# Patient Record
Sex: FEMALE | Race: WHITE | NOT HISPANIC OR LATINO | URBAN - METROPOLITAN AREA
[De-identification: names, ages, dates, MRNs, and addresses within clinical notes are randomized per-mention and may not be internally consistent; named-entity substitution may affect disease eponyms.]

---

## 2017-01-09 ENCOUNTER — EMERGENCY (EMERGENCY)
Facility: HOSPITAL | Age: 52
LOS: 0 days | Discharge: HOME | End: 2017-01-09

## 2017-06-27 DIAGNOSIS — Z86.19 PERSONAL HISTORY OF OTHER INFECTIOUS AND PARASITIC DISEASES: ICD-10-CM

## 2017-06-27 DIAGNOSIS — R05 COUGH: ICD-10-CM

## 2017-06-27 DIAGNOSIS — J02.9 ACUTE PHARYNGITIS, UNSPECIFIED: ICD-10-CM

## 2017-06-27 DIAGNOSIS — K21.9 GASTRO-ESOPHAGEAL REFLUX DISEASE WITHOUT ESOPHAGITIS: ICD-10-CM

## 2017-06-27 DIAGNOSIS — Z87.891 PERSONAL HISTORY OF NICOTINE DEPENDENCE: ICD-10-CM

## 2017-06-27 DIAGNOSIS — I10 ESSENTIAL (PRIMARY) HYPERTENSION: ICD-10-CM

## 2017-06-27 DIAGNOSIS — J44.9 CHRONIC OBSTRUCTIVE PULMONARY DISEASE, UNSPECIFIED: ICD-10-CM

## 2017-06-27 DIAGNOSIS — Z90.49 ACQUIRED ABSENCE OF OTHER SPECIFIED PARTS OF DIGESTIVE TRACT: ICD-10-CM

## 2017-06-27 DIAGNOSIS — Z91.018 ALLERGY TO OTHER FOODS: ICD-10-CM

## 2018-05-25 ENCOUNTER — EMERGENCY (EMERGENCY)
Facility: HOSPITAL | Age: 53
LOS: 0 days | Discharge: HOME | End: 2018-05-26
Attending: EMERGENCY MEDICINE | Admitting: EMERGENCY MEDICINE

## 2018-05-25 VITALS
DIASTOLIC BLOOD PRESSURE: 56 MMHG | OXYGEN SATURATION: 84 % | HEART RATE: 89 BPM | RESPIRATION RATE: 20 BRPM | SYSTOLIC BLOOD PRESSURE: 108 MMHG | TEMPERATURE: 98 F

## 2018-05-25 VITALS
DIASTOLIC BLOOD PRESSURE: 74 MMHG | HEART RATE: 99 BPM | RESPIRATION RATE: 20 BRPM | OXYGEN SATURATION: 97 % | TEMPERATURE: 98 F | SYSTOLIC BLOOD PRESSURE: 150 MMHG

## 2018-05-25 DIAGNOSIS — Z90.49 ACQUIRED ABSENCE OF OTHER SPECIFIED PARTS OF DIGESTIVE TRACT: ICD-10-CM

## 2018-05-25 DIAGNOSIS — K57.92 DIVERTICULITIS OF INTESTINE, PART UNSPECIFIED, WITHOUT PERFORATION OR ABSCESS WITHOUT BLEEDING: ICD-10-CM

## 2018-05-25 DIAGNOSIS — Z79.891 LONG TERM (CURRENT) USE OF OPIATE ANALGESIC: ICD-10-CM

## 2018-05-25 DIAGNOSIS — R10.9 UNSPECIFIED ABDOMINAL PAIN: ICD-10-CM

## 2018-05-25 DIAGNOSIS — I10 ESSENTIAL (PRIMARY) HYPERTENSION: ICD-10-CM

## 2018-05-25 DIAGNOSIS — Z87.891 PERSONAL HISTORY OF NICOTINE DEPENDENCE: ICD-10-CM

## 2018-05-25 DIAGNOSIS — Z91.018 ALLERGY TO OTHER FOODS: ICD-10-CM

## 2018-05-25 DIAGNOSIS — J44.9 CHRONIC OBSTRUCTIVE PULMONARY DISEASE, UNSPECIFIED: ICD-10-CM

## 2018-05-25 DIAGNOSIS — Z79.51 LONG TERM (CURRENT) USE OF INHALED STEROIDS: ICD-10-CM

## 2018-05-25 DIAGNOSIS — Z90.49 ACQUIRED ABSENCE OF OTHER SPECIFIED PARTS OF DIGESTIVE TRACT: Chronic | ICD-10-CM

## 2018-05-25 DIAGNOSIS — Z79.899 OTHER LONG TERM (CURRENT) DRUG THERAPY: ICD-10-CM

## 2018-05-25 LAB
ALBUMIN SERPL ELPH-MCNC: 3.7 G/DL — SIGNIFICANT CHANGE UP (ref 3.5–5.2)
ALP SERPL-CCNC: 92 U/L — SIGNIFICANT CHANGE UP (ref 30–115)
ALT FLD-CCNC: 19 U/L — SIGNIFICANT CHANGE UP (ref 0–41)
ANION GAP SERPL CALC-SCNC: 15 MMOL/L — HIGH (ref 7–14)
APTT BLD: 29.6 SEC — SIGNIFICANT CHANGE UP (ref 27–39.2)
AST SERPL-CCNC: 24 U/L — SIGNIFICANT CHANGE UP (ref 0–41)
BASOPHILS # BLD AUTO: 0.03 K/UL — SIGNIFICANT CHANGE UP (ref 0–0.2)
BASOPHILS NFR BLD AUTO: 0.3 % — SIGNIFICANT CHANGE UP (ref 0–1)
BILIRUB SERPL-MCNC: 0.3 MG/DL — SIGNIFICANT CHANGE UP (ref 0.2–1.2)
BUN SERPL-MCNC: 19 MG/DL — SIGNIFICANT CHANGE UP (ref 10–20)
CALCIUM SERPL-MCNC: 9.3 MG/DL — SIGNIFICANT CHANGE UP (ref 8.5–10.1)
CHLORIDE SERPL-SCNC: 98 MMOL/L — SIGNIFICANT CHANGE UP (ref 98–110)
CO2 SERPL-SCNC: 26 MMOL/L — SIGNIFICANT CHANGE UP (ref 17–32)
CREAT SERPL-MCNC: 1 MG/DL — SIGNIFICANT CHANGE UP (ref 0.7–1.5)
EOSINOPHIL # BLD AUTO: 0.4 K/UL — SIGNIFICANT CHANGE UP (ref 0–0.7)
EOSINOPHIL NFR BLD AUTO: 3.8 % — SIGNIFICANT CHANGE UP (ref 0–8)
GLUCOSE SERPL-MCNC: 103 MG/DL — HIGH (ref 70–99)
HCT VFR BLD CALC: 34.1 % — LOW (ref 37–47)
HGB BLD-MCNC: 10.8 G/DL — LOW (ref 12–16)
IMM GRANULOCYTES NFR BLD AUTO: 0.2 % — SIGNIFICANT CHANGE UP (ref 0.1–0.3)
INR BLD: 0.98 RATIO — SIGNIFICANT CHANGE UP (ref 0.65–1.3)
LACTATE SERPL-SCNC: 1.6 MMOL/L — SIGNIFICANT CHANGE UP (ref 0.5–2.2)
LYMPHOCYTES # BLD AUTO: 1.15 K/UL — LOW (ref 1.2–3.4)
LYMPHOCYTES # BLD AUTO: 10.9 % — LOW (ref 20.5–51.1)
MAGNESIUM SERPL-MCNC: 1.7 MG/DL — LOW (ref 1.8–2.4)
MCHC RBC-ENTMCNC: 26.3 PG — LOW (ref 27–31)
MCHC RBC-ENTMCNC: 31.7 G/DL — LOW (ref 32–37)
MCV RBC AUTO: 83.2 FL — SIGNIFICANT CHANGE UP (ref 81–99)
MONOCYTES # BLD AUTO: 0.77 K/UL — HIGH (ref 0.1–0.6)
MONOCYTES NFR BLD AUTO: 7.3 % — SIGNIFICANT CHANGE UP (ref 1.7–9.3)
NEUTROPHILS # BLD AUTO: 8.16 K/UL — HIGH (ref 1.4–6.5)
NEUTROPHILS NFR BLD AUTO: 77.5 % — HIGH (ref 42.2–75.2)
PLATELET # BLD AUTO: 334 K/UL — SIGNIFICANT CHANGE UP (ref 130–400)
POTASSIUM SERPL-MCNC: 4.4 MMOL/L — SIGNIFICANT CHANGE UP (ref 3.5–5)
POTASSIUM SERPL-SCNC: 4.4 MMOL/L — SIGNIFICANT CHANGE UP (ref 3.5–5)
PROT SERPL-MCNC: 6.3 G/DL — SIGNIFICANT CHANGE UP (ref 6–8)
PROTHROM AB SERPL-ACNC: 10.6 SEC — SIGNIFICANT CHANGE UP (ref 9.95–12.87)
RBC # BLD: 4.1 M/UL — LOW (ref 4.2–5.4)
RBC # FLD: 18 % — HIGH (ref 11.5–14.5)
SODIUM SERPL-SCNC: 139 MMOL/L — SIGNIFICANT CHANGE UP (ref 135–146)
WBC # BLD: 10.53 K/UL — SIGNIFICANT CHANGE UP (ref 4.8–10.8)
WBC # FLD AUTO: 10.53 K/UL — SIGNIFICANT CHANGE UP (ref 4.8–10.8)

## 2018-05-25 RX ORDER — MORPHINE SULFATE 50 MG/1
2 CAPSULE, EXTENDED RELEASE ORAL ONCE
Qty: 0 | Refills: 0 | Status: DISCONTINUED | OUTPATIENT
Start: 2018-05-25 | End: 2018-05-25

## 2018-05-25 RX ORDER — HYDROMORPHONE HYDROCHLORIDE 2 MG/ML
1 INJECTION INTRAMUSCULAR; INTRAVENOUS; SUBCUTANEOUS ONCE
Qty: 0 | Refills: 0 | Status: DISCONTINUED | OUTPATIENT
Start: 2018-05-25 | End: 2018-05-25

## 2018-05-25 RX ORDER — MORPHINE SULFATE 50 MG/1
4 CAPSULE, EXTENDED RELEASE ORAL ONCE
Qty: 0 | Refills: 0 | Status: DISCONTINUED | OUTPATIENT
Start: 2018-05-25 | End: 2018-05-25

## 2018-05-25 RX ORDER — CIPROFLOXACIN LACTATE 400MG/40ML
400 VIAL (ML) INTRAVENOUS ONCE
Qty: 0 | Refills: 0 | Status: COMPLETED | OUTPATIENT
Start: 2018-05-25 | End: 2018-05-25

## 2018-05-25 RX ORDER — SODIUM CHLORIDE 9 MG/ML
1000 INJECTION, SOLUTION INTRAVENOUS ONCE
Qty: 0 | Refills: 0 | Status: COMPLETED | OUTPATIENT
Start: 2018-05-25 | End: 2018-05-25

## 2018-05-25 RX ORDER — METRONIDAZOLE 500 MG
500 TABLET ORAL ONCE
Qty: 0 | Refills: 0 | Status: COMPLETED | OUTPATIENT
Start: 2018-05-25 | End: 2018-05-25

## 2018-05-25 RX ORDER — ONDANSETRON 8 MG/1
4 TABLET, FILM COATED ORAL ONCE
Qty: 0 | Refills: 0 | Status: COMPLETED | OUTPATIENT
Start: 2018-05-25 | End: 2018-05-25

## 2018-05-25 RX ADMIN — HYDROMORPHONE HYDROCHLORIDE 1 MILLIGRAM(S): 2 INJECTION INTRAMUSCULAR; INTRAVENOUS; SUBCUTANEOUS at 21:51

## 2018-05-25 RX ADMIN — Medication 200 MILLIGRAM(S): at 21:52

## 2018-05-25 RX ADMIN — ONDANSETRON 4 MILLIGRAM(S): 8 TABLET, FILM COATED ORAL at 23:53

## 2018-05-25 RX ADMIN — Medication 100 MILLIGRAM(S): at 21:52

## 2018-05-25 RX ADMIN — HYDROMORPHONE HYDROCHLORIDE 1 MILLIGRAM(S): 2 INJECTION INTRAMUSCULAR; INTRAVENOUS; SUBCUTANEOUS at 22:35

## 2018-05-25 RX ADMIN — MORPHINE SULFATE 2 MILLIGRAM(S): 50 CAPSULE, EXTENDED RELEASE ORAL at 18:24

## 2018-05-25 RX ADMIN — MORPHINE SULFATE 2 MILLIGRAM(S): 50 CAPSULE, EXTENDED RELEASE ORAL at 19:30

## 2018-05-25 RX ADMIN — SODIUM CHLORIDE 1000 MILLILITER(S): 9 INJECTION, SOLUTION INTRAVENOUS at 21:46

## 2018-05-25 NOTE — ED PROVIDER NOTE - PHYSICAL EXAMINATION
PHYSICAL EXAM:    T(F): 97.7, Max: 97.7 (05-25-18 @ 15:53)  HR: 99  BP: 150/74  BP(mean): --  RR: 20  SpO2: 97%  GENERAL: NAD, well-developed  HEAD:  Atraumatic, Normocephalic  EYES: EOMI, PERRLA, conjunctiva and sclera clear  NECK: Supple, No JVD  CHEST/LUNG: Clear to auscultation bilaterally; No wheeze  HEART: Regular rate and rhythm; No murmurs, rubs, or gallops  ABDOMEN: tenderness to palpation of LLQ abdomen, rebound tenderness present, no guarding  EXTREMITIES:  2+ Peripheral Pulses, No clubbing, cyanosis, or edema  PSYCH: AAOx3  NEUROLOGY: non-focal  SKIN: No rashes or lesions

## 2018-05-25 NOTE — ED PROVIDER NOTE - PMH
COPD (chronic obstructive pulmonary disease)    Diverticulitis    HTN (hypertension)    Nasopharyngeal cancer    Pulmonary nodules/lesions, multiple

## 2018-05-25 NOTE — ED ADULT NURSE REASSESSMENT NOTE - NS ED NURSE REASSESS COMMENT FT1
Pt reassessed A.O times 4 Vs stable report filling much better Ed attending made aware , NAD noted pt care transfer to next shift RN .

## 2018-05-25 NOTE — ED PROVIDER NOTE - PROGRESS NOTE DETAILS
abdominal pain r/o diverticultiis. will order ct scan, labs, guaiac and re-assess I personally evaluated the patient. I reviewed the Resident’s or Physician Assistant’s note (as assigned above), and agree with the findings and plan except as documented in my note.  51 Y/O F COPD, NASOPHARYNGEAL CA S/P CHEMO/RT, DIVERTCULITIS C/O LLQ PAIN X 2 DAYS. PAIN IS CONSTANT AND WORSENING AND NONRADIATING. PT WITH SEVERAL DAYS OF LOOSE STOOL ALTERNATING WITH "HARD STOOL."PT WITH EPISODES OF LOOSE STOOL MIXED WITH BLOOD OVER THE PAST 3 DAYS. + NAUSEA. NO VOMITING. NO FEVER, CHILS. NORMAL URINATION. NO BACK PAIN. PT HAD A CT SCAN TO FOLLOW UP AN ABNORMAL PET SCAN LAST WEEK AND IT REVEALED A COLITIS WHICH PT DID NOT FOLLOW UP. PSHX: ANN-MARIE. VITALS NOTED. ALERT OX3 MILD DISTRESS DUE TO PAIN. OP NORMAL. MMM. LUNGS CLEAR B/L. RRR S1S2. ABD- SOFT + DISTENDED. + L SIDED ABD TENDERNESS, INCREASED IN LLQ. NO REBOUND. + GUARDING. BACK NONTENDER. NO CVAT B/L. PT SIGNED OUT TO DR. SOLOMON, FOLLOW UP CT SCAN, REASSESS AND DISPO. pt feeling improved with pain, but started feeling nauseated.  will  give antiemetic and reassess, po challenge.  pt with acute diverticulitis, no abscess.  already started on abx pt tolerated po.  will give a few days of percocet for pain, but strongly urged to take stool softeners for the pain.  pt offered admission and pt preferring to go home.

## 2018-05-25 NOTE — ED PROVIDER NOTE - MEDICAL DECISION MAKING DETAILS
wrap up note:  acute diverticulitis, no abscess or perf, started on abx, will need pain meds.  advised to f/u with her gi.  return if sx worsen  Chart finished.

## 2018-05-25 NOTE — ED PROVIDER NOTE - NS ED ROS FT
REVIEW OF SYSTEMS:    CONSTITUTIONAL: No weakness, fevers or chills  EYES/ENT: No visual changes;  No vertigo or throat pain   NECK: No pain or stiffness  RESPIRATORY: No cough, wheezing, hemoptysis; No shortness of breath  CARDIOVASCULAR: No chest pain or palpitations  GASTROINTESTINAL: abdominal pain, diarrhea  GENITOURINARY: No dysuria, frequency or hematuria  NEUROLOGICAL: No numbness or weakness  SKIN: No itching, rashes

## 2018-05-25 NOTE — ED PROVIDER NOTE - OBJECTIVE STATEMENT
52 year old F with COPD, Diverticulitis, pulm nodules, and nasopharyngioma s/p chemo/rad, presenting for a 1 day history or LLQ abdominal pain, nonradiating, dull, associated with nausea and 2x bloody diarrhea episodes. Pt describes pain similar to prior episodes of diverticulitis. Pt denies any fever, chills, sick contacts or recent travel. Pt states that 1 month ptp, pt underwent CT abd/pelvis showing colitis but did not seek further care or follow up on it due to absence of symptoms at that time.

## 2018-05-26 RX ORDER — METRONIDAZOLE 500 MG
1 TABLET ORAL
Qty: 30 | Refills: 0 | OUTPATIENT
Start: 2018-05-26 | End: 2018-06-04

## 2018-05-26 RX ORDER — DOCUSATE SODIUM 100 MG
1 CAPSULE ORAL
Qty: 30 | Refills: 0 | OUTPATIENT
Start: 2018-05-26 | End: 2018-06-04

## 2018-05-26 RX ORDER — MOXIFLOXACIN HYDROCHLORIDE TABLETS, 400 MG 400 MG/1
1 TABLET, FILM COATED ORAL
Qty: 20 | Refills: 0 | OUTPATIENT
Start: 2018-05-26 | End: 2018-06-04

## 2018-06-18 ENCOUNTER — INPATIENT (INPATIENT)
Facility: HOSPITAL | Age: 53
LOS: 3 days | Discharge: HOME | End: 2018-06-22
Attending: INTERNAL MEDICINE | Admitting: INTERNAL MEDICINE

## 2018-06-18 VITALS
DIASTOLIC BLOOD PRESSURE: 76 MMHG | HEART RATE: 90 BPM | OXYGEN SATURATION: 97 % | SYSTOLIC BLOOD PRESSURE: 130 MMHG | RESPIRATION RATE: 18 BRPM | TEMPERATURE: 98 F

## 2018-06-18 DIAGNOSIS — K57.92 DIVERTICULITIS OF INTESTINE, PART UNSPECIFIED, WITHOUT PERFORATION OR ABSCESS WITHOUT BLEEDING: ICD-10-CM

## 2018-06-18 DIAGNOSIS — Z90.49 ACQUIRED ABSENCE OF OTHER SPECIFIED PARTS OF DIGESTIVE TRACT: Chronic | ICD-10-CM

## 2018-06-18 LAB
ALBUMIN SERPL ELPH-MCNC: 3.9 G/DL — SIGNIFICANT CHANGE UP (ref 3.5–5.2)
ALP SERPL-CCNC: 76 U/L — SIGNIFICANT CHANGE UP (ref 30–115)
ALT FLD-CCNC: 15 U/L — SIGNIFICANT CHANGE UP (ref 0–41)
ANION GAP SERPL CALC-SCNC: 15 MMOL/L — HIGH (ref 7–14)
APPEARANCE UR: CLEAR — SIGNIFICANT CHANGE UP
AST SERPL-CCNC: 17 U/L — SIGNIFICANT CHANGE UP (ref 0–41)
BACTERIA # UR AUTO: (no result) /HPF
BASOPHILS # BLD AUTO: 0.03 K/UL — SIGNIFICANT CHANGE UP (ref 0–0.2)
BASOPHILS NFR BLD AUTO: 0.4 % — SIGNIFICANT CHANGE UP (ref 0–1)
BILIRUB SERPL-MCNC: <0.2 MG/DL — SIGNIFICANT CHANGE UP (ref 0.2–1.2)
BILIRUB UR-MCNC: NEGATIVE — SIGNIFICANT CHANGE UP
BUN SERPL-MCNC: 17 MG/DL — SIGNIFICANT CHANGE UP (ref 10–20)
CALCIUM SERPL-MCNC: 8.7 MG/DL — SIGNIFICANT CHANGE UP (ref 8.5–10.1)
CHLORIDE SERPL-SCNC: 98 MMOL/L — SIGNIFICANT CHANGE UP (ref 98–110)
CO2 SERPL-SCNC: 24 MMOL/L — SIGNIFICANT CHANGE UP (ref 17–32)
COLOR SPEC: YELLOW — SIGNIFICANT CHANGE UP
CREAT SERPL-MCNC: 0.9 MG/DL — SIGNIFICANT CHANGE UP (ref 0.7–1.5)
DIFF PNL FLD: NEGATIVE — SIGNIFICANT CHANGE UP
EOSINOPHIL # BLD AUTO: 0.31 K/UL — SIGNIFICANT CHANGE UP (ref 0–0.7)
EOSINOPHIL NFR BLD AUTO: 3.7 % — SIGNIFICANT CHANGE UP (ref 0–8)
EPI CELLS # UR: (no result) /HPF
GLUCOSE SERPL-MCNC: 102 MG/DL — HIGH (ref 70–99)
GLUCOSE UR QL: NEGATIVE MG/DL — SIGNIFICANT CHANGE UP
HCT VFR BLD CALC: 34.6 % — LOW (ref 37–47)
HGB BLD-MCNC: 10.9 G/DL — LOW (ref 12–16)
IMM GRANULOCYTES NFR BLD AUTO: 0.4 % — HIGH (ref 0.1–0.3)
KETONES UR-MCNC: NEGATIVE — SIGNIFICANT CHANGE UP
LACTATE SERPL-SCNC: 1.1 MMOL/L — SIGNIFICANT CHANGE UP (ref 0.5–2.2)
LEUKOCYTE ESTERASE UR-ACNC: NEGATIVE — SIGNIFICANT CHANGE UP
LIDOCAIN IGE QN: 24 U/L — SIGNIFICANT CHANGE UP (ref 7–60)
LYMPHOCYTES # BLD AUTO: 1.08 K/UL — LOW (ref 1.2–3.4)
LYMPHOCYTES # BLD AUTO: 12.9 % — LOW (ref 20.5–51.1)
MCHC RBC-ENTMCNC: 26.3 PG — LOW (ref 27–31)
MCHC RBC-ENTMCNC: 31.5 G/DL — LOW (ref 32–37)
MCV RBC AUTO: 83.4 FL — SIGNIFICANT CHANGE UP (ref 81–99)
MONOCYTES # BLD AUTO: 0.54 K/UL — SIGNIFICANT CHANGE UP (ref 0.1–0.6)
MONOCYTES NFR BLD AUTO: 6.5 % — SIGNIFICANT CHANGE UP (ref 1.7–9.3)
NEUTROPHILS # BLD AUTO: 6.35 K/UL — SIGNIFICANT CHANGE UP (ref 1.4–6.5)
NEUTROPHILS NFR BLD AUTO: 76.1 % — HIGH (ref 42.2–75.2)
NITRITE UR-MCNC: NEGATIVE — SIGNIFICANT CHANGE UP
NRBC # BLD: 0 /100 WBCS — SIGNIFICANT CHANGE UP (ref 0–0)
PH UR: 6 — SIGNIFICANT CHANGE UP (ref 5–8)
PLATELET # BLD AUTO: 319 K/UL — SIGNIFICANT CHANGE UP (ref 130–400)
POTASSIUM SERPL-MCNC: 3.9 MMOL/L — SIGNIFICANT CHANGE UP (ref 3.5–5)
POTASSIUM SERPL-SCNC: 3.9 MMOL/L — SIGNIFICANT CHANGE UP (ref 3.5–5)
PROT SERPL-MCNC: 6.6 G/DL — SIGNIFICANT CHANGE UP (ref 6–8)
PROT UR-MCNC: 30 MG/DL
RBC # BLD: 4.15 M/UL — LOW (ref 4.2–5.4)
RBC # FLD: 17 % — HIGH (ref 11.5–14.5)
SODIUM SERPL-SCNC: 137 MMOL/L — SIGNIFICANT CHANGE UP (ref 135–146)
SP GR SPEC: 1.02 — SIGNIFICANT CHANGE UP (ref 1.01–1.03)
UROBILINOGEN FLD QL: 0.2 MG/DL — SIGNIFICANT CHANGE UP (ref 0.2–0.2)
WBC # BLD: 8.34 K/UL — SIGNIFICANT CHANGE UP (ref 4.8–10.8)
WBC # FLD AUTO: 8.34 K/UL — SIGNIFICANT CHANGE UP (ref 4.8–10.8)
WBC UR QL: SIGNIFICANT CHANGE UP /HPF

## 2018-06-18 RX ORDER — PANTOPRAZOLE SODIUM 20 MG/1
40 TABLET, DELAYED RELEASE ORAL
Qty: 0 | Refills: 0 | Status: DISCONTINUED | OUTPATIENT
Start: 2018-06-18 | End: 2018-06-22

## 2018-06-18 RX ORDER — LEVOTHYROXINE SODIUM 125 MCG
88 TABLET ORAL DAILY
Qty: 0 | Refills: 0 | Status: DISCONTINUED | OUTPATIENT
Start: 2018-06-18 | End: 2018-06-22

## 2018-06-18 RX ORDER — CIPROFLOXACIN LACTATE 400MG/40ML
400 VIAL (ML) INTRAVENOUS EVERY 12 HOURS
Qty: 0 | Refills: 0 | Status: DISCONTINUED | OUTPATIENT
Start: 2018-06-19 | End: 2018-06-19

## 2018-06-18 RX ORDER — HYDROMORPHONE HYDROCHLORIDE 2 MG/ML
4 INJECTION INTRAMUSCULAR; INTRAVENOUS; SUBCUTANEOUS EVERY 6 HOURS
Qty: 0 | Refills: 0 | Status: DISCONTINUED | OUTPATIENT
Start: 2018-06-18 | End: 2018-06-22

## 2018-06-18 RX ORDER — CYCLOBENZAPRINE HYDROCHLORIDE 10 MG/1
0 TABLET, FILM COATED ORAL
Qty: 60 | Refills: 0 | COMMUNITY

## 2018-06-18 RX ORDER — BUPROPION HYDROCHLORIDE 150 MG/1
300 TABLET, EXTENDED RELEASE ORAL DAILY
Qty: 0 | Refills: 0 | Status: DISCONTINUED | OUTPATIENT
Start: 2018-06-18 | End: 2018-06-22

## 2018-06-18 RX ORDER — ONDANSETRON 8 MG/1
8 TABLET, FILM COATED ORAL THREE TIMES A DAY
Qty: 0 | Refills: 0 | Status: DISCONTINUED | OUTPATIENT
Start: 2018-06-18 | End: 2018-06-20

## 2018-06-18 RX ORDER — SODIUM CHLORIDE 9 MG/ML
1000 INJECTION, SOLUTION INTRAVENOUS
Qty: 0 | Refills: 0 | Status: DISCONTINUED | OUTPATIENT
Start: 2018-06-18 | End: 2018-06-19

## 2018-06-18 RX ORDER — CIPROFLOXACIN LACTATE 400MG/40ML
400 VIAL (ML) INTRAVENOUS ONCE
Qty: 0 | Refills: 0 | Status: COMPLETED | OUTPATIENT
Start: 2018-06-18 | End: 2018-06-18

## 2018-06-18 RX ORDER — ALBUTEROL 90 UG/1
2 AEROSOL, METERED ORAL EVERY 6 HOURS
Qty: 0 | Refills: 0 | Status: DISCONTINUED | OUTPATIENT
Start: 2018-06-18 | End: 2018-06-22

## 2018-06-18 RX ORDER — CLONAZEPAM 1 MG
0 TABLET ORAL
Qty: 60 | Refills: 0 | COMMUNITY

## 2018-06-18 RX ORDER — HYDROMORPHONE HYDROCHLORIDE 2 MG/ML
1 INJECTION INTRAMUSCULAR; INTRAVENOUS; SUBCUTANEOUS ONCE
Qty: 0 | Refills: 0 | Status: DISCONTINUED | OUTPATIENT
Start: 2018-06-18 | End: 2018-06-18

## 2018-06-18 RX ORDER — ZOLPIDEM TARTRATE 10 MG/1
5 TABLET ORAL AT BEDTIME
Qty: 0 | Refills: 0 | Status: DISCONTINUED | OUTPATIENT
Start: 2018-06-18 | End: 2018-06-22

## 2018-06-18 RX ORDER — CLONAZEPAM 1 MG
0.5 TABLET ORAL
Qty: 0 | Refills: 0 | Status: DISCONTINUED | OUTPATIENT
Start: 2018-06-18 | End: 2018-06-22

## 2018-06-18 RX ORDER — DOCUSATE SODIUM 100 MG
100 CAPSULE ORAL THREE TIMES A DAY
Qty: 0 | Refills: 0 | Status: DISCONTINUED | OUTPATIENT
Start: 2018-06-18 | End: 2018-06-22

## 2018-06-18 RX ORDER — ONDANSETRON 8 MG/1
4 TABLET, FILM COATED ORAL ONCE
Qty: 0 | Refills: 0 | Status: COMPLETED | OUTPATIENT
Start: 2018-06-18 | End: 2018-06-18

## 2018-06-18 RX ORDER — IBUPROFEN 200 MG
600 TABLET ORAL THREE TIMES A DAY
Qty: 0 | Refills: 0 | Status: DISCONTINUED | OUTPATIENT
Start: 2018-06-18 | End: 2018-06-22

## 2018-06-18 RX ORDER — METRONIDAZOLE 500 MG
500 TABLET ORAL EVERY 8 HOURS
Qty: 0 | Refills: 0 | Status: DISCONTINUED | OUTPATIENT
Start: 2018-06-18 | End: 2018-06-22

## 2018-06-18 RX ORDER — SENNA PLUS 8.6 MG/1
1 TABLET ORAL
Qty: 0 | Refills: 0 | Status: DISCONTINUED | OUTPATIENT
Start: 2018-06-18 | End: 2018-06-22

## 2018-06-18 RX ORDER — ENOXAPARIN SODIUM 100 MG/ML
40 INJECTION SUBCUTANEOUS DAILY
Qty: 0 | Refills: 0 | Status: DISCONTINUED | OUTPATIENT
Start: 2018-06-18 | End: 2018-06-22

## 2018-06-18 RX ORDER — CIPROFLOXACIN LACTATE 400MG/40ML
VIAL (ML) INTRAVENOUS
Qty: 0 | Refills: 0 | Status: DISCONTINUED | OUTPATIENT
Start: 2018-06-18 | End: 2018-06-19

## 2018-06-18 RX ORDER — SODIUM CHLORIDE 9 MG/ML
1000 INJECTION, SOLUTION INTRAVENOUS ONCE
Qty: 0 | Refills: 0 | Status: COMPLETED | OUTPATIENT
Start: 2018-06-18 | End: 2018-06-18

## 2018-06-18 RX ORDER — LEVOTHYROXINE SODIUM 125 MCG
0 TABLET ORAL
Qty: 30 | Refills: 0 | COMMUNITY

## 2018-06-18 RX ORDER — DIPHENHYDRAMINE HCL 50 MG
25 CAPSULE ORAL ONCE
Qty: 0 | Refills: 0 | Status: COMPLETED | OUTPATIENT
Start: 2018-06-18 | End: 2018-06-18

## 2018-06-18 RX ORDER — CYCLOBENZAPRINE HYDROCHLORIDE 10 MG/1
10 TABLET, FILM COATED ORAL THREE TIMES A DAY
Qty: 0 | Refills: 0 | Status: DISCONTINUED | OUTPATIENT
Start: 2018-06-18 | End: 2018-06-22

## 2018-06-18 RX ORDER — TIOTROPIUM BROMIDE 18 UG/1
1 CAPSULE ORAL; RESPIRATORY (INHALATION) DAILY
Qty: 0 | Refills: 0 | Status: DISCONTINUED | OUTPATIENT
Start: 2018-06-18 | End: 2018-06-22

## 2018-06-18 RX ORDER — PIPERACILLIN AND TAZOBACTAM 4; .5 G/20ML; G/20ML
3.38 INJECTION, POWDER, LYOPHILIZED, FOR SOLUTION INTRAVENOUS ONCE
Qty: 0 | Refills: 0 | Status: COMPLETED | OUTPATIENT
Start: 2018-06-18 | End: 2018-06-18

## 2018-06-18 RX ORDER — BUDESONIDE AND FORMOTEROL FUMARATE DIHYDRATE 160; 4.5 UG/1; UG/1
2 AEROSOL RESPIRATORY (INHALATION)
Qty: 0 | Refills: 0 | Status: DISCONTINUED | OUTPATIENT
Start: 2018-06-18 | End: 2018-06-22

## 2018-06-18 RX ORDER — PANTOPRAZOLE SODIUM 20 MG/1
0 TABLET, DELAYED RELEASE ORAL
Qty: 30 | Refills: 0 | COMMUNITY

## 2018-06-18 RX ORDER — OXYCODONE AND ACETAMINOPHEN 5; 325 MG/1; MG/1
2 TABLET ORAL EVERY 6 HOURS
Qty: 0 | Refills: 0 | Status: DISCONTINUED | OUTPATIENT
Start: 2018-06-18 | End: 2018-06-22

## 2018-06-18 RX ORDER — MONTELUKAST 4 MG/1
10 TABLET, CHEWABLE ORAL DAILY
Qty: 0 | Refills: 0 | Status: DISCONTINUED | OUTPATIENT
Start: 2018-06-18 | End: 2018-06-22

## 2018-06-18 RX ORDER — ZOLPIDEM TARTRATE 10 MG/1
0 TABLET ORAL
Qty: 30 | Refills: 0 | COMMUNITY

## 2018-06-18 RX ORDER — BUPROPION HYDROCHLORIDE 150 MG/1
0 TABLET, EXTENDED RELEASE ORAL
Qty: 30 | Refills: 0 | COMMUNITY

## 2018-06-18 RX ADMIN — Medication 100 MILLIGRAM(S): at 23:56

## 2018-06-18 RX ADMIN — Medication 25 MILLIGRAM(S): at 22:27

## 2018-06-18 RX ADMIN — HYDROMORPHONE HYDROCHLORIDE 1 MILLIGRAM(S): 2 INJECTION INTRAMUSCULAR; INTRAVENOUS; SUBCUTANEOUS at 17:14

## 2018-06-18 RX ADMIN — HYDROMORPHONE HYDROCHLORIDE 1 MILLIGRAM(S): 2 INJECTION INTRAMUSCULAR; INTRAVENOUS; SUBCUTANEOUS at 16:23

## 2018-06-18 RX ADMIN — Medication 200 MILLIGRAM(S): at 21:19

## 2018-06-18 RX ADMIN — ONDANSETRON 4 MILLIGRAM(S): 8 TABLET, FILM COATED ORAL at 16:16

## 2018-06-18 RX ADMIN — SODIUM CHLORIDE 1000 MILLILITER(S): 9 INJECTION, SOLUTION INTRAVENOUS at 16:16

## 2018-06-18 RX ADMIN — PIPERACILLIN AND TAZOBACTAM 200 GRAM(S): 4; .5 INJECTION, POWDER, LYOPHILIZED, FOR SOLUTION INTRAVENOUS at 18:29

## 2018-06-18 RX ADMIN — HYDROMORPHONE HYDROCHLORIDE 4 MILLIGRAM(S): 2 INJECTION INTRAMUSCULAR; INTRAVENOUS; SUBCUTANEOUS at 21:19

## 2018-06-18 RX ADMIN — HYDROMORPHONE HYDROCHLORIDE 4 MILLIGRAM(S): 2 INJECTION INTRAMUSCULAR; INTRAVENOUS; SUBCUTANEOUS at 22:30

## 2018-06-18 RX ADMIN — BUDESONIDE AND FORMOTEROL FUMARATE DIHYDRATE 2 PUFF(S): 160; 4.5 AEROSOL RESPIRATORY (INHALATION) at 21:20

## 2018-06-18 RX ADMIN — SODIUM CHLORIDE 75 MILLILITER(S): 9 INJECTION, SOLUTION INTRAVENOUS at 21:19

## 2018-06-18 RX ADMIN — Medication 100 MILLIGRAM(S): at 23:57

## 2018-06-18 NOTE — H&P ADULT - ATTENDING COMMENTS
53 y/o female with a history of diverticulitis, recently d/lynda from the ED MAy 25th with cipro and flagyl with o/p f/u with a gastroenterologist presents with acute sigmoid diverticulitis and no abscess formation.    pt seen and examined in er    chart reviwed    agree with plan above    IV abx    GI and surgery f/u    ID eval if any fever    stat repeat CT if any worsening pain    frequent abdominal exams    Call or text with any questions or updates

## 2018-06-18 NOTE — H&P ADULT - NSHPLABSRESULTS_GEN_ALL_CORE
T(F): 97.5 (18 @ 15:01), Max: 97.5 (18 @ 15:01)  HR: 90 (18 @ 15:01) (90 - 90)  BP: 130/76 (18 @ 15:01) (130/76 - 130/76)  BP(mean): --  RR: 18 (18 @ 15:01) (18 - 18)  SpO2: 97% (18 @ 15:01) (97% - 97%)        137  |  98  |  17  ----------------------------<  102<H>  3.9   |  24  |  0.9    Ca    8.7      2018 16:05    TPro  6.6  /  Alb  3.9  /  TBili  <0.2  /  DBili  x   /  AST  17  /  ALT  15  /  AlkPhos  76                              10.9   8.34  )-----------( 319      ( 2018 16:05 )             34.6         Urinalysis Basic - ( 2018 16:05 )    Color: Yellow / Appearance: Clear / S.020 / pH: x  Gluc: x / Ketone: Negative  / Bili: Negative / Urobili: 0.2 mg/dL   Blood: x / Protein: 30 mg/dL / Nitrite: Negative   Leuk Esterase: Negative / RBC: x / WBC 3-5 /HPF   Sq Epi: x / Non Sq Epi: Moderate /HPF / Bacteria: Few /HPF         < from: CT Abdomen and Pelvis w/ IV Cont (18 @ 17:07) >    1. Acute sigmoid diverticulitis; no abscess.    < end of copied text >

## 2018-06-18 NOTE — H&P ADULT - ASSESSMENT
51 y/o female with a history of diverticulitis, recently d/lynda from the ED MAy 25th with cipro and flagyl with o/p f/u with a gastroenterologist presents with acute sigmoid diverticulitis and no abscess formation.          #acute/recurrent diverticulitis  -admit to medicine  -NPO except meds and ice chips  -GI evaluation  -surgery evaluation given that this is recurrent  - IVF- LR @100cc/hr  -cipro+ flagyl IV  -pain control      #COPD not on home O2  -symbicort + spiriva (pt on breo + incruse at home)  - ventolin prn    #GERD  -protonix qd    #anxiety  clonazepam prn + bupropion    #insomnia  zolpidem    #htn  -enalapril 5mg    #h/a  pain control    #cervical discopathy  -percocet prn for mod pain  -dilaudid po prn for severe pain  -flexeril for muscle spasms    #constipation/ nausea  colace 100mg tid  senna bid  zofran prn    #insomnia  zolpidem    #dvt ppx

## 2018-06-18 NOTE — H&P ADULT - PMH
Cervical disc disease  sequelae of radtion for nasopharyngeal cancer  COPD (chronic obstructive pulmonary disease)    Diverticulitis    Hemorrhoids    HTN (hypertension)    Hypothyroid    Left ear hearing loss    Nasopharyngeal cancer    Pulmonary nodules/lesions, multiple

## 2018-06-18 NOTE — ED PROVIDER NOTE - PHYSICAL EXAMINATION
CONSTITUTIONAL: WA / WN / NAD  HEAD: NCAT  EYES: PERRL; EOMI; anicteric.  ENT: Normal pharynx; mucous membranes pink/moist, no erythema.  NECK: Supple; no meningeal signs  CARD: RRR; nl S1/S2; no M/R/G. Pulses equal bilaterally.  RESP: Respiratory rate and effort are normal; breath sounds clear and equal bilaterally.  ABD: Soft,  + RLQ & LLQ abdominal tenderness  MSK/EXT: No gross deformities;  SKIN: Warm and dry;   NEURO: AAOx3, Motor 5/5 x 4 extremities  PSYCH: Memory Intact, Normal Affect

## 2018-06-18 NOTE — ED PROVIDER NOTE - NS ED ROS FT
Constitutional:  See HPI.  ENMT:  No hearing changes, pain, discharge or infections. No neck pain or stiffness.  Cardiac:  No chest pain, SOB o  Respiratory:  No cough or respiratory distress.   GI:  see hpi  :  No dysuria, frequency or burning.  MS:  No myalgia, muscle weakness, joint pain or back pain.  Neuro:  No focal neurological complaints.

## 2018-06-18 NOTE — ED PROVIDER NOTE - PROGRESS NOTE DETAILS
Attending Note: 53 y/o F PMHx diverticulitis presents with L lower abdominal pain since last night associated with blood in stool and constipation. (+) nausea, no vomiting. No fevers or chills. No CP, some SOB with pain. No back pain. Pt able to recall 4 episodes of diverticulitis throughout her life but reports after last episode x1 month ago her stomach has not felt the same. PE: Pt in NAD. AAOX3. Head NCAT. Lungs CTAB. CV S1S2 regular. Abdomen soft, LLQ ND. No CVAT. Plan: CT and reassess. Attending Note: 51 y/o F, PMHx diverticulitis, last episode 1 mo ago, presents with L lower abdominal pain since last night associated with blood in stool and nausea, no vomiting. No fevers or chills. No CP/SOB. No back pain. No urinary symptoms. PE: Pt in NAD. AAOX3. Head NCAT. Lungs CTAB. CV S1S2 regular. Abdomen soft/LLQ abdominal pain/ND/(+)BS, back (-) CVAT. Plan: CT, labs, and reassess.

## 2018-06-18 NOTE — H&P ADULT - NSHPPHYSICALEXAM_GEN_ALL_CORE
PHYSICAL EXAM:  GENERAL: NAD, speaks in full sentences, no signs of respiratory distress, morbidly obese female  HEAD:  Atraumatic, Normocephalic, decreased left sidede hearing  EYES:  conjunctiva and sclera clear  NECK: Supple,   CHEST/LUNG: Clear to auscultation bilaterally; No wheeze; No crackles; No accessory muscles used  HEART: Regular rate and rhythm; No murmurs;   ABDOMEN: Soft, Nondistended;  No guarding, TTP LLQ and RLQ, no rebound tenderness  EXTREMITIES:  No cyanosis or edema  PSYCH: AAOx3  NEUROLOGY: non-focal  SKIN: No rashes or lesions

## 2018-06-18 NOTE — ED ADULT NURSE NOTE - OBJECTIVE STATEMENT
pt comes in with complaints of left lower abdominal pain that radiates to groin. feels like labor pains. denies fever. no diarrhea. pt states she is unable to go to bathroom. not sure on how many days its been. pain started last night.  has nausea no vomiting. pt states she placed a gloved finger up her rectum thinking her stool was hard, states red blood and white jelly material came out but no stool.

## 2018-06-18 NOTE — H&P ADULT - HISTORY OF PRESENT ILLNESS
51 yo female with history of recurrent diverticulitis, COPD, pulmonary nodules, hypothyroidism, nasopharygneal cancer s/p chemo + radiation (2013 leading to cervical discopathy and left sided hearing loss p/w 1 day history of worsening LLQ abdominal pain  a/w nausea and diarrhea. Patient was recently at [facility] in May for diverticulitis, seen in the ED and discharged with a course of cipro and flagyl. Per patient she states she never fully recovered while at home, states that she was still continuing to have diarrhea and was having on and off diffuse headaches over the past 2 weeks. 24 hour ptp patient states she was constipated and overnight she tried to disimpact herself (which she has done in the past) and feels that she may have aggravated her hemorrhoids because she had blood streaked stool afterward, and at times when she does strain she noticed that she has blood streaked stool as well. Her abdominal pain improved since her discharge in the ED from may but 1 night ptp it reoccurred prompting her to come to the ED for further evaluation. IN the ED patient was found to have acute sigmoid diverticulitis on CT imaging was given zosyn, 1 liter LR bolus.

## 2018-06-18 NOTE — CONSULT NOTE ADULT - ASSESSMENT
53y/o female with abdominal pain from recurrent diverticulitis  NPO  IV fluids  IV abx  Colonoscopy after 6 weeks  Recommend surgery after colonscopy after discharge  Will continue to follow

## 2018-06-18 NOTE — ED PROVIDER NOTE - OBJECTIVE STATEMENT
52 year old female with a pmh of diveriticulitits copd , pulm nodules nasopharyngea carcinoma in the past s/p chemo and radiation presents here c/o llq abdominal pain that began suddenly last night . Patient states the pain is similar to her previous episode of diveriticulittis 1 month ago when she received cipro & flagyl. Patient states she has had blood in her stool yesterday as well. Patient admits to having nausea. Denies fever chills cp urinary frequency or urgency.

## 2018-06-18 NOTE — CONSULT NOTE ADULT - PROBLEM SELECTOR RECOMMENDATION 9
NPO  IV fluids  IV abx  Colonoscopy after 6 weeks  Recommend surgery after colonscopy after discharge  Will continue to follow

## 2018-06-19 LAB
ALBUMIN SERPL ELPH-MCNC: 3.7 G/DL — SIGNIFICANT CHANGE UP (ref 3.5–5.2)
ALP SERPL-CCNC: 78 U/L — SIGNIFICANT CHANGE UP (ref 30–115)
ALT FLD-CCNC: 15 U/L — SIGNIFICANT CHANGE UP (ref 0–41)
ANION GAP SERPL CALC-SCNC: 14 MMOL/L — SIGNIFICANT CHANGE UP (ref 7–14)
AST SERPL-CCNC: 16 U/L — SIGNIFICANT CHANGE UP (ref 0–41)
BASOPHILS # BLD AUTO: 0.03 K/UL — SIGNIFICANT CHANGE UP (ref 0–0.2)
BASOPHILS NFR BLD AUTO: 0.4 % — SIGNIFICANT CHANGE UP (ref 0–1)
BILIRUB SERPL-MCNC: <0.2 MG/DL — SIGNIFICANT CHANGE UP (ref 0.2–1.2)
BUN SERPL-MCNC: 14 MG/DL — SIGNIFICANT CHANGE UP (ref 10–20)
CALCIUM SERPL-MCNC: 8.6 MG/DL — SIGNIFICANT CHANGE UP (ref 8.5–10.1)
CHLORIDE SERPL-SCNC: 100 MMOL/L — SIGNIFICANT CHANGE UP (ref 98–110)
CO2 SERPL-SCNC: 27 MMOL/L — SIGNIFICANT CHANGE UP (ref 17–32)
CREAT SERPL-MCNC: 0.9 MG/DL — SIGNIFICANT CHANGE UP (ref 0.7–1.5)
EOSINOPHIL # BLD AUTO: 0.35 K/UL — SIGNIFICANT CHANGE UP (ref 0–0.7)
EOSINOPHIL NFR BLD AUTO: 4.5 % — SIGNIFICANT CHANGE UP (ref 0–8)
GLUCOSE SERPL-MCNC: 111 MG/DL — HIGH (ref 70–99)
HCT VFR BLD CALC: 32.9 % — LOW (ref 37–47)
HGB BLD-MCNC: 10.5 G/DL — LOW (ref 12–16)
IMM GRANULOCYTES NFR BLD AUTO: 0.3 % — SIGNIFICANT CHANGE UP (ref 0.1–0.3)
LYMPHOCYTES # BLD AUTO: 0.97 K/UL — LOW (ref 1.2–3.4)
LYMPHOCYTES # BLD AUTO: 12.4 % — LOW (ref 20.5–51.1)
MCHC RBC-ENTMCNC: 26.9 PG — LOW (ref 27–31)
MCHC RBC-ENTMCNC: 31.9 G/DL — LOW (ref 32–37)
MCV RBC AUTO: 84.1 FL — SIGNIFICANT CHANGE UP (ref 81–99)
MONOCYTES # BLD AUTO: 0.56 K/UL — SIGNIFICANT CHANGE UP (ref 0.1–0.6)
MONOCYTES NFR BLD AUTO: 7.1 % — SIGNIFICANT CHANGE UP (ref 1.7–9.3)
NEUTROPHILS # BLD AUTO: 5.91 K/UL — SIGNIFICANT CHANGE UP (ref 1.4–6.5)
NEUTROPHILS NFR BLD AUTO: 75.3 % — HIGH (ref 42.2–75.2)
PLATELET # BLD AUTO: 305 K/UL — SIGNIFICANT CHANGE UP (ref 130–400)
POTASSIUM SERPL-MCNC: 4.2 MMOL/L — SIGNIFICANT CHANGE UP (ref 3.5–5)
POTASSIUM SERPL-SCNC: 4.2 MMOL/L — SIGNIFICANT CHANGE UP (ref 3.5–5)
PROT SERPL-MCNC: 6.3 G/DL — SIGNIFICANT CHANGE UP (ref 6–8)
RBC # BLD: 3.91 M/UL — LOW (ref 4.2–5.4)
RBC # FLD: 17.2 % — HIGH (ref 11.5–14.5)
SODIUM SERPL-SCNC: 141 MMOL/L — SIGNIFICANT CHANGE UP (ref 135–146)
WBC # BLD: 7.84 K/UL — SIGNIFICANT CHANGE UP (ref 4.8–10.8)
WBC # FLD AUTO: 7.84 K/UL — SIGNIFICANT CHANGE UP (ref 4.8–10.8)

## 2018-06-19 RX ORDER — ONDANSETRON 8 MG/1
4 TABLET, FILM COATED ORAL ONCE
Qty: 0 | Refills: 0 | Status: COMPLETED | OUTPATIENT
Start: 2018-06-19 | End: 2018-06-19

## 2018-06-19 RX ORDER — CEFTRIAXONE 500 MG/1
2 INJECTION, POWDER, FOR SOLUTION INTRAMUSCULAR; INTRAVENOUS ONCE
Qty: 0 | Refills: 0 | Status: COMPLETED | OUTPATIENT
Start: 2018-06-19 | End: 2018-06-19

## 2018-06-19 RX ORDER — CEFTRIAXONE 500 MG/1
INJECTION, POWDER, FOR SOLUTION INTRAMUSCULAR; INTRAVENOUS
Qty: 0 | Refills: 0 | Status: DISCONTINUED | OUTPATIENT
Start: 2018-06-19 | End: 2018-06-22

## 2018-06-19 RX ORDER — CEFTRIAXONE 500 MG/1
2 INJECTION, POWDER, FOR SOLUTION INTRAMUSCULAR; INTRAVENOUS EVERY 24 HOURS
Qty: 0 | Refills: 0 | Status: DISCONTINUED | OUTPATIENT
Start: 2018-06-20 | End: 2018-06-22

## 2018-06-19 RX ADMIN — Medication 600 MILLIGRAM(S): at 06:12

## 2018-06-19 RX ADMIN — SENNA PLUS 1 TABLET(S): 8.6 TABLET ORAL at 06:13

## 2018-06-19 RX ADMIN — BUDESONIDE AND FORMOTEROL FUMARATE DIHYDRATE 2 PUFF(S): 160; 4.5 AEROSOL RESPIRATORY (INHALATION) at 08:16

## 2018-06-19 RX ADMIN — HYDROMORPHONE HYDROCHLORIDE 4 MILLIGRAM(S): 2 INJECTION INTRAMUSCULAR; INTRAVENOUS; SUBCUTANEOUS at 17:38

## 2018-06-19 RX ADMIN — Medication 100 MILLIGRAM(S): at 21:27

## 2018-06-19 RX ADMIN — BUPROPION HYDROCHLORIDE 300 MILLIGRAM(S): 150 TABLET, EXTENDED RELEASE ORAL at 13:22

## 2018-06-19 RX ADMIN — Medication 100 MILLIGRAM(S): at 06:12

## 2018-06-19 RX ADMIN — Medication 5 MILLIGRAM(S): at 06:13

## 2018-06-19 RX ADMIN — Medication 600 MILLIGRAM(S): at 16:44

## 2018-06-19 RX ADMIN — ZOLPIDEM TARTRATE 5 MILLIGRAM(S): 10 TABLET ORAL at 21:29

## 2018-06-19 RX ADMIN — Medication 100 MILLIGRAM(S): at 14:50

## 2018-06-19 RX ADMIN — TIOTROPIUM BROMIDE 1 CAPSULE(S): 18 CAPSULE ORAL; RESPIRATORY (INHALATION) at 09:53

## 2018-06-19 RX ADMIN — HYDROMORPHONE HYDROCHLORIDE 4 MILLIGRAM(S): 2 INJECTION INTRAMUSCULAR; INTRAVENOUS; SUBCUTANEOUS at 04:17

## 2018-06-19 RX ADMIN — CEFTRIAXONE 100 GRAM(S): 500 INJECTION, POWDER, FOR SOLUTION INTRAMUSCULAR; INTRAVENOUS at 11:56

## 2018-06-19 RX ADMIN — Medication 600 MILLIGRAM(S): at 07:07

## 2018-06-19 RX ADMIN — Medication 88 MICROGRAM(S): at 06:12

## 2018-06-19 RX ADMIN — HYDROMORPHONE HYDROCHLORIDE 4 MILLIGRAM(S): 2 INJECTION INTRAMUSCULAR; INTRAVENOUS; SUBCUTANEOUS at 22:38

## 2018-06-19 RX ADMIN — Medication 100 MILLIGRAM(S): at 06:13

## 2018-06-19 RX ADMIN — ONDANSETRON 8 MILLIGRAM(S): 8 TABLET, FILM COATED ORAL at 13:15

## 2018-06-19 RX ADMIN — HYDROMORPHONE HYDROCHLORIDE 4 MILLIGRAM(S): 2 INJECTION INTRAMUSCULAR; INTRAVENOUS; SUBCUTANEOUS at 10:08

## 2018-06-19 RX ADMIN — PANTOPRAZOLE SODIUM 40 MILLIGRAM(S): 20 TABLET, DELAYED RELEASE ORAL at 06:13

## 2018-06-19 RX ADMIN — Medication 600 MILLIGRAM(S): at 17:38

## 2018-06-19 RX ADMIN — HYDROMORPHONE HYDROCHLORIDE 4 MILLIGRAM(S): 2 INJECTION INTRAMUSCULAR; INTRAVENOUS; SUBCUTANEOUS at 16:31

## 2018-06-19 RX ADMIN — HYDROMORPHONE HYDROCHLORIDE 4 MILLIGRAM(S): 2 INJECTION INTRAMUSCULAR; INTRAVENOUS; SUBCUTANEOUS at 22:30

## 2018-06-19 RX ADMIN — HYDROMORPHONE HYDROCHLORIDE 4 MILLIGRAM(S): 2 INJECTION INTRAMUSCULAR; INTRAVENOUS; SUBCUTANEOUS at 04:10

## 2018-06-19 RX ADMIN — MONTELUKAST 10 MILLIGRAM(S): 4 TABLET, CHEWABLE ORAL at 13:11

## 2018-06-19 RX ADMIN — ENOXAPARIN SODIUM 40 MILLIGRAM(S): 100 INJECTION SUBCUTANEOUS at 13:10

## 2018-06-19 NOTE — CONSULT NOTE ADULT - SUBJECTIVE AND OBJECTIVE BOX
HPI:  53 yo female with history of recurrent diverticulitis, COPD, pulmonary nodules, hypothyroidism, nasopharygneal cancer s/p chemo + radiation ( leading to cervical discopathy and left sided hearing loss p/w 1 day history of worsening constant LLQ abdominal pain  a/w nausea and watery diarrhea. The pain is described as sharp rated 9/10. Patient was recently at [facility] in May for diverticulitis, seen in the ED and discharged with a course of PO cipro and flagyl. She had 2 more similar episodes in s and s. Per patient she states she never fully recovered while at home, states that she was still continuing to have diarrhea and was having on and off diffuse headaches over the past 2 weeks. 24 hour ptp patient states she was constipated and overnight she tried to disimpact herself (which she has done in the past) and feels that she may have aggravated her hemorrhoids because she reports her finger was covered with red jell, and at times when she does strain she noticed that she has blood streaked stool as well. Her abdominal pain improved since her discharge in the ED from may but 1 night ptp it reoccurred prompting her to come to the ED for further evaluation. In the ED patient was found to have acute sigmoid diverticulitis on CT imaging was given zosyn, 1 liter LR bolus. When patient received IV cipro last night, patient developed itchy arm and face with L eye swelling. Currently on IV Rocephin and Flagyl. Patient hasn't had BM since ED admission.     PAST MEDICAL & SURGICAL HISTORY:  Hemorrhoids  Left ear hearing loss  Cervical disc 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  Hypothyroid  Nasopharyngeal cancer  Pulmonary nodules/lesions, multiple  Diverticulitis  HTN (hypertension)  COPD (chronic obstructive pulmonary disease)  History of cholecystectomy      REVIEW OF SYSTEMS: C/o headache, abdominal pain, nausea. Denies fever, chills, vomiting. Rest of ROS are negative.       MEDICATIONS  (STANDING):  buDESOnide 160 MICROgram(s)/formoterol 4.5 MICROgram(s) Inhaler 2 Puff(s) Inhalation two times a day  buPROPion XL (24-Hour) Oral Tab/Cap - Peds 300 milliGRAM(s) Oral daily  cefTRIAXone   IVPB      cefTRIAXone   IVPB 2 Gram(s) IV Intermittent once  docusate sodium 100 milliGRAM(s) Oral three times a day  enalapril 5 milliGRAM(s) Oral daily  enoxaparin Injectable 40 milliGRAM(s) SubCutaneous daily  lactated ringers. 1000 milliLiter(s) (75 mL/Hr) IV Continuous <Continuous>  levothyroxine 88 MICROGram(s) Oral daily  metroNIDAZOLE  IVPB 500 milliGRAM(s) IV Intermittent every 8 hours  montelukast Oral Tab/Cap - Peds 10 milliGRAM(s) Oral daily  pantoprazole    Tablet 40 milliGRAM(s) Oral before breakfast  senna 1 Tablet(s) Oral two times a day  tiotropium 18 MICROgram(s) Capsule 1 Capsule(s) Inhalation daily    MEDICATIONS  (PRN):  ALBUTerol    90 MICROgram(s) HFA Inhaler 2 Puff(s) Inhalation every 6 hours PRN Shortness of Breath and/or Wheezing  clonazePAM Tablet 0.5 milliGRAM(s) Oral two times a day PRN anxiety  cyclobenzaprine 10 milliGRAM(s) Oral three times a day PRN Muscle Spasm  HYDROmorphone   Tablet 4 milliGRAM(s) Oral every 6 hours PRN Severe Pain (7 - 10)  ibuprofen  Tablet 600 milliGRAM(s) Oral three times a day PRN headache  ondansetron    Tablet 8 milliGRAM(s) Oral three times a day PRN Nausea and/or Vomiting  oxyCODONE    5 mG/acetaminophen 325 mG 2 Tablet(s) Oral every 6 hours PRN Moderate Pain (4 - 6)  zolpidem 5 milliGRAM(s) Oral at bedtime PRN Insomnia  zolpidem 5 milliGRAM(s) Oral at bedtime PRN Insomnia      Allergies: IV Cipro (pruritus and edema)      FAMILY HISTORY:  Family history of lung cancer (Father, Mother)    PHYSICAL EXAM  Vital Signs Last 24 Hrs  T(C): 36.4 (2018 07:33), Max: 36.6 (2018 23:16)  T(F): 97.6 (2018 07:33), Max: 97.9 (2018 23:16)  HR: 89 (2018 07:33) (78 - 90)  BP: 118/59 (2018 07:33) (118/59 - 141/67)  BP(mean): --  RR: 19 (2018 07:33) (18 - 20)  SpO2: 98% (2018 07:33) (97% - 98%)      GENERAL: NAD, speaks in full sentences, no signs of respiratory distress, morbidly obese female  	HEAD:  Atraumatic, Normocephalic, decreased left sided hearing  	EYES:  conjunctiva and sclera clear  	NECK: Supple,   	CHEST/LUNG: Clear to auscultation bilaterally; No wheeze; No crackles; No accessory muscles used  	HEART: Regular rate and rhythm; Normal S1 and S2. No murmurs;   	ABDOMEN: Soft, Nondistended; BM is present. Tenderness in deep palpation of LLQ w/o guarding, rebound, or rigidity.   	EXTREMITIES:  No cyanosis or edema  	PSYCH: AAOx3  	NEUROLOGY: non-focal    SKIN: No rashes or lesions        LABS:                        10.5   7.84  )-----------( 305      ( 2018 07:59 )               32.9    Complete Blood Count + Automated Diff (18 @ 16:05)    WBC Count: 8.34 K/uL    RBC Count: 4.15 M/uL    Hemoglobin: 10.9 g/dL    Hematocrit: 34.6 %    Mean Cell Volume: 83.4 fL    Mean Cell Hemoglobin: 26.3 pg    Mean Cell Hemoglobin Conc: 31.5 g/dL    Red Cell Distrib Width: 17.0 %    Platelet Count - Automated: 319 K/uL    Auto Neutrophil #: 6.35 K/uL    Auto Lymphocyte #: 1.08 K/uL    Auto Monocyte #: 0.54 K/uL    Auto Eosinophil #: 0.31 K/uL    Auto Basophil #: 0.03 K/uL    Auto Neutrophil %: 76.1: Differential percentages must be correlated with absolute numbers for  clinical significance. %    Auto Lymphocyte %: 12.9 %    Auto Monocyte %: 6.5 %    Auto Eosinophil %: 3.7 %    Auto Basophil %: 0.4 %    Auto Immature Granulocyte %: 0.4 %        Hemoglobin: 10.5 g/dL (18 @ 07:59)  Hemoglobin: 10.9 g/dL (18 @ 16:05)          141  |  100  |  14  ----------------------------<  111<H>  4.2   |  27  |  0.9    Ca    8.6      2018 07:59    TPro  6.3  /  Alb  3.7  /  TBili  <0.2  /  DBili  x   /  AST  16  /  ALT  15  /  AlkPhos  78  -      Urinalysis Basic - ( 2018 16:05 )    Color: Yellow / Appearance: Clear / S.020 / pH: x  Gluc: x / Ketone: Negative  / Bili: Negative / Urobili: 0.2 mg/dL   Blood: x / Protein: 30 mg/dL / Nitrite: Negative   Leuk Esterase: Negative / RBC: x / WBC 3-5 /HPF   Sq Epi: x / Non Sq Epi: Moderate /HPF / Bacteria: Few /HPF    RADIOLOGY & ADDITIONAL STUDIES:  < from: CT Abdomen and Pelvis w/ IV Cont (18 @ 17:07) >  FINDINGS:    LOWER CHEST: Unremarkable.    HEPATOBILIARY: Post cholecystectomy. Liver unremarkable. No biliary   dilation.    SPLEEN: Unremarkable.    PANCREAS: Unremarkable.    ADRENAL GLANDS: Stable 1.5 cm right adrenal nodule, not fully   characterize however likely represents adenoma in absence of oncological   history.    KIDNEYS: Right renal cyst, previously characterized on ultrasound. No   hydronephrosis.    ABDOMINOPELVIC NODES: Unremarkable.    PELVIC ORGANS: Unremarkable.    PERITONEUM/MESENTERY/BOWEL: Sigmoid diverticulosis with segment of wall   thickening and stranding inflammatory change; no evidence of abscess or   gross free air.    BONES/SOFT TISSUES: Degenerative change, lumbar spine.      IMPRESSION:   1. Acute sigmoid diverticulitis; no abscess.    < end of copied text >
HPI:  51 yo female with history of recurrent diverticulitis, COPD, pulmonary nodules, hypothyroidism, nasopharygneal cancer s/p chemo + radiation ( leading to cervical discopathy and left sided hearing loss p/w 1 day history of worsening LLQ abdominal pain  a/w nausea and diarrhea. Patient was recently at [facility] in May for diverticulitis, seen in the ED and discharged with a course of cipro and flagyl. Per patient she states she never fully recovered while at home, states that she was still continuing to have diarrhea and was having on and off diffuse headaches over the past 2 weeks. 24 hour ptp patient states she was constipated and overnight she tried to disimpact herself (which she has done in the past) and feels that she may have aggravated her hemorrhoids because she had blood streaked stool afterward, and at times when she does strain she noticed that she has blood streaked stool as well. Her abdominal pain improved since her discharge in the ED from may but 1 night ptp it reoccurred prompting her to come to the ED for further evaluation. IN the ED patient was found to have acute sigmoid diverticulitis on CT imaging was given zosyn, 1 liter LR bolus. (2018 19:16)    Colonoscopy 3 years ago. benign polyps removed    PAST MEDICAL & SURGICAL HISTORY:  Hemorrhoids  Left ear hearing loss  Cervical disc 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  Hypothyroid  Nasopharyngeal cancer  Pulmonary nodules/lesions, multiple  Diverticulitis  HTN (hypertension)  COPD (chronic obstructive pulmonary disease)  History of cholecystectomy    No Known Allergies    Home Medications:  BREO ELLIPTA -25:  (2018 19:22)  BUPROPION HCL  MG TB24: orally once a day (2018 19:22)  CLONAZEPAM .5 MG TABS: orally 2 times a day (2018 19:22)  CYCLOBENZAPRINE HCL 10 MG TABS: orally 2 times a day (2018 19:22)  ENALAPRIL MALEATE 5 MG TABS:  (2018 19:22)  HYDROCO/APAP TAB 7.5-325: orally 2 times a day, As Needed (2018 19:22)  INCRUSE ELLIPTA 62.5 MCG/INH AEPB:  (2018 19:22)  LEVOTHYROXINE SODIUM 88 MCG TABS: orally once a day (2018 19:22)  MONTELUKAST SODIUM 10 MG TABS:  (2018 19:22)  PANTOPRAZOLE SODIUM 40 MG TBEC: orally once a day (2018 19:22)  VENTOLIN  MCG/ACT AERS:  (2018 19:22)  ZOLPIDEM TARTRATE 10 MG TABS: orally once a day (at bedtime) (2018 19:22)      Vital Signs Last 24 Hrs  T(C): 36.4 (2018 15:01), Max: 36.4 (2018 15:01)  T(F): 97.5 (2018 15:01), Max: 97.5 (2018 15:01)  HR: 90 (2018 15:) (90 - 90)  BP: 130/76 (2018 15:) (130/76 - 130/76)  BP(mean): --  RR: 18 (2018 15:01) (18 - 18)  SpO2: 97% (2018 15:01) (97% - 97%)    PHYSICAL EXAM  Gen:   CV:   Lungs:   Abd:  Inc:   Ext:    MEDICATIONS:   MEDICATIONS  (STANDING):  buDESOnide 160 MICROgram(s)/formoterol 4.5 MICROgram(s) Inhaler 2 Puff(s) Inhalation two times a day  buPROPion XL (24-Hour) Oral Tab/Cap - Peds 300 milliGRAM(s) Oral daily  ciprofloxacin   IVPB      docusate sodium 100 milliGRAM(s) Oral three times a day  enalapril 5 milliGRAM(s) Oral daily  enoxaparin Injectable 40 milliGRAM(s) SubCutaneous daily  lactated ringers. 1000 milliLiter(s) (75 mL/Hr) IV Continuous <Continuous>  levothyroxine 88 MICROGram(s) Oral daily  metroNIDAZOLE  IVPB 500 milliGRAM(s) IV Intermittent every 8 hours  montelukast Oral Tab/Cap - Peds 10 milliGRAM(s) Oral daily  pantoprazole    Tablet 40 milliGRAM(s) Oral before breakfast  senna 1 Tablet(s) Oral two times a day  tiotropium 18 MICROgram(s) Capsule 1 Capsule(s) Inhalation daily    MEDICATIONS  (PRN):  ALBUTerol    90 MICROgram(s) HFA Inhaler 2 Puff(s) Inhalation every 6 hours PRN Shortness of Breath and/or Wheezing  clonazePAM Tablet 0.5 milliGRAM(s) Oral two times a day PRN anxiety  cyclobenzaprine 10 milliGRAM(s) Oral three times a day PRN Muscle Spasm  HYDROmorphone   Tablet 4 milliGRAM(s) Oral every 6 hours PRN Severe Pain (7 - 10)  ibuprofen  Tablet 600 milliGRAM(s) Oral three times a day PRN headache  ondansetron    Tablet 8 milliGRAM(s) Oral three times a day PRN Nausea and/or Vomiting  oxyCODONE    5 mG/acetaminophen 325 mG 2 Tablet(s) Oral every 6 hours PRN Moderate Pain (4 - 6)  zolpidem 5 milliGRAM(s) Oral at bedtime PRN Insomnia  zolpidem 5 milliGRAM(s) Oral at bedtime PRN Insomnia      LAB/STUDIES:                        10.9   8.34  )-----------( 319      ( 2018 16:05 )             34.6     06-18    137  |  98  |  17  ----------------------------<  102<H>  3.9   |  24  |  0.9    Ca    8.7      2018 16:05    TPro  6.6  /  Alb  3.9  /  TBili  <0.2  /  DBili  x   /  AST  17  /  ALT  15  /  AlkPhos  76  06-18      LIVER FUNCTIONS - ( 2018 16:05 )  Alb: 3.9 g/dL / Pro: 6.6 g/dL / ALK PHOS: 76 U/L / ALT: 15 U/L / AST: 17 U/L / GGT: x           Urinalysis Basic - ( 2018 16:05 )    Color: Yellow / Appearance: Clear / S.020 / pH: x  Gluc: x / Ketone: Negative  / Bili: Negative / Urobili: 0.2 mg/dL   Blood: x / Protein: 30 mg/dL / Nitrite: Negative   Leuk Esterase: Negative / RBC: x / WBC 3-5 /HPF   Sq Epi: x / Non Sq Epi: Moderate /HPF / Bacteria: Few /HPF                  IMAGING:  CT scan- Diverticulitis w/o any drainable collection

## 2018-06-19 NOTE — PROGRESS NOTE ADULT - ASSESSMENT
53 y/o female with a history of diverticulitis, recently d/lynda from the ED MAy 25th with cipro and flagyl with o/p f/u with a gastroenterologist presents with acute sigmoid diverticulitis and no abscess formation.    #acute/recurrent diverticulitis  -admit to medicine  -NPO except meds and ice chips  -GI evaluation  -as per surgery no intervention until outpt colonscopy  - IVF- LR @100cc/hr  -rocephin+ flagyl IV  -pain control      #COPD not on home O2  -symbicort + spiriva (pt on breo + incruse at home)  - ventolin prn    #GERD  -protonix qd    #anxiety  clonazepam prn + bupropion    #insomnia  zolpidem    #htn  -enalapril 5mg    #h/a  pain control    #cervical discopathy  -percocet prn for mod pain  -dilaudid po prn for severe pain  -flexeril for muscle spasms    #constipation/ nausea  colace 100mg tid  senna bid  zofran prn    #insomnia  zolpidem    #dvt ppx

## 2018-06-19 NOTE — PATIENT PROFILE ADULT. - ABILITY TO HEAR (WITH HEARING AID OR HEARING APPLIANCE IF NORMALLY USED):
on left ear/Mildly to Moderately Impaired: difficulty hearing in some environments or speaker may need to increase volume or speak distinctly

## 2018-06-19 NOTE — CONSULT NOTE ADULT - ASSESSMENT
51 y/o female with a history of diverticulitis, recently d/lynda from the ED MAy 25th with cipro and flagyl with o/p f/u with a gastroenterologist presents with acute sigmoid diverticulitis and no abscess formation.          #acute/recurrent sigmoidal diverticulitis  -admit to medicine  -NPO except meds and ice chips  -surgery evaluation: Recommend surgery after colonoscopy after discharge  - IVF- LR @100cc/hr  -c/w IV Rocephin and Flagyl  -pain control  -Close monitoring.  -F/u outpatient and colonoscopy in 6 weeks after d/c. 51 y/o female with a history of diverticulitis, recently d/lynda from the ED MAy 25th with cipro and flagyl with o/p f/u with a gastroenterologist presents with acute sigmoid diverticulitis and no abscess formation.      #acute/recurrent sigmoidal diverticulitis  -admit to medicine  -NPO except meds and ice chips  -surgery evaluation: Recommend surgery after colonoscopy after discharge  - IVF- LR @100cc/hr  -c/w IV Rocephin and Flagyl  -pain control  -Observation.   -F/u outpatient and colonoscopy in 6 weeks after d/c. 51 y/o female with a history of diverticulitis, recently d/lynda from the ED MAy 25th with cipro and flagyl with o/p f/u with a gastroenterologist presents with acute sigmoid diverticulitis and no abscess formation.      #acute/recurrent sigmoidal diverticulitis  -admit to medicine  -NPO except meds and ice chips  -surgery evaluation  - IVF- LR @100cc/hr  -c/w IV Rocephin and Flagyl  -pain control  -Observation.   -F/u outpatient and colonoscopy in 6 weeks after d/c.

## 2018-06-19 NOTE — PROGRESS NOTE ADULT - SUBJECTIVE AND OBJECTIVE BOX
DIAGNOSIS:   HOSPITAL DAY #:    STATUS POST:    POST OPERATIVE DAY #:     Vital Signs Last 24 Hrs  T(C): 35.9 (19 Jun 2018 16:00), Max: 36.6 (18 Jun 2018 23:16)  T(F): 96.7 (19 Jun 2018 16:00), Max: 97.9 (18 Jun 2018 23:16)  HR: 81 (19 Jun 2018 16:00) (78 - 89)  BP: 112/56 (19 Jun 2018 16:00) (101/58 - 141/67)  BP(mean): --  RR: 18 (19 Jun 2018 16:00) (18 - 20)  SpO2: 98% (19 Jun 2018 07:33) (98% - 98%)    SUBJECTIVE: Pt seen    Pain: YES  [ ]   NO [ ]   Nausea: [ ] YES [ ] NO           Vomiting: [ ] YES [ ] NO  Flatus: [ ] YES [ ] NO             Bowel Movement: [ ] YES [ ] NO     Void: [ ]YES [ ]No      JANIS DRAINAGE: SIGNIFICANT [ ]   NOT SIGNIFICANT [ ]   NOT APPLICABLE [ ]  YES [ ] NO    General Appearance: Appears well, NAD  Neck: Supple  Chest: Equal expansion bilaterally, equal breath sounds  CV: Pulse regular presently  Abdomen: Soft [x ] YES [ ]NO  DISTENDED [ ] YES [x ] NO TENDERNESS [ ]YES [ ]NO  INCISIONS: HEALING WELL [ ] YES  [ ] NO ERYTHEMA [ ] YES [ ] NO DRAINAGE [ ] YES  [ ] NO  Extremities: Grossly symmetric, CALF TENDERNESS [ ] YES  [ ] NO      LABS:                        10.5   7.84  )-----------( 305      ( 19 Jun 2018 07:59 )             32.9     06-19    141  |  100  |  14  ----------------------------<  111<H>  4.2   |  27  |  0.9    Ca    8.6      19 Jun 2018 07:59    TPro  6.3  /  Alb  3.7  /  TBili  <0.2  /  DBili  x   /  AST  16  /  ALT  15  /  AlkPhos  78  06-19            ASSESSMENT:     GOOD POST OP COURSE [ ]  YES  [ ] NO  CONDITION IMPROVING  []  YES  [ ]  NO          PLAN:    CONTINUE PRESENT MANAGEMENT  [x ] YES  [ ] NO

## 2018-06-19 NOTE — PROGRESS NOTE ADULT - SUBJECTIVE AND OBJECTIVE BOX
LENGTH OF HOSPITAL STAY: 1d    CHIEF COMPLAINT:   Patient is a 52y old  Female who presents with a chief complaint of diverticulitis (2018 19:16)      HISTORY OF PRESENTING ILLNESS:    HPI:  53 yo female with history of recurrent diverticulitis, COPD, pulmonary nodules, hypothyroidism, nasopharygneal cancer s/p chemo + radiation ( leading to cervical discopathy and left sided hearing loss p/w 1 day history of worsening LLQ abdominal pain  a/w nausea and diarrhea. Patient was recently at [facility] in May for diverticulitis, seen in the ED and discharged with a course of cipro and flagyl. Per patient she states she never fully recovered while at home, states that she was still continuing to have diarrhea and was having on and off diffuse headaches over the past 2 weeks. 24 hour ptp patient states she was constipated and overnight she tried to disimpact herself (which she has done in the past) and feels that she may have aggravated her hemorrhoids because she had blood streaked stool afterward, and at times when she does strain she noticed that she has blood streaked stool as well. Her abdominal pain improved since her discharge in the ED from may but 1 night ptp it reoccurred prompting her to come to the ED for further evaluation. IN the ED patient was found to have acute sigmoid diverticulitis on CT imaging was given zosyn, 1 liter LR bolus.   Pt reports having urticaria last time with cipro IV but not PO. Overnight pt received with IV cipro and developed urticaria again.    PAST MEDICAL & SURGICAL HISTORY  PAST MEDICAL & SURGICAL HISTORY:  Hemorrhoids  Left ear hearing loss  Cervical disc 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  Hypothyroid  Nasopharyngeal cancer  Pulmonary nodules/lesions, multiple  Diverticulitis  HTN (hypertension)  COPD (chronic obstructive pulmonary disease)  History of cholecystectomy    SOCIAL HISTORY:    ALLERGIES:  No Known Allergies    MEDICATIONS:  STANDING MEDICATIONS  buDESOnide 160 MICROgram(s)/formoterol 4.5 MICROgram(s) Inhaler 2 Puff(s) Inhalation two times a day  buPROPion XL (24-Hour) Oral Tab/Cap - Peds 300 milliGRAM(s) Oral daily  cefTRIAXone   IVPB      docusate sodium 100 milliGRAM(s) Oral three times a day  enalapril 5 milliGRAM(s) Oral daily  enoxaparin Injectable 40 milliGRAM(s) SubCutaneous daily  lactated ringers. 1000 milliLiter(s) IV Continuous <Continuous>  levothyroxine 88 MICROGram(s) Oral daily  metroNIDAZOLE  IVPB 500 milliGRAM(s) IV Intermittent every 8 hours  montelukast Oral Tab/Cap - Peds 10 milliGRAM(s) Oral daily  pantoprazole    Tablet 40 milliGRAM(s) Oral before breakfast  senna 1 Tablet(s) Oral two times a day  tiotropium 18 MICROgram(s) Capsule 1 Capsule(s) Inhalation daily    PRN MEDICATIONS  ALBUTerol    90 MICROgram(s) HFA Inhaler 2 Puff(s) Inhalation every 6 hours PRN  clonazePAM Tablet 0.5 milliGRAM(s) Oral two times a day PRN  cyclobenzaprine 10 milliGRAM(s) Oral three times a day PRN  HYDROmorphone   Tablet 4 milliGRAM(s) Oral every 6 hours PRN  ibuprofen  Tablet 600 milliGRAM(s) Oral three times a day PRN  ondansetron    Tablet 8 milliGRAM(s) Oral three times a day PRN  oxyCODONE    5 mG/acetaminophen 325 mG 2 Tablet(s) Oral every 6 hours PRN  zolpidem 5 milliGRAM(s) Oral at bedtime PRN  zolpidem 5 milliGRAM(s) Oral at bedtime PRN    VITALS:   T(F): 97.6  HR: 89  BP: 118/59  RR: 19  SpO2: 98%    LABS:                        10.5   7.84  )-----------( 305      ( 2018 07:59 )             32.9     06-19    141  |  100  |  14  ----------------------------<  111<H>  4.2   |  27  |  0.9    Ca    8.6      2018 07:59    TPro  6.3  /  Alb  3.7  /  TBili  <0.2  /  DBili  x   /  AST  16  /  ALT  15  /  AlkPhos  78  -      Urinalysis Basic - ( 2018 16:05 )    Color: Yellow / Appearance: Clear / S.020 / pH: x  Gluc: x / Ketone: Negative  / Bili: Negative / Urobili: 0.2 mg/dL   Blood: x / Protein: 30 mg/dL / Nitrite: Negative   Leuk Esterase: Negative / RBC: x / WBC 3-5 /HPF   Sq Epi: x / Non Sq Epi: Moderate /HPF / Bacteria: Few /HPF        Lactate, Blood: 1.1 mmol/L (18 @ 16:05)          RADIOLOGY:    PHYSICAL EXAM:  GEN: No acute distress  HEENT:   LUNGS: Clear to auscultation bilaterally   HEART: S1/S2 present. RRR.   ABD: Soft, mildly-tender, non-distended. Bowel sounds present  EXT:  NEURO: AAOX3

## 2018-06-20 LAB
ALBUMIN SERPL ELPH-MCNC: 3.5 G/DL — SIGNIFICANT CHANGE UP (ref 3.5–5.2)
ALP SERPL-CCNC: 76 U/L — SIGNIFICANT CHANGE UP (ref 30–115)
ALT FLD-CCNC: 13 U/L — SIGNIFICANT CHANGE UP (ref 0–41)
ANION GAP SERPL CALC-SCNC: 14 MMOL/L — SIGNIFICANT CHANGE UP (ref 7–14)
ANION GAP SERPL CALC-SCNC: 18 MMOL/L — HIGH (ref 7–14)
AST SERPL-CCNC: 15 U/L — SIGNIFICANT CHANGE UP (ref 0–41)
BASOPHILS # BLD AUTO: 0.02 K/UL — SIGNIFICANT CHANGE UP (ref 0–0.2)
BASOPHILS NFR BLD AUTO: 0.3 % — SIGNIFICANT CHANGE UP (ref 0–1)
BILIRUB SERPL-MCNC: <0.2 MG/DL — SIGNIFICANT CHANGE UP (ref 0.2–1.2)
BUN SERPL-MCNC: 12 MG/DL — SIGNIFICANT CHANGE UP (ref 10–20)
BUN SERPL-MCNC: 13 MG/DL — SIGNIFICANT CHANGE UP (ref 10–20)
CALCIUM SERPL-MCNC: 8.2 MG/DL — LOW (ref 8.5–10.1)
CALCIUM SERPL-MCNC: 8.3 MG/DL — LOW (ref 8.5–10.1)
CHLORIDE SERPL-SCNC: 100 MMOL/L — SIGNIFICANT CHANGE UP (ref 98–110)
CHLORIDE SERPL-SCNC: 104 MMOL/L — SIGNIFICANT CHANGE UP (ref 98–110)
CO2 SERPL-SCNC: 24 MMOL/L — SIGNIFICANT CHANGE UP (ref 17–32)
CO2 SERPL-SCNC: 28 MMOL/L — SIGNIFICANT CHANGE UP (ref 17–32)
CREAT SERPL-MCNC: 0.9 MG/DL — SIGNIFICANT CHANGE UP (ref 0.7–1.5)
CREAT SERPL-MCNC: 0.9 MG/DL — SIGNIFICANT CHANGE UP (ref 0.7–1.5)
EOSINOPHIL # BLD AUTO: 0.29 K/UL — SIGNIFICANT CHANGE UP (ref 0–0.7)
EOSINOPHIL NFR BLD AUTO: 4.4 % — SIGNIFICANT CHANGE UP (ref 0–8)
GLUCOSE SERPL-MCNC: 101 MG/DL — HIGH (ref 70–99)
GLUCOSE SERPL-MCNC: 90 MG/DL — SIGNIFICANT CHANGE UP (ref 70–99)
HCT VFR BLD CALC: 33.2 % — LOW (ref 37–47)
HCT VFR BLD CALC: 34.1 % — LOW (ref 37–47)
HGB BLD-MCNC: 10.4 G/DL — LOW (ref 12–16)
HGB BLD-MCNC: 10.6 G/DL — LOW (ref 12–16)
IMM GRANULOCYTES NFR BLD AUTO: 0.6 % — HIGH (ref 0.1–0.3)
LYMPHOCYTES # BLD AUTO: 1.05 K/UL — LOW (ref 1.2–3.4)
LYMPHOCYTES # BLD AUTO: 16 % — LOW (ref 20.5–51.1)
MAGNESIUM SERPL-MCNC: 2 MG/DL — SIGNIFICANT CHANGE UP (ref 1.8–2.4)
MCHC RBC-ENTMCNC: 26.4 PG — LOW (ref 27–31)
MCHC RBC-ENTMCNC: 26.5 PG — LOW (ref 27–31)
MCHC RBC-ENTMCNC: 31.1 G/DL — LOW (ref 32–37)
MCHC RBC-ENTMCNC: 31.3 G/DL — LOW (ref 32–37)
MCV RBC AUTO: 84.3 FL — SIGNIFICANT CHANGE UP (ref 81–99)
MCV RBC AUTO: 85.3 FL — SIGNIFICANT CHANGE UP (ref 81–99)
MONOCYTES # BLD AUTO: 0.43 K/UL — SIGNIFICANT CHANGE UP (ref 0.1–0.6)
MONOCYTES NFR BLD AUTO: 6.5 % — SIGNIFICANT CHANGE UP (ref 1.7–9.3)
NEUTROPHILS # BLD AUTO: 4.75 K/UL — SIGNIFICANT CHANGE UP (ref 1.4–6.5)
NEUTROPHILS NFR BLD AUTO: 72.2 % — SIGNIFICANT CHANGE UP (ref 42.2–75.2)
NRBC # BLD: 0 /100 WBCS — SIGNIFICANT CHANGE UP (ref 0–0)
NRBC # BLD: 0 /100 WBCS — SIGNIFICANT CHANGE UP (ref 0–0)
PLATELET # BLD AUTO: 304 K/UL — SIGNIFICANT CHANGE UP (ref 130–400)
PLATELET # BLD AUTO: 319 K/UL — SIGNIFICANT CHANGE UP (ref 130–400)
POTASSIUM SERPL-MCNC: 3.9 MMOL/L — SIGNIFICANT CHANGE UP (ref 3.5–5)
POTASSIUM SERPL-MCNC: 3.9 MMOL/L — SIGNIFICANT CHANGE UP (ref 3.5–5)
POTASSIUM SERPL-SCNC: 3.9 MMOL/L — SIGNIFICANT CHANGE UP (ref 3.5–5)
POTASSIUM SERPL-SCNC: 3.9 MMOL/L — SIGNIFICANT CHANGE UP (ref 3.5–5)
PROT SERPL-MCNC: 5.9 G/DL — LOW (ref 6–8)
RBC # BLD: 3.94 M/UL — LOW (ref 4.2–5.4)
RBC # BLD: 4 M/UL — LOW (ref 4.2–5.4)
RBC # FLD: 16.8 % — HIGH (ref 11.5–14.5)
RBC # FLD: 16.9 % — HIGH (ref 11.5–14.5)
SODIUM SERPL-SCNC: 142 MMOL/L — SIGNIFICANT CHANGE UP (ref 135–146)
SODIUM SERPL-SCNC: 146 MMOL/L — SIGNIFICANT CHANGE UP (ref 135–146)
WBC # BLD: 6.58 K/UL — SIGNIFICANT CHANGE UP (ref 4.8–10.8)
WBC # BLD: 6.66 K/UL — SIGNIFICANT CHANGE UP (ref 4.8–10.8)
WBC # FLD AUTO: 6.58 K/UL — SIGNIFICANT CHANGE UP (ref 4.8–10.8)
WBC # FLD AUTO: 6.66 K/UL — SIGNIFICANT CHANGE UP (ref 4.8–10.8)

## 2018-06-20 RX ORDER — ONDANSETRON 8 MG/1
4 TABLET, FILM COATED ORAL EVERY 6 HOURS
Qty: 0 | Refills: 0 | Status: DISCONTINUED | OUTPATIENT
Start: 2018-06-20 | End: 2018-06-22

## 2018-06-20 RX ORDER — SODIUM CHLORIDE 9 MG/ML
1000 INJECTION, SOLUTION INTRAVENOUS
Qty: 0 | Refills: 0 | Status: DISCONTINUED | OUTPATIENT
Start: 2018-06-20 | End: 2018-06-20

## 2018-06-20 RX ORDER — SODIUM CHLORIDE 0.65 %
1 AEROSOL, SPRAY (ML) NASAL
Qty: 0 | Refills: 0 | Status: DISCONTINUED | OUTPATIENT
Start: 2018-06-20 | End: 2018-06-22

## 2018-06-20 RX ADMIN — Medication 5 MILLIGRAM(S): at 05:37

## 2018-06-20 RX ADMIN — SODIUM CHLORIDE 125 MILLILITER(S): 9 INJECTION, SOLUTION INTRAVENOUS at 11:12

## 2018-06-20 RX ADMIN — Medication 100 MILLIGRAM(S): at 05:37

## 2018-06-20 RX ADMIN — Medication 88 MICROGRAM(S): at 05:36

## 2018-06-20 RX ADMIN — ONDANSETRON 4 MILLIGRAM(S): 8 TABLET, FILM COATED ORAL at 18:20

## 2018-06-20 RX ADMIN — ONDANSETRON 4 MILLIGRAM(S): 8 TABLET, FILM COATED ORAL at 11:18

## 2018-06-20 RX ADMIN — OXYCODONE AND ACETAMINOPHEN 2 TABLET(S): 5; 325 TABLET ORAL at 14:31

## 2018-06-20 RX ADMIN — CEFTRIAXONE 100 GRAM(S): 500 INJECTION, POWDER, FOR SOLUTION INTRAMUSCULAR; INTRAVENOUS at 09:52

## 2018-06-20 RX ADMIN — HYDROMORPHONE HYDROCHLORIDE 4 MILLIGRAM(S): 2 INJECTION INTRAMUSCULAR; INTRAVENOUS; SUBCUTANEOUS at 05:39

## 2018-06-20 RX ADMIN — MONTELUKAST 10 MILLIGRAM(S): 4 TABLET, CHEWABLE ORAL at 11:19

## 2018-06-20 RX ADMIN — Medication 100 MILLIGRAM(S): at 14:19

## 2018-06-20 RX ADMIN — BUPROPION HYDROCHLORIDE 300 MILLIGRAM(S): 150 TABLET, EXTENDED RELEASE ORAL at 12:20

## 2018-06-20 RX ADMIN — SENNA PLUS 1 TABLET(S): 8.6 TABLET ORAL at 05:36

## 2018-06-20 RX ADMIN — OXYCODONE AND ACETAMINOPHEN 2 TABLET(S): 5; 325 TABLET ORAL at 09:52

## 2018-06-20 RX ADMIN — PANTOPRAZOLE SODIUM 40 MILLIGRAM(S): 20 TABLET, DELAYED RELEASE ORAL at 05:36

## 2018-06-20 RX ADMIN — ZOLPIDEM TARTRATE 5 MILLIGRAM(S): 10 TABLET ORAL at 23:25

## 2018-06-20 RX ADMIN — Medication 100 MILLIGRAM(S): at 21:48

## 2018-06-20 RX ADMIN — ONDANSETRON 8 MILLIGRAM(S): 8 TABLET, FILM COATED ORAL at 05:36

## 2018-06-20 RX ADMIN — OXYCODONE AND ACETAMINOPHEN 2 TABLET(S): 5; 325 TABLET ORAL at 18:36

## 2018-06-20 RX ADMIN — ONDANSETRON 4 MILLIGRAM(S): 8 TABLET, FILM COATED ORAL at 00:23

## 2018-06-20 RX ADMIN — OXYCODONE AND ACETAMINOPHEN 2 TABLET(S): 5; 325 TABLET ORAL at 19:24

## 2018-06-20 RX ADMIN — OXYCODONE AND ACETAMINOPHEN 2 TABLET(S): 5; 325 TABLET ORAL at 11:31

## 2018-06-20 RX ADMIN — Medication 100 MILLIGRAM(S): at 05:36

## 2018-06-20 RX ADMIN — OXYCODONE AND ACETAMINOPHEN 2 TABLET(S): 5; 325 TABLET ORAL at 16:56

## 2018-06-20 RX ADMIN — ENOXAPARIN SODIUM 40 MILLIGRAM(S): 100 INJECTION SUBCUTANEOUS at 11:19

## 2018-06-20 RX ADMIN — HYDROMORPHONE HYDROCHLORIDE 4 MILLIGRAM(S): 2 INJECTION INTRAMUSCULAR; INTRAVENOUS; SUBCUTANEOUS at 06:20

## 2018-06-20 RX ADMIN — Medication 100 MILLIGRAM(S): at 21:51

## 2018-06-20 NOTE — PROGRESS NOTE ADULT - SUBJECTIVE AND OBJECTIVE BOX
SUBJECTIVE:    Patient is a 52y old Female who presents with a chief complaint of diverticulitis (2018 19:16)    Currently admitted to medicine with the primary diagnosis of Diverticulitis     Today is hospital day 2d. Patient reports diffuse abdominal pain worst in the LLQ and nausea.     PAST MEDICAL & SURGICAL HISTORY  Hemorrhoids  Left ear hearing loss  Cervical disc disease: sequelae of radiation for nasopharyngeal cancer  Hypothyroid  Nasopharyngeal cancer  Pulmonary nodules/lesions, multiple  Diverticulitis  HTN (hypertension)  COPD (chronic obstructive pulmonary disease)  History of cholecystectomy    SOCIAL HISTORY:  Negative for smoking/alcohol/drug use.     ALLERGIES:  No Known Allergies    MEDICATIONS:  STANDING MEDICATIONS  buDESOnide 160 MICROgram(s)/formoterol 4.5 MICROgram(s) Inhaler 2 Puff(s) Inhalation two times a day  buPROPion XL (24-Hour) Oral Tab/Cap - Peds 300 milliGRAM(s) Oral daily  cefTRIAXone   IVPB      cefTRIAXone   IVPB 2 Gram(s) IV Intermittent every 24 hours  docusate sodium 100 milliGRAM(s) Oral three times a day  enalapril 5 milliGRAM(s) Oral daily  enoxaparin Injectable 40 milliGRAM(s) SubCutaneous daily  lactated ringers. 1000 milliLiter(s) IV Continuous <Continuous>  levothyroxine 88 MICROGram(s) Oral daily  metroNIDAZOLE  IVPB 500 milliGRAM(s) IV Intermittent every 8 hours  montelukast Oral Tab/Cap - Peds 10 milliGRAM(s) Oral daily  pantoprazole    Tablet 40 milliGRAM(s) Oral before breakfast  senna 1 Tablet(s) Oral two times a day  tiotropium 18 MICROgram(s) Capsule 1 Capsule(s) Inhalation daily    PRN MEDICATIONS  ALBUTerol    90 MICROgram(s) HFA Inhaler 2 Puff(s) Inhalation every 6 hours PRN  clonazePAM Tablet 0.5 milliGRAM(s) Oral two times a day PRN  cyclobenzaprine 10 milliGRAM(s) Oral three times a day PRN  HYDROmorphone   Tablet 4 milliGRAM(s) Oral every 6 hours PRN  ibuprofen  Tablet 600 milliGRAM(s) Oral three times a day PRN  ondansetron Injectable 4 milliGRAM(s) IV Push every 6 hours PRN  oxyCODONE    5 mG/acetaminophen 325 mG 2 Tablet(s) Oral every 6 hours PRN  zolpidem 5 milliGRAM(s) Oral at bedtime PRN  zolpidem 5 milliGRAM(s) Oral at bedtime PRN    VITALS:   T(F): 98.3  HR: 107  BP: 122/60  RR: 20  SpO2: --    LABS:                        10.4   6.66  )-----------( 304      ( 2018 07:26 )             33.2     06-20    146  |  104  |  12  ----------------------------<  101<H>  3.9   |  24  |  0.9    Ca    8.2<L>      2018 07:26    TPro  5.9<L>  /  Alb  3.5  /  TBili  <0.2  /  DBili  x   /  AST  15  /  ALT  13  /  AlkPhos  76        Urinalysis Basic - ( 2018 16:05 )    Color: Yellow / Appearance: Clear / S.020 / pH: x  Gluc: x / Ketone: Negative  / Bili: Negative / Urobili: 0.2 mg/dL   Blood: x / Protein: 30 mg/dL / Nitrite: Negative   Leuk Esterase: Negative / RBC: x / WBC 3-5 /HPF   Sq Epi: x / Non Sq Epi: Moderate /HPF / Bacteria: Few /HPF      RADIOLOGY:     EXAM:  CT ABDOMEN AND PELVIS IC            PROCEDURE DATE:  2018        INTERPRETATION:  CLINICAL STATEMENT: Abdominal pain.      TECHNIQUE: Contiguous axial CT images were obtained from the lower chest   to the pubic symphysis following administration of 100cc Optiray 320   intravenous contrast.  Oral contrast was not administered.  Reformatted   images in the coronal and sagittal planes were acquired.    Comparison made with CT abdomen and pelvis May 25, 2018.      FINDINGS:    LOWER CHEST: Unremarkable.    HEPATOBILIARY: Post cholecystectomy. Liver unremarkable. No biliary   dilation.    SPLEEN: Unremarkable.    PANCREAS: Unremarkable.    ADRENAL GLANDS: Stable 1.5 cm right adrenal nodule, not fully   characterize however likely represents adenoma in absence of oncological   history.    KIDNEYS: Right renal cyst, previously characterized on ultrasound. No   hydronephrosis.    ABDOMINOPELVIC NODES: Unremarkable.    PELVIC ORGANS: Unremarkable.    PERITONEUM/MESENTERY/BOWEL: Sigmoid diverticulosis with segment of wall   thickening and stranding inflammatory change; no evidence of abscess or   gross free air.    BONES/SOFT TISSUES: Degenerative change, lumbar spine.      IMPRESSION:   1. Acute sigmoid diverticulitis; no abscess.    PHYSICAL EXAM:  GEN: Lying in bed in mild discomfort   LUNGS: Clear to auscultation bilaterally   HEART: S1/S2 present. RRR   ABD: Soft, TTP in RLQ and LLQ, non-distended, no rebound or guarding. Bowel sounds present  EXT: NC/NC/NE/2+PP/TEE/Skin Intact   NEURO: AAOX3    Intravenous access:   NG tube:   Perez cathter: SUBJECTIVE:    Patient is a 52y old Female who presents with a chief complaint of diverticulitis (2018 19:16)    Currently admitted to medicine with the primary diagnosis of Diverticulitis     Today is hospital day 2d. Patient reported diffuse abdominal pain worst in the LLQ and nausea this AM. Diet advanced from NPO to clear liquids, patient tolerating well.     PAST MEDICAL & SURGICAL HISTORY  Hemorrhoids  Left ear hearing loss  Cervical disc disease: sequelae of radiation for nasopharyngeal cancer  Hypothyroid  Nasopharyngeal cancer  Pulmonary nodules/lesions, multiple  Diverticulitis  HTN (hypertension)  COPD (chronic obstructive pulmonary disease)  History of cholecystectomy    SOCIAL HISTORY:  Negative for smoking/alcohol/drug use.     ALLERGIES:  No Known Allergies    MEDICATIONS:  STANDING MEDICATIONS  buDESOnide 160 MICROgram(s)/formoterol 4.5 MICROgram(s) Inhaler 2 Puff(s) Inhalation two times a day  buPROPion XL (24-Hour) Oral Tab/Cap - Peds 300 milliGRAM(s) Oral daily  cefTRIAXone   IVPB      cefTRIAXone   IVPB 2 Gram(s) IV Intermittent every 24 hours  docusate sodium 100 milliGRAM(s) Oral three times a day  enalapril 5 milliGRAM(s) Oral daily  enoxaparin Injectable 40 milliGRAM(s) SubCutaneous daily  lactated ringers. 1000 milliLiter(s) IV Continuous <Continuous>  levothyroxine 88 MICROGram(s) Oral daily  metroNIDAZOLE  IVPB 500 milliGRAM(s) IV Intermittent every 8 hours  montelukast Oral Tab/Cap - Peds 10 milliGRAM(s) Oral daily  pantoprazole    Tablet 40 milliGRAM(s) Oral before breakfast  senna 1 Tablet(s) Oral two times a day  tiotropium 18 MICROgram(s) Capsule 1 Capsule(s) Inhalation daily    PRN MEDICATIONS  ALBUTerol    90 MICROgram(s) HFA Inhaler 2 Puff(s) Inhalation every 6 hours PRN  clonazePAM Tablet 0.5 milliGRAM(s) Oral two times a day PRN  cyclobenzaprine 10 milliGRAM(s) Oral three times a day PRN  HYDROmorphone   Tablet 4 milliGRAM(s) Oral every 6 hours PRN  ibuprofen  Tablet 600 milliGRAM(s) Oral three times a day PRN  ondansetron Injectable 4 milliGRAM(s) IV Push every 6 hours PRN  oxyCODONE    5 mG/acetaminophen 325 mG 2 Tablet(s) Oral every 6 hours PRN  zolpidem 5 milliGRAM(s) Oral at bedtime PRN  zolpidem 5 milliGRAM(s) Oral at bedtime PRN    VITALS:   T(F): 98.3  HR: 107  BP: 122/60  RR: 20  SpO2: --    LABS:                        10.4   6.66  )-----------( 304      ( 2018 07:26 )             33.2     06-20    146  |  104  |  12  ----------------------------<  101<H>  3.9   |  24  |  0.9    Ca    8.2<L>      2018 07:26    TPro  5.9<L>  /  Alb  3.5  /  TBili  <0.2  /  DBili  x   /  AST  15  /  ALT  13  /  AlkPhos  76  06-20      Urinalysis Basic - ( 2018 16:05 )    Color: Yellow / Appearance: Clear / S.020 / pH: x  Gluc: x / Ketone: Negative  / Bili: Negative / Urobili: 0.2 mg/dL   Blood: x / Protein: 30 mg/dL / Nitrite: Negative   Leuk Esterase: Negative / RBC: x / WBC 3-5 /HPF   Sq Epi: x / Non Sq Epi: Moderate /HPF / Bacteria: Few /HPF      RADIOLOGY:     EXAM:  CT ABDOMEN AND PELVIS IC      IMPRESSION:   1. Acute sigmoid diverticulitis; no abscess.    PHYSICAL EXAM:  GEN: Lying in bed in mild discomfort   LUNGS: Clear to auscultation bilaterally   HEART: S1/S2 present. RRR   ABD: Soft, TTP in RLQ and LLQ, non-distended, no rebound or guarding. Bowel sounds present  EXT: NC/NC/NE/2+PP/TEE/Skin Intact   NEURO: AAOX3    Intravenous access:   NG tube:   Perez cathter:

## 2018-06-20 NOTE — PROGRESS NOTE ADULT - SUBJECTIVE AND OBJECTIVE BOX
Pt seen and examined at bedside. Patient reports the pain is the same but resolution of pain when oxycodone is given. Patient reports flatus and urination but has not had BM. Patient reports nausea but resolution with Zofran. Denies fever, chill, vomiting. Patient is tolerating liquid. Felt nauseous with soft liquid.     REVIEW OF SYSTEMS: negative except as stated above.   Allergies    No Known Allergies    Intolerances        MEDICATIONS:  MEDICATIONS  (STANDING):  buDESOnide 160 MICROgram(s)/formoterol 4.5 MICROgram(s) Inhaler 2 Puff(s) Inhalation two times a day  buPROPion XL (24-Hour) Oral Tab/Cap - Peds 300 milliGRAM(s) Oral daily  cefTRIAXone   IVPB      cefTRIAXone   IVPB 2 Gram(s) IV Intermittent every 24 hours  docusate sodium 100 milliGRAM(s) Oral three times a day  enalapril 5 milliGRAM(s) Oral daily  enoxaparin Injectable 40 milliGRAM(s) SubCutaneous daily  lactated ringers. 1000 milliLiter(s) (125 mL/Hr) IV Continuous <Continuous>  levothyroxine 88 MICROGram(s) Oral daily  metroNIDAZOLE  IVPB 500 milliGRAM(s) IV Intermittent every 8 hours  montelukast Oral Tab/Cap - Peds 10 milliGRAM(s) Oral daily  pantoprazole    Tablet 40 milliGRAM(s) Oral before breakfast  senna 1 Tablet(s) Oral two times a day  tiotropium 18 MICROgram(s) Capsule 1 Capsule(s) Inhalation daily    MEDICATIONS  (PRN):  ALBUTerol    90 MICROgram(s) HFA Inhaler 2 Puff(s) Inhalation every 6 hours PRN Shortness of Breath and/or Wheezing  clonazePAM Tablet 0.5 milliGRAM(s) Oral two times a day PRN anxiety  cyclobenzaprine 10 milliGRAM(s) Oral three times a day PRN Muscle Spasm  HYDROmorphone   Tablet 4 milliGRAM(s) Oral every 6 hours PRN Severe Pain (7 - 10)  ibuprofen  Tablet 600 milliGRAM(s) Oral three times a day PRN headache  ondansetron Injectable 4 milliGRAM(s) IV Push every 6 hours PRN Nausea and/or Vomiting  oxyCODONE    5 mG/acetaminophen 325 mG 2 Tablet(s) Oral every 6 hours PRN Moderate Pain (4 - 6)  zolpidem 5 milliGRAM(s) Oral at bedtime PRN Insomnia  zolpidem 5 milliGRAM(s) Oral at bedtime PRN Insomnia      Vital Signs Last 24 Hrs  T(C): 36.8 (2018 07:51), Max: 36.8 (2018 07:51)  T(F): 98.3 (2018 07:51), Max: 98.3 (2018 07:51)  HR: 107 (2018 07:51) (81 - 107)  BP: 122/60 (2018 07:51) (101/58 - 128/71)  BP(mean): --  RR: 20 (2018 07:51) (18 - 20)  SpO2: --     @ 07:01  -   @ 12:39  --------------------------------------------------------  IN: 460 mL / OUT: 300 mL / NET: 160 mL        PHYSICAL EXAM:    General: Well developed; well nourished; in no acute distress  HEENT: MMM, conjunctiva and sclera clear  Gastrointestinal: Soft non-tender non-distended; Normal bowel sounds; No hepatosplenomegaly. No rebound or guarding  Skin: Warm and dry. No obvious rash    LABS:  CBC Full  -  ( 2018 07:26 )  WBC Count : 6.66 K/uL  Hemoglobin : 10.4 g/dL  Hematocrit : 33.2 %  Platelet Count - Automated : 304 K/uL  Mean Cell Volume : 84.3 fL  Mean Cell Hemoglobin : 26.4 pg  Mean Cell Hemoglobin Concentration : 31.3 g/dL  Auto Neutrophil # : x  Auto Lymphocyte # : x  Auto Monocyte # : x  Auto Eosinophil # : x  Auto Basophil # : x  Auto Neutrophil % : x  Auto Lymphocyte % : x  Auto Monocyte % : x  Auto Eosinophil % : x  Auto Basophil % : x        146  |  104  |  12  ----------------------------<  101<H>  3.9   |  24  |  0.9    Ca    8.2<L>      2018 07:26    TPro  5.9<L>  /  Alb  3.5  /  TBili  <0.2  /  DBili  x   /  AST  15  /  ALT  13  /  AlkPhos  76  06-20          Urinalysis Basic - ( 2018 16:05 )    Color: Yellow / Appearance: Clear / S.020 / pH: x  Gluc: x / Ketone: Negative  / Bili: Negative / Urobili: 0.2 mg/dL   Blood: x / Protein: 30 mg/dL / Nitrite: Negative   Leuk Esterase: Negative / RBC: x / WBC 3-5 /HPF   Sq Epi: x / Non Sq Epi: Moderate /HPF / Bacteria: Few /HPF

## 2018-06-20 NOTE — PROGRESS NOTE ADULT - SUBJECTIVE AND OBJECTIVE BOX
Patient was seen and examined. Spoke with RN. Chart reviewed.  No events overnight. Haqs nausea; but no bleeding, fever, diarrhea, and has improved pain  Vital Signs Last 24 Hrs  T(F): 96.5 (2018 02:01), Max: 97.6 (2018 07:33)  HR: 88 (2018 02:01) (81 - 89)  BP: 121/66 (2018 04:49) (101/58 - 128/71)  SpO2: 98% (2018 07:33) (98% - 98%)  MEDICATIONS  (STANDING):  buDESOnide 160 MICROgram(s)/formoterol 4.5 MICROgram(s) Inhaler 2 Puff(s) Inhalation two times a day  buPROPion XL (24-Hour) Oral Tab/Cap - Peds 300 milliGRAM(s) Oral daily  cefTRIAXone   IVPB      cefTRIAXone   IVPB 2 Gram(s) IV Intermittent every 24 hours  docusate sodium 100 milliGRAM(s) Oral three times a day  enalapril 5 milliGRAM(s) Oral daily  enoxaparin Injectable 40 milliGRAM(s) SubCutaneous daily  levothyroxine 88 MICROGram(s) Oral daily  metroNIDAZOLE  IVPB 500 milliGRAM(s) IV Intermittent every 8 hours  montelukast Oral Tab/Cap - Peds 10 milliGRAM(s) Oral daily  pantoprazole    Tablet 40 milliGRAM(s) Oral before breakfast  senna 1 Tablet(s) Oral two times a day  tiotropium 18 MICROgram(s) Capsule 1 Capsule(s) Inhalation daily    MEDICATIONS  (PRN):  ALBUTerol    90 MICROgram(s) HFA Inhaler 2 Puff(s) Inhalation every 6 hours PRN Shortness of Breath and/or Wheezing  clonazePAM Tablet 0.5 milliGRAM(s) Oral two times a day PRN anxiety  cyclobenzaprine 10 milliGRAM(s) Oral three times a day PRN Muscle Spasm  HYDROmorphone   Tablet 4 milliGRAM(s) Oral every 6 hours PRN Severe Pain (7 - 10)  ibuprofen  Tablet 600 milliGRAM(s) Oral three times a day PRN headache  ondansetron    Tablet 8 milliGRAM(s) Oral three times a day PRN Nausea and/or Vomiting  oxyCODONE    5 mG/acetaminophen 325 mG 2 Tablet(s) Oral every 6 hours PRN Moderate Pain (4 - 6)  zolpidem 5 milliGRAM(s) Oral at bedtime PRN Insomnia  zolpidem 5 milliGRAM(s) Oral at bedtime PRN Insomnia    Labs:                        10.5   7.84  )-----------( 305      ( 2018 07:59 )             32.9                         10.9   8.34  )-----------( 319      ( 2018 16:05 )             34.6     2018 07:59    141    |  100    |  14     ----------------------------<  111    4.2     |  27     |  0.9    2018 16:05    137    |  98     |  17     ----------------------------<  102    3.9     |  24     |  0.9      Ca    8.6        2018 07:59  Ca    8.7        2018 16:05    TPro  6.3    /  Alb  3.7    /  TBili  <0.2   /  DBili  x      /  AST  16     /  ALT  15     /  AlkPhos  78     2018 07:59  TPro  6.6    /  Alb  3.9    /  TBili  <0.2   /  DBili  x      /  AST  17     /  ALT  15     /  AlkPhos  76     2018 16:05      Urinalysis Basic - ( 2018 16:05 )    Color: Yellow / Appearance: Clear / S.020 / pH: x  Gluc: x / Ketone: Negative  / Bili: Negative / Urobili: 0.2 mg/dL   Blood: x / Protein: 30 mg/dL / Nitrite: Negative   Leuk Esterase: Negative / RBC: x / WBC 3-5 /HPF   Sq Epi: x / Non Sq Epi: Moderate /HPF / Bacteria: Few /HPF        General: comfortable, NAD  Neurology: A&Ox3, nonfocal  Head:  Normocephalic, atraumatic  ENT:  Mucosa moist, no ulcerations  Neck:  Supple, no JVD,   Skin: no breakdowns (as per RN)  Resp: CTA B/L  CV: RRR, S1S2,   GI: Soft, NT, bowel sounds, obese  MS: No edema, + peripheral pulses, FROM all 4 extremity      A/P:  diverticulitis  IV abx  GI consult    IV zofran    pain management consult    surgery f/u    close monitoring  DVT prophylaxis  Decubitus prevention- all measures as per RN protocol  Please call or text me with any questions or updates

## 2018-06-20 NOTE — PROGRESS NOTE ADULT - SUBJECTIVE AND OBJECTIVE BOX
DIAGNOSIS:   HOSPITAL DAY #:    STATUS POST:    POST OPERATIVE DAY #:     Vital Signs Last 24 Hrs  T(C): 35.6 (20 Jun 2018 15:40), Max: 36.8 (20 Jun 2018 07:51)  T(F): 96 (20 Jun 2018 15:40), Max: 98.3 (20 Jun 2018 07:51)  HR: 107 (20 Jun 2018 15:40) (88 - 107)  BP: 121/65 (20 Jun 2018 15:40) (121/65 - 128/71)  BP(mean): --  RR: 18 (20 Jun 2018 15:40) (18 - 20)  SpO2: --    SUBJECTIVE: Pt seen    Pain: YES  [ ]   NO [ ]   Nausea: [ ] YES [ ] NO           Vomiting: [ ] YES [ ] NO  Flatus: [ ] YES [ ] NO             Bowel Movement: [ ] YES [ ] NO     Void: [ ]YES [ ]No      JANIS DRAINAGE: SIGNIFICANT [ ]   NOT SIGNIFICANT [ ]   NOT APPLICABLE [ ]  YES [ ] NO    General Appearance: Appears well, NAD  Neck: Supple  Chest: Equal expansion bilaterally, equal breath sounds  CV: Pulse regular presently  Abdomen: Soft [x ] YES [ ]NO  DISTENDED [ ] YES [x ] NO TENDERNESS [ ]YES [ ]NO  INCISIONS: HEALING WELL [ ] YES  [ ] NO ERYTHEMA [ ] YES [ ] NO DRAINAGE [ ] YES  [ ] NO  Extremities: Grossly symmetric, CALF TENDERNESS [ ] YES  [ ] NO  mild tenderness LLQ    LABS:                        10.6   6.58  )-----------( 319      ( 20 Jun 2018 22:06 )             34.1     06-20    146  |  104  |  12  ----------------------------<  101<H>  3.9   |  24  |  0.9    Ca    8.2<L>      20 Jun 2018 07:26    TPro  5.9<L>  /  Alb  3.5  /  TBili  <0.2  /  DBili  x   /  AST  15  /  ALT  13  /  AlkPhos  76  06-20            ASSESSMENT:     GOOD POST OP COURSE [ ]  YES  [ ] NO  CONDITION IMPROVING  []  YES  [ ]  NO          PLAN:    CONTINUE PRESENT MANAGEMENT  [x ] YES  [ ] NO

## 2018-06-20 NOTE — PROGRESS NOTE ADULT - SUBJECTIVE AND OBJECTIVE BOX
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  Hypothyroid  Nasopharyngeal cancer  Pulmonary nodules/lesions, multiple  Diverticulitis  HTN (hypertension)  COPD (chronic obstructive pulmonary disease)  History of cholecystectomy      Vital Signs Last 24 Hrs  T(C): 35.8 (2018 02:01), Max: 36.4 (2018 07:33)  T(F): 96.5 (2018 02:01), Max: 97.6 (2018 07:33)  HR: 88 (2018 02:01) (81 - 89)  BP: 121/66 (2018 04:49) (101/58 - 141/67)  BP(mean): --  RR: 19 (2018 02:01) (18 - 20)  SpO2: 98% (2018 07:33) (98% - 98%)        Diet, NPO:   Except Medications  With Ice Chips/Sips of Water (18 @ 19:32)      I&O's Detail      MEDICATIONS  (STANDING):  buDESOnide 160 MICROgram(s)/formoterol 4.5 MICROgram(s) Inhaler 2 Puff(s) Inhalation two times a day  buPROPion XL (24-Hour) Oral Tab/Cap - Peds 300 milliGRAM(s) Oral daily  cefTRIAXone   IVPB      cefTRIAXone   IVPB 2 Gram(s) IV Intermittent every 24 hours  docusate sodium 100 milliGRAM(s) Oral three times a day  enalapril 5 milliGRAM(s) Oral daily  enoxaparin Injectable 40 milliGRAM(s) SubCutaneous daily  levothyroxine 88 MICROGram(s) Oral daily  metroNIDAZOLE  IVPB 500 milliGRAM(s) IV Intermittent every 8 hours  montelukast Oral Tab/Cap - Peds 10 milliGRAM(s) Oral daily  pantoprazole    Tablet 40 milliGRAM(s) Oral before breakfast  senna 1 Tablet(s) Oral two times a day  tiotropium 18 MICROgram(s) Capsule 1 Capsule(s) Inhalation daily    MEDICATIONS  (PRN):  ALBUTerol    90 MICROgram(s) HFA Inhaler 2 Puff(s) Inhalation every 6 hours PRN Shortness of Breath and/or Wheezing  clonazePAM Tablet 0.5 milliGRAM(s) Oral two times a day PRN anxiety  cyclobenzaprine 10 milliGRAM(s) Oral three times a day PRN Muscle Spasm  HYDROmorphone   Tablet 4 milliGRAM(s) Oral every 6 hours PRN Severe Pain (7 - 10)  ibuprofen  Tablet 600 milliGRAM(s) Oral three times a day PRN headache  ondansetron    Tablet 8 milliGRAM(s) Oral three times a day PRN Nausea and/or Vomiting  oxyCODONE    5 mG/acetaminophen 325 mG 2 Tablet(s) Oral every 6 hours PRN Moderate Pain (4 - 6)  zolpidem 5 milliGRAM(s) Oral at bedtime PRN Insomnia  zolpidem 5 milliGRAM(s) Oral at bedtime PRN Insomnia      PHYSICAL EXAM:                          10.5   7.84  )-----------( 305      ( 2018 07:59 )             32.9        06-    141  |  100  |  14  ----------------------------<  111<H>  4.2   |  27  |  0.9    Ca    8.6      2018 07:59    TPro  6.3  /  Alb  3.7  /  TBili  <0.2  /  DBili  x   /  AST  16  /  ALT  15  /  AlkPhos  78      LIVER FUNCTIONS - ( 2018 07:59 )  Alb: 3.7 g/dL / Pro: 6.3 g/dL / ALK PHOS: 78 U/L / ALT: 15 U/L / AST: 16 U/L / GGT: x                 Urinalysis Basic - ( 2018 16:05 )    Color: Yellow / Appearance: Clear / S.020 / pH: x  Gluc: x / Ketone: Negative  / Bili: Negative / Urobili: 0.2 mg/dL   Blood: x / Protein: 30 mg/dL / Nitrite: Negative   Leuk Esterase: Negative / RBC: x / WBC 3-5 /HPF   Sq Epi: x / Non Sq Epi: Moderate /HPF / Bacteria: Few /HPF          IMAGING: < from: CT Abdomen and Pelvis w/ IV Cont (18 @ 17:07) >  1. Acute sigmoid diverticulitis; no abscess.    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  Hypothyroid  Nasopharyngeal cancer  Pulmonary nodules/lesions, multiple  Diverticulitis  HTN (hypertension)  COPD (chronic obstructive pulmonary disease)  History of cholecystectomy      Vital Signs Last 24 Hrs  T(C): 35.8 (2018 02:01), Max: 36.4 (2018 07:33)  T(F): 96.5 (2018 02:01), Max: 97.6 (2018 07:33)  HR: 88 (2018 02:01) (81 - 89)  BP: 121/66 (2018 04:49) (101/58 - 141/67)  BP(mean): --  RR: 19 (2018 02:01) (18 - 20)  SpO2: 98% (2018 07:33) (98% - 98%)        Diet, NPO:   Except Medications  With Ice Chips/Sips of Water (18 @ 19:32)      I&O's Detail      MEDICATIONS  (STANDING):  buDESOnide 160 MICROgram(s)/formoterol 4.5 MICROgram(s) Inhaler 2 Puff(s) Inhalation two times a day  buPROPion XL (24-Hour) Oral Tab/Cap - Peds 300 milliGRAM(s) Oral daily  cefTRIAXone   IVPB      cefTRIAXone   IVPB 2 Gram(s) IV Intermittent every 24 hours  docusate sodium 100 milliGRAM(s) Oral three times a day  enalapril 5 milliGRAM(s) Oral daily  enoxaparin Injectable 40 milliGRAM(s) SubCutaneous daily  levothyroxine 88 MICROGram(s) Oral daily  metroNIDAZOLE  IVPB 500 milliGRAM(s) IV Intermittent every 8 hours  montelukast Oral Tab/Cap - Peds 10 milliGRAM(s) Oral daily  pantoprazole    Tablet 40 milliGRAM(s) Oral before breakfast  senna 1 Tablet(s) Oral two times a day  tiotropium 18 MICROgram(s) Capsule 1 Capsule(s) Inhalation daily    MEDICATIONS  (PRN):  ALBUTerol    90 MICROgram(s) HFA Inhaler 2 Puff(s) Inhalation every 6 hours PRN Shortness of Breath and/or Wheezing  clonazePAM Tablet 0.5 milliGRAM(s) Oral two times a day PRN anxiety  cyclobenzaprine 10 milliGRAM(s) Oral three times a day PRN Muscle Spasm  HYDROmorphone   Tablet 4 milliGRAM(s) Oral every 6 hours PRN Severe Pain (7 - 10)  ibuprofen  Tablet 600 milliGRAM(s) Oral three times a day PRN headache  ondansetron    Tablet 8 milliGRAM(s) Oral three times a day PRN Nausea and/or Vomiting  oxyCODONE    5 mG/acetaminophen 325 mG 2 Tablet(s) Oral every 6 hours PRN Moderate Pain (4 - 6)  zolpidem 5 milliGRAM(s) Oral at bedtime PRN Insomnia  zolpidem 5 milliGRAM(s) Oral at bedtime PRN Insomnia      PHYSICAL EXAM:  GEN: NAD  HEENT: WNL  RESP: CTAB  CV: RRR  ABD: SOFT, MILD DISTENSION, DIFFUSELY TENDER - WORSE SUPRAPUBIC                          10.5   7.84  )-----------( 305      ( 2018 07:59 )             32.9        06-19    141  |  100  |  14  ----------------------------<  111<H>  4.2   |  27  |  0.9    Ca    8.6      2018 07:59    TPro  6.3  /  Alb  3.7  /  TBili  <0.2  /  DBili  x   /  AST  16  /  ALT  15  /  AlkPhos  78  06-19    LIVER FUNCTIONS - ( 2018 07:59 )  Alb: 3.7 g/dL / Pro: 6.3 g/dL / ALK PHOS: 78 U/L / ALT: 15 U/L / AST: 16 U/L / GGT: x                 Urinalysis Basic - ( 2018 16:05 )    Color: Yellow / Appearance: Clear / S.020 / pH: x  Gluc: x / Ketone: Negative  / Bili: Negative / Urobili: 0.2 mg/dL   Blood: x / Protein: 30 mg/dL / Nitrite: Negative   Leuk Esterase: Negative / RBC: x / WBC 3-5 /HPF   Sq Epi: x / Non Sq Epi: Moderate /HPF / Bacteria: Few /HPF          IMAGING: < from: CT Abdomen and Pelvis w/ IV Cont (18 @ 17:07) >  1. Acute sigmoid diverticulitis; no abscess.    < end of copied text >      SPECTRA: [id number]

## 2018-06-20 NOTE — PROGRESS NOTE ADULT - ASSESSMENT
51 Y/O female hospital day 4 w/ acute diverticulitis    Plan:  non-operative management  needs IV fluids - maintenance fluids at 125  encourage ambulation  continue abx  will need outpatient colonoscopy in 6 weeks

## 2018-06-20 NOTE — PROGRESS NOTE ADULT - ASSESSMENT
51 y/o female with a history of diverticulitis, recently d/lynda from the ED MAy 25th with cipro and flagyl with o/p f/u with a gastroenterologist presents with acute sigmoid diverticulitis and no abscess formation.      #acute/recurrent sigmoidal diverticulitis  -advanced diet as tolerated  -patient can be discharged after tolerating PO diet.  -c/w abx and finish the treatment course of acute diverticulitis  -c/w pain control  -F/u outpatient for colonoscopy in 6 weeks after d/c.  -Recall if needed. GI sign off.

## 2018-06-20 NOTE — PROGRESS NOTE ADULT - ASSESSMENT
Acute recurrent diverticulitis  -Advanced diet to clear liquids  -Fluids: LR @ 125 cc/hr  -GI eval, F/U GI outpatient   -Per surgery, no intervention until outpatient colonoscopy at 6 weeks, ambulation, pain control  -Rocephin and Flagyl IV   -IV Zofran PRN nausea     #COPD not on home O2  -symbicort + spiriva (pt on breo + incruse at home)  - ventolin prn    #GERD  -protonix qd    #anxiety  clonazepam prn + bupropion    #insomnia  zolpidem    #htn  -enalapril 5mg    #h/a  pain control    #cervical discopathy  -percocet prn for mod pain  -dilaudid po prn for severe pain  -flexeril for muscle spasms    #constipation/ nausea  colace 100mg tid  senna bid  zofran prn    #insomnia  zolpidem    #dvt ppx: Acute recurrent diverticulitis  -Currently on clear liquid diet   -GI: advance diet as tolerated, DC after PO intake, finish abx course   -Fluids: LR @ 125 cc/hr   -Per surgery, no intervention until outpatient colonoscopy at 6 weeks, ambulation, pain control  -Rocephin and Flagyl IV   -IV Zofran PRN nausea     #Anemia  -Hb 10.4, continue to monitor     #COPD not on home O2  -symbicort + spiriva (pt on breo + incruse at home)  - ventolin prn    #GERD  -protonix qd    #anxiety  clonazepam prn + bupropion    #insomnia  zolpidem    #htn  -enalapril 5mg    #h/a  pain control    #cervical discopathy  -percocet prn for mod pain  -dilaudid po prn for severe pain  -flexeril for muscle spasms    #constipation/ nausea  colace 100mg tid  senna bid  zofran prn    #insomnia  zolpidem    #dvt ppx:

## 2018-06-21 LAB
ANION GAP SERPL CALC-SCNC: 12 MMOL/L — SIGNIFICANT CHANGE UP (ref 7–14)
BUN SERPL-MCNC: 16 MG/DL — SIGNIFICANT CHANGE UP (ref 10–20)
CALCIUM SERPL-MCNC: 8.4 MG/DL — LOW (ref 8.5–10.1)
CHLORIDE SERPL-SCNC: 101 MMOL/L — SIGNIFICANT CHANGE UP (ref 98–110)
CO2 SERPL-SCNC: 28 MMOL/L — SIGNIFICANT CHANGE UP (ref 17–32)
CREAT SERPL-MCNC: 1 MG/DL — SIGNIFICANT CHANGE UP (ref 0.7–1.5)
GLUCOSE SERPL-MCNC: 133 MG/DL — HIGH (ref 70–99)
POTASSIUM SERPL-MCNC: 3.9 MMOL/L — SIGNIFICANT CHANGE UP (ref 3.5–5)
POTASSIUM SERPL-SCNC: 3.9 MMOL/L — SIGNIFICANT CHANGE UP (ref 3.5–5)
SODIUM SERPL-SCNC: 141 MMOL/L — SIGNIFICANT CHANGE UP (ref 135–146)

## 2018-06-21 RX ADMIN — Medication 100 MILLIGRAM(S): at 21:32

## 2018-06-21 RX ADMIN — CEFTRIAXONE 100 GRAM(S): 500 INJECTION, POWDER, FOR SOLUTION INTRAMUSCULAR; INTRAVENOUS at 08:48

## 2018-06-21 RX ADMIN — MONTELUKAST 10 MILLIGRAM(S): 4 TABLET, CHEWABLE ORAL at 11:20

## 2018-06-21 RX ADMIN — OXYCODONE AND ACETAMINOPHEN 2 TABLET(S): 5; 325 TABLET ORAL at 01:24

## 2018-06-21 RX ADMIN — OXYCODONE AND ACETAMINOPHEN 2 TABLET(S): 5; 325 TABLET ORAL at 08:40

## 2018-06-21 RX ADMIN — Medication 88 MICROGRAM(S): at 05:25

## 2018-06-21 RX ADMIN — Medication 100 MILLIGRAM(S): at 05:26

## 2018-06-21 RX ADMIN — TIOTROPIUM BROMIDE 1 CAPSULE(S): 18 CAPSULE ORAL; RESPIRATORY (INHALATION) at 08:41

## 2018-06-21 RX ADMIN — BUPROPION HYDROCHLORIDE 300 MILLIGRAM(S): 150 TABLET, EXTENDED RELEASE ORAL at 11:20

## 2018-06-21 RX ADMIN — Medication 100 MILLIGRAM(S): at 05:25

## 2018-06-21 RX ADMIN — ONDANSETRON 4 MILLIGRAM(S): 8 TABLET, FILM COATED ORAL at 15:33

## 2018-06-21 RX ADMIN — Medication 100 MILLIGRAM(S): at 21:31

## 2018-06-21 RX ADMIN — BUDESONIDE AND FORMOTEROL FUMARATE DIHYDRATE 2 PUFF(S): 160; 4.5 AEROSOL RESPIRATORY (INHALATION) at 13:34

## 2018-06-21 RX ADMIN — OXYCODONE AND ACETAMINOPHEN 2 TABLET(S): 5; 325 TABLET ORAL at 21:34

## 2018-06-21 RX ADMIN — OXYCODONE AND ACETAMINOPHEN 2 TABLET(S): 5; 325 TABLET ORAL at 00:56

## 2018-06-21 RX ADMIN — PANTOPRAZOLE SODIUM 40 MILLIGRAM(S): 20 TABLET, DELAYED RELEASE ORAL at 05:25

## 2018-06-21 RX ADMIN — Medication 100 MILLIGRAM(S): at 13:20

## 2018-06-21 RX ADMIN — OXYCODONE AND ACETAMINOPHEN 2 TABLET(S): 5; 325 TABLET ORAL at 08:24

## 2018-06-21 RX ADMIN — SENNA PLUS 1 TABLET(S): 8.6 TABLET ORAL at 05:25

## 2018-06-21 RX ADMIN — ONDANSETRON 4 MILLIGRAM(S): 8 TABLET, FILM COATED ORAL at 21:38

## 2018-06-21 RX ADMIN — ONDANSETRON 4 MILLIGRAM(S): 8 TABLET, FILM COATED ORAL at 08:16

## 2018-06-21 RX ADMIN — OXYCODONE AND ACETAMINOPHEN 2 TABLET(S): 5; 325 TABLET ORAL at 15:33

## 2018-06-21 RX ADMIN — OXYCODONE AND ACETAMINOPHEN 2 TABLET(S): 5; 325 TABLET ORAL at 16:02

## 2018-06-21 RX ADMIN — ENOXAPARIN SODIUM 40 MILLIGRAM(S): 100 INJECTION SUBCUTANEOUS at 11:21

## 2018-06-21 RX ADMIN — ZOLPIDEM TARTRATE 5 MILLIGRAM(S): 10 TABLET ORAL at 21:34

## 2018-06-21 RX ADMIN — Medication 5 MILLIGRAM(S): at 05:26

## 2018-06-21 NOTE — PROGRESS NOTE ADULT - ASSESSMENT
51 y/o female with a history of diverticulitis, recently d/lynda from the ED May 25th with cipro and flagyl with o/p f/u with a gastroenterologist. Presents with acute sigmoid diverticulitis, no abscess formation.    #acute/recurrent diverticulitis  -Full liquid diet being advanced to regular diet  -GI: advance diet as tolerated and OK for discharge after PO intake tolerated, finish abx course, F/u outpt   -surgery: no intervention at this time, outpt colonoscopy at 6 weeks   -rocephin+ flagyl IV  -pain control     #anemia  -Hb 10.4, continue to monitor     #COPD not on home O2  -symbicort + spiriva (pt on breo + incruse at home)  - ventolin prn    #GERD  -protonix qd    #anxiety  clonazepam prn + bupropion    #insomnia  zolpidem    #htn  -enalapril 5mg    #h/a  pain control    #cervical discopathy  -percocet prn for mod pain  -dilaudid po prn for severe pain  -flexeril for muscle spasms    #constipation/ nausea  colace 100mg tid  senna bid  zofran prn    #insomnia  zolpidem    #dvt ppx

## 2018-06-21 NOTE — PROGRESS NOTE ADULT - ASSESSMENT
51 y/o female hospital day 5 w/ acute diverticulitis, on full liquid diet, ambulating, pain improving. Seen by GI. On abx.    Plan:  cont abx  advance diet as tolerated  encourage ambulation  f/u w/ GI for cscope in 6 weeks  no acute surgical intervention

## 2018-06-21 NOTE — PROGRESS NOTE ADULT - SUBJECTIVE AND OBJECTIVE BOX
Patient was seen and examined. Spoke with RN. Chart reviewed.    No events overnight.  Vital Signs Last 24 Hrs  T(F): 98.7 (21 Jun 2018 07:55), Max: 98.7 (21 Jun 2018 07:55)  HR: 86 (21 Jun 2018 07:55) (83 - 107)  BP: 148/70 (21 Jun 2018 07:55) (120/66 - 148/77)  SpO2: --  MEDICATIONS  (STANDING):  buDESOnide 160 MICROgram(s)/formoterol 4.5 MICROgram(s) Inhaler 2 Puff(s) Inhalation two times a day  buPROPion XL (24-Hour) Oral Tab/Cap - Peds 300 milliGRAM(s) Oral daily  cefTRIAXone   IVPB      cefTRIAXone   IVPB 2 Gram(s) IV Intermittent every 24 hours  docusate sodium 100 milliGRAM(s) Oral three times a day  enalapril 5 milliGRAM(s) Oral daily  enoxaparin Injectable 40 milliGRAM(s) SubCutaneous daily  levothyroxine 88 MICROGram(s) Oral daily  metroNIDAZOLE  IVPB 500 milliGRAM(s) IV Intermittent every 8 hours  montelukast Oral Tab/Cap - Peds 10 milliGRAM(s) Oral daily  pantoprazole    Tablet 40 milliGRAM(s) Oral before breakfast  senna 1 Tablet(s) Oral two times a day  tiotropium 18 MICROgram(s) Capsule 1 Capsule(s) Inhalation daily    MEDICATIONS  (PRN):  ALBUTerol    90 MICROgram(s) HFA Inhaler 2 Puff(s) Inhalation every 6 hours PRN Shortness of Breath and/or Wheezing  clonazePAM Tablet 0.5 milliGRAM(s) Oral two times a day PRN anxiety  cyclobenzaprine 10 milliGRAM(s) Oral three times a day PRN Muscle Spasm  HYDROmorphone   Tablet 4 milliGRAM(s) Oral every 6 hours PRN Severe Pain (7 - 10)  ibuprofen  Tablet 600 milliGRAM(s) Oral three times a day PRN headache  ondansetron Injectable 4 milliGRAM(s) IV Push every 6 hours PRN Nausea and/or Vomiting  oxyCODONE    5 mG/acetaminophen 325 mG 2 Tablet(s) Oral every 6 hours PRN Moderate Pain (4 - 6)  sodium chloride 0.65% Nasal 1 Spray(s) Both Nostrils two times a day PRN Nasal Congestion  zolpidem 5 milliGRAM(s) Oral at bedtime PRN Insomnia  zolpidem 5 milliGRAM(s) Oral at bedtime PRN Insomnia    Labs:                        10.6   6.58  )-----------( 319      ( 20 Jun 2018 22:06 )             34.1                         10.4   6.66  )-----------( 304      ( 20 Jun 2018 07:26 )             33.2     20 Jun 2018 22:06    142    |  100    |  13     ----------------------------<  90     3.9     |  28     |  0.9    20 Jun 2018 07:26    146    |  104    |  12     ----------------------------<  101    3.9     |  24     |  0.9      Ca    8.3        20 Jun 2018 22:06  Ca    8.2        20 Jun 2018 07:26  Mg     2.0       20 Jun 2018 22:06    TPro  5.9    /  Alb  3.5    /  TBili  <0.2   /  DBili  x      /  AST  15     /  ALT  13     /  AlkPhos  76     20 Jun 2018 07:26            Radiology:    General: comfortable, NAD  Neurology: A&Ox3, nonfocal  Head:  Normocephalic, atraumatic  ENT:  Mucosa moist, no ulcerations  Neck:  Supple, no JVD,   Skin: no breakdowns (as per RN)  Resp: CTA B/L  CV: RRR, S1S2,   GI: Soft, NT, bowel sounds  MS: No edema, + peripheral pulses, FROM all 4 extremity

## 2018-06-21 NOTE — PROGRESS NOTE ADULT - SUBJECTIVE AND OBJECTIVE BOX
SUBJECTIVE:    Patient is a 52y old Female who presents with a chief complaint of diverticulitis (18 Jun 2018 19:16)    Currently admitted to medicine with the primary diagnosis of Diverticulitis     Today is hospital day 3d. This morning she is resting comfortably in bed. Patient reports her abdominal pain has decreased since yesterday and she is more comfortable. She reported 2 episodes of nonbloody emesis last night after dinner and persistent nausea which is well controlled with Zofran. Patient is willing to try to advance her diet.     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     ALLERGIES:  No Known Allergies    MEDICATIONS:  STANDING MEDICATIONS  buDESOnide 160 MICROgram(s)/formoterol 4.5 MICROgram(s) Inhaler 2 Puff(s) Inhalation two times a day  buPROPion XL (24-Hour) Oral Tab/Cap - Peds 300 milliGRAM(s) Oral daily  cefTRIAXone   IVPB      cefTRIAXone   IVPB 2 Gram(s) IV Intermittent every 24 hours  docusate sodium 100 milliGRAM(s) Oral three times a day  enalapril 5 milliGRAM(s) Oral daily  enoxaparin Injectable 40 milliGRAM(s) SubCutaneous daily  levothyroxine 88 MICROGram(s) Oral daily  metroNIDAZOLE  IVPB 500 milliGRAM(s) IV Intermittent every 8 hours  montelukast Oral Tab/Cap - Peds 10 milliGRAM(s) Oral daily  pantoprazole    Tablet 40 milliGRAM(s) Oral before breakfast  senna 1 Tablet(s) Oral two times a day  tiotropium 18 MICROgram(s) Capsule 1 Capsule(s) Inhalation daily    PRN MEDICATIONS  ALBUTerol    90 MICROgram(s) HFA Inhaler 2 Puff(s) Inhalation every 6 hours PRN  clonazePAM Tablet 0.5 milliGRAM(s) Oral two times a day PRN  cyclobenzaprine 10 milliGRAM(s) Oral three times a day PRN  HYDROmorphone   Tablet 4 milliGRAM(s) Oral every 6 hours PRN  ibuprofen  Tablet 600 milliGRAM(s) Oral three times a day PRN  ondansetron Injectable 4 milliGRAM(s) IV Push every 6 hours PRN  oxyCODONE    5 mG/acetaminophen 325 mG 2 Tablet(s) Oral every 6 hours PRN  sodium chloride 0.65% Nasal 1 Spray(s) Both Nostrils two times a day PRN  zolpidem 5 milliGRAM(s) Oral at bedtime PRN  zolpidem 5 milliGRAM(s) Oral at bedtime PRN    VITALS:   T(F): 98.7  HR: 86  BP: 148/70  RR: 19  SpO2: --    LABS:                        10.6   6.58  )-----------( 319      ( 20 Jun 2018 22:06 )             34.1     06-20    142  |  100  |  13  ----------------------------<  90  3.9   |  28  |  0.9    Ca    8.3<L>      20 Jun 2018 22:06  Mg     2.0     06-20    TPro  5.9<L>  /  Alb  3.5  /  TBili  <0.2  /  DBili  x   /  AST  15  /  ALT  13  /  AlkPhos  76  06-20    RADIOLOGY:  No new imaging studies.     PHYSICAL EXAM:  GEN: No acute distress  LUNGS: Clear to auscultation bilaterally   HEART: S1/S2 present. RRR.   ABD: Soft, mild TTP in LLQ, non-distended, no guarding or rebound. Bowel sounds present  EXT: NC/NC/NE/2+PP/TEE/Skin Intact.   NEURO: AAOX3    Intravenous access:   NG tube:   Perez cathter: SUBJECTIVE:    Patient is a 52y old Female who presents with a chief complaint of diverticulitis (18 Jun 2018 19:16)    Currently admitted to medicine with the primary diagnosis of Diverticulitis     Today is hospital day 3d. This morning she is resting comfortably in bed. Patient reports her abdominal pain has decreased since yesterday and she is more comfortable. She reported 2 episodes of nonbloody emesis last night after dinner and persistent nausea which is well controlled with Zofran. Her diet was advanced from full liquid to regular and she reported 5 episodes of watery stool after lunch.     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     ALLERGIES:  No Known Allergies    MEDICATIONS:  STANDING MEDICATIONS  buDESOnide 160 MICROgram(s)/formoterol 4.5 MICROgram(s) Inhaler 2 Puff(s) Inhalation two times a day  buPROPion XL (24-Hour) Oral Tab/Cap - Peds 300 milliGRAM(s) Oral daily  cefTRIAXone   IVPB      cefTRIAXone   IVPB 2 Gram(s) IV Intermittent every 24 hours  docusate sodium 100 milliGRAM(s) Oral three times a day  enalapril 5 milliGRAM(s) Oral daily  enoxaparin Injectable 40 milliGRAM(s) SubCutaneous daily  levothyroxine 88 MICROGram(s) Oral daily  metroNIDAZOLE  IVPB 500 milliGRAM(s) IV Intermittent every 8 hours  montelukast Oral Tab/Cap - Peds 10 milliGRAM(s) Oral daily  pantoprazole    Tablet 40 milliGRAM(s) Oral before breakfast  senna 1 Tablet(s) Oral two times a day  tiotropium 18 MICROgram(s) Capsule 1 Capsule(s) Inhalation daily    PRN MEDICATIONS  ALBUTerol    90 MICROgram(s) HFA Inhaler 2 Puff(s) Inhalation every 6 hours PRN  clonazePAM Tablet 0.5 milliGRAM(s) Oral two times a day PRN  cyclobenzaprine 10 milliGRAM(s) Oral three times a day PRN  HYDROmorphone   Tablet 4 milliGRAM(s) Oral every 6 hours PRN  ibuprofen  Tablet 600 milliGRAM(s) Oral three times a day PRN  ondansetron Injectable 4 milliGRAM(s) IV Push every 6 hours PRN  oxyCODONE    5 mG/acetaminophen 325 mG 2 Tablet(s) Oral every 6 hours PRN  sodium chloride 0.65% Nasal 1 Spray(s) Both Nostrils two times a day PRN  zolpidem 5 milliGRAM(s) Oral at bedtime PRN  zolpidem 5 milliGRAM(s) Oral at bedtime PRN    VITALS:   T(F): 98.7  HR: 86  BP: 148/70  RR: 19  SpO2: --    LABS:                        10.6   6.58  )-----------( 319      ( 20 Jun 2018 22:06 )             34.1     06-20    142  |  100  |  13  ----------------------------<  90  3.9   |  28  |  0.9    Ca    8.3<L>      20 Jun 2018 22:06  Mg     2.0     06-20    TPro  5.9<L>  /  Alb  3.5  /  TBili  <0.2  /  DBili  x   /  AST  15  /  ALT  13  /  AlkPhos  76  06-20    RADIOLOGY:  No new imaging studies.     PHYSICAL EXAM:  GEN: No acute distress  LUNGS: Clear to auscultation bilaterally   HEART: S1/S2 present. RRR.   ABD: Soft, mild TTP in LLQ, non-distended, no guarding or rebound. Bowel sounds present  NEURO: AAOX3    Intravenous access:   NG tube:   Perez cathter:

## 2018-06-21 NOTE — PROGRESS NOTE ADULT - SUBJECTIVE AND OBJECTIVE BOX
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  Hypothyroid  Nasopharyngeal cancer  Pulmonary nodules/lesions, multiple  Diverticulitis  HTN (hypertension)  COPD (chronic obstructive pulmonary disease)  History of cholecystectomy      Vital Signs Last 24 Hrs  T(C): 36.3 (21 Jun 2018 00:12), Max: 36.8 (20 Jun 2018 07:51)  T(F): 97.4 (21 Jun 2018 00:12), Max: 98.3 (20 Jun 2018 07:51)  HR: 88 (21 Jun 2018 05:07) (83 - 107)  BP: 148/77 (21 Jun 2018 05:07) (120/66 - 148/77)  BP(mean): --  RR: 18 (21 Jun 2018 05:07) (18 - 20)  SpO2: --        Diet, Full Liquid (06-20-18 @ 16:33)      I&O's Detail    20 Jun 2018 07:01  -  21 Jun 2018 05:25  --------------------------------------------------------  IN:    Oral Fluid: 700 mL  Total IN: 700 mL    OUT:    Voided: 300 mL  Total OUT: 300 mL    Total NET: 400 mL          MEDICATIONS  (STANDING):  buDESOnide 160 MICROgram(s)/formoterol 4.5 MICROgram(s) Inhaler 2 Puff(s) Inhalation two times a day  buPROPion XL (24-Hour) Oral Tab/Cap - Peds 300 milliGRAM(s) Oral daily  cefTRIAXone   IVPB      cefTRIAXone   IVPB 2 Gram(s) IV Intermittent every 24 hours  docusate sodium 100 milliGRAM(s) Oral three times a day  enalapril 5 milliGRAM(s) Oral daily  enoxaparin Injectable 40 milliGRAM(s) SubCutaneous daily  levothyroxine 88 MICROGram(s) Oral daily  metroNIDAZOLE  IVPB 500 milliGRAM(s) IV Intermittent every 8 hours  montelukast Oral Tab/Cap - Peds 10 milliGRAM(s) Oral daily  pantoprazole    Tablet 40 milliGRAM(s) Oral before breakfast  senna 1 Tablet(s) Oral two times a day  tiotropium 18 MICROgram(s) Capsule 1 Capsule(s) Inhalation daily    MEDICATIONS  (PRN):  ALBUTerol    90 MICROgram(s) HFA Inhaler 2 Puff(s) Inhalation every 6 hours PRN Shortness of Breath and/or Wheezing  clonazePAM Tablet 0.5 milliGRAM(s) Oral two times a day PRN anxiety  cyclobenzaprine 10 milliGRAM(s) Oral three times a day PRN Muscle Spasm  HYDROmorphone   Tablet 4 milliGRAM(s) Oral every 6 hours PRN Severe Pain (7 - 10)  ibuprofen  Tablet 600 milliGRAM(s) Oral three times a day PRN headache  ondansetron Injectable 4 milliGRAM(s) IV Push every 6 hours PRN Nausea and/or Vomiting  oxyCODONE    5 mG/acetaminophen 325 mG 2 Tablet(s) Oral every 6 hours PRN Moderate Pain (4 - 6)  sodium chloride 0.65% Nasal 1 Spray(s) Both Nostrils two times a day PRN Nasal Congestion  zolpidem 5 milliGRAM(s) Oral at bedtime PRN Insomnia  zolpidem 5 milliGRAM(s) Oral at bedtime PRN Insomnia      PHYSICAL EXAM:    GENERAL: NAD    HEENT: NCAT    CHEST/LUNGS: CTAB    HEART: RRR,  No murmurs, rubs, or gallops    ABDOMEN: Soft, generalized tenderness, non distended    EXTREMITIES:  FROM, No clubbing, cyanosis, or edema, palpable pulse    NEURO: No focal neurological deficits    SKIN: No rashes or lesions                              10.6   6.58  )-----------( 319      ( 20 Jun 2018 22:06 )             34.1        06-20    142  |  100  |  13  ----------------------------<  90  3.9   |  28  |  0.9    Ca    8.3<L>      20 Jun 2018 22:06  Mg     2.0     06-20    TPro  5.9<L>  /  Alb  3.5  /  TBili  <0.2  /  DBili  x   /  AST  15  /  ALT  13  /  AlkPhos  76  06-20    LIVER FUNCTIONS - ( 20 Jun 2018 07:26 )  Alb: 3.5 g/dL / Pro: 5.9 g/dL / ALK PHOS: 76 U/L / ALT: 13 U/L / AST: 15 U/L / GGT: x               SPECTRA: [id number]

## 2018-06-22 ENCOUNTER — TRANSCRIPTION ENCOUNTER (OUTPATIENT)
Age: 53
End: 2018-06-22

## 2018-06-22 VITALS
HEART RATE: 79 BPM | RESPIRATION RATE: 18 BRPM | TEMPERATURE: 97 F | DIASTOLIC BLOOD PRESSURE: 88 MMHG | SYSTOLIC BLOOD PRESSURE: 139 MMHG

## 2018-06-22 LAB
ANION GAP SERPL CALC-SCNC: 16 MMOL/L — HIGH (ref 7–14)
BASOPHILS # BLD AUTO: 0.03 K/UL — SIGNIFICANT CHANGE UP (ref 0–0.2)
BASOPHILS NFR BLD AUTO: 0.4 % — SIGNIFICANT CHANGE UP (ref 0–1)
BUN SERPL-MCNC: 13 MG/DL — SIGNIFICANT CHANGE UP (ref 10–20)
CALCIUM SERPL-MCNC: 8.6 MG/DL — SIGNIFICANT CHANGE UP (ref 8.5–10.1)
CHLORIDE SERPL-SCNC: 101 MMOL/L — SIGNIFICANT CHANGE UP (ref 98–110)
CO2 SERPL-SCNC: 26 MMOL/L — SIGNIFICANT CHANGE UP (ref 17–32)
CREAT SERPL-MCNC: 1 MG/DL — SIGNIFICANT CHANGE UP (ref 0.7–1.5)
EOSINOPHIL # BLD AUTO: 0.26 K/UL — SIGNIFICANT CHANGE UP (ref 0–0.7)
EOSINOPHIL NFR BLD AUTO: 3.5 % — SIGNIFICANT CHANGE UP (ref 0–8)
GLUCOSE SERPL-MCNC: 101 MG/DL — HIGH (ref 70–99)
HCT VFR BLD CALC: 33.1 % — LOW (ref 37–47)
HGB BLD-MCNC: 10.5 G/DL — LOW (ref 12–16)
IMM GRANULOCYTES NFR BLD AUTO: 0.4 % — HIGH (ref 0.1–0.3)
LYMPHOCYTES # BLD AUTO: 1.21 K/UL — SIGNIFICANT CHANGE UP (ref 1.2–3.4)
LYMPHOCYTES # BLD AUTO: 16.4 % — LOW (ref 20.5–51.1)
MAGNESIUM SERPL-MCNC: 1.9 MG/DL — SIGNIFICANT CHANGE UP (ref 1.8–2.4)
MCHC RBC-ENTMCNC: 26.4 PG — LOW (ref 27–31)
MCHC RBC-ENTMCNC: 31.7 G/DL — LOW (ref 32–37)
MCV RBC AUTO: 83.4 FL — SIGNIFICANT CHANGE UP (ref 81–99)
MONOCYTES # BLD AUTO: 0.47 K/UL — SIGNIFICANT CHANGE UP (ref 0.1–0.6)
MONOCYTES NFR BLD AUTO: 6.4 % — SIGNIFICANT CHANGE UP (ref 1.7–9.3)
NEUTROPHILS # BLD AUTO: 5.37 K/UL — SIGNIFICANT CHANGE UP (ref 1.4–6.5)
NEUTROPHILS NFR BLD AUTO: 72.9 % — SIGNIFICANT CHANGE UP (ref 42.2–75.2)
NRBC # BLD: 0 /100 WBCS — SIGNIFICANT CHANGE UP (ref 0–0)
PLATELET # BLD AUTO: 318 K/UL — SIGNIFICANT CHANGE UP (ref 130–400)
POTASSIUM SERPL-MCNC: 3.8 MMOL/L — SIGNIFICANT CHANGE UP (ref 3.5–5)
POTASSIUM SERPL-SCNC: 3.8 MMOL/L — SIGNIFICANT CHANGE UP (ref 3.5–5)
RBC # BLD: 3.97 M/UL — LOW (ref 4.2–5.4)
RBC # FLD: 16.4 % — HIGH (ref 11.5–14.5)
SODIUM SERPL-SCNC: 143 MMOL/L — SIGNIFICANT CHANGE UP (ref 135–146)
WBC # BLD: 7.37 K/UL — SIGNIFICANT CHANGE UP (ref 4.8–10.8)
WBC # FLD AUTO: 7.37 K/UL — SIGNIFICANT CHANGE UP (ref 4.8–10.8)

## 2018-06-22 RX ORDER — AZTREONAM 2 G
1 VIAL (EA) INJECTION
Qty: 14 | Refills: 0 | OUTPATIENT
Start: 2018-06-22 | End: 2018-06-28

## 2018-06-22 RX ORDER — METRONIDAZOLE 500 MG
1 TABLET ORAL
Qty: 21 | Refills: 0 | OUTPATIENT
Start: 2018-06-22 | End: 2018-06-28

## 2018-06-22 RX ADMIN — SENNA PLUS 1 TABLET(S): 8.6 TABLET ORAL at 05:04

## 2018-06-22 RX ADMIN — Medication 100 MILLIGRAM(S): at 05:03

## 2018-06-22 RX ADMIN — Medication 100 MILLIGRAM(S): at 13:17

## 2018-06-22 RX ADMIN — Medication 88 MICROGRAM(S): at 05:03

## 2018-06-22 RX ADMIN — PANTOPRAZOLE SODIUM 40 MILLIGRAM(S): 20 TABLET, DELAYED RELEASE ORAL at 05:03

## 2018-06-22 RX ADMIN — TIOTROPIUM BROMIDE 1 CAPSULE(S): 18 CAPSULE ORAL; RESPIRATORY (INHALATION) at 07:49

## 2018-06-22 RX ADMIN — Medication 100 MILLIGRAM(S): at 13:14

## 2018-06-22 RX ADMIN — Medication 5 MILLIGRAM(S): at 05:03

## 2018-06-22 RX ADMIN — CEFTRIAXONE 100 GRAM(S): 500 INJECTION, POWDER, FOR SOLUTION INTRAMUSCULAR; INTRAVENOUS at 13:17

## 2018-06-22 RX ADMIN — BUPROPION HYDROCHLORIDE 300 MILLIGRAM(S): 150 TABLET, EXTENDED RELEASE ORAL at 13:15

## 2018-06-22 RX ADMIN — MONTELUKAST 10 MILLIGRAM(S): 4 TABLET, CHEWABLE ORAL at 13:14

## 2018-06-22 NOTE — DISCHARGE NOTE ADULT - HOSPITAL COURSE
Lety Servin is a 52 year old female who presented to [facility] after failing outpatient treatment for diverticulitis. She was seen by GI and surgery, who recommended no surgical intervention at this time. Patient was placed on IV rocephin and flagyl, given pain control, and her diet was slowly advanced as tolerated. Patient is currently pain free, eating regular meals, with no abdominal pain, nausea, vomiting, diarrhea, or constipation. She will complete an outpatient course of antibiotics and follow up with gastroenterology in 6 weeks for an outpatient colonoscopy as well as follow up with her primary care doctor.

## 2018-06-22 NOTE — PROGRESS NOTE ADULT - ASSESSMENT
53 Y/O female hospital day 6 w/  DIVERTICULITIS, +flatus, + BM, mild nausea, no vomiting, tolerating diet      Plan:  no surgical intervention  follow up with surgery as outpatient  f/u w/ GI as outpatient for c-scope in 6 weeks  d/c on oral abx

## 2018-06-22 NOTE — DISCHARGE NOTE ADULT - ABILITY TO HEAR (WITH HEARING AID OR HEARING APPLIANCE IF NORMALLY USED):
Mildly to Moderately Impaired: difficulty hearing in some environments or speaker may need to increase volume or speak distinctly/on left ear

## 2018-06-22 NOTE — PROGRESS NOTE ADULT - SUBJECTIVE AND OBJECTIVE BOX
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  Hypothyroid  Nasopharyngeal cancer  Pulmonary nodules/lesions, multiple  Diverticulitis  HTN (hypertension)  COPD (chronic obstructive pulmonary disease)  History of cholecystectomy      Vital Signs Last 24 Hrs  T(C): 36.6 (22 Jun 2018 00:00), Max: 37.1 (21 Jun 2018 07:55)  T(F): 97.9 (22 Jun 2018 00:00), Max: 98.7 (21 Jun 2018 07:55)  HR: 84 (22 Jun 2018 05:00) (71 - 86)  BP: 163/75 (22 Jun 2018 05:00) (122/60 - 163/75)  BP(mean): --  RR: 19 (22 Jun 2018 05:00) (18 - 19)  SpO2: --        Diet, Regular (06-21-18 @ 11:16)      I&O's Detail    21 Jun 2018 07:01  -  22 Jun 2018 05:12  --------------------------------------------------------  IN:    IV PiggyBack: 150 mL  Total IN: 150 mL    OUT:  Total OUT: 0 mL    Total NET: 150 mL          MEDICATIONS  (STANDING):  buDESOnide 160 MICROgram(s)/formoterol 4.5 MICROgram(s) Inhaler 2 Puff(s) Inhalation two times a day  buPROPion XL (24-Hour) Oral Tab/Cap - Peds 300 milliGRAM(s) Oral daily  cefTRIAXone   IVPB      cefTRIAXone   IVPB 2 Gram(s) IV Intermittent every 24 hours  docusate sodium 100 milliGRAM(s) Oral three times a day  enalapril 5 milliGRAM(s) Oral daily  enoxaparin Injectable 40 milliGRAM(s) SubCutaneous daily  levothyroxine 88 MICROGram(s) Oral daily  metroNIDAZOLE  IVPB 500 milliGRAM(s) IV Intermittent every 8 hours  montelukast Oral Tab/Cap - Peds 10 milliGRAM(s) Oral daily  pantoprazole    Tablet 40 milliGRAM(s) Oral before breakfast  senna 1 Tablet(s) Oral two times a day  tiotropium 18 MICROgram(s) Capsule 1 Capsule(s) Inhalation daily    MEDICATIONS  (PRN):  ALBUTerol    90 MICROgram(s) HFA Inhaler 2 Puff(s) Inhalation every 6 hours PRN Shortness of Breath and/or Wheezing  clonazePAM Tablet 0.5 milliGRAM(s) Oral two times a day PRN anxiety  cyclobenzaprine 10 milliGRAM(s) Oral three times a day PRN Muscle Spasm  HYDROmorphone   Tablet 4 milliGRAM(s) Oral every 6 hours PRN Severe Pain (7 - 10)  ibuprofen  Tablet 600 milliGRAM(s) Oral three times a day PRN headache  ondansetron Injectable 4 milliGRAM(s) IV Push every 6 hours PRN Nausea and/or Vomiting  oxyCODONE    5 mG/acetaminophen 325 mG 2 Tablet(s) Oral every 6 hours PRN Moderate Pain (4 - 6)  sodium chloride 0.65% Nasal 1 Spray(s) Both Nostrils two times a day PRN Nasal Congestion  zolpidem 5 milliGRAM(s) Oral at bedtime PRN Insomnia  zolpidem 5 milliGRAM(s) Oral at bedtime PRN Insomnia      PHYSICAL EXAM:                          10.6   6.58  )-----------( 319      ( 20 Jun 2018 22:06 )             34.1        06-21    141  |  101  |  16  ----------------------------<  133<H>  3.9   |  28  |  1.0    Ca    8.4<L>      21 Jun 2018 19:08  Mg     2.0     06-20    TPro  5.9<L>  /  Alb  3.5  /  TBili  <0.2  /  DBili  x   /  AST  15  /  ALT  13  /  AlkPhos  76  06-20    LIVER FUNCTIONS - ( 20 Jun 2018 07:26 )  Alb: 3.5 g/dL / Pro: 5.9 g/dL / ALK PHOS: 76 U/L / ALT: 13 U/L / AST: 15 U/L / GGT: x                 SPECTRA: [id number] GEOVANNA PUTNAM  52y Female   [id number]    Hospital Day: 6    Procedure/dx: DIVERTICULITIS  Events of the Last 24h: +flatus, + BM, mild nausea, no vomiting, tolerating diet    Patient is a 52y old  Female who presents with a chief complaint of diverticulitis (18 Jun 2018 19:16)    PAST MEDICAL & SURGICAL HISTORY:  Hemorrhoids  Left ear hearing loss  Cervical disc 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  Hypothyroid  Nasopharyngeal cancer  Pulmonary nodules/lesions, multiple  Diverticulitis  HTN (hypertension)  COPD (chronic obstructive pulmonary disease)  History of cholecystectomy      Vital Signs Last 24 Hrs  T(C): 36.6 (22 Jun 2018 00:00), Max: 37.1 (21 Jun 2018 07:55)  T(F): 97.9 (22 Jun 2018 00:00), Max: 98.7 (21 Jun 2018 07:55)  HR: 84 (22 Jun 2018 05:00) (71 - 86)  BP: 163/75 (22 Jun 2018 05:00) (122/60 - 163/75)  BP(mean): --  RR: 19 (22 Jun 2018 05:00) (18 - 19)  SpO2: --        Diet, Regular (06-21-18 @ 11:16)      I&O's Detail    21 Jun 2018 07:01  -  22 Jun 2018 05:12  --------------------------------------------------------  IN:    IV PiggyBack: 150 mL  Total IN: 150 mL    OUT:  Total OUT: 0 mL    Total NET: 150 mL          MEDICATIONS  (STANDING):  buDESOnide 160 MICROgram(s)/formoterol 4.5 MICROgram(s) Inhaler 2 Puff(s) Inhalation two times a day  buPROPion XL (24-Hour) Oral Tab/Cap - Peds 300 milliGRAM(s) Oral daily  cefTRIAXone   IVPB      cefTRIAXone   IVPB 2 Gram(s) IV Intermittent every 24 hours  docusate sodium 100 milliGRAM(s) Oral three times a day  enalapril 5 milliGRAM(s) Oral daily  enoxaparin Injectable 40 milliGRAM(s) SubCutaneous daily  levothyroxine 88 MICROGram(s) Oral daily  metroNIDAZOLE  IVPB 500 milliGRAM(s) IV Intermittent every 8 hours  montelukast Oral Tab/Cap - Peds 10 milliGRAM(s) Oral daily  pantoprazole    Tablet 40 milliGRAM(s) Oral before breakfast  senna 1 Tablet(s) Oral two times a day  tiotropium 18 MICROgram(s) Capsule 1 Capsule(s) Inhalation daily    MEDICATIONS  (PRN):  ALBUTerol    90 MICROgram(s) HFA Inhaler 2 Puff(s) Inhalation every 6 hours PRN Shortness of Breath and/or Wheezing  clonazePAM Tablet 0.5 milliGRAM(s) Oral two times a day PRN anxiety  cyclobenzaprine 10 milliGRAM(s) Oral three times a day PRN Muscle Spasm  HYDROmorphone   Tablet 4 milliGRAM(s) Oral every 6 hours PRN Severe Pain (7 - 10)  ibuprofen  Tablet 600 milliGRAM(s) Oral three times a day PRN headache  ondansetron Injectable 4 milliGRAM(s) IV Push every 6 hours PRN Nausea and/or Vomiting  oxyCODONE    5 mG/acetaminophen 325 mG 2 Tablet(s) Oral every 6 hours PRN Moderate Pain (4 - 6)  sodium chloride 0.65% Nasal 1 Spray(s) Both Nostrils two times a day PRN Nasal Congestion  zolpidem 5 milliGRAM(s) Oral at bedtime PRN Insomnia  zolpidem 5 milliGRAM(s) Oral at bedtime PRN Insomnia      PHYSICAL EXAM:  GEN: NAD  HEENT: WNL  RESP: CTAB  CV: RRR  ABD: MILD SUPRAPUBIC TENDERNESS, SOFT, NON DISTENDED                          10.6   6.58  )-----------( 319      ( 20 Jun 2018 22:06 )             34.1        06-21    141  |  101  |  16  ----------------------------<  133<H>  3.9   |  28  |  1.0    Ca    8.4<L>      21 Jun 2018 19:08  Mg     2.0     06-20    TPro  5.9<L>  /  Alb  3.5  /  TBili  <0.2  /  DBili  x   /  AST  15  /  ALT  13  /  AlkPhos  76  06-20    LIVER FUNCTIONS - ( 20 Jun 2018 07:26 )  Alb: 3.5 g/dL / Pro: 5.9 g/dL / ALK PHOS: 76 U/L / ALT: 13 U/L / AST: 15 U/L / GGT: x                 SPECTRA: 8069

## 2018-06-22 NOTE — DISCHARGE NOTE ADULT - CARE PLAN
Principal Discharge DX:	Diverticulitis  Goal:	Manage and prevent complications  Assessment and plan of treatment:	Complete course of antibiotics as prescribed. Follow up with GI outpatient for colonoscopy in 6 weeks. Maintain a high fiber diet.  Secondary Diagnosis:	COPD (chronic obstructive pulmonary disease)  Goal:	Manage and prevent complications  Assessment and plan of treatment:	Take home meds as directed. Follow up with primary care doctor.  Secondary Diagnosis:	HTN (hypertension)  Goal:	Manage and prevent complications  Assessment and plan of treatment:	Take home meds as directed. Follow up with primary care doctor.  Secondary Diagnosis:	Hypothyroid  Goal:	Manage and prevent complications  Assessment and plan of treatment:	Take home meds as directed. Follow up with primary care doctor.

## 2018-06-22 NOTE — PROGRESS NOTE ADULT - PROVIDER SPECIALTY LIST ADULT
Gastroenterology
Internal Medicine
Surgery
Internal Medicine

## 2018-06-22 NOTE — DISCHARGE NOTE ADULT - MEDICATION SUMMARY - MEDICATIONS TO TAKE
I will START or STAY ON the medications listed below when I get home from the hospital:    Flagyl 500 mg oral tablet  -- 1 tab(s) by mouth 3 times a day   -- Do not drink alcoholic beverages when taking this medication.  Finish all this medication unless otherwise directed by prescriber.  May discolor urine or feces.    -- Indication: For Diverticulitis    HYDROCO/APAP TAB 7.5-325  -- orally 2 times a day, As Needed  -- Indication: For Cervical disc disease    ENALAPRIL MALEATE 5 MG TABS  -- Indication: For HTN (hypertension)    CLONAZEPAM .5 MG TABS  -- orally 2 times a day  -- Indication: For Cervical disc disease    ZOLPIDEM TARTRATE 10 MG TABS  -- orally once a day (at bedtime)  -- Indication: For Cervical disc disease    VENTOLIN  MCG/ACT AERS  -- Indication: For COPD (chronic obstructive pulmonary disease)    BREO ELLIPTA -25  -- Indication: For COPD (chronic obstructive pulmonary disease)    INCRUSE ELLIPTA 62.5 MCG/INH AEPB  -- Indication: For COPD (chronic obstructive pulmonary disease)    Colace 100 mg oral capsule  -- 1 cap(s) by mouth 3 times a day   -- Medication should be taken with plenty of water.    -- Indication: For Hemorrhoids    MONTELUKAST SODIUM 10 MG TABS  -- Indication: For COPD (chronic obstructive pulmonary disease)    CYCLOBENZAPRINE HCL 10 MG TABS  -- orally 2 times a day  -- Indication: For Cervical disc disease    PANTOPRAZOLE SODIUM 40 MG TBEC  -- orally once a day  -- Indication: For Diverticulitis    BUPROPION HCL  MG TB24  -- orally once a day  -- Indication: For COPD (chronic obstructive pulmonary disease)    Bactrim  mg-160 mg oral tablet  -- 1 tab(s) by mouth 2 times a day   -- Avoid prolonged or excessive exposure to direct and/or artificial sunlight while taking this medication.  Finish all this medication unless otherwise directed by prescriber.  Medication should be taken with plenty of water.    -- Indication: For Diverticulitis    LEVOTHYROXINE SODIUM 88 MCG TABS  -- orally once a day  -- Indication: For Hypothyroid

## 2018-06-22 NOTE — DISCHARGE NOTE ADULT - PATIENT PORTAL LINK FT
You can access the [facility] Patient Portal, offered by [facility], by registering with the following website: [email or web address]

## 2018-06-22 NOTE — DISCHARGE NOTE ADULT - PLAN OF CARE
Manage and prevent complications Complete course of antibiotics as prescribed. Follow up with GI outpatient for colonoscopy in 6 weeks. Maintain a high fiber diet. Take home meds as directed. Follow up with primary care doctor.

## 2018-06-22 NOTE — DISCHARGE NOTE ADULT - CARE PROVIDER_API CALL
Kody Noonan (DO), Internal Medicine  [address] [place] [address]  Phone: [phone]  Fax: [phone]

## 2018-06-26 DIAGNOSIS — K21.9 GASTRO-ESOPHAGEAL REFLUX DISEASE WITHOUT ESOPHAGITIS: ICD-10-CM

## 2018-06-26 DIAGNOSIS — F41.9 ANXIETY DISORDER, UNSPECIFIED: ICD-10-CM

## 2018-06-26 DIAGNOSIS — K57.32 DIVERTICULITIS OF LARGE INTESTINE WITHOUT PERFORATION OR ABSCESS WITHOUT BLEEDING: ICD-10-CM

## 2018-06-26 DIAGNOSIS — K59.00 CONSTIPATION, UNSPECIFIED: ICD-10-CM

## 2018-06-26 DIAGNOSIS — Z85.819 PERSONAL HISTORY OF MALIGNANT NEOPLASM OF UNSPECIFIED SITE OF LIP, ORAL CAVITY, AND PHARYNX: ICD-10-CM

## 2018-06-26 DIAGNOSIS — H91.92 UNSPECIFIED HEARING LOSS, LEFT EAR: ICD-10-CM

## 2018-06-26 DIAGNOSIS — K64.9 UNSPECIFIED HEMORRHOIDS: ICD-10-CM

## 2018-06-26 DIAGNOSIS — Z92.3 PERSONAL HISTORY OF IRRADIATION: ICD-10-CM

## 2018-06-26 DIAGNOSIS — R51 HEADACHE: ICD-10-CM

## 2018-06-26 DIAGNOSIS — D64.9 ANEMIA, UNSPECIFIED: ICD-10-CM

## 2018-06-26 DIAGNOSIS — I10 ESSENTIAL (PRIMARY) HYPERTENSION: ICD-10-CM

## 2018-06-26 DIAGNOSIS — M53.82 OTHER SPECIFIED DORSOPATHIES, CERVICAL REGION: ICD-10-CM

## 2018-06-26 DIAGNOSIS — G47.00 INSOMNIA, UNSPECIFIED: ICD-10-CM

## 2018-06-26 DIAGNOSIS — Z92.21 PERSONAL HISTORY OF ANTINEOPLASTIC CHEMOTHERAPY: ICD-10-CM
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
## 2018-06-26 DIAGNOSIS — R91.8 OTHER NONSPECIFIC ABNORMAL FINDING OF LUNG FIELD: ICD-10-CM

## 2018-06-26 DIAGNOSIS — E03.9 HYPOTHYROIDISM, UNSPECIFIED: ICD-10-CM

## 2018-12-13 ENCOUNTER — EMERGENCY (EMERGENCY)
Facility: HOSPITAL | Age: 53
LOS: 1 days | Discharge: HOME | End: 2018-12-13
Attending: EMERGENCY MEDICINE | Admitting: EMERGENCY MEDICINE

## 2018-12-13 VITALS
TEMPERATURE: 99 F | DIASTOLIC BLOOD PRESSURE: 71 MMHG | RESPIRATION RATE: 20 BRPM | HEART RATE: 121 BPM | OXYGEN SATURATION: 98 % | SYSTOLIC BLOOD PRESSURE: 119 MMHG

## 2018-12-13 VITALS
HEART RATE: 118 BPM | TEMPERATURE: 97 F | DIASTOLIC BLOOD PRESSURE: 70 MMHG | RESPIRATION RATE: 20 BRPM | SYSTOLIC BLOOD PRESSURE: 121 MMHG | OXYGEN SATURATION: 98 %

## 2018-12-13 DIAGNOSIS — Z79.2 LONG TERM (CURRENT) USE OF ANTIBIOTICS: ICD-10-CM

## 2018-12-13 DIAGNOSIS — Z90.49 ACQUIRED ABSENCE OF OTHER SPECIFIED PARTS OF DIGESTIVE TRACT: Chronic | ICD-10-CM

## 2018-12-13 DIAGNOSIS — Z79.51 LONG TERM (CURRENT) USE OF INHALED STEROIDS: ICD-10-CM

## 2018-12-13 DIAGNOSIS — R11.0 NAUSEA: ICD-10-CM

## 2018-12-13 DIAGNOSIS — Z88.1 ALLERGY STATUS TO OTHER ANTIBIOTIC AGENTS STATUS: ICD-10-CM

## 2018-12-13 DIAGNOSIS — R10.32 LEFT LOWER QUADRANT PAIN: ICD-10-CM

## 2018-12-13 DIAGNOSIS — Z79.899 OTHER LONG TERM (CURRENT) DRUG THERAPY: ICD-10-CM

## 2018-12-13 DIAGNOSIS — Z90.49 ACQUIRED ABSENCE OF OTHER SPECIFIED PARTS OF DIGESTIVE TRACT: ICD-10-CM

## 2018-12-13 DIAGNOSIS — R19.7 DIARRHEA, UNSPECIFIED: ICD-10-CM

## 2018-12-13 DIAGNOSIS — I10 ESSENTIAL (PRIMARY) HYPERTENSION: ICD-10-CM

## 2018-12-13 DIAGNOSIS — K57.32 DIVERTICULITIS OF LARGE INTESTINE WITHOUT PERFORATION OR ABSCESS WITHOUT BLEEDING: ICD-10-CM

## 2018-12-13 DIAGNOSIS — J44.9 CHRONIC OBSTRUCTIVE PULMONARY DISEASE, UNSPECIFIED: ICD-10-CM

## 2018-12-13 DIAGNOSIS — E03.9 HYPOTHYROIDISM, UNSPECIFIED: ICD-10-CM

## 2018-12-13 LAB
ALBUMIN SERPL ELPH-MCNC: 4.1 G/DL — SIGNIFICANT CHANGE UP (ref 3.5–5.2)
ALP SERPL-CCNC: 103 U/L — SIGNIFICANT CHANGE UP (ref 30–115)
ALT FLD-CCNC: 21 U/L — SIGNIFICANT CHANGE UP (ref 0–41)
ANION GAP SERPL CALC-SCNC: 16 MMOL/L — HIGH (ref 7–14)
AST SERPL-CCNC: 20 U/L — SIGNIFICANT CHANGE UP (ref 0–41)
BASE EXCESS BLDV CALC-SCNC: 5.1 MMOL/L — HIGH (ref -2–2)
BASOPHILS # BLD AUTO: 0.02 K/UL — SIGNIFICANT CHANGE UP (ref 0–0.2)
BASOPHILS NFR BLD AUTO: 0.2 % — SIGNIFICANT CHANGE UP (ref 0–1)
BILIRUB SERPL-MCNC: 0.4 MG/DL — SIGNIFICANT CHANGE UP (ref 0.2–1.2)
BUN SERPL-MCNC: 16 MG/DL — SIGNIFICANT CHANGE UP (ref 10–20)
CA-I SERPL-SCNC: 1.2 MMOL/L — SIGNIFICANT CHANGE UP (ref 1.12–1.3)
CALCIUM SERPL-MCNC: 9.6 MG/DL — SIGNIFICANT CHANGE UP (ref 8.5–10.1)
CHLORIDE SERPL-SCNC: 95 MMOL/L — LOW (ref 98–110)
CO2 SERPL-SCNC: 27 MMOL/L — SIGNIFICANT CHANGE UP (ref 17–32)
CREAT SERPL-MCNC: 1.2 MG/DL — SIGNIFICANT CHANGE UP (ref 0.7–1.5)
EOSINOPHIL # BLD AUTO: 0.15 K/UL — SIGNIFICANT CHANGE UP (ref 0–0.7)
EOSINOPHIL NFR BLD AUTO: 1.7 % — SIGNIFICANT CHANGE UP (ref 0–8)
GAS PNL BLDV: 140 MMOL/L — SIGNIFICANT CHANGE UP (ref 136–145)
GAS PNL BLDV: SIGNIFICANT CHANGE UP
GLUCOSE SERPL-MCNC: 105 MG/DL — HIGH (ref 70–99)
HCO3 BLDV-SCNC: 30 MMOL/L — HIGH (ref 22–29)
HCT VFR BLD CALC: 38.2 % — SIGNIFICANT CHANGE UP (ref 37–47)
HCT VFR BLDA CALC: 39.2 % — SIGNIFICANT CHANGE UP (ref 34–44)
HGB BLD CALC-MCNC: 12.8 G/DL — LOW (ref 14–18)
HGB BLD-MCNC: 12.1 G/DL — SIGNIFICANT CHANGE UP (ref 12–16)
IMM GRANULOCYTES NFR BLD AUTO: 0.5 % — HIGH (ref 0.1–0.3)
LACTATE BLDV-MCNC: 1.1 MMOL/L — SIGNIFICANT CHANGE UP (ref 0.5–1.6)
LIDOCAIN IGE QN: 22 U/L — SIGNIFICANT CHANGE UP (ref 7–60)
LYMPHOCYTES # BLD AUTO: 1.02 K/UL — LOW (ref 1.2–3.4)
LYMPHOCYTES # BLD AUTO: 11.8 % — LOW (ref 20.5–51.1)
MCHC RBC-ENTMCNC: 26.8 PG — LOW (ref 27–31)
MCHC RBC-ENTMCNC: 31.7 G/DL — LOW (ref 32–37)
MCV RBC AUTO: 84.5 FL — SIGNIFICANT CHANGE UP (ref 81–99)
MONOCYTES # BLD AUTO: 0.48 K/UL — SIGNIFICANT CHANGE UP (ref 0.1–0.6)
MONOCYTES NFR BLD AUTO: 5.5 % — SIGNIFICANT CHANGE UP (ref 1.7–9.3)
NEUTROPHILS # BLD AUTO: 6.94 K/UL — HIGH (ref 1.4–6.5)
NEUTROPHILS NFR BLD AUTO: 80.3 % — HIGH (ref 42.2–75.2)
NRBC # BLD: 0 /100 WBCS — SIGNIFICANT CHANGE UP (ref 0–0)
PCO2 BLDV: 47 MMHG — SIGNIFICANT CHANGE UP (ref 41–51)
PH BLDV: 7.42 — SIGNIFICANT CHANGE UP (ref 7.26–7.43)
PLATELET # BLD AUTO: 357 K/UL — SIGNIFICANT CHANGE UP (ref 130–400)
PO2 BLDV: 20 MMHG — SIGNIFICANT CHANGE UP (ref 20–40)
POTASSIUM BLDV-SCNC: 3.6 MMOL/L — SIGNIFICANT CHANGE UP (ref 3.3–5.6)
POTASSIUM SERPL-MCNC: 4 MMOL/L — SIGNIFICANT CHANGE UP (ref 3.5–5)
POTASSIUM SERPL-SCNC: 4 MMOL/L — SIGNIFICANT CHANGE UP (ref 3.5–5)
PROT SERPL-MCNC: 7.5 G/DL — SIGNIFICANT CHANGE UP (ref 6–8)
RBC # BLD: 4.52 M/UL — SIGNIFICANT CHANGE UP (ref 4.2–5.4)
RBC # FLD: 15.9 % — HIGH (ref 11.5–14.5)
SAO2 % BLDV: 30 % — SIGNIFICANT CHANGE UP
SODIUM SERPL-SCNC: 138 MMOL/L — SIGNIFICANT CHANGE UP (ref 135–146)
WBC # BLD: 8.65 K/UL — SIGNIFICANT CHANGE UP (ref 4.8–10.8)
WBC # FLD AUTO: 8.65 K/UL — SIGNIFICANT CHANGE UP (ref 4.8–10.8)

## 2018-12-13 RX ORDER — HYDROMORPHONE HYDROCHLORIDE 2 MG/ML
1 INJECTION INTRAMUSCULAR; INTRAVENOUS; SUBCUTANEOUS ONCE
Qty: 0 | Refills: 0 | Status: DISCONTINUED | OUTPATIENT
Start: 2018-12-13 | End: 2018-12-13

## 2018-12-13 RX ORDER — MORPHINE SULFATE 50 MG/1
8 CAPSULE, EXTENDED RELEASE ORAL ONCE
Qty: 0 | Refills: 0 | Status: DISCONTINUED | OUTPATIENT
Start: 2018-12-13 | End: 2018-12-13

## 2018-12-13 RX ORDER — ONDANSETRON 8 MG/1
8 TABLET, FILM COATED ORAL ONCE
Qty: 0 | Refills: 0 | Status: COMPLETED | OUTPATIENT
Start: 2018-12-13 | End: 2018-12-13

## 2018-12-13 RX ORDER — SODIUM CHLORIDE 9 MG/ML
1000 INJECTION, SOLUTION INTRAVENOUS ONCE
Qty: 0 | Refills: 0 | Status: COMPLETED | OUTPATIENT
Start: 2018-12-13 | End: 2018-12-13

## 2018-12-13 RX ADMIN — SODIUM CHLORIDE 1000 MILLILITER(S): 9 INJECTION, SOLUTION INTRAVENOUS at 19:24

## 2018-12-13 RX ADMIN — MORPHINE SULFATE 8 MILLIGRAM(S): 50 CAPSULE, EXTENDED RELEASE ORAL at 19:23

## 2018-12-13 RX ADMIN — HYDROMORPHONE HYDROCHLORIDE 1 MILLIGRAM(S): 2 INJECTION INTRAMUSCULAR; INTRAVENOUS; SUBCUTANEOUS at 23:10

## 2018-12-13 RX ADMIN — ONDANSETRON 8 MILLIGRAM(S): 8 TABLET, FILM COATED ORAL at 19:23

## 2018-12-13 NOTE — ED PROVIDER NOTE - PROGRESS NOTE DETAILS
Patient seen and evaluated by me. Labs/imaging ordered. Started on 1L IVF, given morphine and zofran IV for pain. Patient re-evaluated - still in pain - CT pending. Ordered 1mg dilaudid. Pain controlled, labs/imaging discussed with patient - uncomplicated diverticulitis. Patient wants to go home. Agrees to take abx as prescribed and follow up with PMD. Patient states she had allergic reaction to cipro as inpatient but states when it was prescribed in pill form in the past she tolerated without any effects.

## 2018-12-13 NOTE — ED PROVIDER NOTE - OBJECTIVE STATEMENT
53 year old female, pmhx diverticulitis, nasopharyngeal CA s/p radiation/chemo, last dose 2013, COPD, HTN, hypothyroidism, presenting with a 2 day history of worsening LLQ abdominal pain associated with nausea and diarrhea. States this feels the exact same as it did earlier this year when she was dx with diverticulitis and was admitted for IV abx. Patient otherwise denies fevers, headache, vision changes, weakness/numbness, confusion, URI symptoms, neck pain, chest pain, back pain, dyspnea, cough, palpitations, vomiting, constipation, blood in stool/dark stools, urinary symptoms, vaginal bleeding/discharge, leg swelling, rash, recent travel or sick contacts.

## 2018-12-13 NOTE — ED ADULT NURSE NOTE - NSIMPLEMENTINTERV_GEN_ALL_ED
Implemented All Universal Safety Interventions:  [place] to call system. Call bell, personal items and telephone within reach. Instruct patient to call for assistance. Room bathroom lighting operational. Non-slip footwear when patient is off stretcher. Physically safe environment: no spills, clutter or unnecessary equipment. Stretcher in lowest position, wheels locked, appropriate side rails in place.

## 2018-12-13 NOTE — ED PROVIDER NOTE - MEDICAL DECISION MAKING DETAILS
Patient presented with abdominal pain, nausea, diarrhea, found to have acute uncomplicated diverticulitis. Patient's pain controlled in ED, otherwise HD stable, no other significant lab abnormalities. Patient agrees to follow up as outpatient and take abx as prescribed.

## 2018-12-13 NOTE — ED PROVIDER NOTE - CARE PLAN
Principal Discharge DX:	Diverticulitis  Secondary Diagnosis:	Nausea  Secondary Diagnosis:	Diarrhea, unspecified type

## 2018-12-13 NOTE — ED PROVIDER NOTE - PHYSICAL EXAMINATION
Vital Signs: I have reviewed the initial vital signs.  Constitutional: NAD, well-nourished, appears stated age, no acute distress.  HEENT: Airway patent, moist MM, no erythema/swelling/deformity of oral structures. EOMI, PERRLA.  CV: regular rate, regular rhythm, well-perfused extremities, 2+ b/l DP and radial pulses equal.  Lungs: BCTA, no increased WOB.  ABD: (+) Diffuse tenderness, most pronounced in LLQ, no guarding or rebound, no pulsatile mass, no hernias.   MSK: Neck supple, nontender, nl ROM, no stepoff. Chest nontender. Back nontender in TLS spine or to b/l bony structures or flanks. Ext nontender, nl rom, no deformity.   INTEG: Skin warm, dry, no rash.  NEURO: A&Ox3, CN II-XII intact, normal strength 5/5 all 4 ext, nl sensation throughout, normal speech and coordination.  PSYCH: Calm, cooperative, normal affect and interaction.

## 2018-12-14 PROBLEM — C11.9 MALIGNANT NEOPLASM OF NASOPHARYNX, UNSPECIFIED: Chronic | Status: ACTIVE | Noted: 2018-05-25

## 2018-12-14 PROBLEM — M50.90 CERVICAL DISC DISORDER, UNSPECIFIED, UNSPECIFIED CERVICAL REGION: Chronic | Status: ACTIVE | Noted: 2018-06-18

## 2018-12-14 PROBLEM — H91.92 UNSPECIFIED HEARING LOSS, LEFT EAR: Chronic | Status: ACTIVE | Noted: 2018-06-18

## 2018-12-14 PROBLEM — E03.9 HYPOTHYROIDISM, UNSPECIFIED: Chronic | Status: ACTIVE | Noted: 2018-06-18

## 2018-12-14 PROBLEM — K57.92 DIVERTICULITIS OF INTESTINE, PART UNSPECIFIED, WITHOUT PERFORATION OR ABSCESS WITHOUT BLEEDING: Chronic | Status: ACTIVE | Noted: 2018-05-25

## 2018-12-14 PROBLEM — J44.9 CHRONIC OBSTRUCTIVE PULMONARY DISEASE, UNSPECIFIED: Chronic | Status: ACTIVE | Noted: 2018-05-25

## 2018-12-14 PROBLEM — I10 ESSENTIAL (PRIMARY) HYPERTENSION: Chronic | Status: ACTIVE | Noted: 2018-05-25

## 2018-12-14 PROBLEM — R91.8 OTHER NONSPECIFIC ABNORMAL FINDING OF LUNG FIELD: Chronic | Status: ACTIVE | Noted: 2018-05-25

## 2018-12-14 PROBLEM — K64.9 UNSPECIFIED HEMORRHOIDS: Chronic | Status: ACTIVE | Noted: 2018-06-18

## 2018-12-14 RX ORDER — CIPROFLOXACIN LACTATE 400MG/40ML
1 VIAL (ML) INTRAVENOUS
Qty: 20 | Refills: 0 | OUTPATIENT
Start: 2018-12-14 | End: 2018-12-23

## 2018-12-14 RX ORDER — METRONIDAZOLE 500 MG
1 TABLET ORAL
Qty: 21 | Refills: 0 | OUTPATIENT
Start: 2018-12-14 | End: 2018-12-20

## 2019-02-07 ENCOUNTER — INPATIENT (INPATIENT)
Facility: HOSPITAL | Age: 54
LOS: 2 days | Discharge: HOME | End: 2019-02-10
Attending: INTERNAL MEDICINE | Admitting: INTERNAL MEDICINE

## 2019-02-07 VITALS
RESPIRATION RATE: 20 BRPM | HEART RATE: 121 BPM | HEIGHT: 64 IN | TEMPERATURE: 99 F | SYSTOLIC BLOOD PRESSURE: 149 MMHG | WEIGHT: 281.97 LBS | DIASTOLIC BLOOD PRESSURE: 74 MMHG | OXYGEN SATURATION: 98 %

## 2019-02-07 DIAGNOSIS — Z90.49 ACQUIRED ABSENCE OF OTHER SPECIFIED PARTS OF DIGESTIVE TRACT: Chronic | ICD-10-CM

## 2019-02-08 LAB
ALBUMIN SERPL ELPH-MCNC: 3.7 G/DL — SIGNIFICANT CHANGE UP (ref 3.5–5.2)
ALP SERPL-CCNC: 88 U/L — SIGNIFICANT CHANGE UP (ref 30–115)
ALT FLD-CCNC: 17 U/L — SIGNIFICANT CHANGE UP (ref 0–41)
ANION GAP SERPL CALC-SCNC: 17 MMOL/L — HIGH (ref 7–14)
APPEARANCE UR: CLEAR — SIGNIFICANT CHANGE UP
AST SERPL-CCNC: 17 U/L — SIGNIFICANT CHANGE UP (ref 0–41)
BASOPHILS # BLD AUTO: 0.02 K/UL — SIGNIFICANT CHANGE UP (ref 0–0.2)
BASOPHILS NFR BLD AUTO: 0.2 % — SIGNIFICANT CHANGE UP (ref 0–1)
BILIRUB SERPL-MCNC: <0.2 MG/DL — SIGNIFICANT CHANGE UP (ref 0.2–1.2)
BILIRUB UR-MCNC: NEGATIVE — SIGNIFICANT CHANGE UP
BUN SERPL-MCNC: 18 MG/DL — SIGNIFICANT CHANGE UP (ref 10–20)
CALCIUM SERPL-MCNC: 9 MG/DL — SIGNIFICANT CHANGE UP (ref 8.5–10.1)
CHLORIDE SERPL-SCNC: 100 MMOL/L — SIGNIFICANT CHANGE UP (ref 98–110)
CO2 SERPL-SCNC: 23 MMOL/L — SIGNIFICANT CHANGE UP (ref 17–32)
COLOR SPEC: YELLOW — SIGNIFICANT CHANGE UP
CREAT SERPL-MCNC: 1.1 MG/DL — SIGNIFICANT CHANGE UP (ref 0.7–1.5)
DIFF PNL FLD: NEGATIVE — SIGNIFICANT CHANGE UP
EOSINOPHIL # BLD AUTO: 0.16 K/UL — SIGNIFICANT CHANGE UP (ref 0–0.7)
EOSINOPHIL NFR BLD AUTO: 1.5 % — SIGNIFICANT CHANGE UP (ref 0–8)
EPI CELLS # UR: ABNORMAL /HPF
GLUCOSE SERPL-MCNC: 115 MG/DL — HIGH (ref 70–99)
GLUCOSE UR QL: NEGATIVE — SIGNIFICANT CHANGE UP
HCT VFR BLD CALC: 34.7 % — LOW (ref 37–47)
HGB BLD-MCNC: 11 G/DL — LOW (ref 12–16)
IMM GRANULOCYTES NFR BLD AUTO: 0.3 % — SIGNIFICANT CHANGE UP (ref 0.1–0.3)
KETONES UR-MCNC: NEGATIVE — SIGNIFICANT CHANGE UP
LACTATE SERPL-SCNC: 1 MMOL/L — SIGNIFICANT CHANGE UP (ref 0.5–2.2)
LEUKOCYTE ESTERASE UR-ACNC: NEGATIVE — SIGNIFICANT CHANGE UP
LIDOCAIN IGE QN: 27 U/L — SIGNIFICANT CHANGE UP (ref 7–60)
LYMPHOCYTES # BLD AUTO: 1.23 K/UL — SIGNIFICANT CHANGE UP (ref 1.2–3.4)
LYMPHOCYTES # BLD AUTO: 11.7 % — LOW (ref 20.5–51.1)
MCHC RBC-ENTMCNC: 26.7 PG — LOW (ref 27–31)
MCHC RBC-ENTMCNC: 31.7 G/DL — LOW (ref 32–37)
MCV RBC AUTO: 84.2 FL — SIGNIFICANT CHANGE UP (ref 81–99)
MONOCYTES # BLD AUTO: 0.82 K/UL — HIGH (ref 0.1–0.6)
MONOCYTES NFR BLD AUTO: 7.8 % — SIGNIFICANT CHANGE UP (ref 1.7–9.3)
NEUTROPHILS # BLD AUTO: 8.25 K/UL — HIGH (ref 1.4–6.5)
NEUTROPHILS NFR BLD AUTO: 78.5 % — HIGH (ref 42.2–75.2)
NITRITE UR-MCNC: NEGATIVE — SIGNIFICANT CHANGE UP
NRBC # BLD: 0 /100 WBCS — SIGNIFICANT CHANGE UP (ref 0–0)
PH UR: 6 — SIGNIFICANT CHANGE UP (ref 5–8)
PLATELET # BLD AUTO: 313 K/UL — SIGNIFICANT CHANGE UP (ref 130–400)
POTASSIUM SERPL-MCNC: 4.3 MMOL/L — SIGNIFICANT CHANGE UP (ref 3.5–5)
POTASSIUM SERPL-SCNC: 4.3 MMOL/L — SIGNIFICANT CHANGE UP (ref 3.5–5)
PROT SERPL-MCNC: 6.6 G/DL — SIGNIFICANT CHANGE UP (ref 6–8)
PROT UR-MCNC: ABNORMAL
RBC # BLD: 4.12 M/UL — LOW (ref 4.2–5.4)
RBC # FLD: 16.2 % — HIGH (ref 11.5–14.5)
RBC CASTS # UR COMP ASSIST: SIGNIFICANT CHANGE UP /HPF
SODIUM SERPL-SCNC: 140 MMOL/L — SIGNIFICANT CHANGE UP (ref 135–146)
SP GR SPEC: >=1.03 — SIGNIFICANT CHANGE UP (ref 1.01–1.03)
UROBILINOGEN FLD QL: 0.2 — SIGNIFICANT CHANGE UP (ref 0.2–0.2)
WBC # BLD: 10.51 K/UL — SIGNIFICANT CHANGE UP (ref 4.8–10.8)
WBC # FLD AUTO: 10.51 K/UL — SIGNIFICANT CHANGE UP (ref 4.8–10.8)

## 2019-02-08 RX ORDER — SODIUM CHLORIDE 9 MG/ML
1000 INJECTION, SOLUTION INTRAVENOUS ONCE
Qty: 0 | Refills: 0 | Status: COMPLETED | OUTPATIENT
Start: 2019-02-08 | End: 2019-02-08

## 2019-02-08 RX ORDER — IPRATROPIUM/ALBUTEROL SULFATE 18-103MCG
3 AEROSOL WITH ADAPTER (GRAM) INHALATION EVERY 6 HOURS
Qty: 0 | Refills: 0 | Status: DISCONTINUED | OUTPATIENT
Start: 2019-02-08 | End: 2019-02-10

## 2019-02-08 RX ORDER — HYDROMORPHONE HYDROCHLORIDE 2 MG/ML
0.5 INJECTION INTRAMUSCULAR; INTRAVENOUS; SUBCUTANEOUS EVERY 4 HOURS
Qty: 0 | Refills: 0 | Status: DISCONTINUED | OUTPATIENT
Start: 2019-02-08 | End: 2019-02-10

## 2019-02-08 RX ORDER — PANTOPRAZOLE SODIUM 20 MG/1
40 TABLET, DELAYED RELEASE ORAL
Qty: 0 | Refills: 0 | Status: DISCONTINUED | OUTPATIENT
Start: 2019-02-08 | End: 2019-02-10

## 2019-02-08 RX ORDER — ENOXAPARIN SODIUM 100 MG/ML
40 INJECTION SUBCUTANEOUS EVERY 24 HOURS
Qty: 0 | Refills: 0 | Status: DISCONTINUED | OUTPATIENT
Start: 2019-02-08 | End: 2019-02-10

## 2019-02-08 RX ORDER — MONTELUKAST 4 MG/1
10 TABLET, CHEWABLE ORAL DAILY
Qty: 0 | Refills: 0 | Status: DISCONTINUED | OUTPATIENT
Start: 2019-02-08 | End: 2019-02-10

## 2019-02-08 RX ORDER — ONDANSETRON 8 MG/1
4 TABLET, FILM COATED ORAL ONCE
Qty: 0 | Refills: 0 | Status: COMPLETED | OUTPATIENT
Start: 2019-02-08 | End: 2019-02-08

## 2019-02-08 RX ORDER — LEVOTHYROXINE SODIUM 125 MCG
0 TABLET ORAL
Qty: 0 | Refills: 0 | COMMUNITY

## 2019-02-08 RX ORDER — LEVOTHYROXINE SODIUM 125 MCG
112 TABLET ORAL DAILY
Qty: 0 | Refills: 0 | Status: DISCONTINUED | OUTPATIENT
Start: 2019-02-08 | End: 2019-02-10

## 2019-02-08 RX ORDER — CHLORHEXIDINE GLUCONATE 213 G/1000ML
1 SOLUTION TOPICAL
Qty: 0 | Refills: 0 | Status: DISCONTINUED | OUTPATIENT
Start: 2019-02-08 | End: 2019-02-10

## 2019-02-08 RX ORDER — SODIUM CHLORIDE 9 MG/ML
3 INJECTION INTRAMUSCULAR; INTRAVENOUS; SUBCUTANEOUS ONCE
Qty: 0 | Refills: 0 | Status: COMPLETED | OUTPATIENT
Start: 2019-02-08 | End: 2019-02-08

## 2019-02-08 RX ORDER — ONDANSETRON 8 MG/1
4 TABLET, FILM COATED ORAL EVERY 6 HOURS
Qty: 0 | Refills: 0 | Status: DISCONTINUED | OUTPATIENT
Start: 2019-02-08 | End: 2019-02-10

## 2019-02-08 RX ORDER — CEFEPIME 1 G/1
2000 INJECTION, POWDER, FOR SOLUTION INTRAMUSCULAR; INTRAVENOUS ONCE
Qty: 0 | Refills: 0 | Status: COMPLETED | OUTPATIENT
Start: 2019-02-08 | End: 2019-02-08

## 2019-02-08 RX ORDER — HYDROMORPHONE HYDROCHLORIDE 2 MG/ML
0.5 INJECTION INTRAMUSCULAR; INTRAVENOUS; SUBCUTANEOUS ONCE
Qty: 0 | Refills: 0 | Status: DISCONTINUED | OUTPATIENT
Start: 2019-02-08 | End: 2019-02-08

## 2019-02-08 RX ORDER — METHOCARBAMOL 500 MG/1
750 TABLET, FILM COATED ORAL
Qty: 0 | Refills: 0 | Status: DISCONTINUED | OUTPATIENT
Start: 2019-02-08 | End: 2019-02-10

## 2019-02-08 RX ORDER — FLUOXETINE HCL 10 MG
1 CAPSULE ORAL
Qty: 0 | Refills: 0 | COMMUNITY

## 2019-02-08 RX ORDER — BUPROPION HYDROCHLORIDE 150 MG/1
300 TABLET, EXTENDED RELEASE ORAL DAILY
Qty: 0 | Refills: 0 | Status: DISCONTINUED | OUTPATIENT
Start: 2019-02-08 | End: 2019-02-10

## 2019-02-08 RX ORDER — SODIUM CHLORIDE 9 MG/ML
1000 INJECTION, SOLUTION INTRAVENOUS
Qty: 0 | Refills: 0 | Status: DISCONTINUED | OUTPATIENT
Start: 2019-02-08 | End: 2019-02-09

## 2019-02-08 RX ORDER — METRONIDAZOLE 500 MG
500 TABLET ORAL ONCE
Qty: 0 | Refills: 0 | Status: COMPLETED | OUTPATIENT
Start: 2019-02-08 | End: 2019-02-08

## 2019-02-08 RX ORDER — ZOLPIDEM TARTRATE 10 MG/1
5 TABLET ORAL AT BEDTIME
Qty: 0 | Refills: 0 | Status: DISCONTINUED | OUTPATIENT
Start: 2019-02-08 | End: 2019-02-10

## 2019-02-08 RX ORDER — METRONIDAZOLE 500 MG
500 TABLET ORAL EVERY 8 HOURS
Qty: 0 | Refills: 0 | Status: DISCONTINUED | OUTPATIENT
Start: 2019-02-08 | End: 2019-02-10

## 2019-02-08 RX ORDER — CYCLOBENZAPRINE HYDROCHLORIDE 10 MG/1
0 TABLET, FILM COATED ORAL
Qty: 0 | Refills: 0 | COMMUNITY

## 2019-02-08 RX ORDER — CLONAZEPAM 1 MG
0.5 TABLET ORAL
Qty: 0 | Refills: 0 | Status: DISCONTINUED | OUTPATIENT
Start: 2019-02-08 | End: 2019-02-10

## 2019-02-08 RX ORDER — FLUOXETINE HCL 10 MG
40 CAPSULE ORAL DAILY
Qty: 0 | Refills: 0 | Status: DISCONTINUED | OUTPATIENT
Start: 2019-02-08 | End: 2019-02-10

## 2019-02-08 RX ORDER — CEFTRIAXONE 500 MG/1
2 INJECTION, POWDER, FOR SOLUTION INTRAMUSCULAR; INTRAVENOUS EVERY 24 HOURS
Qty: 0 | Refills: 0 | Status: DISCONTINUED | OUTPATIENT
Start: 2019-02-08 | End: 2019-02-10

## 2019-02-08 RX ORDER — BUDESONIDE AND FORMOTEROL FUMARATE DIHYDRATE 160; 4.5 UG/1; UG/1
2 AEROSOL RESPIRATORY (INHALATION)
Qty: 0 | Refills: 0 | Status: DISCONTINUED | OUTPATIENT
Start: 2019-02-08 | End: 2019-02-10

## 2019-02-08 RX ORDER — HYDROMORPHONE HYDROCHLORIDE 2 MG/ML
1 INJECTION INTRAMUSCULAR; INTRAVENOUS; SUBCUTANEOUS ONCE
Qty: 0 | Refills: 0 | Status: DISCONTINUED | OUTPATIENT
Start: 2019-02-08 | End: 2019-02-08

## 2019-02-08 RX ADMIN — ONDANSETRON 4 MILLIGRAM(S): 8 TABLET, FILM COATED ORAL at 04:12

## 2019-02-08 RX ADMIN — HYDROMORPHONE HYDROCHLORIDE 1 MILLIGRAM(S): 2 INJECTION INTRAMUSCULAR; INTRAVENOUS; SUBCUTANEOUS at 03:15

## 2019-02-08 RX ADMIN — Medication 100 MILLIGRAM(S): at 23:02

## 2019-02-08 RX ADMIN — SODIUM CHLORIDE 100 MILLILITER(S): 9 INJECTION, SOLUTION INTRAVENOUS at 22:45

## 2019-02-08 RX ADMIN — HYDROMORPHONE HYDROCHLORIDE 0.5 MILLIGRAM(S): 2 INJECTION INTRAMUSCULAR; INTRAVENOUS; SUBCUTANEOUS at 20:11

## 2019-02-08 RX ADMIN — BUPROPION HYDROCHLORIDE 300 MILLIGRAM(S): 150 TABLET, EXTENDED RELEASE ORAL at 14:45

## 2019-02-08 RX ADMIN — ENOXAPARIN SODIUM 40 MILLIGRAM(S): 100 INJECTION SUBCUTANEOUS at 14:45

## 2019-02-08 RX ADMIN — HYDROMORPHONE HYDROCHLORIDE 0.5 MILLIGRAM(S): 2 INJECTION INTRAMUSCULAR; INTRAVENOUS; SUBCUTANEOUS at 15:35

## 2019-02-08 RX ADMIN — ZOLPIDEM TARTRATE 5 MILLIGRAM(S): 10 TABLET ORAL at 23:00

## 2019-02-08 RX ADMIN — SODIUM CHLORIDE 1000 MILLILITER(S): 9 INJECTION, SOLUTION INTRAVENOUS at 02:21

## 2019-02-08 RX ADMIN — HYDROMORPHONE HYDROCHLORIDE 0.5 MILLIGRAM(S): 2 INJECTION INTRAMUSCULAR; INTRAVENOUS; SUBCUTANEOUS at 20:30

## 2019-02-08 RX ADMIN — Medication 100 MILLIGRAM(S): at 02:26

## 2019-02-08 RX ADMIN — MONTELUKAST 10 MILLIGRAM(S): 4 TABLET, CHEWABLE ORAL at 14:50

## 2019-02-08 RX ADMIN — HYDROMORPHONE HYDROCHLORIDE 0.5 MILLIGRAM(S): 2 INJECTION INTRAMUSCULAR; INTRAVENOUS; SUBCUTANEOUS at 02:20

## 2019-02-08 RX ADMIN — CEFTRIAXONE 100 GRAM(S): 500 INJECTION, POWDER, FOR SOLUTION INTRAMUSCULAR; INTRAVENOUS at 14:41

## 2019-02-08 RX ADMIN — SODIUM CHLORIDE 100 MILLILITER(S): 9 INJECTION, SOLUTION INTRAVENOUS at 14:37

## 2019-02-08 RX ADMIN — Medication 100 MILLIGRAM(S): at 14:41

## 2019-02-08 RX ADMIN — SODIUM CHLORIDE 3 MILLILITER(S): 9 INJECTION INTRAMUSCULAR; INTRAVENOUS; SUBCUTANEOUS at 02:21

## 2019-02-08 RX ADMIN — HYDROMORPHONE HYDROCHLORIDE 0.5 MILLIGRAM(S): 2 INJECTION INTRAMUSCULAR; INTRAVENOUS; SUBCUTANEOUS at 14:38

## 2019-02-08 RX ADMIN — HYDROMORPHONE HYDROCHLORIDE 0.5 MILLIGRAM(S): 2 INJECTION INTRAMUSCULAR; INTRAVENOUS; SUBCUTANEOUS at 10:12

## 2019-02-08 RX ADMIN — Medication 40 MILLIGRAM(S): at 14:40

## 2019-02-08 RX ADMIN — CEFEPIME 100 MILLIGRAM(S): 1 INJECTION, POWDER, FOR SOLUTION INTRAMUSCULAR; INTRAVENOUS at 03:15

## 2019-02-08 NOTE — ED PROVIDER NOTE - OBJECTIVE STATEMENT
The patient is a 53y Female with PMH diverticulitis, HTN, COPD, nasopharyngeal ca in remission is presenting to ED with LLQ pain x 1 day. Patient states she developed LLQ pain today that was mild in nature. She states the pain has worsened since 9pm and is worse with movement. She endorses nonbloody diarrhea x 3days and chills. She denies n/v/f, chest pain, shortness of breath, urinary symptoms, vaginal bleeding or discharge.

## 2019-02-08 NOTE — ED PROVIDER NOTE - PHYSICAL EXAMINATION
GEN: Alert & Oriented x 3, No acute distress. Calm, appropriate.  Eyes: PERRL. No conjunctival injection. No scleral icterus.   RESP: Lungs clear to auscult bilat. no wheezes, rhonchi or rales. No retractions. Equal air entry.  CARDIO: regular rate and rhythm, no murmurs, rubs or gallops. Normal S1, S2. Radial pulses 2+ bilaterally.   ABD: Soft, distended. BS +4Q. No rebound tenderness/guarding.  No pulsatile mass. Tenderness with palpation to LLQ. No tenderness with palpation of LUQ, RUQ, RLQ.  MS: Full ROM of extremities.  SKIN: no rashes/lesions, no petechiae, no ecchymosis.  NEURO: CN II-XII grossly intact.  Speech and cognition normal.

## 2019-02-08 NOTE — H&P ADULT - NSHPSOCIALHISTORY_GEN_ALL_CORE
Marital Status:  (x )    (   ) Single    (   )    (  )   Occupation: stay at home mom  Lives with: (  ) alone  (  ) children   ( x ) spouse   (  ) parents  (  ) other    Substance Use (street drugs): (x ) never used  (  ) other:  Tobacco Usage:  (   ) never smoked   (quit 9yrs ago and smoked 3ppd since the age of 14) former smoker   (   ) current smoker  (     ) pack year  (        ) last cigarette date  Alcohol Usage: Quit daily drinking about 9yrs ago. Now drinks occasionally

## 2019-02-08 NOTE — H&P ADULT - PMH
Anxiety    Cervical disc disease  sequelae of radtion for nasopharyngeal cancer  COPD (chronic obstructive pulmonary disease)    Diverticulitis    Hemorrhoids    HTN (hypertension)    Hypothyroid    Left ear hearing loss    Nasopharyngeal cancer    Pulmonary nodules/lesions, multiple

## 2019-02-08 NOTE — H&P ADULT - ASSESSMENT
54 yo female with history of recurrent diverticulitis, htn, depression, anxiety, COPD, pulmonary nodules, morbid obesity, hypothyroidism, nasopharyngeal cancer s/p chemo + radiation (2013 leading to cervical discopathy and left sided hearing loss p/w 3 day history of worsening LLQ abdominal pain  a/w nausea and diarrhea. 54 yo female with history of recurrent diverticulitis, htn, depression, anxiety, COPD, pulmonary nodules, morbid obesity, hypothyroidism, nasopharyngeal cancer s/p chemo + radiation (2013 leading to cervical discopathy and left sided hearing loss p/w 3 day history of worsening LLQ abdominal pain  a/w nausea and diarrhea.     Acute uncomplicated diverticulitis  -Admit to medicine  -Start on ceftriaxone 2gm q24h and flagyl 500mg q8h IV  -fluid resusitation with LR @100ml/hr  -Keep NPO for now and when abdominal pain improves can advance to clears and then solids  -zofran prn for nausea  -If fevers develop or elevated wbc or worsening pain can repeat CT abdomen  -Patient can be offered surgery but since recurrent attacks is not the indication for surgery anymore they can be consulted if the patient wants them.       COPD not on home O2  -c/w symbicort + spiriva (pt on breo + incruse at home)  - ventolin prn    GERD  -c/w protonix     anxiety  -c/w clonazepam prn + bupropion    insomnia  -c/w zolpidem    htn  -c/w enalapril 5mg    #cervical discopathy  -percocet prn for mod pain  -dilaudid IV prn for severe pain  -flexeril for muscle spasms    dvt ppx  -lovenox

## 2019-02-08 NOTE — ED PROVIDER NOTE - MEDICAL DECISION MAKING DETAILS
Pt with abd pain, vomiting. CT consistent with acute diverticulitis. Will admit for further management and due to intractable pain.

## 2019-02-08 NOTE — H&P ADULT - ATTENDING COMMENTS
This is 52 yo female with PMH of recurrent diverticulitis, HTN, depression, anxiety, COPD, morbid obesity, hypothyroidism, nasopharyngeal cancer s/p chemo + radiation (2013 leading to cervical discopathy and left sided hearing loss came to ED c/o LLQ pain, symptoms started three days ago with water diarrhea, then lower abdominal pain, yesterday the pain was localized on the LLQ, she started to have diarrhea, nausea and vomited she came to Ed, she reports chills but no fever, no chest pain, SOB, palpitation or cough, patient was admitted in Jun 2018 for similar finding, she was discharged home advised to have colonoscopy in 6 weeks but as per patient she lost her insurance and couldn't do the colonoscopy. In the ED her vital signs were stable , abdomen CT showed acute sigmoid diverticulitis. This is the 3rd episode since May 2018.     PHYSICAL EXAM:  GENERAL: NAD, morbidly obese.   HEAD:  Atraumatic, Normocephalic  EYES: EOMI, PERRLA, conjunctiva and sclera clear  NECK: Supple, No JVD  CHEST/LUNG: Clear to auscultation bilaterally; No wheeze  HEART: Regular rate and rhythm; No murmurs, rubs, or gallops  ABDOMEN: Soft, LLQ tenderness, no guarding, no rigidity.   EXTREMITIES:  2+ Peripheral Pulses, No clubbing, cyanosis, or edema  PSYCH: AAOx3  NEUROLOGY: non-focal    A/P:   1. Acute Sigmoid Diverticulitis:   severe abdominal pain  Start on Cipro and flagyl  Send stool Cx, stool WBC  GI follow up outpatient  NPO today, clear liquid once abdominal pain improves. IV NS 100CC/hr.    HTN:   Continue Enalapril.     COPD:   Stable, clear lungs, continue home meds    Anxiety  clonazepam prn and bupropion

## 2019-02-08 NOTE — H&P ADULT - NSHPPHYSICALEXAM_GEN_ALL_CORE
T(F): 98.9 (02-07-19 @ 23:50), Max: 98.9 (02-07-19 @ 23:50)  HR: 121 (02-07-19 @ 23:50) (121 - 121)  BP: 149/74 (02-07-19 @ 23:50) (149/74 - 149/74)  RR: 20 (02-07-19 @ 23:50) (20 - 20)  SpO2: 98% (02-07-19 @ 23:50) (98% - 98%)  PHYSICAL EXAM:  GENERAL: mild distress due to pain on movement, speaks in full sentences, no signs of respiratory distress  HEAD:  Atraumatic, Normocephalic  EYES: EOMI,  conjunctiva clear  CHEST/LUNG: Clear to auscultation bilaterally; No wheeze; No crackles; No accessory muscles used  HEART: Regular rate and rhythm; No murmurs;   ABDOMEN: Soft, lower abdominal tenderness to palpation, Nondistended; Bowel sounds present; No guarding  EXTREMITIES:  No cyanosis or edema  PSYCH: AAOx3  NEUROLOGY: non-focal  SKIN: No rashes or lesions

## 2019-02-08 NOTE — ED PROVIDER NOTE - PROGRESS NOTE DETAILS
pt seen and examined; + tender LLQ; labs pending; will take pt to Ct asap; case d/w ct tech; abx/pain meds given;  I personally evaluated the patient. I reviewed the Physician Assistant Fellow's note and agree with the findings and plan. Discussed with patient lab/CT results. Patient to be admitted for pain control.

## 2019-02-08 NOTE — ED PROVIDER NOTE - NS ED ROS FT
GEN: (-) fever, (+) chills, (-) malaise, (-) decreased appetite  HEENT: (-) vision changes, (-) HA, (-) sore throat, (-) ear pain  CV: (-) chest pain, (-) palpitations, (-) edema  PULM: (-) cough, (-) wheezing, (-) dyspnea  GI: (+) abdominal pain,(-) Nausea, (-) Vomiting, (+) Diarrhea, (-) Melena  NEURO: (-) weakness, (-) paresthesias, (-) syncope  : (-) dysuria, (-) frequency, (-) urgency, (-) hematuria  MS: (-) back pain, (-) joint pain, (-)myalgias, (-) swelling  SKIN: (-) rashes, (-) new lesions, (-) pruritus, (-) jaundice  HEME: (-) bleeding, (-) ecchymosis

## 2019-02-08 NOTE — H&P ADULT - NSHPLABSRESULTS_GEN_ALL_CORE
LABS:                        11.0   10.51 )-----------( 313      ( 08 Feb 2019 01:22 )             34.7     08 Feb 2019 01:22    140    |  100    |  18     ----------------------------<  115    4.3     |  23     |  1.1      Ca    9.0        08 Feb 2019 01:22    TPro  6.6    /  Alb  3.7    /  TBili  <0.2   /  DBili  x      /  AST  17     /  ALT  17     /  AlkPhos  88     08 Feb 2019 01:22

## 2019-02-08 NOTE — H&P ADULT - HISTORY OF PRESENT ILLNESS
53 yo female with history of recurrent diverticulitis, depression, anxiety, COPD, pulmonary nodules, morbid obesity, hypothyroidism, nasopharyngeal cancer s/p chemo + radiation (2013 leading to cervical discopathy and left sided hearing loss p/w 3 day history of worsening LLQ abdominal pain  a/w nausea and diarrhea. As per patient she developed watery diarrhea about 3 days ago and would see blood when she wiped which she attributed to her hemorrhoids. Since yesterday she developed LLQ abdominal pain with nausea which kept worsening and she had to come to the ED. This was associated with chills. Patient denies fevers, vomiting, chest pain, melena, dysuria or vaginal bleeding. In the ED she was found to be tachycardic and given 1L LR. CT abdomen done showed that since May 2018 this is the 3rd attack of diverticulitis in the exact same location. 54 yo female with history of recurrent diverticulitis, htn, depression, anxiety, COPD, pulmonary nodules, morbid obesity, hypothyroidism, nasopharyngeal cancer s/p chemo + radiation (2013 leading to cervical discopathy and left sided hearing loss p/w 3 day history of worsening LLQ abdominal pain  a/w nausea and diarrhea. As per patient she developed watery diarrhea about 3 days ago and would see blood when she wiped which she attributed to her hemorrhoids. Since yesterday she developed LLQ abdominal pain with nausea which kept worsening and she had to come to the ED. This was associated with chills. Patient denies fevers, vomiting, chest pain, melena, dysuria or vaginal bleeding. In the ED she was found to be tachycardic and given 1L LR. CT abdomen done showed that since May 2018 this is the 3rd attack of diverticulitis in the exact same location.

## 2019-02-09 LAB
ALBUMIN SERPL ELPH-MCNC: 3.4 G/DL — LOW (ref 3.5–5.2)
ALP SERPL-CCNC: 86 U/L — SIGNIFICANT CHANGE UP (ref 30–115)
ALT FLD-CCNC: 14 U/L — SIGNIFICANT CHANGE UP (ref 0–41)
ANION GAP SERPL CALC-SCNC: 16 MMOL/L — HIGH (ref 7–14)
AST SERPL-CCNC: 15 U/L — SIGNIFICANT CHANGE UP (ref 0–41)
BASOPHILS # BLD AUTO: 0.03 K/UL — SIGNIFICANT CHANGE UP (ref 0–0.2)
BASOPHILS NFR BLD AUTO: 0.5 % — SIGNIFICANT CHANGE UP (ref 0–1)
BILIRUB SERPL-MCNC: <0.2 MG/DL — SIGNIFICANT CHANGE UP (ref 0.2–1.2)
BUN SERPL-MCNC: 10 MG/DL — SIGNIFICANT CHANGE UP (ref 10–20)
CALCIUM SERPL-MCNC: 8.7 MG/DL — SIGNIFICANT CHANGE UP (ref 8.5–10.1)
CHLORIDE SERPL-SCNC: 100 MMOL/L — SIGNIFICANT CHANGE UP (ref 98–110)
CO2 SERPL-SCNC: 25 MMOL/L — SIGNIFICANT CHANGE UP (ref 17–32)
CREAT SERPL-MCNC: 0.8 MG/DL — SIGNIFICANT CHANGE UP (ref 0.7–1.5)
EOSINOPHIL # BLD AUTO: 0.18 K/UL — SIGNIFICANT CHANGE UP (ref 0–0.7)
EOSINOPHIL NFR BLD AUTO: 3.2 % — SIGNIFICANT CHANGE UP (ref 0–8)
GLUCOSE SERPL-MCNC: 96 MG/DL — SIGNIFICANT CHANGE UP (ref 70–99)
HCT VFR BLD CALC: 31.8 % — LOW (ref 37–47)
HGB BLD-MCNC: 10.1 G/DL — LOW (ref 12–16)
IMM GRANULOCYTES NFR BLD AUTO: 0.4 % — HIGH (ref 0.1–0.3)
LYMPHOCYTES # BLD AUTO: 0.95 K/UL — LOW (ref 1.2–3.4)
LYMPHOCYTES # BLD AUTO: 16.7 % — LOW (ref 20.5–51.1)
MCHC RBC-ENTMCNC: 26.7 PG — LOW (ref 27–31)
MCHC RBC-ENTMCNC: 31.8 G/DL — LOW (ref 32–37)
MCV RBC AUTO: 84.1 FL — SIGNIFICANT CHANGE UP (ref 81–99)
MONOCYTES # BLD AUTO: 0.43 K/UL — SIGNIFICANT CHANGE UP (ref 0.1–0.6)
MONOCYTES NFR BLD AUTO: 7.5 % — SIGNIFICANT CHANGE UP (ref 1.7–9.3)
NEUTROPHILS # BLD AUTO: 4.09 K/UL — SIGNIFICANT CHANGE UP (ref 1.4–6.5)
NEUTROPHILS NFR BLD AUTO: 71.7 % — SIGNIFICANT CHANGE UP (ref 42.2–75.2)
PLATELET # BLD AUTO: 299 K/UL — SIGNIFICANT CHANGE UP (ref 130–400)
POTASSIUM SERPL-MCNC: 4 MMOL/L — SIGNIFICANT CHANGE UP (ref 3.5–5)
POTASSIUM SERPL-SCNC: 4 MMOL/L — SIGNIFICANT CHANGE UP (ref 3.5–5)
PROT SERPL-MCNC: 6.1 G/DL — SIGNIFICANT CHANGE UP (ref 6–8)
RBC # BLD: 3.78 M/UL — LOW (ref 4.2–5.4)
RBC # FLD: 16.2 % — HIGH (ref 11.5–14.5)
SODIUM SERPL-SCNC: 141 MMOL/L — SIGNIFICANT CHANGE UP (ref 135–146)
WBC # BLD: 5.7 K/UL — SIGNIFICANT CHANGE UP (ref 4.8–10.8)
WBC # FLD AUTO: 5.7 K/UL — SIGNIFICANT CHANGE UP (ref 4.8–10.8)

## 2019-02-09 RX ADMIN — BUPROPION HYDROCHLORIDE 300 MILLIGRAM(S): 150 TABLET, EXTENDED RELEASE ORAL at 11:15

## 2019-02-09 RX ADMIN — SODIUM CHLORIDE 100 MILLILITER(S): 9 INJECTION, SOLUTION INTRAVENOUS at 10:11

## 2019-02-09 RX ADMIN — HYDROMORPHONE HYDROCHLORIDE 0.5 MILLIGRAM(S): 2 INJECTION INTRAMUSCULAR; INTRAVENOUS; SUBCUTANEOUS at 22:31

## 2019-02-09 RX ADMIN — METHOCARBAMOL 750 MILLIGRAM(S): 500 TABLET, FILM COATED ORAL at 05:21

## 2019-02-09 RX ADMIN — Medication 5 MILLIGRAM(S): at 05:21

## 2019-02-09 RX ADMIN — Medication 112 MICROGRAM(S): at 05:20

## 2019-02-09 RX ADMIN — Medication 100 MILLIGRAM(S): at 22:10

## 2019-02-09 RX ADMIN — BUDESONIDE AND FORMOTEROL FUMARATE DIHYDRATE 2 PUFF(S): 160; 4.5 AEROSOL RESPIRATORY (INHALATION) at 22:11

## 2019-02-09 RX ADMIN — HYDROMORPHONE HYDROCHLORIDE 0.5 MILLIGRAM(S): 2 INJECTION INTRAMUSCULAR; INTRAVENOUS; SUBCUTANEOUS at 18:24

## 2019-02-09 RX ADMIN — Medication 100 MILLIGRAM(S): at 13:05

## 2019-02-09 RX ADMIN — Medication 40 MILLIGRAM(S): at 11:15

## 2019-02-09 RX ADMIN — ZOLPIDEM TARTRATE 5 MILLIGRAM(S): 10 TABLET ORAL at 22:31

## 2019-02-09 RX ADMIN — METHOCARBAMOL 750 MILLIGRAM(S): 500 TABLET, FILM COATED ORAL at 17:28

## 2019-02-09 RX ADMIN — HYDROMORPHONE HYDROCHLORIDE 0.5 MILLIGRAM(S): 2 INJECTION INTRAMUSCULAR; INTRAVENOUS; SUBCUTANEOUS at 09:24

## 2019-02-09 RX ADMIN — Medication 100 MILLIGRAM(S): at 05:21

## 2019-02-09 RX ADMIN — HYDROMORPHONE HYDROCHLORIDE 0.5 MILLIGRAM(S): 2 INJECTION INTRAMUSCULAR; INTRAVENOUS; SUBCUTANEOUS at 01:35

## 2019-02-09 RX ADMIN — MONTELUKAST 10 MILLIGRAM(S): 4 TABLET, CHEWABLE ORAL at 11:15

## 2019-02-09 RX ADMIN — CEFTRIAXONE 100 GRAM(S): 500 INJECTION, POWDER, FOR SOLUTION INTRAMUSCULAR; INTRAVENOUS at 14:38

## 2019-02-09 RX ADMIN — ENOXAPARIN SODIUM 40 MILLIGRAM(S): 100 INJECTION SUBCUTANEOUS at 13:05

## 2019-02-09 RX ADMIN — HYDROMORPHONE HYDROCHLORIDE 0.5 MILLIGRAM(S): 2 INJECTION INTRAMUSCULAR; INTRAVENOUS; SUBCUTANEOUS at 01:20

## 2019-02-09 NOTE — PROGRESS NOTE ADULT - ATTENDING COMMENTS
patient feels better, diet advanced, no fever.  continue with antibiotics  plan to discharge home tomorrow with oral Cipro and Flagyl  GI follow up outpatient in 6 weeks for colonoscopy.

## 2019-02-10 ENCOUNTER — TRANSCRIPTION ENCOUNTER (OUTPATIENT)
Age: 54
End: 2019-02-10

## 2019-02-10 VITALS
DIASTOLIC BLOOD PRESSURE: 65 MMHG | HEART RATE: 77 BPM | SYSTOLIC BLOOD PRESSURE: 137 MMHG | TEMPERATURE: 97 F | RESPIRATION RATE: 18 BRPM

## 2019-02-10 LAB
ANION GAP SERPL CALC-SCNC: 18 MMOL/L — HIGH (ref 7–14)
BASOPHILS # BLD AUTO: 0.03 K/UL — SIGNIFICANT CHANGE UP (ref 0–0.2)
BASOPHILS NFR BLD AUTO: 0.6 % — SIGNIFICANT CHANGE UP (ref 0–1)
BUN SERPL-MCNC: 11 MG/DL — SIGNIFICANT CHANGE UP (ref 10–20)
CALCIUM SERPL-MCNC: 9 MG/DL — SIGNIFICANT CHANGE UP (ref 8.5–10.1)
CHLORIDE SERPL-SCNC: 99 MMOL/L — SIGNIFICANT CHANGE UP (ref 98–110)
CO2 SERPL-SCNC: 25 MMOL/L — SIGNIFICANT CHANGE UP (ref 17–32)
CREAT SERPL-MCNC: 0.8 MG/DL — SIGNIFICANT CHANGE UP (ref 0.7–1.5)
EOSINOPHIL # BLD AUTO: 0.21 K/UL — SIGNIFICANT CHANGE UP (ref 0–0.7)
EOSINOPHIL NFR BLD AUTO: 3.9 % — SIGNIFICANT CHANGE UP (ref 0–8)
GLUCOSE SERPL-MCNC: 124 MG/DL — HIGH (ref 70–99)
HCT VFR BLD CALC: 32.5 % — LOW (ref 37–47)
HGB BLD-MCNC: 10.4 G/DL — LOW (ref 12–16)
IMM GRANULOCYTES NFR BLD AUTO: 0.6 % — HIGH (ref 0.1–0.3)
LYMPHOCYTES # BLD AUTO: 0.91 K/UL — LOW (ref 1.2–3.4)
LYMPHOCYTES # BLD AUTO: 17 % — LOW (ref 20.5–51.1)
MAGNESIUM SERPL-MCNC: 1.9 MG/DL — SIGNIFICANT CHANGE UP (ref 1.8–2.4)
MCHC RBC-ENTMCNC: 26.7 PG — LOW (ref 27–31)
MCHC RBC-ENTMCNC: 32 G/DL — SIGNIFICANT CHANGE UP (ref 32–37)
MCV RBC AUTO: 83.5 FL — SIGNIFICANT CHANGE UP (ref 81–99)
MONOCYTES # BLD AUTO: 0.29 K/UL — SIGNIFICANT CHANGE UP (ref 0.1–0.6)
MONOCYTES NFR BLD AUTO: 5.4 % — SIGNIFICANT CHANGE UP (ref 1.7–9.3)
NEUTROPHILS # BLD AUTO: 3.87 K/UL — SIGNIFICANT CHANGE UP (ref 1.4–6.5)
NEUTROPHILS NFR BLD AUTO: 72.5 % — SIGNIFICANT CHANGE UP (ref 42.2–75.2)
NRBC # BLD: 0 /100 WBCS — SIGNIFICANT CHANGE UP (ref 0–0)
PLATELET # BLD AUTO: 299 K/UL — SIGNIFICANT CHANGE UP (ref 130–400)
POTASSIUM SERPL-MCNC: 3.8 MMOL/L — SIGNIFICANT CHANGE UP (ref 3.5–5)
POTASSIUM SERPL-SCNC: 3.8 MMOL/L — SIGNIFICANT CHANGE UP (ref 3.5–5)
RBC # BLD: 3.89 M/UL — LOW (ref 4.2–5.4)
RBC # FLD: 16.1 % — HIGH (ref 11.5–14.5)
SODIUM SERPL-SCNC: 142 MMOL/L — SIGNIFICANT CHANGE UP (ref 135–146)
WBC # BLD: 5.34 K/UL — SIGNIFICANT CHANGE UP (ref 4.8–10.8)
WBC # FLD AUTO: 5.34 K/UL — SIGNIFICANT CHANGE UP (ref 4.8–10.8)

## 2019-02-10 RX ORDER — CIPROFLOXACIN LACTATE 400MG/40ML
1 VIAL (ML) INTRAVENOUS
Qty: 10 | Refills: 0
Start: 2019-02-10 | End: 2019-02-14

## 2019-02-10 RX ORDER — METRONIDAZOLE 500 MG
1 TABLET ORAL
Qty: 10 | Refills: 0
Start: 2019-02-10 | End: 2019-02-14

## 2019-02-10 RX ADMIN — MONTELUKAST 10 MILLIGRAM(S): 4 TABLET, CHEWABLE ORAL at 11:08

## 2019-02-10 RX ADMIN — Medication 100 MILLIGRAM(S): at 13:03

## 2019-02-10 RX ADMIN — Medication 40 MILLIGRAM(S): at 11:08

## 2019-02-10 RX ADMIN — Medication 112 MICROGRAM(S): at 05:49

## 2019-02-10 RX ADMIN — METHOCARBAMOL 750 MILLIGRAM(S): 500 TABLET, FILM COATED ORAL at 05:49

## 2019-02-10 RX ADMIN — Medication 5 MILLIGRAM(S): at 05:49

## 2019-02-10 RX ADMIN — HYDROMORPHONE HYDROCHLORIDE 0.5 MILLIGRAM(S): 2 INJECTION INTRAMUSCULAR; INTRAVENOUS; SUBCUTANEOUS at 05:49

## 2019-02-10 RX ADMIN — HYDROMORPHONE HYDROCHLORIDE 0.5 MILLIGRAM(S): 2 INJECTION INTRAMUSCULAR; INTRAVENOUS; SUBCUTANEOUS at 06:11

## 2019-02-10 RX ADMIN — HYDROMORPHONE HYDROCHLORIDE 0.5 MILLIGRAM(S): 2 INJECTION INTRAMUSCULAR; INTRAVENOUS; SUBCUTANEOUS at 14:12

## 2019-02-10 RX ADMIN — HYDROMORPHONE HYDROCHLORIDE 0.5 MILLIGRAM(S): 2 INJECTION INTRAMUSCULAR; INTRAVENOUS; SUBCUTANEOUS at 00:18

## 2019-02-10 RX ADMIN — Medication 100 MILLIGRAM(S): at 05:49

## 2019-02-10 RX ADMIN — BUPROPION HYDROCHLORIDE 300 MILLIGRAM(S): 150 TABLET, EXTENDED RELEASE ORAL at 11:08

## 2019-02-10 NOTE — DISCHARGE NOTE ADULT - CARE PLAN
Principal Discharge DX:	Diverticulitis  Goal:	improved  Assessment and plan of treatment:	low fiber diet  GI follow up in 2 weeks, colonoscopy in 6 weeks  complete the antibiotics, oral hydration  Secondary Diagnosis:	COPD (chronic obstructive pulmonary disease)  Assessment and plan of treatment:	continue home meds  Secondary Diagnosis:	Hypothyroid

## 2019-02-10 NOTE — DISCHARGE NOTE ADULT - CARE PROVIDER_API CALL
Kody Noonan (DO)  Internal Medicine  [address] [place] [address]  Phone: [phone]  Fax: [phone]  Follow Up Time:

## 2019-02-10 NOTE — DISCHARGE NOTE ADULT - PATIENT PORTAL LINK FT
You can access the [facility] Patient Portal, offered by [facility], by registering with the following website: [email or web address]

## 2019-02-10 NOTE — DISCHARGE NOTE ADULT - PLAN OF CARE
improved low fiber diet  GI follow up in 2 weeks, colonoscopy in 6 weeks  complete the antibiotics, oral hydration continue home meds

## 2019-02-10 NOTE — DISCHARGE NOTE ADULT - MEDICATION SUMMARY - MEDICATIONS TO TAKE
I will START or STAY ON the medications listed below when I get home from the hospital:    Flagyl 500 mg oral tablet  -- 1 tab(s) by mouth 2 times a day   -- Do not drink alcoholic beverages when taking this medication.  Finish all this medication unless otherwise directed by prescriber.  May discolor urine or feces.    -- Indication: For Diverticulitis    HYDROCO/APAP TAB 7.5-325  -- orally 2 times a day, As Needed  -- Indication: For Diverticulitis    ENALAPRIL MALEATE 5 MG TABS  -- Indication: For HTN    CLONAZEPAM .5 MG TABS  -- orally 2 times a day  -- Indication: For Anxiety    FLUoxetine 40 mg oral capsule  -- 1 cap(s) by mouth once a day  -- Indication: For Anxiety    ZOLPIDEM TARTRATE 10 MG TABS  -- orally once a day (at bedtime)  -- Indication: For Anxiety    BREO ELLIPTA -25  -- Indication: For Asthma    VENTOLIN  MCG/ACT AERS  -- Indication: For Asthma    INCRUSE ELLIPTA 62.5 MCG/INH AEPB  -- Indication: For Asthma    MONTELUKAST SODIUM 10 MG TABS  -- Indication: For Asthma    Soma 350 mg oral tablet  -- 1 tab(s) by mouth 2 times a day  -- Indication: For back pain    PANTOPRAZOLE SODIUM 40 MG TBEC  -- orally once a day  -- Indication: For GERD    Cipro 250 mg oral tablet  -- 1 tab(s) by mouth 2 times a day   -- Avoid prolonged or excessive exposure to direct and/or artificial sunlight while taking this medication.  Check with your doctor before becoming pregnant.  Do not take dairy products, antacids, or iron preparations within one hour of this medication.  Finish all this medication unless otherwise directed by prescriber.  Medication should be taken with plenty of water.    -- Indication: For Diverticulitis    BUPROPION HCL  MG TB24  -- orally once a day  -- Indication: For Anxiety    levothyroxine 112 mcg (0.112 mg) oral tablet  -- 1 tab(s) by mouth once a day  -- Indication: For Hypothyroidism

## 2019-02-10 NOTE — DISCHARGE NOTE ADULT - HOSPITAL COURSE
This is 52 yo female with PMH of recurrent diverticulitis, HTN, depression, anxiety, COPD, morbid obesity, hypothyroidism, nasopharyngeal cancer s/p chemo + radiation (2013 leading to cervical discopathy and left sided hearing loss came to ED c/o LLQ pain, symptoms started three days ago with water diarrhea, then lower abdominal pain, yesterday the pain was localized on the LLQ, she started to have diarrhea, nausea and vomited she came to Ed, she reports chills but no fever, no chest pain, SOB, palpitation or cough, patient was admitted in Jun 2018 for similar finding, she was discharged home advised to have colonoscopy in 6 weeks but as per patient she lost her insurance and couldn't do the colonoscopy. In the ED her vital signs were stable , abdomen CT showed acute sigmoid diverticulitis. This is the 3rd episode since May 2018. patient was started on antibiotics, her pain improved, she started on clear liquid diet    PHYSICAL EXAM:  GENERAL: NAD, morbidly obese.   HEAD:  Atraumatic, Normocephalic  EYES: EOMI, PERRLA, conjunctiva and sclera clear  NECK: Supple, No JVD  CHEST/LUNG: Clear to auscultation bilaterally; No wheeze  HEART: Regular rate and rhythm; No murmurs, rubs, or gallops  ABDOMEN: Soft, LLQ tenderness, no guarding, no rigidity.   EXTREMITIES:  2+ Peripheral Pulses, No clubbing, cyanosis, or edema  PSYCH: AAOx3  NEUROLOGY: non-focal    A/P:   1. Acute Sigmoid Diverticulitis:   discharge home on cipro and flagyl to complete 7 days  continue clear liquid at home when pain free, start low fiber diet.   GI follow up outpatient , colonoscopy in 6 weeks.     HTN:   Continue Enalapril.     COPD:   Stable, clear lungs, continue home meds    Anxiety  clonazepam prn and bupropion .

## 2019-02-12 LAB
CULTURE RESULTS: SIGNIFICANT CHANGE UP
SPECIMEN SOURCE: SIGNIFICANT CHANGE UP

## 2019-02-14 DIAGNOSIS — E66.01 MORBID (SEVERE) OBESITY DUE TO EXCESS CALORIES: ICD-10-CM

## 2019-02-14 DIAGNOSIS — G47.00 INSOMNIA, UNSPECIFIED: ICD-10-CM

## 2019-02-14 DIAGNOSIS — K57.32 DIVERTICULITIS OF LARGE INTESTINE WITHOUT PERFORATION OR ABSCESS WITHOUT BLEEDING: ICD-10-CM

## 2019-02-14 DIAGNOSIS — F41.9 ANXIETY DISORDER, UNSPECIFIED: ICD-10-CM

## 2019-02-14 DIAGNOSIS — J44.9 CHRONIC OBSTRUCTIVE PULMONARY DISEASE, UNSPECIFIED: ICD-10-CM

## 2019-02-14 DIAGNOSIS — Z90.49 ACQUIRED ABSENCE OF OTHER SPECIFIED PARTS OF DIGESTIVE TRACT: ICD-10-CM

## 2019-02-14 DIAGNOSIS — I10 ESSENTIAL (PRIMARY) HYPERTENSION: ICD-10-CM

## 2019-02-14 DIAGNOSIS — E03.9 HYPOTHYROIDISM, UNSPECIFIED: ICD-10-CM

## 2019-02-14 DIAGNOSIS — K21.9 GASTRO-ESOPHAGEAL REFLUX DISEASE WITHOUT ESOPHAGITIS: ICD-10-CM

## 2019-02-14 DIAGNOSIS — M50.90 CERVICAL DISC DISORDER, UNSPECIFIED, UNSPECIFIED CERVICAL REGION: ICD-10-CM

## 2019-02-14 DIAGNOSIS — F32.9 MAJOR DEPRESSIVE DISORDER, SINGLE EPISODE, UNSPECIFIED: ICD-10-CM

## 2019-02-14 DIAGNOSIS — Z85.819 PERSONAL HISTORY OF MALIGNANT NEOPLASM OF UNSPECIFIED SITE OF LIP, ORAL CAVITY, AND PHARYNX: ICD-10-CM

## 2019-02-14 DIAGNOSIS — H91.8X2 OTHER SPECIFIED HEARING LOSS, LEFT EAR: ICD-10-CM

## 2019-10-22 ENCOUNTER — TRANSCRIPTION ENCOUNTER (OUTPATIENT)
Age: 54
End: 2019-10-22

## 2019-11-08 NOTE — PROGRESS NOTE ADULT - ASSESSMENT
Thoracic Surgery Consultation Admit Date: 10/19/2019 Reason for Consultation: Charl Loffler placement HPI: 
Sacha Alexander is a 68 y.o. male with extensive PMH as noted below & who is 2 1/2 weeks s/p CABG x5 (LIMA to LAD; saphenous vein graft to diagonal 1 to OM2 to OM3; saphenous vein graft to PDA) by Dr. Tony Solis on 10/22/2019 who we are asked to see in Thoracic Surgery consultation for Trach placement. Mr. Massimo Sousa was initially extubated on POD #1 & has been on hiflow/BIPAP until yesterday (11/7/2019) when he was re-intubated, as he was unable to be weaned from BIPAP & not clearing secretions adequately. He is currently critically ill with hypoxic respiratory failure--on vent 80% FiO2 & has been on steroids without significant improvement. Per pulmonary, suspect acute exacerbation of underlying ILD in setting of cardiac surgery, high O2 exposure & currently suspect rapidly progressive pulmonary fibrosis. Of note, he has been smoking since 4 yrs old. Patient Active Problem List  
 Diagnosis Date Noted  ACS (acute coronary syndrome) ([address] 10/19/2019 Priority: 1 - One  S/P CABG x 5 10/23/2019  Systolic heart failure ([address] 10/22/2019  Coronary artery disease of native artery of native heart with stable angina pectoris ([address] 10/21/2019  Unstable angina ([address] 10/19/2019 Past Medical History:  
Diagnosis Date  Diabetes ([address]  High cholesterol  Hypertension  MI, old Past Surgical History:  
Procedure Laterality Date  HX CORONARY ARTERY BYPASS GRAFT  10/23/2019  
 x 5, LIMA to LAD, RSVG to Diag1, OM2-OM3, RSVG to PDA  HX CORONARY STENT PLACEMENT    
 HX LUMBAR LAMINECTOMY l5-s1 Social History Tobacco Use  Smoking status: Current Every Day Smoker  Smokeless tobacco: Never Used Substance Use Topics  Alcohol use: Yes History reviewed. No pertinent family history. Prior to Admission medications ADMISSION SUMMARY  54 yo female with history of recurrent diverticulitis, htn, depression, anxiety, COPD, pulmonary nodules, morbid obesity, hypothyroidism, nasopharyngeal cancer s/p chemo + radiation (2013 leading to cervical discopathy and left sided hearing loss p/w 3 day history of worsening LLQ abdominal pain  a/w nausea and diarrhea.     ASSESSMENT & PLAN    # Acute uncomplicated diverticulitis  -Afebrile, no leukocytosis, vitals stable  -Continue ceftriaxone 2g q24h and Flagyl 500mg q8h IV  -Abdominal pain improved today, requesting to eat, can advance to clears and then advance as tolerated. If increased pain or nausea with clears, NPO  -Continue fluids LR @100 cc/hr until tolerating diet, Zofran PRN for nausea  -If fevers develop or elevated WBC or worsening pain can repeat CT abdomen  -Patient can be offered surgery but since recurrent attacks is not the indication for surgery anymore they can be consulted if the patient wants them     # COPD not on home O2  -C/w Symbicort + Spiriva (pt on Breo + Incruse at home)  -Ventolin PRN    # Anxiety  -C/w clonazepam 0.5 mg BID PRN, bupropion 300 mg daily    # Insomnia  -C/w zolpidem 5 mg qHS    # HTN   -C/w enalapril 5 mg daily    # Cervical discopathy  -Percocet PRN for mod pain, Dilaudid IV prn for severe pain  -Flexeril for muscle spasms    # GI ppx  -Diet: NPO, advance as tolerated > clears > full liquid > regular  -Protonix 40 mg daily (GERD)     # DVT ppx  -Lovenox 40 mg daily    # Disposition   -From home  -Code status: FULL CODE Medication Sig Start Date End Date Taking? Authorizing Provider  
lisinopril (PRINIVIL, ZESTRIL) 40 mg tablet Take 40 mg by mouth daily. Indications: high blood pressure   Yes Provider, Historical  
pravastatin (PRAVACHOL) 40 mg tablet Take 40 mg by mouth nightly. Yes Provider, Historical  
clopidogrel (PLAVIX) 75 mg tab Take  by mouth daily. Yes Provider, Historical  
sertraline (ZOLOFT) 100 mg tablet Take 100 mg by mouth daily. Indications: Anxiousness associated with Depression   Yes Provider, Historical  
 
Allergies Allergen Reactions  Scallops Other (comments) Headache Subjective:  
 
Review of Systems:   
Review of systems not obtained due to patient factors. Objective:  
 
Blood pressure 102/61, pulse 90, temperature 99.6 °F (37.6 °C), resp. rate 14, height 5' 7\" (1.702 m), weight 123 lb 3.2 oz (55.9 kg), SpO2 100 %. Recent Results (from the past 24 hour(s)) CULTURE, RESPIRATORY/SPUTUM/BRONCH W GRAM STAIN Collection Time: 11/07/19 12:45 PM  
Result Value Ref Range Special Requests: NO SPECIAL REQUESTS    
 GRAM STAIN OCCASIONAL WBCS SEEN    
 GRAM STAIN NO ORGANISMS SEEN Culture result: PENDING   
CELL COUNT, BODY FLUID Collection Time: 11/07/19 12:45 PM  
Result Value Ref Range BODY FLUID TYPE BRONCHOALVEOLAR LAVAGE    
 FLUID COLOR COLORLESS    
 FLUID APPEARANCE CLEAR    
 FLUID RBC CT. >100 (H) 0 /cu mm FLUID NUCLEATED CELLS 49 /cu mm  
 FLD NEUTROPHILS 80 (A) NRRE % FLD LYMPHS 2 (A) NRRE % FLD MONO/MACROPHAGES 18 (A) NRRE %  
IRIS, OTHER SOURCES Collection Time: 11/07/19 12:45 PM  
Result Value Ref Range Special Requests: NO SPECIAL REQUESTS    
 KOH NO YEAST SEEN    
 KOH NO FUNGAL ELEMENTS SEEN    
GLUCOSE, POC Collection Time: 11/07/19  1:27 PM  
Result Value Ref Range Glucose (POC) 204 (H) 65 - 100 mg/dL Performed by Sherryle Postal, POC Collection Time: 11/07/19  6:24 PM  
Result Value Ref Range Glucose (POC) 173 (H) 65 - 100 mg/dL Performed by Dean Bravo, POC Collection Time: 11/07/19 11:37 PM  
Result Value Ref Range Glucose (POC) 212 (H) 65 - 100 mg/dL Performed by Taylor Sahu METABOLIC PANEL, COMPREHENSIVE Collection Time: 11/08/19  3:00 AM  
Result Value Ref Range Sodium 144 136 - 145 mmol/L Potassium 4.4 3.5 - 5.1 mmol/L Chloride 102 97 - 108 mmol/L  
 CO2 32 21 - 32 mmol/L Anion gap 10 5 - 15 mmol/L Glucose 170 (H) 65 - 100 mg/dL  (H) 6 - 20 MG/DL Creatinine 2.92 (H) 0.70 - 1.30 MG/DL  
 BUN/Creatinine ratio 59 (H) 12 - 20 GFR est AA 26 (L) >60 ml/min/1.73m2 GFR est non-AA 21 (L) >60 ml/min/1.73m2 Calcium 8.6 8.5 - 10.1 MG/DL Bilirubin, total 0.4 0.2 - 1.0 MG/DL  
 ALT (SGPT) 56 12 - 78 U/L  
 AST (SGOT) 43 (H) 15 - 37 U/L Alk. phosphatase 150 (H) 45 - 117 U/L Protein, total 6.5 6.4 - 8.2 g/dL Albumin 2.7 (L) 3.5 - 5.0 g/dL Globulin 3.8 2.0 - 4.0 g/dL A-G Ratio 0.7 (L) 1.1 - 2.2    
CBC W/O DIFF Collection Time: 11/08/19  3:00 AM  
Result Value Ref Range WBC 21.1 (H) 4.1 - 11.1 K/uL  
 RBC 3.12 (L) 4.10 - 5.70 M/uL HGB 9.8 (L) 12.1 - 17.0 g/dL HCT 30.4 (L) 36.6 - 50.3 % MCV 97.4 80.0 - 99.0 FL  
 MCH 31.4 26.0 - 34.0 PG  
 MCHC 32.2 30.0 - 36.5 g/dL  
 RDW 15.1 (H) 11.5 - 14.5 % PLATELET 184 591 - 556 K/uL MPV 11.8 8.9 - 12.9 FL  
 NRBC 0.0 0  WBC ABSOLUTE NRBC 0.00 0.00 - 0.01 K/uL MAGNESIUM Collection Time: 11/08/19  3:00 AM  
Result Value Ref Range Magnesium 3.5 (H) 1.6 - 2.4 mg/dL NT-PRO BNP Collection Time: 11/08/19  3:00 AM  
Result Value Ref Range NT pro-BNP 9,465 (H) <125 PG/ML  
GLUCOSE, POC Collection Time: 11/08/19  5:36 AM  
Result Value Ref Range Glucose (POC) 199 (H) 65 - 100 mg/dL Performed by Taylor Sahu   
 
_____________________ Physical Exam:  
 
General:  Intubated & sedated on vent.   
Throat: Lips, mucosa, and tongue normal.  
 Neck: Supple, symmetrical, trachea midline. Lungs:   Dec BS bilaterally. Heart:  Regular rate and rhythm. Abdomen:   Soft, non-tender. +da silva Extremities: Extremities normal, atraumatic, no cyanosis or edema. Skin: W/d/i. Assessment:  
Principal Problem: S/P CABG x 5 (10/23/2019) Overview: x 5, LIMA to LAD, RSVG to Diag1, OM2-OM3, RSVG to PDA Active Problems: 
  ACS (acute coronary syndrome) ([address] (10/19/2019) Unstable angina ([address] (10/19/2019) Coronary artery disease of native artery of native heart with stable angina pectoris ([address] (10/21/2019) Systolic heart failure ([address] (10/22/2019) Plan:  
 
Continue critical care plan as outlined. CT Chest today as ordered. Dr. Freddy Pond to evauate patient upon his return to [facility] on Monday 11/11/2019 Thank you for including us in the care of this gentleman. Total time spent with patient: 25 minutes. Signed By: CHELSEA Villeda November 8, 2019 ADMISSION SUMMARY  54 yo female with history of recurrent diverticulitis, htn, depression, anxiety, COPD, pulmonary nodules, morbid obesity, hypothyroidism, nasopharyngeal cancer s/p chemo + radiation (2013 leading to cervical discopathy and left sided hearing loss p/w 3 day history of worsening LLQ abdominal pain  a/w nausea and diarrhea.     ASSESSMENT & PLAN    # Acute uncomplicated diverticulitis  -Afebrile, no leukocytosis, vitals stable  -Continue ceftriaxone 2g q24h and Flagyl 500mg q8h IV  -Abdominal pain improved today, requesting to eat, can advance to clears and then advance as tolerated. If increased pain or nausea with clears, NPO  DC fluid, encourage oral hydration.   -If fevers develop or elevated WBC or worsening pain can repeat CT abdomen      # COPD not on home O2  -C/w Symbicort + Spiriva (pt on Breo + Incruse at home)  -Ventolin PRN    # Anxiety  -C/w clonazepam 0.5 mg BID PRN, bupropion 300 mg daily    # Insomnia  -C/w zolpidem 5 mg qHS    # HTN   -C/w enalapril 5 mg daily    # Cervical discopathy  -Percocet PRN for mod pain, Dilaudid IV prn for severe pain  -Flexeril for muscle spasms    # GI ppx  -Diet: NPO, advance as tolerated > clears > full liquid > regular  -Protonix 40 mg daily (GERD)     # DVT ppx  -Lovenox 40 mg daily    # Disposition   -From home  -Code status: FULL CODE

## 2020-08-29 ENCOUNTER — EMERGENCY (EMERGENCY)
Facility: HOSPITAL | Age: 55
LOS: 0 days | Discharge: HOME | End: 2020-08-29
Attending: EMERGENCY MEDICINE | Admitting: EMERGENCY MEDICINE
Payer: MEDICAID

## 2020-08-29 VITALS
WEIGHT: 293 LBS | HEART RATE: 92 BPM | SYSTOLIC BLOOD PRESSURE: 142 MMHG | DIASTOLIC BLOOD PRESSURE: 67 MMHG | OXYGEN SATURATION: 96 % | TEMPERATURE: 98 F | RESPIRATION RATE: 19 BRPM

## 2020-08-29 DIAGNOSIS — Z79.899 OTHER LONG TERM (CURRENT) DRUG THERAPY: ICD-10-CM

## 2020-08-29 DIAGNOSIS — F41.9 ANXIETY DISORDER, UNSPECIFIED: ICD-10-CM

## 2020-08-29 DIAGNOSIS — Z88.1 ALLERGY STATUS TO OTHER ANTIBIOTIC AGENTS STATUS: ICD-10-CM

## 2020-08-29 DIAGNOSIS — H91.92 UNSPECIFIED HEARING LOSS, LEFT EAR: ICD-10-CM

## 2020-08-29 DIAGNOSIS — E03.9 HYPOTHYROIDISM, UNSPECIFIED: ICD-10-CM

## 2020-08-29 DIAGNOSIS — Z79.2 LONG TERM (CURRENT) USE OF ANTIBIOTICS: ICD-10-CM

## 2020-08-29 DIAGNOSIS — Z90.49 ACQUIRED ABSENCE OF OTHER SPECIFIED PARTS OF DIGESTIVE TRACT: Chronic | ICD-10-CM

## 2020-08-29 DIAGNOSIS — F44.9 DISSOCIATIVE AND CONVERSION DISORDER, UNSPECIFIED: ICD-10-CM

## 2020-08-29 DIAGNOSIS — Z85.818 PERSONAL HISTORY OF MALIGNANT NEOPLASM OF OTHER SITES OF LIP, ORAL CAVITY, AND PHARYNX: ICD-10-CM

## 2020-08-29 DIAGNOSIS — I10 ESSENTIAL (PRIMARY) HYPERTENSION: ICD-10-CM

## 2020-08-29 DIAGNOSIS — K62.5 HEMORRHAGE OF ANUS AND RECTUM: ICD-10-CM

## 2020-08-29 DIAGNOSIS — K57.32 DIVERTICULITIS OF LARGE INTESTINE WITHOUT PERFORATION OR ABSCESS WITHOUT BLEEDING: ICD-10-CM

## 2020-08-29 LAB
ALBUMIN SERPL ELPH-MCNC: 3.7 G/DL — SIGNIFICANT CHANGE UP (ref 3.5–5.2)
ALP SERPL-CCNC: 75 U/L — SIGNIFICANT CHANGE UP (ref 30–115)
ALT FLD-CCNC: 21 U/L — SIGNIFICANT CHANGE UP (ref 0–41)
ANION GAP SERPL CALC-SCNC: 12 MMOL/L — SIGNIFICANT CHANGE UP (ref 7–14)
APPEARANCE UR: CLEAR — SIGNIFICANT CHANGE UP
AST SERPL-CCNC: 29 U/L — SIGNIFICANT CHANGE UP (ref 0–41)
BASOPHILS # BLD AUTO: 0.02 K/UL — SIGNIFICANT CHANGE UP (ref 0–0.2)
BASOPHILS NFR BLD AUTO: 0.3 % — SIGNIFICANT CHANGE UP (ref 0–1)
BILIRUB DIRECT SERPL-MCNC: <0.2 MG/DL — SIGNIFICANT CHANGE UP (ref 0–0.2)
BILIRUB INDIRECT FLD-MCNC: SIGNIFICANT CHANGE UP MG/DL (ref 0.2–1.2)
BILIRUB SERPL-MCNC: <0.2 MG/DL — SIGNIFICANT CHANGE UP (ref 0.2–1.2)
BILIRUB UR-MCNC: NEGATIVE — SIGNIFICANT CHANGE UP
BLD GP AB SCN SERPL QL: SIGNIFICANT CHANGE UP
BUN SERPL-MCNC: 17 MG/DL — SIGNIFICANT CHANGE UP (ref 10–20)
CALCIUM SERPL-MCNC: 9 MG/DL — SIGNIFICANT CHANGE UP (ref 8.5–10.1)
CHLORIDE SERPL-SCNC: 104 MMOL/L — SIGNIFICANT CHANGE UP (ref 98–110)
CO2 SERPL-SCNC: 25 MMOL/L — SIGNIFICANT CHANGE UP (ref 17–32)
COLOR SPEC: YELLOW — SIGNIFICANT CHANGE UP
CREAT SERPL-MCNC: 1.1 MG/DL — SIGNIFICANT CHANGE UP (ref 0.7–1.5)
DIFF PNL FLD: NEGATIVE — SIGNIFICANT CHANGE UP
EOSINOPHIL # BLD AUTO: 0.3 K/UL — SIGNIFICANT CHANGE UP (ref 0–0.7)
EOSINOPHIL NFR BLD AUTO: 4.4 % — SIGNIFICANT CHANGE UP (ref 0–8)
GLUCOSE SERPL-MCNC: 127 MG/DL — HIGH (ref 70–99)
GLUCOSE UR QL: NEGATIVE — SIGNIFICANT CHANGE UP
HCT VFR BLD CALC: 35.1 % — LOW (ref 37–47)
HGB BLD-MCNC: 10.8 G/DL — LOW (ref 12–16)
IMM GRANULOCYTES NFR BLD AUTO: 0.3 % — SIGNIFICANT CHANGE UP (ref 0.1–0.3)
KETONES UR-MCNC: NEGATIVE — SIGNIFICANT CHANGE UP
LACTATE SERPL-SCNC: 1.5 MMOL/L — SIGNIFICANT CHANGE UP (ref 0.7–2)
LEUKOCYTE ESTERASE UR-ACNC: NEGATIVE — SIGNIFICANT CHANGE UP
LIDOCAIN IGE QN: 48 U/L — SIGNIFICANT CHANGE UP (ref 7–60)
LYMPHOCYTES # BLD AUTO: 1.16 K/UL — LOW (ref 1.2–3.4)
LYMPHOCYTES # BLD AUTO: 16.9 % — LOW (ref 20.5–51.1)
MCHC RBC-ENTMCNC: 25.7 PG — LOW (ref 27–31)
MCHC RBC-ENTMCNC: 30.8 G/DL — LOW (ref 32–37)
MCV RBC AUTO: 83.6 FL — SIGNIFICANT CHANGE UP (ref 81–99)
MONOCYTES # BLD AUTO: 0.4 K/UL — SIGNIFICANT CHANGE UP (ref 0.1–0.6)
MONOCYTES NFR BLD AUTO: 5.8 % — SIGNIFICANT CHANGE UP (ref 1.7–9.3)
NEUTROPHILS # BLD AUTO: 4.98 K/UL — SIGNIFICANT CHANGE UP (ref 1.4–6.5)
NEUTROPHILS NFR BLD AUTO: 72.3 % — SIGNIFICANT CHANGE UP (ref 42.2–75.2)
NITRITE UR-MCNC: NEGATIVE — SIGNIFICANT CHANGE UP
NRBC # BLD: 0 /100 WBCS — SIGNIFICANT CHANGE UP (ref 0–0)
PH UR: 6.5 — SIGNIFICANT CHANGE UP (ref 5–8)
PLATELET # BLD AUTO: 281 K/UL — SIGNIFICANT CHANGE UP (ref 130–400)
POTASSIUM SERPL-MCNC: 4.2 MMOL/L — SIGNIFICANT CHANGE UP (ref 3.5–5)
POTASSIUM SERPL-SCNC: 4.2 MMOL/L — SIGNIFICANT CHANGE UP (ref 3.5–5)
PROT SERPL-MCNC: 6.2 G/DL — SIGNIFICANT CHANGE UP (ref 6–8)
PROT UR-MCNC: SIGNIFICANT CHANGE UP
RBC # BLD: 4.2 M/UL — SIGNIFICANT CHANGE UP (ref 4.2–5.4)
RBC # FLD: 19.5 % — HIGH (ref 11.5–14.5)
SODIUM SERPL-SCNC: 141 MMOL/L — SIGNIFICANT CHANGE UP (ref 135–146)
SP GR SPEC: >1.05 (ref 1.01–1.02)
UROBILINOGEN FLD QL: SIGNIFICANT CHANGE UP
WBC # BLD: 6.88 K/UL — SIGNIFICANT CHANGE UP (ref 4.8–10.8)
WBC # FLD AUTO: 6.88 K/UL — SIGNIFICANT CHANGE UP (ref 4.8–10.8)

## 2020-08-29 PROCEDURE — 99285 EMERGENCY DEPT VISIT HI MDM: CPT

## 2020-08-29 PROCEDURE — 74177 CT ABD & PELVIS W/CONTRAST: CPT | Mod: 26

## 2020-08-29 RX ORDER — MORPHINE SULFATE 50 MG/1
4 CAPSULE, EXTENDED RELEASE ORAL ONCE
Refills: 0 | Status: DISCONTINUED | OUTPATIENT
Start: 2020-08-29 | End: 2020-08-29

## 2020-08-29 RX ORDER — CEPHALEXIN 500 MG
1 CAPSULE ORAL
Qty: 20 | Refills: 0
Start: 2020-08-29 | End: 2020-09-07

## 2020-08-29 RX ORDER — METRONIDAZOLE 500 MG
500 TABLET ORAL ONCE
Refills: 0 | Status: COMPLETED | OUTPATIENT
Start: 2020-08-29 | End: 2020-08-29

## 2020-08-29 RX ORDER — METRONIDAZOLE 500 MG
1 TABLET ORAL
Qty: 30 | Refills: 0
Start: 2020-08-29 | End: 2020-09-07

## 2020-08-29 RX ORDER — CEFEPIME 1 G/1
2000 INJECTION, POWDER, FOR SOLUTION INTRAMUSCULAR; INTRAVENOUS ONCE
Refills: 0 | Status: COMPLETED | OUTPATIENT
Start: 2020-08-29 | End: 2020-08-29

## 2020-08-29 RX ORDER — SODIUM CHLORIDE 9 MG/ML
1000 INJECTION INTRAMUSCULAR; INTRAVENOUS; SUBCUTANEOUS ONCE
Refills: 0 | Status: COMPLETED | OUTPATIENT
Start: 2020-08-29 | End: 2020-08-29

## 2020-08-29 RX ORDER — MORPHINE SULFATE 50 MG/1
6 CAPSULE, EXTENDED RELEASE ORAL ONCE
Refills: 0 | Status: DISCONTINUED | OUTPATIENT
Start: 2020-08-29 | End: 2020-08-29

## 2020-08-29 RX ORDER — ACETAMINOPHEN 500 MG
650 TABLET ORAL ONCE
Refills: 0 | Status: COMPLETED | OUTPATIENT
Start: 2020-08-29 | End: 2020-08-29

## 2020-08-29 RX ADMIN — Medication 100 MILLIGRAM(S): at 19:15

## 2020-08-29 RX ADMIN — MORPHINE SULFATE 4 MILLIGRAM(S): 50 CAPSULE, EXTENDED RELEASE ORAL at 16:57

## 2020-08-29 RX ADMIN — MORPHINE SULFATE 6 MILLIGRAM(S): 50 CAPSULE, EXTENDED RELEASE ORAL at 18:18

## 2020-08-29 RX ADMIN — SODIUM CHLORIDE 1000 MILLILITER(S): 9 INJECTION INTRAMUSCULAR; INTRAVENOUS; SUBCUTANEOUS at 16:57

## 2020-08-29 RX ADMIN — CEFEPIME 2000 MILLIGRAM(S): 1 INJECTION, POWDER, FOR SOLUTION INTRAMUSCULAR; INTRAVENOUS at 20:08

## 2020-08-29 RX ADMIN — CEFEPIME 100 MILLIGRAM(S): 1 INJECTION, POWDER, FOR SOLUTION INTRAMUSCULAR; INTRAVENOUS at 19:38

## 2020-08-29 RX ADMIN — Medication 500 MILLIGRAM(S): at 20:00

## 2020-08-29 RX ADMIN — Medication 650 MILLIGRAM(S): at 19:52

## 2020-08-29 NOTE — ED PROVIDER NOTE - PHYSICAL EXAMINATION
CONSTITUTIONAL: Well-appearing; well-nourished; in no apparent distress.   EYES: PERRL; EOM intact.   ENT: normal nose; no rhinorrhea; normal pharynx with no tonsillar hypertrophy.   NECK: Supple; non-tender; no cervical lymphadenopathy.   CARDIOVASCULAR: Normal S1, S2; no murmurs, rubs, or gallops.   RESPIRATORY: Normal chest excursion with respiration; breath sounds clear and equal bilaterally; no wheezes, rhonchi, or rales.  GI/: Normal bowel sounds; non-distended; + ttp to LLQ. No rebound or guarding. Rectal exam chaperoned by PCA. No external abnl, blood, melena noted.   MS: No evidence of trauma or deformity. Normal ROM in all four extremities; non-tender to palpation; distal pulses are normal.   SKIN: Normal for age and race; warm; dry; good turgor; no apparent lesions or exudate.   NEURO/PSYCH: A & O x 4; grossly unremarkable. mood and manner are appropriate.

## 2020-08-29 NOTE — ED ADULT NURSE NOTE - OBJECTIVE STATEMENT
pt c/o abd pain and rectal bleeding. states bleeding is present every time she has a BM. pt c/o abd pain with nausea but denies vomiting,. Denies fever, cough, sob. PMH diverticulitis

## 2020-08-29 NOTE — ED PROVIDER NOTE - NS ED ROS FT
Constitutional: no fever, chills, no recent weight loss, change in appetite or malaise  Cardiac: No chest pain, SOB or edema.  Respiratory: No cough or respiratory distress  GI: + LLQ pain and bloody stools. No nausea, vomiting, diarrhea   : No dysuria, frequency, urgency or hematuria  MS: no pain to back or extremities, no loss of ROM, no weakness  Neuro: No headache or weakness. No LOC.  Skin: No skin rash.  Endocrine: No history of thyroid disease or diabetes.  Except as documented in the HPI, all other systems are negative.

## 2020-08-29 NOTE — ED PROVIDER NOTE - PATIENT PORTAL LINK FT
You can access the FollowMyHealth Patient Portal offered by [facility] by registering at the following website: [email or web address] By joining [facility]’s FollowMyHealth portal, you will also be able to view your health information using other applications (apps) compatible with our system.

## 2020-08-29 NOTE — ED PROVIDER NOTE - ATTENDING CONTRIBUTION TO CARE
53 yo f with pmh of copd, hypthyroidism, htn, diverticulitis, presents with LLQ pain and rectal bleeding.  pt says only when she has a bm.  pt f/u with dr. bryan for gi.  no urinary sx, no n/v, no fever, chills,  no cp ,no sob, no cough.  exam: nad, ncat, perrl, eomi, mmm, rrr, ctab, abd soft, ttp LLQ no rebound, no guarding ,nd aox3,imp: pt with llq pain, r/o diverticulitis, labs, ct a/p

## 2020-08-29 NOTE — ED PROVIDER NOTE - OBJECTIVE STATEMENT
54 year old F with hx of HTN, diverticulitis, COPD not on home O2, hypothyroidism, nasopharyngeal ca s/p chemo/radiation (2013) c/o LLQ pain and rectal bleeding x 1 day. Pain is sharp, constant and non radiating. + 5 episodes of rectal bleeding. No melena/AC/AP use. Sx hx: cholecystectomy. pt follows with GI Dr. Rollins/last colonoscopy 2014. No fever/chills, vomiting, urinary symptoms, sick contacts, recent travel, chest pain, sob.

## 2020-08-29 NOTE — ED PROVIDER NOTE - CLINICAL SUMMARY MEDICAL DECISION MAKING FREE TEXT BOX
Pt with llq pain, found to have diverticulitis on CT, started on abx, pt to f/u with dr. randi GI outpt

## 2020-08-29 NOTE — ED PROVIDER NOTE - CARE PROVIDER_API CALL
Akira Reyna  Gastroenterology  [address] [place] [address]  Phone: [phone]  Fax: [phone]  Follow Up Time:

## 2020-10-01 ENCOUNTER — INPATIENT (INPATIENT)
Facility: HOSPITAL | Age: 55
LOS: 2 days | Discharge: HOME | End: 2020-10-04
Attending: HOSPITALIST | Admitting: HOSPITALIST
Payer: MEDICAID

## 2020-10-01 VITALS
WEIGHT: 244.93 LBS | HEIGHT: 64 IN | HEART RATE: 111 BPM | DIASTOLIC BLOOD PRESSURE: 68 MMHG | SYSTOLIC BLOOD PRESSURE: 122 MMHG | RESPIRATION RATE: 20 BRPM | OXYGEN SATURATION: 97 % | TEMPERATURE: 98 F

## 2020-10-01 DIAGNOSIS — Z90.49 ACQUIRED ABSENCE OF OTHER SPECIFIED PARTS OF DIGESTIVE TRACT: Chronic | ICD-10-CM

## 2020-10-01 LAB
ALBUMIN SERPL ELPH-MCNC: 4 G/DL — SIGNIFICANT CHANGE UP (ref 3.5–5.2)
ALP SERPL-CCNC: 82 U/L — SIGNIFICANT CHANGE UP (ref 30–115)
ALT FLD-CCNC: 20 U/L — SIGNIFICANT CHANGE UP (ref 0–41)
ANION GAP SERPL CALC-SCNC: 12 MMOL/L — SIGNIFICANT CHANGE UP (ref 7–14)
APPEARANCE UR: CLEAR — SIGNIFICANT CHANGE UP
APTT BLD: 30 SEC — SIGNIFICANT CHANGE UP (ref 27–39.2)
AST SERPL-CCNC: 21 U/L — SIGNIFICANT CHANGE UP (ref 0–41)
BACTERIA # UR AUTO: NEGATIVE — SIGNIFICANT CHANGE UP
BASOPHILS # BLD AUTO: 0.03 K/UL — SIGNIFICANT CHANGE UP (ref 0–0.2)
BASOPHILS NFR BLD AUTO: 0.3 % — SIGNIFICANT CHANGE UP (ref 0–1)
BILIRUB DIRECT SERPL-MCNC: <0.2 MG/DL — SIGNIFICANT CHANGE UP (ref 0–0.2)
BILIRUB INDIRECT FLD-MCNC: SIGNIFICANT CHANGE UP MG/DL (ref 0.2–1.2)
BILIRUB SERPL-MCNC: <0.2 MG/DL — SIGNIFICANT CHANGE UP (ref 0.2–1.2)
BILIRUB UR-MCNC: NEGATIVE — SIGNIFICANT CHANGE UP
BLD GP AB SCN SERPL QL: SIGNIFICANT CHANGE UP
BUN SERPL-MCNC: 24 MG/DL — HIGH (ref 10–20)
CALCIUM SERPL-MCNC: 9.8 MG/DL — SIGNIFICANT CHANGE UP (ref 8.5–10.1)
CHLORIDE SERPL-SCNC: 100 MMOL/L — SIGNIFICANT CHANGE UP (ref 98–110)
CO2 SERPL-SCNC: 26 MMOL/L — SIGNIFICANT CHANGE UP (ref 17–32)
COLOR SPEC: ABNORMAL
CREAT SERPL-MCNC: 1.3 MG/DL — SIGNIFICANT CHANGE UP (ref 0.7–1.5)
DIFF PNL FLD: NEGATIVE — SIGNIFICANT CHANGE UP
EOSINOPHIL # BLD AUTO: 0.25 K/UL — SIGNIFICANT CHANGE UP (ref 0–0.7)
EOSINOPHIL NFR BLD AUTO: 2.7 % — SIGNIFICANT CHANGE UP (ref 0–8)
EPI CELLS # UR: 12 /HPF — HIGH (ref 0–5)
GLUCOSE SERPL-MCNC: 101 MG/DL — HIGH (ref 70–99)
GLUCOSE UR QL: NEGATIVE — SIGNIFICANT CHANGE UP
HCT VFR BLD CALC: 37.8 % — SIGNIFICANT CHANGE UP (ref 37–47)
HGB BLD-MCNC: 11.7 G/DL — LOW (ref 12–16)
HYALINE CASTS # UR AUTO: 12 /LPF — HIGH (ref 0–7)
IMM GRANULOCYTES NFR BLD AUTO: 0.3 % — SIGNIFICANT CHANGE UP (ref 0.1–0.3)
INR BLD: 1.01 RATIO — SIGNIFICANT CHANGE UP (ref 0.65–1.3)
KETONES UR-MCNC: SIGNIFICANT CHANGE UP
LACTATE SERPL-SCNC: 1.3 MMOL/L — SIGNIFICANT CHANGE UP (ref 0.7–2)
LEUKOCYTE ESTERASE UR-ACNC: NEGATIVE — SIGNIFICANT CHANGE UP
LIDOCAIN IGE QN: 51 U/L — SIGNIFICANT CHANGE UP (ref 7–60)
LYMPHOCYTES # BLD AUTO: 1.43 K/UL — SIGNIFICANT CHANGE UP (ref 1.2–3.4)
LYMPHOCYTES # BLD AUTO: 15.7 % — LOW (ref 20.5–51.1)
MCHC RBC-ENTMCNC: 26.6 PG — LOW (ref 27–31)
MCHC RBC-ENTMCNC: 31 G/DL — LOW (ref 32–37)
MCV RBC AUTO: 85.9 FL — SIGNIFICANT CHANGE UP (ref 81–99)
MONOCYTES # BLD AUTO: 0.7 K/UL — HIGH (ref 0.1–0.6)
MONOCYTES NFR BLD AUTO: 7.7 % — SIGNIFICANT CHANGE UP (ref 1.7–9.3)
NEUTROPHILS # BLD AUTO: 6.69 K/UL — HIGH (ref 1.4–6.5)
NEUTROPHILS NFR BLD AUTO: 73.3 % — SIGNIFICANT CHANGE UP (ref 42.2–75.2)
NITRITE UR-MCNC: NEGATIVE — SIGNIFICANT CHANGE UP
NRBC # BLD: 0 /100 WBCS — SIGNIFICANT CHANGE UP (ref 0–0)
PH UR: 6 — SIGNIFICANT CHANGE UP (ref 5–8)
PLATELET # BLD AUTO: 314 K/UL — SIGNIFICANT CHANGE UP (ref 130–400)
POTASSIUM SERPL-MCNC: 4.4 MMOL/L — SIGNIFICANT CHANGE UP (ref 3.5–5)
POTASSIUM SERPL-SCNC: 4.4 MMOL/L — SIGNIFICANT CHANGE UP (ref 3.5–5)
PROT SERPL-MCNC: 6.9 G/DL — SIGNIFICANT CHANGE UP (ref 6–8)
PROT UR-MCNC: ABNORMAL
PROTHROM AB SERPL-ACNC: 11.6 SEC — SIGNIFICANT CHANGE UP (ref 9.95–12.87)
RBC # BLD: 4.4 M/UL — SIGNIFICANT CHANGE UP (ref 4.2–5.4)
RBC # FLD: 18.4 % — HIGH (ref 11.5–14.5)
RBC CASTS # UR COMP ASSIST: 3 /HPF — SIGNIFICANT CHANGE UP (ref 0–4)
SODIUM SERPL-SCNC: 138 MMOL/L — SIGNIFICANT CHANGE UP (ref 135–146)
SP GR SPEC: 1.03 — HIGH (ref 1.01–1.03)
TROPONIN T SERPL-MCNC: <0.01 NG/ML — SIGNIFICANT CHANGE UP
UROBILINOGEN FLD QL: ABNORMAL
WBC # BLD: 9.13 K/UL — SIGNIFICANT CHANGE UP (ref 4.8–10.8)
WBC # FLD AUTO: 9.13 K/UL — SIGNIFICANT CHANGE UP (ref 4.8–10.8)
WBC UR QL: 1 /HPF — SIGNIFICANT CHANGE UP (ref 0–5)

## 2020-10-01 PROCEDURE — 99285 EMERGENCY DEPT VISIT HI MDM: CPT

## 2020-10-01 PROCEDURE — 74177 CT ABD & PELVIS W/CONTRAST: CPT | Mod: 26

## 2020-10-01 PROCEDURE — 99223 1ST HOSP IP/OBS HIGH 75: CPT | Mod: AI

## 2020-10-01 PROCEDURE — 93010 ELECTROCARDIOGRAM REPORT: CPT

## 2020-10-01 RX ORDER — ONDANSETRON 8 MG/1
4 TABLET, FILM COATED ORAL ONCE
Refills: 0 | Status: COMPLETED | OUTPATIENT
Start: 2020-10-01 | End: 2020-10-01

## 2020-10-01 RX ORDER — SODIUM CHLORIDE 9 MG/ML
1000 INJECTION, SOLUTION INTRAVENOUS
Refills: 0 | Status: DISCONTINUED | OUTPATIENT
Start: 2020-10-01 | End: 2020-10-04

## 2020-10-01 RX ORDER — FLUOXETINE HCL 10 MG
1 CAPSULE ORAL
Qty: 0 | Refills: 0 | DISCHARGE

## 2020-10-01 RX ORDER — CEFTRIAXONE 500 MG/1
1000 INJECTION, POWDER, FOR SOLUTION INTRAMUSCULAR; INTRAVENOUS ONCE
Refills: 0 | Status: COMPLETED | OUTPATIENT
Start: 2020-10-01 | End: 2020-10-01

## 2020-10-01 RX ORDER — ALBUTEROL 90 UG/1
2 AEROSOL, METERED ORAL EVERY 6 HOURS
Refills: 0 | Status: DISCONTINUED | OUTPATIENT
Start: 2020-10-01 | End: 2020-10-04

## 2020-10-01 RX ORDER — OXYCODONE HYDROCHLORIDE 5 MG/1
5 TABLET ORAL EVERY 6 HOURS
Refills: 0 | Status: DISCONTINUED | OUTPATIENT
Start: 2020-10-01 | End: 2020-10-04

## 2020-10-01 RX ORDER — ACETAMINOPHEN 500 MG
650 TABLET ORAL EVERY 6 HOURS
Refills: 0 | Status: DISCONTINUED | OUTPATIENT
Start: 2020-10-01 | End: 2020-10-03

## 2020-10-01 RX ORDER — IOHEXOL 300 MG/ML
30 INJECTION, SOLUTION INTRAVENOUS ONCE
Refills: 0 | Status: COMPLETED | OUTPATIENT
Start: 2020-10-01 | End: 2020-10-01

## 2020-10-01 RX ORDER — CLONAZEPAM 1 MG
1 TABLET ORAL
Refills: 0 | Status: DISCONTINUED | OUTPATIENT
Start: 2020-10-01 | End: 2020-10-04

## 2020-10-01 RX ORDER — SODIUM CHLORIDE 9 MG/ML
1000 INJECTION, SOLUTION INTRAVENOUS ONCE
Refills: 0 | Status: COMPLETED | OUTPATIENT
Start: 2020-10-01 | End: 2020-10-01

## 2020-10-01 RX ORDER — BUPROPION HYDROCHLORIDE 150 MG/1
300 TABLET, EXTENDED RELEASE ORAL DAILY
Refills: 0 | Status: DISCONTINUED | OUTPATIENT
Start: 2020-10-01 | End: 2020-10-04

## 2020-10-01 RX ORDER — ZOLPIDEM TARTRATE 10 MG/1
5 TABLET ORAL AT BEDTIME
Refills: 0 | Status: DISCONTINUED | OUTPATIENT
Start: 2020-10-01 | End: 2020-10-04

## 2020-10-01 RX ORDER — MONTELUKAST 4 MG/1
10 TABLET, CHEWABLE ORAL DAILY
Refills: 0 | Status: DISCONTINUED | OUTPATIENT
Start: 2020-10-01 | End: 2020-10-04

## 2020-10-01 RX ORDER — LEVOTHYROXINE SODIUM 125 MCG
125 TABLET ORAL DAILY
Refills: 0 | Status: DISCONTINUED | OUTPATIENT
Start: 2020-10-01 | End: 2020-10-04

## 2020-10-01 RX ORDER — PANTOPRAZOLE SODIUM 20 MG/1
40 TABLET, DELAYED RELEASE ORAL
Refills: 0 | Status: DISCONTINUED | OUTPATIENT
Start: 2020-10-01 | End: 2020-10-04

## 2020-10-01 RX ORDER — METRONIDAZOLE 500 MG
500 TABLET ORAL ONCE
Refills: 0 | Status: COMPLETED | OUTPATIENT
Start: 2020-10-01 | End: 2020-10-01

## 2020-10-01 RX ORDER — METRONIDAZOLE 500 MG
500 TABLET ORAL EVERY 8 HOURS
Refills: 0 | Status: DISCONTINUED | OUTPATIENT
Start: 2020-10-02 | End: 2020-10-04

## 2020-10-01 RX ORDER — LEVOTHYROXINE SODIUM 125 MCG
1 TABLET ORAL
Qty: 0 | Refills: 0 | DISCHARGE

## 2020-10-01 RX ORDER — FLUTICASONE FUROATE AND VILANTEROL TRIFENATATE 100; 25 UG/1; UG/1
0 POWDER RESPIRATORY (INHALATION)
Qty: 60 | Refills: 0 | DISCHARGE

## 2020-10-01 RX ORDER — UMECLIDINIUM 62.5 UG/1
0 AEROSOL, POWDER ORAL
Qty: 30 | Refills: 0 | DISCHARGE

## 2020-10-01 RX ORDER — TIOTROPIUM BROMIDE 18 UG/1
1 CAPSULE ORAL; RESPIRATORY (INHALATION) DAILY
Refills: 0 | Status: DISCONTINUED | OUTPATIENT
Start: 2020-10-01 | End: 2020-10-04

## 2020-10-01 RX ORDER — CEFTRIAXONE 500 MG/1
2000 INJECTION, POWDER, FOR SOLUTION INTRAMUSCULAR; INTRAVENOUS EVERY 24 HOURS
Refills: 0 | Status: DISCONTINUED | OUTPATIENT
Start: 2020-10-02 | End: 2020-10-04

## 2020-10-01 RX ORDER — CLONAZEPAM 1 MG
0 TABLET ORAL
Qty: 0 | Refills: 0 | DISCHARGE

## 2020-10-01 RX ORDER — BUDESONIDE AND FORMOTEROL FUMARATE DIHYDRATE 160; 4.5 UG/1; UG/1
2 AEROSOL RESPIRATORY (INHALATION)
Refills: 0 | Status: DISCONTINUED | OUTPATIENT
Start: 2020-10-01 | End: 2020-10-04

## 2020-10-01 RX ORDER — MORPHINE SULFATE 50 MG/1
6 CAPSULE, EXTENDED RELEASE ORAL ONCE
Refills: 0 | Status: DISCONTINUED | OUTPATIENT
Start: 2020-10-01 | End: 2020-10-01

## 2020-10-01 RX ORDER — MORPHINE SULFATE 50 MG/1
4 CAPSULE, EXTENDED RELEASE ORAL ONCE
Refills: 0 | Status: DISCONTINUED | OUTPATIENT
Start: 2020-10-01 | End: 2020-10-01

## 2020-10-01 RX ORDER — CARISOPRODOL 250 MG
1 TABLET ORAL
Qty: 0 | Refills: 0 | DISCHARGE

## 2020-10-01 RX ADMIN — MORPHINE SULFATE 4 MILLIGRAM(S): 50 CAPSULE, EXTENDED RELEASE ORAL at 21:36

## 2020-10-01 RX ADMIN — MORPHINE SULFATE 4 MILLIGRAM(S): 50 CAPSULE, EXTENDED RELEASE ORAL at 22:09

## 2020-10-01 RX ADMIN — SODIUM CHLORIDE 1000 MILLILITER(S): 9 INJECTION, SOLUTION INTRAVENOUS at 19:00

## 2020-10-01 RX ADMIN — MORPHINE SULFATE 6 MILLIGRAM(S): 50 CAPSULE, EXTENDED RELEASE ORAL at 19:23

## 2020-10-01 RX ADMIN — IOHEXOL 30 MILLILITER(S): 300 INJECTION, SOLUTION INTRAVENOUS at 18:59

## 2020-10-01 RX ADMIN — SODIUM CHLORIDE 50 MILLILITER(S): 9 INJECTION, SOLUTION INTRAVENOUS at 23:08

## 2020-10-01 RX ADMIN — ONDANSETRON 4 MILLIGRAM(S): 8 TABLET, FILM COATED ORAL at 18:59

## 2020-10-01 RX ADMIN — CEFTRIAXONE 100 MILLIGRAM(S): 500 INJECTION, POWDER, FOR SOLUTION INTRAMUSCULAR; INTRAVENOUS at 21:36

## 2020-10-01 RX ADMIN — ONDANSETRON 4 MILLIGRAM(S): 8 TABLET, FILM COATED ORAL at 21:29

## 2020-10-01 RX ADMIN — MORPHINE SULFATE 6 MILLIGRAM(S): 50 CAPSULE, EXTENDED RELEASE ORAL at 18:59

## 2020-10-01 RX ADMIN — Medication 100 MILLIGRAM(S): at 22:07

## 2020-10-01 RX ADMIN — SODIUM CHLORIDE 1000 MILLILITER(S): 9 INJECTION, SOLUTION INTRAVENOUS at 23:03

## 2020-10-01 RX ADMIN — CEFTRIAXONE 1000 MILLIGRAM(S): 500 INJECTION, POWDER, FOR SOLUTION INTRAMUSCULAR; INTRAVENOUS at 23:04

## 2020-10-01 NOTE — H&P ADULT - ASSESSMENT
54 year old female with hx of HTN, COPD not on home O2, nasopharyngeal carcinoma s/p radiation in 2013, recurrent diverticulitis more than 6 episodes in the last 10 years, presents with LLQ abdominal pain, chills, and bloody diarrhea likely secondary to diverticulitis    # Diverticulitis  - not septic on admission  - CT showing mild acute sigmoid diverticulitis, no abscess  - has been recurrent, patient was referred to GI surgeon however hasn't followed up yet  - started on rocephin/flagyl in the ED as patient has cipro allergy   54 year old female with hx of HTN, COPD not on home O2, nasopharyngeal carcinoma s/p radiation in 2013, recurrent diverticulitis more than 6 episodes in the last 10 years, presents with LLQ abdominal pain, chills, and bloody diarrhea likely secondary to diverticulitis    # Diverticulitis  - not septic on admission  - CT showing mild acute sigmoid diverticulitis, no abscess  - has been recurrent, patient was referred to GI surgeon however hasn't followed up yet  - started on rocephin/flagyl in the ED as patient has cipro allergy, will continue // f/u ID and GI  - continue with LR at 50 for now, received 1L in ED // will keep NPO for now // f/u AM lactate  - f/u blood cx / GI PCR    # HTN  - c/w enalapril 10 qd    # COPD  - c/w symbicort / albuterol / spiriva / montelukast    # Chronic neck pain and abdominal pain  - oxy 5 q6h prn, tylenol    # Depression/Anxiety  - bupropion  / klonopin 1 bid    # GERD  - protonix 40 qd    PLAN: c/w abx monitor pain, likely advance diet to liquids as pain subsides, f/u GI and ID    # DVT PPX: SCDs for now as patient was having bloody diarrhea, f/u cbc tomorrow  # GI PPX: already on protonix  # Diet: NPO for now  # FULL CODE

## 2020-10-01 NOTE — ED PROVIDER NOTE - ATTENDING CONTRIBUTION TO CARE
I personally evaluated the patient. I reviewed the Resident’s or Physician Assistant’s note (as assigned above), and agree with the findings and plan except as documented in my note.  54 F with hx of nasopharyngeal ca 7 years ago s/p chemo and radiation, HTN, COPD, + smoker, hx of diverticulitis with complaints of 4 dys of progressing LLQ abd pain. Associated with nausea w/o vomiting. No f/c/d. No urinary complaints. VS reviewed, pt non-toxic appearing, but uncomfortable due to pain, NAD. Head ncat, MMM,  neck supple, normal ROM, normal s1s2 without any murmurs, Lungs CTAB with normal work of breathing. abd +BS, s/nd, + ttp of the LLQ with rebound, no CVAT b/l, LIVIA exam as per MLP exam, extremities wnl, neuro exam grossly normal. No acute skin rashes.

## 2020-10-01 NOTE — H&P ADULT - NSHPLABSRESULTS_GEN_ALL_CORE
11.7   9.13  )-----------( 314      ( 01 Oct 2020 18:55 )             37.8     10    138  |  100  |  24<H>  ----------------------------<  101<H>  4.4   |  26  |  1.3    Ca    9.8      01 Oct 2020 18:55    TPro  6.9  /  Alb  4.0  /  TBili  <0.2  /  DBili  <0.2  /  AST  21  /  ALT  20  /  AlkPhos  82  10-01    PT/INR - ( 01 Oct 2020 18:55 )   PT: 11.60 sec;   INR: 1.01 ratio         PTT - ( 01 Oct 2020 18:55 )  PTT:30.0 sec    Urinalysis Basic - ( 01 Oct 2020 20:50 )    Color: Ingrid / Appearance: Clear / S.031 / pH: x  Gluc: x / Ketone: Trace  / Bili: Negative / Urobili: 3 mg/dL   Blood: x / Protein: 30 mg/dL / Nitrite: Negative   Leuk Esterase: Negative / RBC: 3 /HPF / WBC 1 /HPF   Sq Epi: x / Non Sq Epi: 12 /HPF / Bacteria: Negative    < from: CT Abdomen and Pelvis w/ Oral Cont and w/ IV Cont (10.01.20 @ 21:09) >    1. Mild acute sigmoid diverticulitis; no abscess.  2. Hepatic steatosis.    < end of copied text >

## 2020-10-01 NOTE — H&P ADULT - ATTENDING COMMENTS
55 YO F with a PMH of obesity, HTN, depression/anxiety, GERD, COPD not on home O2, nasopharyngeal CA s/p radiation, and recurrent diverticulitis who present to the hospital with a c/o ABD pain for the last x 2 days. Described as LLQ, crampy, and non-radiating. Associated with fevers/chills and diarrhea that was bloody and is now watery. In the ED, CT-AP w/ IV contrast showed acute sigmoid diverticulitis. Started on IV ABXs (Flagyl/Cipro).     Physical exam shows obese pt in NAD. VSS, afebrile, not hypoxic on RA. A&Ox3. Non-focal neuro exam. Muscle strength/sensation intact. CTA B/L with no W/C/R. RRR, no M/G/R. ABD with TTP in the LLQ, normoactive BSs. LEs without swelling. No rashes. Labs and radiology as above.     LLQ ABD pain + bloody diarrhea (now resolved) due to acute sigmoid diverticulitis without abscess/perforation; no sepsis on admission. NPO. IV ABXs (Cipro/flagyl). PRN pain meds. IVFs (LR). Anti-emetics PRN. FU cultures and GI PCR. GI consult.    Hx of obesity, HTN, depression/anxiety, GERD, COPD not on home O2, and nasopharyngeal CA s/p radiation. Restart home meds. DVT PPX. Inform PCP of pt's admission to hospital. My note supersedes the residents note.

## 2020-10-01 NOTE — ED PROVIDER NOTE - PHYSICAL EXAMINATION
RECTAL: nontender, no masses or fissures, no blood. Exam chaperoned by medical student Sharon. CONSTITUTIONAL: Well-developed; well-nourished; in no acute distress.   SKIN: warm, dry  HEAD: Normocephalic; atraumatic.  EYES: no conjunctival injection. EOMI.   ENT: No nasal discharge; airway clear.  NECK: Supple; non tender.  CARD: S1, S2 normal; no murmurs, gallops, or rubs. Regular rate and rhythm.   RESP: No wheezes, rales or rhonchi.  ABD: soft non-distended. +TTP in LLQ. No CVA tenderness.   RECTAL: nontender, no masses or fissures, no blood. Exam chaperoned by medical student Sharon.  EXT: Normal ROM.  No clubbing, cyanosis or edema.   LYMPH: No acute cervical adenopathy.  NEURO: Alert, oriented, grossly unremarkable.  PSYCH: Cooperative, appropriate.

## 2020-10-01 NOTE — H&P ADULT - NSHPPHYSICALEXAM_GEN_ALL_CORE
General: WN/WD obese  Neurology: A&Ox3, nonfocal, TEE x 4  Head:  Normocephalic, atraumatic  ENT:  Mucosa moist, no ulcerations  Neck:  chronic radiation changes, no sinuses or palpable masses  Lymphatic:  No palpable cervical, supraclavicular  Respiratory: CTA B/L  CV: RRR, S1S2, no murmur  Abdominal: Soft, tenderness in bialteral lower quadrants, pain in LLQ upon palpation of RLQ  MSK: +1 bl le edema chronic  Incisions: intact, no erythema or drainage

## 2020-10-01 NOTE — ED PROVIDER NOTE - NS ED ROS FT
Review of Systems:  CONSTITUTIONAL: No fever, No diaphoresis, No weight change  SKIN: No rash  HEMATOLOGIC: No abnormal bleeding or bruising  EYES: No eye pain, No blurred vision  ENT: No sore throat, No neck pain, No rhinorrhea  RESPIRATORY: No shortness of breath, No cough  CARDIAC: No chest pain, No palpitations  GI: +abdominal pain, +nausea, No vomiting, +diarrhea, No constipation, +bright red blood per rectum. No flank pain.  : No dysuria, frequency, hematuria.   MUSCULOSKELETAL: No joint paint, No swelling, No back pain  NEUROLOGIC: No numbness, No focal weakness, No headache, No dizziness  All other systems negative, unless specified in HPI

## 2020-10-01 NOTE — H&P ADULT - HISTORY OF PRESENT ILLNESS
54 year old female with hx of HTN, COPD not on home O2, nasopharyngeal carcinoma s/p radiation in 2013, recurrent diverticulitis more than 6 episodes in the last 10 years, presents with LLQ abdominal pain, chills, and bloody diarrhea.  Pain started yesterday and she started having chills later on.  She states that her episodes start off as watery diarrhea and then she begins having bloody diarrhea.  She states she had about 20 episodes of watery and then bloody diarrhea yesterday, her last bowel movement before our interview was watery diarrhea.  She also endorsed nausea with no vomiting.    Of note her PCP was in the process of setting her up with a surgeon for resection due to her recurrent episodes of diverticulitis but she has yet to follow up with him.    In the ED she received rocephin/flagyl, 1L LR, morphine, and zofran.    Triage vitals: /68    RR 20  Temp 97.9  SpO2 97% on room air

## 2020-10-01 NOTE — ED ADULT NURSE REASSESSMENT NOTE - NS ED NURSE REASSESS COMMENT FT1
Pt. assessed, not in any form of distress; AAO x4. on continous IV infusion, IV patent and intact. instructed to obtain urine speciment. VSS. no verbalized pain at this time. safety measures in place. will continue to assess and monitor.

## 2020-10-01 NOTE — ED PROVIDER NOTE - OBJECTIVE STATEMENT
55 y/o F PMHx HTN, diverticulitis, COPD not on home O2, nasopharyngeal carcinoma in remission, anxiety/depression presents to ED with severe gradual LLQ abd pain since last night. Pain has been worsening. Pt reports nausea, diarrhea, and bright red blood per rectum since yesterday. No vomiting. Denies fevers/chills.

## 2020-10-01 NOTE — H&P ADULT - NSICDXFAMILYHX_GEN_ALL_CORE_FT
FAMILY HISTORY:  Father  Still living? Unknown  Family history of lung cancer, Age at diagnosis: Age Unknown    Mother  Still living? Unknown  Family history of lung cancer, Age at diagnosis: Age Unknown

## 2020-10-01 NOTE — H&P ADULT - NSICDXPASTMEDICALHX_GEN_ALL_CORE_FT
PAST MEDICAL HISTORY:  Anxiety     Cervical disc disease sequelae of radtion for nasopharyngeal cancer    COPD (chronic obstructive pulmonary disease)     Diverticulitis     Hemorrhoids     HTN (hypertension)     Hypothyroid     Left ear hearing loss     Nasopharyngeal cancer     Pulmonary nodules/lesions, multiple

## 2020-10-01 NOTE — H&P ADULT - NSHPREVIEWOFSYSTEMS_GEN_ALL_CORE
CONSTITUTIONAL: had chills at home  EYES/ENT: No visual changes;  No vertigo or throat pain   NECK: chronic neck pain  RESPIRATORY: No cough, wheezing, hemoptysis; No shortness of breath  CARDIOVASCULAR: No chest pain or palpitations  GASTROINTESTINAL: aching abdominal pain in RLQ and LLQ. Mild nausea no vomiting; currently having watery diarrhea, had bloody diarrhea at home  GENITOURINARY: No dysuria, frequency or hematuria  NEUROLOGICAL: No numbness or weakness  SKIN: No itching, rashes

## 2020-10-02 LAB
A1C WITH ESTIMATED AVERAGE GLUCOSE RESULT: 6.6 % — HIGH (ref 4–5.6)
ALBUMIN SERPL ELPH-MCNC: 3.6 G/DL — SIGNIFICANT CHANGE UP (ref 3.5–5.2)
ALP SERPL-CCNC: 88 U/L — SIGNIFICANT CHANGE UP (ref 30–115)
ALT FLD-CCNC: 19 U/L — SIGNIFICANT CHANGE UP (ref 0–41)
ANION GAP SERPL CALC-SCNC: 12 MMOL/L — SIGNIFICANT CHANGE UP (ref 7–14)
AST SERPL-CCNC: 18 U/L — SIGNIFICANT CHANGE UP (ref 0–41)
BASOPHILS # BLD AUTO: 0.02 K/UL — SIGNIFICANT CHANGE UP (ref 0–0.2)
BASOPHILS NFR BLD AUTO: 0.2 % — SIGNIFICANT CHANGE UP (ref 0–1)
BILIRUB SERPL-MCNC: <0.2 MG/DL — SIGNIFICANT CHANGE UP (ref 0.2–1.2)
BUN SERPL-MCNC: 18 MG/DL — SIGNIFICANT CHANGE UP (ref 10–20)
CALCIUM SERPL-MCNC: 9.1 MG/DL — SIGNIFICANT CHANGE UP (ref 8.5–10.1)
CHLORIDE SERPL-SCNC: 103 MMOL/L — SIGNIFICANT CHANGE UP (ref 98–110)
CO2 SERPL-SCNC: 25 MMOL/L — SIGNIFICANT CHANGE UP (ref 17–32)
CREAT SERPL-MCNC: 1 MG/DL — SIGNIFICANT CHANGE UP (ref 0.7–1.5)
EOSINOPHIL # BLD AUTO: 0.27 K/UL — SIGNIFICANT CHANGE UP (ref 0–0.7)
EOSINOPHIL NFR BLD AUTO: 3.1 % — SIGNIFICANT CHANGE UP (ref 0–8)
ESTIMATED AVERAGE GLUCOSE: 143 MG/DL — HIGH (ref 68–114)
GLUCOSE SERPL-MCNC: 119 MG/DL — HIGH (ref 70–99)
HCT VFR BLD CALC: 36.3 % — LOW (ref 37–47)
HCV AB S/CO SERPL IA: 0.04 COI — SIGNIFICANT CHANGE UP
HCV AB SERPL-IMP: SIGNIFICANT CHANGE UP
HGB BLD-MCNC: 11 G/DL — LOW (ref 12–16)
IMM GRANULOCYTES NFR BLD AUTO: 0.3 % — SIGNIFICANT CHANGE UP (ref 0.1–0.3)
LACTATE SERPL-SCNC: 1 MMOL/L — SIGNIFICANT CHANGE UP (ref 0.7–2)
LYMPHOCYTES # BLD AUTO: 1.31 K/UL — SIGNIFICANT CHANGE UP (ref 1.2–3.4)
LYMPHOCYTES # BLD AUTO: 15 % — LOW (ref 20.5–51.1)
MAGNESIUM SERPL-MCNC: 1.6 MG/DL — LOW (ref 1.8–2.4)
MCHC RBC-ENTMCNC: 25.5 PG — LOW (ref 27–31)
MCHC RBC-ENTMCNC: 30.3 G/DL — LOW (ref 32–37)
MCV RBC AUTO: 84.2 FL — SIGNIFICANT CHANGE UP (ref 81–99)
MONOCYTES # BLD AUTO: 0.62 K/UL — HIGH (ref 0.1–0.6)
MONOCYTES NFR BLD AUTO: 7.1 % — SIGNIFICANT CHANGE UP (ref 1.7–9.3)
NEUTROPHILS # BLD AUTO: 6.49 K/UL — SIGNIFICANT CHANGE UP (ref 1.4–6.5)
NEUTROPHILS NFR BLD AUTO: 74.3 % — SIGNIFICANT CHANGE UP (ref 42.2–75.2)
NRBC # BLD: 0 /100 WBCS — SIGNIFICANT CHANGE UP (ref 0–0)
PLATELET # BLD AUTO: 293 K/UL — SIGNIFICANT CHANGE UP (ref 130–400)
POTASSIUM SERPL-MCNC: 4.4 MMOL/L — SIGNIFICANT CHANGE UP (ref 3.5–5)
POTASSIUM SERPL-SCNC: 4.4 MMOL/L — SIGNIFICANT CHANGE UP (ref 3.5–5)
PROT SERPL-MCNC: 5.9 G/DL — LOW (ref 6–8)
RBC # BLD: 4.31 M/UL — SIGNIFICANT CHANGE UP (ref 4.2–5.4)
RBC # FLD: 18.4 % — HIGH (ref 11.5–14.5)
SARS-COV-2 RNA SPEC QL NAA+PROBE: SIGNIFICANT CHANGE UP
SODIUM SERPL-SCNC: 140 MMOL/L — SIGNIFICANT CHANGE UP (ref 135–146)
WBC # BLD: 8.74 K/UL — SIGNIFICANT CHANGE UP (ref 4.8–10.8)
WBC # FLD AUTO: 8.74 K/UL — SIGNIFICANT CHANGE UP (ref 4.8–10.8)

## 2020-10-02 PROCEDURE — 99233 SBSQ HOSP IP/OBS HIGH 50: CPT

## 2020-10-02 PROCEDURE — 74018 RADEX ABDOMEN 1 VIEW: CPT | Mod: 26

## 2020-10-02 RX ORDER — TRAMADOL HYDROCHLORIDE 50 MG/1
25 TABLET ORAL ONCE
Refills: 0 | Status: DISCONTINUED | OUTPATIENT
Start: 2020-10-02 | End: 2020-10-02

## 2020-10-02 RX ORDER — HEPARIN SODIUM 5000 [USP'U]/ML
5000 INJECTION INTRAVENOUS; SUBCUTANEOUS EVERY 12 HOURS
Refills: 0 | Status: DISCONTINUED | OUTPATIENT
Start: 2020-10-02 | End: 2020-10-03

## 2020-10-02 RX ADMIN — TRAMADOL HYDROCHLORIDE 25 MILLIGRAM(S): 50 TABLET ORAL at 05:23

## 2020-10-02 RX ADMIN — OXYCODONE HYDROCHLORIDE 5 MILLIGRAM(S): 5 TABLET ORAL at 04:42

## 2020-10-02 RX ADMIN — Medication 650 MILLIGRAM(S): at 11:18

## 2020-10-02 RX ADMIN — SODIUM CHLORIDE 50 MILLILITER(S): 9 INJECTION, SOLUTION INTRAVENOUS at 08:22

## 2020-10-02 RX ADMIN — Medication 100 MILLIGRAM(S): at 13:19

## 2020-10-02 RX ADMIN — OXYCODONE HYDROCHLORIDE 5 MILLIGRAM(S): 5 TABLET ORAL at 18:49

## 2020-10-02 RX ADMIN — ZOLPIDEM TARTRATE 5 MILLIGRAM(S): 10 TABLET ORAL at 23:09

## 2020-10-02 RX ADMIN — OXYCODONE HYDROCHLORIDE 5 MILLIGRAM(S): 5 TABLET ORAL at 08:22

## 2020-10-02 RX ADMIN — Medication 1 MILLIGRAM(S): at 05:22

## 2020-10-02 RX ADMIN — OXYCODONE HYDROCHLORIDE 5 MILLIGRAM(S): 5 TABLET ORAL at 00:09

## 2020-10-02 RX ADMIN — OXYCODONE HYDROCHLORIDE 5 MILLIGRAM(S): 5 TABLET ORAL at 09:00

## 2020-10-02 RX ADMIN — Medication 100 MILLIGRAM(S): at 05:22

## 2020-10-02 RX ADMIN — MONTELUKAST 10 MILLIGRAM(S): 4 TABLET, CHEWABLE ORAL at 11:17

## 2020-10-02 RX ADMIN — TRAMADOL HYDROCHLORIDE 25 MILLIGRAM(S): 50 TABLET ORAL at 06:17

## 2020-10-02 RX ADMIN — Medication 100 MILLIGRAM(S): at 21:20

## 2020-10-02 RX ADMIN — TIOTROPIUM BROMIDE 1 CAPSULE(S): 18 CAPSULE ORAL; RESPIRATORY (INHALATION) at 09:56

## 2020-10-02 RX ADMIN — BUPROPION HYDROCHLORIDE 300 MILLIGRAM(S): 150 TABLET, EXTENDED RELEASE ORAL at 11:17

## 2020-10-02 RX ADMIN — Medication 1 MILLIGRAM(S): at 18:36

## 2020-10-02 RX ADMIN — BUDESONIDE AND FORMOTEROL FUMARATE DIHYDRATE 2 PUFF(S): 160; 4.5 AEROSOL RESPIRATORY (INHALATION) at 09:56

## 2020-10-02 RX ADMIN — CEFTRIAXONE 100 MILLIGRAM(S): 500 INJECTION, POWDER, FOR SOLUTION INTRAMUSCULAR; INTRAVENOUS at 21:20

## 2020-10-02 RX ADMIN — Medication 125 MICROGRAM(S): at 06:01

## 2020-10-02 NOTE — CONSULT NOTE ADULT - ASSESSMENT
ASSESSMENT  54yFemale with a PMH of HTN, COPD not on home O2, nasopharyngeal carcinoma s/p radiation in 2013, recurrent diverticulitis more than 6 episodes in the last 10 years, presents with abdominal pain and bloody diarrhea likely secondary to acute diverticulitis      IMPRESSION  # Noncomplicated acute recurrent diverticulitis  -LLQ Tenderness and pain, bloody diarrhea, chills  -CT positive for mild acute diverticultitis with no abscess  -Prior history of recurrence  -Vital Signs WNL      RECOMMENDATIONS  -Continue with current antibiotic course of ceftriaxone IV 2g 124h and metronidazole IV 500mg q8h until symptoms subside  -Continue NPO and bowel rest      This is a pended note. All final recommendations to follow pending discussion with ID Attending ASSESSMENT  54yFemale with a PMH of HTN, COPD not on home O2, nasopharyngeal carcinoma s/p radiation in 2013, recurrent diverticulitis more than 6 episodes in the last 10 years, presents with abdominal pain and bloody diarrhea likely secondary to acute diverticulitis      IMPRESSION  # Noncomplicated acute recurrent diverticulitis  -LLQ Tenderness and pain, bloody diarrhea, chills  -CT positive for mild acute diverticultitis with no abscess  -Prior history of recurrence  -Vital Signs WNL      RECOMMENDATIONS  -Continue with current antibiotic course of ceftriaxone IV 2g 124h and metronidazole IV 500mg q8h until symptoms subside  -Continue NPO and bowel rest  -f/u pending blood culture    This is a pended note. All final recommendations to follow pending discussion with ID Attending ASSESSMENT  54yFemale with a PMH of HTN, COPD not on home O2, nasopharyngeal carcinoma s/p radiation in 2013, recurrent diverticulitis more than 6 episodes in the last 10 years, presents with abdominal pain and bloody diarrhea likely secondary to acute diverticulitis      IMPRESSION  # Noncomplicated acute recurrent diverticulitis  -LLQ Tenderness and pain, bloody diarrhea, chills  -CT positive for mild acute diverticultitis with no abscess  -Prior history of recurrence  -Vital Signs WNL      RECOMMENDATIONS  -Continue with current antibiotic course of ceftriaxone IV 2g 124h and metronidazole IV 500mg q8h for 10 days  -Switch to cefpodoxime 200mg BID and metronidazole 500mg TID PO upon discharge  -Continue NPO and bowel rest  -f/u pending blood culture    This is a pended note. All final recommendations to follow pending discussion with ID Attending ASSESSMENT  54yFemale with a PMH of HTN, COPD not on home O2, nasopharyngeal carcinoma s/p radiation in 2013, recurrent diverticulitis more than 6 episodes in the last 10 years, presents with abdominal pain and bloody diarrhea likely secondary to acute diverticulitis      IMPRESSION  # Noncomplicated acute recurrent diverticulitis  -LLQ Tenderness and pain, bloody diarrhea, chills  -CT positive for mild acute diverticultitis with no abscess  -Prior history of recurrence  -Vital Signs WNL      RECOMMENDATIONS  -Continue with current antibiotic course of ceftriaxone IV 2g 124h and metronidazole IV 500mg q8h  -Switch to cefpodoxime 200mg BID and metronidazole 500mg TID PO upon discharge  for 10 days  -Continue NPO and bowel rest  -f/u pending blood culture

## 2020-10-02 NOTE — CONSULT NOTE ADULT - SUBJECTIVE AND OBJECTIVE BOX
GEOVANNA PUTNAM [id number]  54y Female  1d    HPI:  54F w/ PMH of HTN, COPD not on home O2, hypothyroidism, nasopharyngeal carcinoma s/p chemo/radiation (), recurrent diverticulitis who presented to the hospital yesterday with LLQ pain. Associated chills, nausea, and diarrhea. Denies vomiting. In the ED, pt received CTX and flagyl (cipro allergy), IVF, morphine, and zofran. CT A/P notable for sigmoid diverticulitis w/o abscess. Surgery is consulted for resection.       PAST MEDICAL & SURGICAL HISTORY:  Anxiety  Hemorrhoids  Left ear hearing loss  Cervical disc disease  sequelae of radtion for nasopharyngeal cancer  Hypothyroid  Nasopharyngeal cancer  Pulmonary nodules/lesions, multiple  Diverticulitis  HTN (hypertension)  COPD (chronic obstructive pulmonary disease)  History of cholecystectomy    MEDICATIONS  (STANDING):  budesonide 160 MICROgram(s)/formoterol 4.5 MICROgram(s) Inhaler 2 Puff(s) Inhalation two times a day  buPROPion XL . 300 milliGRAM(s) Oral daily  cefTRIAXone   IVPB 2000 milliGRAM(s) IV Intermittent every 24 hours  clonazePAM  Tablet 1 milliGRAM(s) Oral two times a day  heparin SubCutaneous Injection - Peds 5000 Unit(s) SubCutaneous every 12 hours  lactated ringers. 1000 milliLiter(s) (50 mL/Hr) IV Continuous <Continuous>  levothyroxine 125 MICROGram(s) Oral daily  metroNIDAZOLE  IVPB 500 milliGRAM(s) IV Intermittent every 8 hours  montelukast 10 milliGRAM(s) Oral daily  pantoprazole    Tablet 40 milliGRAM(s) Oral before breakfast  tiotropium 18 MICROgram(s) Capsule 1 Capsule(s) Inhalation daily    MEDICATIONS  (PRN):  acetaminophen   Tablet .. 650 milliGRAM(s) Oral every 6 hours PRN Mild Pain (1 - 3)  ALBUTerol    90 MICROgram(s) HFA Inhaler 2 Puff(s) Inhalation every 6 hours PRN Shortness of Breath and/or Wheezing  oxyCODONE    IR 5 milliGRAM(s) Oral every 6 hours PRN Moderate Pain (4 - 6)  zolpidem 5 milliGRAM(s) Oral at bedtime PRN Insomnia      Allergies  ciprofloxacin (Urticaria; Other)    REVIEW OF SYSTEMS  [X] A ten-point review of systems was otherwise negative except as noted.  [ ] Due to altered mental status/intubation, subjective information were not able to be obtained from the patient. History was obtained, to the extent possible, from review of the chart and collateral sources of information.    Vital Signs Last 24 Hrs  T(C): 37.2 (02 Oct 2020 07:54), Max: 37.2 (02 Oct 2020 07:54)  T(F): 98.9 (02 Oct 2020 07:54), Max: 98.9 (02 Oct 2020 07:54)  HR: 92 (02 Oct 2020 07:54) (82 - 111)  BP: 123/57 (02 Oct 2020 07:54) (115/62 - 130/68)  RR: 18 (02 Oct 2020 07:54) (18 - 20)  SpO2: 96% (02 Oct 2020 07:54) (96% - 100%)      PHYSICAL EXAM:  GENERAL: NAD, well-appearing  CHEST/LUNG: Clear to auscultation bilaterally  HEART: Regular rate and rhythm  ABDOMEN: Soft, Nontender, Nondistended;   EXTREMITIES:  No clubbing, cyanosis, or edema      LABS:                  11.0   8.74  )-----------( 293      ( 02 Oct 2020 05:15 )             36.3       Auto Neutrophil %: 74.3 % (10-02-20 @ 05:15)  Auto Immature Granulocyte %: 0.3 % (10-02-20 @ 05:15)  Auto Neutrophil %: 73.3 % (10-01-20 @ 18:55)  Auto Immature Granulocyte %: 0.3 % (10-01-20 @ 18:55)    10-02    140  |  103  |  18  ----------------------------<  119<H>  4.4   |  25  |  1.0    Calcium, Total Serum: 9.1 mg/dL (10-02-20 @ 05:15)    LFTs:             5.9  | <0.2 | 18       ------------------[88      ( 02 Oct 2020 05:15 )  3.6  | x    | 19          Lactate, Blood: 1.0 mmol/L (10-02-20 @ 05:15)  Lactate, Blood: 1.3 mmol/L (10-01-20 @ 18:55)    Coags:     11.60  ----< 1.01    ( 01 Oct 2020 18:55 )     30.0     CARDIAC MARKERS ( 01 Oct 2020 20:10 )  x     / <0.01 ng/mL / x     / x     / x          Urinalysis Basic - ( 01 Oct 2020 20:50 )    Color: Ingrid / Appearance: Clear / S.031 / pH: x  Gluc: x / Ketone: Trace  / Bili: Negative / Urobili: 3 mg/dL   Blood: x / Protein: 30 mg/dL / Nitrite: Negative   Leuk Esterase: Negative / RBC: 3 /HPF / WBC 1 /HPF   Sq Epi: x / Non Sq Epi: 12 /HPF / Bacteria: Negative        RADIOLOGY & ADDITIONAL STUDIES:    < from: CT Abdomen and Pelvis w/ Oral Cont and w/ IV Cont (10.01.20 @ 21:09) >  FINDINGS:    LOWER CHEST: Left basilar subsegmental atelectasis.    HEPATOBILIARY: Hepatic steatosis. Post cholecystectomy. No significant biliary dilation.    SPLEEN: Unremarkable.    PANCREAS: Unremarkable.    ADRENAL GLANDS: Unchanged 1.3 cm right adrenal nodule.    KIDNEYS: No hydronephrosis or renal calculus. Right lower renal pole cyst..    ABDOMINOPELVIC NODES: Unremarkable.    PELVIC ORGANS: Unremarkable.    PERITONEUM/MESENTERY/BOWEL: Sigmoid diverticulosis with area of perisigmoid mild inflammatory stranding; no abscess or gross free air.    BONES/SOFT TISSUES: Degenerative change, lumbar spine.    OTHER: Vascular calcifications.      IMPRESSION:  1. Mild acute sigmoid diverticulitis; no abscess.  2. Hepatic steatosis.  < end of copied text > GEOVANNA PUTNAM [id number]  54y Female  1d    HPI:  54F w/ PMH of HTN, COPD not on home O2, hypothyroidism, nasopharyngeal carcinoma s/p chemo/radiation (), recurrent diverticulitis who presented to the hospital yesterday with LLQ pain. Associated chills, nausea, and diarrhea. Denies vomiting. In the ED, pt received CTX and flagyl (cipro allergy), IVF, morphine, and zofran. CT A/P notable for sigmoid diverticulitis w/o abscess. Surgery is consulted for resection.       PAST MEDICAL & SURGICAL HISTORY:  Anxiety  Hemorrhoids  Left ear hearing loss  Cervical disc disease  sequelae of radtion for nasopharyngeal cancer  Hypothyroid  Nasopharyngeal cancer  Pulmonary nodules/lesions, multiple  Diverticulitis  HTN (hypertension)  COPD (chronic obstructive pulmonary disease)  History of cholecystectomy    MEDICATIONS  (STANDING):  budesonide 160 MICROgram(s)/formoterol 4.5 MICROgram(s) Inhaler 2 Puff(s) Inhalation two times a day  buPROPion XL . 300 milliGRAM(s) Oral daily  cefTRIAXone   IVPB 2000 milliGRAM(s) IV Intermittent every 24 hours  clonazePAM  Tablet 1 milliGRAM(s) Oral two times a day  heparin SubCutaneous Injection - Peds 5000 Unit(s) SubCutaneous every 12 hours  lactated ringers. 1000 milliLiter(s) (50 mL/Hr) IV Continuous <Continuous>  levothyroxine 125 MICROGram(s) Oral daily  metroNIDAZOLE  IVPB 500 milliGRAM(s) IV Intermittent every 8 hours  montelukast 10 milliGRAM(s) Oral daily  pantoprazole    Tablet 40 milliGRAM(s) Oral before breakfast  tiotropium 18 MICROgram(s) Capsule 1 Capsule(s) Inhalation daily    MEDICATIONS  (PRN):  acetaminophen   Tablet .. 650 milliGRAM(s) Oral every 6 hours PRN Mild Pain (1 - 3)  ALBUTerol    90 MICROgram(s) HFA Inhaler 2 Puff(s) Inhalation every 6 hours PRN Shortness of Breath and/or Wheezing  oxyCODONE    IR 5 milliGRAM(s) Oral every 6 hours PRN Moderate Pain (4 - 6)  zolpidem 5 milliGRAM(s) Oral at bedtime PRN Insomnia      Allergies  ciprofloxacin (Urticaria; Other)    REVIEW OF SYSTEMS  [X] A ten-point review of systems was otherwise negative except as noted.  [ ] Due to altered mental status/intubation, subjective information were not able to be obtained from the patient. History was obtained, to the extent possible, from review of the chart and collateral sources of information.    Vital Signs Last 24 Hrs  T(C): 37.2 (02 Oct 2020 07:54), Max: 37.2 (02 Oct 2020 07:54)  T(F): 98.9 (02 Oct 2020 07:54), Max: 98.9 (02 Oct 2020 07:54)  HR: 92 (02 Oct 2020 07:54) (82 - 111)  BP: 123/57 (02 Oct 2020 07:54) (115/62 - 130/68)  RR: 18 (02 Oct 2020 07:54) (18 - 20)  SpO2: 96% (02 Oct 2020 07:54) (96% - 100%)      PHYSICAL EXAM:  GENERAL: NAD, well-appearing  CHEST/LUNG: Clear to auscultation bilaterally  HEART: Regular rate and rhythm  ABDOMEN: Soft, obese, Nondistended; TTP in LLQ/suprapubic area  EXTREMITIES: No clubbing, cyanosis, or edema      LABS:                  11.0   8.74  )-----------( 293      ( 02 Oct 2020 05:15 )             36.3       Auto Neutrophil %: 74.3 % (10-02-20 @ 05:15)  Auto Immature Granulocyte %: 0.3 % (10-02-20 @ 05:15)  Auto Neutrophil %: 73.3 % (10-01-20 @ 18:55)  Auto Immature Granulocyte %: 0.3 % (10-01-20 @ 18:55)    10-02    140  |  103  |  18  ----------------------------<  119<H>  4.4   |  25  |  1.0    Calcium, Total Serum: 9.1 mg/dL (10-02-20 @ 05:15)    LFTs:             5.9  | <0.2 | 18       ------------------[88      ( 02 Oct 2020 05:15 )  3.6  | x    | 19          Lactate, Blood: 1.0 mmol/L (10-02-20 @ 05:15)  Lactate, Blood: 1.3 mmol/L (10-01-20 @ 18:55)    Coags:     11.60  ----< 1.01    ( 01 Oct 2020 18:55 )     30.0     CARDIAC MARKERS ( 01 Oct 2020 20:10 )  x     / <0.01 ng/mL / x     / x     / x          Urinalysis Basic - ( 01 Oct 2020 20:50 )    Color: Ingrid / Appearance: Clear / S.031 / pH: x  Gluc: x / Ketone: Trace  / Bili: Negative / Urobili: 3 mg/dL   Blood: x / Protein: 30 mg/dL / Nitrite: Negative   Leuk Esterase: Negative / RBC: 3 /HPF / WBC 1 /HPF   Sq Epi: x / Non Sq Epi: 12 /HPF / Bacteria: Negative        RADIOLOGY & ADDITIONAL STUDIES:    < from: CT Abdomen and Pelvis w/ Oral Cont and w/ IV Cont (10.01.20 @ 21:09) >  FINDINGS:    PERITONEUM/MESENTERY/BOWEL: Sigmoid diverticulosis with area of perisigmoid mild inflammatory stranding; no abscess or gross free air.    IMPRESSION:  1. Mild acute sigmoid diverticulitis; no abscess.  2. Hepatic steatosis.  < end of copied text >

## 2020-10-02 NOTE — PROGRESS NOTE ADULT - ASSESSMENT
54 year old female with hx of HTN, COPD not on home O2, nasopharyngeal carcinoma s/p radiation in 2013, recurrent diverticulitis more than 6 episodes in the last 10 years, presents with LLQ abdominal pain, chills, and bloody diarrhea likely secondary to diverticulitis    #  Recurrent Diverticulitis Still has abdominal pain today , Not complicated by abscess or fisula, NO BM since admission  - not septic on admission  - CT showing mild acute sigmoid diverticulitis, no abscess  - has been recurrent, patient was referred to GI surgeon however hasn't followed up yet  - started on rocephin/flagyl in the ED as patient has cipro allergy, will continue // f/u GI  - continue with LR at 48 for now, received 1L in ED // will keep NPO for now   - f/u blood cx / GI PCR  - f/u Surgery   - starting Clears  tomorrow likely  - f/u KUB       # HTN  - c/w enalapril 10 qd    # COPD  - c/w symbicort / albuterol / spiriva / montelukast    # Chronic neck pain and abdominal pain  - oxy 5 q6h prn, tylenol    # Depression/Anxiety  - bupropion  / klonopin 1 bid    # GERD  - protonix 40 qd      Electrolyte Imbalances: Correct as needed  []  Hyponatremia   /   Hypernatremia  []   []  Hypokalemia   /   Hyperkalemia  []   []  Hypochlorhydria   /    Hypochlorhydria  []   [x]  Hypomagnesemia   /   Hypermagnesemia  []   []  Hypophosphatemia   /   Hyperphosphatemia  []       GI ppx:                                   [] Not indicated   /   [] Pantoprazole 40mg PO Daily    DVT ppx: HGB stable   [] Not indicated / [x] Heparin 5000mg SubQ / [] Lovenox 40mg SubQ / [] SCDs    Fluids: NPO  [] PO  |  [] IVF    Activity:  [X] Assisatnce needed   [X] Increase as Tolerated  /  [] OOB w/ assist  /  [] Bed Rest    BMI:      DISPO:  Patient to be discharged when condition(s) optimized.  [x ] From Home     [ ] NH/SNF   [ ] 4A Rehab  [ ] Detox Clinic  [X] Plan Discussion with patient and/or family.  [X] Discussed Case and Plan with the Medical Attending.    CODE STATUS  [X] FULL   /    [] DNR       54 year old female with hx of HTN, COPD not on home O2, nasopharyngeal carcinoma s/p radiation in 2013, recurrent diverticulitis more than 6 episodes in the last 10 years, presents with LLQ abdominal pain, chills, and bloody diarrhea likely secondary to diverticulitis    #  Recurrent Diverticulitis Still has abdominal pain today , Not complicated by abscess or fisula, NO BM since admission  - not septic on admission  - CT showing mild acute sigmoid diverticulitis, no abscess  - has been recurrent, patient was referred to GI surgeon however hasn't followed up yet  - started on rocephin/flagyl in the ED as patient has cipro allergy, will continue // f/u GI  - continue with LR at 48 for now, received 1L in ED // will keep NPO for now   - f/u blood cx / GI PCR  - f/u Surgery   - starting Clears  tomorrow likely  - f/u KUB    Hepatic  Steatosis    Counseled on Weight loss      # HTN  - c/w enalapril 10 qd    # COPD  - c/w symbicort / albuterol / spiriva / montelukast    # Chronic neck pain and abdominal pain  - oxy 5 q6h prn, tylenol    # Depression/Anxiety  - bupropion  / klonopin 1 bid    # GERD  - protonix 40 qd      Electrolyte Imbalances: Correct as needed  []  Hyponatremia   /   Hypernatremia  []   []  Hypokalemia   /   Hyperkalemia  []   []  Hypochlorhydria   /    Hypochlorhydria  []   [x]  Hypomagnesemia   /   Hypermagnesemia  []   []  Hypophosphatemia   /   Hyperphosphatemia  []       GI ppx:                                   [] Not indicated   /   [] Pantoprazole 40mg PO Daily    DVT ppx: HGB stable   [] Not indicated / [x] Heparin 5000mg SubQ / [] Lovenox 40mg SubQ / [] SCDs    Fluids: NPO  [] PO  |  [] IVF    Activity:  [X] Assisatnce needed   [X] Increase as Tolerated  /  [] OOB w/ assist  /  [] Bed Rest    BMI:      DISPO:  Patient to be discharged when condition(s) optimized.  [x ] From Home     [ ] NH/SNF   [ ] 4A Rehab  [ ] Detox Clinic  [X] Plan Discussion with patient and/or family.  [X] Discussed Case and Plan with the Medical Attending.    CODE STATUS  [X] FULL   /    [] DNR

## 2020-10-02 NOTE — PROGRESS NOTE ADULT - PROVIDER SPECIALTY LIST ADULT
[facility]  [address]  [facility] [address]  Phone: [phone]  Fax: [phone]                  Nathalie Rodriguez   5/15/2017 3:20 PM   Office Visit    Description:  Female : 1972   Provider:  Krish Edwards MD   Department:  [facility]           Reason for Visit     Consult           Diagnoses this Visit        Comments    Hemidystonia    -  Primary            To Do List           Goals (5 Years of Data)     None       These Medications        Disp Refills Start End    carbidopa-levodopa  mg (SINEMET)  mg per tablet 20 tablet 0 5/15/2017     Take 1/2 - 1 tab twice per day    Pharmacy: [facility] [id number] [place] [facility] [id number] [place] [address] #: [phone]         [facility]     [facility] On Call Nurse Care Line -  Assistance  Unless otherwise directed by your provider, please contact [facility], our nurse care line that is available for  assistance.     Registered nurses in the [facility] provide: appointment scheduling, clinical advisement, health education, and other advisory services.  Call: [phone] (toll free)               Medications           Message regarding Medications     Verify the changes and/or additions to your medication regime listed below are the same as discussed with your clinician today.  If any of these changes or additions are incorrect, please notify your healthcare provider.        START taking these NEW medications        Refills    carbidopa-levodopa  mg (SINEMET)  mg per tablet 0    Sig: Take 1/2 - 1 tab twice per day    Class: Normal           Verify that the below list of medications is an accurate representation of the medications you are currently taking.  If none reported, the list may be blank. If incorrect, please contact your healthcare provider. Carry this list with you in case of emergency.           Current Medications     cyclobenzaprine (FLEXERIL) 10 MG  Internal Medicine "tablet Tab 1 po at HS prn muscle spasms.  Warning of drowsiness.    levothyroxine (SYNTHROID) 88 MCG tablet Take 1 tablet (88 mcg total) by mouth before breakfast.    liothyronine (CYTOMEL) 5 MCG Tab Take 1 tablet (5 mcg total) by mouth 2 (two) times daily.    mecobal-levomefolat Ca-B6 phos 3-35-2 mg Tab Take one tablet by mouth one time daily    meloxicam (MOBIC) 15 MG tablet TAKE 1 TABLET BY MOUTH ONCE DAILY AS NEEDED FOR KNEE PAIN    VITAMIN D2 50,000 unit capsule 50,000 Units every 30 days.     carbidopa-levodopa  mg (SINEMET)  mg per tablet Take 1/2 - 1 tab twice per day           Clinical Reference Information           Your Vitals Were     BP Pulse Height Weight Last Period BMI    111/74 (BP Location: Right arm, Patient Position: Sitting, BP Method: Automatic) 84 5' 3" (1.6 m) 58.9 kg (129 lb 13.6 oz) 05/04/2017 (Exact Date) 23 kg/m2      Blood Pressure          Most Recent Value    BP  111/74      Allergies as of 5/15/2017     Codeine    Hydrocodone    Sulfa (Sulfonamide Antibiotics)      Immunizations Administered on Date of Encounter - 5/15/2017     None      Orders Placed During Today's Visit     Future Labs/Procedures Expected by Expires    CERULOPLASMIN  5/15/2017 7/14/2018    Copper, urine, 24 hour  As directed 5/15/2018      Language Assistance Services     ATTENTION: Language assistance services are available, free of charge. Please call [phone].      ATENCIÓN: Si habla tor, tiene a tubbs disposición servicios gratuitos de asistencia lingüística. Llame al [phone].     CHÚ Ý: N?u b?n nói Ti?ng Vi?t, có các d?ch v? h? tr? ngôn ng? mi?n phí dành cho b?n. G?i s? [phone].         [facility] complies with applicable Federal civil rights laws and does not discriminate on the basis of race, color, national origin, age, disability, or sex.        "

## 2020-10-02 NOTE — CONSULT NOTE ADULT - SUBJECTIVE AND OBJECTIVE BOX
GEOVANNA PUTNAM  54y, Female  Allergy: ciprofloxacin (Urticaria; Other)      CHIEF COMPLAINT: abdominal pain and bloody diarrhea (02 Oct 2020 10:35)      HPI:  54yFemale with a past medical history of HTN, COPD not on home O2, nasopharyngeal carcinoma s/p radiation in , recurrent diverticulitis more than 6 episodes in the last 10 years, presents with LLQ abdominal pain, chills, and bloody diarrhea. Patient endorses that pain has been ongoing for 2-3 days and was at its worst yesterday when she came into the emergency department. Patient states she had one episode of bloody diarrhea yesterday and multiple episodes of watery diarrhea otherwise. Patient states that the current pain is similar to the type she has had from prior episodes but that her current episode feels worse. Patient endorses nausea, denies vomiting, shortness of breath, chest pain, urinary symptoms, swelling. She has been referred to surgery by her PCP for her recurrent diverticulitis, but has been unable to schedule an appointment.     Infectious Diseases History:  Old Micro Data/Cultures:   No history of positive cultures    FAMILY HISTORY:  Family history of lung cancer (Father, Mother)      PAST MEDICAL & SURGICAL HISTORY:  Anxiety    Hemorrhoids    Left ear hearing loss    Cervical disc disease  sequelae of radtion for nasopharyngeal cancer    Hypothyroid    Nasopharyngeal cancer    Pulmonary nodules/lesions, multiple    Diverticulitis    HTN (hypertension)    COPD (chronic obstructive pulmonary disease)    History of cholecystectomy        SOCIAL HISTORY  Social History:  restarted smoking in February, about a pack a week  social alcohol use  no illicit drug abuse (01 Oct 2020 22:46)      Recent Travel: N/A  Other Exposures: N/A    ROS  General: Denies rigors, nightsweats  HEENT: Denies rhinorrhea, sore throat, eye pain  CV: Denies CP, palpitations  PULM: Denies wheezing, hemoptysis  GI: Denies hematemesis, vomiting   : Denies discharge, hematuria  MSK: Denies arthralgias, myalgias  SKIN: Denies rash, lesions  NEURO: Denies paresthesias, weakness  PSYCH: Denies depression, Endorses anxiety    VITALS:  T(F): 98.9, Max: 98.9 (10-02-20 @ 07:54)  HR: 92  BP: 123/57  RR: 18Vital Signs Last 24 Hrs  T(C): 37.2 (02 Oct 2020 07:54), Max: 37.2 (02 Oct 2020 07:54)  T(F): 98.9 (02 Oct 2020 07:54), Max: 98.9 (02 Oct 2020 07:54)  HR: 92 (02 Oct 2020 07:54) (82 - 111)  BP: 123/57 (02 Oct 2020 07:54) (115/62 - 130/68)  BP(mean): --  RR: 18 (02 Oct 2020 07:54) (18 - 20)  SpO2: 96% (02 Oct 2020 07:54) (96% - 100%)    PHYSICAL EXAM:  Gen: NAD, resting in bed  HEENT: Normocephalic, atraumatic  Neck: supple, no lymphadenopathy  CV: Regular rate & regular rhythm  Lungs: CTAB  Abdomen: Soft, BS present, Tenderness to RLQ, LUQ, and LLQ with greatest severity in LLQ  Ext: Warm, well perfused  Neuro: non focal, awake  Skin: no rash, no lesions  Lines: no phlebitis    TESTS & MEASUREMENTS:                        11.0   8.74  )-----------( 293      ( 02 Oct 2020 05:15 )             36.3     10-    140  |  103  |  18  ----------------------------<  119<H>  4.4   |  25  |  1.0    Ca    9.1      02 Oct 2020 05:15  Mg     1.6     10-    TPro  5.9<L>  /  Alb  3.6  /  TBili  <0.2  /  DBili  x   /  AST  18  /  ALT  19  /  AlkPhos  88  10-02    eGFR if Non African American: 63 mL/min/1.73M2 (10-02-20 @ 05:15)  eGFR if African American: 73 mL/min/1.73M2 (10-02-20 @ 05:15)  eGFR if Non African American: 46 mL/min/1.73M2 (10-01-20 @ 18:55)  eGFR if : 53 mL/min/1.73M2 (10-01-20 @ 18:55)    LIVER FUNCTIONS - ( 02 Oct 2020 05:15 )  Alb: 3.6 g/dL / Pro: 5.9 g/dL / ALK PHOS: 88 U/L / ALT: 19 U/L / AST: 18 U/L / GGT: x           Urinalysis Basic - ( 01 Oct 2020 20:50 )    Color: Ingrid / Appearance: Clear / S.031 / pH: x  Gluc: x / Ketone: Trace  / Bili: Negative / Urobili: 3 mg/dL   Blood: x / Protein: 30 mg/dL / Nitrite: Negative   Leuk Esterase: Negative / RBC: 3 /HPF / WBC 1 /HPF   Sq Epi: x / Non Sq Epi: 12 /HPF / Bacteria: Negative          Lactate, Blood: 1.0 mmol/L (10-02-20 @ 05:15)  Lactate, Blood: 1.3 mmol/L (10-01-20 @ 18:55)      INFECTIOUS DISEASES TESTING      RADIOLOGY & ADDITIONAL TESTS:  CT  CT Abdomen and Pelvis w/ Oral Cont and w/ IV Cont:   EXAM:  CT ABDOMEN AND PELVIS OC IC            PROCEDURE DATE:  10/01/2020            INTERPRETATION:  CLINICAL STATEMENT: Abdominal pain.    TECHNIQUE: Contiguous axial CT images were obtained from the lower chest to the pubic symphysis following administration of 100cc Optiray 320 intravenous contrast.  Oral contrast was not administered.  Reformatted images in the coronal and sagittal planes were acquired.    Comparison made with CT abdomen and pelvis 2020.    FINDINGS:    LOWER CHEST: Left basilar subsegmental atelectasis.    HEPATOBILIARY: Hepatic steatosis. Post cholecystectomy. No significant biliary dilation.    SPLEEN: Unremarkable.    PANCREAS: Unremarkable.    ADRENAL GLANDS: Unchanged 1.3 cm right adrenal nodule.    KIDNEYS: No hydronephrosis or renal calculus. Right lower renal pole cyst..    ABDOMINOPELVIC NODES: Unremarkable.    PELVIC ORGANS: Unremarkable.    PERITONEUM/MESENTERY/BOWEL: Sigmoid diverticulosis with area of perisigmoid mild inflammatory stranding; no abscess or gross free air.    BONES/SOFT TISSUES: Degenerative change, lumbar spine.    OTHER: Vascular calcifications.      IMPRESSION:  1. Mild acute sigmoid diverticulitis; no abscess.  2. Hepatic steatosis.                JOSUÉ MA M.D., ATTENDING RADIOLOGIST  This document has been electronically signed. Oct  1 2020  9:19PM (10-01-20 @ 21:09)      CARDIOLOGY TESTING  12 Lead ECG:   Ventricular Rate 88 BPM    Atrial Rate 88 BPM    P-R Interval 160 ms    QRS Duration 94 ms    Q-T Interval 400 ms    QTC Calculation(Bazett) 484 ms    P Axis 70 degrees    R Axis -8 degrees    T Axis 99 degrees    Diagnosis Line Normal sinus rhythm  Incomplete right bundle branch block  T wave abnormality, consider lateral ischemia  Abnormal ECG    Confirmed by Luca Small ([id number]) on 10/1/2020 11:33:39 PM (10-01-20 @ 19:36)      All available historical records have been reviewed    MEDICATIONS  budesonide 160 MICROgram(s)/formoterol 4.5 MICROgram(s) Inhaler 2  buPROPion XL . 300  cefTRIAXone   IVPB 2000  clonazePAM  Tablet 1  heparin SubCutaneous Injection - Peds 5000  lactated ringers. 1000  levothyroxine 125  metroNIDAZOLE  IVPB 500  montelukast 10  pantoprazole    Tablet 40  tiotropium 18 MICROgram(s) Capsule 1      ANTIBIOTICS:  cefTRIAXone   IVPB 2000 milliGRAM(s) IV Intermittent every 24 hours  metroNIDAZOLE  IVPB 500 milliGRAM(s) IV Intermittent every 8 hours      All available historical data has been reviewed

## 2020-10-02 NOTE — CONSULT NOTE ADULT - ASSESSMENT
54F w/ PMH of HTN, COPD not on home O2, hypothyroidism, nasopharyngeal carcinoma s/p chemo/radiation (2013), recurrent diverticulitis admitted with diverticulitis now on IV ceftriaxone and flagyl.     PLAN:  - no acute surgical intervention  - recommend NPO, IVF, IV antibiotics  - advance diet as tolerated when pain resolves  - outpatient c-scope in 6 weeks  - FU w/ Dr. Spencer as outpatient

## 2020-10-02 NOTE — CONSULT NOTE ADULT - ATTENDING COMMENTS
above noted abdomen soft no distension tender LLQ ct scan noted labs noted on IV AB
54yFemale with a PMH of HTN, COPD not on home O2, nasopharyngeal carcinoma s/p radiation in 2013, recurrent diverticulitis more than 6 episodes in the last 10 years, presents with abdominal pain and bloody diarrhea likely secondary to acute diverticulitis    IMPRESSION  #Acute sigmoid diverticulitis    WBC 6    CT AP +, no abscess or perforation  #Sepsis ruled out on admission   #Morbid obesity  #DM A1c 6.6    RECOMMEND  - Continue Ceftriaxone/flagyl, on d/c PO vantin 200mg BID and flagyl 500mg TID x 10 days  - Surgery f/u   - Counseled about diet

## 2020-10-02 NOTE — PROGRESS NOTE ADULT - SUBJECTIVE AND OBJECTIVE BOX
LENGTH OF HOSPITAL STAY: 1d      CHIEF COMPLAINT:   Patient is a 54y old  Female who presents with a chief complaint of abdominal pain and bloody diarrhea (01 Oct 2020 22:46)      OVER Past 24hrs:  The patient was seen and examined at bedside there were no acute events  during the night.         PAST MEDICAL & SURGICAL HISTORY  PAST MEDICAL & SURGICAL HISTORY:  Anxiety    Hemorrhoids    Left ear hearing loss    Cervical disc disease  sequelae of radtion for nasopharyngeal cancer    Hypothyroid    Nasopharyngeal cancer    Pulmonary nodules/lesions, multiple    Diverticulitis    HTN (hypertension)    COPD (chronic obstructive pulmonary disease)    History of cholecystectomy          REVIEW OF SYSTEMS  CONSTITUTIONAL: No fever, weight loss, or fatigue  EYES: No eye pain, visual disturbances, or discharge  RESPIRATORY: No cough, wheezing, chills or hemoptysis; No shortness of breath  CARDIOVASCULAR: No chest pain, palpitations, dizziness, or leg swelling  GASTROINTESTINAL: complains of lower abdominal  pain. No nausea, vomiting, or hematemesis; complains constipation.   GENITOURINARY: No dysuria, frequency, hematuria, or incontinence  NEUROLOGICAL: No headaches, memory loss, loss of strength, numbness, or tremors    ALLERGIES:  ciprofloxacin (Urticaria; Other)    MEDICATIONS:  STANDING MEDICATIONS  budesonide 160 MICROgram(s)/formoterol 4.5 MICROgram(s) Inhaler 2 Puff(s) Inhalation two times a day  buPROPion XL . 300 milliGRAM(s) Oral daily  cefTRIAXone   IVPB 2000 milliGRAM(s) IV Intermittent every 24 hours  clonazePAM  Tablet 1 milliGRAM(s) Oral two times a day  lactated ringers. 1000 milliLiter(s) IV Continuous <Continuous>  levothyroxine 125 MICROGram(s) Oral daily  metroNIDAZOLE  IVPB 500 milliGRAM(s) IV Intermittent every 8 hours  montelukast 10 milliGRAM(s) Oral daily  pantoprazole    Tablet 40 milliGRAM(s) Oral before breakfast  tiotropium 18 MICROgram(s) Capsule 1 Capsule(s) Inhalation daily    PRN MEDICATIONS  acetaminophen   Tablet .. 650 milliGRAM(s) Oral every 6 hours PRN  ALBUTerol    90 MICROgram(s) HFA Inhaler 2 Puff(s) Inhalation every 6 hours PRN  oxyCODONE    IR 5 milliGRAM(s) Oral every 6 hours PRN  zolpidem 5 milliGRAM(s) Oral at bedtime PRN    VITALS:   T(F): 98.9  HR: 92  BP: 123/57  RR: 18  SpO2: 96%    PHYSICAL EXAM:  General: No acute distress.  Alert, oriented, interactive, nonfocal.    HEENT: Pupils equal, reactive to light symmetrically.    PULM: Clear to auscultation bilaterally, no significant sputum production.    CVS: Regular rate and rhythm, no murmurs, rubs, or gallops.    GI: Soft, distended, tender no masses. no rebound no guarding     MSK: No edema, nontender.    SKIN: Warm and well perfused, no rashes noted.    PSYCH:     NEURO:    LABS:                        11.0   8.74  )-----------( 293      ( 02 Oct 2020 05:15 )             36.3     10    140  |  103  |  18  ----------------------------<  119<H>  4.4   |  25  |  1.0    Ca    9.1      02 Oct 2020 05:15  Mg     1.6     10-    TPro  5.9<L>  /  Alb  3.6  /  TBili  <0.2  /  DBili  x   /  AST  18  /  ALT  19  /  AlkPhos  88  10-02    PT/INR - ( 01 Oct 2020 18:55 )   PT: 11.60 sec;   INR: 1.01 ratio         PTT - ( 01 Oct 2020 18:55 )  PTT:30.0 sec  Urinalysis Basic - ( 01 Oct 2020 20:50 )    Color: Ingrid / Appearance: Clear / S.031 / pH: x  Gluc: x / Ketone: Trace  / Bili: Negative / Urobili: 3 mg/dL   Blood: x / Protein: 30 mg/dL / Nitrite: Negative   Leuk Esterase: Negative / RBC: 3 /HPF / WBC 1 /HPF   Sq Epi: x / Non Sq Epi: 12 /HPF / Bacteria: Negative        Lactate, Blood: 1.0 mmol/L (10-02-20 @ 05:15)  Troponin T, Serum: <0.01 ng/mL (10-01-20 @ 20:10)  Lactate, Blood: 1.3 mmol/L (10-01-20 @ 18:55)      CARDIAC MARKERS ( 01 Oct 2020 20:10 )  x     / <0.01 ng/mL / x     / x     / x          RADIOLOGY:                       LENGTH OF HOSPITAL STAY: 1d      CHIEF COMPLAINT:   Patient is a 54y old  Female who presents with a chief complaint of abdominal pain and bloody diarrhea (01 Oct 2020 22:46)      OVER Past 24hrs:  The patient was seen and examined at bedside there were no acute events  during the night.         PAST MEDICAL & SURGICAL HISTORY  PAST MEDICAL & SURGICAL HISTORY:  Anxiety    Hemorrhoids    Left ear hearing loss    Cervical disc disease  sequelae of radtion for nasopharyngeal cancer    Hypothyroid    Nasopharyngeal cancer    Pulmonary nodules/lesions, multiple    Diverticulitis    HTN (hypertension)    COPD (chronic obstructive pulmonary disease)    History of cholecystectomy          REVIEW OF SYSTEMS  CONSTITUTIONAL: No fever, weight loss, or fatigue  EYES: No eye pain, visual disturbances, or discharge  RESPIRATORY: No cough, wheezing, chills or hemoptysis; No shortness of breath  CARDIOVASCULAR: No chest pain, palpitations, dizziness, or leg swelling  GASTROINTESTINAL: complains of lower abdominal  pain. No nausea, vomiting, or hematemesis; complains constipation.   GENITOURINARY: No dysuria, frequency, hematuria, or incontinence  NEUROLOGICAL: No headaches, memory loss, loss of strength, numbness, or tremors    ALLERGIES:  ciprofloxacin (Urticaria; Other)    MEDICATIONS:  STANDING MEDICATIONS  budesonide 160 MICROgram(s)/formoterol 4.5 MICROgram(s) Inhaler 2 Puff(s) Inhalation two times a day  buPROPion XL . 300 milliGRAM(s) Oral daily  cefTRIAXone   IVPB 2000 milliGRAM(s) IV Intermittent every 24 hours  clonazePAM  Tablet 1 milliGRAM(s) Oral two times a day  lactated ringers. 1000 milliLiter(s) IV Continuous <Continuous>  levothyroxine 125 MICROGram(s) Oral daily  metroNIDAZOLE  IVPB 500 milliGRAM(s) IV Intermittent every 8 hours  montelukast 10 milliGRAM(s) Oral daily  pantoprazole    Tablet 40 milliGRAM(s) Oral before breakfast  tiotropium 18 MICROgram(s) Capsule 1 Capsule(s) Inhalation daily    PRN MEDICATIONS  acetaminophen   Tablet .. 650 milliGRAM(s) Oral every 6 hours PRN  ALBUTerol    90 MICROgram(s) HFA Inhaler 2 Puff(s) Inhalation every 6 hours PRN  oxyCODONE    IR 5 milliGRAM(s) Oral every 6 hours PRN  zolpidem 5 milliGRAM(s) Oral at bedtime PRN    VITALS:   T(F): 98.9  HR: 92  BP: 123/57  RR: 18  SpO2: 96%    PHYSICAL EXAM:  General: No acute distress.  Alert, oriented, interactive, nonfocal. Female  Obese     HEENT: Pupils equal, reactive to light symmetrically.    PULM: Clear to auscultation bilaterally, no significant sputum production.    CVS: Regular rate and rhythm, no murmurs, rubs, or gallops.    GI: Soft, distended, tender in lower quadrants no masses. no rebound no guarding,     MSK: No edema, nontender.    SKIN: Warm and well perfused, no rashes noted.    PSYCH: AAOX3    NEURO: Non focal     LABS:                        11.0   8.74  )-----------( 293      ( 02 Oct 2020 05:15 )             36.3     10-    140  |  103  |  18  ----------------------------<  119<H>  4.4   |  25  |  1.0    Ca    9.1      02 Oct 2020 05:15  Mg     1.6     10-    TPro  5.9<L>  /  Alb  3.6  /  TBili  <0.2  /  DBili  x   /  AST  18  /  ALT  19  /  AlkPhos  88  10-02    PT/INR - ( 01 Oct 2020 18:55 )   PT: 11.60 sec;   INR: 1.01 ratio         PTT - ( 01 Oct 2020 18:55 )  PTT:30.0 sec  Urinalysis Basic - ( 01 Oct 2020 20:50 )    Color: Ingrid / Appearance: Clear / S.031 / pH: x  Gluc: x / Ketone: Trace  / Bili: Negative / Urobili: 3 mg/dL   Blood: x / Protein: 30 mg/dL / Nitrite: Negative   Leuk Esterase: Negative / RBC: 3 /HPF / WBC 1 /HPF   Sq Epi: x / Non Sq Epi: 12 /HPF / Bacteria: Negative        Lactate, Blood: 1.0 mmol/L (10-02-20 @ 05:15)  Troponin T, Serum: <0.01 ng/mL (10-01-20 @ 20:10)  Lactate, Blood: 1.3 mmol/L (10-01-20 @ 18:55)      CARDIAC MARKERS ( 01 Oct 2020 20:10 )  x     / <0.01 ng/mL / x     / x     / x          RADIOLOGY:    < from: CT Abdomen and Pelvis w/ Oral Cont and w/ IV Cont (10.01.20 @ 21:09) >  TECHNIQUE: Contiguous axial CT images were obtained from the lower chest to the pubic symphysis following administration of 100cc Optiray 320 intravenous contrast.  Oral contrast was not administered.  Reformatted images in the coronal and sagittal planes were acquired.    Comparison made with CT abdomen and pelvis 2020.    FINDINGS:    LOWER CHEST: Left basilar subsegmental atelectasis.    HEPATOBILIARY: Hepatic steatosis. Post cholecystectomy. No significant biliary dilation.    SPLEEN: Unremarkable.    PANCREAS: Unremarkable.    ADRENAL GLANDS: Unchanged 1.3 cm right adrenal nodule.    KIDNEYS: No hydronephrosis or renal calculus. Right lower renal pole cyst..    ABDOMINOPELVIC NODES: Unremarkable.    PELVIC ORGANS: Unremarkable.    PERITONEUM/MESENTERY/BOWEL: Sigmoid diverticulosis with area of perisigmoid mild inflammatory stranding; no abscess or gross free air.    BONES/SOFT TISSUES: Degenerative change, lumbar spine.    OTHER: Vascular calcifications.      IMPRESSION:  1. Mild acute sigmoid diverticulitis; no abscess.  2. Hepatic steatosis.    < end of copied text >

## 2020-10-03 LAB
ALBUMIN SERPL ELPH-MCNC: 3.6 G/DL — SIGNIFICANT CHANGE UP (ref 3.5–5.2)
ALP SERPL-CCNC: 87 U/L — SIGNIFICANT CHANGE UP (ref 30–115)
ALT FLD-CCNC: 18 U/L — SIGNIFICANT CHANGE UP (ref 0–41)
ANION GAP SERPL CALC-SCNC: 9 MMOL/L — SIGNIFICANT CHANGE UP (ref 7–14)
AST SERPL-CCNC: 19 U/L — SIGNIFICANT CHANGE UP (ref 0–41)
BASOPHILS # BLD AUTO: 0.04 K/UL — SIGNIFICANT CHANGE UP (ref 0–0.2)
BASOPHILS NFR BLD AUTO: 0.7 % — SIGNIFICANT CHANGE UP (ref 0–1)
BILIRUB SERPL-MCNC: <0.2 MG/DL — SIGNIFICANT CHANGE UP (ref 0.2–1.2)
BUN SERPL-MCNC: 13 MG/DL — SIGNIFICANT CHANGE UP (ref 10–20)
CALCIUM SERPL-MCNC: 8.5 MG/DL — SIGNIFICANT CHANGE UP (ref 8.5–10.1)
CHLORIDE SERPL-SCNC: 102 MMOL/L — SIGNIFICANT CHANGE UP (ref 98–110)
CO2 SERPL-SCNC: 29 MMOL/L — SIGNIFICANT CHANGE UP (ref 17–32)
CREAT SERPL-MCNC: 0.9 MG/DL — SIGNIFICANT CHANGE UP (ref 0.7–1.5)
EOSINOPHIL # BLD AUTO: 0.25 K/UL — SIGNIFICANT CHANGE UP (ref 0–0.7)
EOSINOPHIL NFR BLD AUTO: 4.2 % — SIGNIFICANT CHANGE UP (ref 0–8)
GLUCOSE SERPL-MCNC: 108 MG/DL — HIGH (ref 70–99)
HCT VFR BLD CALC: 35.3 % — LOW (ref 37–47)
HGB BLD-MCNC: 10.7 G/DL — LOW (ref 12–16)
IMM GRANULOCYTES NFR BLD AUTO: 0.5 % — HIGH (ref 0.1–0.3)
LYMPHOCYTES # BLD AUTO: 1.24 K/UL — SIGNIFICANT CHANGE UP (ref 1.2–3.4)
LYMPHOCYTES # BLD AUTO: 20.7 % — SIGNIFICANT CHANGE UP (ref 20.5–51.1)
MAGNESIUM SERPL-MCNC: 2.1 MG/DL — SIGNIFICANT CHANGE UP (ref 1.8–2.4)
MCHC RBC-ENTMCNC: 26 PG — LOW (ref 27–31)
MCHC RBC-ENTMCNC: 30.3 G/DL — LOW (ref 32–37)
MCV RBC AUTO: 85.9 FL — SIGNIFICANT CHANGE UP (ref 81–99)
MONOCYTES # BLD AUTO: 0.4 K/UL — SIGNIFICANT CHANGE UP (ref 0.1–0.6)
MONOCYTES NFR BLD AUTO: 6.7 % — SIGNIFICANT CHANGE UP (ref 1.7–9.3)
NEUTROPHILS # BLD AUTO: 4.03 K/UL — SIGNIFICANT CHANGE UP (ref 1.4–6.5)
NEUTROPHILS NFR BLD AUTO: 67.2 % — SIGNIFICANT CHANGE UP (ref 42.2–75.2)
NRBC # BLD: 0 /100 WBCS — SIGNIFICANT CHANGE UP (ref 0–0)
PLATELET # BLD AUTO: 275 K/UL — SIGNIFICANT CHANGE UP (ref 130–400)
POTASSIUM SERPL-MCNC: 4.3 MMOL/L — SIGNIFICANT CHANGE UP (ref 3.5–5)
POTASSIUM SERPL-SCNC: 4.3 MMOL/L — SIGNIFICANT CHANGE UP (ref 3.5–5)
PROT SERPL-MCNC: 6 G/DL — SIGNIFICANT CHANGE UP (ref 6–8)
RBC # BLD: 4.11 M/UL — LOW (ref 4.2–5.4)
RBC # FLD: 18.2 % — HIGH (ref 11.5–14.5)
SODIUM SERPL-SCNC: 140 MMOL/L — SIGNIFICANT CHANGE UP (ref 135–146)
WBC # BLD: 5.99 K/UL — SIGNIFICANT CHANGE UP (ref 4.8–10.8)
WBC # FLD AUTO: 5.99 K/UL — SIGNIFICANT CHANGE UP (ref 4.8–10.8)

## 2020-10-03 PROCEDURE — 99233 SBSQ HOSP IP/OBS HIGH 50: CPT

## 2020-10-03 RX ORDER — ONDANSETRON 8 MG/1
4 TABLET, FILM COATED ORAL ONCE
Refills: 0 | Status: COMPLETED | OUTPATIENT
Start: 2020-10-03 | End: 2020-10-03

## 2020-10-03 RX ORDER — ENOXAPARIN SODIUM 100 MG/ML
40 INJECTION SUBCUTANEOUS DAILY
Refills: 0 | Status: DISCONTINUED | OUTPATIENT
Start: 2020-10-03 | End: 2020-10-04

## 2020-10-03 RX ORDER — ONDANSETRON 8 MG/1
4 TABLET, FILM COATED ORAL AT BEDTIME
Refills: 0 | Status: DISCONTINUED | OUTPATIENT
Start: 2020-10-03 | End: 2020-10-04

## 2020-10-03 RX ORDER — KETOROLAC TROMETHAMINE 30 MG/ML
30 SYRINGE (ML) INJECTION ONCE
Refills: 0 | Status: DISCONTINUED | OUTPATIENT
Start: 2020-10-03 | End: 2020-10-03

## 2020-10-03 RX ORDER — KETOROLAC TROMETHAMINE 30 MG/ML
30 SYRINGE (ML) INJECTION EVERY 6 HOURS
Refills: 0 | Status: DISCONTINUED | OUTPATIENT
Start: 2020-10-03 | End: 2020-10-04

## 2020-10-03 RX ORDER — TRAMADOL HYDROCHLORIDE 50 MG/1
25 TABLET ORAL ONCE
Refills: 0 | Status: DISCONTINUED | OUTPATIENT
Start: 2020-10-03 | End: 2020-10-03

## 2020-10-03 RX ORDER — ACETAMINOPHEN 500 MG
650 TABLET ORAL EVERY 6 HOURS
Refills: 0 | Status: DISCONTINUED | OUTPATIENT
Start: 2020-10-03 | End: 2020-10-04

## 2020-10-03 RX ADMIN — Medication 650 MILLIGRAM(S): at 17:16

## 2020-10-03 RX ADMIN — Medication 30 MILLIGRAM(S): at 11:01

## 2020-10-03 RX ADMIN — BUDESONIDE AND FORMOTEROL FUMARATE DIHYDRATE 2 PUFF(S): 160; 4.5 AEROSOL RESPIRATORY (INHALATION) at 12:21

## 2020-10-03 RX ADMIN — SODIUM CHLORIDE 50 MILLILITER(S): 9 INJECTION, SOLUTION INTRAVENOUS at 15:34

## 2020-10-03 RX ADMIN — Medication 1 MILLIGRAM(S): at 05:02

## 2020-10-03 RX ADMIN — Medication 650 MILLIGRAM(S): at 18:25

## 2020-10-03 RX ADMIN — HEPARIN SODIUM 5000 UNIT(S): 5000 INJECTION INTRAVENOUS; SUBCUTANEOUS at 05:02

## 2020-10-03 RX ADMIN — BUPROPION HYDROCHLORIDE 300 MILLIGRAM(S): 150 TABLET, EXTENDED RELEASE ORAL at 12:20

## 2020-10-03 RX ADMIN — Medication 100 MILLIGRAM(S): at 21:13

## 2020-10-03 RX ADMIN — ZOLPIDEM TARTRATE 5 MILLIGRAM(S): 10 TABLET ORAL at 22:26

## 2020-10-03 RX ADMIN — Medication 1 MILLIGRAM(S): at 17:16

## 2020-10-03 RX ADMIN — OXYCODONE HYDROCHLORIDE 5 MILLIGRAM(S): 5 TABLET ORAL at 03:08

## 2020-10-03 RX ADMIN — Medication 100 MILLIGRAM(S): at 13:09

## 2020-10-03 RX ADMIN — Medication 30 MILLIGRAM(S): at 23:56

## 2020-10-03 RX ADMIN — CEFTRIAXONE 100 MILLIGRAM(S): 500 INJECTION, POWDER, FOR SOLUTION INTRAMUSCULAR; INTRAVENOUS at 21:13

## 2020-10-03 RX ADMIN — Medication 100 MILLIGRAM(S): at 05:03

## 2020-10-03 RX ADMIN — ENOXAPARIN SODIUM 40 MILLIGRAM(S): 100 INJECTION SUBCUTANEOUS at 13:55

## 2020-10-03 RX ADMIN — OXYCODONE HYDROCHLORIDE 5 MILLIGRAM(S): 5 TABLET ORAL at 13:55

## 2020-10-03 RX ADMIN — Medication 650 MILLIGRAM(S): at 23:53

## 2020-10-03 RX ADMIN — ALBUTEROL 2 PUFF(S): 90 AEROSOL, METERED ORAL at 08:00

## 2020-10-03 RX ADMIN — TIOTROPIUM BROMIDE 1 CAPSULE(S): 18 CAPSULE ORAL; RESPIRATORY (INHALATION) at 08:01

## 2020-10-03 RX ADMIN — OXYCODONE HYDROCHLORIDE 5 MILLIGRAM(S): 5 TABLET ORAL at 14:25

## 2020-10-03 RX ADMIN — BUDESONIDE AND FORMOTEROL FUMARATE DIHYDRATE 2 PUFF(S): 160; 4.5 AEROSOL RESPIRATORY (INHALATION) at 21:12

## 2020-10-03 RX ADMIN — PANTOPRAZOLE SODIUM 40 MILLIGRAM(S): 20 TABLET, DELAYED RELEASE ORAL at 05:02

## 2020-10-03 RX ADMIN — TRAMADOL HYDROCHLORIDE 25 MILLIGRAM(S): 50 TABLET ORAL at 15:51

## 2020-10-03 RX ADMIN — Medication 30 MILLIGRAM(S): at 11:31

## 2020-10-03 RX ADMIN — ONDANSETRON 4 MILLIGRAM(S): 8 TABLET, FILM COATED ORAL at 03:16

## 2020-10-03 RX ADMIN — MONTELUKAST 10 MILLIGRAM(S): 4 TABLET, CHEWABLE ORAL at 12:20

## 2020-10-03 RX ADMIN — TRAMADOL HYDROCHLORIDE 25 MILLIGRAM(S): 50 TABLET ORAL at 17:09

## 2020-10-03 RX ADMIN — ONDANSETRON 4 MILLIGRAM(S): 8 TABLET, FILM COATED ORAL at 19:27

## 2020-10-03 RX ADMIN — Medication 125 MICROGRAM(S): at 05:02

## 2020-10-03 NOTE — PROGRESS NOTE ADULT - SUBJECTIVE AND OBJECTIVE BOX
54F w/ PMH of HTN, COPD not on home O2, hypothyroidism, nasopharyngeal carcinoma s/p chemo/radiation (2013), recurrent diverticulitis admitted with diverticulitis now on IV ceftriaxone and flagyl.     PAST MEDICAL & SURGICAL HISTORY:  Anxiety    Hemorrhoids    Left ear hearing loss    Cervical disc disease  sequelae of radtion for nasopharyngeal cancer    Hypothyroid    Nasopharyngeal cancer    Pulmonary nodules/lesions, multiple    Diverticulitis    HTN (hypertension)    COPD (chronic obstructive pulmonary disease)    History of cholecystectomy        Events of the Last 24h:  Vital Signs Last 24 Hrs  T(C): 36.9 (02 Oct 2020 18:31), Max: 37.3 (02 Oct 2020 16:12)  T(F): 98.4 (02 Oct 2020 18:31), Max: 99.1 (02 Oct 2020 16:12)  HR: 81 (02 Oct 2020 18:31) (81 - 92)  BP: 132/63 (02 Oct 2020 18:31) (120/61 - 132/63)  BP(mean): --  RR: 17 (02 Oct 2020 18:31) (17 - 18)  SpO2: 98% (02 Oct 2020 18:31) (96% - 98%)        Diet, NPO:   Except Medications (10-01-20 @ 22:43)      I&O's Summary   I&O's Detail      MEDICATIONS  (STANDING):  budesonide 160 MICROgram(s)/formoterol 4.5 MICROgram(s) Inhaler 2 Puff(s) Inhalation two times a day  buPROPion XL . 300 milliGRAM(s) Oral daily  cefTRIAXone   IVPB 2000 milliGRAM(s) IV Intermittent every 24 hours  clonazePAM  Tablet 1 milliGRAM(s) Oral two times a day  heparin SubCutaneous Injection - Peds 5000 Unit(s) SubCutaneous every 12 hours  lactated ringers. 1000 milliLiter(s) (50 mL/Hr) IV Continuous <Continuous>  levothyroxine 125 MICROGram(s) Oral daily  metroNIDAZOLE  IVPB 500 milliGRAM(s) IV Intermittent every 8 hours  montelukast 10 milliGRAM(s) Oral daily  pantoprazole    Tablet 40 milliGRAM(s) Oral before breakfast  tiotropium 18 MICROgram(s) Capsule 1 Capsule(s) Inhalation daily    MEDICATIONS  (PRN):  acetaminophen   Tablet .. 650 milliGRAM(s) Oral every 6 hours PRN Mild Pain (1 - 3)  ALBUTerol    90 MICROgram(s) HFA Inhaler 2 Puff(s) Inhalation every 6 hours PRN Shortness of Breath and/or Wheezing  ondansetron    Tablet 4 milliGRAM(s) Oral at bedtime PRN Nausea and/or Vomiting  oxyCODONE    IR 5 milliGRAM(s) Oral every 6 hours PRN Moderate Pain (4 - 6)  zolpidem 5 milliGRAM(s) Oral at bedtime PRN Insomnia      PHYSICAL EXAM:  GENERAL: NAD, well-appearing  CHEST/LUNG: Clear to auscultation bilaterally  HEART: Regular rate and rhythm  ABDOMEN: Soft, obese, Nondistended; TTP in LLQ/suprapubic area  EXTREMITIES: No clubbing, cyanosis, or edema                            11.0   8.74  )-----------( 293      ( 02 Oct 2020 05:15 )             36.3        CBC Full  -  ( 02 Oct 2020 05:15 )  WBC Count : 8.74 K/uL  RBC Count : 4.31 M/uL  Hemoglobin : 11.0 g/dL  Hematocrit : 36.3 %  Platelet Count - Automated : 293 K/uL  Mean Cell Volume : 84.2 fL  Mean Cell Hemoglobin : 25.5 pg  Mean Cell Hemoglobin Concentration : 30.3 g/dL  Auto Neutrophil # : 6.49 K/uL  Auto Lymphocyte # : 1.31 K/uL  Auto Monocyte # : 0.62 K/uL  Auto Eosinophil # : 0.27 K/uL  Auto Basophil # : 0.02 K/uL  Auto Neutrophil % : 74.3 %  Auto Lymphocyte % : 15.0 %  Auto Monocyte % : 7.1 %  Auto Eosinophil % : 3.1 %  Auto Basophil % : 0.2 %               140   |  103   |  18                 Ca: 9.1    BMP:   ----------------------------< 119    M.6   (10-02-20 @ 05:15)             4.4    |  25    | 1.0                Ph: x        LFT:     TPro: 5.9 / Alb: 3.6 / TBili: <0.2 / DBili: x / AST: 18 / ALT: 19 / AlkPhos: 88   (10-02-20 @ 05:15)    LIVER FUNCTIONS - ( 02 Oct 2020 05:15 )  Alb: 3.6 g/dL / Pro: 5.9 g/dL / ALK PHOS: 88 U/L / ALT: 19 U/L / AST: 18 U/L / GGT: x           PT/INR - ( 01 Oct 2020 18:55 )   PT: 11.60 sec;   INR: 1.01 ratio         PTT - ( 01 Oct 2020 18:55 )  PTT:30.0 sec  CARDIAC MARKERS ( 01 Oct 2020 20:10 )  x     / <0.01 ng/mL / x     / x     / x          Urinalysis Basic - ( 01 Oct 2020 20:50 )    Color: Ingrid / Appearance: Clear / S.031 / pH: x  Gluc: x / Ketone: Trace  / Bili: Negative / Urobili: 3 mg/dL   Blood: x / Protein: 30 mg/dL / Nitrite: Negative   Leuk Esterase: Negative / RBC: 3 /HPF / WBC 1 /HPF   Sq Epi: x / Non Sq Epi: 12 /HPF / Bacteria: Negative          IMAGING:    PATHOLOGY:      SPECTRA:

## 2020-10-03 NOTE — PROGRESS NOTE ADULT - SUBJECTIVE AND OBJECTIVE BOX
pt seen and examined.     still has abd pain , mostly on the left lower quadrant. no radiation. no n/v     Vital Signs Last 24 Hrs  T(C): 36 (03 Oct 2020 04:40), Max: 37.3 (02 Oct 2020 16:12)  T(F): 96.8 (03 Oct 2020 04:40), Max: 99.1 (02 Oct 2020 16:12)  HR: 87 (03 Oct 2020 04:40) (81 - 87)  BP: 100/50 (03 Oct 2020 04:40) (100/50 - 132/63)  BP(mean): --  RR: 18 (03 Oct 2020 04:40) (17 - 18)  SpO2: 98% (02 Oct 2020 18:31) (98% - 98%)    Physical exam:   constitutional NAD, AAOX3, Respiratory  lungs CTA, CVS heart RRR, GI: abdomen obese, LLQ tenderness, BS+, skin: intact  neuro exam non focal. morbid obesity,                           10.7   5.99  )-----------( 275      ( 03 Oct 2020 06:27 )             35.3     10-03    140  |  102  |  13  ----------------------------<  108<H>  4.3   |  29  |  0.9    Ca    8.5      03 Oct 2020 06:27  Mg     2.1     10-03    TPro  6.0  /  Alb  3.6  /  TBili  <0.2  /  DBili  x   /  AST  19  /  ALT  18  /  AlkPhos  87  10-03    < from: CT Abdomen and Pelvis w/ Oral Cont and w/ IV Cont (10.01.20 @ 21:09) >  1. Mild acute sigmoid diverticulitis; no abscess.  2. Hepatic steatosis.    < end of copied text >    a/p    # recurrent diverticultitis, needs surgical resection ( non urgent) , cont abx, start clear liquids today.  #obesity and hepatic steatosis, outpt fu   # PAST MEDICAL & SURGICAL HISTORY: out pt fu   Anxiety  Hemorrhoids  Left ear hearing loss  Cervical disc disease  sequelae of radtion for nasopharyngeal cancer  Hypothyroid  Nasopharyngeal cancer  Pulmonary nodules/lesions, multiple  Diverticulitis  HTN (hypertension)  COPD (chronic obstructive pulmonary disease)  History of cholecystectomy    full code  poss dc in 24-48 hrs

## 2020-10-03 NOTE — PROGRESS NOTE ADULT - ASSESSMENT
54F w/ PMH of HTN, COPD not on home O2, hypothyroidism, nasopharyngeal carcinoma s/p chemo/radiation (2013), recurrent diverticulitis admitted with diverticulitis now on IV ceftriaxone and flagyl.     PLAN:  - no acute surgical intervention  - recommend NPO, IVF, IV antibiotics  - advance diet as tolerated when pain resolves  - surgery to follow

## 2020-10-04 ENCOUNTER — TRANSCRIPTION ENCOUNTER (OUTPATIENT)
Age: 55
End: 2020-10-04

## 2020-10-04 VITALS
DIASTOLIC BLOOD PRESSURE: 75 MMHG | RESPIRATION RATE: 18 BRPM | SYSTOLIC BLOOD PRESSURE: 157 MMHG | HEART RATE: 82 BPM | TEMPERATURE: 97 F

## 2020-10-04 LAB
-  AMIKACIN: SIGNIFICANT CHANGE UP
-  AMOXICILLIN/CLAVULANIC ACID: SIGNIFICANT CHANGE UP
-  AMPICILLIN/SULBACTAM: SIGNIFICANT CHANGE UP
-  AMPICILLIN: SIGNIFICANT CHANGE UP
-  AZTREONAM: SIGNIFICANT CHANGE UP
-  CEFAZOLIN: SIGNIFICANT CHANGE UP
-  CEFEPIME: SIGNIFICANT CHANGE UP
-  CEFOXITIN: SIGNIFICANT CHANGE UP
-  CEFTRIAXONE: SIGNIFICANT CHANGE UP
-  CIPROFLOXACIN: SIGNIFICANT CHANGE UP
-  ERTAPENEM: SIGNIFICANT CHANGE UP
-  GENTAMICIN: SIGNIFICANT CHANGE UP
-  IMIPENEM: SIGNIFICANT CHANGE UP
-  LEVOFLOXACIN: SIGNIFICANT CHANGE UP
-  MEROPENEM: SIGNIFICANT CHANGE UP
-  NITROFURANTOIN: SIGNIFICANT CHANGE UP
-  PIPERACILLIN/TAZOBACTAM: SIGNIFICANT CHANGE UP
-  TIGECYCLINE: SIGNIFICANT CHANGE UP
-  TOBRAMYCIN: SIGNIFICANT CHANGE UP
-  TRIMETHOPRIM/SULFAMETHOXAZOLE: SIGNIFICANT CHANGE UP
ALBUMIN SERPL ELPH-MCNC: 3.6 G/DL — SIGNIFICANT CHANGE UP (ref 3.5–5.2)
ALP SERPL-CCNC: 81 U/L — SIGNIFICANT CHANGE UP (ref 30–115)
ALT FLD-CCNC: 19 U/L — SIGNIFICANT CHANGE UP (ref 0–41)
ANION GAP SERPL CALC-SCNC: 12 MMOL/L — SIGNIFICANT CHANGE UP (ref 7–14)
AST SERPL-CCNC: 22 U/L — SIGNIFICANT CHANGE UP (ref 0–41)
BASOPHILS # BLD AUTO: 0.02 K/UL — SIGNIFICANT CHANGE UP (ref 0–0.2)
BASOPHILS NFR BLD AUTO: 0.3 % — SIGNIFICANT CHANGE UP (ref 0–1)
BILIRUB SERPL-MCNC: <0.2 MG/DL — SIGNIFICANT CHANGE UP (ref 0.2–1.2)
BUN SERPL-MCNC: 12 MG/DL — SIGNIFICANT CHANGE UP (ref 10–20)
CALCIUM SERPL-MCNC: 8.4 MG/DL — LOW (ref 8.5–10.1)
CHLORIDE SERPL-SCNC: 103 MMOL/L — SIGNIFICANT CHANGE UP (ref 98–110)
CO2 SERPL-SCNC: 26 MMOL/L — SIGNIFICANT CHANGE UP (ref 17–32)
CREAT SERPL-MCNC: 1 MG/DL — SIGNIFICANT CHANGE UP (ref 0.7–1.5)
CULTURE RESULTS: SIGNIFICANT CHANGE UP
EOSINOPHIL # BLD AUTO: 0.26 K/UL — SIGNIFICANT CHANGE UP (ref 0–0.7)
EOSINOPHIL NFR BLD AUTO: 4.2 % — SIGNIFICANT CHANGE UP (ref 0–8)
GLUCOSE SERPL-MCNC: 107 MG/DL — HIGH (ref 70–99)
HCT VFR BLD CALC: 36.2 % — LOW (ref 37–47)
HGB BLD-MCNC: 10.9 G/DL — LOW (ref 12–16)
IMM GRANULOCYTES NFR BLD AUTO: 0.3 % — SIGNIFICANT CHANGE UP (ref 0.1–0.3)
LYMPHOCYTES # BLD AUTO: 0.86 K/UL — LOW (ref 1.2–3.4)
LYMPHOCYTES # BLD AUTO: 13.8 % — LOW (ref 20.5–51.1)
MCHC RBC-ENTMCNC: 25.5 PG — LOW (ref 27–31)
MCHC RBC-ENTMCNC: 30.1 G/DL — LOW (ref 32–37)
MCV RBC AUTO: 84.6 FL — SIGNIFICANT CHANGE UP (ref 81–99)
METHOD TYPE: SIGNIFICANT CHANGE UP
MONOCYTES # BLD AUTO: 0.36 K/UL — SIGNIFICANT CHANGE UP (ref 0.1–0.6)
MONOCYTES NFR BLD AUTO: 5.8 % — SIGNIFICANT CHANGE UP (ref 1.7–9.3)
NEUTROPHILS # BLD AUTO: 4.71 K/UL — SIGNIFICANT CHANGE UP (ref 1.4–6.5)
NEUTROPHILS NFR BLD AUTO: 75.6 % — HIGH (ref 42.2–75.2)
NRBC # BLD: 0 /100 WBCS — SIGNIFICANT CHANGE UP (ref 0–0)
ORGANISM # SPEC MICROSCOPIC CNT: SIGNIFICANT CHANGE UP
ORGANISM # SPEC MICROSCOPIC CNT: SIGNIFICANT CHANGE UP
PLATELET # BLD AUTO: 303 K/UL — SIGNIFICANT CHANGE UP (ref 130–400)
POTASSIUM SERPL-MCNC: 4.2 MMOL/L — SIGNIFICANT CHANGE UP (ref 3.5–5)
POTASSIUM SERPL-SCNC: 4.2 MMOL/L — SIGNIFICANT CHANGE UP (ref 3.5–5)
PROT SERPL-MCNC: 6.1 G/DL — SIGNIFICANT CHANGE UP (ref 6–8)
RBC # BLD: 4.28 M/UL — SIGNIFICANT CHANGE UP (ref 4.2–5.4)
RBC # FLD: 17.9 % — HIGH (ref 11.5–14.5)
SODIUM SERPL-SCNC: 141 MMOL/L — SIGNIFICANT CHANGE UP (ref 135–146)
SPECIMEN SOURCE: SIGNIFICANT CHANGE UP
WBC # BLD: 6.23 K/UL — SIGNIFICANT CHANGE UP (ref 4.8–10.8)
WBC # FLD AUTO: 6.23 K/UL — SIGNIFICANT CHANGE UP (ref 4.8–10.8)

## 2020-10-04 PROCEDURE — 99233 SBSQ HOSP IP/OBS HIGH 50: CPT

## 2020-10-04 RX ORDER — ALBUTEROL 90 UG/1
2 AEROSOL, METERED ORAL
Qty: 0 | Refills: 0 | DISCHARGE
Start: 2020-10-04

## 2020-10-04 RX ORDER — METRONIDAZOLE 500 MG
1 TABLET ORAL
Qty: 36 | Refills: 0
Start: 2020-10-04 | End: 2020-10-15

## 2020-10-04 RX ORDER — ALBUTEROL 90 UG/1
0 AEROSOL, METERED ORAL
Qty: 18 | Refills: 0 | DISCHARGE

## 2020-10-04 RX ORDER — CEPHALEXIN 500 MG
1 CAPSULE ORAL
Qty: 24 | Refills: 0
Start: 2020-10-04 | End: 2020-10-15

## 2020-10-04 RX ORDER — SACCHAROMYCES BOULARDII 250 MG
1 POWDER IN PACKET (EA) ORAL
Qty: 45 | Refills: 0
Start: 2020-10-04 | End: 2020-10-18

## 2020-10-04 RX ADMIN — Medication 650 MILLIGRAM(S): at 05:25

## 2020-10-04 RX ADMIN — Medication 10 MILLIGRAM(S): at 16:47

## 2020-10-04 RX ADMIN — PANTOPRAZOLE SODIUM 40 MILLIGRAM(S): 20 TABLET, DELAYED RELEASE ORAL at 07:33

## 2020-10-04 RX ADMIN — Medication 100 MILLIGRAM(S): at 14:53

## 2020-10-04 RX ADMIN — Medication 1 MILLIGRAM(S): at 05:24

## 2020-10-04 RX ADMIN — BUDESONIDE AND FORMOTEROL FUMARATE DIHYDRATE 2 PUFF(S): 160; 4.5 AEROSOL RESPIRATORY (INHALATION) at 07:34

## 2020-10-04 RX ADMIN — Medication 125 MICROGRAM(S): at 05:25

## 2020-10-04 RX ADMIN — ENOXAPARIN SODIUM 40 MILLIGRAM(S): 100 INJECTION SUBCUTANEOUS at 11:17

## 2020-10-04 RX ADMIN — TIOTROPIUM BROMIDE 1 CAPSULE(S): 18 CAPSULE ORAL; RESPIRATORY (INHALATION) at 07:35

## 2020-10-04 RX ADMIN — Medication 650 MILLIGRAM(S): at 17:04

## 2020-10-04 RX ADMIN — Medication 1 MILLIGRAM(S): at 17:06

## 2020-10-04 RX ADMIN — Medication 650 MILLIGRAM(S): at 18:38

## 2020-10-04 RX ADMIN — BUPROPION HYDROCHLORIDE 300 MILLIGRAM(S): 150 TABLET, EXTENDED RELEASE ORAL at 11:17

## 2020-10-04 RX ADMIN — Medication 650 MILLIGRAM(S): at 11:16

## 2020-10-04 RX ADMIN — MONTELUKAST 10 MILLIGRAM(S): 4 TABLET, CHEWABLE ORAL at 11:16

## 2020-10-04 RX ADMIN — Medication 100 MILLIGRAM(S): at 05:25

## 2020-10-04 NOTE — PROGRESS NOTE ADULT - SUBJECTIVE AND OBJECTIVE BOX
54y old  Female who presents with a chief complaint of abdominal pain and bloody diarrhea, pt has h/o recurrent diverticulitis.   She was adviced to follow up with general surgery to schedule elective colectomy but developed worsening abdominal pain and was admitted for acute diverticulitis, clinically improved after IV Abx, able to tolerate clear liquid diet.   Today pt denies abdominal pain, asking for solid food and discharge.         PAST MEDICAL & SURGICAL HISTORY  PAST MEDICAL & SURGICAL HISTORY:  Anxiety    Hemorrhoids    Left ear hearing loss    Cervical disc disease  sequelae of radtion for nasopharyngeal cancer    Hypothyroid    Nasopharyngeal cancer    Pulmonary nodules/lesions, multiple    Diverticulitis    HTN (hypertension)    COPD (chronic obstructive pulmonary disease)    History of cholecystectomy          REVIEW OF SYSTEMS  CONSTITUTIONAL: morbidly obese, NAD   EYES: No eye pain, visual disturbances, or discharge  RESPIRATORY: No cough, wheezing, chills or hemoptysis; No shortness of breath  CARDIOVASCULAR: No chest pain, palpitations, dizziness, or leg swelling  GASTROINTESTINAL: obese, NT,ND, BS present   GENITOURINARY: No dysuria, frequency, hematuria, or incontinence  NEUROLOGICAL: AAOx3 , no focal neuro deficit     ALLERGIES:  ciprofloxacin (Urticaria; Other)    VITALS:   T(C): 36.1 (04 Oct 2020 12:18), Max: 36.3 (04 Oct 2020 05:20)  T(F): 96.9 (04 Oct 2020 12:18), Max: 97.3 (04 Oct 2020 05:20)  HR: 84 (04 Oct 2020 12:18) (71 - 84)  BP: 185/84 (04 Oct 2020 12:18) (122/60 - 187/84)  BP(mean): --  RR: 18 (04 Oct 2020 12:18) (18 - 18)  SpO2: 98% (04 Oct 2020 05:20) (97% - 98%)    PHYSICAL EXAM:  General: NAD, morbidly obese   NECK: large neck   PULM: decreased BS at bases   CVS: Regular rate and rhythm, no murmurs, rubs, or gallops.  GI: Soft, distended, slightly tender in lower quadrants no masses. no rebound no guarding,   MSK: No edema, nontender.  SKIN: Warm and well perfused, no rashes noted.  PSYCH: AAOX3  NEURO: Non focal     LABS:                                   10.9   6.23  )-----------( 303      ( 04 Oct 2020 06:55 )             36.2   10-04    141  |  103  |  12  ----------------------------<  107<H>  4.2   |  26  |  1.0    Ca    8.4<L>      04 Oct 2020 06:55  Mg     2.1     10-03    TPro  6.1  /  Alb  3.6  /  TBili  <0.2  /  DBili  x   /  AST  22  /  ALT  19  /  AlkPhos  81  10-04         PTT - ( 01 Oct 2020 18:55 )  PTT:30.0 sec  Urinalysis Basic - ( 01 Oct 2020 20:50 )    Color: Ingrid / Appearance: Clear / S.031 / pH: x  Gluc: x / Ketone: Trace  / Bili: Negative / Urobili: 3 mg/dL   Blood: x / Protein: 30 mg/dL / Nitrite: Negative   Leuk Esterase: Negative / RBC: 3 /HPF / WBC 1 /HPF   Sq Epi: x / Non Sq Epi: 12 /HPF / Bacteria: Negative        Lactate, Blood: 1.0 mmol/L (10-02-20 @ 05:15)  Troponin T, Serum: <0.01 ng/mL (10-01-20 @ 20:10)  Lactate, Blood: 1.3 mmol/L (10-01-20 @ 18:55)      CARDIAC MARKERS ( 01 Oct 2020 20:10 )  x     / <0.01 ng/mL / x     / x     / x          Culture - Urine (10.01.20 @ 20:50)   - Amikacin: S <=16   - Amoxicillin/Clavulanic Acid: R >16/8   - Ampicillin: R >16 These ampicillin results predict results for amoxicillin   - Ampicillin/Sulbactam: R >16/8 Enterobacter, Citrobacter, and Serratia may develop resistance during prolonged therapy (3-4 days)   - Aztreonam: I 16   - Cefazolin: R >16   - Cefepime: S <=2   - Cefoxitin: S <=8   - Ceftriaxone: S <=1 Enterobacter, Citrobacter, and Serratia may develop resistance during prolonged therapy   - Ciprofloxacin: S <=0.25   - Ertapenem: S <=0.5   - Gentamicin: S <=2   - Imipenem: R 4   - Levofloxacin: S <=0.5   - Meropenem: S <=1   - Nitrofurantoin: R <=32 Should not be used to treat pyelonephritis   - Piperacillin/Tazobactam: S <=8   - Tigecycline: R <=2   - Tobramycin: S <=2   - Trimethoprim/Sulfamethoxazole: S <=0.5/9.5   Specimen Source: .Urine Clean Catch (Midstream)   Culture Results:   10,000 - 49,000 CFU/mL Morganella morganii   Organism Identification: Morganella morganii   Organism: Morganella morganii     RADIOLOGY:    < from: CT Abdomen and Pelvis w/ Oral Cont and w/ IV Cont (10.01.20 @ 21:09) >  TECHNIQUE: Contiguous axial CT images were obtained from the lower chest to the pubic symphysis following administration of 100cc Optiray 320 intravenous contrast.  Oral contrast was not administered.  Reformatted images in the coronal and sagittal planes were acquired.    Comparison made with CT abdomen and pelvis 2020.    FINDINGS:    LOWER CHEST: Left basilar subsegmental atelectasis.    HEPATOBILIARY: Hepatic steatosis. Post cholecystectomy. No significant biliary dilation.    SPLEEN: Unremarkable.    PANCREAS: Unremarkable.    ADRENAL GLANDS: Unchanged 1.3 cm right adrenal nodule.    KIDNEYS: No hydronephrosis or renal calculus. Right lower renal pole cyst..    ABDOMINOPELVIC NODES: Unremarkable.    PELVIC ORGANS: Unremarkable.    PERITONEUM/MESENTERY/BOWEL: Sigmoid diverticulosis with area of perisigmoid mild inflammatory stranding; no abscess or gross free air.    BONES/SOFT TISSUES: Degenerative change, lumbar spine.    OTHER: Vascular calcifications.      IMPRESSION:  1. Mild acute sigmoid diverticulitis; no abscess.    MEDICATIONS  (STANDING):  acetaminophen   Tablet .. 650 milliGRAM(s) Oral every 6 hours  budesonide 160 MICROgram(s)/formoterol 4.5 MICROgram(s) Inhaler 2 Puff(s) Inhalation two times a day  buPROPion XL . 300 milliGRAM(s) Oral daily  cefTRIAXone   IVPB 2000 milliGRAM(s) IV Intermittent every 24 hours  clonazePAM  Tablet 1 milliGRAM(s) Oral two times a day  enoxaparin Injectable 40 milliGRAM(s) SubCutaneous daily  lactated ringers. 1000 milliLiter(s) (50 mL/Hr) IV Continuous <Continuous>  levothyroxine 125 MICROGram(s) Oral daily  metroNIDAZOLE  IVPB 500 milliGRAM(s) IV Intermittent every 8 hours  montelukast 10 milliGRAM(s) Oral daily  pantoprazole    Tablet 40 milliGRAM(s) Oral before breakfast  tiotropium 18 MICROgram(s) Capsule 1 Capsule(s) Inhalation daily    MEDICATIONS  (PRN):  ALBUTerol    90 MICROgram(s) HFA Inhaler 2 Puff(s) Inhalation every 6 hours PRN Shortness of Breath and/or Wheezing  ketorolac   Injectable 30 milliGRAM(s) IV Push every 6 hours PRN Moderate Pain (4 - 6)  ondansetron    Tablet 4 milliGRAM(s) Oral at bedtime PRN Nausea and/or Vomiting  oxyCODONE    IR 5 milliGRAM(s) Oral every 6 hours PRN Moderate Pain (4 - 6)  zolpidem 5 milliGRAM(s) Oral at bedtime PRN Insomnia  2. Hepatic steatosis.    < end of copied text >

## 2020-10-04 NOTE — PROGRESS NOTE ADULT - SUBJECTIVE AND OBJECTIVE BOX
Progress Note: General Surgery  Patient: GEOVANNA PUTNAM , 54y (1965)Female   MRN: [id number]  Location: [address]  Visit: 10-01-20 Inpatient  Date: 10-04-20 @ 04:36    Procedure/Diagnosis: CT A/P notable for sigmoid diverticulitis w/o abscess.       Events/ 24h: advanced to CLD, No acute events overnight. Pain controlled.    Vitals: T(F): 96.7 (10-03-20 @ 20:22), Max: 96.8 (10-03-20 @ 04:40)  HR: 80 (10-03-20 @ 20:22)  BP: 145/66 (10-03-20 @ 20:22) (100/50 - 187/84)  RR: 18 (10-03-20 @ 20:22)  SpO2: 97% (10-03-20 @ 15:27)    In:   10-03-20 @ 07:01  -  10-04-20 @ 04:36  --------------------------------------------------------  IN: 250 mL      Out:   10-03-20 @ 07:01  -  10-04-20 @ 04:36  --------------------------------------------------------  OUT:  Total OUT: 0 mL        Net:   10-03-20 @ 07:01  -  10-04-20 @ 04:36  --------------------------------------------------------  NET: 250 mL        Diet: Diet, Clear Liquid (10-03-20 @ 13:43)    IV Fluids: lactated ringers. 1000 milliLiter(s) (50 mL/Hr) IV Continuous <Continuous>      Physical Examination:  General Appearance: NAD  HEENT: EOMI, sclera non-icteric.  Heart: RRR   Lungs: CTABL.   Abdomen:  Soft, obese, Nondistended; TTP in LLQ/suprapubic area   MSK/Extremities: Warm & well-perfused.   Skin: Warm, dry. No jaundice.       Medications: [Standing]  acetaminophen   Tablet .. 650 milliGRAM(s) Oral every 6 hours  budesonide 160 MICROgram(s)/formoterol 4.5 MICROgram(s) Inhaler 2 Puff(s) Inhalation two times a day  buPROPion XL . 300 milliGRAM(s) Oral daily  cefTRIAXone   IVPB 2000 milliGRAM(s) IV Intermittent every 24 hours  clonazePAM  Tablet 1 milliGRAM(s) Oral two times a day  enoxaparin Injectable 40 milliGRAM(s) SubCutaneous daily  lactated ringers. 1000 milliLiter(s) (50 mL/Hr) IV Continuous <Continuous>  levothyroxine 125 MICROGram(s) Oral daily  metroNIDAZOLE  IVPB 500 milliGRAM(s) IV Intermittent every 8 hours  montelukast 10 milliGRAM(s) Oral daily  pantoprazole    Tablet 40 milliGRAM(s) Oral before breakfast  tiotropium 18 MICROgram(s) Capsule 1 Capsule(s) Inhalation daily    DVT Prophylaxis: enoxaparin Injectable 40 milliGRAM(s) SubCutaneous daily    GI Prophylaxis: pantoprazole    Tablet 40 milliGRAM(s) Oral before breakfast    Antibiotics: cefTRIAXone   IVPB 2000 milliGRAM(s) IV Intermittent every 24 hours  metroNIDAZOLE  IVPB 500 milliGRAM(s) IV Intermittent every 8 hours    Anticoagulation:   Medications:[PRN]  ALBUTerol    90 MICROgram(s) HFA Inhaler 2 Puff(s) Inhalation every 6 hours PRN  ketorolac   Injectable 30 milliGRAM(s) IV Push every 6 hours PRN  ondansetron    Tablet 4 milliGRAM(s) Oral at bedtime PRN  oxyCODONE    IR 5 milliGRAM(s) Oral every 6 hours PRN  zolpidem 5 milliGRAM(s) Oral at bedtime PRN      Labs:                        10.7   5.99  )-----------( 275      ( 03 Oct 2020 06:27 )             35.3     10-03    140  |  102  |  13  ----------------------------<  108<H>  4.3   |  29  |  0.9    Ca    8.5      03 Oct 2020 06:27  Mg     2.1     10-03    TPro  6.0  /  Alb  3.6  /  TBili  <0.2  /  DBili  x   /  AST  19  /  ALT  18  /  AlkPhos  87  10-03    LIVER FUNCTIONS - ( 03 Oct 2020 06:27 )  Alb: 3.6 g/dL / Pro: 6.0 g/dL / ALK PHOS: 87 U/L / ALT: 18 U/L / AST: 19 U/L / GGT: x                   Urine/Micro:    Culture - Blood (collected 02 Oct 2020 05:15)  Source: .Blood None  Preliminary Report (03 Oct 2020 19:01):    No growth to date.    Culture - Urine (collected 01 Oct 2020 20:50)  Source: .Urine Clean Catch (Midstream)  Preliminary Report (03 Oct 2020 15:01):    10,000 - 49,000 CFU/mL Morganella morganii          Imaging:     < from: Xray Kidney Ureter Bladder (10.02.20 @ 13:57) >  IMPRESSION:  Normal bowel gas pattern    < end of copied text >

## 2020-10-04 NOTE — DISCHARGE NOTE PROVIDER - HOSPITAL COURSE
54y old  Female  with hx of HTN, COPD not on home O2, nasopharyngeal carcinoma s/p radiation in 2013, recurrent diverticulitis more than 6 episodes in the last 10 years who presents with a chief complaint of abdominal pain and bloody diarrhea, pt has h/o recurrent diverticulitis.   She was adviced to follow up with general surgery to schedule elective colectomy but developed worsening abdominal pain and was admitted for acute diverticulitis, clinically improved after IV Abx, able to tolerate clear liquid diet. While in the hospital pt was treated with IV ABx , she was also diagnosed with UTI, consulted by surgery, diet was advanced and tolerated, no intervention recommended by surgery.   Pt was medically cleared for discharge, she was instructed to follow up with PMD, GI and general surgery to scheduled elective colectomy, consulted regarding diet and medications complaince.

## 2020-10-04 NOTE — PROGRESS NOTE ADULT - ASSESSMENT
54 year old female with hx of HTN, COPD not on home O2, nasopharyngeal carcinoma s/p radiation in 2013, recurrent diverticulitis more than 6 episodes in the last 10 years, presents with LLQ abdominal pain, chills, and bloody diarrhea likely secondary to diverticulitis.    A/P     #  Recurrent Diverticulitis   -clinically improved on IV ABx, will d/c on po Abx to complete the course of 14 days    - advance diet as tolerated   - diverticulitis has been  recurrent, patient was referred to GI surgeon however hasn't followed up yet  - pt was consulted by general surgery while in the hospital, no intervention recommended   - f/u with GI after discharge, pt needs colonoscopy in 6-8 weeks ( GI is DR Pelayo)     # UTI  - encourage po hydration   - c/w ABx on discharge   - consult regarding personal hygiene     # Morbid obesity/  Hepatic  Steatosis    - encourage weight loss   - low calories diet     # HTN  -DASH diet   - c/w enalapril 10 qd    # COPD  - stable now   - c/w Symbicort / albuterol / Spiriva / montelukast    # Chronic neck pain and abdominal pain  - c/w home pain meds on discharge  - f/u with pain management     # Depression/Anxiety  - bupropion  / klonopin 1 bid    # GERD  - protonix 40 qd    Dispo: if pt'll tolerated solids for lunch will plan discharge home today, pt was consulted regarding diet, medications compliance and outpt follow ups with PMD, GI and surgery to schedule elective colectomy.

## 2020-10-04 NOTE — DISCHARGE NOTE PROVIDER - CARE PROVIDER_API CALL
GITELMAN, ALEX  [id number]  N  GITELMAN ALEX, Phys,    Phone: [phone]  Fax: [phone]  Follow Up Time:     Venessa Pelayo  GASTROENTEROLOGY  [address] [place] [address]  Phone: [phone]  Fax: [phone]  Follow Up Time:     Fidelia Spencer  SURGERY  [address] [place] [address]  Phone: [phone]  Fax: [phone]  Follow Up Time:

## 2020-10-04 NOTE — PROGRESS NOTE ADULT - REASON FOR ADMISSION
abdominal pain and bloody diarrhea
DISCHARGE

## 2020-10-04 NOTE — DISCHARGE NOTE NURSING/CASE MANAGEMENT/SOCIAL WORK - PATIENT PORTAL LINK FT
You can access the FollowMyHealth Patient Portal offered by [facility] by registering at the following website: [email or web address]. By joining [facility]’s FollowMyHealth portal, you will also be able to view your health information using other applications (apps) compatible with our system.

## 2020-10-04 NOTE — DISCHARGE NOTE PROVIDER - NSDCCPCAREPLAN_GEN_ALL_CORE_FT
PRINCIPAL DISCHARGE DIAGNOSIS  Diagnosis: Diverticulitis  Assessment and Plan of Treatment: take all meds as prescribed, comply with diet, f/u with PMD, GI and general surgery tp schedule elective colectomy, prevent constipation      SECONDARY DISCHARGE DIAGNOSES  Diagnosis: Bacterial UTI  Assessment and Plan of Treatment: dring plenty of water, take all meds as prescribed

## 2020-10-04 NOTE — DISCHARGE NOTE PROVIDER - PROVIDER TOKENS
PROVIDER:[TOKEN:[[id number]]],PROVIDER:[TOKEN:[[id number]]],PROVIDER:[TOKEN:[[id number]]]

## 2020-10-04 NOTE — PROGRESS NOTE ADULT - ASSESSMENT
54F w/ PMH of HTN, COPD not on home O2, hypothyroidism, nasopharyngeal carcinoma s/p chemo/radiation (2013), recurrent diverticulitis admitted with diverticulitis now on IV ceftriaxone and flagyl.     PLAN:  - no acute surgical intervention  -IV antibiotics  - Currently clear liquid diet-advance diet as tolerated when pain resolves  - surgery to follow

## 2020-10-07 LAB
CULTURE RESULTS: SIGNIFICANT CHANGE UP
SPECIMEN SOURCE: SIGNIFICANT CHANGE UP

## 2020-10-09 DIAGNOSIS — K64.9 UNSPECIFIED HEMORRHOIDS: ICD-10-CM

## 2020-10-09 DIAGNOSIS — E83.42 HYPOMAGNESEMIA: ICD-10-CM

## 2020-10-09 DIAGNOSIS — Z92.21 PERSONAL HISTORY OF ANTINEOPLASTIC CHEMOTHERAPY: ICD-10-CM

## 2020-10-09 DIAGNOSIS — E66.01 MORBID (SEVERE) OBESITY DUE TO EXCESS CALORIES: ICD-10-CM

## 2020-10-09 DIAGNOSIS — K21.9 GASTRO-ESOPHAGEAL REFLUX DISEASE WITHOUT ESOPHAGITIS: ICD-10-CM

## 2020-10-09 DIAGNOSIS — J44.9 CHRONIC OBSTRUCTIVE PULMONARY DISEASE, UNSPECIFIED: ICD-10-CM

## 2020-10-09 DIAGNOSIS — I10 ESSENTIAL (PRIMARY) HYPERTENSION: ICD-10-CM

## 2020-10-09 DIAGNOSIS — K76.0 FATTY (CHANGE OF) LIVER, NOT ELSEWHERE CLASSIFIED: ICD-10-CM

## 2020-10-09 DIAGNOSIS — E03.9 HYPOTHYROIDISM, UNSPECIFIED: ICD-10-CM

## 2020-10-09 DIAGNOSIS — M50.90 CERVICAL DISC DISORDER, UNSPECIFIED, UNSPECIFIED CERVICAL REGION: ICD-10-CM

## 2020-10-09 DIAGNOSIS — K57.33 DIVERTICULITIS OF LARGE INTESTINE WITHOUT PERFORATION OR ABSCESS WITH BLEEDING: ICD-10-CM

## 2020-10-09 DIAGNOSIS — K57.92 DIVERTICULITIS OF INTESTINE, PART UNSPECIFIED, WITHOUT PERFORATION OR ABSCESS WITHOUT BLEEDING: ICD-10-CM

## 2020-10-09 DIAGNOSIS — Z88.1 ALLERGY STATUS TO OTHER ANTIBIOTIC AGENTS STATUS: ICD-10-CM

## 2020-10-09 DIAGNOSIS — N39.0 URINARY TRACT INFECTION, SITE NOT SPECIFIED: ICD-10-CM

## 2020-10-09 DIAGNOSIS — F17.210 NICOTINE DEPENDENCE, CIGARETTES, UNCOMPLICATED: ICD-10-CM

## 2020-10-09 DIAGNOSIS — Z85.22 PERSONAL HISTORY OF MALIGNANT NEOPLASM OF NASAL CAVITIES, MIDDLE EAR, AND ACCESSORY SINUSES: ICD-10-CM

## 2020-10-09 DIAGNOSIS — Z92.3 PERSONAL HISTORY OF IRRADIATION: ICD-10-CM

## 2020-10-09 DIAGNOSIS — F41.8 OTHER SPECIFIED ANXIETY DISORDERS: ICD-10-CM

## 2020-10-09 DIAGNOSIS — H91.92 UNSPECIFIED HEARING LOSS, LEFT EAR: ICD-10-CM

## 2020-10-11 ENCOUNTER — INPATIENT (INPATIENT)
Facility: HOSPITAL | Age: 55
LOS: 4 days | Discharge: HOME HEALTH | End: 2020-10-16
Attending: HOSPITALIST | Admitting: HOSPITALIST
Payer: MEDICAID

## 2020-10-11 VITALS
DIASTOLIC BLOOD PRESSURE: 83 MMHG | OXYGEN SATURATION: 96 % | HEART RATE: 102 BPM | WEIGHT: 293 LBS | RESPIRATION RATE: 20 BRPM | TEMPERATURE: 98 F | HEIGHT: 64 IN | SYSTOLIC BLOOD PRESSURE: 188 MMHG

## 2020-10-11 DIAGNOSIS — Z90.49 ACQUIRED ABSENCE OF OTHER SPECIFIED PARTS OF DIGESTIVE TRACT: Chronic | ICD-10-CM

## 2020-10-11 LAB
ALBUMIN SERPL ELPH-MCNC: 4 G/DL — SIGNIFICANT CHANGE UP (ref 3.5–5.2)
ALP SERPL-CCNC: 70 U/L — SIGNIFICANT CHANGE UP (ref 30–115)
ALT FLD-CCNC: 28 U/L — SIGNIFICANT CHANGE UP (ref 0–41)
ANION GAP SERPL CALC-SCNC: 10 MMOL/L — SIGNIFICANT CHANGE UP (ref 7–14)
AST SERPL-CCNC: 24 U/L — SIGNIFICANT CHANGE UP (ref 0–41)
BASOPHILS # BLD AUTO: 0.05 K/UL — SIGNIFICANT CHANGE UP (ref 0–0.2)
BASOPHILS NFR BLD AUTO: 0.5 % — SIGNIFICANT CHANGE UP (ref 0–1)
BILIRUB SERPL-MCNC: <0.2 MG/DL — SIGNIFICANT CHANGE UP (ref 0.2–1.2)
BUN SERPL-MCNC: 25 MG/DL — HIGH (ref 10–20)
CALCIUM SERPL-MCNC: 9.6 MG/DL — SIGNIFICANT CHANGE UP (ref 8.5–10.1)
CHLORIDE SERPL-SCNC: 107 MMOL/L — SIGNIFICANT CHANGE UP (ref 98–110)
CO2 SERPL-SCNC: 23 MMOL/L — SIGNIFICANT CHANGE UP (ref 17–32)
CREAT SERPL-MCNC: 1.2 MG/DL — SIGNIFICANT CHANGE UP (ref 0.7–1.5)
EOSINOPHIL # BLD AUTO: 0.23 K/UL — SIGNIFICANT CHANGE UP (ref 0–0.7)
EOSINOPHIL NFR BLD AUTO: 2.5 % — SIGNIFICANT CHANGE UP (ref 0–8)
GLUCOSE SERPL-MCNC: 112 MG/DL — HIGH (ref 70–99)
HCT VFR BLD CALC: 36.3 % — LOW (ref 37–47)
HGB BLD-MCNC: 11 G/DL — LOW (ref 12–16)
IMM GRANULOCYTES NFR BLD AUTO: 0.3 % — SIGNIFICANT CHANGE UP (ref 0.1–0.3)
LACTATE SERPL-SCNC: 1.1 MMOL/L — SIGNIFICANT CHANGE UP (ref 0.7–2)
LYMPHOCYTES # BLD AUTO: 1.75 K/UL — SIGNIFICANT CHANGE UP (ref 1.2–3.4)
LYMPHOCYTES # BLD AUTO: 18.7 % — LOW (ref 20.5–51.1)
MCHC RBC-ENTMCNC: 25.9 PG — LOW (ref 27–31)
MCHC RBC-ENTMCNC: 30.3 G/DL — LOW (ref 32–37)
MCV RBC AUTO: 85.4 FL — SIGNIFICANT CHANGE UP (ref 81–99)
MONOCYTES # BLD AUTO: 0.67 K/UL — HIGH (ref 0.1–0.6)
MONOCYTES NFR BLD AUTO: 7.2 % — SIGNIFICANT CHANGE UP (ref 1.7–9.3)
NEUTROPHILS # BLD AUTO: 6.63 K/UL — HIGH (ref 1.4–6.5)
NEUTROPHILS NFR BLD AUTO: 70.8 % — SIGNIFICANT CHANGE UP (ref 42.2–75.2)
NRBC # BLD: 0 /100 WBCS — SIGNIFICANT CHANGE UP (ref 0–0)
PLATELET # BLD AUTO: 362 K/UL — SIGNIFICANT CHANGE UP (ref 130–400)
POTASSIUM SERPL-MCNC: 4.6 MMOL/L — SIGNIFICANT CHANGE UP (ref 3.5–5)
POTASSIUM SERPL-SCNC: 4.6 MMOL/L — SIGNIFICANT CHANGE UP (ref 3.5–5)
PROT SERPL-MCNC: 6.5 G/DL — SIGNIFICANT CHANGE UP (ref 6–8)
RBC # BLD: 4.25 M/UL — SIGNIFICANT CHANGE UP (ref 4.2–5.4)
RBC # FLD: 17.9 % — HIGH (ref 11.5–14.5)
SODIUM SERPL-SCNC: 140 MMOL/L — SIGNIFICANT CHANGE UP (ref 135–146)
WBC # BLD: 9.36 K/UL — SIGNIFICANT CHANGE UP (ref 4.8–10.8)
WBC # FLD AUTO: 9.36 K/UL — SIGNIFICANT CHANGE UP (ref 4.8–10.8)

## 2020-10-11 PROCEDURE — 99285 EMERGENCY DEPT VISIT HI MDM: CPT

## 2020-10-11 RX ORDER — SODIUM CHLORIDE 9 MG/ML
1000 INJECTION INTRAMUSCULAR; INTRAVENOUS; SUBCUTANEOUS ONCE
Refills: 0 | Status: COMPLETED | OUTPATIENT
Start: 2020-10-11 | End: 2020-10-11

## 2020-10-11 RX ORDER — HYDROMORPHONE HYDROCHLORIDE 2 MG/ML
1 INJECTION INTRAMUSCULAR; INTRAVENOUS; SUBCUTANEOUS ONCE
Refills: 0 | Status: DISCONTINUED | OUTPATIENT
Start: 2020-10-11 | End: 2020-10-11

## 2020-10-11 RX ADMIN — SODIUM CHLORIDE 1000 MILLILITER(S): 9 INJECTION INTRAMUSCULAR; INTRAVENOUS; SUBCUTANEOUS at 22:43

## 2020-10-11 RX ADMIN — HYDROMORPHONE HYDROCHLORIDE 1 MILLIGRAM(S): 2 INJECTION INTRAMUSCULAR; INTRAVENOUS; SUBCUTANEOUS at 22:43

## 2020-10-11 NOTE — ED ADULT NURSE NOTE - OBJECTIVE STATEMENT
Patient c.o lower abdominal pain. States she is on abt tx for diverticulitis the pain has not subsided.

## 2020-10-11 NOTE — ED PROVIDER NOTE - PHYSICAL EXAMINATION
CONSTITUTIONAL: Well-appearing; well-nourished; in no apparent distress.   EYES: PERRL; EOM intact.   CARDIOVASCULAR: Normal S1, S2; no murmurs, rubs, or gallops.   RESPIRATORY: Normal chest excursion with respiration; breath sounds clear and equal bilaterally; no wheezes, rhonchi, or rales.  GI/: + LLQ tenderness. Normal bowel sounds; non-distended; no palpable organomegaly.   MS: No evidence of trauma or deformity. Non-tender to palpation. No scoliosis. No CVA tenderness. Normal ROM in all four extremities; non-tender to palpation; distal pulses are normal.   SKIN: Normal for age and race; warm; dry; good turgor; no apparent lesions or exudate.   NEURO/PSYCH: A & O x 4; grossly unremarkable.

## 2020-10-11 NOTE — ED PROVIDER NOTE - OBJECTIVE STATEMENT
53 yo female hx of HTN/COPD/Anxiety/Depression/ nasopharyngeal cancer/ recurrent diverticulitis present c/o LLQ pain x 1 day. patient was discharged last week for diverticulitis and still taking antibiotics. Pain started again tonight and similar to her previous diverticulitis pain. also reported an episode of constipation (change of bowel movement) today.   Denies fever/chill/nausea/vomiting/diarrhea/change of appetite and urinary sxs. Denies HA/dizziness/chest pain/sob/palpitations and pain to extremities and ambulatory difficulty.

## 2020-10-11 NOTE — ED PROVIDER NOTE - ATTENDING CONTRIBUTION TO CARE
54y old  f w/ pmhx of HTN, COPD not on home O2, nasopharyngeal carcinoma s/p radiation in 2013, recurrent diverticulitis > 7 episodes in the last 10 years, recent admission on 10/1-10/4 for diverticulitis with uti s/p abx and is to follow up with Johanna for elective colectomy, supposed to see him this week, presents with LLQ abd pain today, sharp in nature, 10/10, constant, non- radiating, typical of when she had diverticulitis in the past. no alleviating or precipitating factors. Dr. Gita more, which she saw ~ 3 days ago on Friday and ws told possible colonoscopy in ~ 6 weeks.  No fever, chills, n/v, cp,  pleuritic cp, sob, palpitations, diaphoresis, cough, ha/lh/dizziness, numbness/tingling, neck pain/ stiffness, diarrhea, constipation, melena/brbpr, urinary symptoms, trauma, weakness, edema, calf pain/swelling/erythema, sick contacts, recent travel or rash.    Vital Signs: I have reviewed the initial vital signs. Constitutional: Female pt sitting on stretcher speaking full sentences uncomfortable 2/2 to pain. Integumentary: No rash. ENT: MMM NECK: Supple, non-tender, no meningeal signs. Cardiovascular: RRR, radial pulses 2/4 b/l. No JVD. Respiratory: BS present b/l, ctabl, no wheezing or crackles, no accessory muscle use, no stridor. Gastrointestinal: BS present throughout all 4 quadrants, soft, nd, LLQ abd pain to palpation, w/ guarding, no rebound tenderness,  no cvat. Musculoskeletal: FROM,  no calf pain/swelling/erythema. Neurologic: AAOx3, motor 5/5 and sensation intact throughout upper and lowe ext, CN II-XII intact, No facial droop or slurring of speech. No focal deficits. 54y old  f w/ pmhx of HTN, COPD not on home O2, nasopharyngeal carcinoma s/p radiation in 2013, recurrent diverticulitis > 7 episodes in the last 10 years, recent admission on 10/1-10/4 for diverticulitis with uti s/p abx and is to follow up with Johanna for elective colectomy, supposed to see him this week, presents with LLQ abd pain today, currently on po abx, sharp in nature, 10/10, constant, non- radiating, typical of when she had diverticulitis in the past. no alleviating or precipitating factors. Dr. Gita more, which she saw ~ 3 days ago on Friday and ws told possible colonoscopy in ~ 6 weeks.  No fever, chills, n/v, cp,  pleuritic cp, sob, palpitations, diaphoresis, cough, ha/lh/dizziness, numbness/tingling, neck pain/ stiffness, diarrhea, constipation, melena/brbpr, urinary symptoms, trauma, weakness, edema, calf pain/swelling/erythema, sick contacts, recent travel or rash.    Vital Signs: I have reviewed the initial vital signs. Constitutional: Female pt sitting on stretcher speaking full sentences uncomfortable 2/2 to pain. Integumentary: No rash. ENT: MMM NECK: Supple, non-tender, no meningeal signs. Cardiovascular: RRR, radial pulses 2/4 b/l. No JVD. Respiratory: BS present b/l, ctabl, no wheezing or crackles, no accessory muscle use, no stridor. Gastrointestinal: BS present throughout all 4 quadrants, soft, nd, LLQ abd pain to palpation, w/ guarding, no rebound tenderness,  no cvat. Musculoskeletal: FROM,  no calf pain/swelling/erythema. Neurologic: AAOx3, motor 5/5 and sensation intact throughout upper and lowe ext, CN II-XII intact, No facial droop or slurring of speech. No focal deficits.

## 2020-10-11 NOTE — ED PROVIDER NOTE - PROGRESS NOTE DETAILS
GW: patient has diverticulitis on CT while taking PO abx. continue with abdominal pain/tenderness despite pain medications will admit for further management.

## 2020-10-11 NOTE — ED PROVIDER NOTE - CLINICAL SUMMARY MEDICAL DECISION MAKING FREE TEXT BOX
pt presented with llq abd pain typical of her diverticulitis pain, was on po abx, (+) diverticulitis on ct scan, is supposed to follow up with surgery for possible surgery, required Dilaudid pain medication x 2, received fluids, zofran, abx, medical admitting aware of pt as discussed and preferred by pt.

## 2020-10-11 NOTE — ED PROVIDER NOTE - CARE PLAN
Assessment and plan of treatment:	Plan: Labs, ivf, pain control, urine, imaging, reassess.   Principal Discharge DX:	Diverticulitis  Assessment and plan of treatment:	Plan: Labs, ivf, pain control, urine, imaging, reassess.   Principal Discharge DX:	Diverticulitis of large intestine with complication  Assessment and plan of treatment:	Plan: Labs, ivf, pain control, urine, imaging, reassess.

## 2020-10-12 LAB
ALBUMIN SERPL ELPH-MCNC: 3.6 G/DL — SIGNIFICANT CHANGE UP (ref 3.5–5.2)
ALP SERPL-CCNC: 69 U/L — SIGNIFICANT CHANGE UP (ref 30–115)
ALT FLD-CCNC: 23 U/L — SIGNIFICANT CHANGE UP (ref 0–41)
ANION GAP SERPL CALC-SCNC: 9 MMOL/L — SIGNIFICANT CHANGE UP (ref 7–14)
APPEARANCE UR: CLEAR — SIGNIFICANT CHANGE UP
AST SERPL-CCNC: 19 U/L — SIGNIFICANT CHANGE UP (ref 0–41)
BASOPHILS # BLD AUTO: 0.03 K/UL — SIGNIFICANT CHANGE UP (ref 0–0.2)
BASOPHILS NFR BLD AUTO: 0.4 % — SIGNIFICANT CHANGE UP (ref 0–1)
BILIRUB SERPL-MCNC: 0.6 MG/DL — SIGNIFICANT CHANGE UP (ref 0.2–1.2)
BILIRUB UR-MCNC: NEGATIVE — SIGNIFICANT CHANGE UP
BUN SERPL-MCNC: 19 MG/DL — SIGNIFICANT CHANGE UP (ref 10–20)
CALCIUM SERPL-MCNC: 8.7 MG/DL — SIGNIFICANT CHANGE UP (ref 8.5–10.1)
CHLORIDE SERPL-SCNC: 106 MMOL/L — SIGNIFICANT CHANGE UP (ref 98–110)
CO2 SERPL-SCNC: 25 MMOL/L — SIGNIFICANT CHANGE UP (ref 17–32)
COLOR SPEC: YELLOW — SIGNIFICANT CHANGE UP
CREAT SERPL-MCNC: 1 MG/DL — SIGNIFICANT CHANGE UP (ref 0.7–1.5)
DIFF PNL FLD: NEGATIVE — SIGNIFICANT CHANGE UP
EOSINOPHIL # BLD AUTO: 0.28 K/UL — SIGNIFICANT CHANGE UP (ref 0–0.7)
EOSINOPHIL NFR BLD AUTO: 4 % — SIGNIFICANT CHANGE UP (ref 0–8)
GLUCOSE SERPL-MCNC: 123 MG/DL — HIGH (ref 70–99)
GLUCOSE UR QL: NEGATIVE — SIGNIFICANT CHANGE UP
HCT VFR BLD CALC: 35.7 % — LOW (ref 37–47)
HGB BLD-MCNC: 10.9 G/DL — LOW (ref 12–16)
IMM GRANULOCYTES NFR BLD AUTO: 0.3 % — SIGNIFICANT CHANGE UP (ref 0.1–0.3)
KETONES UR-MCNC: SIGNIFICANT CHANGE UP
LEUKOCYTE ESTERASE UR-ACNC: NEGATIVE — SIGNIFICANT CHANGE UP
LYMPHOCYTES # BLD AUTO: 1.32 K/UL — SIGNIFICANT CHANGE UP (ref 1.2–3.4)
LYMPHOCYTES # BLD AUTO: 19 % — LOW (ref 20.5–51.1)
MAGNESIUM SERPL-MCNC: 1.9 MG/DL — SIGNIFICANT CHANGE UP (ref 1.8–2.4)
MCHC RBC-ENTMCNC: 25.9 PG — LOW (ref 27–31)
MCHC RBC-ENTMCNC: 30.5 G/DL — LOW (ref 32–37)
MCV RBC AUTO: 84.8 FL — SIGNIFICANT CHANGE UP (ref 81–99)
MONOCYTES # BLD AUTO: 0.49 K/UL — SIGNIFICANT CHANGE UP (ref 0.1–0.6)
MONOCYTES NFR BLD AUTO: 7.1 % — SIGNIFICANT CHANGE UP (ref 1.7–9.3)
NEUTROPHILS # BLD AUTO: 4.8 K/UL — SIGNIFICANT CHANGE UP (ref 1.4–6.5)
NEUTROPHILS NFR BLD AUTO: 69.2 % — SIGNIFICANT CHANGE UP (ref 42.2–75.2)
NITRITE UR-MCNC: NEGATIVE — SIGNIFICANT CHANGE UP
NRBC # BLD: 0 /100 WBCS — SIGNIFICANT CHANGE UP (ref 0–0)
PH UR: 6 — SIGNIFICANT CHANGE UP (ref 5–8)
PLATELET # BLD AUTO: 311 K/UL — SIGNIFICANT CHANGE UP (ref 130–400)
POTASSIUM SERPL-MCNC: 4.6 MMOL/L — SIGNIFICANT CHANGE UP (ref 3.5–5)
POTASSIUM SERPL-SCNC: 4.6 MMOL/L — SIGNIFICANT CHANGE UP (ref 3.5–5)
PROT SERPL-MCNC: 5.9 G/DL — LOW (ref 6–8)
PROT UR-MCNC: SIGNIFICANT CHANGE UP
RBC # BLD: 4.21 M/UL — SIGNIFICANT CHANGE UP (ref 4.2–5.4)
RBC # FLD: 18.1 % — HIGH (ref 11.5–14.5)
SARS-COV-2 RNA SPEC QL NAA+PROBE: SIGNIFICANT CHANGE UP
SODIUM SERPL-SCNC: 140 MMOL/L — SIGNIFICANT CHANGE UP (ref 135–146)
SP GR SPEC: 1.03 — SIGNIFICANT CHANGE UP (ref 1.01–1.03)
UROBILINOGEN FLD QL: SIGNIFICANT CHANGE UP
WBC # BLD: 6.94 K/UL — SIGNIFICANT CHANGE UP (ref 4.8–10.8)
WBC # FLD AUTO: 6.94 K/UL — SIGNIFICANT CHANGE UP (ref 4.8–10.8)

## 2020-10-12 PROCEDURE — 74177 CT ABD & PELVIS W/CONTRAST: CPT | Mod: 26

## 2020-10-12 PROCEDURE — 99223 1ST HOSP IP/OBS HIGH 75: CPT

## 2020-10-12 RX ORDER — CEFEPIME 1 G/1
1000 INJECTION, POWDER, FOR SOLUTION INTRAMUSCULAR; INTRAVENOUS ONCE
Refills: 0 | Status: COMPLETED | OUTPATIENT
Start: 2020-10-12 | End: 2020-10-12

## 2020-10-12 RX ORDER — METHOCARBAMOL 500 MG/1
750 TABLET, FILM COATED ORAL EVERY 8 HOURS
Refills: 0 | Status: DISCONTINUED | OUTPATIENT
Start: 2020-10-12 | End: 2020-10-16

## 2020-10-12 RX ORDER — ACETAMINOPHEN 500 MG
650 TABLET ORAL EVERY 6 HOURS
Refills: 0 | Status: DISCONTINUED | OUTPATIENT
Start: 2020-10-12 | End: 2020-10-16

## 2020-10-12 RX ORDER — HYDROMORPHONE HYDROCHLORIDE 2 MG/ML
1 INJECTION INTRAMUSCULAR; INTRAVENOUS; SUBCUTANEOUS ONCE
Refills: 0 | Status: DISCONTINUED | OUTPATIENT
Start: 2020-10-12 | End: 2020-10-12

## 2020-10-12 RX ORDER — IPRATROPIUM/ALBUTEROL SULFATE 18-103MCG
3 AEROSOL WITH ADAPTER (GRAM) INHALATION EVERY 6 HOURS
Refills: 0 | Status: DISCONTINUED | OUTPATIENT
Start: 2020-10-12 | End: 2020-10-12

## 2020-10-12 RX ORDER — HYDROMORPHONE HYDROCHLORIDE 2 MG/ML
1 INJECTION INTRAMUSCULAR; INTRAVENOUS; SUBCUTANEOUS EVERY 4 HOURS
Refills: 0 | Status: DISCONTINUED | OUTPATIENT
Start: 2020-10-12 | End: 2020-10-12

## 2020-10-12 RX ORDER — BUPROPION HYDROCHLORIDE 150 MG/1
300 TABLET, EXTENDED RELEASE ORAL DAILY
Refills: 0 | Status: DISCONTINUED | OUTPATIENT
Start: 2020-10-12 | End: 2020-10-16

## 2020-10-12 RX ORDER — HEPARIN SODIUM 5000 [USP'U]/ML
5000 INJECTION INTRAVENOUS; SUBCUTANEOUS EVERY 12 HOURS
Refills: 0 | Status: DISCONTINUED | OUTPATIENT
Start: 2020-10-12 | End: 2020-10-16

## 2020-10-12 RX ORDER — LEVOTHYROXINE SODIUM 125 MCG
125 TABLET ORAL DAILY
Refills: 0 | Status: DISCONTINUED | OUTPATIENT
Start: 2020-10-12 | End: 2020-10-16

## 2020-10-12 RX ORDER — METRONIDAZOLE 500 MG
500 TABLET ORAL EVERY 8 HOURS
Refills: 0 | Status: DISCONTINUED | OUTPATIENT
Start: 2020-10-12 | End: 2020-10-14

## 2020-10-12 RX ORDER — MONTELUKAST 4 MG/1
10 TABLET, CHEWABLE ORAL DAILY
Refills: 0 | Status: DISCONTINUED | OUTPATIENT
Start: 2020-10-12 | End: 2020-10-16

## 2020-10-12 RX ORDER — METRONIDAZOLE 500 MG
500 TABLET ORAL ONCE
Refills: 0 | Status: COMPLETED | OUTPATIENT
Start: 2020-10-12 | End: 2020-10-12

## 2020-10-12 RX ORDER — HYDROMORPHONE HYDROCHLORIDE 2 MG/ML
0.5 INJECTION INTRAMUSCULAR; INTRAVENOUS; SUBCUTANEOUS EVERY 6 HOURS
Refills: 0 | Status: DISCONTINUED | OUTPATIENT
Start: 2020-10-12 | End: 2020-10-12

## 2020-10-12 RX ORDER — OXYCODONE AND ACETAMINOPHEN 5; 325 MG/1; MG/1
1 TABLET ORAL EVERY 6 HOURS
Refills: 0 | Status: DISCONTINUED | OUTPATIENT
Start: 2020-10-12 | End: 2020-10-16

## 2020-10-12 RX ORDER — ONDANSETRON 8 MG/1
4 TABLET, FILM COATED ORAL ONCE
Refills: 0 | Status: COMPLETED | OUTPATIENT
Start: 2020-10-12 | End: 2020-10-12

## 2020-10-12 RX ORDER — SACCHAROMYCES BOULARDII 250 MG
250 POWDER IN PACKET (EA) ORAL
Refills: 0 | Status: DISCONTINUED | OUTPATIENT
Start: 2020-10-12 | End: 2020-10-16

## 2020-10-12 RX ORDER — ZOLPIDEM TARTRATE 10 MG/1
5 TABLET ORAL AT BEDTIME
Refills: 0 | Status: DISCONTINUED | OUTPATIENT
Start: 2020-10-12 | End: 2020-10-16

## 2020-10-12 RX ORDER — CEFEPIME 1 G/1
1000 INJECTION, POWDER, FOR SOLUTION INTRAMUSCULAR; INTRAVENOUS EVERY 8 HOURS
Refills: 0 | Status: DISCONTINUED | OUTPATIENT
Start: 2020-10-12 | End: 2020-10-14

## 2020-10-12 RX ORDER — CHLORHEXIDINE GLUCONATE 213 G/1000ML
1 SOLUTION TOPICAL
Refills: 0 | Status: DISCONTINUED | OUTPATIENT
Start: 2020-10-12 | End: 2020-10-16

## 2020-10-12 RX ORDER — SODIUM CHLORIDE 9 MG/ML
1000 INJECTION, SOLUTION INTRAVENOUS
Refills: 0 | Status: DISCONTINUED | OUTPATIENT
Start: 2020-10-12 | End: 2020-10-15

## 2020-10-12 RX ORDER — HYDROMORPHONE HYDROCHLORIDE 2 MG/ML
1 INJECTION INTRAMUSCULAR; INTRAVENOUS; SUBCUTANEOUS EVERY 4 HOURS
Refills: 0 | Status: DISCONTINUED | OUTPATIENT
Start: 2020-10-12 | End: 2020-10-15

## 2020-10-12 RX ORDER — ONDANSETRON 8 MG/1
4 TABLET, FILM COATED ORAL EVERY 6 HOURS
Refills: 0 | Status: DISCONTINUED | OUTPATIENT
Start: 2020-10-12 | End: 2020-10-16

## 2020-10-12 RX ORDER — BUDESONIDE AND FORMOTEROL FUMARATE DIHYDRATE 160; 4.5 UG/1; UG/1
2 AEROSOL RESPIRATORY (INHALATION)
Refills: 0 | Status: DISCONTINUED | OUTPATIENT
Start: 2020-10-12 | End: 2020-10-12

## 2020-10-12 RX ORDER — PANTOPRAZOLE SODIUM 20 MG/1
40 TABLET, DELAYED RELEASE ORAL
Refills: 0 | Status: DISCONTINUED | OUTPATIENT
Start: 2020-10-12 | End: 2020-10-15

## 2020-10-12 RX ORDER — ALBUTEROL 90 UG/1
2 AEROSOL, METERED ORAL EVERY 6 HOURS
Refills: 0 | Status: DISCONTINUED | OUTPATIENT
Start: 2020-10-12 | End: 2020-10-16

## 2020-10-12 RX ORDER — TIOTROPIUM BROMIDE 18 UG/1
1 CAPSULE ORAL; RESPIRATORY (INHALATION) DAILY
Refills: 0 | Status: DISCONTINUED | OUTPATIENT
Start: 2020-10-12 | End: 2020-10-16

## 2020-10-12 RX ORDER — CLONAZEPAM 1 MG
1 TABLET ORAL
Refills: 0 | Status: DISCONTINUED | OUTPATIENT
Start: 2020-10-12 | End: 2020-10-16

## 2020-10-12 RX ADMIN — Medication 650 MILLIGRAM(S): at 19:25

## 2020-10-12 RX ADMIN — Medication 650 MILLIGRAM(S): at 11:08

## 2020-10-12 RX ADMIN — ONDANSETRON 4 MILLIGRAM(S): 8 TABLET, FILM COATED ORAL at 06:53

## 2020-10-12 RX ADMIN — HYDROMORPHONE HYDROCHLORIDE 1 MILLIGRAM(S): 2 INJECTION INTRAMUSCULAR; INTRAVENOUS; SUBCUTANEOUS at 06:54

## 2020-10-12 RX ADMIN — HYDROMORPHONE HYDROCHLORIDE 1 MILLIGRAM(S): 2 INJECTION INTRAMUSCULAR; INTRAVENOUS; SUBCUTANEOUS at 13:30

## 2020-10-12 RX ADMIN — ZOLPIDEM TARTRATE 5 MILLIGRAM(S): 10 TABLET ORAL at 22:55

## 2020-10-12 RX ADMIN — SODIUM CHLORIDE 75 MILLILITER(S): 9 INJECTION, SOLUTION INTRAVENOUS at 04:48

## 2020-10-12 RX ADMIN — MONTELUKAST 10 MILLIGRAM(S): 4 TABLET, CHEWABLE ORAL at 11:28

## 2020-10-12 RX ADMIN — Medication 1 MILLIGRAM(S): at 18:19

## 2020-10-12 RX ADMIN — CEFEPIME 100 MILLIGRAM(S): 1 INJECTION, POWDER, FOR SOLUTION INTRAMUSCULAR; INTRAVENOUS at 18:19

## 2020-10-12 RX ADMIN — HYDROMORPHONE HYDROCHLORIDE 1 MILLIGRAM(S): 2 INJECTION INTRAMUSCULAR; INTRAVENOUS; SUBCUTANEOUS at 12:47

## 2020-10-12 RX ADMIN — Medication 650 MILLIGRAM(S): at 09:47

## 2020-10-12 RX ADMIN — CEFEPIME 100 MILLIGRAM(S): 1 INJECTION, POWDER, FOR SOLUTION INTRAMUSCULAR; INTRAVENOUS at 11:30

## 2020-10-12 RX ADMIN — Medication 650 MILLIGRAM(S): at 18:21

## 2020-10-12 RX ADMIN — HEPARIN SODIUM 5000 UNIT(S): 5000 INJECTION INTRAVENOUS; SUBCUTANEOUS at 18:20

## 2020-10-12 RX ADMIN — BUPROPION HYDROCHLORIDE 300 MILLIGRAM(S): 150 TABLET, EXTENDED RELEASE ORAL at 11:29

## 2020-10-12 RX ADMIN — ALBUTEROL 2 PUFF(S): 90 AEROSOL, METERED ORAL at 18:36

## 2020-10-12 RX ADMIN — SODIUM CHLORIDE 75 MILLILITER(S): 9 INJECTION, SOLUTION INTRAVENOUS at 18:36

## 2020-10-12 RX ADMIN — Medication 100 MILLIGRAM(S): at 23:48

## 2020-10-12 RX ADMIN — Medication 100 MILLIGRAM(S): at 11:29

## 2020-10-12 RX ADMIN — HYDROMORPHONE HYDROCHLORIDE 1 MILLIGRAM(S): 2 INJECTION INTRAMUSCULAR; INTRAVENOUS; SUBCUTANEOUS at 22:54

## 2020-10-12 RX ADMIN — HYDROMORPHONE HYDROCHLORIDE 1 MILLIGRAM(S): 2 INJECTION INTRAMUSCULAR; INTRAVENOUS; SUBCUTANEOUS at 02:33

## 2020-10-12 RX ADMIN — HYDROMORPHONE HYDROCHLORIDE 1 MILLIGRAM(S): 2 INJECTION INTRAMUSCULAR; INTRAVENOUS; SUBCUTANEOUS at 23:30

## 2020-10-12 RX ADMIN — Medication 100 MILLIGRAM(S): at 02:01

## 2020-10-12 RX ADMIN — CEFEPIME 100 MILLIGRAM(S): 1 INJECTION, POWDER, FOR SOLUTION INTRAMUSCULAR; INTRAVENOUS at 02:01

## 2020-10-12 RX ADMIN — ONDANSETRON 4 MILLIGRAM(S): 8 TABLET, FILM COATED ORAL at 04:03

## 2020-10-12 NOTE — H&P ADULT - NSHPLABSRESULTS_GEN_ALL_CORE
11.0   9.36  )-----------( 362      ( 11 Oct 2020 22:46 )             36.3       10-11    140  |  107  |  25<H>  ----------------------------<  112<H>  4.6   |  23  |  1.2    Ca    9.6      11 Oct 2020 22:46    TPro  6.5  /  Alb  4.0  /  TBili  <0.2  /  DBili  x   /  AST  24  /  ALT  28  /  AlkPhos  70  10-11    Lactate, Blood: 1.1 mmol/L (10.11.20 @ 22:46)    < from: CT Abdomen and Pelvis w/ IV Cont (10.12.20 @ 01:06) >    IMPRESSION:  Findings compatible with a mild/uncomplicated acute diverticulitis at the proximal sigmoid colon

## 2020-10-12 NOTE — H&P ADULT - HISTORY OF PRESENT ILLNESS
55 yo female  PMHx: HTN, COPD not on home O2, nasopharyngeal carcinoma s/p radiation in 2013, recurrent diverticulitis more than 6 episodes in the last 10 years  CC: Abdominal pain      ER Course: Vitals on admission were /83, , afebrile, on RA. CT showed mild/acute uncomplicated diverticulitis at proximal sigmoid colon. Pt received IV Cefepime and Flagyl with Dilaudid and Zofran. Pt also received 1L NS bolus. 53 yo female  PMHx: HTN, COPD not on home O2, nasopharyngeal carcinoma s/p radiation in 2013, recurrent diverticulitis more than 6 episodes in the last 10 years  CC: Abdominal pain  History dates back to 1 day when pt started having abdominal pian, located in the left lower quadrant, non radiating, worse with movement and better with pain medications and lying still. Pain was associated with nausea, no vomiting, No bloody diarrhea this admission. Pt denied any fever, chill. Last BM was on Saturday.  Pt was recently admitted to the hospital with similar complaints, treated for acute uncomplicated diverticulitis.  ER Course: Vitals on admission were /83, , afebrile, on RA. CT showed mild/acute uncomplicated diverticulitis at proximal sigmoid colon. Pt received IV Cefepime and Flagyl with Dilaudid and Zofran. Pt also received 1L NS bolus.

## 2020-10-12 NOTE — H&P ADULT - NSHPREVIEWOFSYSTEMS_GEN_ALL_CORE
ROS: Per HPI    Vital Signs Last 24 Hrs  T(C): 36.6 (12 Oct 2020 08:10), Max: 36.7 (11 Oct 2020 21:47)  T(F): 97.8 (12 Oct 2020 08:10), Max: 98 (11 Oct 2020 21:47)  HR: 81 (12 Oct 2020 08:10) (81 - 102)  BP: 146/73 (12 Oct 2020 08:10) (146/73 - 188/83)  RR: 19 (12 Oct 2020 08:10) (19 - 20)  SpO2: 96% (12 Oct 2020 08:10) (96% - 97%)

## 2020-10-12 NOTE — ED PROVIDER NOTE - NSTIMEPROVIDERCAREINITIATE_GEN_ER
[facility] SURGERY    PATIENT NAME: Filiberto Mittal Sr.     TODAY'S DATE: 10/12/2020    CHIEF COMPLAINT: abd pain    INTERVAL HISTORY/HPI:    Pt with mild left sided pain, no nausea or emesis, no fevers. REVIEW OF SYSTEMS:  Pertinent positives and negatives as per interval history section    OBJECTIVE:  VITALS:  BP (!) 155/55   Pulse 96   Temp 97.6 °F (36.4 °C) (Bladder)   Resp (!) 31   Ht 5' 4\" (1.626 m)   Wt (!) 355 lb 9.6 oz (161.3 kg)   SpO2 94%   BMI 61.04 kg/m²     INTAKE/OUTPUT:    I/O last 3 completed shifts: In: 2029 [Other:2029]  Out: -   No intake/output data recorded. CONSTITUTIONAL:  awake and alert  LUNGS:  Respirations easy and unlabored  CARD:  regular rate   ABDOMEN:  normal bowel sounds, soft, non-distended, tender left side     Data:  CBC:   Recent Labs     10/10/20  0528  10/11/20  0605  10/11/20  2002 10/12/20  0218 10/12/20  0620   WBC 36.9*  --  43.7*  --   --   --  27.2*   HGB 8.5*   < > 7.1*   < > 7.2* 6.7* 7.5*   HCT 25.2*   < > 21.3*   < > 22.1* 20.5* 22.9*     --  281  --   --   --  163    < > = values in this interval not displayed. BMP:    Recent Labs     10/11/20  2002 10/12/20  0218 10/12/20  0620    133* 137   K 5.1 4.8 4.9    101 105   CO2 26 26 27   BUN 27* 20 19   CREATININE 1.4* 1.3 1.2   GLUCOSE 150* 143* 133*     Hepatic:   Recent Labs     10/10/20  0528 10/11/20  0605 10/12/20  0620   AST 26 32 19   ALT 20 27 25   BILITOT 1.6* 0.9 0.9   ALKPHOS 67 78 79     Mag:      Recent Labs     10/11/20  2002 10/12/20  0218 10/12/20  0620   MG 2.30 2.30 2.30      Phos:     Recent Labs     10/11/20  1430 10/11/20  2002 10/12/20  0218   PHOS 3.4 2.9 2.4*      INR:   Recent Labs     10/09/20  1247 10/09/20  1811 10/10/20  0528   INR 9.24* 1.93* 1.42*       Radiology Review:  *Imaging personally reviewed by me. NA      ASSESSMENT AND PLAN:  70 yo with left retroperitoneal bleed likely from left kidney  1.   Further management per Urology and Vascular Surgery  2.   Will sign off at this time     Electronically signed by Donna Silva MD     [id number] 13-Dec-2018 18:41

## 2020-10-12 NOTE — CONSULT NOTE ADULT - SUBJECTIVE AND OBJECTIVE BOX
GEOVANNA PUTNAM [id number]  54y Female      HPI:  53 yo female  PMHx: HTN, COPD not on home O2, nasopharyngeal carcinoma s/p radiation in , recurrent diverticulitis more than 6 episodes in the last 10 years  CC: Abdominal pain  History dates back to 1 day when pt started having abdominal pian, located in the left lower quadrant, non radiating, worse with movement and better with pain medications and lying still. Pain was associated with nausea, no vomiting, No bloody diarrhea this admission. Pt denied any fever, chill. Last BM was on Saturday.  Pt was recently admitted to the hospital with similar complaints, treated for acute uncomplicated diverticulitis.  ER Course: Vitals on admission were /83, , afebrile, on RA. CT showed mild/acute uncomplicated diverticulitis at proximal sigmoid colon. Pt received IV Cefepime and Flagyl with Dilaudid and Zofran. Pt also received 1L NS bolus.  (12 Oct 2020 08:31)        54F w/ PMH of HTN, COPD not on home O2, hypothyroidism, nasopharyngeal carcinoma s/p chemo/radiation (), recurrent diverticulitis with most recent hospitalization 10/2/20 who presents to the ED with recurrent diverticulitis. Pt reports she received 4 days of IV CTX and flagyl and DC'd on 11 days of PO cephalexin and flagyl. Pt reports being compliant with antibiotics but developing worsening abdominal pain yesterday (last day of abx). On admission WBC 6.94 and CT A/P notable for mild/uncomplicated acute diverticulitis at the proximal sigmoid colon. Pt was started on cefepime and flagyl. Surgery was consulted for resection.       PAST MEDICAL & SURGICAL HISTORY:  Anxiety  Hemorrhoids  Left ear hearing loss  Cervical disc disease  sequelae of radtion for nasopharyngeal cancer  Hypothyroid  Nasopharyngeal cancer  Pulmonary nodules/lesions, multiple  Diverticulitis  HTN (hypertension)  COPD (chronic obstructive pulmonary disease)  History of cholecystectom    MEDICATIONS  (STANDING):  buPROPion XL . 300 milliGRAM(s) Oral daily  cefepime   IVPB 1000 milliGRAM(s) IV Intermittent every 8 hours  chlorhexidine 4% Liquid 1 Application(s) Topical <User Schedule>  clonazePAM  Tablet 1 milliGRAM(s) Oral two times a day  enalapril 10 milliGRAM(s) Oral daily  heparin   Injectable 5000 Unit(s) SubCutaneous every 12 hours  lactated ringers. 1000 milliLiter(s) (75 mL/Hr) IV Continuous <Continuous>  levothyroxine 125 MICROGram(s) Oral daily  metroNIDAZOLE  IVPB 500 milliGRAM(s) IV Intermittent every 8 hours  montelukast 10 milliGRAM(s) Oral daily  pantoprazole    Tablet 40 milliGRAM(s) Oral before breakfast  saccharomyces boulardii 250 milliGRAM(s) Oral two times a day  tiotropium 18 MICROgram(s) Capsule 1 Capsule(s) Inhalation daily    MEDICATIONS  (PRN):  acetaminophen   Tablet .. 650 milliGRAM(s) Oral every 6 hours PRN Mild Pain (1 - 3)  ALBUTerol    90 MICROgram(s) HFA Inhaler 2 Puff(s) Inhalation every 6 hours PRN Shortness of Breath and/or Wheezing  HYDROmorphone  Injectable 1 milliGRAM(s) IV Push every 4 hours PRN Severe Pain (7 - 10)  methocarbamol 750 milliGRAM(s) Oral every 8 hours PRN muescel spasm  ondansetron Injectable 4 milliGRAM(s) IV Push every 6 hours PRN Nausea and/or Vomiting  oxycodone    5 mG/acetaminophen 325 mG 1 Tablet(s) Oral every 6 hours PRN Moderate Pain (4 - 6)  zolpidem 5 milliGRAM(s) Oral at bedtime PRN Insomnia      Allergies  ciprofloxacin (Urticaria; Other)    REVIEW OF SYSTEMS  [X] A ten-point review of systems was otherwise negative except as noted.  [ ] Due to altered mental status/intubation, subjective information were not able to be obtained from the patient. History was obtained, to the extent possible, from review of the chart and collateral sources of information.    Vital Signs Last 24 Hrs  T(C): 37 (12 Oct 2020 16:03), Max: 37 (12 Oct 2020 16:03)  T(F): 98.6 (12 Oct 2020 16:03), Max: 98.6 (12 Oct 2020 16:03)  HR: 76 (12 Oct 2020 16:03) (76 - 102)  BP: 135/76 (12 Oct 2020 16:03) (135/76 - 188/83)  RR: 19 (12 Oct 2020 16:03) (19 - 20)  SpO2: 95% (12 Oct 2020 16:03) (95% - 97%)      PHYSICAL EXAM:  GENERAL: NAD, well-appearing  CHEST/LUNG: Clear to auscultation bilaterally  HEART: Regular rate and rhythm  ABDOMEN: Soft, obese, TTP in LLQ  EXTREMITIES:  No clubbing, cyanosis, or edema    LABS:                      10.9   6.94  )-----------( 311      ( 12 Oct 2020 11:50 )             35.7       Auto Neutrophil %: 69.2 % (10-12-20 @ 11:50)  Auto Immature Granulocyte %: 0.3 % (10-12-20 @ 11:50)  Auto Neutrophil %: 70.8 % (10-11-20 @ 22:46)  Auto Immature Granulocyte %: 0.3 % (10-11-20 @ 22:46)    10-12    140  |  106  |  19  ----------------------------<  123<H>  4.6   |  25  |  1.0    Calcium, Total Serum: 8.7 mg/dL (10-12-20 @ 11:50)    LFTs:             5.9  | 0.6  | 19       ------------------[69      ( 12 Oct 2020 11:50 )  3.6  | x    | 23           Lactate, Blood: 1.1 mmol/L (10-11-20 @ 22:46)    Urinalysis Basic - ( 12 Oct 2020 01:00 )    Color: Yellow / Appearance: Clear / S.028 / pH: x  Gluc: x / Ketone: Trace  / Bili: Negative / Urobili: <2 mg/dL   Blood: x / Protein: Trace / Nitrite: Negative   Leuk Esterase: Negative / RBC: x / WBC x   Sq Epi: x / Non Sq Epi: x / Bacteria: x      RADIOLOGY & ADDITIONAL STUDIES:  < from: CT Abdomen and Pelvis w/ IV Cont (10.12.20 @ 01:06) >  FINDINGS:    LOWER CHEST: There is mild bibasilar subsegmental atelectasis.    GALLBLADDER AND BILIARY TREE: Cholecystectomy. No biliary ductal dilatation.    ADRENALS: Stable 1.3 cm right adrenal gland nodule dating back to 2018    BOWEL: No bowel obstruction. Unremarkable appendix. There is new trace fascial thickening and fat stranding at the distal left paracolic gutter in the region of the proximal sigmoid colon compatible with a very mild diverticulitis.    PERITONEUM/RETROPERITONEUM/MESENTERY: No ascites, free air or abscess.      IMPRESSION:  Findings compatible with a mild/uncomplicated acute diverticulitis at the proximal sigmoid colon  < end of copied text >

## 2020-10-12 NOTE — H&P ADULT - ATTENDING COMMENTS
53 yo female with  HTN, COPD not on home O2, nasopharyngeal carcinoma s/p radiation in 2013, recurrent diverticulitis more than 6 episodes in the last 10 years, presented for recurrent abdominal pain.    #Acute uncomplicated sigmoid diverticulitis  h/o multiple recurrences  - Cont cefepime 1g q8h  - Cont flagyl 500mg q8h  - Sx consult, GI consult  - Pain control PRN  - Advance diet as tolerated    #HTN  - Cont enalapril 10mg qD    #COPD  - Cont spiriva/symbicort  - Cont montelukast 10mg qD  - Duonebs PRN    #Anxiety  - Klonopin 1mg BID  - Cont buproprion XL 300mg qD    DVT PPX, Heparin

## 2020-10-12 NOTE — H&P ADULT - NSHPPHYSICALEXAM_GEN_ALL_CORE
PHYSICAL EXAM:  GENERAL: NAD, speaks in full sentences, no signs of respiratory distress  HEAD:  Atraumatic, Normocephalic  EYES: EOMI, PERRLA, conjunctiva and sclera clear  NECK: Supple, No JVD  CHEST/LUNG: Clear to auscultation bilaterally; No wheeze; No crackles; No accessory muscles used  HEART: Regular rate and rhythm; No murmurs;   ABDOMEN: Soft, Nontender, Nondistended; Bowel sounds present; No guarding  EXTREMITIES:  2+ Peripheral Pulses, No cyanosis or edema  PSYCH: AAOx3  NEUROLOGY: non-focal  SKIN: No rashes or lesions PHYSICAL EXAM:  GENERAL: NAD, speaks in full sentences, no signs of respiratory distress  HEAD:  Atraumatic, Normocephalic  EYES: EOMI, PERRLA, conjunctiva and sclera clear  NECK: Supple, No JVD  CHEST/LUNG: Clear to auscultation bilaterally; No wheeze; No crackles; No accessory muscles used  HEART: Regular rate and rhythm; No murmurs;   ABDOMEN: Soft, Tender + LLQ abdomen, Nondistended; Bowel sounds present; No guarding  EXTREMITIES:  2+ Peripheral Pulses, No cyanosis or edema  PSYCH: AAOx3  NEUROLOGY: non-focal  SKIN: No rashes or lesions

## 2020-10-13 LAB
ALBUMIN SERPL ELPH-MCNC: 3.6 G/DL — SIGNIFICANT CHANGE UP (ref 3.5–5.2)
ALP SERPL-CCNC: 68 U/L — SIGNIFICANT CHANGE UP (ref 30–115)
ALT FLD-CCNC: 20 U/L — SIGNIFICANT CHANGE UP (ref 0–41)
ANION GAP SERPL CALC-SCNC: 8 MMOL/L — SIGNIFICANT CHANGE UP (ref 7–14)
AST SERPL-CCNC: 19 U/L — SIGNIFICANT CHANGE UP (ref 0–41)
BASOPHILS # BLD AUTO: 0.04 K/UL — SIGNIFICANT CHANGE UP (ref 0–0.2)
BASOPHILS NFR BLD AUTO: 0.6 % — SIGNIFICANT CHANGE UP (ref 0–1)
BILIRUB SERPL-MCNC: 0.7 MG/DL — SIGNIFICANT CHANGE UP (ref 0.2–1.2)
BUN SERPL-MCNC: 12 MG/DL — SIGNIFICANT CHANGE UP (ref 10–20)
CALCIUM SERPL-MCNC: 8.6 MG/DL — SIGNIFICANT CHANGE UP (ref 8.5–10.1)
CHLORIDE SERPL-SCNC: 104 MMOL/L — SIGNIFICANT CHANGE UP (ref 98–110)
CO2 SERPL-SCNC: 27 MMOL/L — SIGNIFICANT CHANGE UP (ref 17–32)
CREAT SERPL-MCNC: 0.9 MG/DL — SIGNIFICANT CHANGE UP (ref 0.7–1.5)
CULTURE RESULTS: NO GROWTH — SIGNIFICANT CHANGE UP
EOSINOPHIL # BLD AUTO: 0.26 K/UL — SIGNIFICANT CHANGE UP (ref 0–0.7)
EOSINOPHIL NFR BLD AUTO: 4.1 % — SIGNIFICANT CHANGE UP (ref 0–8)
GLUCOSE SERPL-MCNC: 116 MG/DL — HIGH (ref 70–99)
HCT VFR BLD CALC: 35.2 % — LOW (ref 37–47)
HGB BLD-MCNC: 10.7 G/DL — LOW (ref 12–16)
IMM GRANULOCYTES NFR BLD AUTO: 0.5 % — HIGH (ref 0.1–0.3)
LACTATE SERPL-SCNC: 0.7 MMOL/L — SIGNIFICANT CHANGE UP (ref 0.7–2)
LYMPHOCYTES # BLD AUTO: 1.16 K/UL — LOW (ref 1.2–3.4)
LYMPHOCYTES # BLD AUTO: 18.1 % — LOW (ref 20.5–51.1)
MAGNESIUM SERPL-MCNC: 1.8 MG/DL — SIGNIFICANT CHANGE UP (ref 1.8–2.4)
MCHC RBC-ENTMCNC: 26.3 PG — LOW (ref 27–31)
MCHC RBC-ENTMCNC: 30.4 G/DL — LOW (ref 32–37)
MCV RBC AUTO: 86.5 FL — SIGNIFICANT CHANGE UP (ref 81–99)
MONOCYTES # BLD AUTO: 0.44 K/UL — SIGNIFICANT CHANGE UP (ref 0.1–0.6)
MONOCYTES NFR BLD AUTO: 6.9 % — SIGNIFICANT CHANGE UP (ref 1.7–9.3)
NEUTROPHILS # BLD AUTO: 4.47 K/UL — SIGNIFICANT CHANGE UP (ref 1.4–6.5)
NEUTROPHILS NFR BLD AUTO: 69.8 % — SIGNIFICANT CHANGE UP (ref 42.2–75.2)
NRBC # BLD: 0 /100 WBCS — SIGNIFICANT CHANGE UP (ref 0–0)
PLATELET # BLD AUTO: 322 K/UL — SIGNIFICANT CHANGE UP (ref 130–400)
POTASSIUM SERPL-MCNC: 4.3 MMOL/L — SIGNIFICANT CHANGE UP (ref 3.5–5)
POTASSIUM SERPL-SCNC: 4.3 MMOL/L — SIGNIFICANT CHANGE UP (ref 3.5–5)
PROT SERPL-MCNC: 5.8 G/DL — LOW (ref 6–8)
RBC # BLD: 4.07 M/UL — LOW (ref 4.2–5.4)
RBC # FLD: 17.8 % — HIGH (ref 11.5–14.5)
SODIUM SERPL-SCNC: 139 MMOL/L — SIGNIFICANT CHANGE UP (ref 135–146)
SPECIMEN SOURCE: SIGNIFICANT CHANGE UP
WBC # BLD: 6.4 K/UL — SIGNIFICANT CHANGE UP (ref 4.8–10.8)
WBC # FLD AUTO: 6.4 K/UL — SIGNIFICANT CHANGE UP (ref 4.8–10.8)

## 2020-10-13 PROCEDURE — 99233 SBSQ HOSP IP/OBS HIGH 50: CPT

## 2020-10-13 RX ORDER — LANOLIN ALCOHOL/MO/W.PET/CERES
3 CREAM (GRAM) TOPICAL AT BEDTIME
Refills: 0 | Status: COMPLETED | OUTPATIENT
Start: 2020-10-13 | End: 2020-10-13

## 2020-10-13 RX ORDER — SENNA PLUS 8.6 MG/1
2 TABLET ORAL AT BEDTIME
Refills: 0 | Status: DISCONTINUED | OUTPATIENT
Start: 2020-10-13 | End: 2020-10-15

## 2020-10-13 RX ORDER — ZOLPIDEM TARTRATE 10 MG/1
5 TABLET ORAL AT BEDTIME
Refills: 0 | Status: DISCONTINUED | OUTPATIENT
Start: 2020-10-13 | End: 2020-10-13

## 2020-10-13 RX ORDER — SODIUM CHLORIDE 0.65 %
1 AEROSOL, SPRAY (ML) NASAL EVERY 6 HOURS
Refills: 0 | Status: DISCONTINUED | OUTPATIENT
Start: 2020-10-13 | End: 2020-10-16

## 2020-10-13 RX ORDER — POLYETHYLENE GLYCOL 3350 17 G/17G
17 POWDER, FOR SOLUTION ORAL DAILY
Refills: 0 | Status: DISCONTINUED | OUTPATIENT
Start: 2020-10-13 | End: 2020-10-15

## 2020-10-13 RX ADMIN — HYDROMORPHONE HYDROCHLORIDE 1 MILLIGRAM(S): 2 INJECTION INTRAMUSCULAR; INTRAVENOUS; SUBCUTANEOUS at 06:00

## 2020-10-13 RX ADMIN — Medication 250 MILLIGRAM(S): at 17:01

## 2020-10-13 RX ADMIN — SENNA PLUS 2 TABLET(S): 8.6 TABLET ORAL at 22:57

## 2020-10-13 RX ADMIN — Medication 125 MICROGRAM(S): at 06:01

## 2020-10-13 RX ADMIN — Medication 1 MILLIGRAM(S): at 05:59

## 2020-10-13 RX ADMIN — CEFEPIME 100 MILLIGRAM(S): 1 INJECTION, POWDER, FOR SOLUTION INTRAMUSCULAR; INTRAVENOUS at 01:35

## 2020-10-13 RX ADMIN — CEFEPIME 100 MILLIGRAM(S): 1 INJECTION, POWDER, FOR SOLUTION INTRAMUSCULAR; INTRAVENOUS at 17:01

## 2020-10-13 RX ADMIN — SODIUM CHLORIDE 75 MILLILITER(S): 9 INJECTION, SOLUTION INTRAVENOUS at 11:00

## 2020-10-13 RX ADMIN — ONDANSETRON 4 MILLIGRAM(S): 8 TABLET, FILM COATED ORAL at 00:24

## 2020-10-13 RX ADMIN — HYDROMORPHONE HYDROCHLORIDE 1 MILLIGRAM(S): 2 INJECTION INTRAMUSCULAR; INTRAVENOUS; SUBCUTANEOUS at 17:01

## 2020-10-13 RX ADMIN — Medication 100 MILLIGRAM(S): at 22:57

## 2020-10-13 RX ADMIN — Medication 250 MILLIGRAM(S): at 06:01

## 2020-10-13 RX ADMIN — Medication 10 MILLIGRAM(S): at 06:01

## 2020-10-13 RX ADMIN — Medication 1 MILLIGRAM(S): at 17:01

## 2020-10-13 RX ADMIN — MONTELUKAST 10 MILLIGRAM(S): 4 TABLET, CHEWABLE ORAL at 11:12

## 2020-10-13 RX ADMIN — Medication 100 MILLIGRAM(S): at 06:02

## 2020-10-13 RX ADMIN — HYDROMORPHONE HYDROCHLORIDE 1 MILLIGRAM(S): 2 INJECTION INTRAMUSCULAR; INTRAVENOUS; SUBCUTANEOUS at 21:55

## 2020-10-13 RX ADMIN — Medication 100 MILLIGRAM(S): at 13:01

## 2020-10-13 RX ADMIN — HYDROMORPHONE HYDROCHLORIDE 1 MILLIGRAM(S): 2 INJECTION INTRAMUSCULAR; INTRAVENOUS; SUBCUTANEOUS at 11:11

## 2020-10-13 RX ADMIN — POLYETHYLENE GLYCOL 3350 17 GRAM(S): 17 POWDER, FOR SOLUTION ORAL at 11:11

## 2020-10-13 RX ADMIN — CEFEPIME 100 MILLIGRAM(S): 1 INJECTION, POWDER, FOR SOLUTION INTRAMUSCULAR; INTRAVENOUS at 11:11

## 2020-10-13 RX ADMIN — BUPROPION HYDROCHLORIDE 300 MILLIGRAM(S): 150 TABLET, EXTENDED RELEASE ORAL at 11:11

## 2020-10-13 RX ADMIN — PANTOPRAZOLE SODIUM 40 MILLIGRAM(S): 20 TABLET, DELAYED RELEASE ORAL at 06:01

## 2020-10-13 RX ADMIN — HYDROMORPHONE HYDROCHLORIDE 1 MILLIGRAM(S): 2 INJECTION INTRAMUSCULAR; INTRAVENOUS; SUBCUTANEOUS at 21:20

## 2020-10-13 RX ADMIN — Medication 3 MILLIGRAM(S): at 22:56

## 2020-10-13 RX ADMIN — ZOLPIDEM TARTRATE 5 MILLIGRAM(S): 10 TABLET ORAL at 00:48

## 2020-10-13 NOTE — PROGRESS NOTE ADULT - ASSESSMENT
55 yo female KTH:  1. HTN  2. COPD not on home O2  3. nasopharyngeal carcinoma s/p radiation in 2013 in remission  4. recurrent diverticulitis more than 6 episodes in the last 10 years    admitted for uncomplicated sigmoid diverticulitis    #Acute uncomplicated sigmoid diverticulitis  -h/o multiple recurrences  - Cont cefepime 1g q8h and flagyl 500mg q8h  - No intervention for now per Sx  - Pain control PRN and bowel regimen for constipation treatment added  - Diet advanced to clear fluids today  - f/u with ID for d/c antibiotics plan  - colonoscopy in 6-12 weeks as per GI  - patient agreeable now to large bowel colectomy when symptoms resolve    #HTN  - Cont enalapril 10mg qD    #COPD  - Cont spiriva/symbicort  - Cont montelukast 10mg qD  - Duonebs PRN    #Anxiety  - Klonopin 1mg BID  - Cont buproprion XL 300mg qD      DVT PPX: Heparin   Diet: clear fluids  Code: Full  Dispo: home, pending medical clearance

## 2020-10-13 NOTE — PROGRESS NOTE ADULT - SUBJECTIVE AND OBJECTIVE BOX
Progress Note: General Surgery  Patient: GEOVANNA PUTNAM , 54y (1965)Female   MRN: [id number]  Location: [address]  Visit: 10-12-20 Inpatient  Date: 10-13-20 @ 13:19    Admit Diagnosis/Chief Complaint: Abdominal pain    Procedure/Diagnosis: Diverticulitis    Events/ 24h: No acute events overnight. Pain controlled.    Vitals: T(F): 97.9 (10-13-20 @ 12:30), Max: 98.6 (10-12-20 @ 16:03)  HR: 83 (10-13-20 @ 12:30)  BP: 135/59 (10-13-20 @ 12:30) (116/69 - 157/70)  RR: 20 (10-13-20 @ 12:30)  SpO2: 95% (10-12-20 @ 16:03)    In:   Out:   Net:     Diet: Diet, Clear Liquid (10-13-20 @ 09:58)    IV Fluids: lactated ringers. 1000 milliLiter(s) (75 mL/Hr) IV Continuous <Continuous>      Physical Examination:  General Appearance: NAD  HEENT: EOMI, sclera non-icteric.  Heart: RRR   Lungs: CTABL.   Abdomen:  Soft, mildly tender LLQ, nondistended.   MSK/Extremities: Warm & well-perfused.   Skin: Warm, dry. No jaundice.       Medications: [Standing]  buPROPion XL . 300 milliGRAM(s) Oral daily  cefepime   IVPB 1000 milliGRAM(s) IV Intermittent every 8 hours  chlorhexidine 4% Liquid 1 Application(s) Topical <User Schedule>  clonazePAM  Tablet 1 milliGRAM(s) Oral two times a day  enalapril 10 milliGRAM(s) Oral daily  heparin   Injectable 5000 Unit(s) SubCutaneous every 12 hours  lactated ringers. 1000 milliLiter(s) (75 mL/Hr) IV Continuous <Continuous>  levothyroxine 125 MICROGram(s) Oral daily  melatonin 3 milliGRAM(s) Oral at bedtime  metroNIDAZOLE  IVPB 500 milliGRAM(s) IV Intermittent every 8 hours  montelukast 10 milliGRAM(s) Oral daily  pantoprazole    Tablet 40 milliGRAM(s) Oral before breakfast  polyethylene glycol 3350 17 Gram(s) Oral daily  saccharomyces boulardii 250 milliGRAM(s) Oral two times a day  senna 2 Tablet(s) Oral at bedtime  tiotropium 18 MICROgram(s) Capsule 1 Capsule(s) Inhalation daily    DVT Prophylaxis: heparin   Injectable 5000 Unit(s) SubCutaneous every 12 hours    GI Prophylaxis: pantoprazole    Tablet 40 milliGRAM(s) Oral before breakfast    Antibiotics: cefepime   IVPB 1000 milliGRAM(s) IV Intermittent every 8 hours  metroNIDAZOLE  IVPB 500 milliGRAM(s) IV Intermittent every 8 hours    Anticoagulation:   Medications:[PRN]  acetaminophen   Tablet .. 650 milliGRAM(s) Oral every 6 hours PRN  ALBUTerol    90 MICROgram(s) HFA Inhaler 2 Puff(s) Inhalation every 6 hours PRN  HYDROmorphone  Injectable 1 milliGRAM(s) IV Push every 4 hours PRN  methocarbamol 750 milliGRAM(s) Oral every 8 hours PRN  ondansetron Injectable 4 milliGRAM(s) IV Push every 6 hours PRN  oxycodone    5 mG/acetaminophen 325 mG 1 Tablet(s) Oral every 6 hours PRN  zolpidem 5 milliGRAM(s) Oral at bedtime PRN      Labs:                        10.7   6.40  )-----------( 322      ( 13 Oct 2020 07:14 )             35.2     10-13    139  |  104  |  12  ----------------------------<  116<H>  4.3   |  27  |  0.9    Ca    8.6      13 Oct 2020 07:14  Mg     1.8     10-13    TPro  5.8<L>  /  Alb  3.6  /  TBili  0.7  /  DBili  x   /  AST  19  /  ALT  20  /  AlkPhos  68  10-13    LIVER FUNCTIONS - ( 13 Oct 2020 07:14 )  Alb: 3.6 g/dL / Pro: 5.8 g/dL / ALK PHOS: 68 U/L / ALT: 20 U/L / AST: 19 U/L / GGT: x             Urine/Micro:    Culture - Urine (collected 12 Oct 2020 01:00)  Source: .Urine Clean Catch (Midstream)  Final Report (13 Oct 2020 09:59):    No growth      Urinalysis Basic - ( 12 Oct 2020 01:00 )    Color: Yellow / Appearance: Clear / S.028 / pH: x  Gluc: x / Ketone: Trace  / Bili: Negative / Urobili: <2 mg/dL   Blood: x / Protein: Trace / Nitrite: Negative   Leuk Esterase: Negative / RBC: x / WBC x   Sq Epi: x / Non Sq Epi: x / Bacteria: x        Imaging:   c< from: CT Abdomen and Pelvis w/ IV Cont (10.12.20 @ 01:06) >    EXAM:  CT ABDOMEN AND PELVIS IC            PROCEDURE DATE:  10/12/2020            INTERPRETATION:  CLINICAL STATEMENT: Left lower quadrant abdominal pain    TECHNIQUE: Contiguous CT images were obtained of the abdomen and pelvis.  Intravenous Contrast: 100 cc intravenous contrast was administered.  Oral contrast: was not administered.      COMPARISON:  CT abdomen pelvis dated 10/1/2020 and 2018    FINDINGS:    LOWER CHEST: There is mild bibasilar subsegmental atelectasis.    LIVER: Unremarkable.    SPLEEN: Unremarkable.    PANCREAS: Unremarkable.    GALLBLADDER AND BILIARY TREE: Cholecystectomy. No biliary ductal dilatation.    ADRENALS: Stable 1.3 cm right adrenal gland nodule dating back to     KIDNEYS: Symmetric pattern of renal enhancement. No hydronephrosis. Right renal cyst    LYMPH NODES: There are no enlarged abdominal or pelvic lymph nodes.    VASCULATURE: Normal caliber abdominal aorta with atherosclerotic changes.    BOWEL: No bowel obstruction. Unremarkable appendix. There is new trace fascial thickening and fat stranding at the distal left paracolic gutter in the region of the proximal sigmoid colon compatible with a very mild diverticulitis.    PERITONEUM/RETROPERITONEUM/MESENTERY: No ascites, free air or abscess.    PELVIC VISCERA: Unremarkable.    BONES AND SOFT TISSUES: Degenerative changes of the spine.      IMPRESSION:    Findings compatible with a mild/uncomplicated acute diverticulitis at the proximal sigmoid colon            CLAUDE MILLER M.D., ATTENDING RADIOLOGIST  This document has been electronically signed. Oct 12 2020  2:36AM    < end of copied text >        Assessment:  54y Female patient consulted for diverticulitis  Patient seen and examined at bedside. NAD.     Plan:  - no acute surgical intervention  - recommend NPO, IVF, IV antibiotics  - advance diet as tolerated when pain resolves  - outpatient c-scope in 6 weeks  - FU w/ Dr. Spencer as outpatient    Date/Time: 10-13-20 @ 13:19

## 2020-10-13 NOTE — PROGRESS NOTE ADULT - SUBJECTIVE AND OBJECTIVE BOX
GEOVANNA PUTNAM  54y, Female  Allergy: ciprofloxacin (Urticaria; Other)    Hospital Day: 1d    Patient seen and examined earlier today. No acute events overnight.    PMH/PSH:  PAST MEDICAL & SURGICAL HISTORY:  Anxiety    Hemorrhoids    Left ear hearing loss    Cervical disc disease  sequelae of radtion for nasopharyngeal cancer    Hypothyroid    Nasopharyngeal cancer    Pulmonary nodules/lesions, multiple    Diverticulitis    HTN (hypertension)    COPD (chronic obstructive pulmonary disease)    History of cholecystectomy        VITALS:  T(F): 97.9 (10-13-20 @ 12:30), Max: 98.6 (10-12-20 @ 16:03)  HR: 83 (10-13-20 @ 12:30)  BP: 135/59 (10-13-20 @ 12:30) (116/69 - 157/70)  RR: 20 (10-13-20 @ 12:30)  SpO2: 95% (10-12-20 @ 16:03)    TESTS & MEASUREMENTS:  Weight (Kg): 135.2 (10-11-20 @ 21:47)  BMI (kg/m2): 51.1 (10-12)                          10.7   6.40  )-----------( 322      ( 13 Oct 2020 07:14 )             35.2       10-13    139  |  104  |  12  ----------------------------<  116<H>  4.3   |  27  |  0.9    Ca    8.6      13 Oct 2020 07:14  Mg     1.8     10-13    TPro  5.8<L>  /  Alb  3.6  /  TBili  0.7  /  DBili  x   /  AST  19  /  ALT  20  /  AlkPhos  68  10-13    LIVER FUNCTIONS - ( 13 Oct 2020 07:14 )  Alb: 3.6 g/dL / Pro: 5.8 g/dL / ALK PHOS: 68 U/L / ALT: 20 U/L / AST: 19 U/L / GGT: x                 Culture - Urine (collected 10-12-20 @ 01:00)  Source: .Urine Clean Catch (Midstream)  Final Report (10-13-20 @ 09:59):    No growth      Urinalysis Basic - ( 12 Oct 2020 01:00 )    Color: Yellow / Appearance: Clear / S.028 / pH: x  Gluc: x / Ketone: Trace  / Bili: Negative / Urobili: <2 mg/dL   Blood: x / Protein: Trace / Nitrite: Negative   Leuk Esterase: Negative / RBC: x / WBC x   Sq Epi: x / Non Sq Epi: x / Bacteria: x        RADIOLOGY & ADDITIONAL TESTS:    RECENT DIAGNOSTIC ORDERS:  Diet, Clear Liquid (10-13-20 @ 09:58)      MEDICATIONS:  MEDICATIONS  (STANDING):  buPROPion XL . 300 milliGRAM(s) Oral daily  cefepime   IVPB 1000 milliGRAM(s) IV Intermittent every 8 hours  chlorhexidine 4% Liquid 1 Application(s) Topical <User Schedule>  clonazePAM  Tablet 1 milliGRAM(s) Oral two times a day  enalapril 10 milliGRAM(s) Oral daily  heparin   Injectable 5000 Unit(s) SubCutaneous every 12 hours  lactated ringers. 1000 milliLiter(s) (75 mL/Hr) IV Continuous <Continuous>  levothyroxine 125 MICROGram(s) Oral daily  melatonin 3 milliGRAM(s) Oral at bedtime  metroNIDAZOLE  IVPB 500 milliGRAM(s) IV Intermittent every 8 hours  montelukast 10 milliGRAM(s) Oral daily  pantoprazole    Tablet 40 milliGRAM(s) Oral before breakfast  polyethylene glycol 3350 17 Gram(s) Oral daily  saccharomyces boulardii 250 milliGRAM(s) Oral two times a day  senna 2 Tablet(s) Oral at bedtime  tiotropium 18 MICROgram(s) Capsule 1 Capsule(s) Inhalation daily    MEDICATIONS  (PRN):  acetaminophen   Tablet .. 650 milliGRAM(s) Oral every 6 hours PRN Mild Pain (1 - 3)  ALBUTerol    90 MICROgram(s) HFA Inhaler 2 Puff(s) Inhalation every 6 hours PRN Shortness of Breath and/or Wheezing  HYDROmorphone  Injectable 1 milliGRAM(s) IV Push every 4 hours PRN Severe Pain (7 - 10)  methocarbamol 750 milliGRAM(s) Oral every 8 hours PRN muescel spasm  ondansetron Injectable 4 milliGRAM(s) IV Push every 6 hours PRN Nausea and/or Vomiting  oxycodone    5 mG/acetaminophen 325 mG 1 Tablet(s) Oral every 6 hours PRN Moderate Pain (4 - 6)  zolpidem 5 milliGRAM(s) Oral at bedtime PRN Insomnia    HOME MEDICATIONS:  albuterol 90 mcg/inh inhalation aerosol (10-12)  BUPROPION HCL  MG TB24 (10-12)  HYDROCO/APAP TAB 7.5-325 (10-12)  KlonoPIN 1 mg oral tablet (10-12)  levothyroxine 125 mcg (0.125 mg) oral tablet (10-12)  MONTELUKAST SODIUM 10 MG TABS (10-12)  PANTOPRAZOLE SODIUM 40 MG TBEC (10-12)  tiZANidine 4 mg oral tablet (10-12)  Trelegy Ellipta inhalation powder (10-12)  ZOLPIDEM TARTRATE 10 MG TABS (10-12)      REVIEW OF SYSTEMS:  All other review of systems is negative unless indicated above.     PHYSICAL EXAM:  GENERAL: NAD  HEENT: No Swelling  CHEST/LUNG: Good air entry, No wheezing  HEART: RRR, No murmurs  ABDOMEN: Soft, Bowel sounds present, LLQ tenderness  EXTREMITIES:  No clubbing

## 2020-10-13 NOTE — PROGRESS NOTE ADULT - SUBJECTIVE AND OBJECTIVE BOX
24H events:    Patient is a 54y old Female who presents with a chief complaint of Abdominal pain (13 Oct 2020 12:41)    Primary diagnosis of Diverticulitis of large intestine with complication       Today is hospital day 1d. This morning patient was seen and examined at bedside, resting comfortably in bed.    No acute or major events overnight.    PAST MEDICAL & SURGICAL HISTORY  Anxiety    Hemorrhoids    Left ear hearing loss    Cervical disc disease  sequelae of radtion for nasopharyngeal cancer    Hypothyroid    Nasopharyngeal cancer    Pulmonary nodules/lesions, multiple    Diverticulitis    HTN (hypertension)    COPD (chronic obstructive pulmonary disease)    History of cholecystectomy      SOCIAL HISTORY:  Social History:  Restarted smoking in February, about a pack a week  social alcohol use  no illicit drug abuse (12 Oct 2020 08:31)      ALLERGIES:  ciprofloxacin (Urticaria; Other)    MEDICATIONS:  STANDING MEDICATIONS  buPROPion XL . 300 milliGRAM(s) Oral daily  cefepime   IVPB 1000 milliGRAM(s) IV Intermittent every 8 hours  chlorhexidine 4% Liquid 1 Application(s) Topical <User Schedule>  clonazePAM  Tablet 1 milliGRAM(s) Oral two times a day  enalapril 10 milliGRAM(s) Oral daily  heparin   Injectable 5000 Unit(s) SubCutaneous every 12 hours  lactated ringers. 1000 milliLiter(s) IV Continuous <Continuous>  levothyroxine 125 MICROGram(s) Oral daily  melatonin 3 milliGRAM(s) Oral at bedtime  metroNIDAZOLE  IVPB 500 milliGRAM(s) IV Intermittent every 8 hours  montelukast 10 milliGRAM(s) Oral daily  pantoprazole    Tablet 40 milliGRAM(s) Oral before breakfast  polyethylene glycol 3350 17 Gram(s) Oral daily  saccharomyces boulardii 250 milliGRAM(s) Oral two times a day  senna 2 Tablet(s) Oral at bedtime  tiotropium 18 MICROgram(s) Capsule 1 Capsule(s) Inhalation daily    PRN MEDICATIONS  acetaminophen   Tablet .. 650 milliGRAM(s) Oral every 6 hours PRN  ALBUTerol    90 MICROgram(s) HFA Inhaler 2 Puff(s) Inhalation every 6 hours PRN  HYDROmorphone  Injectable 1 milliGRAM(s) IV Push every 4 hours PRN  methocarbamol 750 milliGRAM(s) Oral every 8 hours PRN  ondansetron Injectable 4 milliGRAM(s) IV Push every 6 hours PRN  oxycodone    5 mG/acetaminophen 325 mG 1 Tablet(s) Oral every 6 hours PRN  zolpidem 5 milliGRAM(s) Oral at bedtime PRN    VITALS:   T(F): 97.9  HR: 83  BP: 135/59  RR: 20  SpO2: 95%    PHYSICAL EXAM:  GENERAL: NAD, well-groomed, well-developed  HEAD:  Atraumatic, Normocephalic  EYES: EOMI  NECK: Supple  NERVOUS SYSTEM:  Alert & Oriented X3, non focal   CHEST/LUNG: Clear to auscultation bilaterally; No rales, rhonchi, wheezing, or rubs  HEART: Regular rate and rhythm; No murmurs, rubs, or gallops  ABDOMEN: Soft, Nontender, Nondistended; Bowel sounds present  EXTREMITIES:  2+ Peripheral Pulses, No clubbing, cyanosis, or edema  LYMPH: No lymphadenopathy noted  SKIN: No rashes or lesions  LABS:                        10.7   6.40  )-----------( 322      ( 13 Oct 2020 07:14 )             35.2     10-13    139  |  104  |  12  ----------------------------<  116<H>  4.3   |  27  |  0.9    Ca    8.6      13 Oct 2020 07:14  Mg     1.8     10-13    TPro  5.8<L>  /  Alb  3.6  /  TBili  0.7  /  DBili  x   /  AST  19  /  ALT  20  /  AlkPhos  68  10-13      Urinalysis Basic - ( 12 Oct 2020 01:00 )    Color: Yellow / Appearance: Clear / S.028 / pH: x  Gluc: x / Ketone: Trace  / Bili: Negative / Urobili: <2 mg/dL   Blood: x / Protein: Trace / Nitrite: Negative   Leuk Esterase: Negative / RBC: x / WBC x   Sq Epi: x / Non Sq Epi: x / Bacteria: x        Lactate, Blood: 0.7 mmol/L (10-13-20 @ 07:14)      Culture - Urine (collected 12 Oct 2020 01:00)  Source: .Urine Clean Catch (Midstream)  Final Report (13 Oct 2020 09:59):    No growth          RADIOLOGY:

## 2020-10-13 NOTE — PROGRESS NOTE ADULT - ASSESSMENT
53 yo female with  HTN, COPD not on home O2, nasopharyngeal carcinoma s/p radiation in 2013, recurrent diverticulitis more than 6 episodes in the last 10 years, presented for recurrent abdominal pain.    #Acute uncomplicated sigmoid diverticulitis  h/o multiple recurrences  - Cont cefepime 1g q8h  - Cont flagyl 500mg q8h  - Sx consult, GI consult  - Pain control PRN  - Advance diet as tolerated    #HTN  - Cont enalapril 10mg qD    #COPD  - Cont spiriva/symbicort  - Cont montelukast 10mg qD  - Duonebs PRN    #Anxiety  - Klonopin 1mg BID  - Cont buproprion XL 300mg qD    DVT PPX, Heparin .    55 yo female with  HTN, COPD not on home O2, nasopharyngeal carcinoma s/p radiation in 2013, recurrent diverticulitis more than 6 episodes in the last 10 years, presented for recurrent abdominal pain.    #Acute uncomplicated sigmoid diverticulitis  h/o multiple recurrences  - Cont cefepime 1g q8h  - Cont flagyl 500mg q8h  - No intervention for now per Sx  - Pain control PRN  - Advance diet as tolerated    #HTN  - Cont enalapril 10mg qD    #COPD  - Cont spiriva/symbicort  - Cont montelukast 10mg qD  - Duonebs PRN    #Anxiety  - Klonopin 1mg BID  - Cont buproprion XL 300mg qD    DVT PPX, Heparin .

## 2020-10-14 LAB
ALBUMIN SERPL ELPH-MCNC: 3.6 G/DL — SIGNIFICANT CHANGE UP (ref 3.5–5.2)
ALP SERPL-CCNC: 69 U/L — SIGNIFICANT CHANGE UP (ref 30–115)
ALT FLD-CCNC: 18 U/L — SIGNIFICANT CHANGE UP (ref 0–41)
ANION GAP SERPL CALC-SCNC: 11 MMOL/L — SIGNIFICANT CHANGE UP (ref 7–14)
AST SERPL-CCNC: 18 U/L — SIGNIFICANT CHANGE UP (ref 0–41)
BASOPHILS # BLD AUTO: 0.02 K/UL — SIGNIFICANT CHANGE UP (ref 0–0.2)
BASOPHILS NFR BLD AUTO: 0.2 % — SIGNIFICANT CHANGE UP (ref 0–1)
BILIRUB SERPL-MCNC: <0.2 MG/DL — SIGNIFICANT CHANGE UP (ref 0.2–1.2)
BUN SERPL-MCNC: 11 MG/DL — SIGNIFICANT CHANGE UP (ref 10–20)
CALCIUM SERPL-MCNC: 8.9 MG/DL — SIGNIFICANT CHANGE UP (ref 8.5–10.1)
CHLORIDE SERPL-SCNC: 100 MMOL/L — SIGNIFICANT CHANGE UP (ref 98–110)
CO2 SERPL-SCNC: 24 MMOL/L — SIGNIFICANT CHANGE UP (ref 17–32)
CREAT SERPL-MCNC: 1 MG/DL — SIGNIFICANT CHANGE UP (ref 0.7–1.5)
EOSINOPHIL # BLD AUTO: 0.2 K/UL — SIGNIFICANT CHANGE UP (ref 0–0.7)
EOSINOPHIL NFR BLD AUTO: 2.4 % — SIGNIFICANT CHANGE UP (ref 0–8)
GLUCOSE SERPL-MCNC: 119 MG/DL — HIGH (ref 70–99)
HCT VFR BLD CALC: 35.1 % — LOW (ref 37–47)
HGB BLD-MCNC: 10.7 G/DL — LOW (ref 12–16)
IMM GRANULOCYTES NFR BLD AUTO: 0.4 % — HIGH (ref 0.1–0.3)
LYMPHOCYTES # BLD AUTO: 1.33 K/UL — SIGNIFICANT CHANGE UP (ref 1.2–3.4)
LYMPHOCYTES # BLD AUTO: 15.7 % — LOW (ref 20.5–51.1)
MCHC RBC-ENTMCNC: 25.8 PG — LOW (ref 27–31)
MCHC RBC-ENTMCNC: 30.5 G/DL — LOW (ref 32–37)
MCV RBC AUTO: 84.8 FL — SIGNIFICANT CHANGE UP (ref 81–99)
MONOCYTES # BLD AUTO: 0.48 K/UL — SIGNIFICANT CHANGE UP (ref 0.1–0.6)
MONOCYTES NFR BLD AUTO: 5.7 % — SIGNIFICANT CHANGE UP (ref 1.7–9.3)
NEUTROPHILS # BLD AUTO: 6.4 K/UL — SIGNIFICANT CHANGE UP (ref 1.4–6.5)
NEUTROPHILS NFR BLD AUTO: 75.6 % — HIGH (ref 42.2–75.2)
NRBC # BLD: 0 /100 WBCS — SIGNIFICANT CHANGE UP (ref 0–0)
PLATELET # BLD AUTO: 349 K/UL — SIGNIFICANT CHANGE UP (ref 130–400)
POTASSIUM SERPL-MCNC: 3.9 MMOL/L — SIGNIFICANT CHANGE UP (ref 3.5–5)
POTASSIUM SERPL-SCNC: 3.9 MMOL/L — SIGNIFICANT CHANGE UP (ref 3.5–5)
PROT SERPL-MCNC: 6.3 G/DL — SIGNIFICANT CHANGE UP (ref 6–8)
RBC # BLD: 4.14 M/UL — LOW (ref 4.2–5.4)
RBC # FLD: 17.7 % — HIGH (ref 11.5–14.5)
SODIUM SERPL-SCNC: 135 MMOL/L — SIGNIFICANT CHANGE UP (ref 135–146)
WBC # BLD: 8.46 K/UL — SIGNIFICANT CHANGE UP (ref 4.8–10.8)
WBC # FLD AUTO: 8.46 K/UL — SIGNIFICANT CHANGE UP (ref 4.8–10.8)

## 2020-10-14 PROCEDURE — 99233 SBSQ HOSP IP/OBS HIGH 50: CPT

## 2020-10-14 RX ORDER — CEFEPIME 1 G/1
1000 INJECTION, POWDER, FOR SOLUTION INTRAMUSCULAR; INTRAVENOUS EVERY 8 HOURS
Refills: 0 | Status: DISCONTINUED | OUTPATIENT
Start: 2020-10-14 | End: 2020-10-14

## 2020-10-14 RX ORDER — ERTAPENEM SODIUM 1 G/1
1000 INJECTION, POWDER, LYOPHILIZED, FOR SOLUTION INTRAMUSCULAR; INTRAVENOUS EVERY 24 HOURS
Refills: 0 | Status: DISCONTINUED | OUTPATIENT
Start: 2020-10-15 | End: 2020-10-16

## 2020-10-14 RX ORDER — CEFTRIAXONE 500 MG/1
1 INJECTION, POWDER, FOR SOLUTION INTRAMUSCULAR; INTRAVENOUS EVERY 24 HOURS
Refills: 0 | Status: DISCONTINUED | OUTPATIENT
Start: 2020-10-14 | End: 2020-10-14

## 2020-10-14 RX ORDER — SIMETHICONE 80 MG/1
80 TABLET, CHEWABLE ORAL EVERY 6 HOURS
Refills: 0 | Status: DISCONTINUED | OUTPATIENT
Start: 2020-10-14 | End: 2020-10-16

## 2020-10-14 RX ORDER — ERTAPENEM SODIUM 1 G/1
INJECTION, POWDER, LYOPHILIZED, FOR SOLUTION INTRAMUSCULAR; INTRAVENOUS
Refills: 0 | Status: DISCONTINUED | OUTPATIENT
Start: 2020-10-14 | End: 2020-10-16

## 2020-10-14 RX ORDER — ERTAPENEM SODIUM 1 G/1
1000 INJECTION, POWDER, LYOPHILIZED, FOR SOLUTION INTRAMUSCULAR; INTRAVENOUS ONCE
Refills: 0 | Status: COMPLETED | OUTPATIENT
Start: 2020-10-14 | End: 2020-10-14

## 2020-10-14 RX ADMIN — HYDROMORPHONE HYDROCHLORIDE 1 MILLIGRAM(S): 2 INJECTION INTRAMUSCULAR; INTRAVENOUS; SUBCUTANEOUS at 22:59

## 2020-10-14 RX ADMIN — ZOLPIDEM TARTRATE 5 MILLIGRAM(S): 10 TABLET ORAL at 22:10

## 2020-10-14 RX ADMIN — HYDROMORPHONE HYDROCHLORIDE 1 MILLIGRAM(S): 2 INJECTION INTRAMUSCULAR; INTRAVENOUS; SUBCUTANEOUS at 03:18

## 2020-10-14 RX ADMIN — HYDROMORPHONE HYDROCHLORIDE 1 MILLIGRAM(S): 2 INJECTION INTRAMUSCULAR; INTRAVENOUS; SUBCUTANEOUS at 17:35

## 2020-10-14 RX ADMIN — Medication 10 MILLIGRAM(S): at 06:49

## 2020-10-14 RX ADMIN — Medication 1 MILLIGRAM(S): at 06:51

## 2020-10-14 RX ADMIN — HYDROMORPHONE HYDROCHLORIDE 1 MILLIGRAM(S): 2 INJECTION INTRAMUSCULAR; INTRAVENOUS; SUBCUTANEOUS at 20:46

## 2020-10-14 RX ADMIN — CEFEPIME 100 MILLIGRAM(S): 1 INJECTION, POWDER, FOR SOLUTION INTRAMUSCULAR; INTRAVENOUS at 09:22

## 2020-10-14 RX ADMIN — HYDROMORPHONE HYDROCHLORIDE 1 MILLIGRAM(S): 2 INJECTION INTRAMUSCULAR; INTRAVENOUS; SUBCUTANEOUS at 11:38

## 2020-10-14 RX ADMIN — HYDROMORPHONE HYDROCHLORIDE 1 MILLIGRAM(S): 2 INJECTION INTRAMUSCULAR; INTRAVENOUS; SUBCUTANEOUS at 08:15

## 2020-10-14 RX ADMIN — Medication 1 MILLIGRAM(S): at 17:31

## 2020-10-14 RX ADMIN — Medication 250 MILLIGRAM(S): at 17:29

## 2020-10-14 RX ADMIN — HYDROMORPHONE HYDROCHLORIDE 1 MILLIGRAM(S): 2 INJECTION INTRAMUSCULAR; INTRAVENOUS; SUBCUTANEOUS at 12:00

## 2020-10-14 RX ADMIN — Medication 100 MILLIGRAM(S): at 13:14

## 2020-10-14 RX ADMIN — HYDROMORPHONE HYDROCHLORIDE 1 MILLIGRAM(S): 2 INJECTION INTRAMUSCULAR; INTRAVENOUS; SUBCUTANEOUS at 04:00

## 2020-10-14 RX ADMIN — MONTELUKAST 10 MILLIGRAM(S): 4 TABLET, CHEWABLE ORAL at 11:40

## 2020-10-14 RX ADMIN — SIMETHICONE 80 MILLIGRAM(S): 80 TABLET, CHEWABLE ORAL at 17:29

## 2020-10-14 RX ADMIN — HYDROMORPHONE HYDROCHLORIDE 1 MILLIGRAM(S): 2 INJECTION INTRAMUSCULAR; INTRAVENOUS; SUBCUTANEOUS at 16:22

## 2020-10-14 RX ADMIN — BUPROPION HYDROCHLORIDE 300 MILLIGRAM(S): 150 TABLET, EXTENDED RELEASE ORAL at 11:40

## 2020-10-14 RX ADMIN — CEFEPIME 100 MILLIGRAM(S): 1 INJECTION, POWDER, FOR SOLUTION INTRAMUSCULAR; INTRAVENOUS at 03:14

## 2020-10-14 RX ADMIN — Medication 100 MILLIGRAM(S): at 06:49

## 2020-10-14 RX ADMIN — HEPARIN SODIUM 5000 UNIT(S): 5000 INJECTION INTRAVENOUS; SUBCUTANEOUS at 06:49

## 2020-10-14 RX ADMIN — SODIUM CHLORIDE 75 MILLILITER(S): 9 INJECTION, SOLUTION INTRAVENOUS at 20:46

## 2020-10-14 RX ADMIN — ERTAPENEM SODIUM 120 MILLIGRAM(S): 1 INJECTION, POWDER, LYOPHILIZED, FOR SOLUTION INTRAMUSCULAR; INTRAVENOUS at 16:31

## 2020-10-14 RX ADMIN — Medication 125 MICROGRAM(S): at 06:49

## 2020-10-14 RX ADMIN — Medication 250 MILLIGRAM(S): at 06:49

## 2020-10-14 RX ADMIN — PANTOPRAZOLE SODIUM 40 MILLIGRAM(S): 20 TABLET, DELAYED RELEASE ORAL at 06:49

## 2020-10-14 RX ADMIN — HYDROMORPHONE HYDROCHLORIDE 1 MILLIGRAM(S): 2 INJECTION INTRAMUSCULAR; INTRAVENOUS; SUBCUTANEOUS at 07:51

## 2020-10-14 RX ADMIN — SODIUM CHLORIDE 75 MILLILITER(S): 9 INJECTION, SOLUTION INTRAVENOUS at 03:25

## 2020-10-14 NOTE — CONSULT NOTE ADULT - ATTENDING COMMENTS
above noted discussed case with surgical resident abdomen soft tender LLQ
54yFemale with a past medical history of HTN, COPD not on home O2, nasopharyngeal carcinoma s/p radiation in 2013, recurrent diverticulitis more than 6 episodes in the last 10 years. Failed multiple courses of ABx. CTAP with continued mild diverticulitis.     #Sigmoid diveriticulitis  #Super Morbid Obesity BMI 51    - IV erta 1g q24h IV via midline  - Check ESR, CRP  - Surgery f/u  - ID follow-up with Dr. Alphonse Robison Tuesday 10/27 for Telehealth. We will call the patient between 10:30-6:30      [id number] [facility]       [phone]       Fax [phone]

## 2020-10-14 NOTE — PROGRESS NOTE ADULT - ASSESSMENT
55 yo female KTH:  1. HTN  2. COPD not on home O2  3. nasopharyngeal carcinoma s/p radiation in 2013 in remission  4. recurrent diverticulitis more than 6 episodes in the last 10 years    admitted for uncomplicated sigmoid diverticulitis    #Acute uncomplicated sigmoid diverticulitis  -h/o multiple recurrences  - Cont cefepime 1g q8h and flagyl 500mg q8h  - No intervention for now per Sx  - Pain control PRN and bowel regimen for constipation treatment added  - Diet advanced to mechanically soft today  - f/u with ID for d/c antibiotics plan  - colonoscopy in 6-12 weeks as per GI  - patient agreeable now to large bowel colectomy when symptoms resolve  - add semethecone for abdominal distension    #HTN  - Cont enalapril 10mg qD    #COPD  - Cont spiriva/symbicort  - Cont montelukast 10mg qD  - Duonebs PRN    #Anxiety  - Klonopin 1mg BID  - Cont buproprion XL 300mg qD      DVT PPX: Heparin   Diet: clear fluids  Code: Full  Dispo: home, pending medical clearance

## 2020-10-14 NOTE — CONSULT NOTE ADULT - SUBJECTIVE AND OBJECTIVE BOX
GEOVANNA PUTNAM  54y, Female  Allergy: ciprofloxacin (Urticaria; Other)      CHIEF COMPLAINT: Abdominal pain (14 Oct 2020 10:48)      HPI:  54yFemale with a past medical history of HTN, COPD not on home O2, nasopharyngeal carcinoma s/p radiation in 2013, recurrent diverticulitis more than 6 episodes in the last 10 years. Patient reports that she has been experiencing abdominal pain in the LLQ that is non-radiating in nature and worse with movement. Patient was recently discharged from the hospital with similar symptoms- admission was on 10/1. Patient reports that she continues to experience pain in the LLQ associated with nausea and vomiting. CT showed mild/acute uncomplicated diverticulitis at proximal sigmoid colon. Patient was admitted to medicine for recurrent diverticulitis and IV antibiotics for management     Today is hospital day 2 and patient is sitting in bed resting comofrtably. No acute overnight events were reported by team. Patient is able to carry out a conversation and speak in complete sentences. Patient reports that she is nauseous this AM but reports that the last bout of emesis was last night (10/13). Patient still had abdominal pain in the LLQ otherwise patient does not have any associated symptoms. Patient denies fevers, chills, back pain, vomiting, dysuria or bloody stool.       Infectious Diseases History:  Old Micro Data/Cultures:     FAMILY HISTORY:  Family history of lung cancer (Father, Mother)      PAST MEDICAL & SURGICAL HISTORY:  Anxiety    Hemorrhoids    Left ear hearing loss    Cervical disc disease  sequelae of radtion for nasopharyngeal cancer    Hypothyroid    Nasopharyngeal cancer    Pulmonary nodules/lesions, multiple    Diverticulitis    HTN (hypertension)    COPD (chronic obstructive pulmonary disease)    History of cholecystectomy        SOCIAL HISTORY  Social History:  Restarted smoking in February, about a pack a week  social alcohol use  no illicit drug abuse (12 Oct 2020 08:31)      Recent Travel:  Other Exposures:     ROS  General: Denies rigors, nightsweats  HEENT: Denies headache, rhinorrhea, sore throat, eye pain  CV: Denies CP, palpitations  PULM: Denies wheezing, hemoptysis  GI: Denies hematemesis, hematochezia, melena  : Denies discharge, hematuria  MSK: Denies arthralgias, myalgias  SKIN: Denies rash, lesions  NEURO: Denies paresthesias, weakness  PSYCH: Denies depression, anxiety    VITALS:  T(F): 98.5, Max: 98.7 (10-13-20 @ 20:08)  HR: 79  BP: 158/73  RR: 20Vital Signs Last 24 Hrs  T(C): 36.9 (14 Oct 2020 05:12), Max: 37.1 (13 Oct 2020 20:08)  T(F): 98.5 (14 Oct 2020 05:12), Max: 98.7 (13 Oct 2020 20:08)  HR: 79 (14 Oct 2020 12:40) (79 - 85)  BP: 158/73 (14 Oct 2020 12:40) (147/72 - 158/73)  BP(mean): --  RR: 20 (14 Oct 2020 12:40) (20 - 20)  SpO2: 92% (14 Oct 2020 08:16) (92% - 92%)    PHYSICAL EXAM:  Gen: NAD, resting in bed  HEENT: Normocephalic, atraumatic  Neck: supple, no lymphadenopathy  CV: Regular rate & regular rhythm  Lungs: CTAB  Abdomen: Soft, BS present. LLQ tenderness to palpation.   Ext: Warm, well perfused  Neuro: non focal, awake  Skin: no rash, no lesions  Lines: no phlebitis    TESTS & MEASUREMENTS:                        10.7   8.46  )-----------( 349      ( 14 Oct 2020 07:21 )             35.1     10-14    135  |  100  |  11  ----------------------------<  119<H>  3.9   |  24  |  1.0    Ca    8.9      14 Oct 2020 07:21  Mg     1.8     10-13    TPro  6.3  /  Alb  3.6  /  TBili  <0.2  /  DBili  x   /  AST  18  /  ALT  18  /  AlkPhos  69  10-14    eGFR if Non African American: 63 mL/min/1.73M2 (10-14-20 @ 07:21)  eGFR if : 73 mL/min/1.73M2 (10-14-20 @ 07:21)    LIVER FUNCTIONS - ( 14 Oct 2020 07:21 )  Alb: 3.6 g/dL / Pro: 6.3 g/dL / ALK PHOS: 69 U/L / ALT: 18 U/L / AST: 18 U/L / GGT: x               Culture - Urine (collected 10-12-20 @ 01:00)  Source: .Urine Clean Catch (Midstream)  Final Report (10-13-20 @ 09:59):    No growth        Lactate, Blood: 0.7 mmol/L (10-13-20 @ 07:14)  Lactate, Blood: 1.1 mmol/L (10-11-20 @ 22:46)      INFECTIOUS DISEASES TESTING      RADIOLOGY & ADDITIONAL TESTS:  I have personally reviewed the last available Chest xray  CXR      CT  CT Abdomen and Pelvis w/ IV Cont:   EXAM:  CT ABDOMEN AND PELVIS IC        IMPRESSION:  Findings compatible with a mild/uncomplicated acute diverticulitis at the proximal sigmoid colon        CARDIOLOGY TESTING  12 Lead ECG:   Ventricular Rate 88 BPM    Atrial Rate 88 BPM    P-R Interval 160 ms    QRS Duration 94 ms    Q-T Interval 400 ms    QTC Calculation(Bazett) 484 ms    P Axis 70 degrees    R Axis -8 degrees    T Axis 99 degrees    Diagnosis Line Normal sinus rhythm  Incomplete right bundle branch block  T wave abnormality, consider lateral ischemia  Abnormal ECG    Confirmed by Luca Small ([id number]) on 10/1/2020 11:33:39 PM (10-01-20 @ 19:36)      All available historical records have been reviewed    MEDICATIONS  buPROPion XL . 300  cefepime   IVPB 1000  chlorhexidine 4% Liquid 1  clonazePAM  Tablet 1  enalapril 10  heparin   Injectable 5000  lactated ringers. 1000  levothyroxine 125  metroNIDAZOLE  IVPB 500  montelukast 10  pantoprazole    Tablet 40  polyethylene glycol 3350 17  saccharomyces boulardii 250  senna 2  simethicone 80  tiotropium 18 MICROgram(s) Capsule 1      ANTIBIOTICS:  cefepime   IVPB 1000 milliGRAM(s) IV Intermittent every 8 hours  metroNIDAZOLE  IVPB 500 milliGRAM(s) IV Intermittent every 8 hours      All available historical data has been reviewed    ASSESSMENT  54yFemale with a past medical history of HTN, COPD not on home O2, nasopharyngeal carcinoma s/p radiation in 2013, recurrent diverticulitis more than 6 episodes in the last 10 years. Patient is currently admitted to medicine with a diagnosis of recurrent diverticulitis. CT shows mild/acute uncomplicated diverticulitis at the proximal sigmoid colon. Patient is recieving IV antibiotics of cefepime 1g q8h and flagyl 500mg q8h.     IMPRESSION  # (?) Sepsis on admission (2 or more of the following T<96.8F, T>101F, Pulse>90, Resp Rate>20, WBC>12, wbc<4, Bands>10%), PLUS (+) lactic acidosis, OR metabolic encephalopathy, OR LAKISHA, OR bacteremia . Otherwise just SIRS on admission, sepsis ruled out  #  #    RECOMMENDATIONS  - f/u pending cultures  - ***    This is a pended note. All final recommendations to follow pending discussion with ID Attending      GEOVANNA PUTNAM  54y, Female  Allergy: ciprofloxacin (Urticaria; Other)      CHIEF COMPLAINT: Abdominal pain (14 Oct 2020 10:48)      HPI:  54yFemale with a past medical history of HTN, COPD not on home O2, nasopharyngeal carcinoma s/p radiation in 2013, recurrent diverticulitis more than 6 episodes in the last 10 years. Patient reports that she has been experiencing abdominal pain in the LLQ that is non-radiating in nature and worse with movement. Patient was recently discharged from the hospital with similar symptoms- admission was on 10/1. Patient reports that she continues to experience pain in the LLQ associated with nausea and vomiting. CT showed mild/acute uncomplicated diverticulitis at proximal sigmoid colon similar to CT finding on previous admission on 10/1. Patient was admitted to medicine for recurrent diverticulitis and IV antibiotics for management     Today is hospital day 2 and patient is sitting in bed resting comfortably No acute overnight events were reported by team. Patient is able to carry out a conversation and speak in complete sentences. Patient reports that she is nauseous this AM but reports that the last bout of emesis was last night (10/13). Patient still had abdominal pain in the LLQ otherwise patient does not have any associated symptoms. Patient denies fevers, chills, back pain, vomiting, dysuria or bloody stool.       Infectious Diseases History:  Old Micro Data/Cultures:     FAMILY HISTORY:  Family history of lung cancer (Father, Mother)      PAST MEDICAL & SURGICAL HISTORY:  Anxiety    Hemorrhoids    Left ear hearing loss    Cervical disc disease  sequelae of radtion for nasopharyngeal cancer    Hypothyroid    Nasopharyngeal cancer    Pulmonary nodules/lesions, multiple    Diverticulitis    HTN (hypertension)    COPD (chronic obstructive pulmonary disease)    History of cholecystectomy        SOCIAL HISTORY  Social History:  Restarted smoking in February, about a pack a week  social alcohol use  no illicit drug abuse (12 Oct 2020 08:31)      Recent Travel:  Other Exposures:     ROS  General: Denies rigors, nightsweats  HEENT: Denies headache, rhinorrhea, sore throat, eye pain  CV: Denies CP, palpitations  PULM: Denies wheezing, hemoptysis  GI: Denies hematemesis, hematochezia, melena  : Denies discharge, hematuria  MSK: Denies arthralgias, myalgias  SKIN: Denies rash, lesions  NEURO: Denies paresthesias, weakness  PSYCH: Denies depression, anxiety    VITALS:  T(F): 98.5, Max: 98.7 (10-13-20 @ 20:08)  HR: 79  BP: 158/73  RR: 20Vital Signs Last 24 Hrs  T(C): 36.9 (14 Oct 2020 05:12), Max: 37.1 (13 Oct 2020 20:08)  T(F): 98.5 (14 Oct 2020 05:12), Max: 98.7 (13 Oct 2020 20:08)  HR: 79 (14 Oct 2020 12:40) (79 - 85)  BP: 158/73 (14 Oct 2020 12:40) (147/72 - 158/73)  BP(mean): --  RR: 20 (14 Oct 2020 12:40) (20 - 20)  SpO2: 92% (14 Oct 2020 08:16) (92% - 92%)    PHYSICAL EXAM:  Gen: NAD, resting in bed  HEENT: Normocephalic, atraumatic  Neck: supple, no lymphadenopathy  CV: Regular rate & regular rhythm  Lungs: CTAB  Abdomen: Soft, BS present. LLQ tenderness to palpation.   Ext: Warm, well perfused  Neuro: non focal, awake  Skin: no rash, no lesions  Lines: no phlebitis    TESTS & MEASUREMENTS:                        10.7   8.46  )-----------( 349      ( 14 Oct 2020 07:21 )             35.1     10-14    135  |  100  |  11  ----------------------------<  119<H>  3.9   |  24  |  1.0    Ca    8.9      14 Oct 2020 07:21  Mg     1.8     10-13    TPro  6.3  /  Alb  3.6  /  TBili  <0.2  /  DBili  x   /  AST  18  /  ALT  18  /  AlkPhos  69  10-14    eGFR if Non African American: 63 mL/min/1.73M2 (10-14-20 @ 07:21)  eGFR if : 73 mL/min/1.73M2 (10-14-20 @ 07:21)    LIVER FUNCTIONS - ( 14 Oct 2020 07:21 )  Alb: 3.6 g/dL / Pro: 6.3 g/dL / ALK PHOS: 69 U/L / ALT: 18 U/L / AST: 18 U/L / GGT: x               Culture - Urine (collected 10-12-20 @ 01:00)  Source: .Urine Clean Catch (Midstream)  Final Report (10-13-20 @ 09:59):    No growth        Lactate, Blood: 0.7 mmol/L (10-13-20 @ 07:14)  Lactate, Blood: 1.1 mmol/L (10-11-20 @ 22:46)      INFECTIOUS DISEASES TESTING      RADIOLOGY & ADDITIONAL TESTS:  I have personally reviewed the last available Chest xray  CXR      CT  CT Abdomen and Pelvis w/ IV Cont:   EXAM:  CT ABDOMEN AND PELVIS IC        IMPRESSION:  Findings compatible with a mild/uncomplicated acute diverticulitis at the proximal sigmoid colon        CARDIOLOGY TESTING  12 Lead ECG:   Ventricular Rate 88 BPM    Atrial Rate 88 BPM    P-R Interval 160 ms    QRS Duration 94 ms    Q-T Interval 400 ms    QTC Calculation(Bazett) 484 ms    P Axis 70 degrees    R Axis -8 degrees    T Axis 99 degrees    Diagnosis Line Normal sinus rhythm  Incomplete right bundle branch block  T wave abnormality, consider lateral ischemia  Abnormal ECG    Confirmed by Luca Small ([id number]) on 10/1/2020 11:33:39 PM (10-01-20 @ 19:36)      All available historical records have been reviewed    MEDICATIONS  buPROPion XL . 300  cefepime   IVPB 1000  chlorhexidine 4% Liquid 1  clonazePAM  Tablet 1  enalapril 10  heparin   Injectable 5000  lactated ringers. 1000  levothyroxine 125  metroNIDAZOLE  IVPB 500  montelukast 10  pantoprazole    Tablet 40  polyethylene glycol 3350 17  saccharomyces boulardii 250  senna 2  simethicone 80  tiotropium 18 MICROgram(s) Capsule 1      ANTIBIOTICS:  cefepime   IVPB 1000 milliGRAM(s) IV Intermittent every 8 hours  metroNIDAZOLE  IVPB 500 milliGRAM(s) IV Intermittent every 8 hours      All available historical data has been reviewed    ASSESSMENT  54yFemale with a past medical history of HTN, COPD not on home O2, nasopharyngeal carcinoma s/p radiation in 2013, recurrent diverticulitis more than 6 episodes in the last 10 years. Patient is currently admitted to medicine with a diagnosis of recurrent diverticulitis. CT shows mild/acute uncomplicated diverticulitis at the proximal sigmoid colon. Patient is recieving IV antibiotics of cefepime 1g q8h and flagyl 500mg q8h.     IMPRESSION  #Acute recurrent uncomplicated sigmoid diverticulitis   - NOT septic on admission- afebrile since admission no leukocytosis or tachycardia   - recurrent episodes of diverticulitis 6+ in the past 10 years   - CT on 10/12 shows mild/acute diverticulitis at the proximal sigmoid colon similar to CT last admission on 10/1  - failed PO treatment 2x and cannot use ciprofloxacin due to patient allergy  - patient advised to follow up with surgery outpatient to eliminate recurrence.       RECOMMENDATIONS  - midline placement   - start ertapenem 1g IV daily for 14 days due to failed PO abx treatment x2  - obtain ESR/CRP result and trend as outpatient   - will set up outpatient follow up with ID on 10/27    This is a pended note. All final recommendations to follow pending discussion with ID Attending      GEOVANNA PUTNAM  54y, Female  Allergy: ciprofloxacin (Urticaria; Other)      CHIEF COMPLAINT: Abdominal pain (14 Oct 2020 10:48)      HPI:  54yFemale with a past medical history of HTN, COPD not on home O2, nasopharyngeal carcinoma s/p radiation in 2013, recurrent diverticulitis more than 6 episodes in the last 10 years. Patient reports that she has been experiencing abdominal pain in the LLQ that is non-radiating in nature and worse with movement. Patient was recently discharged from the hospital with similar symptoms- admission was on 10/1. Patient reports that she continues to experience pain in the LLQ associated with nausea and vomiting. CT showed mild/acute uncomplicated diverticulitis at proximal sigmoid colon similar to CT finding on previous admission on 10/1. Patient was admitted to medicine for recurrent diverticulitis and IV antibiotics for management     Today is hospital day 2 and patient is sitting in bed resting comfortably No acute overnight events were reported by team. Patient is able to carry out a conversation and speak in complete sentences. Patient reports that she is nauseous this AM but reports that the last bout of emesis was last night (10/13). Patient still had abdominal pain in the LLQ otherwise patient does not have any associated symptoms. Patient denies fevers, chills, back pain, vomiting, dysuria or bloody stool.       Infectious Diseases History:  Old Micro Data/Cultures: NA    FAMILY HISTORY:  Family history of lung cancer (Father, Mother)      PAST MEDICAL & SURGICAL HISTORY:  Anxiety    Hemorrhoids    Left ear hearing loss    Cervical disc disease  sequelae of radtion for nasopharyngeal cancer    Hypothyroid    Nasopharyngeal cancer    Pulmonary nodules/lesions, multiple    Diverticulitis    HTN (hypertension)    COPD (chronic obstructive pulmonary disease)    History of cholecystectomy        SOCIAL HISTORY  Social History:  Restarted smoking in February, about a pack a week  social alcohol use  no illicit drug abuse (12 Oct 2020 08:31)      Recent Travel:  Other Exposures:     ROS  General: Denies rigors, nightsweats  HEENT: Denies headache, rhinorrhea, sore throat, eye pain  CV: Denies CP, palpitations  PULM: Denies wheezing, hemoptysis  GI: Denies hematemesis, hematochezia, melena  : Denies discharge, hematuria  MSK: Denies arthralgias, myalgias  SKIN: Denies rash, lesions  NEURO: Denies paresthesias, weakness  PSYCH: Denies depression, anxiety    VITALS:  T(F): 98.5, Max: 98.7 (10-13-20 @ 20:08)  HR: 79  BP: 158/73  RR: 20Vital Signs Last 24 Hrs  T(C): 36.9 (14 Oct 2020 05:12), Max: 37.1 (13 Oct 2020 20:08)  T(F): 98.5 (14 Oct 2020 05:12), Max: 98.7 (13 Oct 2020 20:08)  HR: 79 (14 Oct 2020 12:40) (79 - 85)  BP: 158/73 (14 Oct 2020 12:40) (147/72 - 158/73)  BP(mean): --  RR: 20 (14 Oct 2020 12:40) (20 - 20)  SpO2: 92% (14 Oct 2020 08:16) (92% - 92%)    PHYSICAL EXAM:  Gen: NAD, resting in bed  HEENT: Normocephalic, atraumatic  Neck: supple, no lymphadenopathy  CV: Regular rate & regular rhythm  Lungs: CTAB  Abdomen: Soft, BS present. LLQ tenderness to palpation.   Ext: Warm, well perfused  Neuro: non focal, awake  Skin: no rash, no lesions  Lines: no phlebitis    TESTS & MEASUREMENTS:                        10.7   8.46  )-----------( 349      ( 14 Oct 2020 07:21 )             35.1     10-14    135  |  100  |  11  ----------------------------<  119<H>  3.9   |  24  |  1.0    Ca    8.9      14 Oct 2020 07:21  Mg     1.8     10-13    TPro  6.3  /  Alb  3.6  /  TBili  <0.2  /  DBili  x   /  AST  18  /  ALT  18  /  AlkPhos  69  10-14    eGFR if Non African American: 63 mL/min/1.73M2 (10-14-20 @ 07:21)  eGFR if : 73 mL/min/1.73M2 (10-14-20 @ 07:21)    LIVER FUNCTIONS - ( 14 Oct 2020 07:21 )  Alb: 3.6 g/dL / Pro: 6.3 g/dL / ALK PHOS: 69 U/L / ALT: 18 U/L / AST: 18 U/L / GGT: x               Culture - Urine (collected 10-12-20 @ 01:00)  Source: .Urine Clean Catch (Midstream)  Final Report (10-13-20 @ 09:59):    No growth        Lactate, Blood: 0.7 mmol/L (10-13-20 @ 07:14)  Lactate, Blood: 1.1 mmol/L (10-11-20 @ 22:46)      INFECTIOUS DISEASES TESTING      RADIOLOGY & ADDITIONAL TESTS:  I have personally reviewed the last available Chest xray  CXR      CT  CT Abdomen and Pelvis w/ IV Cont:   EXAM:  CT ABDOMEN AND PELVIS IC        IMPRESSION:  Findings compatible with a mild/uncomplicated acute diverticulitis at the proximal sigmoid colon        CARDIOLOGY TESTING  12 Lead ECG:   Ventricular Rate 88 BPM    Atrial Rate 88 BPM    P-R Interval 160 ms    QRS Duration 94 ms    Q-T Interval 400 ms    QTC Calculation(Bazett) 484 ms    P Axis 70 degrees    R Axis -8 degrees    T Axis 99 degrees    Diagnosis Line Normal sinus rhythm  Incomplete right bundle branch block  T wave abnormality, consider lateral ischemia  Abnormal ECG    Confirmed by Luca Small ([id number]) on 10/1/2020 11:33:39 PM (10-01-20 @ 19:36)      All available historical records have been reviewed    MEDICATIONS  buPROPion XL . 300  cefepime   IVPB 1000  chlorhexidine 4% Liquid 1  clonazePAM  Tablet 1  enalapril 10  heparin   Injectable 5000  lactated ringers. 1000  levothyroxine 125  metroNIDAZOLE  IVPB 500  montelukast 10  pantoprazole    Tablet 40  polyethylene glycol 3350 17  saccharomyces boulardii 250  senna 2  simethicone 80  tiotropium 18 MICROgram(s) Capsule 1      ANTIBIOTICS:  cefepime   IVPB 1000 milliGRAM(s) IV Intermittent every 8 hours  metroNIDAZOLE  IVPB 500 milliGRAM(s) IV Intermittent every 8 hours      All available historical data has been reviewed

## 2020-10-14 NOTE — PROGRESS NOTE ADULT - ASSESSMENT
Patient is a 54 year old male with diverticulitis    NPO- bowel rest  Pain management  IVF  IV ABX cefepime flagyl   Follow up ID

## 2020-10-14 NOTE — PROGRESS NOTE ADULT - SUBJECTIVE AND OBJECTIVE BOX
24H events:    Patient is a 54y old Female who presents with a chief complaint of Abdominal pain (14 Oct 2020 01:07)    Primary diagnosis of Diverticulitis of large intestine with complication       Today is hospital day 2d. This morning patient was seen and examined at bedside, resting comfortably in bed.    No acute or major events overnight.    PAST MEDICAL & SURGICAL HISTORY  Anxiety    Hemorrhoids    Left ear hearing loss    Cervical disc disease  sequelae of radtion for nasopharyngeal cancer    Hypothyroid    Nasopharyngeal cancer    Pulmonary nodules/lesions, multiple    Diverticulitis    HTN (hypertension)    COPD (chronic obstructive pulmonary disease)    History of cholecystectomy      SOCIAL HISTORY:  Social History:  Restarted smoking in February, about a pack a week  social alcohol use  no illicit drug abuse (12 Oct 2020 08:31)      ALLERGIES:  ciprofloxacin (Urticaria; Other)    MEDICATIONS:  STANDING MEDICATIONS  buPROPion XL . 300 milliGRAM(s) Oral daily  cefepime   IVPB 1000 milliGRAM(s) IV Intermittent every 8 hours  chlorhexidine 4% Liquid 1 Application(s) Topical <User Schedule>  clonazePAM  Tablet 1 milliGRAM(s) Oral two times a day  enalapril 10 milliGRAM(s) Oral daily  heparin   Injectable 5000 Unit(s) SubCutaneous every 12 hours  lactated ringers. 1000 milliLiter(s) IV Continuous <Continuous>  levothyroxine 125 MICROGram(s) Oral daily  metroNIDAZOLE  IVPB 500 milliGRAM(s) IV Intermittent every 8 hours  montelukast 10 milliGRAM(s) Oral daily  pantoprazole    Tablet 40 milliGRAM(s) Oral before breakfast  polyethylene glycol 3350 17 Gram(s) Oral daily  saccharomyces boulardii 250 milliGRAM(s) Oral two times a day  senna 2 Tablet(s) Oral at bedtime  simethicone 80 milliGRAM(s) Chew every 6 hours  tiotropium 18 MICROgram(s) Capsule 1 Capsule(s) Inhalation daily    PRN MEDICATIONS  acetaminophen   Tablet .. 650 milliGRAM(s) Oral every 6 hours PRN  ALBUTerol    90 MICROgram(s) HFA Inhaler 2 Puff(s) Inhalation every 6 hours PRN  HYDROmorphone  Injectable 1 milliGRAM(s) IV Push every 4 hours PRN  methocarbamol 750 milliGRAM(s) Oral every 8 hours PRN  ondansetron Injectable 4 milliGRAM(s) IV Push every 6 hours PRN  oxycodone    5 mG/acetaminophen 325 mG 1 Tablet(s) Oral every 6 hours PRN  sodium chloride 0.65% Nasal 1 Spray(s) Both Nostrils every 6 hours PRN  zolpidem 5 milliGRAM(s) Oral at bedtime PRN    VITALS:   T(F): 98.5  HR: 85  BP: 147/72  RR: 20  SpO2: 92%    PHYSICAL EXAM:  GENERAL: NAD, well-groomed, well-developed  HEAD:  Atraumatic, Normocephalic  EYES: EOMI  NECK: Supple  NERVOUS SYSTEM:  Alert & Oriented X3, non focal   CHEST/LUNG: Clear to auscultation bilaterally; No rales, rhonchi, wheezing, or rubs  HEART: Regular rate and rhythm; No murmurs, rubs, or gallops  ABDOMEN: Soft, Nondistended; Bowel sounds present, LLQ tenderness  EXTREMITIES:  2+ Peripheral Pulses, No clubbing, cyanosis, or edema  LYMPH: No lymphadenopathy noted  SKIN: No rashes or lesions  LABS:                        10.7   8.46  )-----------( 349      ( 14 Oct 2020 07:21 )             35.1     10-14    135  |  100  |  11  ----------------------------<  119<H>  3.9   |  24  |  1.0    Ca    8.9      14 Oct 2020 07:21  Mg     1.8     10-13    TPro  6.3  /  Alb  3.6  /  TBili  <0.2  /  DBili  x   /  AST  18  /  ALT  18  /  AlkPhos  69  10-14              Culture - Urine (collected 12 Oct 2020 01:00)  Source: .Urine Clean Catch (Midstream)  Final Report (13 Oct 2020 09:59):    No growth          RADIOLOGY:

## 2020-10-14 NOTE — PROGRESS NOTE ADULT - SUBJECTIVE AND OBJECTIVE BOX
Hospital Day: 3  Post Operative Day:  Procedure:  Patient is a 54y old  Female who presents with a chief complaint of Abdominal pain (13 Oct 2020 14:56)    PAST MEDICAL & SURGICAL HISTORY:  Anxiety    Hemorrhoids    Left ear hearing loss    Cervical disc disease  sequelae of radtion for nasopharyngeal cancer    Hypothyroid    Nasopharyngeal cancer    Pulmonary nodules/lesions, multiple    Diverticulitis    HTN (hypertension)    COPD (chronic obstructive pulmonary disease)    History of cholecystectomy        Events of the Last 24h:  Vital Signs Last 24 Hrs  T(C): 37.1 (13 Oct 2020 20:08), Max: 37.1 (13 Oct 2020 20:08)  T(F): 98.7 (13 Oct 2020 20:08), Max: 98.7 (13 Oct 2020 20:08)  HR: 84 (13 Oct 2020 20:08) (83 - 87)  BP: 150/71 (13 Oct 2020 20:08) (135/59 - 157/70)  BP(mean): --  RR: 20 (13 Oct 2020 20:08) (20 - 20)  SpO2: --        Diet, Clear Liquid (10-13-20 @ 09:58)      I&O's Summary   I&O's Detail      MEDICATIONS  (STANDING):  buPROPion XL . 300 milliGRAM(s) Oral daily  cefepime   IVPB 1000 milliGRAM(s) IV Intermittent every 8 hours  chlorhexidine 4% Liquid 1 Application(s) Topical <User Schedule>  clonazePAM  Tablet 1 milliGRAM(s) Oral two times a day  enalapril 10 milliGRAM(s) Oral daily  heparin   Injectable 5000 Unit(s) SubCutaneous every 12 hours  lactated ringers. 1000 milliLiter(s) (75 mL/Hr) IV Continuous <Continuous>  levothyroxine 125 MICROGram(s) Oral daily  metroNIDAZOLE  IVPB 500 milliGRAM(s) IV Intermittent every 8 hours  montelukast 10 milliGRAM(s) Oral daily  pantoprazole    Tablet 40 milliGRAM(s) Oral before breakfast  polyethylene glycol 3350 17 Gram(s) Oral daily  saccharomyces boulardii 250 milliGRAM(s) Oral two times a day  senna 2 Tablet(s) Oral at bedtime  tiotropium 18 MICROgram(s) Capsule 1 Capsule(s) Inhalation daily    MEDICATIONS  (PRN):  acetaminophen   Tablet .. 650 milliGRAM(s) Oral every 6 hours PRN Mild Pain (1 - 3)  ALBUTerol    90 MICROgram(s) HFA Inhaler 2 Puff(s) Inhalation every 6 hours PRN Shortness of Breath and/or Wheezing  HYDROmorphone  Injectable 1 milliGRAM(s) IV Push every 4 hours PRN Severe Pain (7 - 10)  methocarbamol 750 milliGRAM(s) Oral every 8 hours PRN muescel spasm  ondansetron Injectable 4 milliGRAM(s) IV Push every 6 hours PRN Nausea and/or Vomiting  oxycodone    5 mG/acetaminophen 325 mG 1 Tablet(s) Oral every 6 hours PRN Moderate Pain (4 - 6)  sodium chloride 0.65% Nasal 1 Spray(s) Both Nostrils every 6 hours PRN Nasal Congestion  zolpidem 5 milliGRAM(s) Oral at bedtime PRN Insomnia      PHYSICAL EXAM:    GENERAL: NAD    HEENT: NCAT    CHEST/LUNGS: CTAB    HEART: RRR,  No murmurs, rubs, or gallops    ABDOMEN: SNTND +BS    EXTREMITIES:  FROM, No clubbing, cyanosis, or edema, palpable pulse    NEURO: No focal neurological deficits    SKIN: No rashes or lesions    INCISION/WOUNDS:                          10.7   6.40  )-----------( 322      ( 13 Oct 2020 07:14 )             35.2        CBC Full  -  ( 13 Oct 2020 07:14 )  WBC Count : 6.40 K/uL  RBC Count : 4.07 M/uL  Hemoglobin : 10.7 g/dL  Hematocrit : 35.2 %  Platelet Count - Automated : 322 K/uL  Mean Cell Volume : 86.5 fL  Mean Cell Hemoglobin : 26.3 pg  Mean Cell Hemoglobin Concentration : 30.4 g/dL  Auto Neutrophil # : 4.47 K/uL  Auto Lymphocyte # : 1.16 K/uL  Auto Monocyte # : 0.44 K/uL  Auto Eosinophil # : 0.26 K/uL  Auto Basophil # : 0.04 K/uL  Auto Neutrophil % : 69.8 %  Auto Lymphocyte % : 18.1 %  Auto Monocyte % : 6.9 %  Auto Eosinophil % : 4.1 %  Auto Basophil % : 0.6 %               139   |  104   |  12                 Ca: 8.6    BMP:   ----------------------------< 116    M.8   (10-13-20 @ 07:14)             4.3    |  27    | 0.9                Ph: x        LFT:     TPro: 5.8 / Alb: 3.6 / TBili: 0.7 / DBili: x / AST: 19 / ALT: 20 / AlkPhos: 68   (10-13-20 @ 07:14)    LIVER FUNCTIONS - ( 13 Oct 2020 07:14 )  Alb: 3.6 g/dL / Pro: 5.8 g/dL / ALK PHOS: 68 U/L / ALT: 20 U/L / AST: 19 U/L / GGT: x                     Culture - Urine (collected 12 Oct 2020 01:00)  Source: .Urine Clean Catch (Midstream)  Final Report (13 Oct 2020 09:59):    No growth      r< from: CT Abdomen and Pelvis w/ IV Cont (10.12.20 @ 01:06) >    IMPRESSION:    Findings compatible with a mild/uncomplicated acute diverticulitis at the proximal sigmoid colon

## 2020-10-15 ENCOUNTER — TRANSCRIPTION ENCOUNTER (OUTPATIENT)
Age: 55
End: 2020-10-15

## 2020-10-15 LAB
ALBUMIN SERPL ELPH-MCNC: 3.5 G/DL — SIGNIFICANT CHANGE UP (ref 3.5–5.2)
ALP SERPL-CCNC: 64 U/L — SIGNIFICANT CHANGE UP (ref 30–115)
ALT FLD-CCNC: 20 U/L — SIGNIFICANT CHANGE UP (ref 0–41)
ANION GAP SERPL CALC-SCNC: 10 MMOL/L — SIGNIFICANT CHANGE UP (ref 7–14)
AST SERPL-CCNC: 26 U/L — SIGNIFICANT CHANGE UP (ref 0–41)
BASOPHILS # BLD AUTO: 0.03 K/UL — SIGNIFICANT CHANGE UP (ref 0–0.2)
BASOPHILS NFR BLD AUTO: 0.5 % — SIGNIFICANT CHANGE UP (ref 0–1)
BILIRUB SERPL-MCNC: <0.2 MG/DL — SIGNIFICANT CHANGE UP (ref 0.2–1.2)
BUN SERPL-MCNC: 11 MG/DL — SIGNIFICANT CHANGE UP (ref 10–20)
CALCIUM SERPL-MCNC: 8.8 MG/DL — SIGNIFICANT CHANGE UP (ref 8.5–10.1)
CHLORIDE SERPL-SCNC: 103 MMOL/L — SIGNIFICANT CHANGE UP (ref 98–110)
CO2 SERPL-SCNC: 27 MMOL/L — SIGNIFICANT CHANGE UP (ref 17–32)
CREAT SERPL-MCNC: 0.9 MG/DL — SIGNIFICANT CHANGE UP (ref 0.7–1.5)
EOSINOPHIL # BLD AUTO: 0.25 K/UL — SIGNIFICANT CHANGE UP (ref 0–0.7)
EOSINOPHIL NFR BLD AUTO: 3.8 % — SIGNIFICANT CHANGE UP (ref 0–8)
ERYTHROCYTE [SEDIMENTATION RATE] IN BLOOD: 52 MM/HR — HIGH (ref 0–20)
GLUCOSE SERPL-MCNC: 119 MG/DL — HIGH (ref 70–99)
HCT VFR BLD CALC: 33.7 % — LOW (ref 37–47)
HGB BLD-MCNC: 10.3 G/DL — LOW (ref 12–16)
IMM GRANULOCYTES NFR BLD AUTO: 0.5 % — HIGH (ref 0.1–0.3)
LYMPHOCYTES # BLD AUTO: 1.34 K/UL — SIGNIFICANT CHANGE UP (ref 1.2–3.4)
LYMPHOCYTES # BLD AUTO: 20.3 % — LOW (ref 20.5–51.1)
MCHC RBC-ENTMCNC: 25.8 PG — LOW (ref 27–31)
MCHC RBC-ENTMCNC: 30.6 G/DL — LOW (ref 32–37)
MCV RBC AUTO: 84.3 FL — SIGNIFICANT CHANGE UP (ref 81–99)
MONOCYTES # BLD AUTO: 0.48 K/UL — SIGNIFICANT CHANGE UP (ref 0.1–0.6)
MONOCYTES NFR BLD AUTO: 7.3 % — SIGNIFICANT CHANGE UP (ref 1.7–9.3)
NEUTROPHILS # BLD AUTO: 4.48 K/UL — SIGNIFICANT CHANGE UP (ref 1.4–6.5)
NEUTROPHILS NFR BLD AUTO: 67.6 % — SIGNIFICANT CHANGE UP (ref 42.2–75.2)
NRBC # BLD: 0 /100 WBCS — SIGNIFICANT CHANGE UP (ref 0–0)
PLATELET # BLD AUTO: 318 K/UL — SIGNIFICANT CHANGE UP (ref 130–400)
POTASSIUM SERPL-MCNC: 3.8 MMOL/L — SIGNIFICANT CHANGE UP (ref 3.5–5)
POTASSIUM SERPL-SCNC: 3.8 MMOL/L — SIGNIFICANT CHANGE UP (ref 3.5–5)
PROT SERPL-MCNC: 5.9 G/DL — LOW (ref 6–8)
RBC # BLD: 4 M/UL — LOW (ref 4.2–5.4)
RBC # FLD: 17.4 % — HIGH (ref 11.5–14.5)
SODIUM SERPL-SCNC: 140 MMOL/L — SIGNIFICANT CHANGE UP (ref 135–146)
WBC # BLD: 6.61 K/UL — SIGNIFICANT CHANGE UP (ref 4.8–10.8)
WBC # FLD AUTO: 6.61 K/UL — SIGNIFICANT CHANGE UP (ref 4.8–10.8)

## 2020-10-15 PROCEDURE — 99233 SBSQ HOSP IP/OBS HIGH 50: CPT

## 2020-10-15 PROCEDURE — 93010 ELECTROCARDIOGRAM REPORT: CPT

## 2020-10-15 PROCEDURE — 93306 TTE W/DOPPLER COMPLETE: CPT | Mod: 26

## 2020-10-15 RX ORDER — FAMOTIDINE 10 MG/ML
20 INJECTION INTRAVENOUS DAILY
Refills: 0 | Status: DISCONTINUED | OUTPATIENT
Start: 2020-10-15 | End: 2020-10-16

## 2020-10-15 RX ORDER — LACTOBACILLUS ACIDOPHILUS 100MM CELL
1 CAPSULE ORAL DAILY
Refills: 0 | Status: DISCONTINUED | OUTPATIENT
Start: 2020-10-15 | End: 2020-10-16

## 2020-10-15 RX ORDER — LANOLIN ALCOHOL/MO/W.PET/CERES
5 CREAM (GRAM) TOPICAL AT BEDTIME
Refills: 0 | Status: DISCONTINUED | OUTPATIENT
Start: 2020-10-15 | End: 2020-10-16

## 2020-10-15 RX ADMIN — SIMETHICONE 80 MILLIGRAM(S): 80 TABLET, CHEWABLE ORAL at 00:47

## 2020-10-15 RX ADMIN — Medication 125 MICROGRAM(S): at 05:25

## 2020-10-15 RX ADMIN — CHLORHEXIDINE GLUCONATE 1 APPLICATION(S): 213 SOLUTION TOPICAL at 05:25

## 2020-10-15 RX ADMIN — HYDROMORPHONE HYDROCHLORIDE 1 MILLIGRAM(S): 2 INJECTION INTRAMUSCULAR; INTRAVENOUS; SUBCUTANEOUS at 01:00

## 2020-10-15 RX ADMIN — MONTELUKAST 10 MILLIGRAM(S): 4 TABLET, CHEWABLE ORAL at 11:16

## 2020-10-15 RX ADMIN — SIMETHICONE 80 MILLIGRAM(S): 80 TABLET, CHEWABLE ORAL at 05:26

## 2020-10-15 RX ADMIN — Medication 10 MILLIGRAM(S): at 05:26

## 2020-10-15 RX ADMIN — ONDANSETRON 4 MILLIGRAM(S): 8 TABLET, FILM COATED ORAL at 10:09

## 2020-10-15 RX ADMIN — BUPROPION HYDROCHLORIDE 300 MILLIGRAM(S): 150 TABLET, EXTENDED RELEASE ORAL at 11:16

## 2020-10-15 RX ADMIN — PANTOPRAZOLE SODIUM 40 MILLIGRAM(S): 20 TABLET, DELAYED RELEASE ORAL at 05:25

## 2020-10-15 RX ADMIN — FAMOTIDINE 20 MILLIGRAM(S): 10 INJECTION INTRAVENOUS at 12:05

## 2020-10-15 RX ADMIN — HYDROMORPHONE HYDROCHLORIDE 1 MILLIGRAM(S): 2 INJECTION INTRAMUSCULAR; INTRAVENOUS; SUBCUTANEOUS at 00:47

## 2020-10-15 RX ADMIN — HYDROMORPHONE HYDROCHLORIDE 1 MILLIGRAM(S): 2 INJECTION INTRAMUSCULAR; INTRAVENOUS; SUBCUTANEOUS at 05:25

## 2020-10-15 RX ADMIN — Medication 250 MILLIGRAM(S): at 05:26

## 2020-10-15 RX ADMIN — OXYCODONE AND ACETAMINOPHEN 1 TABLET(S): 5; 325 TABLET ORAL at 15:48

## 2020-10-15 RX ADMIN — SIMETHICONE 80 MILLIGRAM(S): 80 TABLET, CHEWABLE ORAL at 11:16

## 2020-10-15 RX ADMIN — Medication 1 MILLIGRAM(S): at 05:25

## 2020-10-15 RX ADMIN — ERTAPENEM SODIUM 120 MILLIGRAM(S): 1 INJECTION, POWDER, LYOPHILIZED, FOR SOLUTION INTRAMUSCULAR; INTRAVENOUS at 13:01

## 2020-10-15 RX ADMIN — HYDROMORPHONE HYDROCHLORIDE 1 MILLIGRAM(S): 2 INJECTION INTRAMUSCULAR; INTRAVENOUS; SUBCUTANEOUS at 06:06

## 2020-10-15 RX ADMIN — HEPARIN SODIUM 5000 UNIT(S): 5000 INJECTION INTRAVENOUS; SUBCUTANEOUS at 17:13

## 2020-10-15 RX ADMIN — OXYCODONE AND ACETAMINOPHEN 1 TABLET(S): 5; 325 TABLET ORAL at 21:52

## 2020-10-15 RX ADMIN — SIMETHICONE 80 MILLIGRAM(S): 80 TABLET, CHEWABLE ORAL at 17:13

## 2020-10-15 RX ADMIN — ZOLPIDEM TARTRATE 5 MILLIGRAM(S): 10 TABLET ORAL at 21:52

## 2020-10-15 RX ADMIN — HEPARIN SODIUM 5000 UNIT(S): 5000 INJECTION INTRAVENOUS; SUBCUTANEOUS at 05:26

## 2020-10-15 RX ADMIN — Medication 250 MILLIGRAM(S): at 17:13

## 2020-10-15 RX ADMIN — HYDROMORPHONE HYDROCHLORIDE 1 MILLIGRAM(S): 2 INJECTION INTRAMUSCULAR; INTRAVENOUS; SUBCUTANEOUS at 10:08

## 2020-10-15 RX ADMIN — Medication 1 TABLET(S): at 18:00

## 2020-10-15 RX ADMIN — Medication 1 MILLIGRAM(S): at 17:13

## 2020-10-15 RX ADMIN — SIMETHICONE 80 MILLIGRAM(S): 80 TABLET, CHEWABLE ORAL at 21:52

## 2020-10-15 RX ADMIN — OXYCODONE AND ACETAMINOPHEN 1 TABLET(S): 5; 325 TABLET ORAL at 17:38

## 2020-10-15 RX ADMIN — HYDROMORPHONE HYDROCHLORIDE 1 MILLIGRAM(S): 2 INJECTION INTRAMUSCULAR; INTRAVENOUS; SUBCUTANEOUS at 12:20

## 2020-10-15 NOTE — CONSULT NOTE ADULT - SUBJECTIVE AND OBJECTIVE BOX
T H I S    I S    A N    I N C O M P L E T E     N O T GEOVANNA SEBASTIAN  MRN-[id number]    HISTORY OF PRESENT ILLNESS:  HPI:  53 yo female  PMHx: HTN, COPD not on home O2, nasopharyngeal carcinoma s/p radiation in 2013, recurrent diverticulitis more than 6 episodes in the last 10 years  CC: Abdominal pain  History dates back to 1 day when pt started having abdominal pian, located in the left lower quadrant, non radiating, worse with movement and better with pain medications and lying still. Pain was associated with nausea, no vomiting, No bloody diarrhea this admission. Pt denied any fever, chill. Last BM was on Saturday.  Pt was recently admitted to the hospital with similar complaints, treated for acute uncomplicated diverticulitis.  ER Course: Vitals on admission were /83, , afebrile, on RA. CT showed mild/acute uncomplicated diverticulitis at proximal sigmoid colon. Pt received IV Cefepime and Flagyl with Dilaudid and Zofran. Pt also received 1L NS bolus.  (12 Oct 2020 08:31)      PMH/PSH:  PAST MEDICAL & SURGICAL HISTORY:  Anxiety    Hemorrhoids    Left ear hearing loss    Cervical disc disease  sequelae of radtion for nasopharyngeal cancer    Hypothyroid    Nasopharyngeal cancer    Pulmonary nodules/lesions, multiple    Diverticulitis    HTN (hypertension)    COPD (chronic obstructive pulmonary disease)    History of cholecystectomy      ALLERGIES:  Allergies    ciprofloxacin (Urticaria; Other)    Intolerances      SOCIAL HABITS:    FAMILY HISTORY:   FAMILY HISTORY:  Family history of lung cancer (Father, Mother)        REVIEW OF SYSTEM:  Elements of review of systems are negative or non-applicable except as noted above in HPI section.       HOME MEDICATIONS:  albuterol 90 mcg/inh inhalation aerosol  BUPROPION HCL  MG TB24  HYDROCO/APAP TAB 7.5-325  KlonoPIN 1 mg oral tablet  levothyroxine 125 mcg (0.125 mg) oral tablet  MONTELUKAST SODIUM 10 MG TABS  PANTOPRAZOLE SODIUM 40 MG TBEC  tiZANidine 4 mg oral tablet  Trelegy Ellipta inhalation powder  ZOLPIDEM TARTRATE 10 MG TABS    MEDICATIONS:  MEDICATIONS  (STANDING):  buPROPion XL . 300 milliGRAM(s) Oral daily  chlorhexidine 4% Liquid 1 Application(s) Topical <User Schedule>  clonazePAM  Tablet 1 milliGRAM(s) Oral two times a day  enalapril 10 milliGRAM(s) Oral daily  ertapenem  IVPB      ertapenem  IVPB 1000 milliGRAM(s) IV Intermittent every 24 hours  famotidine    Tablet 20 milliGRAM(s) Oral daily  heparin   Injectable 5000 Unit(s) SubCutaneous every 12 hours  levothyroxine 125 MICROGram(s) Oral daily  montelukast 10 milliGRAM(s) Oral daily  saccharomyces boulardii 250 milliGRAM(s) Oral two times a day  simethicone 80 milliGRAM(s) Chew every 6 hours  tiotropium 18 MICROgram(s) Capsule 1 Capsule(s) Inhalation daily    MEDICATIONS  (PRN):  acetaminophen   Tablet .. 650 milliGRAM(s) Oral every 6 hours PRN Mild Pain (1 - 3)  ALBUTerol    90 MICROgram(s) HFA Inhaler 2 Puff(s) Inhalation every 6 hours PRN Shortness of Breath and/or Wheezing  methocarbamol 750 milliGRAM(s) Oral every 8 hours PRN muescel spasm  ondansetron Injectable 4 milliGRAM(s) IV Push every 6 hours PRN Nausea and/or Vomiting  oxycodone    5 mG/acetaminophen 325 mG 1 Tablet(s) Oral every 6 hours PRN Moderate Pain (4 - 6)  sodium chloride 0.65% Nasal 1 Spray(s) Both Nostrils every 6 hours PRN Nasal Congestion  zolpidem 5 milliGRAM(s) Oral at bedtime PRN Insomnia        VITALS:   Vital Signs Last 24 Hrs  T(C): 37.1 (15 Oct 2020 14:36), Max: 37.1 (15 Oct 2020 14:36)  T(F): 98.7 (15 Oct 2020 14:36), Max: 98.7 (15 Oct 2020 14:36)  HR: 79 (15 Oct 2020 14:36) (78 - 83)  BP: 145/70 (15 Oct 2020 14:36) (138/72 - 145/70)  BP(mean): --  RR: 19 (15 Oct 2020 14:36) (18 - 19)  SpO2: 95% (15 Oct 2020 08:45) (95% - 95%)        PHYSICAL EXAM:    GENERAL: NAD  HEAD:  Atraumatic, Normocephalic  NECK: Supple, No JVD  CHEST/LUNG: Clear to auscultation bilaterally;   HEART: Regular rate and rhythm; No murmurs  ABDOMEN: Soft, Nontender, Nondistended  EXTREMITIES:  Good peripheral Pulses, No clubbing, cyanosis, or edema      LABS:                        10.3   6.61  )-----------( 318      ( 15 Oct 2020 06:12 )             33.7     10-15    140  |  103  |  11  ----------------------------<  119<H>  3.8   |  27  |  0.9    Ca    8.8      15 Oct 2020 06:12    TPro  5.9<L>  /  Alb  3.5  /  TBili  <0.2  /  DBili  x   /  AST  26  /  ALT  20  /  AlkPhos  64  10-15    LIVER FUNCTIONS - ( 15 Oct 2020 06:12 )  Alb: 3.5 g/dL / Pro: 5.9 g/dL / ALK PHOS: 64 U/L / ALT: 20 U/L / AST: 26 U/L / GGT: x                 COVID-19 PCR: NotDetec (10-11-20 @ 22:26)      Culture - Urine (collected 10-12-20 @ 01:00)  Source: .Urine Clean Catch (Midstream)  Final Report (10-13-20 @ 09:59):    No growth            ABG & VENT SETTINGS (when applicable)        DIAGNOSTIC STUDIES:  12 Lead ECG:   Ventricular Rate 84 BPM    Atrial Rate 84 BPM    P-R Interval 194 ms    QRS Duration 94 ms    Q-T Interval 388 ms    QTC Calculation(Bazett) 458 ms    P Axis 45 degrees    R Axis -17 degrees    T Axis 91 degrees    Diagnosis Line Sinus rhythm with Premature atrial complexes  Nonspecific T wave abnormality  Abnormal ECG    Reconfirmed by Luca Small ([id number]) on 10/15/2020 1:26:51 PM (10-15-20 @ 12:00)             GEOVANNA PUTNAM  MRN-[id number]    HISTORY OF PRESENT ILLNESS:  HPI:  53 yo female  PMHx: HTN, COPD not on home O2, nasopharyngeal carcinoma s/p radiation in 2013, recurrent diverticulitis more than 6 episodes in the last 10 years  CC: Abdominal pain  History dates back to 1 day when pt started having abdominal pian, located in the left lower quadrant, non radiating, worse with movement and better with pain medications and lying still. Pain was associated with nausea, no vomiting, No bloody diarrhea this admission. Pt denied any fever, chill. Last BM was on Saturday.  Pt was recently admitted to the hospital with similar complaints, treated for acute uncomplicated diverticulitis.  ER Course: Vitals on admission were /83, , afebrile, on RA. CT showed mild/acute uncomplicated diverticulitis at proximal sigmoid colon. Pt received IV Cefepime and Flagyl with Dilaudid and Zofran. Pt also received 1L NS bolus.  (12 Oct 2020 08:31)      PMH/PSH:  PAST MEDICAL & SURGICAL HISTORY:  Anxiety    Hemorrhoids    Left ear hearing loss    Cervical disc disease  sequelae of radtion for nasopharyngeal cancer    Hypothyroid    Nasopharyngeal cancer    Pulmonary nodules/lesions, multiple    Diverticulitis    HTN (hypertension)    COPD (chronic obstructive pulmonary disease)    History of cholecystectomy      ALLERGIES:  Allergies    ciprofloxacin (Urticaria; Other)    Intolerances      SOCIAL HABITS:    FAMILY HISTORY:   FAMILY HISTORY:  Family history of lung cancer (Father, Mother)        REVIEW OF SYSTEM:  Elements of review of systems are negative or non-applicable except as noted above in HPI section.       HOME MEDICATIONS:  albuterol 90 mcg/inh inhalation aerosol  BUPROPION HCL  MG TB24  HYDROCO/APAP TAB 7.5-325  KlonoPIN 1 mg oral tablet  levothyroxine 125 mcg (0.125 mg) oral tablet  MONTELUKAST SODIUM 10 MG TABS  PANTOPRAZOLE SODIUM 40 MG TBEC  tiZANidine 4 mg oral tablet  Trelegy Ellipta inhalation powder  ZOLPIDEM TARTRATE 10 MG TABS    MEDICATIONS:  MEDICATIONS  (STANDING):  buPROPion XL . 300 milliGRAM(s) Oral daily  chlorhexidine 4% Liquid 1 Application(s) Topical <User Schedule>  clonazePAM  Tablet 1 milliGRAM(s) Oral two times a day  enalapril 10 milliGRAM(s) Oral daily  ertapenem  IVPB      ertapenem  IVPB 1000 milliGRAM(s) IV Intermittent every 24 hours  famotidine    Tablet 20 milliGRAM(s) Oral daily  heparin   Injectable 5000 Unit(s) SubCutaneous every 12 hours  levothyroxine 125 MICROGram(s) Oral daily  montelukast 10 milliGRAM(s) Oral daily  saccharomyces boulardii 250 milliGRAM(s) Oral two times a day  simethicone 80 milliGRAM(s) Chew every 6 hours  tiotropium 18 MICROgram(s) Capsule 1 Capsule(s) Inhalation daily    MEDICATIONS  (PRN):  acetaminophen   Tablet .. 650 milliGRAM(s) Oral every 6 hours PRN Mild Pain (1 - 3)  ALBUTerol    90 MICROgram(s) HFA Inhaler 2 Puff(s) Inhalation every 6 hours PRN Shortness of Breath and/or Wheezing  methocarbamol 750 milliGRAM(s) Oral every 8 hours PRN muescel spasm  ondansetron Injectable 4 milliGRAM(s) IV Push every 6 hours PRN Nausea and/or Vomiting  oxycodone    5 mG/acetaminophen 325 mG 1 Tablet(s) Oral every 6 hours PRN Moderate Pain (4 - 6)  sodium chloride 0.65% Nasal 1 Spray(s) Both Nostrils every 6 hours PRN Nasal Congestion  zolpidem 5 milliGRAM(s) Oral at bedtime PRN Insomnia        VITALS:   Vital Signs Last 24 Hrs  T(C): 37.1 (15 Oct 2020 14:36), Max: 37.1 (15 Oct 2020 14:36)  T(F): 98.7 (15 Oct 2020 14:36), Max: 98.7 (15 Oct 2020 14:36)  HR: 79 (15 Oct 2020 14:36) (78 - 83)  BP: 145/70 (15 Oct 2020 14:36) (138/72 - 145/70)  BP(mean): --  RR: 19 (15 Oct 2020 14:36) (18 - 19)  SpO2: 95% (15 Oct 2020 08:45) (95% - 95%)        PHYSICAL EXAM:    GENERAL: NAD  HEAD:  Atraumatic, Normocephalic  NECK: Supple, No JVD  CHEST/LUNG: Clear to auscultation bilaterally;   HEART: Regular rate and rhythm; No murmurs  ABDOMEN: Soft, Nontender, Nondistended  EXTREMITIES:  Good peripheral Pulses, No clubbing, cyanosis, or edema      LABS:                        10.3   6.61  )-----------( 318      ( 15 Oct 2020 06:12 )             33.7     10-15    140  |  103  |  11  ----------------------------<  119<H>  3.8   |  27  |  0.9    Ca    8.8      15 Oct 2020 06:12    TPro  5.9<L>  /  Alb  3.5  /  TBili  <0.2  /  DBili  x   /  AST  26  /  ALT  20  /  AlkPhos  64  10-15    LIVER FUNCTIONS - ( 15 Oct 2020 06:12 )  Alb: 3.5 g/dL / Pro: 5.9 g/dL / ALK PHOS: 64 U/L / ALT: 20 U/L / AST: 26 U/L / GGT: x                 COVID-19 PCR: NotDetec (10-11-20 @ 22:26)      Culture - Urine (collected 10-12-20 @ 01:00)  Source: .Urine Clean Catch (Midstream)  Final Report (10-13-20 @ 09:59):    No growth            ABG & VENT SETTINGS (when applicable)        DIAGNOSTIC STUDIES:  12 Lead ECG:   Ventricular Rate 84 BPM    Atrial Rate 84 BPM    P-R Interval 194 ms    QRS Duration 94 ms    Q-T Interval 388 ms    QTC Calculation(Bazett) 458 ms    P Axis 45 degrees    R Axis -17 degrees    T Axis 91 degrees    Diagnosis Line Sinus rhythm with Premature atrial complexes  Nonspecific T wave abnormality  Abnormal ECG    Reconfirmed by Luca Small ([id number]) on 10/15/2020 1:26:51 PM (10-15-20 @ 12:00)

## 2020-10-15 NOTE — CONSULT NOTE ADULT - ASSESSMENT
54F w/ PMH of HTN, COPD not on home O2, hypothyroidism, nasopharyngeal carcinoma s/p chemo/radiation (2013), recurrent diverticulitis with most recent hospitalization 10/2/20 who presents to the ED with recurrent diverticulitis now on cefepime and flagyl.    PLAN:  - no acute surgical intervention  - recommend NPO, IVF, IV antibiotics  - advance diet as tolerated when pain resolves  - outpatient c-scope in 6 weeks  - FU w/ Dr. Spencer as outpatient
Impression  COPD  DAVID- on APAP at home (patient unsure of settings)  Stable from pulmonary standpoint for procedure    Recommendations  on triple therapy at home, start symbicort and spiriva  AVAPS at bedtime, TV-400, PEEP-8, Max IPAP-20, min IPAP-15  Standard post op pulmonary care  Stable from pulmonary standpoint for procedure  Aspiration precautions  DVT ppx
stable COPD, DAVID compliant with her CPAP  pre op risk assessment, stable to have the colonoscopy done no further cardiac w/u needed   for the surgery consider a new ECG, echo to assess systolic and diastolic function and PA pressure (if possible)  for colon resection in an elective basis: RCRI score 0, class I, estimated 30 days risk of MACE is about 3.9%  post op available CPAP  after the echo, if there is a finding will address accordingly.
54 year old female smoker with known COPD and DAVID with acute diverticulitis  Pulm consult for preop care/eval    Pt stable from the pulmonary standpoint for planned procedure  perioperative analgesics, incentive spirometry and  perioperative Bipap 12/6 9pt on pap therapy at home, last sleep study about 8 years ago)  perioperative albuterol/atrovent nebs  resume symbicort and spiriva  smoking cessation counseling  complete abx course  Advanced directives-full code    Patient scheduled for out ot follow up- will benefit from repeat psgn and pft  
ASSESSMENT  54yFemale with a past medical history of HTN, COPD not on home O2, nasopharyngeal carcinoma s/p radiation in 2013, recurrent diverticulitis more than 6 episodes in the last 10 years. Patient is currently admitted to medicine with a diagnosis of recurrent diverticulitis. CT shows mild/acute uncomplicated diverticulitis at the proximal sigmoid colon. Patient is receiving IV antibiotics of cefepime 1g q8h and flagyl 500mg q8h.     IMPRESSION  #Acute recurrent uncomplicated sigmoid diverticulitis   - NOT septic on admission- afebrile since admission no leukocytosis or tachycardia   - recurrent episodes of diverticulitis 6+ in the past 10 years   - CT on 10/12 shows mild/acute diverticulitis at the proximal sigmoid colon similar to CT last admission on 10/1  - failed PO treatment 2x and cannot use ciprofloxacin due to patient allergy  - patient advised to follow up with surgery outpatient to eliminate recurrence.       RECOMMENDATIONS  - midline placement   - start ertapenem 1g IV daily for 14 days due to failed PO abx treatment x2  - obtain ESR/CRP result and trend as outpatient   - will set up outpatient follow up with ID on 10/27

## 2020-10-15 NOTE — PROGRESS NOTE ADULT - SUBJECTIVE AND OBJECTIVE BOX
24H events:    Patient is a 54y old Female who presents with a chief complaint of Abdominal pain (15 Oct 2020 11:08)    Primary diagnosis of Diverticulitis of large intestine with complication       Today is hospital day 3d. This morning patient was seen and examined at bedside, resting comfortably in bed.    No acute or major events overnight.    PAST MEDICAL & SURGICAL HISTORY  Anxiety    Hemorrhoids    Left ear hearing loss    Cervical disc disease  sequelae of radtion for nasopharyngeal cancer    Hypothyroid    Nasopharyngeal cancer    Pulmonary nodules/lesions, multiple    Diverticulitis    HTN (hypertension)    COPD (chronic obstructive pulmonary disease)    History of cholecystectomy      SOCIAL HISTORY:  Social History:  Restarted smoking in February, about a pack a week  social alcohol use  no illicit drug abuse (12 Oct 2020 08:31)      ALLERGIES:  ciprofloxacin (Urticaria; Other)    MEDICATIONS:  STANDING MEDICATIONS  buPROPion XL . 300 milliGRAM(s) Oral daily  chlorhexidine 4% Liquid 1 Application(s) Topical <User Schedule>  clonazePAM  Tablet 1 milliGRAM(s) Oral two times a day  enalapril 10 milliGRAM(s) Oral daily  ertapenem  IVPB      ertapenem  IVPB 1000 milliGRAM(s) IV Intermittent every 24 hours  famotidine    Tablet 20 milliGRAM(s) Oral daily  heparin   Injectable 5000 Unit(s) SubCutaneous every 12 hours  levothyroxine 125 MICROGram(s) Oral daily  montelukast 10 milliGRAM(s) Oral daily  saccharomyces boulardii 250 milliGRAM(s) Oral two times a day  simethicone 80 milliGRAM(s) Chew every 6 hours  tiotropium 18 MICROgram(s) Capsule 1 Capsule(s) Inhalation daily    PRN MEDICATIONS  acetaminophen   Tablet .. 650 milliGRAM(s) Oral every 6 hours PRN  ALBUTerol    90 MICROgram(s) HFA Inhaler 2 Puff(s) Inhalation every 6 hours PRN  HYDROmorphone  Injectable 1 milliGRAM(s) IV Push every 4 hours PRN  methocarbamol 750 milliGRAM(s) Oral every 8 hours PRN  ondansetron Injectable 4 milliGRAM(s) IV Push every 6 hours PRN  oxycodone    5 mG/acetaminophen 325 mG 1 Tablet(s) Oral every 6 hours PRN  sodium chloride 0.65% Nasal 1 Spray(s) Both Nostrils every 6 hours PRN  zolpidem 5 milliGRAM(s) Oral at bedtime PRN    VITALS:   T(F): 98.5  HR: 78  BP: 138/72  RR: 18  SpO2: 95%    PHYSICAL EXAM:  GENERAL: NAD, well-groomed, well-developed  HEAD:  Atraumatic, Normocephalic  EYES: EOMI  NECK: Supple  NERVOUS SYSTEM:  Alert & Oriented X3, non focal   CHEST/LUNG: Clear to auscultation bilaterally; No rales, rhonchi, wheezing, or rubs  HEART: Regular rate and rhythm; No murmurs, rubs, or gallops  ABDOMEN: Soft, Nontender, Nondistended; Bowel sounds present  EXTREMITIES:  2+ Peripheral Pulses, No clubbing, cyanosis, or edema  LYMPH: No lymphadenopathy noted  SKIN: No rashes or lesions  LABS:                        10.3   6.61  )-----------( 318      ( 15 Oct 2020 06:12 )             33.7     10-15    140  |  103  |  11  ----------------------------<  119<H>  3.8   |  27  |  0.9    Ca    8.8      15 Oct 2020 06:12    TPro  5.9<L>  /  Alb  3.5  /  TBili  <0.2  /  DBili  x   /  AST  26  /  ALT  20  /  AlkPhos  64  10-15                  RADIOLOGY:

## 2020-10-15 NOTE — PROGRESS NOTE ADULT - SUBJECTIVE AND OBJECTIVE BOX
Hospital Day: 4  Post Operative Day:  Procedure:  Patient is a 54y old  Female who presents with a chief complaint of Abdominal pain found to have diverticulitis  (14 Oct 2020 12:55)    PAST MEDICAL & SURGICAL HISTORY:  Anxiety    Hemorrhoids    Left ear hearing loss    Cervical disc disease  sequelae of radtion for nasopharyngeal cancer    Hypothyroid    Nasopharyngeal cancer    Pulmonary nodules/lesions, multiple    Diverticulitis    HTN (hypertension)    COPD (chronic obstructive pulmonary disease)    History of cholecystectomy        Events of the Last 24h:  Vital Signs Last 24 Hrs  T(C): 36.9 (14 Oct 2020 20:38), Max: 36.9 (14 Oct 2020 05:12)  T(F): 98.5 (14 Oct 2020 20:38), Max: 98.5 (14 Oct 2020 05:12)  HR: 83 (14 Oct 2020 20:38) (79 - 85)  BP: 138/72 (14 Oct 2020 20:38) (138/72 - 158/73)  BP(mean): --  RR: 18 (14 Oct 2020 20:38) (18 - 20)  SpO2: 96% (14 Oct 2020 15:32) (92% - 96%)        Diet, Mechanical Soft (10-14-20 @ 10:10)      I&O's Summary   I&O's Detail      MEDICATIONS  (STANDING):  buPROPion XL . 300 milliGRAM(s) Oral daily  chlorhexidine 4% Liquid 1 Application(s) Topical <User Schedule>  clonazePAM  Tablet 1 milliGRAM(s) Oral two times a day  enalapril 10 milliGRAM(s) Oral daily  ertapenem  IVPB      ertapenem  IVPB 1000 milliGRAM(s) IV Intermittent every 24 hours  heparin   Injectable 5000 Unit(s) SubCutaneous every 12 hours  lactated ringers. 1000 milliLiter(s) (75 mL/Hr) IV Continuous <Continuous>  levothyroxine 125 MICROGram(s) Oral daily  montelukast 10 milliGRAM(s) Oral daily  pantoprazole    Tablet 40 milliGRAM(s) Oral before breakfast  polyethylene glycol 3350 17 Gram(s) Oral daily  saccharomyces boulardii 250 milliGRAM(s) Oral two times a day  senna 2 Tablet(s) Oral at bedtime  simethicone 80 milliGRAM(s) Chew every 6 hours  tiotropium 18 MICROgram(s) Capsule 1 Capsule(s) Inhalation daily    MEDICATIONS  (PRN):  acetaminophen   Tablet .. 650 milliGRAM(s) Oral every 6 hours PRN Mild Pain (1 - 3)  ALBUTerol    90 MICROgram(s) HFA Inhaler 2 Puff(s) Inhalation every 6 hours PRN Shortness of Breath and/or Wheezing  HYDROmorphone  Injectable 1 milliGRAM(s) IV Push every 4 hours PRN Severe Pain (7 - 10)  methocarbamol 750 milliGRAM(s) Oral every 8 hours PRN muescel spasm  ondansetron Injectable 4 milliGRAM(s) IV Push every 6 hours PRN Nausea and/or Vomiting  oxycodone    5 mG/acetaminophen 325 mG 1 Tablet(s) Oral every 6 hours PRN Moderate Pain (4 - 6)  sodium chloride 0.65% Nasal 1 Spray(s) Both Nostrils every 6 hours PRN Nasal Congestion  zolpidem 5 milliGRAM(s) Oral at bedtime PRN Insomnia      PHYSICAL EXAM:    GENERAL: NAD    HEENT: NCAT    CHEST/LUNGS: CTAB    HEART: RRR,  No murmurs, rubs, or gallops    ABDOMEN: SNTND +BS    EXTREMITIES:  FROM, No clubbing, cyanosis, or edema, palpable pulse    NEURO: No focal neurological deficits    SKIN: No rashes or lesions    INCISION/WOUNDS:                          10.7   8.46  )-----------( 349      ( 14 Oct 2020 07:21 )             35.1        CBC Full  -  ( 14 Oct 2020 07:21 )  WBC Count : 8.46 K/uL  RBC Count : 4.14 M/uL  Hemoglobin : 10.7 g/dL  Hematocrit : 35.1 %  Platelet Count - Automated : 349 K/uL  Mean Cell Volume : 84.8 fL  Mean Cell Hemoglobin : 25.8 pg  Mean Cell Hemoglobin Concentration : 30.5 g/dL  Auto Neutrophil # : 6.40 K/uL  Auto Lymphocyte # : 1.33 K/uL  Auto Monocyte # : 0.48 K/uL  Auto Eosinophil # : 0.20 K/uL  Auto Basophil # : 0.02 K/uL  Auto Neutrophil % : 75.6 %  Auto Lymphocyte % : 15.7 %  Auto Monocyte % : 5.7 %  Auto Eosinophil % : 2.4 %  Auto Basophil % : 0.2 %               135   |  100   |  11                 Ca: 8.9    BMP:   ----------------------------< 119    Mg: x     (10-14-20 @ 07:21)             3.9    |  24    | 1.0                Ph: x        LFT:     TPro: 6.3 / Alb: 3.6 / TBili: <0.2 / DBili: x / AST: 18 / ALT: 18 / AlkPhos: 69   (10-14-20 @ 07:21)    LIVER FUNCTIONS - ( 14 Oct 2020 07:21 )  Alb: 3.6 g/dL / Pro: 6.3 g/dL / ALK PHOS: 69 U/L / ALT: 18 U/L / AST: 18 U/L / GGT: x

## 2020-10-15 NOTE — PROGRESS NOTE ADULT - SUBJECTIVE AND OBJECTIVE BOX
GEOVANNA PUTNAM  54y, Female  Allergy: ciprofloxacin (Urticaria; Other)      LOS  3d    CHIEF COMPLAINT: Abdominal pain (15 Oct 2020 12:03)      INTERVAL EVENTS/HPI  - No acute events overnight  - T(F): , Max: 98.5 (10-14-20 @ 20:38)  - Denies any worsening symptoms  - Tolerating medication  - WBC Count: 6.61 (10-15-20 @ 06:12)  WBC Count: 8.46 (10-14-20 @ 07:21)  - Creatinine, Serum: 0.9 (10-15-20 @ 06:12)  Creatinine, Serum: 1.0 (10-14-20 @ 07:21)       ROS  General: Denies rigors, nightsweats  HEENT: Denies headache, rhinorrhea, sore throat, eye pain  CV: Denies CP, palpitations  PULM: Denies wheezing, hemoptysis  GI: Denies hematemesis, hematochezia, melena  : Denies discharge, hematuria  MSK: Denies arthralgias, myalgias  SKIN: Denies rash, lesions  NEURO: Denies paresthesias, weakness  PSYCH: Denies depression, anxiety    VITALS:  T(F): 98.5, Max: 98.5 (10-14-20 @ 20:38)  HR: 78  BP: 138/72  RR: 18Vital Signs Last 24 Hrs  T(C): 36.9 (14 Oct 2020 20:38), Max: 36.9 (14 Oct 2020 20:38)  T(F): 98.5 (14 Oct 2020 20:38), Max: 98.5 (14 Oct 2020 20:38)  HR: 78 (15 Oct 2020 08:45) (78 - 83)  BP: 138/72 (14 Oct 2020 20:38) (138/72 - 138/72)  BP(mean): --  RR: 18 (14 Oct 2020 20:38) (18 - 18)  SpO2: 95% (15 Oct 2020 08:45) (95% - 96%)    PHYSICAL EXAM:  Gen: NAD, resting in bed  HEENT: Normocephalic, atraumatic  Neck: supple, no lymphadenopathy  CV: Regular rate & regular rhythm  Lungs: decreased BS at bases, no fremitus  Abdomen: Soft, BS present LLQ tenderness to palpation   Ext: Warm, well perfused  Neuro: non focal, awake  Skin: no rash, no erythema  Lines: no phlebitis    FH: Non-contributory  Social Hx: Non-contributory    TESTS & MEASUREMENTS:                        10.3   6.61  )-----------( 318      ( 15 Oct 2020 06:12 )             33.7     10-15    140  |  103  |  11  ----------------------------<  119<H>  3.8   |  27  |  0.9    Ca    8.8      15 Oct 2020 06:12    TPro  5.9<L>  /  Alb  3.5  /  TBili  <0.2  /  DBili  x   /  AST  26  /  ALT  20  /  AlkPhos  64  10-15    eGFR if Non African American: 71 mL/min/1.73M2 (10-15-20 @ 06:12)  eGFR if African American: 83 mL/min/1.73M2 (10-15-20 @ 06:12)    LIVER FUNCTIONS - ( 15 Oct 2020 06:12 )  Alb: 3.5 g/dL / Pro: 5.9 g/dL / ALK PHOS: 64 U/L / ALT: 20 U/L / AST: 26 U/L / GGT: x               Culture - Urine (collected 10-12-20 @ 01:00)  Source: .Urine Clean Catch (Midstream)  Final Report (10-13-20 @ 09:59):    No growth    Culture - Blood (collected 10-02-20 @ 05:15)  Source: .Blood None  Final Report (10-07-20 @ 19:01):    No Growth Final    Culture - Urine (collected 10-01-20 @ 20:50)  Source: .Urine Clean Catch (Midstream)  Final Report (10-04-20 @ 12:25):    10,000 - 49,000 CFU/mL Morganella morganii  Organism: Morganella morganii (10-04-20 @ 12:25)  Organism: Morganella morganii (10-04-20 @ 12:25)      -  Amikacin: S <=16      -  Amoxicillin/Clavulanic Acid: R >16/8      -  Ampicillin: R >16 These ampicillin results predict results for amoxicillin      -  Ampicillin/Sulbactam: R >16/8 Enterobacter, Citrobacter, and Serratia may develop resistance during prolonged therapy (3-4 days)      -  Aztreonam: I 16      -  Cefazolin: R >16      -  Cefepime: S <=2      -  Cefoxitin: S <=8      -  Ceftriaxone: S <=1 Enterobacter, Citrobacter, and Serratia may develop resistance during prolonged therapy      -  Ciprofloxacin: S <=0.25      -  Ertapenem: S <=0.5      -  Gentamicin: S <=2      -  Imipenem: R 4      -  Levofloxacin: S <=0.5      -  Meropenem: S <=1      -  Nitrofurantoin: R <=32 Should not be used to treat pyelonephritis      -  Piperacillin/Tazobactam: S <=8      -  Tigecycline: R <=2      -  Tobramycin: S <=2      -  Trimethoprim/Sulfamethoxazole: S <=0.5/9.5      Method Type: LISA        Lactate, Blood: 0.7 mmol/L (10-13-20 @ 07:14)  Lactate, Blood: 1.1 mmol/L (10-11-20 @ 22:46)      INFECTIOUS DISEASES TESTING  COVID-19 PCR: NotDetec (10-11-20 @ 22:26)  COVID-19 PCR: NotDetec (10-01-20 @ 20:10)      INFLAMMATORY MARKERS      RADIOLOGY & ADDITIONAL TESTS:  I have personally reviewed the last available Chest xray  CXR      CT      CARDIOLOGY TESTING  12 Lead ECG:   Ventricular Rate 88 BPM    Atrial Rate 88 BPM    P-R Interval 160 ms    QRS Duration 94 ms    Q-T Interval 400 ms    QTC Calculation(Bazett) 484 ms    P Axis 70 degrees    R Axis -8 degrees    T Axis 99 degrees    Diagnosis Line Normal sinus rhythm  Incomplete right bundle branch block  T wave abnormality, consider lateral ischemia  Abnormal ECG    Confirmed by Luca Small ([id number]) on 10/1/2020 11:33:39 PM (10-01-20 @ 19:36)      MEDICATIONS  buPROPion XL . 300 Oral daily  chlorhexidine 4% Liquid 1 Topical <User Schedule>  clonazePAM  Tablet 1 Oral two times a day  enalapril 10 Oral daily  ertapenem  IVPB     ertapenem  IVPB 1000 IV Intermittent every 24 hours  famotidine    Tablet 20 Oral daily  heparin   Injectable 5000 SubCutaneous every 12 hours  levothyroxine 125 Oral daily  montelukast 10 Oral daily  saccharomyces boulardii 250 Oral two times a day  simethicone 80 Chew every 6 hours  tiotropium 18 MICROgram(s) Capsule 1 Inhalation daily      WEIGHT  Weight (kg): 135.2 (10-11-20 @ 21:47)  Creatinine, Serum: 0.9 mg/dL (10-15-20 @ 06:12)      ANTIBIOTICS:  ertapenem  IVPB      ertapenem  IVPB 1000 milliGRAM(s) IV Intermittent every 24 hours      All available historical records have been reviewed

## 2020-10-15 NOTE — PROGRESS NOTE ADULT - ASSESSMENT
ASSESSMENT  54yFemale with a past medical history of HTN, COPD not on home O2, nasopharyngeal carcinoma s/p radiation in 2013, recurrent diverticulitis more than 6 episodes in the last 10 years. Failed multiple courses of ABx. CTAP with continued mild diverticulitis.     IMPRESSION  #Recurrent/persistent Sigmoid diverticulitis    hx UCX 10/1 kyung  #Super Morbid Obesity BMI 51    RECOMMEND  - IV erta 1g q24h IV via midline x 14 days  - Check ESR, CRP  - Surgery f/u  - ID follow-up with Dr. Alphonse Robison Tudanielday 10/27 for Telehealth. We will call the patient between 10:30-6:30      [address]       [phone]       Fax [phone].

## 2020-10-15 NOTE — PROCEDURE NOTE - NSPROCDETAILS_GEN_ALL_CORE
location identified, draped/prepped, sterile technique used/ultrasound assessment/sterile technique, catheter placed/sterile dressing applied/supine position/ultrasound guidance

## 2020-10-15 NOTE — DISCHARGE NOTE NURSING/CASE MANAGEMENT/SOCIAL WORK - NSDCPEEMAIL_GEN_ALL_CORE
[facility] for Tobacco Control email [email or web address]

## 2020-10-15 NOTE — CONSULT NOTE ADULT - SUBJECTIVE AND OBJECTIVE BOX
Patient is a 54y old  Female who presents with a chief complaint of Abdominal pain (15 Oct 2020 13:00)      HPI:  53 yo female  PMHx: HTN, COPD not on home O2, nasopharyngeal carcinoma s/p radiation in 2013, recurrent diverticulitis more than 6 episodes in the last 10 years  CC: Abdominal pain  History dates back to 1 day when pt started having abdominal pian, located in the left lower quadrant, non radiating, worse with movement and better with pain medications and lying still. Pain was associated with nausea, no vomiting, No bloody diarrhea this admission. Pt denied any fever, chill. Last BM was on Saturday.  Pt was recently admitted to the hospital with similar complaints, treated for acute uncomplicated diverticulitis.  ER Course: Vitals on admission were /83, , afebrile, on RA. CT showed mild/acute uncomplicated diverticulitis at proximal sigmoid colon. Pt received IV Cefepime and Flagyl with Dilaudid and Zofran. Pt also received 1L NS bolus.  (12 Oct 2020 08:31)      PAST MEDICAL & SURGICAL HISTORY:  Anxiety    Hemorrhoids    Left ear hearing loss    Cervical disc disease  sequelae of radtion for nasopharyngeal cancer    Hypothyroid    Nasopharyngeal cancer    Pulmonary nodules/lesions, multiple    Diverticulitis    HTN (hypertension)    COPD (chronic obstructive pulmonary disease)    History of cholecystectomy        SOCIAL HX:   Smoking     0.5ppd for 15 yrs                    ETOH       denies                     Other denies illicit drug use    FAMILY HISTORY:  Family history of lung cancer (Father, Mother)    .  No cardiovascular or pulmonary family history     REVIEW OF SYSTEMS:    All ROS are negative exept per HPI       Allergies    ciprofloxacin (Urticaria; Other)    Intolerances          PHYSICAL EXAM  Vital Signs Last 24 Hrs  T(C): 36.9 (14 Oct 2020 20:38), Max: 36.9 (14 Oct 2020 20:38)  T(F): 98.5 (14 Oct 2020 20:38), Max: 98.5 (14 Oct 2020 20:38)  HR: 78 (15 Oct 2020 08:45) (78 - 83)  BP: 138/72 (14 Oct 2020 20:38) (138/72 - 138/72)  BP(mean): --  RR: 18 (14 Oct 2020 20:38) (18 - 18)  SpO2: 95% (15 Oct 2020 08:45) (95% - 96%)    CONSTITUTIONAL:  Morbidly obese female    ENT:   Airway patent,   No thrush    EYES:   Clear bilaterally,   pupils equal,   round and reactive to light.    CARDIAC:   Normal rate,   regular rhythm.    no edema    RESPIRATORY:   No wheezing   Normal chest expansion  Not tachypneic,  No use of accessory muscles    GASTROINTESTINAL:  Abdomen soft, non-tender,   No guarding,   Positive BS    MUSCULOSKELETAL:   Range of motion is not limited,  No clubbing, cyanosis    NEUROLOGICAL:   Alert and oriented   No motor deficits.    SKIN:   Skin normal color for race,   No evidence of rash.        LABS:                          10.3   6.61  )-----------( 318      ( 15 Oct 2020 06:12 )             33.7                                               10-15    140  |  103  |  11  ----------------------------<  119<H>  3.8   |  27  |  0.9    Ca    8.8      15 Oct 2020 06:12    TPro  5.9<L>  /  Alb  3.5  /  TBili  <0.2  /  DBili  x   /  AST  26  /  ALT  20  /  AlkPhos  64  10-15                                                                                           LIVER FUNCTIONS - ( 15 Oct 2020 06:12 )  Alb: 3.5 g/dL / Pro: 5.9 g/dL / ALK PHOS: 64 U/L / ALT: 20 U/L / AST: 26 U/L / GGT: x                                                                                                MEDICATIONS  (STANDING):  buPROPion XL . 300 milliGRAM(s) Oral daily  chlorhexidine 4% Liquid 1 Application(s) Topical <User Schedule>  clonazePAM  Tablet 1 milliGRAM(s) Oral two times a day  enalapril 10 milliGRAM(s) Oral daily  ertapenem  IVPB      ertapenem  IVPB 1000 milliGRAM(s) IV Intermittent every 24 hours  famotidine    Tablet 20 milliGRAM(s) Oral daily  heparin   Injectable 5000 Unit(s) SubCutaneous every 12 hours  levothyroxine 125 MICROGram(s) Oral daily  montelukast 10 milliGRAM(s) Oral daily  saccharomyces boulardii 250 milliGRAM(s) Oral two times a day  simethicone 80 milliGRAM(s) Chew every 6 hours  tiotropium 18 MICROgram(s) Capsule 1 Capsule(s) Inhalation daily    MEDICATIONS  (PRN):  acetaminophen   Tablet .. 650 milliGRAM(s) Oral every 6 hours PRN Mild Pain (1 - 3)  ALBUTerol    90 MICROgram(s) HFA Inhaler 2 Puff(s) Inhalation every 6 hours PRN Shortness of Breath and/or Wheezing  methocarbamol 750 milliGRAM(s) Oral every 8 hours PRN muescel spasm  ondansetron Injectable 4 milliGRAM(s) IV Push every 6 hours PRN Nausea and/or Vomiting  oxycodone    5 mG/acetaminophen 325 mG 1 Tablet(s) Oral every 6 hours PRN Moderate Pain (4 - 6)  sodium chloride 0.65% Nasal 1 Spray(s) Both Nostrils every 6 hours PRN Nasal Congestion  zolpidem 5 milliGRAM(s) Oral at bedtime PRN Insomnia      X-Rays reviewed:    CXR interpreted by me:

## 2020-10-15 NOTE — PROGRESS NOTE ADULT - ASSESSMENT
55 yo female KTH:  1. HTN  2. COPD not on home O2  3. nasopharyngeal carcinoma s/p radiation in 2013 in remission  4. recurrent diverticulitis more than 6 episodes in the last 10 years    admitted for uncomplicated sigmoid diverticulitis    #Acute uncomplicated sigmoid diverticulitis  -h/o multiple recurrences  - Cont cefepime 1g q8h and flagyl 500mg q8h  - No intervention for now per Sx  - Pain control PRN and bowel regimen for constipation treatment added  - Diet advanced to regular  - colonoscopy in 6-12 weeks as per GI  - patient agreeable now to large bowel colectomy when symptoms resolve (cardiology consulted by surgery)  - to be discharged on ertapenem for a total of 14 days of ertapenem via midline (sterile procedures done today)    #HTN  - Cont enalapril 10mg qD    #COPD  - Cont spiriva/symbicort  - Cont montelukast 10mg qD  - Duonebs PRN    #Anxiety  - Klonopin 1mg BID  - Cont buproprion XL 300mg qD      DVT PPX: Heparin   Diet: clear fluids  Code: Full  Dispo: home, pending medical clearance

## 2020-10-15 NOTE — DISCHARGE NOTE NURSING/CASE MANAGEMENT/SOCIAL WORK - NSDCPEWEB_GEN_ALL_CORE
[facility] for Tobacco Control website --- [email or web address] --- [email or web address]

## 2020-10-15 NOTE — PROGRESS NOTE ADULT - ASSESSMENT
IV abx etrapenem  LR @ 75  Soft diet  Follow up cardiology consult for outpt surgery clearance  Outpt follow up with Dr. Spencer

## 2020-10-15 NOTE — DISCHARGE NOTE NURSING/CASE MANAGEMENT/SOCIAL WORK - PATIENT PORTAL LINK FT
You can access the FollowMyHealth Patient Portal offered by [facility] by registering at the following website: [email or web address]. By joining [facility]’s FollowMyHealth portal, you will also be able to view your health information using other applications (apps) compatible with our system.

## 2020-10-16 ENCOUNTER — TRANSCRIPTION ENCOUNTER (OUTPATIENT)
Age: 55
End: 2020-10-16

## 2020-10-16 VITALS
TEMPERATURE: 97 F | DIASTOLIC BLOOD PRESSURE: 85 MMHG | OXYGEN SATURATION: 97 % | RESPIRATION RATE: 18 BRPM | HEART RATE: 82 BPM | SYSTOLIC BLOOD PRESSURE: 175 MMHG

## 2020-10-16 LAB — CRP SERPL-MCNC: 5.73 MG/DL — HIGH (ref 0–0.4)

## 2020-10-16 PROCEDURE — 99239 HOSP IP/OBS DSCHRG MGMT >30: CPT

## 2020-10-16 RX ORDER — ACETAMINOPHEN 500 MG
2 TABLET ORAL
Qty: 50 | Refills: 0
Start: 2020-10-16

## 2020-10-16 RX ORDER — AMLODIPINE BESYLATE 2.5 MG/1
10 TABLET ORAL ONCE
Refills: 0 | Status: COMPLETED | OUTPATIENT
Start: 2020-10-16 | End: 2020-10-16

## 2020-10-16 RX ORDER — LEVOTHYROXINE SODIUM 125 MCG
1 TABLET ORAL
Qty: 30 | Refills: 0
Start: 2020-10-16 | End: 2020-11-14

## 2020-10-16 RX ORDER — ERTAPENEM SODIUM 1 G/1
1 INJECTION, POWDER, LYOPHILIZED, FOR SOLUTION INTRAMUSCULAR; INTRAVENOUS
Qty: 11 | Refills: 0
Start: 2020-10-16 | End: 2020-10-26

## 2020-10-16 RX ORDER — SIMETHICONE 80 MG/1
1 TABLET, CHEWABLE ORAL
Qty: 40 | Refills: 0
Start: 2020-10-16 | End: 2020-10-25

## 2020-10-16 RX ORDER — ONDANSETRON 8 MG/1
1 TABLET, FILM COATED ORAL
Qty: 20 | Refills: 0
Start: 2020-10-16

## 2020-10-16 RX ORDER — LEVOTHYROXINE SODIUM 125 MCG
1 TABLET ORAL
Qty: 0 | Refills: 0 | DISCHARGE

## 2020-10-16 RX ADMIN — FAMOTIDINE 20 MILLIGRAM(S): 10 INJECTION INTRAVENOUS at 11:15

## 2020-10-16 RX ADMIN — MONTELUKAST 10 MILLIGRAM(S): 4 TABLET, CHEWABLE ORAL at 11:15

## 2020-10-16 RX ADMIN — Medication 125 MICROGRAM(S): at 05:37

## 2020-10-16 RX ADMIN — SIMETHICONE 80 MILLIGRAM(S): 80 TABLET, CHEWABLE ORAL at 05:36

## 2020-10-16 RX ADMIN — Medication 10 MILLIGRAM(S): at 05:37

## 2020-10-16 RX ADMIN — Medication 250 MILLIGRAM(S): at 05:36

## 2020-10-16 RX ADMIN — AMLODIPINE BESYLATE 10 MILLIGRAM(S): 2.5 TABLET ORAL at 15:48

## 2020-10-16 RX ADMIN — OXYCODONE AND ACETAMINOPHEN 1 TABLET(S): 5; 325 TABLET ORAL at 05:36

## 2020-10-16 RX ADMIN — SIMETHICONE 80 MILLIGRAM(S): 80 TABLET, CHEWABLE ORAL at 13:17

## 2020-10-16 RX ADMIN — OXYCODONE AND ACETAMINOPHEN 1 TABLET(S): 5; 325 TABLET ORAL at 14:13

## 2020-10-16 RX ADMIN — ERTAPENEM SODIUM 120 MILLIGRAM(S): 1 INJECTION, POWDER, LYOPHILIZED, FOR SOLUTION INTRAMUSCULAR; INTRAVENOUS at 13:17

## 2020-10-16 RX ADMIN — BUPROPION HYDROCHLORIDE 300 MILLIGRAM(S): 150 TABLET, EXTENDED RELEASE ORAL at 11:14

## 2020-10-16 RX ADMIN — OXYCODONE AND ACETAMINOPHEN 1 TABLET(S): 5; 325 TABLET ORAL at 00:17

## 2020-10-16 RX ADMIN — Medication 1 MILLIGRAM(S): at 05:36

## 2020-10-16 RX ADMIN — Medication 1 TABLET(S): at 11:14

## 2020-10-16 NOTE — DISCHARGE NOTE PROVIDER - PROVIDER TOKENS
PROVIDER:[TOKEN:[[id number]],SCHEDULEDAPPT:[10/27/2020]],PROVIDER:[TOKEN:[[id number]],FOLLOWUP:[1 month]],PROVIDER:[TOKEN:[[id number]],FOLLOWUP:[1 month]]

## 2020-10-16 NOTE — DISCHARGE NOTE PROVIDER - NSDCMRMEDTOKEN_GEN_ALL_CORE_FT
albuterol 90 mcg/inh inhalation aerosol: 2 puff(s) inhaled every 6 hours, As needed, Shortness of Breath and/or Wheezing  BUPROPION HCL  MG TB24: orally once a day  cephalexin 500 mg oral tablet: 1 tab(s) orally 2 times a day   Florastor 250 mg oral capsule: 1 cap(s) orally 3 times a day   KlonoPIN 1 mg oral tablet: 1 tab(s) orally 2 times a day  levothyroxine 125 mcg (0.125 mg) oral tablet: 1 tab(s) orally once a day  MONTELUKAST SODIUM 10 MG TABS:   PANTOPRAZOLE SODIUM 40 MG TBEC: orally once a day  tiZANidine 4 mg oral tablet: 2 tab(s) orally 2 times a day  Trelegy Ellipta inhalation powder: 1 puff(s) inhaled once a day  ZOLPIDEM TARTRATE 10 MG TABS: orally once a day (at bedtime)   acetaminophen 325 mg oral tablet: 2 tab(s) orally every 8 hours, As Needed -Mild Pain (1 - 3)   albuterol 90 mcg/inh inhalation aerosol: 2 puff(s) inhaled every 6 hours, As needed, Shortness of Breath and/or Wheezing  BUPROPION HCL  MG TB24: orally once a day  enalapril 10 mg oral tablet: 1 tab(s) orally once a day  ertapenem 1 g injection: 1 gram(s) injectable once a day   Florastor 250 mg oral capsule: 1 cap(s) orally 3 times a day   KlonoPIN 1 mg oral tablet: 1 tab(s) orally 2 times a day  levothyroxine 125 mcg (0.125 mg) oral tablet: 1 tab(s) orally once a day  MONTELUKAST SODIUM 10 MG TABS:   ondansetron 4 mg oral tablet: 1 tab(s) orally every 8 hours, As Needed -for nausea   oxycodone-acetaminophen 5 mg-325 mg oral tablet: 1 tab(s) orally every 6 hours, As needed, Moderate Pain (4 - 6) MDD:Do NOT take more than 4 tablets in 24 hours  PANTOPRAZOLE SODIUM 40 MG TBEC: orally once a day  simethicone 80 mg oral tablet, chewable: 1 tab(s) orally every 6 hours  tiZANidine 4 mg oral tablet: 2 tab(s) orally 2 times a day  Trelegy Ellipta inhalation powder: 1 puff(s) inhaled once a day  ZOLPIDEM TARTRATE 10 MG TABS: orally once a day (at bedtime)

## 2020-10-16 NOTE — PROGRESS NOTE ADULT - ASSESSMENT
acute diverticulitis, improved  copd  obstructive sleep apnea  hypertension      pulmonary status stable for discharge and outpatient follow up with dr srinivasan next week  continue bronchodilators  cpap with sleep  blood pressure medications  finish course of antibiotics

## 2020-10-16 NOTE — PROGRESS NOTE ADULT - SUBJECTIVE AND OBJECTIVE BOX
GEOVANNA PUTNAM  54y, Female  Allergy: ciprofloxacin (Urticaria; Other)      LAST 24-Hr EVENTS:  feels better; no respiratory complaints  VITALS:  T(F): 96.8 (10-16-20 @ 13:32), Max: 98.6 (10-16-20 @ 05:07)  HR: 80 (10-16-20 @ 15:50)  BP: 189/90 (10-16-20 @ 15:50) (135/84 - 189/90)  RR: 18 (10-16-20 @ 15:50)  SpO2: 98% (10-16-20 @ 15:50)room air    PHYSICAL EXAM:    GENERAL: NAD, well-developed, obese  HEAD:  Atraumatic, Normocephalic  NECK: Supple, No JVD  CHEST/LUNG: Clear to auscultation bilaterally; No wheeze  HEART: Regular rate and rhythm; No murmurs, rubs, or gallops  ABDOMEN: Soft, Nontender, Nondistended; Bowel sounds present  EXTREMITIES:  2+ Peripheral Pulses, No clubbing, cyanosis, or edema        TESTS & MEASUREMENTS:    IN: 600 mL / OUT: 0 mL / NET: 600 mL    IN: 0 mL / OUT: 1 mL / NET: -1 mL                            10.3   6.61  )-----------( 318      ( 15 Oct 2020 06:12 )             33.7       10-15    140  |  103  |  11  ----------------------------<  119<H>  3.8   |  27  |  0.9    Ca    8.8      15 Oct 2020 06:12    TPro  5.9<L>  /  Alb  3.5  /  TBili  <0.2  /  DBili  x   /  AST  26  /  ALT  20  /  AlkPhos  64  10-15    LIVER FUNCTIONS - ( 15 Oct 2020 06:12 )  Alb: 3.5 g/dL / Pro: 5.9 g/dL / ALK PHOS: 64 U/L / ALT: 20 U/L / AST: 26 U/L / GGT: x                 Culture - Urine (collected 10-12-20 @ 01:00)  Source: .Urine Clean Catch (Midstream)  Final Report (10-13-20 @ 09:59):    No growth          ABG & VENT SETTINGS: (when applicable)    n/a    RADIOLOGY & ADDITIONAL TESTS:    MEDICATIONS:  MEDICATIONS  (STANDING):  buPROPion XL . 300 milliGRAM(s) Oral daily  chlorhexidine 4% Liquid 1 Application(s) Topical <User Schedule>  clonazePAM  Tablet 1 milliGRAM(s) Oral two times a day  enalapril 10 milliGRAM(s) Oral daily  ertapenem  IVPB      ertapenem  IVPB 1000 milliGRAM(s) IV Intermittent every 24 hours  famotidine    Tablet 20 milliGRAM(s) Oral daily  heparin   Injectable 5000 Unit(s) SubCutaneous every 12 hours  lactobacillus acidophilus 1 Tablet(s) Oral daily  levothyroxine 125 MICROGram(s) Oral daily  montelukast 10 milliGRAM(s) Oral daily  saccharomyces boulardii 250 milliGRAM(s) Oral two times a day  simethicone 80 milliGRAM(s) Chew every 6 hours  tiotropium 18 MICROgram(s) Capsule 1 Capsule(s) Inhalation daily    MEDICATIONS  (PRN):  acetaminophen   Tablet .. 650 milliGRAM(s) Oral every 6 hours PRN Mild Pain (1 - 3)  ALBUTerol    90 MICROgram(s) HFA Inhaler 2 Puff(s) Inhalation every 6 hours PRN Shortness of Breath and/or Wheezing  melatonin 5 milliGRAM(s) Oral at bedtime PRN Insomnia  methocarbamol 750 milliGRAM(s) Oral every 8 hours PRN muescel spasm  ondansetron Injectable 4 milliGRAM(s) IV Push every 6 hours PRN Nausea and/or Vomiting  oxycodone    5 mG/acetaminophen 325 mG 1 Tablet(s) Oral every 6 hours PRN Moderate Pain (4 - 6)  sodium chloride 0.65% Nasal 1 Spray(s) Both Nostrils every 6 hours PRN Nasal Congestion  zolpidem 5 milliGRAM(s) Oral at bedtime PRN Insomnia

## 2020-10-16 NOTE — PROGRESS NOTE ADULT - ATTENDING COMMENTS
above noted abdomen soft no distension tenderness less on IV AB discussed case with surgical resident
above noted discussed case with surgical resident abdomen soft tender LLQ on IV AB
-Pt with recurrent Diverticulitis (many episodes over the last 10 years per patient).  She states that she will undergo colectomy as outpatient once medically stable.  -ID follow up appreciated; will need midline and ertapenem x 14 days.  Can DC home once midline placed, home abx set up by CM.  -Surgery has requested cardiac and pulmonary clearance for procedure.  She is currently optimized from a medical standpoint.
Agree with above.  I have edited the note where appropriate myself.  -Pt with recurrent Diverticulitis (many episodes over the last 10 years per patient).  She states that she will undergo colectomy as outpatient once medically stable.  -Continue Cefepime and Flagyl pending ID follow up.
above noted abdomen soft no distension tender LLQ on IV AB
above noted abdomen soft no distension tenderness less on IV AB

## 2020-10-16 NOTE — DISCHARGE NOTE PROVIDER - NSDCCPCAREPLAN_GEN_ALL_CORE_FT
PRINCIPAL DISCHARGE DIAGNOSIS  Diagnosis: Diverticulitis of intestine without perforation or abscess without bleeding  Assessment and Plan of Treatment: You were admitted for uncomplicated recurrent diverticulitis. You were seen by surgery and are receiving IV antibiotics. You should complete a total treatment course of ertapenem. Follow up with ID, GI, and Surgery as for a colonoscopy in 6 weeks as well as evaluation to a possible bowel resection surgery.

## 2020-10-16 NOTE — PROGRESS NOTE ADULT - SUBJECTIVE AND OBJECTIVE BOX
Progress Note: General Surgery  Patient: GEOVANNA PUTNAM , 54y (1965)Female   MRN: [id number]  Location: [address]  Visit: 10-12-20 Inpatient  Date: 10-16-20 @ 08:57    Admit Diagnosis/Chief Complaint: diverticulitis    Procedure/Diagnosis:  diverticulitis with antibiotic management    Events/ 24h: No acute events overnight. Pain controlled. ID saw patient, plan to send home on IV Abx    Vitals: T(F): 98.6 (10-16-20 @ 05:07), Max: 98.7 (10-15-20 @ 14:36)  HR: 80 (10-16-20 @ 07:54)  BP: 135/84 (10-16-20 @ 05:07) (135/84 - 147/72)  RR: 19 (10-16-20 @ 05:07)  SpO2: 91% (10-16-20 @ 07:54)    In:   10-15-20 @ 07:01  -  10-16-20 @ 07:00  --------------------------------------------------------  IN: 0 mL      Out:   10-15-20 @ 07:01  -  10-16-20 @ 07:00  --------------------------------------------------------  OUT:    Voided (mL): 1 mL  Total OUT: 1 mL        Net:   10-15-20 @ 07:01  -  10-16-20 @ 07:00  --------------------------------------------------------  NET: -1 mL        Diet: Diet, Mechanical Soft (10-14-20 @ 10:10)    IV Fluids:     Physical Examination:  General Appearance: NAD   HEENT: EOMI, sclera non-icteric.  Heart: RRR   Lungs: CTABL.   Abdomen:  Soft, mild tenderness LLQ, nondistended.   MSK/Extremities: Warm & well-perfused.   Skin: Warm, dry. No jaundice.       Medications: [Standing]  buPROPion XL . 300 milliGRAM(s) Oral daily  chlorhexidine 4% Liquid 1 Application(s) Topical <User Schedule>  clonazePAM  Tablet 1 milliGRAM(s) Oral two times a day  enalapril 10 milliGRAM(s) Oral daily  ertapenem  IVPB      ertapenem  IVPB 1000 milliGRAM(s) IV Intermittent every 24 hours  famotidine    Tablet 20 milliGRAM(s) Oral daily  heparin   Injectable 5000 Unit(s) SubCutaneous every 12 hours  lactobacillus acidophilus 1 Tablet(s) Oral daily  levothyroxine 125 MICROGram(s) Oral daily  montelukast 10 milliGRAM(s) Oral daily  saccharomyces boulardii 250 milliGRAM(s) Oral two times a day  simethicone 80 milliGRAM(s) Chew every 6 hours  tiotropium 18 MICROgram(s) Capsule 1 Capsule(s) Inhalation daily    DVT Prophylaxis: heparin   Injectable 5000 Unit(s) SubCutaneous every 12 hours    GI Prophylaxis: famotidine    Tablet 20 milliGRAM(s) Oral daily    Antibiotics: ertapenem  IVPB      ertapenem  IVPB 1000 milliGRAM(s) IV Intermittent every 24 hours    Anticoagulation:   Medications:[PRN]  acetaminophen   Tablet .. 650 milliGRAM(s) Oral every 6 hours PRN  ALBUTerol    90 MICROgram(s) HFA Inhaler 2 Puff(s) Inhalation every 6 hours PRN  melatonin 5 milliGRAM(s) Oral at bedtime PRN  methocarbamol 750 milliGRAM(s) Oral every 8 hours PRN  ondansetron Injectable 4 milliGRAM(s) IV Push every 6 hours PRN  oxycodone    5 mG/acetaminophen 325 mG 1 Tablet(s) Oral every 6 hours PRN  sodium chloride 0.65% Nasal 1 Spray(s) Both Nostrils every 6 hours PRN  zolpidem 5 milliGRAM(s) Oral at bedtime PRN      Labs:                        10.3   6.61  )-----------( 318      ( 15 Oct 2020 06:12 )             33.7     10-15    140  |  103  |  11  ----------------------------<  119<H>  3.8   |  27  |  0.9    Ca    8.8      15 Oct 2020 06:12    TPro  5.9<L>  /  Alb  3.5  /  TBili  <0.2  /  DBili  x   /  AST  26  /  ALT  20  /  AlkPhos  64  10-15    LIVER FUNCTIONS - ( 15 Oct 2020 06:12 )  Alb: 3.5 g/dL / Pro: 5.9 g/dL / ALK PHOS: 64 U/L / ALT: 20 U/L / AST: 26 U/L / GGT: x             Assessment:  Consulted for diverticulitis  Patient seen and examined at bedside. NAD.     Plan:  - Dispo as per primary medical team on IV antibiotics.           Date/Time: 10-16-20 @ 08:57

## 2020-10-16 NOTE — DISCHARGE NOTE PROVIDER - CARE PROVIDERS DIRECT ADDRESSES
,DirectAddress_Unknown,DirectAddress_Unknown,[email or web address]

## 2020-10-16 NOTE — DISCHARGE NOTE PROVIDER - CARE PROVIDER_API CALL
Alphonse Robison  Infectious Disease  [address] [place] [address]  Phone: [phone]  Fax: [phone]  Scheduled Appointment: 10/27/2020    Fidelia Spencer  SURGERY  [address] [place] [address]  Phone: [phone]  Fax: [phone]  Follow Up Time: 1 month    Aguila Carpio)  Gastroenterology; Internal Medicine  [address] [place] [address]  Phone: [phone]  Fax: [phone]  Follow Up Time: 1 month

## 2020-10-16 NOTE — DISCHARGE NOTE PROVIDER - HOSPITAL COURSE
This is a case of a 54 year old lady  known to have: HTN, COPD not on home O2, nasopharyngeal carcinoma s/p radiation in 2013 in remission, recurrent diverticulitis more than 6 episodes in the last 10 years readmitted for abdominal pian, located in the left lower quadrant, non radiating, worse with movement and better with pain medications and lying still. Pain was associated with nausea, no vomiting, No bloody diarrhea this admission. Pt denied any fever, chill. Pt was admitted to the hospital for acute uncomplicated diverticulitis. She is receiving treatment with IV antibiotics. Symptoms have improved. She is medically stable and ready for discharge This is a case of a 54 year old lady  known to have: HTN, COPD not on home O2, nasopharyngeal carcinoma s/p radiation in 2013 in remission, recurrent diverticulitis more than 6 episodes in the last 10 years readmitted for abdominal pian, located in the left lower quadrant, non radiating, worse with movement and better with pain medications and lying still. Pain was associated with nausea, no vomiting, No bloody diarrhea this admission. Pt denied any fever, chill. Pt was admitted to the hospital for acute uncomplicated diverticulitis. She is receiving treatment with IV antibiotics. Symptoms have improved. She is medically stable and ready for discharge  -She will be discharged on 14 days of ertapenem via a midline and will follow with surgery after resolution of acute illness for likely colectomy as she has had multiple episodes of recurrent Diverticulitis over the last 10 years.

## 2020-10-21 NOTE — DISCHARGE NOTE ADULT - FUNCTIONAL SCREEN CURRENT LEVEL: AMBULATION, MLM
Patient Education        Gastroenteritis: Care Instructions  Your Care Instructions     Gastroenteritis is an illness that may cause nausea, vomiting, and diarrhea. It is sometimes called \"stomach flu. \" It can be caused by bacteria or a virus. You will probably begin to feel better in 1 to 2 days. In the meantime, get plenty of rest and make sure you do not become dehydrated. Dehydration occurs when your body loses too much fluid. Follow-up care is a key part of your treatment and safety. Be sure to make and go to all appointments, and call your doctor if you are having problems. It's also a good idea to know your test results and keep a list of the medicines you take. How can you care for yourself at home? · If your doctor prescribed antibiotics, take them as directed. Do not stop taking them just because you feel better. You need to take the full course of antibiotics. · Drink plenty of fluids to prevent dehydration, enough so that your urine is light yellow or clear like water. Choose water and other caffeine-free clear liquids until you feel better. If you have kidney, heart, or liver disease and have to limit fluids, talk with your doctor before you increase your fluid intake. · Drink fluids slowly, in frequent, small amounts, because drinking too much too fast can cause vomiting. · Begin eating mild foods, such as dry toast, yogurt, applesauce, bananas, and rice. Avoid spicy, hot, or high-fat foods, and do not drink alcohol or caffeine for a day or two. Do not drink milk or eat ice cream until you are feeling better. How to prevent gastroenteritis  · Keep hot foods hot and cold foods cold. · Do not eat meats, dressings, salads, or other foods that have been kept at room temperature for more than 2 hours. · Use a thermometer to check your refrigerator. It should be between 34°F and 40°F.  · Defrost meats in the refrigerator or microwave, not on the kitchen counter.   · Keep your hands and your kitchen clean. Wash your hands, cutting boards, and countertops with hot soapy water frequently. · Cook meat until it is well done. · Do not eat raw eggs or uncooked sauces made with raw eggs. · Do not take chances. If food looks or tastes spoiled, throw it out. When should you call for help? Call 911 anytime you think you may need emergency care. For example, call if:    · You vomit blood or what looks like coffee grounds.     · You passed out (lost consciousness).     · You pass maroon or very bloody stools. Call your doctor now or seek immediate medical care if:    · You have severe belly pain.     · You have signs of needing more fluids. You have sunken eyes, a dry mouth, and pass only a little dark urine.     · You feel like you are going to faint.     · You have increased belly pain that does not go away in 1 to 2 days.     · You have new or increased nausea, or you are vomiting.     · You have a new or higher fever.     · Your stools are black and tarlike or have streaks of blood. Watch closely for changes in your health, and be sure to contact your doctor if:    · You are dizzy or lightheaded.     · You urinate less than usual, or your urine is dark yellow or brown.     · You do not feel better with each day that goes by. Where can you learn more? Go to http://www.simental.com/  Enter N142 in the search box to learn more about \"Gastroenteritis: Care Instructions. \"  Current as of: February 11, 2020               Content Version: 12.6  © 7476-8634 [employer], [employer]. Care instructions adapted under license by [employer] (which disclaims liability or warranty for this information). If you have questions about a medical condition or this instruction, always ask your healthcare professional. [place] [address] any warranty or liability for your use of this information.          Patient Education        Nausea and Vomiting: Care Instructions  Your Care Instructions     When you are nauseated, you may feel weak and sweaty and notice a lot of saliva in your mouth. Nausea often leads to vomiting. Most of the time you do not need to worry about nausea and vomiting, but they can be signs of other illnesses. Two common causes of nausea and vomiting are stomach flu and food poisoning. Nausea and vomiting from viral stomach flu will usually start to improve within 24 hours. Nausea and vomiting from food poisoning may last from 12 to 48 hours. The doctor has checked you carefully, but problems can develop later. If you notice any problems or new symptoms, get medical treatment right away. Follow-up care is a key part of your treatment and safety. Be sure to make and go to all appointments, and call your doctor if you are having problems. It's also a good idea to know your test results and keep a list of the medicines you take. How can you care for yourself at home? · To prevent dehydration, drink plenty of fluids, enough so that your urine is light yellow or clear like water. Choose water and other caffeine-free clear liquids until you feel better. If you have kidney, heart, or liver disease and have to limit fluids, talk with your doctor before you increase the amount of fluids you drink. · Rest in bed until you feel better. · When you are able to eat, try clear soups, mild foods, and liquids until all symptoms are gone for 12 to 48 hours. Other good choices include dry toast, crackers, cooked cereal, and gelatin dessert, such as Jell-O. When should you call for help? Call 911 anytime you think you may need emergency care. For example, call if:    · You passed out (lost consciousness). Call your doctor now or seek immediate medical care if:    · You have symptoms of dehydration, such as:  ? Dry eyes and a dry mouth. ? Passing only a little dark urine. ?  Feeling thirstier than usual.     · You have new or worsening belly pain.     · You have a new or higher fever.     · You vomit blood or what looks like coffee grounds. Watch closely for changes in your health, and be sure to contact your doctor if:    · You have ongoing nausea and vomiting.     · Your vomiting is getting worse.     · Your vomiting lasts longer than 2 days.     · You are not getting better as expected. Where can you learn more? Go to http://www.simental.com/  Enter H591 in the search box to learn more about \"Nausea and Vomiting: Care Instructions. \"  Current as of: June 26, 2019               Content Version: 12.6  © 2347-8485 [employer]. Care instructions adapted under license by [employer] (which disclaims liability or warranty for this information). If you have questions about a medical condition or this instruction, always ask your healthcare professional. [address] any warranty or liability for your use of this information. 0 = independent

## 2020-10-25 DIAGNOSIS — Z92.21 PERSONAL HISTORY OF ANTINEOPLASTIC CHEMOTHERAPY: ICD-10-CM

## 2020-10-25 DIAGNOSIS — K21.9 GASTRO-ESOPHAGEAL REFLUX DISEASE WITHOUT ESOPHAGITIS: ICD-10-CM

## 2020-10-25 DIAGNOSIS — K57.12 DIVERTICULITIS OF SMALL INTESTINE WITHOUT PERFORATION OR ABSCESS WITHOUT BLEEDING: ICD-10-CM

## 2020-10-25 DIAGNOSIS — E03.9 HYPOTHYROIDISM, UNSPECIFIED: ICD-10-CM

## 2020-10-25 DIAGNOSIS — M50.90 CERVICAL DISC DISORDER, UNSPECIFIED, UNSPECIFIED CERVICAL REGION: ICD-10-CM

## 2020-10-25 DIAGNOSIS — Z88.1 ALLERGY STATUS TO OTHER ANTIBIOTIC AGENTS STATUS: ICD-10-CM

## 2020-10-25 DIAGNOSIS — J44.9 CHRONIC OBSTRUCTIVE PULMONARY DISEASE, UNSPECIFIED: ICD-10-CM

## 2020-10-25 DIAGNOSIS — K64.9 UNSPECIFIED HEMORRHOIDS: ICD-10-CM

## 2020-10-25 DIAGNOSIS — G47.33 OBSTRUCTIVE SLEEP APNEA (ADULT) (PEDIATRIC): ICD-10-CM

## 2020-10-25 DIAGNOSIS — H91.92 UNSPECIFIED HEARING LOSS, LEFT EAR: ICD-10-CM

## 2020-10-25 DIAGNOSIS — Z87.440 PERSONAL HISTORY OF URINARY (TRACT) INFECTIONS: ICD-10-CM

## 2020-10-25 DIAGNOSIS — Z79.51 LONG TERM (CURRENT) USE OF INHALED STEROIDS: ICD-10-CM

## 2020-10-25 DIAGNOSIS — I10 ESSENTIAL (PRIMARY) HYPERTENSION: ICD-10-CM

## 2020-10-25 DIAGNOSIS — F41.8 OTHER SPECIFIED ANXIETY DISORDERS: ICD-10-CM

## 2020-10-25 DIAGNOSIS — E66.01 MORBID (SEVERE) OBESITY DUE TO EXCESS CALORIES: ICD-10-CM

## 2020-10-25 DIAGNOSIS — Z92.3 PERSONAL HISTORY OF IRRADIATION: ICD-10-CM

## 2020-10-25 DIAGNOSIS — R91.8 OTHER NONSPECIFIC ABNORMAL FINDING OF LUNG FIELD: ICD-10-CM

## 2020-10-25 DIAGNOSIS — Z85.819 PERSONAL HISTORY OF MALIGNANT NEOPLASM OF UNSPECIFIED SITE OF LIP, ORAL CAVITY, AND PHARYNX: ICD-10-CM

## 2020-10-27 ENCOUNTER — OUTPATIENT (OUTPATIENT)
Dept: OUTPATIENT SERVICES | Facility: HOSPITAL | Age: 55
LOS: 1 days | Discharge: HOME | End: 2020-10-27
Payer: MEDICAID

## 2020-10-27 DIAGNOSIS — R06.02 SHORTNESS OF BREATH: ICD-10-CM

## 2020-10-27 DIAGNOSIS — Z90.49 ACQUIRED ABSENCE OF OTHER SPECIFIED PARTS OF DIGESTIVE TRACT: Chronic | ICD-10-CM

## 2020-10-27 PROCEDURE — 71046 X-RAY EXAM CHEST 2 VIEWS: CPT | Mod: 26

## 2020-11-10 ENCOUNTER — INPATIENT (INPATIENT)
Facility: HOSPITAL | Age: 55
LOS: 3 days | Discharge: HOME HEALTH | End: 2020-11-14
Attending: INTERNAL MEDICINE | Admitting: INTERNAL MEDICINE
Payer: MEDICAID

## 2020-11-10 VITALS
SYSTOLIC BLOOD PRESSURE: 176 MMHG | OXYGEN SATURATION: 95 % | DIASTOLIC BLOOD PRESSURE: 79 MMHG | HEIGHT: 64 IN | HEART RATE: 108 BPM | RESPIRATION RATE: 18 BRPM | TEMPERATURE: 98 F

## 2020-11-10 DIAGNOSIS — Z90.49 ACQUIRED ABSENCE OF OTHER SPECIFIED PARTS OF DIGESTIVE TRACT: Chronic | ICD-10-CM

## 2020-11-10 LAB
ALBUMIN SERPL ELPH-MCNC: 3.8 G/DL — SIGNIFICANT CHANGE UP (ref 3.5–5.2)
ALP SERPL-CCNC: 83 U/L — SIGNIFICANT CHANGE UP (ref 30–115)
ALT FLD-CCNC: 23 U/L — SIGNIFICANT CHANGE UP (ref 0–41)
ANION GAP SERPL CALC-SCNC: 15 MMOL/L — HIGH (ref 7–14)
APPEARANCE UR: CLEAR — SIGNIFICANT CHANGE UP
AST SERPL-CCNC: 28 U/L — SIGNIFICANT CHANGE UP (ref 0–41)
BASOPHILS # BLD AUTO: 0.02 K/UL — SIGNIFICANT CHANGE UP (ref 0–0.2)
BASOPHILS NFR BLD AUTO: 0.2 % — SIGNIFICANT CHANGE UP (ref 0–1)
BILIRUB SERPL-MCNC: <0.2 MG/DL — SIGNIFICANT CHANGE UP (ref 0.2–1.2)
BILIRUB UR-MCNC: NEGATIVE — SIGNIFICANT CHANGE UP
BUN SERPL-MCNC: 26 MG/DL — HIGH (ref 10–20)
CALCIUM SERPL-MCNC: 9.4 MG/DL — SIGNIFICANT CHANGE UP (ref 8.5–10.1)
CHLORIDE SERPL-SCNC: 103 MMOL/L — SIGNIFICANT CHANGE UP (ref 98–110)
CO2 SERPL-SCNC: 23 MMOL/L — SIGNIFICANT CHANGE UP (ref 17–32)
COLOR SPEC: YELLOW — SIGNIFICANT CHANGE UP
CREAT SERPL-MCNC: 1.3 MG/DL — SIGNIFICANT CHANGE UP (ref 0.7–1.5)
DIFF PNL FLD: NEGATIVE — SIGNIFICANT CHANGE UP
EOSINOPHIL # BLD AUTO: 0.35 K/UL — SIGNIFICANT CHANGE UP (ref 0–0.7)
EOSINOPHIL NFR BLD AUTO: 4.2 % — SIGNIFICANT CHANGE UP (ref 0–8)
GLUCOSE SERPL-MCNC: 113 MG/DL — HIGH (ref 70–99)
GLUCOSE UR QL: NEGATIVE — SIGNIFICANT CHANGE UP
HCT VFR BLD CALC: 37.8 % — SIGNIFICANT CHANGE UP (ref 37–47)
HGB BLD-MCNC: 11.5 G/DL — LOW (ref 12–16)
IMM GRANULOCYTES NFR BLD AUTO: 0.4 % — HIGH (ref 0.1–0.3)
KETONES UR-MCNC: NEGATIVE — SIGNIFICANT CHANGE UP
LACTATE SERPL-SCNC: 1.5 MMOL/L — SIGNIFICANT CHANGE UP (ref 0.7–2)
LEUKOCYTE ESTERASE UR-ACNC: NEGATIVE — SIGNIFICANT CHANGE UP
LIDOCAIN IGE QN: 27 U/L — SIGNIFICANT CHANGE UP (ref 7–60)
LYMPHOCYTES # BLD AUTO: 1.34 K/UL — SIGNIFICANT CHANGE UP (ref 1.2–3.4)
LYMPHOCYTES # BLD AUTO: 16.2 % — LOW (ref 20.5–51.1)
MCHC RBC-ENTMCNC: 25.9 PG — LOW (ref 27–31)
MCHC RBC-ENTMCNC: 30.4 G/DL — LOW (ref 32–37)
MCV RBC AUTO: 85.1 FL — SIGNIFICANT CHANGE UP (ref 81–99)
MONOCYTES # BLD AUTO: 0.54 K/UL — SIGNIFICANT CHANGE UP (ref 0.1–0.6)
MONOCYTES NFR BLD AUTO: 6.5 % — SIGNIFICANT CHANGE UP (ref 1.7–9.3)
NEUTROPHILS # BLD AUTO: 5.98 K/UL — SIGNIFICANT CHANGE UP (ref 1.4–6.5)
NEUTROPHILS NFR BLD AUTO: 72.5 % — SIGNIFICANT CHANGE UP (ref 42.2–75.2)
NITRITE UR-MCNC: NEGATIVE — SIGNIFICANT CHANGE UP
NRBC # BLD: 0 /100 WBCS — SIGNIFICANT CHANGE UP (ref 0–0)
PH UR: 6 — SIGNIFICANT CHANGE UP (ref 5–8)
PLATELET # BLD AUTO: 241 K/UL — SIGNIFICANT CHANGE UP (ref 130–400)
POTASSIUM SERPL-MCNC: 4.8 MMOL/L — SIGNIFICANT CHANGE UP (ref 3.5–5)
POTASSIUM SERPL-SCNC: 4.8 MMOL/L — SIGNIFICANT CHANGE UP (ref 3.5–5)
PROT SERPL-MCNC: 6.6 G/DL — SIGNIFICANT CHANGE UP (ref 6–8)
PROT UR-MCNC: SIGNIFICANT CHANGE UP
RBC # BLD: 4.44 M/UL — SIGNIFICANT CHANGE UP (ref 4.2–5.4)
RBC # FLD: 16.9 % — HIGH (ref 11.5–14.5)
SODIUM SERPL-SCNC: 141 MMOL/L — SIGNIFICANT CHANGE UP (ref 135–146)
SP GR SPEC: 1.03 — SIGNIFICANT CHANGE UP (ref 1.01–1.03)
UROBILINOGEN FLD QL: SIGNIFICANT CHANGE UP
WBC # BLD: 8.26 K/UL — SIGNIFICANT CHANGE UP (ref 4.8–10.8)
WBC # FLD AUTO: 8.26 K/UL — SIGNIFICANT CHANGE UP (ref 4.8–10.8)

## 2020-11-10 PROCEDURE — 74177 CT ABD & PELVIS W/CONTRAST: CPT | Mod: 26

## 2020-11-10 PROCEDURE — 99285 EMERGENCY DEPT VISIT HI MDM: CPT

## 2020-11-10 RX ORDER — LEVOTHYROXINE SODIUM 125 MCG
125 TABLET ORAL DAILY
Refills: 0 | Status: DISCONTINUED | OUTPATIENT
Start: 2020-11-10 | End: 2020-11-14

## 2020-11-10 RX ORDER — SODIUM CHLORIDE 9 MG/ML
1000 INJECTION INTRAMUSCULAR; INTRAVENOUS; SUBCUTANEOUS ONCE
Refills: 0 | Status: COMPLETED | OUTPATIENT
Start: 2020-11-10 | End: 2020-11-10

## 2020-11-10 RX ORDER — CLONAZEPAM 1 MG
1 TABLET ORAL
Qty: 0 | Refills: 0 | DISCHARGE

## 2020-11-10 RX ORDER — SODIUM CHLORIDE 9 MG/ML
1000 INJECTION, SOLUTION INTRAVENOUS ONCE
Refills: 0 | Status: COMPLETED | OUTPATIENT
Start: 2020-11-10 | End: 2020-11-10

## 2020-11-10 RX ORDER — TIOTROPIUM BROMIDE 18 UG/1
1 CAPSULE ORAL; RESPIRATORY (INHALATION) DAILY
Refills: 0 | Status: DISCONTINUED | OUTPATIENT
Start: 2020-11-10 | End: 2020-11-14

## 2020-11-10 RX ORDER — MONTELUKAST 4 MG/1
10 TABLET, CHEWABLE ORAL DAILY
Refills: 0 | Status: DISCONTINUED | OUTPATIENT
Start: 2020-11-10 | End: 2020-11-14

## 2020-11-10 RX ORDER — TIZANIDINE 4 MG/1
2 TABLET ORAL
Qty: 0 | Refills: 0 | DISCHARGE

## 2020-11-10 RX ORDER — BUDESONIDE AND FORMOTEROL FUMARATE DIHYDRATE 160; 4.5 UG/1; UG/1
2 AEROSOL RESPIRATORY (INHALATION)
Refills: 0 | Status: DISCONTINUED | OUTPATIENT
Start: 2020-11-10 | End: 2020-11-14

## 2020-11-10 RX ORDER — ALBUTEROL 90 UG/1
2 AEROSOL, METERED ORAL EVERY 6 HOURS
Refills: 0 | Status: DISCONTINUED | OUTPATIENT
Start: 2020-11-10 | End: 2020-11-14

## 2020-11-10 RX ORDER — HEPARIN SODIUM 5000 [USP'U]/ML
5000 INJECTION INTRAVENOUS; SUBCUTANEOUS EVERY 8 HOURS
Refills: 0 | Status: DISCONTINUED | OUTPATIENT
Start: 2020-11-10 | End: 2020-11-12

## 2020-11-10 RX ORDER — HYDROMORPHONE HYDROCHLORIDE 2 MG/ML
1 INJECTION INTRAMUSCULAR; INTRAVENOUS; SUBCUTANEOUS ONCE
Refills: 0 | Status: DISCONTINUED | OUTPATIENT
Start: 2020-11-10 | End: 2020-11-10

## 2020-11-10 RX ORDER — MEROPENEM 1 G/30ML
1000 INJECTION INTRAVENOUS ONCE
Refills: 0 | Status: COMPLETED | OUTPATIENT
Start: 2020-11-10 | End: 2020-11-10

## 2020-11-10 RX ORDER — SODIUM CHLORIDE 9 MG/ML
1000 INJECTION, SOLUTION INTRAVENOUS
Refills: 0 | Status: DISCONTINUED | OUTPATIENT
Start: 2020-11-10 | End: 2020-11-14

## 2020-11-10 RX ORDER — HYDROMORPHONE HYDROCHLORIDE 2 MG/ML
0.5 INJECTION INTRAMUSCULAR; INTRAVENOUS; SUBCUTANEOUS ONCE
Refills: 0 | Status: DISCONTINUED | OUTPATIENT
Start: 2020-11-10 | End: 2020-11-10

## 2020-11-10 RX ORDER — CLONAZEPAM 1 MG
1 TABLET ORAL
Refills: 0 | Status: DISCONTINUED | OUTPATIENT
Start: 2020-11-10 | End: 2020-11-14

## 2020-11-10 RX ORDER — CHLORHEXIDINE GLUCONATE 213 G/1000ML
1 SOLUTION TOPICAL
Refills: 0 | Status: DISCONTINUED | OUTPATIENT
Start: 2020-11-10 | End: 2020-11-14

## 2020-11-10 RX ORDER — ONDANSETRON 8 MG/1
4 TABLET, FILM COATED ORAL ONCE
Refills: 0 | Status: COMPLETED | OUTPATIENT
Start: 2020-11-10 | End: 2020-11-10

## 2020-11-10 RX ORDER — PIPERACILLIN AND TAZOBACTAM 4; .5 G/20ML; G/20ML
3.38 INJECTION, POWDER, LYOPHILIZED, FOR SOLUTION INTRAVENOUS ONCE
Refills: 0 | Status: DISCONTINUED | OUTPATIENT
Start: 2020-11-10 | End: 2020-11-10

## 2020-11-10 RX ORDER — PANTOPRAZOLE SODIUM 20 MG/1
40 TABLET, DELAYED RELEASE ORAL
Refills: 0 | Status: DISCONTINUED | OUTPATIENT
Start: 2020-11-10 | End: 2020-11-12

## 2020-11-10 RX ORDER — BUPROPION HYDROCHLORIDE 150 MG/1
300 TABLET, EXTENDED RELEASE ORAL DAILY
Refills: 0 | Status: DISCONTINUED | OUTPATIENT
Start: 2020-11-10 | End: 2020-11-14

## 2020-11-10 RX ADMIN — SODIUM CHLORIDE 1000 MILLILITER(S): 9 INJECTION, SOLUTION INTRAVENOUS at 23:18

## 2020-11-10 RX ADMIN — HYDROMORPHONE HYDROCHLORIDE 1 MILLIGRAM(S): 2 INJECTION INTRAMUSCULAR; INTRAVENOUS; SUBCUTANEOUS at 23:18

## 2020-11-10 RX ADMIN — HYDROMORPHONE HYDROCHLORIDE 0.5 MILLIGRAM(S): 2 INJECTION INTRAMUSCULAR; INTRAVENOUS; SUBCUTANEOUS at 20:39

## 2020-11-10 RX ADMIN — SODIUM CHLORIDE 1000 MILLILITER(S): 9 INJECTION INTRAMUSCULAR; INTRAVENOUS; SUBCUTANEOUS at 18:35

## 2020-11-10 RX ADMIN — HEPARIN SODIUM 5000 UNIT(S): 5000 INJECTION INTRAVENOUS; SUBCUTANEOUS at 23:18

## 2020-11-10 RX ADMIN — MEROPENEM 100 MILLIGRAM(S): 1 INJECTION INTRAVENOUS at 23:55

## 2020-11-10 RX ADMIN — ONDANSETRON 4 MILLIGRAM(S): 8 TABLET, FILM COATED ORAL at 18:34

## 2020-11-10 RX ADMIN — HYDROMORPHONE HYDROCHLORIDE 1 MILLIGRAM(S): 2 INJECTION INTRAMUSCULAR; INTRAVENOUS; SUBCUTANEOUS at 18:34

## 2020-11-10 NOTE — H&P ADULT - NSHPLABSRESULTS_GEN_ALL_CORE
11.5   8.26  )-----------( 241      ( 10 Nov 2020 18:46 )             37.8     11-10    141  |  103  |  26<H>  ----------------------------<  113<H>  4.8   |  23  |  1.3    Ca    9.4      10 Nov 2020 18:46    TPro  6.6  /  Alb  3.8  /  TBili  <0.2  /  DBili  x   /  AST  28  /  ALT  23  /  AlkPhos  83  11-10        Urinalysis Basic - ( 10 Nov 2020 20:48 )    Color: Yellow / Appearance: Clear / S.026 / pH: x  Gluc: x / Ketone: Negative  / Bili: Negative / Urobili: <2 mg/dL   Blood: x / Protein: Trace / Nitrite: Negative   Leuk Esterase: Negative / RBC: x / WBC x   Sq Epi: x / Non Sq Epi: x / Bacteria: x    Lactate Trend  11-10 @ 18:46 Lactate:1.5    Culture Results:   No growth (10-12 @ 01:00)    CT Abdomen and Pelvis w/ IV Cont (11.10.20 @ 21:38)  IMPRESSION:  Acute sigmoid diverticulitis. No perforation or abscess.

## 2020-11-10 NOTE — H&P ADULT - HISTORY OF PRESENT ILLNESS
This is a 54 year old female with PMHx of Diverticulitis, COPD, HTN, Depression, Anxiety who presented to the ED with recurrence of abdominal pain. The patient reports that she has been having LLQ abdominal pain that is associated with sharp severe throbbing pain. The patient also states that she has been having bright red blood per rectum. Of note the patient was recently treated and released with IV antibiotic course for sigmoid diverticulitis. Last dose of ertapenem was approximately two weeks ago. Patient states that this is extremely similar to her prior recent bouts of diverticulitis.     In the ED initial vitals: T98.1, , /79, sat 95% on room air. Labs overall unremarkable when compared to prior, UA negative. CT noted for acute sigmoid diverticulitis. Admitted to medicine for IV antibiotics and ID evaluation,

## 2020-11-10 NOTE — H&P ADULT - NSHPPHYSICALEXAM_GEN_ALL_CORE
T(C): 36.7 (11-10-20 @ 17:21), Max: 36.7 (11-10-20 @ 17:21)  HR: 108 (11-10-20 @ 17:21) (108 - 108)  BP: 176/79 (11-10-20 @ 17:21) (176/79 - 176/79)  RR: 18 (11-10-20 @ 17:21) (18 - 18)  SpO2: 95% (11-10-20 @ 17:21) (95% - 95%)    PHYSICAL EXAM:  GENERAL: NAD, well-developed, obese appearing female, conversive  HEAD:  Atraumatic, Normocephalic  EYES: EOMI, PERRLA, conjunctiva and sclera clear  ENT:No nasal obstruction or discharge. No tonsillar exudate, swelling or erythema.  NECK: Supple, No JVD  CHEST/LUNG: Clear to auscultation bilaterally; No wheeze, rhonchi, rales   HEART: Regular rate and rhythm; No murmurs, rubs, or gallops  ABDOMEN: Soft, tenderness to palpation in LLQ, Nondistended; Bowel sounds present  EXTREMITIES:  2+ Peripheral Pulses, No clubbing, cyanosis, or edema  PSYCH: AAOx3, Sensation equal and intact, cranial nerves ii-xii grossly intact, muscle strength 5/5 bilateral upper and lower extremities  NEUROLOGY: non-focal  SKIN: No rashes or lesions

## 2020-11-10 NOTE — ED PROVIDER NOTE - OBJECTIVE STATEMENT
54 y.o female w/ hx of diverticulitis, COPD, HTN, depression, anxiety 54 y.o female w/ hx of diverticulitis, COPD, HTN, depression, anxiety presents to the ED for evaluation of abd pain x 2 days.  Gradual onset LLQ abd pain, intermittent, throbbing, mild severity, today pain radiates to RLQ prompting visit to the ED.  Also admits to nausea.  Feels similar to previous abd pain. Admits to diarrhea. Denies constipation, vaginal d/c or bleeding, urinary sxs, fever, chills, back pain.

## 2020-11-10 NOTE — ED PROVIDER NOTE - NS ED ROS FT
Constitutional: See HPI.  Eyes: No visual changes, eye pain or discharge.   ENMT: No hearing changes, pain, discharge or infections.   Cardiac: No SOB or edema. No chest pain with exertion.  Respiratory: No cough or respiratory distress.   GI: + abd pain, + nausea. No vomiting, diarrhea  : No dysuria, frequency or burning. No Discharge  MS: No myalgia, muscle weakness, joint pain or back pain.  Neuro: No headache or weakness.  Skin: No skin rash.  Except as documented in the HPI, all other systems are negative.

## 2020-11-10 NOTE — H&P ADULT - ATTENDING COMMENTS
HPI:  This is a 54 year old female with PMHx of Diverticulitis, COPD, HTN, Depression, Anxiety who presented to the ED with recurrence of abdominal pain. The patient reports that she has been having LLQ abdominal pain that is associated with sharp severe throbbing pain. The patient also states that she has been having bright red blood per rectum. Of note the patient was recently treated and released with IV antibiotic course for sigmoid diverticulitis. Last dose of ertapenem was approximately two weeks ago. Patient states that this is extremely similar to her prior recent bouts of diverticulitis.     In the ED initial vitals: T98.1, , /79, sat 95% on room air. Labs overall unremarkable when compared to prior, UA negative. CT noted for acute sigmoid diverticulitis. Admitted to medicine for IV antibiotics and ID evaluation,  (10 Nov 2020 23:34)    REVIEW OF SYSTEMS:    CONSTITUTIONAL: No weakness, fevers or chills  EYES/ENT: No visual changes;  No vertigo or throat pain   NECK: No pain or stiffness  RESPIRATORY: No cough, wheezing, hemoptysis; No shortness of breath  CARDIOVASCULAR: No chest pain or palpitations  GASTROINTESTINAL: No abdominal or epigastric pain. No nausea, vomiting, or hematemesis; No diarrhea or constipation. No melena or hematochezia.  GENITOURINARY: No dysuria, frequency or hematuria  NEUROLOGICAL: No numbness or weakness  SKIN: No itching, rashes    Physical Exam:    General: WN/WD NAD  Neurology: A&Ox3, nonfocal, follows commands  Eyes: PERRLA/ EOMI  ENT/Neck: Neck supple, trachea midline, No JVD  Respiratory: CTA B/L, No wheezing, rales, rhonchi  CV: Normal rate regular rhythm, S1S2, no murmurs, rubs or gallops  Abdominal: Soft, NT, ND +BS,   Extremities: No edema, + peripheral pulses  Skin: No Rashes, Hematoma, Ecchymosis  Incisions: n/a  Tubes: n/a HPI:  This is a 54 year old female with PMHx of Diverticulitis, COPD, HTN, Depression, Anxiety who presented to the ED with recurrence of abdominal pain. The patient reports that she has been having LLQ abdominal pain that is associated with sharp severe throbbing pain. The patient also states that she has been having bright red blood per rectum. Of note the patient was recently treated and released with IV antibiotic course for sigmoid diverticulitis. Last dose of ertapenem was approximately two weeks ago. Patient states that this is extremely similar to her prior recent bouts of diverticulitis.     In the ED initial vitals: T98.1, , /79, sat 95% on room air. Labs overall unremarkable when compared to prior, UA negative. CT noted for acute sigmoid diverticulitis. Admitted to medicine for IV antibiotics and ID evaluation,  (10 Nov 2020 23:34)    REVIEW OF SYSTEMS: see cc/HPI   CONSTITUTIONAL: No weakness, fevers or chills  EYES/ENT: No visual changes;  No vertigo or throat pain   NECK: No pain or stiffness  RESPIRATORY: No cough, wheezing, hemoptysis; No shortness of breath  CARDIOVASCULAR: No chest pain or palpitations  GASTROINTESTINAL: No abdominal or epigastric pain. No nausea, vomiting, or hematemesis; No diarrhea or constipation. No melena or hematochezia.  GENITOURINARY: No dysuria, frequency or hematuria  NEUROLOGICAL: No numbness or weakness  SKIN: No itching, rashes    Physical Exam:  General: WN/WD NAD  Neurology: A&Ox3, nonfocal, follows commands  Eyes: PERRLA/ EOMI  ENT/Neck: Neck supple, trachea midline, No JVD  Respiratory: CTA B/L, No wheezing, rales, rhonchi  CV: Normal rate regular rhythm, S1S2, no murmurs, rubs or gallops  Abdominal: Soft, ND +BS, (+) tenderness on the left half of the abdomen - worse in the LLQ, (+) guarding  Extremities: No edema, + peripheral pulses  Skin: No Rashes, Hematoma, Ecchymosis  Incisions: n/a  Tubes: n/a    A/p   Recurrent Diverticulitis   Rectal bleeding - hemodynamically stable   -Admit to floor  -strict NPO and IV fluids for now   -IV abx  -ID eval   -PRN pain Rx  -check serial CBC  -GI and surgical evals     COPD -stable   -c/w outpatient Rx     Depression/ Anxiety  -c/w Buproprion / clonazepam     HTN - stable   -Hold Rx IF patient becomes hemodynamically unstable     Hypothyroidism   -Levothyroxine     GERD   -stable     DVT prophylaxis

## 2020-11-10 NOTE — ED ADULT NURSE NOTE - NSIMPLEMENTINTERV_GEN_ALL_ED
Implemented All Universal Safety Interventions:  [place] to call system. Call bell, personal items and telephone within reach. Instruct patient to call for assistance. Room bathroom lighting operational. Non-slip footwear when patient is off stretcher. Physically safe environment: no spills, clutter or unnecessary equipment. Stretcher in lowest position, wheels locked, appropriate side rails in place.

## 2020-11-10 NOTE — ED PROVIDER NOTE - ATTENDING CONTRIBUTION TO CARE
I personally evaluated the patient. I reviewed the Resident’s or Physician Assistant’s note (as assigned above), and agree with the findings and plan except as documented in my note.  55 yo woman with recurrent diverticulitis and recently finished a course of IV antibiotics via midline presents for worsening lower abd pain over the past 2 days.  IV, labs and CT scan ordered.  Just finished course on 10/27.

## 2020-11-10 NOTE — ED PROVIDER NOTE - CLINICAL SUMMARY MEDICAL DECISION MAKING FREE TEXT BOX
Case signed out to me by Dr. Webb -- patient is 54 with recurrent diverticulitis, last finished course of ertapeneim via PICC line on 10/27 here for recurrent pain and diarrhea -- labs with leukocytosis, CT with acute diverticulitis -- the patient is not responding to medical treatment, likely needs surgical resection, however will change abx to zosyn, admit to medicine for continued monitoring and routine surgical consult.

## 2020-11-10 NOTE — ED ADULT NURSE NOTE - OBJECTIVE STATEMENT
Pt c/o lower abdominal pain. Pt states she has had this pain for 10 years. It has gotten worse recently. Pt was admitted recently with similar issues. Pt c/o neausea. No vomiting/diarrhea.

## 2020-11-10 NOTE — H&P ADULT - NSICDXPASTMEDICALHX_GEN_ALL_CORE_FT
----- Message from Marian Lockett RN sent at 5/28/2020 12:27 PM CDT -----   Keytruda is now approved.    ----- Message -----  From: Gurpreet Estrada MD  Sent: 5/27/2020   4:55 PM CDT  To: Marian Lockett, RN, #    Thanks, Jatin    ----- Message -----  From: Jatin Oliveira  Sent: 5/27/2020   3:40 PM CDT  To: Marian Lockett RN, #      after speaking with pre-auth team this hasn't been approved just yet, auth is working to get this expedited ASAP, will keep you updated.      ----- Message -----  From: Gurpreet Estrada MD  Sent: 5/27/2020   3:05 PM CDT  To: Abida Jacobs RN, Dinorah Dickerson RN, #    Just got guardant back!  She is ready to be scheduled for Keytruda.  Could we schedule next available?  Treatment plan is in.  No need for port.    Thanks so much  Johny    ----- Message -----  From: Abida Jacobs RN  Sent: 5/27/2020   2:00 PM CDT  To: Gurpreet Estrada MD, Beltran Ace RN    Is the above pt ready to be scheduled for chemo?    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

## 2020-11-10 NOTE — H&P ADULT - ASSESSMENT
This is a 54 year old female with PMHx of Diverticulitis, COPD, HTN, Depression, Anxiety who presented to the ED with recurrence of abdominal pain.    #Abdominal pain and bright red blood per rectum 2/2 sigmoid diverticulitis  - Admit to medicine  - Noted reoccurrence with multiple admissions in the past 45 days for the same issue  - Meropenem 1gm ordered for now; further antibiotics to be determined by ID   - Pain control: Dilaudid 1gm every 4 hours   - GI consult for this being reoccurrence     #COPD not on home O2  - Not in any acute exacerbation  - Continue on Symbicort, will use spiriva while here.  - Continue on montelukast 10mg daily   - Albuterol inhalation PRN  - Noted patient on other inhalers that are not supplied here. Will need to bring outside meds in to continue receiving them here.     #Depression/Anxiety  - Continue on Buproprion 300mg daily and Clonazepam 1mg BID     #HTN  - Continue on Enalapril 10mg daily     #Hypothyroidism  - Continue on Levothyroxine 125mcg daily    #GERD  - Protonix daily    Activity: As tolerated  Diet: NPO for now  DVT ppx: Heparin  GI ppx: Protonix  Code Status: Full Code  DISPO: acute This is a 54 year old female with PMHx of Diverticulitis, COPD, HTN, Depression, Anxiety who presented to the ED with recurrence of abdominal pain.    #Abdominal pain and bright red blood per rectum 2/2 sigmoid diverticulitis  - Admit to medicine  - Noted reoccurrence with multiple admissions in the past 45 days for the same issue  - Meropenem 1gm ordered for now; further antibiotics to be determined by ID   - Pain control: Dilaudid 1gm every 4 hours   - GI consult for this being reoccurrence   - NPO for now except meds  - IV hydration: LR at 75cc/hour    #COPD not on home O2  - Not in any acute exacerbation  - Continue on Symbicort, will use spiriva while here.  - Continue on montelukast 10mg daily   - Albuterol inhalation PRN  - Noted patient on other inhalers that are not supplied here. Will need to bring outside meds in to continue receiving them here.     #Depression/Anxiety  - Continue on Buproprion 300mg daily and Clonazepam 1mg BID     #HTN  - Continue on Enalapril 10mg daily     #Hypothyroidism  - Continue on Levothyroxine 125mcg daily    #GERD  - Protonix daily    Activity: As tolerated  Diet: NPO for now  DVT ppx: Heparin  GI ppx: Protonix  Code Status: Full Code  DISPO: acute

## 2020-11-10 NOTE — ED PROVIDER NOTE - PHYSICAL EXAMINATION
CONST: mild distress 2/2 pain.   EYES: Sclera and conjunctiva clear.  CARD: Normal S1 S2; Normal rate and rhythm  RESP: Equal BS B/L, No wheezes, rhonchi or rales. No distress  GI: Soft, + TTP LLQ/RLQ w/ guarding, no rebound, no CVA tenderness, obese, non-distended.  MS: Normal ROM in all extremities. No edema of lower extremities, no calf pain, radial pulses 2+ bilaterally  SKIN: Warm, dry, no acute rashes. Good turgor  NEURO: A&Ox3, No focal deficits. Strength 5/5 with no sensory deficits. Steady gait

## 2020-11-11 LAB
ANION GAP SERPL CALC-SCNC: 12 MMOL/L — SIGNIFICANT CHANGE UP (ref 7–14)
BASOPHILS # BLD AUTO: 0.03 K/UL — SIGNIFICANT CHANGE UP (ref 0–0.2)
BASOPHILS NFR BLD AUTO: 0.4 % — SIGNIFICANT CHANGE UP (ref 0–1)
BUN SERPL-MCNC: 19 MG/DL — SIGNIFICANT CHANGE UP (ref 10–20)
CALCIUM SERPL-MCNC: 9.2 MG/DL — SIGNIFICANT CHANGE UP (ref 8.5–10.1)
CHLORIDE SERPL-SCNC: 103 MMOL/L — SIGNIFICANT CHANGE UP (ref 98–110)
CO2 SERPL-SCNC: 27 MMOL/L — SIGNIFICANT CHANGE UP (ref 17–32)
CREAT SERPL-MCNC: 1.1 MG/DL — SIGNIFICANT CHANGE UP (ref 0.7–1.5)
EOSINOPHIL # BLD AUTO: 0.3 K/UL — SIGNIFICANT CHANGE UP (ref 0–0.7)
EOSINOPHIL NFR BLD AUTO: 4.2 % — SIGNIFICANT CHANGE UP (ref 0–8)
GLUCOSE SERPL-MCNC: 124 MG/DL — HIGH (ref 70–99)
HCT VFR BLD CALC: 34 % — LOW (ref 37–47)
HGB BLD-MCNC: 10.5 G/DL — LOW (ref 12–16)
IMM GRANULOCYTES NFR BLD AUTO: 0.3 % — SIGNIFICANT CHANGE UP (ref 0.1–0.3)
LYMPHOCYTES # BLD AUTO: 1.37 K/UL — SIGNIFICANT CHANGE UP (ref 1.2–3.4)
LYMPHOCYTES # BLD AUTO: 19.1 % — LOW (ref 20.5–51.1)
MAGNESIUM SERPL-MCNC: 1.9 MG/DL — SIGNIFICANT CHANGE UP (ref 1.8–2.4)
MCHC RBC-ENTMCNC: 26.1 PG — LOW (ref 27–31)
MCHC RBC-ENTMCNC: 30.9 G/DL — LOW (ref 32–37)
MCV RBC AUTO: 84.6 FL — SIGNIFICANT CHANGE UP (ref 81–99)
MONOCYTES # BLD AUTO: 0.59 K/UL — SIGNIFICANT CHANGE UP (ref 0.1–0.6)
MONOCYTES NFR BLD AUTO: 8.2 % — SIGNIFICANT CHANGE UP (ref 1.7–9.3)
NEUTROPHILS # BLD AUTO: 4.88 K/UL — SIGNIFICANT CHANGE UP (ref 1.4–6.5)
NEUTROPHILS NFR BLD AUTO: 67.8 % — SIGNIFICANT CHANGE UP (ref 42.2–75.2)
NRBC # BLD: 0 /100 WBCS — SIGNIFICANT CHANGE UP (ref 0–0)
PLATELET # BLD AUTO: 277 K/UL — SIGNIFICANT CHANGE UP (ref 130–400)
POTASSIUM SERPL-MCNC: 4.8 MMOL/L — SIGNIFICANT CHANGE UP (ref 3.5–5)
POTASSIUM SERPL-SCNC: 4.8 MMOL/L — SIGNIFICANT CHANGE UP (ref 3.5–5)
RBC # BLD: 4.02 M/UL — LOW (ref 4.2–5.4)
RBC # FLD: 16.7 % — HIGH (ref 11.5–14.5)
SARS-COV-2 RNA SPEC QL NAA+PROBE: SIGNIFICANT CHANGE UP
SODIUM SERPL-SCNC: 142 MMOL/L — SIGNIFICANT CHANGE UP (ref 135–146)
WBC # BLD: 7.19 K/UL — SIGNIFICANT CHANGE UP (ref 4.8–10.8)
WBC # FLD AUTO: 7.19 K/UL — SIGNIFICANT CHANGE UP (ref 4.8–10.8)

## 2020-11-11 PROCEDURE — 99223 1ST HOSP IP/OBS HIGH 75: CPT

## 2020-11-11 PROCEDURE — 99223 1ST HOSP IP/OBS HIGH 75: CPT | Mod: AI

## 2020-11-11 RX ORDER — HYDROMORPHONE HYDROCHLORIDE 2 MG/ML
1.5 INJECTION INTRAMUSCULAR; INTRAVENOUS; SUBCUTANEOUS EVERY 8 HOURS
Refills: 0 | Status: DISCONTINUED | OUTPATIENT
Start: 2020-11-11 | End: 2020-11-13

## 2020-11-11 RX ORDER — ZOLPIDEM TARTRATE 10 MG/1
5 TABLET ORAL AT BEDTIME
Refills: 0 | Status: DISCONTINUED | OUTPATIENT
Start: 2020-11-11 | End: 2020-11-14

## 2020-11-11 RX ORDER — MORPHINE SULFATE 50 MG/1
2 CAPSULE, EXTENDED RELEASE ORAL ONCE
Refills: 0 | Status: DISCONTINUED | OUTPATIENT
Start: 2020-11-11 | End: 2020-11-11

## 2020-11-11 RX ORDER — HYDROMORPHONE HYDROCHLORIDE 2 MG/ML
1 INJECTION INTRAMUSCULAR; INTRAVENOUS; SUBCUTANEOUS ONCE
Refills: 0 | Status: DISCONTINUED | OUTPATIENT
Start: 2020-11-11 | End: 2020-11-11

## 2020-11-11 RX ORDER — MEROPENEM 1 G/30ML
1000 INJECTION INTRAVENOUS EVERY 12 HOURS
Refills: 0 | Status: DISCONTINUED | OUTPATIENT
Start: 2020-11-11 | End: 2020-11-14

## 2020-11-11 RX ORDER — KETOROLAC TROMETHAMINE 30 MG/ML
15 SYRINGE (ML) INJECTION ONCE
Refills: 0 | Status: DISCONTINUED | OUTPATIENT
Start: 2020-11-11 | End: 2020-11-11

## 2020-11-11 RX ORDER — ONDANSETRON 8 MG/1
4 TABLET, FILM COATED ORAL ONCE
Refills: 0 | Status: COMPLETED | OUTPATIENT
Start: 2020-11-11 | End: 2020-11-11

## 2020-11-11 RX ORDER — MEROPENEM 1 G/30ML
INJECTION INTRAVENOUS
Refills: 0 | Status: DISCONTINUED | OUTPATIENT
Start: 2020-11-11 | End: 2020-11-14

## 2020-11-11 RX ORDER — MEROPENEM 1 G/30ML
1000 INJECTION INTRAVENOUS ONCE
Refills: 0 | Status: COMPLETED | OUTPATIENT
Start: 2020-11-11 | End: 2020-11-11

## 2020-11-11 RX ORDER — ACETAMINOPHEN 500 MG
650 TABLET ORAL ONCE
Refills: 0 | Status: COMPLETED | OUTPATIENT
Start: 2020-11-11 | End: 2020-11-11

## 2020-11-11 RX ADMIN — Medication 650 MILLIGRAM(S): at 21:58

## 2020-11-11 RX ADMIN — HEPARIN SODIUM 5000 UNIT(S): 5000 INJECTION INTRAVENOUS; SUBCUTANEOUS at 14:37

## 2020-11-11 RX ADMIN — Medication 1 MILLIGRAM(S): at 18:48

## 2020-11-11 RX ADMIN — Medication 1 MILLIGRAM(S): at 06:16

## 2020-11-11 RX ADMIN — BUPROPION HYDROCHLORIDE 300 MILLIGRAM(S): 150 TABLET, EXTENDED RELEASE ORAL at 11:52

## 2020-11-11 RX ADMIN — CHLORHEXIDINE GLUCONATE 1 APPLICATION(S): 213 SOLUTION TOPICAL at 06:17

## 2020-11-11 RX ADMIN — ONDANSETRON 4 MILLIGRAM(S): 8 TABLET, FILM COATED ORAL at 10:30

## 2020-11-11 RX ADMIN — HYDROMORPHONE HYDROCHLORIDE 1 MILLIGRAM(S): 2 INJECTION INTRAMUSCULAR; INTRAVENOUS; SUBCUTANEOUS at 04:31

## 2020-11-11 RX ADMIN — Medication 650 MILLIGRAM(S): at 22:50

## 2020-11-11 RX ADMIN — ZOLPIDEM TARTRATE 5 MILLIGRAM(S): 10 TABLET ORAL at 23:03

## 2020-11-11 RX ADMIN — HYDROMORPHONE HYDROCHLORIDE 1.5 MILLIGRAM(S): 2 INJECTION INTRAMUSCULAR; INTRAVENOUS; SUBCUTANEOUS at 16:59

## 2020-11-11 RX ADMIN — MONTELUKAST 10 MILLIGRAM(S): 4 TABLET, CHEWABLE ORAL at 11:52

## 2020-11-11 RX ADMIN — HYDROMORPHONE HYDROCHLORIDE 1.5 MILLIGRAM(S): 2 INJECTION INTRAMUSCULAR; INTRAVENOUS; SUBCUTANEOUS at 23:51

## 2020-11-11 RX ADMIN — MEROPENEM 100 MILLIGRAM(S): 1 INJECTION INTRAVENOUS at 08:04

## 2020-11-11 RX ADMIN — ONDANSETRON 4 MILLIGRAM(S): 8 TABLET, FILM COATED ORAL at 03:51

## 2020-11-11 RX ADMIN — MORPHINE SULFATE 2 MILLIGRAM(S): 50 CAPSULE, EXTENDED RELEASE ORAL at 10:31

## 2020-11-11 RX ADMIN — Medication 125 MICROGRAM(S): at 06:17

## 2020-11-11 RX ADMIN — BUDESONIDE AND FORMOTEROL FUMARATE DIHYDRATE 2 PUFF(S): 160; 4.5 AEROSOL RESPIRATORY (INHALATION) at 08:04

## 2020-11-11 RX ADMIN — HYDROMORPHONE HYDROCHLORIDE 1 MILLIGRAM(S): 2 INJECTION INTRAMUSCULAR; INTRAVENOUS; SUBCUTANEOUS at 01:42

## 2020-11-11 RX ADMIN — PANTOPRAZOLE SODIUM 40 MILLIGRAM(S): 20 TABLET, DELAYED RELEASE ORAL at 06:16

## 2020-11-11 RX ADMIN — ONDANSETRON 4 MILLIGRAM(S): 8 TABLET, FILM COATED ORAL at 22:00

## 2020-11-11 RX ADMIN — SODIUM CHLORIDE 75 MILLILITER(S): 9 INJECTION, SOLUTION INTRAVENOUS at 23:27

## 2020-11-11 RX ADMIN — BUDESONIDE AND FORMOTEROL FUMARATE DIHYDRATE 2 PUFF(S): 160; 4.5 AEROSOL RESPIRATORY (INHALATION) at 21:57

## 2020-11-11 RX ADMIN — MEROPENEM 100 MILLIGRAM(S): 1 INJECTION INTRAVENOUS at 17:56

## 2020-11-11 RX ADMIN — MORPHINE SULFATE 2 MILLIGRAM(S): 50 CAPSULE, EXTENDED RELEASE ORAL at 10:44

## 2020-11-11 RX ADMIN — HYDROMORPHONE HYDROCHLORIDE 1 MILLIGRAM(S): 2 INJECTION INTRAMUSCULAR; INTRAVENOUS; SUBCUTANEOUS at 03:54

## 2020-11-11 RX ADMIN — HYDROMORPHONE HYDROCHLORIDE 1.5 MILLIGRAM(S): 2 INJECTION INTRAMUSCULAR; INTRAVENOUS; SUBCUTANEOUS at 17:15

## 2020-11-11 RX ADMIN — Medication 10 MILLIGRAM(S): at 06:17

## 2020-11-11 RX ADMIN — HYDROMORPHONE HYDROCHLORIDE 1.5 MILLIGRAM(S): 2 INJECTION INTRAMUSCULAR; INTRAVENOUS; SUBCUTANEOUS at 23:03

## 2020-11-11 NOTE — CONSULT NOTE ADULT - SUBJECTIVE AND OBJECTIVE BOX
Chief Complaint: Patient is a 54y old  Female who presents with a chief complaint of Diverticulitis (11 Nov 2020 08:26)      HPI:    54 year old female with PMHx of Diverticulitis, COPD, HTN, Depression, Anxiety who presented to the ED with recurrence of abdominal pain. The patient reports that she has been having LLQ abdominal pain that is associated with sharp severe throbbing pain. The patient also states that she has been having bright red blood per rectum. Of note the patient was recently treated and released with IV antibiotic course for sigmoid diverticulitis. Last dose of ertapenem was approximately two weeks ago. Patient states that this is extremely similar to her prior recent bouts of diverticulitis.     In the ED initial vitals: T98.1, , /79, sat 95% on room air. Labs overall unremarkable when compared to prior, UA negative. CT noted for acute sigmoid diverticulitis. Admitted to medicine for IV antibiotics and ID evaluation    ADDITIONAL HISTORY: The patient reports that she has had multiple diverticulitis episodes with hospitalizations ( this is the third documented admission since 10/1/20). The patient notes 1 episode of bloody bowel movement yesterday. She says she has history of hemorrhoids and reports occasional bloody streaks in the toilet when she strains.  She reports that she was unable to follow up for colonoscopy outpatient and has not been able to finish preop testing for surgery due to recurrent episodes of diverticulitis and having to come to the hospital. She was evaluated by surgery team during previous admission and recommended outpatient follow up and preop testing however unable due to being hospitalized again ( she says she started preop testing)      Medications:  ALBUTerol    90 MICROgram(s) HFA Inhaler 2 Puff(s) Inhalation every 6 hours PRN  budesonide 160 MICROgram(s)/formoterol 4.5 MICROgram(s) Inhaler 2 Puff(s) Inhalation two times a day  buPROPion XL (24-Hour) Oral Tab/Cap - Peds 300 milliGRAM(s) Oral daily  chlorhexidine 4% Liquid 1 Application(s) Topical <User Schedule>  clonazePAM  Tablet 1 milliGRAM(s) Oral two times a day  enalapril 10 milliGRAM(s) Oral daily  heparin   Injectable 5000 Unit(s) SubCutaneous every 8 hours  lactated ringers. 1000 milliLiter(s) IV Continuous <Continuous>  levothyroxine 125 MICROGram(s) Oral daily  meropenem  IVPB 1000 milliGRAM(s) IV Intermittent every 12 hours  meropenem  IVPB      montelukast 10 milliGRAM(s) Oral daily  morphine  - Injectable 2 milliGRAM(s) IV Push once  ondansetron Injectable 4 milliGRAM(s) IV Push once  pantoprazole    Tablet 40 milliGRAM(s) Oral before breakfast  tiotropium 18 MICROgram(s) Capsule 1 Capsule(s) Inhalation daily      PMHX/PSHX:  Anxiety    Hemorrhoids    Left ear hearing loss    Cervical disc disease    Hypothyroid    Nasopharyngeal cancer    Pulmonary nodules/lesions, multiple    Diverticulitis    HTN (hypertension)    COPD (chronic obstructive pulmonary disease)    History of cholecystectomy    No significant past surgical history        Family history:  Family history of lung cancer (Father, Mother)        Social History:     Allergies:  ciprofloxacin (Urticaria; Other)        Review of Systems:  General:  No wt loss, fevers, chills, night sweats, fatigue or pruritis.  Eyes:  Good vision, no reported pain or redness.  ENT:  No sore throat, pain, runny nose, or difficulty swallowing  CV:  No pain, palpitations, hypo/hypertension  Resp:  No dyspnea, cough, tachypnea, wheezing  GI:  No pain, nausea, vomiting, dysphagia, heartburn, diarrhea, constipation, or weight loss. , No rectal bleeding, tarry stools, or hematemesis.  :  No pain, bleeding/discharges, incontinence, nocturia  Musculoskeletal:  No pain, weakness or fasciculations.  Neuro:  No weakness, tingling, memory problems or paresthesias  Psych:  No fatigue, insomnia, mood problems, depression  Endocrine:  No polyuria, polydipsia, cold/heat intolerance  Heme:  No petechiae, ecchymosis, easy bruisability  Skin:  No rash, pruritis, tattoos, scars, or edema      PHYSICAL EXAM:   Vital Signs:  Vital Signs Last 24 Hrs  T(C): 36.6 (11 Nov 2020 03:43), Max: 36.7 (10 Nov 2020 17:21)  T(F): 97.9 (11 Nov 2020 03:43), Max: 98.1 (10 Nov 2020 17:21)  HR: 92 (11 Nov 2020 03:43) (92 - 108)  BP: 182/81 (11 Nov 2020 03:43) (176/79 - 182/81)  BP(mean): --  RR: 18 (11 Nov 2020 03:43) (18 - 18)  SpO2: 99% (11 Nov 2020 03:43) (95% - 99%)  Daily Height in cm: 162.56 (10 Nov 2020 17:21)    Daily     T(C): 36.6 (11-11-20 @ 03:43), Max: 36.7 (11-10-20 @ 17:21)  HR: 92 (11-11-20 @ 03:43) (92 - 108)  BP: 182/81 (11-11-20 @ 03:43) (176/79 - 182/81)  RR: 18 (11-11-20 @ 03:43) (18 - 18)  SpO2: 99% (11-11-20 @ 03:43) (95% - 99%)    GENERAL:  Appears stated age, well-groomed, well-nourished, no distress , OBESE  HEENT:  Conjunctivae clear and pink, no thyromegaly, nodules, adenopathy, no JVD, sclera -anicteric  CHEST:  Full & symmetric excursion, no increased effort, breath sounds clear  HEART:  Regular rhythm, S1, S2, no murmur/rub/S3/S4, no abdominal bruit, no edema  ABDOMEN:  Soft, LLQ TENDERNESS AND GUARDING , non-distended, normoactive bowel sounds,  no masses ,no hepato-splenomegaly, no signs of chronic liver disease , LIVIA NEGATIVE FOR BLOOD  EXTREMITIES  no cyanosis,clubbing or edema  SKIN:  No rash/erythema/ecchymoses/petechiae/wounds/abscess/warm/dry  NEURO:  Alert, oriented, no asterixis, no tremor, no encephalopathy    LABS:                        10.5   7.19  )-----------( 277      ( 11 Nov 2020 06:49 )             34.0     11-11    142  |  103  |  19  ----------------------------<  124<H>  4.8   |  27  |  1.1    Ca    9.2      11 Nov 2020 06:49  Mg     1.9     11-11    TPro  6.6  /  Alb  3.8  /  TBili  <0.2  /  DBili  x   /  AST  28  /  ALT  23  /  AlkPhos  83  11-10    LIVER FUNCTIONS - ( 10 Nov 2020 18:46 )  Alb: 3.8 g/dL / Pro: 6.6 g/dL / ALK PHOS: 83 U/L / ALT: 23 U/L / AST: 28 U/L / GGT: x               Amylase Serum--      Lipase serum 27       Ammonia--    GGT--        Imaging:    < from: CT Abdomen and Pelvis w/ IV Cont (11.10.20 @ 21:38) >  IMPRESSION:    Acute sigmoid diverticulitis. No perforation or abscess.    < end of copied text >        Assessment and Plan:    54 year old female with PMHx of hemorrhoids, diverticulitis, COPD not on home O2, HTN, Depression, Anxiety who presented to the ED with recurrence of abdominal pain, found to have recurrent diverticulitis ( no abscess/ perforation)      # Recurrent uncomplicated acute sigmoid diverticulitis  # One episode of BRBPR , hemodynamically stable   - no sepsis on admission  - LIVIA negative   - CT A/P w/ IV Contrast: no abscess/ no perforation  - c/w IVF and IV Meropenem (patient allergic to cipro)  - advance diet as tolerated  - surgery evaluation   - will need colonoscopy in 6-8 weeks after resolution of the symptoms       ** THIS IS AN INCOMPLETE NOTE , FINAL ATTENDING RECOMMENDATIONS TO FOLLOW **           Chief Complaint: Patient is a 54y old  Female who presents with a chief complaint of Diverticulitis (11 Nov 2020 08:26)      HPI:    54 year old female with PMHx of Diverticulitis, COPD, HTN, Depression, Anxiety who presented to the ED with recurrence of abdominal pain. The patient reports that she has been having LLQ abdominal pain that is associated with sharp severe throbbing pain. The patient also states that she has been having bright red blood per rectum. Of note the patient was recently treated and released with IV antibiotic course for sigmoid diverticulitis. Last dose of ertapenem was approximately two weeks ago. Patient states that this is extremely similar to her prior recent bouts of diverticulitis.     In the ED initial vitals: T98.1, , /79, sat 95% on room air. Labs overall unremarkable when compared to prior, UA negative. CT noted for acute sigmoid diverticulitis. Admitted to medicine for IV antibiotics and ID evaluation    ADDITIONAL HISTORY: The patient reports that she has had multiple diverticulitis episodes with hospitalizations ( this is the third documented admission since 10/1/20). The patient notes 1 episode of bloody bowel movement yesterday. She says she has history of hemorrhoids and reports occasional bloody streaks in the toilet when she strains.  She reports that she was unable to follow up for colonoscopy outpatient and has not been able to finish preop testing for surgery due to recurrent episodes of diverticulitis and having to come to the hospital. She was evaluated by surgery team during previous admission and recommended outpatient follow up and preop testing however unable due to being hospitalized again ( she says she started preop testing)      Medications:  ALBUTerol    90 MICROgram(s) HFA Inhaler 2 Puff(s) Inhalation every 6 hours PRN  budesonide 160 MICROgram(s)/formoterol 4.5 MICROgram(s) Inhaler 2 Puff(s) Inhalation two times a day  buPROPion XL (24-Hour) Oral Tab/Cap - Peds 300 milliGRAM(s) Oral daily  chlorhexidine 4% Liquid 1 Application(s) Topical <User Schedule>  clonazePAM  Tablet 1 milliGRAM(s) Oral two times a day  enalapril 10 milliGRAM(s) Oral daily  heparin   Injectable 5000 Unit(s) SubCutaneous every 8 hours  lactated ringers. 1000 milliLiter(s) IV Continuous <Continuous>  levothyroxine 125 MICROGram(s) Oral daily  meropenem  IVPB 1000 milliGRAM(s) IV Intermittent every 12 hours  meropenem  IVPB      montelukast 10 milliGRAM(s) Oral daily  morphine  - Injectable 2 milliGRAM(s) IV Push once  ondansetron Injectable 4 milliGRAM(s) IV Push once  pantoprazole    Tablet 40 milliGRAM(s) Oral before breakfast  tiotropium 18 MICROgram(s) Capsule 1 Capsule(s) Inhalation daily      PMHX/PSHX:  Anxiety    Hemorrhoids    Left ear hearing loss    Cervical disc disease    Hypothyroid    Nasopharyngeal cancer    Pulmonary nodules/lesions, multiple    Diverticulitis    HTN (hypertension)    COPD (chronic obstructive pulmonary disease)    History of cholecystectomy    No significant past surgical history        Family history:  Family history of lung cancer (Father, Mother)        Social History:     Allergies:  ciprofloxacin (Urticaria; Other)        Review of Systems:  General:  No wt loss, fevers, chills, night sweats, fatigue or pruritis.  Eyes:  Good vision, no reported pain or redness.  ENT:  No sore throat, pain, runny nose, or difficulty swallowing  CV:  No pain, palpitations, hypo/hypertension  Resp:  No dyspnea, cough, tachypnea, wheezing  GI:  No pain, nausea, vomiting, dysphagia, heartburn, diarrhea, constipation, or weight loss. , No rectal bleeding, tarry stools, or hematemesis.  :  No pain, bleeding/discharges, incontinence, nocturia  Musculoskeletal:  No pain, weakness or fasciculations.  Neuro:  No weakness, tingling, memory problems or paresthesias  Psych:  No fatigue, insomnia, mood problems, depression  Endocrine:  No polyuria, polydipsia, cold/heat intolerance  Heme:  No petechiae, ecchymosis, easy bruisability  Skin:  No rash, pruritis, tattoos, scars, or edema      PHYSICAL EXAM:   Vital Signs:  Vital Signs Last 24 Hrs  T(C): 36.6 (11 Nov 2020 03:43), Max: 36.7 (10 Nov 2020 17:21)  T(F): 97.9 (11 Nov 2020 03:43), Max: 98.1 (10 Nov 2020 17:21)  HR: 92 (11 Nov 2020 03:43) (92 - 108)  BP: 182/81 (11 Nov 2020 03:43) (176/79 - 182/81)  BP(mean): --  RR: 18 (11 Nov 2020 03:43) (18 - 18)  SpO2: 99% (11 Nov 2020 03:43) (95% - 99%)  Daily Height in cm: 162.56 (10 Nov 2020 17:21)    Daily     T(C): 36.6 (11-11-20 @ 03:43), Max: 36.7 (11-10-20 @ 17:21)  HR: 92 (11-11-20 @ 03:43) (92 - 108)  BP: 182/81 (11-11-20 @ 03:43) (176/79 - 182/81)  RR: 18 (11-11-20 @ 03:43) (18 - 18)  SpO2: 99% (11-11-20 @ 03:43) (95% - 99%)    GENERAL:  Appears stated age, well-groomed, well-nourished, no distress , OBESE  HEENT:  Conjunctivae clear and pink, no thyromegaly, nodules, adenopathy, no JVD, sclera -anicteric  CHEST:  Full & symmetric excursion, no increased effort, breath sounds clear  HEART:  Regular rhythm, S1, S2, no murmur/rub/S3/S4, no abdominal bruit, no edema  ABDOMEN:  Soft, LLQ TENDERNESS AND GUARDING , non-distended, normoactive bowel sounds,  no masses ,no hepato-splenomegaly, no signs of chronic liver disease , LIVIA NEGATIVE FOR BLOOD  EXTREMITIES  no cyanosis,clubbing or edema  SKIN:  No rash/erythema/ecchymoses/petechiae/wounds/abscess/warm/dry  NEURO:  Alert, oriented, no asterixis, no tremor, no encephalopathy    LABS:                        10.5   7.19  )-----------( 277      ( 11 Nov 2020 06:49 )             34.0     11-11    142  |  103  |  19  ----------------------------<  124<H>  4.8   |  27  |  1.1    Ca    9.2      11 Nov 2020 06:49  Mg     1.9     11-11    TPro  6.6  /  Alb  3.8  /  TBili  <0.2  /  DBili  x   /  AST  28  /  ALT  23  /  AlkPhos  83  11-10    LIVER FUNCTIONS - ( 10 Nov 2020 18:46 )  Alb: 3.8 g/dL / Pro: 6.6 g/dL / ALK PHOS: 83 U/L / ALT: 23 U/L / AST: 28 U/L / GGT: x               Amylase Serum--      Lipase serum 27       Ammonia--    GGT--        Imaging:    < from: CT Abdomen and Pelvis w/ IV Cont (11.10.20 @ 21:38) >  IMPRESSION:    Acute sigmoid diverticulitis. No perforation or abscess.    < end of copied text >        Assessment and Plan:    54 year old female with PMHx of hemorrhoids, diverticulitis, COPD not on home O2, HTN, Depression, Anxiety who presented to the ED with recurrence of abdominal pain, found to have recurrent diverticulitis ( no abscess/ perforation)      # Recurrent uncomplicated acute sigmoid diverticulitis  # One episode of BRBPR , hemodynamically stable   - no sepsis on admission  - LIVIA negative   - CT A/P w/ IV Contrast: no abscess/ no perforation  - c/w IVF and IV Meropenem (patient allergic to cipro)  - advance diet as tolerated  - surgery evaluation on this admission given recurrence of symptoms and inability to finish pre-op testing outpatient , although unlikely to be a candidate for acute surgery intervention at this time as no evidence of sepsis / peritonitis/ perforation   - will need colonoscopy in 6-8 weeks after resolution of the symptoms (last colonoscopy in 2013)      ** THIS IS AN INCOMPLETE NOTE , FINAL ATTENDING RECOMMENDATIONS TO FOLLOW **           Chief Complaint: Patient is a 54y old  Female who presents with a chief complaint of Diverticulitis (11 Nov 2020 08:26)      HPI:    54 year old female with PMHx of Diverticulitis, COPD, HTN, Depression, Anxiety who presented to the ED with recurrence of abdominal pain. The patient reports that she has been having LLQ abdominal pain that is associated with sharp severe throbbing pain. The patient also states that she has been having bright red blood per rectum. Of note the patient was recently treated and released with IV antibiotic course for sigmoid diverticulitis. Last dose of ertapenem was approximately two weeks ago. Patient states that this is extremely similar to her prior recent bouts of diverticulitis.     In the ED initial vitals: T98.1, , /79, sat 95% on room air. Labs overall unremarkable when compared to prior, UA negative. CT noted for acute sigmoid diverticulitis. Admitted to medicine for IV antibiotics and ID evaluation    ADDITIONAL HISTORY: The patient reports that she has had multiple diverticulitis episodes with hospitalizations ( this is the third documented admission since 10/1/20). The patient notes 1 episode of bloody bowel movement yesterday. She says she has history of hemorrhoids and reports occasional bloody streaks in the toilet when she strains.  She reports that she was unable to follow up for colonoscopy outpatient and has not been able to finish preop testing for surgery due to recurrent episodes of diverticulitis and having to come to the hospital. She was evaluated by surgery team during previous admission and recommended outpatient follow up and preop testing however unable due to being hospitalized again ( she says she started preop testing)      Medications:  ALBUTerol    90 MICROgram(s) HFA Inhaler 2 Puff(s) Inhalation every 6 hours PRN  budesonide 160 MICROgram(s)/formoterol 4.5 MICROgram(s) Inhaler 2 Puff(s) Inhalation two times a day  buPROPion XL (24-Hour) Oral Tab/Cap - Peds 300 milliGRAM(s) Oral daily  chlorhexidine 4% Liquid 1 Application(s) Topical <User Schedule>  clonazePAM  Tablet 1 milliGRAM(s) Oral two times a day  enalapril 10 milliGRAM(s) Oral daily  heparin   Injectable 5000 Unit(s) SubCutaneous every 8 hours  lactated ringers. 1000 milliLiter(s) IV Continuous <Continuous>  levothyroxine 125 MICROGram(s) Oral daily  meropenem  IVPB 1000 milliGRAM(s) IV Intermittent every 12 hours  meropenem  IVPB      montelukast 10 milliGRAM(s) Oral daily  morphine  - Injectable 2 milliGRAM(s) IV Push once  ondansetron Injectable 4 milliGRAM(s) IV Push once  pantoprazole    Tablet 40 milliGRAM(s) Oral before breakfast  tiotropium 18 MICROgram(s) Capsule 1 Capsule(s) Inhalation daily      PMHX/PSHX:  Anxiety    Hemorrhoids    Left ear hearing loss    Cervical disc disease    Hypothyroid    Nasopharyngeal cancer    Pulmonary nodules/lesions, multiple    Diverticulitis    HTN (hypertension)    COPD (chronic obstructive pulmonary disease)    History of cholecystectomy    No significant past surgical history        Family history:  Family history of lung cancer (Father, Mother)        Social History:     Allergies:  ciprofloxacin (Urticaria; Other)        Review of Systems:  General:  No wt loss, fevers, chills, night sweats, fatigue or pruritis.  Eyes:  Good vision, no reported pain or redness.  ENT:  No sore throat, pain, runny nose, or difficulty swallowing  CV:  No pain, palpitations, hypo/hypertension  Resp:  No dyspnea, cough, tachypnea, wheezing  GI:  No pain, nausea, vomiting, dysphagia, heartburn, diarrhea, constipation, or weight loss. , No rectal bleeding, tarry stools, or hematemesis.  :  No pain, bleeding/discharges, incontinence, nocturia  Musculoskeletal:  No pain, weakness or fasciculations.  Neuro:  No weakness, tingling, memory problems or paresthesias  Psych:  No fatigue, insomnia, mood problems, depression  Endocrine:  No polyuria, polydipsia, cold/heat intolerance  Heme:  No petechiae, ecchymosis, easy bruisability  Skin:  No rash, pruritis, tattoos, scars, or edema      PHYSICAL EXAM:   Vital Signs:  Vital Signs Last 24 Hrs  T(C): 36.6 (11 Nov 2020 03:43), Max: 36.7 (10 Nov 2020 17:21)  T(F): 97.9 (11 Nov 2020 03:43), Max: 98.1 (10 Nov 2020 17:21)  HR: 92 (11 Nov 2020 03:43) (92 - 108)  BP: 182/81 (11 Nov 2020 03:43) (176/79 - 182/81)  BP(mean): --  RR: 18 (11 Nov 2020 03:43) (18 - 18)  SpO2: 99% (11 Nov 2020 03:43) (95% - 99%)  Daily Height in cm: 162.56 (10 Nov 2020 17:21)    Daily     T(C): 36.6 (11-11-20 @ 03:43), Max: 36.7 (11-10-20 @ 17:21)  HR: 92 (11-11-20 @ 03:43) (92 - 108)  BP: 182/81 (11-11-20 @ 03:43) (176/79 - 182/81)  RR: 18 (11-11-20 @ 03:43) (18 - 18)  SpO2: 99% (11-11-20 @ 03:43) (95% - 99%)    GENERAL:  Appears stated age, well-groomed, well-nourished, no distress , OBESE  HEENT:  Conjunctivae clear and pink, no thyromegaly, nodules, adenopathy, no JVD, sclera -anicteric  CHEST:  Full & symmetric excursion, no increased effort, breath sounds clear  HEART:  Regular rhythm, S1, S2, no murmur/rub/S3/S4, no abdominal bruit, no edema  ABDOMEN:  Soft, LLQ TENDERNESS AND GUARDING , non-distended, normoactive bowel sounds,  no masses ,no hepato-splenomegaly, no signs of chronic liver disease , LIVIA NEGATIVE FOR BLOOD  EXTREMITIES  no cyanosis,clubbing or edema  SKIN:  No rash/erythema/ecchymoses/petechiae/wounds/abscess/warm/dry  NEURO:  Alert, oriented, no asterixis, no tremor, no encephalopathy    LABS:                        10.5   7.19  )-----------( 277      ( 11 Nov 2020 06:49 )             34.0     11-11    142  |  103  |  19  ----------------------------<  124<H>  4.8   |  27  |  1.1    Ca    9.2      11 Nov 2020 06:49  Mg     1.9     11-11    TPro  6.6  /  Alb  3.8  /  TBili  <0.2  /  DBili  x   /  AST  28  /  ALT  23  /  AlkPhos  83  11-10    LIVER FUNCTIONS - ( 10 Nov 2020 18:46 )  Alb: 3.8 g/dL / Pro: 6.6 g/dL / ALK PHOS: 83 U/L / ALT: 23 U/L / AST: 28 U/L / GGT: x               Amylase Serum--      Lipase serum 27       Ammonia--    GGT--        Imaging:    < from: CT Abdomen and Pelvis w/ IV Cont (11.10.20 @ 21:38) >  IMPRESSION:    Acute sigmoid diverticulitis. No perforation or abscess.    < end of copied text >        Assessment and Plan:    54 year old female with PMHx of hemorrhoids, diverticulitis, COPD not on home O2, HTN, Depression, Anxiety who presented to the ED with recurrence of abdominal pain, found to have recurrent diverticulitis ( no abscess/ perforation)      # Recurrent uncomplicated acute sigmoid diverticulitis  # One episode of BRBPR , hemodynamically stable, likely hemorrhoidal  - no sepsis on admission  - LIVIA negative   - CT A/P w/ IV Contrast: no abscess/ no perforation  - c/w IVF and IV Meropenem (patient allergic to cipro)  - advance diet as tolerated  - surgery evaluation on this admission given recurrence of symptoms and inability to finish pre-op testing outpatient , although unlikely to be a candidate for acute surgery intervention at this time as no evidence of sepsis / peritonitis/ perforation   - will need colonoscopy in 6-8 weeks after resolution of the symptoms (last colonoscopy in 2013)    will follow up

## 2020-11-11 NOTE — PROGRESS NOTE ADULT - ASSESSMENT
This is a 54 year old female with PMHx of Diverticulitis, COPD, HTN, Depression, Anxiety who presented to the ED with recurrence of abdominal pain.    #Abdominal pain and bright red blood per rectum 2/2 sigmoid diverticulitis  - Admit to medicine  - Noted reoccurrence with multiple admissions in the past 45 days for the same issue  - Meropenem 1gm ordered for now; further antibiotics to be determined by ID   - Pain control: Dilaudid 1gm every 4 hours   - GI consult for this being reoccurrence   - NPO for now except meds  - IV hydration: LR at 75cc/hour    #COPD not on home O2  - Not in any acute exacerbation  - Continue on Symbicort, will use spiriva while here.  - Continue on montelukast 10mg daily   - Albuterol inhalation PRN  - Noted patient on other inhalers that are not supplied here. Will need to bring outside meds in to continue receiving them here.     #Depression/Anxiety  - Continue on Buproprion 300mg daily and Clonazepam 1mg BID     #HTN  - Continue on Enalapril 10mg daily     #Hypothyroidism  - Continue on Levothyroxine 125mcg daily    #GERD  - Protonix daily    Activity: As tolerated  Diet: NPO for now  DVT ppx: Heparin  GI ppx: Protonix  Code Status: Full Code  DISPO: acute This is a 54 year old female with PMHx of Diverticulitis, COPD, HTN, Depression, Anxiety who presented to the ED with recurrence of abdominal pain.    #uncomplicated recurrent sigmoid diverticulitis (4 in the past 4 months, 6 in the past few yr), s/p ertapenem course last month   - no abscess, normal wbc, no fever, normal lactate  - Meropenem for now, given recurrence with get Id and GI  eval   - NPO for now except meds, may advance to CLL if pain improve   - IV hydration: LR at 75cc/hour    #COPD not on home O2  - Not in any acute exacerbation  - Continue on Symbicort, will use spiriva while here.  - Continue on montelukast 10mg daily   - Albuterol inhalation PRN  - Noted patient on other inhalers that are not supplied here. Will need to bring outside meds in to continue receiving them here.     #Depression/Anxiety  - Continue on Buproprion 300mg daily and Clonazepam 1mg BID     #HTN  - Continue on Enalapril 10mg daily     #Hypothyroidism  - Continue on Levothyroxine 125mcg daily    #GERD  - Protonix daily    Activity: As tolerated  Diet: NPO for now  DVT ppx: Heparin  GI ppx: Protonix  Code Status: Full Code  DISPO: acute This is a 54 year old female with PMHx of Diverticulitis, COPD, HTN, Depression, Anxiety who presented to the ED with recurrence of abdominal pain.    #uncomplicated recurrent sigmoid diverticulitis (4 in the past 4 months, 6 in the past few yr), s/p ertapenem course last month   - no abscess, normal wbc, no fever, normal lactate  - Meropenem for now, given recurrence with get Id and GI  eval   - NPO for now except meds, may advance to CLL if pain improve   - IV hydration: LR at 75cc/hour  - last c-scope 8 yr ago, will need c-scope and possible colectomy later     #COPD not on home O2  - Not in any acute exacerbation  - Continue on Symbicort, will use spiriva while here.  - Continue on montelukast 10mg daily   - Albuterol inhalation PRN  - Noted patient on other inhalers that are not supplied here. Will need to bring outside meds in to continue receiving them here.     #Depression/Anxiety  - Continue on Buproprion 300mg daily and Clonazepam 1mg BID     #HTN  - Continue on Enalapril 10mg daily     #Hypothyroidism  - Continue on Levothyroxine 125mcg daily    #GERD  - Protonix daily    Activity: As tolerated  Diet: NPO for now  DVT ppx: Heparin  GI ppx: Protonix  Code Status: Full Code  DISPO: acute This is a 54 year old female with PMHx of Diverticulitis, COPD, HTN, Depression, Anxiety who presented to the ED with recurrence of abdominal pain.    #uncomplicated recurrent sigmoid diverticulitis (4 in the past 4 months, 6 in the past few yr), s/p ertapenem course last month   - no abscess, normal wbc, no fever, normal lactate  - allergic to cipro, Meropenem for now, given recurrence with get Id and GI  eval   - NPO for now except meds, may advance to CLL if pain improve   - IV hydration: LR at 75cc/hour  - last c-scope 8 yr ago, will need c-scope and possible colectomy later   - no bloody BM, Hbg stable     #COPD not on home O2  - Not in any acute exacerbation  - Continue on Symbicort, will use spiriva while here.  - Continue on montelukast 10mg daily   - Albuterol inhalation PRN  - Noted patient on other inhalers that are not supplied here. Will need to bring outside meds in to continue receiving them here.     #Depression/Anxiety  - Continue on Buproprion 300mg daily and Clonazepam 1mg BID     #HTN  - Continue on Enalapril 10mg daily     #Hypothyroidism  - Continue on Levothyroxine 125mcg daily    #GERD  - Protonix daily    Activity: As tolerated  Diet: NPO for now  DVT ppx: Heparin  GI ppx: Protonix  Code Status: Full Code  DISPO: acute This is a 54 year old female with PMHx of Diverticulitis, COPD, HTN, Depression, Anxiety who presented to the ED with recurrence of abdominal pain.    #uncomplicated recurrent sigmoid diverticulitis (4 in the past 4 months, 6 in the past few yr), s/p ertapenem course last month   - no abscess, normal wbc, no fever, normal lactate  - allergic to cipro, Meropenem for now, send Bcx, given recurrence with get Id and GI  eval   - NPO for now except meds, may advance to CLL if pain improve   - IV hydration: LR at 75cc/hour  - last c-scope 8 yr ago, will need c-scope and possible colectomy later   - no bloody BM, Hbg stable     #COPD not on home O2  - Not in any acute exacerbation  - Continue on Symbicort, will use spiriva while here.  - Continue on montelukast 10mg daily   - Albuterol inhalation PRN  - Noted patient on other inhalers that are not supplied here. Will need to bring outside meds in to continue receiving them here.     #Depression/Anxiety  - Continue on Buproprion 300mg daily and Clonazepam 1mg BID     #HTN  - Continue on Enalapril 10mg daily     #Hypothyroidism  - Continue on Levothyroxine 125mcg daily    #GERD  - Protonix daily    Activity: As tolerated  Diet: NPO for now  DVT ppx: Heparin  GI ppx: Protonix  Code Status: Full Code  DISPO: acute

## 2020-11-11 NOTE — PROGRESS NOTE ADULT - SUBJECTIVE AND OBJECTIVE BOX
Patient is a 54y old  Female who presents with a chief complaint of Diverticulitis (10 Nov 2020 23:34)      OVERNIGHT EVENTS: remaines in pain, no diarrhea or vomiting, no bloody BM    SUBJECTIVE / INTERVAL HPI: Patient seen and examined at bedside.     VITAL SIGNS:  Vital Signs Last 24 Hrs  T(C): 36.6 (2020 03:43), Max: 36.7 (10 Nov 2020 17:21)  T(F): 97.9 (2020 03:43), Max: 98.1 (10 Nov 2020 17:21)  HR: 92 (2020 03:43) (92 - 108)  BP: 182/81 (2020 03:43) (176/79 - 182/81)  BP(mean): --  RR: 18 (:43) (18 - 18)  SpO2: 99% (2020 03:43) (95% - 99%)    PHYSICAL EXAM:    General: WDWN  HEENT: NC/AT; PERRL, clear conjunctiva  Neck: supple  Cardiovascular: +S1/S2; RRR  Respiratory: CTA b/l; no W/R/R  Gastrointestinal: soft, +ve moderate  tenderness over the LLQ not changed since yesterday, no guarding or rebound,  NT/ND; +BSx4  Extremities: WWP; 2+ peripheral pulses; no edema   Neurological: AAOx3; no focal deficits    MEDICATIONS:  MEDICATIONS  (STANDING):  budesonide 160 MICROgram(s)/formoterol 4.5 MICROgram(s) Inhaler 2 Puff(s) Inhalation two times a day  buPROPion XL (24-Hour) Oral Tab/Cap - Peds 300 milliGRAM(s) Oral daily  chlorhexidine 4% Liquid 1 Application(s) Topical <User Schedule>  clonazePAM  Tablet 1 milliGRAM(s) Oral two times a day  enalapril 10 milliGRAM(s) Oral daily  heparin   Injectable 5000 Unit(s) SubCutaneous every 8 hours  lactated ringers. 1000 milliLiter(s) (75 mL/Hr) IV Continuous <Continuous>  levothyroxine 125 MICROGram(s) Oral daily  meropenem  IVPB 1000 milliGRAM(s) IV Intermittent every 12 hours  meropenem  IVPB      montelukast 10 milliGRAM(s) Oral daily  pantoprazole    Tablet 40 milliGRAM(s) Oral before breakfast  tiotropium 18 MICROgram(s) Capsule 1 Capsule(s) Inhalation daily    MEDICATIONS  (PRN):  ALBUTerol    90 MICROgram(s) HFA Inhaler 2 Puff(s) Inhalation every 6 hours PRN Shortness of Breath and/or Wheezing      ALLERGIES:  Allergies    ciprofloxacin (Urticaria; Other)    Intolerances        LABS:                        10.5   7.19  )-----------( 277      ( 2020 06:49 )             34.0     11-10    141  |  103  |  26<H>  ----------------------------<  113<H>  4.8   |  23  |  1.3    Ca    9.4      10 Nov 2020 18:46    TPro  6.6  /  Alb  3.8  /  TBili  <0.2  /  DBili  x   /  AST  28  /  ALT  23  /  AlkPhos  83  11-10      Urinalysis Basic - ( 10 Nov 2020 20:48 )    Color: Yellow / Appearance: Clear / S.026 / pH: x  Gluc: x / Ketone: Negative  / Bili: Negative / Urobili: <2 mg/dL   Blood: x / Protein: Trace / Nitrite: Negative   Leuk Esterase: Negative / RBC: x / WBC x   Sq Epi: x / Non Sq Epi: x / Bacteria: x    < from: CT Abdomen and Pelvis w/ IV Cont (11.10.20 @ 21:38) >  IMPRESSION:    Acute sigmoid diverticulitis. No perforation or abscess.    < end of copied text >

## 2020-11-11 NOTE — CONSULT NOTE ADULT - ASSESSMENT
ASSESSMENT  54 year old female with PMHx of recurrent Diverticulitis, COPD, HTN, Depression, Anxiety recently treated with IV ertapenem for acute diverticulitis presented to the ED with recurrence of LLQ abdominal pain.     IMPRESSION  # Uncomplicated recurrent sigmoid diverticulitis   - 4 episodes in the past 4 months, 6 in the past few yr  - s/p ertapenem course last month   - no abscess, normal wbc, no fever, normal lactate  - allergic to cipro,  - On meropenem for now  - follow up BCX   - NPO for now except meds, may advance to CLL if pain improve   - IV hydration  - GI and surgery follow up      ** This is a pended note. All final recommendations to follow pending discussion with ID Attending **     ASSESSMENT  54 year old female with PMHx of recurrent Diverticulitis, COPD, HTN, Depression, Anxiety recently treated with IV ertapenem for acute diverticulitis presented to the ED with recurrence of LLQ abdominal pain.     IMPRESSION  # Uncomplicated recurrent sigmoid diverticulitis   - 4 episodes in the past 4 months, 6 in the past few yr  - s/p ertapenem course last month   - no abscess, normal wbc, no fever, normal lactate  - allergic to cipro,  - On meropenem for now  - follow up BCX   - NPO for now except meds, may advance to CLL if pain improve   - IV hydration  - GI and surgery follow up  - Lifestyle modification for weight loss and prevention of constipation        ** This is a pended note. All final recommendations to follow pending discussion with ID Attending **     ASSESSMENT  54 year old female with PMHx of recurrent Diverticulitis, COPD, HTN, Depression, Anxiety recently treated with IV ertapenem for acute diverticulitis presented to the ED with recurrence of LLQ abdominal pain.     IMPRESSION  # Uncomplicated recurrent sigmoid diverticulitis   - LLQ abdominal pain  - CT abdomen and pelvis: Acute sigmoid diverticulitis. No perforation or abscess.  - 4 episodes in the past 4 months, 6 in the past few yr  - s/p ertapenem course last month  - no abscess, normal wbc, no fever, normal lactate,   - Change Meropenem to 1g IV q8h  - follow up BCX   - Advance diet per GI if clinically improving  - IV hydration  - Surgical evaluation for recurrent diverticulitis

## 2020-11-11 NOTE — CONSULT NOTE ADULT - SUBJECTIVE AND OBJECTIVE BOX
GEOVANNA PUTNAM  54y, Female  Allergy: ciprofloxacin (Urticaria; Other)      CHIEF COMPLAINT: Recurrent Diverticulitis (2020 09:34)      HPI:  54 year old female with PMHx of recurrent Diverticulitis, COPD, HTN, Depression, Anxiety who presented to the ED with recurrence of abdominal pain. The patient reports that she has been having LLQ abdominal pain for , sharp, severe throbbing in nature. The patient also reports one episode of bright red blood per rectum. She has history of hemorrhoids and reports occasional bloody streaks in the toilet when she strains.  Of note the patient was recently treated and released with IV antibiotic (ertapenem) course for sigmoid diverticulitis. Last dose of ertapenem was approximately two weeks ago. Patient states that this is extremely similar to her recent bouts of diverticulitis.     In the ED initial vitals: T98.1, , /79, sat 95% on room air. Labs overall unremarkable when compared to prior, UA negative. CT noted for acute sigmoid diverticulitis. Admitted to medicine for IV antibiotics and ID evaluation.      Infectious Diseases History:  Old Micro Data/Cultures:     FAMILY HISTORY:  Family history of lung cancer (Father, Mother)      PAST MEDICAL & SURGICAL HISTORY:  Anxiety    Hemorrhoids    Left ear hearing loss    Cervical disc disease  sequelae of radtion for nasopharyngeal cancer    Hypothyroid    Nasopharyngeal cancer    Pulmonary nodules/lesions, multiple    Diverticulitis    HTN (hypertension)    COPD (chronic obstructive pulmonary disease)    History of cholecystectomy        SOCIAL HISTORY  Social History:  Former Smoker  No EtOH  No recreational drug use (10 Nov 2020 23:34)      Recent Travel:  Other Exposures:     ROS  General: Denies rigors, nightsweats  HEENT: Denies headache, rhinorrhea, sore throat, eye pain  CV: Denies CP, palpitations  PULM: Denies wheezing, hemoptysis  GI: Negative except as in HPI  : Denies discharge, hematuria  MSK: Denies arthralgias, myalgias  SKIN: Denies rash, lesions  NEURO: Denies paresthesias, weakness  PSYCH: h/o anxiety and depression. No recent change in mood    VITALS:  T(F): 97.9, Max: 98.1 (11-10-20 @ 17:21)  HR: 92  BP: 182/81  RR: 18  Vital Signs Last 24 Hrs  T(C): 36.6 (2020 03:43), Max: 36.7 (10 Nov 2020 17:21)  T(F): 97.9 (2020 03:43), Max: 98.1 (10 Nov 2020 17:21)  HR: 92 (2020 03:43) (92 - 108)  BP: 182/81 (2020 03:43) (176/79 - 182/81)  RR: 18 (2020 03:43) (18 - 18)  SpO2: 99% (2020 03:43) (95% - 99%)    PHYSICAL EXAM:  Gen: NAD, resting in bed, Obese  HEENT: Normocephalic, atraumatic  Neck: supple, no lymphadenopathy  CV: Regular rate & regular rhythm  Lungs: CTAB  Abdomen: Soft, Tenderness and guarding on LLQ, non-distended, normoactive bowel sounds,  no masses ,no hepato-splenomegaly  Ext: Warm, well perfused  Neuro: non focal, awake  Skin: no rash, no lesions  Lines: no phlebitis    TESTS & MEASUREMENTS:                        10.5   7.19  )-----------( 277      ( 2020 06:49 )             34.0         142  |  103  |  19  ----------------------------<  124<H>  4.8   |  27  |  1.1    Ca    9.2      2020 06:49  Mg     1.9         TPro  6.6  /  Alb  3.8  /  TBili  <0.2  /  DBili  x   /  AST  28  /  ALT  23  /  AlkPhos  83  11-10    eGFR if Non African American: 56 mL/min/1.73M2 (20 @ 06:49)  eGFR if : 65 mL/min/1.73M2 (20 @ 06:49)  eGFR if Non African American: 46 mL/min/1.73M2 (11-10-20 @ 18:46)  eGFR if : 53 mL/min/1.73M2 (11-10-20 @ 18:46)    LIVER FUNCTIONS - ( 10 Nov 2020 18:46 )  Alb: 3.8 g/dL / Pro: 6.6 g/dL / ALK PHOS: 83 U/L / ALT: 23 U/L / AST: 28 U/L / GGT: x           Urinalysis Basic - ( 10 Nov 2020 20:48 )    Color: Yellow / Appearance: Clear / S.026 / pH: x  Gluc: x / Ketone: Negative  / Bili: Negative / Urobili: <2 mg/dL   Blood: x / Protein: Trace / Nitrite: Negative   Leuk Esterase: Negative / RBC: x / WBC x   Sq Epi: x / Non Sq Epi: x / Bacteria: x  Lactate, Blood: 1.5 mmol/L (11-10-20 @ 18:46)      INFECTIOUS DISEASES TESTING    RADIOLOGY & ADDITIONAL TESTS:  I have personally reviewed the last available Chest xray  CXR      CT  CT Abdomen and Pelvis w/ IV Cont:   EXAM:  CT ABDOMEN AND PELVIS IC        PROCEDURE DATE:  11/10/2020      Acute sigmoid diverticulitis. No perforation or abscess.      CARDIOLOGY TESTING      All available historical records have been reviewed    MEDICATIONS  budesonide 160 MICROgram(s)/formoterol 4.5 MICROgram(s) Inhaler 2  buPROPion XL (24-Hour) Oral Tab/Cap - Peds 300  chlorhexidine 4% Liquid 1  clonazePAM  Tablet 1  enalapril 10  heparin   Injectable 5000  lactated ringers. 1000  levothyroxine 125  meropenem  IVPB 1000  meropenem  IVPB   montelukast 10  pantoprazole    Tablet 40  tiotropium 18 MICROgram(s) Capsule 1      ANTIBIOTICS:  meropenem  IVPB 1000 milliGRAM(s) IV Intermittent every 12 hours  meropenem  IVPB          All available historical data has been reviewed   GEOVANNA PUTNAM  54y, Female  Allergy: ciprofloxacin (Urticaria; Other)    CHIEF COMPLAINT: Recurrent Diverticulitis (2020 09:34)      HPI:  54 year old female with PMHx of recurrent Diverticulitis, COPD, HTN, Depression, Anxiety who presented to the ED with recurrence of abdominal. The patient reports that she has been having LLQ abdominal pain for 1 day , sharp, severe throbbing in nature, non radiating, associated with nausea. The patient also reports one episode of bright red blood per rectum. She has history of hemorrhoids and reports occasional bloody streaks in the toilet when she strains.  Of note the patient was recently treated and released with IV antibiotic (ertapenem) course sigmoid diverticulitis. She completed 2 weeks of ertapenem course on 10/28. Patient states that her abdominal pain was resolved after IV antibiotics in previous admissions. Patient states that this is extremely similar to her recent bouts of diverticulitis. Patient states that her diet usually consist of low fiber, meat products and ice pops and she is not physically active at home.  Denies any fever, vomiting or diarrhea.     In the ED initial vitals: T98.1, , /79, sat 95% on room air. Labs overall unremarkable when compared to prior, UA negative. CT noted for acute sigmoid diverticulitis.     Infectious Diseases History:  Old Micro Data/Cultures:     FAMILY HISTORY:  Family history of lung cancer (Father, Mother)      PAST MEDICAL & SURGICAL HISTORY:  Anxiety    Hemorrhoids    Left ear hearing loss    Cervical disc disease  sequelae of radtion for nasopharyngeal cancer    Hypothyroid    Nasopharyngeal cancer    Pulmonary nodules/lesions, multiple    Diverticulitis    HTN (hypertension)    COPD (chronic obstructive pulmonary disease)    History of cholecystectomy        SOCIAL HISTORY  Social History:  Former Smoker  No EtOH  No recreational drug use (10 Nov 2020 23:34)      Recent Travel:  Other Exposures:     ROS  General: Denies rigors, nightsweats  HEENT: Denies headache, rhinorrhea, sore throat, eye pain  CV: Denies CP, palpitations  PULM: Denies wheezing, hemoptysis  GI: Negative except as in HPI  : Denies discharge, hematuria  MSK: Denies arthralgias, myalgias  SKIN: Denies rash, lesions  NEURO: Denies paresthesias, weakness  PSYCH: h/o anxiety and depression. Patient feels depressed.     VITALS:  T(F): 97.9, Max: 98.1 (11-10-20 @ 17:21)  HR: 92  BP: 182/81  RR: 18  Vital Signs Last 24 Hrs  T(C): 36.6 (2020 03:43), Max: 36.7 (10 Nov 2020 17:21)  T(F): 97.9 (2020 03:43), Max: 98.1 (10 Nov 2020 17:21)  HR: 92 (2020 03:43) (92 - 108)  BP: 182/81 (2020 03:43) (176/79 - 182/81)  RR: 18 (2020 03:43) (18 - 18)  SpO2: 99% (2020 03:43) (95% - 99%)    PHYSICAL EXAM:  Gen: NAD, resting in bed, Morbidly Obese  HEENT: Normocephalic, atraumatic  Neck: supple, no lymphadenopathy  CV: Regular rate & regular rhythm  Lungs: CTAB  Abdomen: Soft, Tenderness and guarding on LLQ, non-distended, normoactive bowel sounds,  no masses ,no hepato-splenomegaly  Ext: Warm, well perfused  Neuro: non focal, awake  Skin: no rash, no lesions  Lines: no phlebitis    TESTS & MEASUREMENTS:                        10.5   7.19  )-----------( 277      ( 2020 06:49 )             34.0         142  |  103  |  19  ----------------------------<  124<H>  4.8   |  27  |  1.1    Ca    9.2      2020 06:49  Mg     1.9         TPro  6.6  /  Alb  3.8  /  TBili  <0.2  /  DBili  x   /  AST  28  /  ALT  23  /  AlkPhos  83  11-10    eGFR if Non African American: 56 mL/min/1.73M2 (20 @ 06:49)  eGFR if : 65 mL/min/1.73M2 (20 @ 06:49)  eGFR if Non African American: 46 mL/min/1.73M2 (11-10-20 @ 18:46)  eGFR if : 53 mL/min/1.73M2 (11-10-20 @ 18:46)    LIVER FUNCTIONS - ( 10 Nov 2020 18:46 )  Alb: 3.8 g/dL / Pro: 6.6 g/dL / ALK PHOS: 83 U/L / ALT: 23 U/L / AST: 28 U/L / GGT: x           Urinalysis Basic - ( 10 Nov 2020 20:48 )    Color: Yellow / Appearance: Clear / S.026 / pH: x  Gluc: x / Ketone: Negative  / Bili: Negative / Urobili: <2 mg/dL   Blood: x / Protein: Trace / Nitrite: Negative   Leuk Esterase: Negative / RBC: x / WBC x   Sq Epi: x / Non Sq Epi: x / Bacteria: x  Lactate, Blood: 1.5 mmol/L (11-10-20 @ 18:46)      INFECTIOUS DISEASES TESTING    RADIOLOGY & ADDITIONAL TESTS:  I have personally reviewed the last available Chest xray  CXR      CT  CT Abdomen and Pelvis w/ IV Cont:   EXAM:  CT ABDOMEN AND PELVIS IC        PROCEDURE DATE:  11/10/2020      Acute sigmoid diverticulitis. No perforation or abscess.      CARDIOLOGY TESTING      All available historical records have been reviewed    MEDICATIONS  budesonide 160 MICROgram(s)/formoterol 4.5 MICROgram(s) Inhaler 2  buPROPion XL (24-Hour) Oral Tab/Cap - Peds 300  chlorhexidine 4% Liquid 1  clonazePAM  Tablet 1  enalapril 10  heparin   Injectable 5000  lactated ringers. 1000  levothyroxine 125  meropenem  IVPB 1000  meropenem  IVPB   montelukast 10  pantoprazole    Tablet 40  tiotropium 18 MICROgram(s) Capsule 1      ANTIBIOTICS:  meropenem  IVPB 1000 milliGRAM(s) IV Intermittent every 12 hours  meropenem  IVPB          All available historical data has been reviewed   GEOVANNA PUTNAM  54y, Female  Allergy: ciprofloxacin (Urticaria; Other)    CHIEF COMPLAINT: Recurrent Diverticulitis (2020 09:34)      HPI:  54 year old female with PMHx of recurrent Diverticulitis, COPD, HTN, Depression, Anxiety who presented to the ED with recurrence of abdominal. The patient reports that she has been having LLQ abdominal pain for 1 day , sharp, severe throbbing in nature, non radiating, associated with nausea. The patient also reports one episode of bright red blood per rectum. She has history of hemorrhoids and reports occasional bloody streaks in the toilet when she strains.  Of note the patient was recently treated and released with IV antibiotic (ertapenem) course sigmoid diverticulitis. She completed 2 weeks of ertapenem course on 10/28. Patient states that her abdominal pain was resolved after IV antibiotics in previous admissions. Patient states that this is extremely similar to her recent bouts of diverticulitis. Patient states that her diet usually consist of low fiber, meat products and ice pops and she is not physically active at home.  Denies any fever, vomiting or diarrhea.     In the ED initial vitals: T98.1, , /79, sat 95% on room air. Labs overall unremarkable when compared to prior, UA negative. CT noted for acute sigmoid diverticulitis.     Infectious Diseases History:  Old Micro Data/Cultures: NA, hx UCX morganella    FAMILY HISTORY:  Family history of lung cancer (Father, Mother)      PAST MEDICAL & SURGICAL HISTORY:  Anxiety    Hemorrhoids    Left ear hearing loss    Cervical disc disease  sequelae of radtion for nasopharyngeal cancer    Hypothyroid    Nasopharyngeal cancer    Pulmonary nodules/lesions, multiple    Diverticulitis    HTN (hypertension)    COPD (chronic obstructive pulmonary disease)    History of cholecystectomy        SOCIAL HISTORY  Social History:  Former Smoker  No EtOH  No recreational drug use (10 Nov 2020 23:34)      Recent Travel:  Other Exposures:     ROS  General: Denies rigors, nightsweats  HEENT: Denies headache, rhinorrhea, sore throat, eye pain  CV: Denies CP, palpitations  PULM: Denies wheezing, hemoptysis  GI: Negative except as in HPI  : Denies discharge, hematuria  MSK: Denies arthralgias, myalgias  SKIN: Denies rash, lesions  NEURO: Denies paresthesias, weakness  PSYCH: h/o anxiety and depression. Patient feels depressed.     VITALS:  T(F): 97.9, Max: 98.1 (11-10-20 @ 17:21)  HR: 92  BP: 182/81  RR: 18  Vital Signs Last 24 Hrs  T(C): 36.6 (2020 03:43), Max: 36.7 (10 Nov 2020 17:21)  T(F): 97.9 (2020 03:43), Max: 98.1 (10 Nov 2020 17:21)  HR: 92 (2020 03:43) (92 - 108)  BP: 182/81 (2020 03:43) (176/79 - 182/81)  RR: 18 (2020 03:43) (18 - 18)  SpO2: 99% (2020 03:43) (95% - 99%)    PHYSICAL EXAM:  Gen: NAD, resting in bed, Morbidly Obese  HEENT: Normocephalic, atraumatic  Neck: supple, no lymphadenopathy  CV: Regular rate & regular rhythm  Lungs: CTAB  Abdomen: Soft, Tenderness and guarding on LLQ, non-distended, normoactive bowel sounds,  no masses ,no hepato-splenomegaly  Ext: Warm, well perfused  Neuro: non focal, awake  Skin: no rash, no lesions  Lines: no phlebitis    TESTS & MEASUREMENTS:                        10.5   7.19  )-----------( 277      ( 2020 06:49 )             34.0         142  |  103  |  19  ----------------------------<  124<H>  4.8   |  27  |  1.1    Ca    9.2      2020 06:49  Mg     1.9         TPro  6.6  /  Alb  3.8  /  TBili  <0.2  /  DBili  x   /  AST  28  /  ALT  23  /  AlkPhos  83  11-10    eGFR if Non African American: 56 mL/min/1.73M2 (20 @ 06:49)  eGFR if : 65 mL/min/1.73M2 (20 @ 06:49)  eGFR if Non African American: 46 mL/min/1.73M2 (11-10-20 @ 18:46)  eGFR if : 53 mL/min/1.73M2 (11-10-20 @ 18:46)    LIVER FUNCTIONS - ( 10 Nov 2020 18:46 )  Alb: 3.8 g/dL / Pro: 6.6 g/dL / ALK PHOS: 83 U/L / ALT: 23 U/L / AST: 28 U/L / GGT: x           Urinalysis Basic - ( 10 Nov 2020 20:48 )    Color: Yellow / Appearance: Clear / S.026 / pH: x  Gluc: x / Ketone: Negative  / Bili: Negative / Urobili: <2 mg/dL   Blood: x / Protein: Trace / Nitrite: Negative   Leuk Esterase: Negative / RBC: x / WBC x   Sq Epi: x / Non Sq Epi: x / Bacteria: x  Lactate, Blood: 1.5 mmol/L (11-10-20 @ 18:46)      INFECTIOUS DISEASES TESTING    RADIOLOGY & ADDITIONAL TESTS:  I have personally reviewed the last available Chest xray  CXR      CT  CT Abdomen and Pelvis w/ IV Cont:   EXAM:  CT ABDOMEN AND PELVIS IC        PROCEDURE DATE:  11/10/2020      Acute sigmoid diverticulitis. No perforation or abscess.      CARDIOLOGY TESTING      All available historical records have been reviewed    MEDICATIONS  budesonide 160 MICROgram(s)/formoterol 4.5 MICROgram(s) Inhaler 2  buPROPion XL (24-Hour) Oral Tab/Cap - Peds 300  chlorhexidine 4% Liquid 1  clonazePAM  Tablet 1  enalapril 10  heparin   Injectable 5000  lactated ringers. 1000  levothyroxine 125  meropenem  IVPB 1000  meropenem  IVPB   montelukast 10  pantoprazole    Tablet 40  tiotropium 18 MICROgram(s) Capsule 1      ANTIBIOTICS:  meropenem  IVPB 1000 milliGRAM(s) IV Intermittent every 12 hours  meropenem  IVPB          All available historical data has been reviewed

## 2020-11-12 LAB
ALBUMIN SERPL ELPH-MCNC: 3.3 G/DL — LOW (ref 3.5–5.2)
ALP SERPL-CCNC: 81 U/L — SIGNIFICANT CHANGE UP (ref 30–115)
ALT FLD-CCNC: 18 U/L — SIGNIFICANT CHANGE UP (ref 0–41)
ANION GAP SERPL CALC-SCNC: 11 MMOL/L — SIGNIFICANT CHANGE UP (ref 7–14)
AST SERPL-CCNC: 18 U/L — SIGNIFICANT CHANGE UP (ref 0–41)
BASOPHILS # BLD AUTO: 0.02 K/UL — SIGNIFICANT CHANGE UP (ref 0–0.2)
BASOPHILS NFR BLD AUTO: 0.4 % — SIGNIFICANT CHANGE UP (ref 0–1)
BILIRUB SERPL-MCNC: 0.7 MG/DL — SIGNIFICANT CHANGE UP (ref 0.2–1.2)
BUN SERPL-MCNC: 11 MG/DL — SIGNIFICANT CHANGE UP (ref 10–20)
CALCIUM SERPL-MCNC: 8.7 MG/DL — SIGNIFICANT CHANGE UP (ref 8.5–10.1)
CHLORIDE SERPL-SCNC: 102 MMOL/L — SIGNIFICANT CHANGE UP (ref 98–110)
CO2 SERPL-SCNC: 27 MMOL/L — SIGNIFICANT CHANGE UP (ref 17–32)
CREAT SERPL-MCNC: 1 MG/DL — SIGNIFICANT CHANGE UP (ref 0.7–1.5)
EOSINOPHIL # BLD AUTO: 0.24 K/UL — SIGNIFICANT CHANGE UP (ref 0–0.7)
EOSINOPHIL NFR BLD AUTO: 4.3 % — SIGNIFICANT CHANGE UP (ref 0–8)
GLUCOSE SERPL-MCNC: 160 MG/DL — HIGH (ref 70–99)
HCT VFR BLD CALC: 33.3 % — LOW (ref 37–47)
HGB BLD-MCNC: 10.2 G/DL — LOW (ref 12–16)
IMM GRANULOCYTES NFR BLD AUTO: 0.2 % — SIGNIFICANT CHANGE UP (ref 0.1–0.3)
LACTATE SERPL-SCNC: 1.2 MMOL/L — SIGNIFICANT CHANGE UP (ref 0.7–2)
LYMPHOCYTES # BLD AUTO: 1.09 K/UL — LOW (ref 1.2–3.4)
LYMPHOCYTES # BLD AUTO: 19.5 % — LOW (ref 20.5–51.1)
MAGNESIUM SERPL-MCNC: 1.6 MG/DL — LOW (ref 1.8–2.4)
MCHC RBC-ENTMCNC: 25.8 PG — LOW (ref 27–31)
MCHC RBC-ENTMCNC: 30.6 G/DL — LOW (ref 32–37)
MCV RBC AUTO: 84.3 FL — SIGNIFICANT CHANGE UP (ref 81–99)
MONOCYTES # BLD AUTO: 0.38 K/UL — SIGNIFICANT CHANGE UP (ref 0.1–0.6)
MONOCYTES NFR BLD AUTO: 6.8 % — SIGNIFICANT CHANGE UP (ref 1.7–9.3)
NEUTROPHILS # BLD AUTO: 3.84 K/UL — SIGNIFICANT CHANGE UP (ref 1.4–6.5)
NEUTROPHILS NFR BLD AUTO: 68.8 % — SIGNIFICANT CHANGE UP (ref 42.2–75.2)
NRBC # BLD: 0 /100 WBCS — SIGNIFICANT CHANGE UP (ref 0–0)
PLATELET # BLD AUTO: 270 K/UL — SIGNIFICANT CHANGE UP (ref 130–400)
POTASSIUM SERPL-MCNC: 3.9 MMOL/L — SIGNIFICANT CHANGE UP (ref 3.5–5)
POTASSIUM SERPL-SCNC: 3.9 MMOL/L — SIGNIFICANT CHANGE UP (ref 3.5–5)
PROT SERPL-MCNC: 5.9 G/DL — LOW (ref 6–8)
RBC # BLD: 3.95 M/UL — LOW (ref 4.2–5.4)
RBC # FLD: 16.9 % — HIGH (ref 11.5–14.5)
SODIUM SERPL-SCNC: 140 MMOL/L — SIGNIFICANT CHANGE UP (ref 135–146)
WBC # BLD: 5.58 K/UL — SIGNIFICANT CHANGE UP (ref 4.8–10.8)
WBC # FLD AUTO: 5.58 K/UL — SIGNIFICANT CHANGE UP (ref 4.8–10.8)

## 2020-11-12 PROCEDURE — 99233 SBSQ HOSP IP/OBS HIGH 50: CPT

## 2020-11-12 RX ORDER — ENOXAPARIN SODIUM 100 MG/ML
40 INJECTION SUBCUTANEOUS AT BEDTIME
Refills: 0 | Status: DISCONTINUED | OUTPATIENT
Start: 2020-11-12 | End: 2020-11-14

## 2020-11-12 RX ORDER — METOCLOPRAMIDE HCL 10 MG
10 TABLET ORAL ONCE
Refills: 0 | Status: COMPLETED | OUTPATIENT
Start: 2020-11-12 | End: 2020-11-12

## 2020-11-12 RX ORDER — ONDANSETRON 8 MG/1
4 TABLET, FILM COATED ORAL ONCE
Refills: 0 | Status: COMPLETED | OUTPATIENT
Start: 2020-11-12 | End: 2020-11-12

## 2020-11-12 RX ADMIN — ENOXAPARIN SODIUM 40 MILLIGRAM(S): 100 INJECTION SUBCUTANEOUS at 21:36

## 2020-11-12 RX ADMIN — Medication 1 MILLIGRAM(S): at 06:14

## 2020-11-12 RX ADMIN — MEROPENEM 100 MILLIGRAM(S): 1 INJECTION INTRAVENOUS at 17:26

## 2020-11-12 RX ADMIN — HYDROMORPHONE HYDROCHLORIDE 1.5 MILLIGRAM(S): 2 INJECTION INTRAMUSCULAR; INTRAVENOUS; SUBCUTANEOUS at 05:43

## 2020-11-12 RX ADMIN — Medication 10 MILLIGRAM(S): at 05:44

## 2020-11-12 RX ADMIN — BUPROPION HYDROCHLORIDE 300 MILLIGRAM(S): 150 TABLET, EXTENDED RELEASE ORAL at 17:22

## 2020-11-12 RX ADMIN — MEROPENEM 100 MILLIGRAM(S): 1 INJECTION INTRAVENOUS at 05:44

## 2020-11-12 RX ADMIN — HYDROMORPHONE HYDROCHLORIDE 1.5 MILLIGRAM(S): 2 INJECTION INTRAMUSCULAR; INTRAVENOUS; SUBCUTANEOUS at 17:25

## 2020-11-12 RX ADMIN — ONDANSETRON 4 MILLIGRAM(S): 8 TABLET, FILM COATED ORAL at 21:48

## 2020-11-12 RX ADMIN — HYDROMORPHONE HYDROCHLORIDE 1.5 MILLIGRAM(S): 2 INJECTION INTRAMUSCULAR; INTRAVENOUS; SUBCUTANEOUS at 21:46

## 2020-11-12 RX ADMIN — TIOTROPIUM BROMIDE 1 CAPSULE(S): 18 CAPSULE ORAL; RESPIRATORY (INHALATION) at 07:58

## 2020-11-12 RX ADMIN — BUDESONIDE AND FORMOTEROL FUMARATE DIHYDRATE 2 PUFF(S): 160; 4.5 AEROSOL RESPIRATORY (INHALATION) at 21:47

## 2020-11-12 RX ADMIN — HYDROMORPHONE HYDROCHLORIDE 1.5 MILLIGRAM(S): 2 INJECTION INTRAMUSCULAR; INTRAVENOUS; SUBCUTANEOUS at 06:25

## 2020-11-12 RX ADMIN — BUDESONIDE AND FORMOTEROL FUMARATE DIHYDRATE 2 PUFF(S): 160; 4.5 AEROSOL RESPIRATORY (INHALATION) at 11:03

## 2020-11-12 RX ADMIN — MONTELUKAST 10 MILLIGRAM(S): 4 TABLET, CHEWABLE ORAL at 17:21

## 2020-11-12 RX ADMIN — ZOLPIDEM TARTRATE 5 MILLIGRAM(S): 10 TABLET ORAL at 21:45

## 2020-11-12 RX ADMIN — Medication 125 MICROGRAM(S): at 05:44

## 2020-11-12 RX ADMIN — PANTOPRAZOLE SODIUM 40 MILLIGRAM(S): 20 TABLET, DELAYED RELEASE ORAL at 05:45

## 2020-11-12 RX ADMIN — Medication 1 MILLIGRAM(S): at 17:21

## 2020-11-12 NOTE — CONSULT NOTE ADULT - ASSESSMENT
54y female currently admitted for recurrent bouts of sigmoid diverticulitis, being treated with IV antibiotics. No perforation or abscess.     -Currently no indication for emergent surgical intervention, continue IV antibiotics per ID  -Will discuss with Dr. Spencer regarding possible surgical intervention this hospitalization due to frequency of diverticulitis flares  -Surgery will continue to follow    Discussed with Dr. Spencer

## 2020-11-12 NOTE — PROGRESS NOTE ADULT - ASSESSMENT
54 year old female with PMH of Diverticulitis, COPD, HTN, Depression, Anxiety who presented to the ED with recurrence of abdominal pain.    A/P:   Acute sigmoid diverticulitis: recurrent 4th episode in the last 4 months.   No sepsis on admission  Still with LLQ abdominal pain. Not tolerating oral feeds.   Abdomen CT showed Acute sigmoid diverticulitis. No perforation or abscess.   Keep NPO, Continue IV RL   Continue Meropenem.   GI follow up, she needs colonoscopy once infection improves, also surgery consult for possible resection.     COPD: Stable.   Continue Symbicort, Spiriva and Albuterol prn.     Depression/Anxiety  Continue on Buproprion 300mg daily and Clonazepam 1mg BID     HTN  Continue on Enalapril 10mg daily     Hypothyroidism  Continue on Levothyroxine 125mcg daily    Morbid Obesity: advised with weight loss, may benefit from weight loss procedure.   Outpatient follow up with bariatric surgery.     #Progress Note Handoff:  Pending (specify): improving abdominal pain, advancing diet, GI plan  Family discussion:  Disposition: Home.

## 2020-11-12 NOTE — PROGRESS NOTE ADULT - SUBJECTIVE AND OBJECTIVE BOX
Chief Complaint: Patient is a 54y old  Female who presents with a chief complaint of Diverticulitis (12 Nov 2020 08:17)    INTERVAL EVENTS: The patient reports     Medications:  ALBUTerol    90 MICROgram(s) HFA Inhaler 2 Puff(s) Inhalation every 6 hours PRN  budesonide 160 MICROgram(s)/formoterol 4.5 MICROgram(s) Inhaler 2 Puff(s) Inhalation two times a day  buPROPion XL (24-Hour) Oral Tab/Cap - Peds 300 milliGRAM(s) Oral daily  chlorhexidine 4% Liquid 1 Application(s) Topical <User Schedule>  clonazePAM  Tablet 1 milliGRAM(s) Oral two times a day  enalapril 10 milliGRAM(s) Oral daily  enoxaparin Injectable 40 milliGRAM(s) SubCutaneous at bedtime  HYDROmorphone  Injectable 1.5 milliGRAM(s) IV Push every 8 hours  lactated ringers. 1000 milliLiter(s) IV Continuous <Continuous>  levothyroxine 125 MICROGram(s) Oral daily  meropenem  IVPB      meropenem  IVPB 1000 milliGRAM(s) IV Intermittent every 12 hours  metoclopramide Injectable 10 milliGRAM(s) IV Push once  montelukast 10 milliGRAM(s) Oral daily  tiotropium 18 MICROgram(s) Capsule 1 Capsule(s) Inhalation daily  zolpidem 5 milliGRAM(s) Oral at bedtime PRN      PMHX/PSHX:  Anxiety    Hemorrhoids    Left ear hearing loss    Cervical disc disease    Hypothyroid    Nasopharyngeal cancer    Pulmonary nodules/lesions, multiple    Diverticulitis    HTN (hypertension)    COPD (chronic obstructive pulmonary disease)    History of cholecystectomy    No significant past surgical history        Family history:  Family history of lung cancer (Father, Mother)        Social History:     Allergies:  ciprofloxacin (Urticaria; Other)        Review of Systems:  General:  No wt loss, fevers, chills, night sweats, fatigue or pruritis.  Eyes:  Good vision, no reported pain or redness.  ENT:  No sore throat, pain, runny nose, or difficulty swallowing  CV:  No pain, palpitations, hypo/hypertension  Resp:  No dyspnea, cough, tachypnea, wheezing  GI:  No pain, nausea, vomiting, dysphagia, heartburn, diarrhea, constipation, or weight loss. , No rectal bleeding, tarry stools, or hematemesis.  :  No pain, bleeding/discharges, incontinence, nocturia  Musculoskeletal:  No pain, weakness or fasciculations.  Neuro:  No weakness, tingling, memory problems or paresthesias  Psych:  No fatigue, insomnia, mood problems, depression  Endocrine:  No polyuria, polydipsia, cold/heat intolerance  Heme:  No petechiae, ecchymosis, easy bruisability  Skin:  No rash, pruritis, tattoos, scars, or edema      PHYSICAL EXAM:   Vital Signs:  Vital Signs Last 24 Hrs  T(C): 37.2 (12 Nov 2020 05:07), Max: 37.4 (11 Nov 2020 20:59)  T(F): 99 (12 Nov 2020 05:07), Max: 99.3 (11 Nov 2020 20:59)  HR: 80 (12 Nov 2020 05:07) (68 - 93)  BP: 122/58 (12 Nov 2020 05:07) (122/58 - 152/62)  BP(mean): --  RR: 18 (12 Nov 2020 05:07) (18 - 18)  SpO2: 94% (11 Nov 2020 19:51) (94% - 94%)  Daily     Daily     T(C): 37.2 (11-12-20 @ 05:07), Max: 37.4 (11-11-20 @ 20:59)  HR: 80 (11-12-20 @ 05:07) (68 - 93)  BP: 122/58 (11-12-20 @ 05:07) (122/58 - 152/62)  RR: 18 (11-12-20 @ 05:07) (18 - 18)  SpO2: 94% (11-11-20 @ 19:51) (94% - 94%)    GENERAL:  Appears stated age, well-groomed, well-nourished, no distress  HEENT:  Conjunctivae clear and pink, no thyromegaly, nodules, adenopathy, no JVD, sclera -anicteric  CHEST:  Full & symmetric excursion, no increased effort, breath sounds clear  HEART:  Regular rhythm, S1, S2, no murmur/rub/S3/S4, no abdominal bruit, no edema  ABDOMEN:  Soft, non-tender, non-distended, normoactive bowel sounds,  no masses ,no hepato-splenomegaly, no signs of chronic liver disease  EXTEREMITIES:  no cyanosis,clubbing or edema  SKIN:  No rash/erythema/ecchymoses/petechiae/wounds/abscess/warm/dry  NEURO:  Alert, oriented, no asterixis, no tremor, no encephalopathy    LABS:                        10.5   7.19  )-----------( 277      ( 11 Nov 2020 06:49 )             34.0     11-11    142  |  103  |  19  ----------------------------<  124<H>  4.8   |  27  |  1.1    Ca    9.2      11 Nov 2020 06:49  Mg     1.9     11-11    TPro  6.6  /  Alb  3.8  /  TBili  <0.2  /  DBili  x   /  AST  28  /  ALT  23  /  AlkPhos  83  11-10    LIVER FUNCTIONS - ( 10 Nov 2020 18:46 )  Alb: 3.8 g/dL / Pro: 6.6 g/dL / ALK PHOS: 83 U/L / ALT: 23 U/L / AST: 28 U/L / GGT: x             Imaging:      Assessment and Plan:      54 year old female with PMHx of hemorrhoids, diverticulitis, COPD not on home O2, HTN, Depression, Anxiety who presented to the ED with recurrence of abdominal pain, found to have recurrent diverticulitis ( no abscess/ perforation)      # Recurrent uncomplicated acute sigmoid diverticulitis - improving  # One episode of BRBPR prior to admission (hemodynamically stable) - resolved  - no sepsis   - LIVIA negative   - CT A/P w/ IV Contrast: no abscess/ no perforation  - c/w IVF and IV Meropenem (patient allergic to cipro)  - advance diet as tolerated        ** THIS IS AN INCOMPLETE NOTE , FINAL ATTENDING RECOMMENDATIONS TO FOLLOW **         Chief Complaint: Patient is a 54y old  Female who presents with a chief complaint of Diverticulitis (12 Nov 2020 08:17)    INTERVAL EVENTS: The patient reports slight improvement in abdominal pain. She was given clear liquids this morning and reports episode of vomiting shortly after.     Medications:  ALBUTerol    90 MICROgram(s) HFA Inhaler 2 Puff(s) Inhalation every 6 hours PRN  budesonide 160 MICROgram(s)/formoterol 4.5 MICROgram(s) Inhaler 2 Puff(s) Inhalation two times a day  buPROPion XL (24-Hour) Oral Tab/Cap - Peds 300 milliGRAM(s) Oral daily  chlorhexidine 4% Liquid 1 Application(s) Topical <User Schedule>  clonazePAM  Tablet 1 milliGRAM(s) Oral two times a day  enalapril 10 milliGRAM(s) Oral daily  enoxaparin Injectable 40 milliGRAM(s) SubCutaneous at bedtime  HYDROmorphone  Injectable 1.5 milliGRAM(s) IV Push every 8 hours  lactated ringers. 1000 milliLiter(s) IV Continuous <Continuous>  levothyroxine 125 MICROGram(s) Oral daily  meropenem  IVPB      meropenem  IVPB 1000 milliGRAM(s) IV Intermittent every 12 hours  metoclopramide Injectable 10 milliGRAM(s) IV Push once  montelukast 10 milliGRAM(s) Oral daily  tiotropium 18 MICROgram(s) Capsule 1 Capsule(s) Inhalation daily  zolpidem 5 milliGRAM(s) Oral at bedtime PRN      PMHX/PSHX:  Anxiety    Hemorrhoids    Left ear hearing loss    Cervical disc disease    Hypothyroid    Nasopharyngeal cancer    Pulmonary nodules/lesions, multiple    Diverticulitis    HTN (hypertension)    COPD (chronic obstructive pulmonary disease)    History of cholecystectomy    No significant past surgical history        Family history:  Family history of lung cancer (Father, Mother)        Social History:     Allergies:  ciprofloxacin (Urticaria; Other)        Review of Systems:  General:  No wt loss, fevers, chills, night sweats, fatigue or pruritis.  Eyes:  Good vision, no reported pain or redness.  ENT:  No sore throat, pain, runny nose, or difficulty swallowing  CV:  No pain, palpitations, hypo/hypertension  Resp:  No dyspnea, cough, tachypnea, wheezing  GI:  No pain, nausea, vomiting, dysphagia, heartburn, diarrhea, constipation, or weight loss. , No rectal bleeding, tarry stools, or hematemesis.  :  No pain, bleeding/discharges, incontinence, nocturia  Musculoskeletal:  No pain, weakness or fasciculations.  Neuro:  No weakness, tingling, memory problems or paresthesias  Psych:  No fatigue, insomnia, mood problems, depression  Endocrine:  No polyuria, polydipsia, cold/heat intolerance  Heme:  No petechiae, ecchymosis, easy bruisability  Skin:  No rash, pruritis, tattoos, scars, or edema      PHYSICAL EXAM:   Vital Signs:  Vital Signs Last 24 Hrs  T(C): 37.2 (12 Nov 2020 05:07), Max: 37.4 (11 Nov 2020 20:59)  T(F): 99 (12 Nov 2020 05:07), Max: 99.3 (11 Nov 2020 20:59)  HR: 80 (12 Nov 2020 05:07) (68 - 93)  BP: 122/58 (12 Nov 2020 05:07) (122/58 - 152/62)  BP(mean): --  RR: 18 (12 Nov 2020 05:07) (18 - 18)  SpO2: 94% (11 Nov 2020 19:51) (94% - 94%)  Daily     Daily     T(C): 37.2 (11-12-20 @ 05:07), Max: 37.4 (11-11-20 @ 20:59)  HR: 80 (11-12-20 @ 05:07) (68 - 93)  BP: 122/58 (11-12-20 @ 05:07) (122/58 - 152/62)  RR: 18 (11-12-20 @ 05:07) (18 - 18)  SpO2: 94% (11-11-20 @ 19:51) (94% - 94%)    GENERAL:  Appears stated age, well-groomed, well-nourished, no distress  HEENT:  Conjunctivae clear and pink, no thyromegaly, nodules, adenopathy, no JVD, sclera -anicteric  CHEST:  Full & symmetric excursion, no increased effort, breath sounds clear  HEART:  Regular rhythm, S1, S2, no murmur/rub/S3/S4, no abdominal bruit, no edema  ABDOMEN:  Soft, LLQ TENDERNSS AND REBOUND , non-distended, normoactive bowel sounds,  no masses ,no hepato-splenomegaly, no signs of chronic liver disease  EXTEREMITIES:  no cyanosis,clubbing or edema  SKIN:  No rash/erythema/ecchymoses/petechiae/wounds/abscess/warm/dry  NEURO:  Alert, oriented, no asterixis, no tremor, no encephalopathy    LABS:                        10.5   7.19  )-----------( 277      ( 11 Nov 2020 06:49 )             34.0     11-11    142  |  103  |  19  ----------------------------<  124<H>  4.8   |  27  |  1.1    Ca    9.2      11 Nov 2020 06:49  Mg     1.9     11-11    TPro  6.6  /  Alb  3.8  /  TBili  <0.2  /  DBili  x   /  AST  28  /  ALT  23  /  AlkPhos  83  11-10    LIVER FUNCTIONS - ( 10 Nov 2020 18:46 )  Alb: 3.8 g/dL / Pro: 6.6 g/dL / ALK PHOS: 83 U/L / ALT: 23 U/L / AST: 28 U/L / GGT: x             Imaging:      Assessment and Plan:      54 year old female with PMHx of hemorrhoids, diverticulitis, COPD not on home O2, HTN, Depression, Anxiety who presented to the ED with recurrence of abdominal pain, found to have recurrent diverticulitis ( no abscess/ perforation)      # Recurrent uncomplicated acute sigmoid diverticulitis - improving  # One episode of BRBPR prior to admission (hemodynamically stable) - resolved  - no sepsis   - LIVIA negative   - CT A/P w/ IV Contrast: no abscess/ no perforation  - c/w IVF and IV Meropenem (patient allergic to cipro)  - advance diet as tolerated  - Surgery evaluation         ** THIS IS AN INCOMPLETE NOTE , FINAL ATTENDING RECOMMENDATIONS TO FOLLOW **         Chief Complaint: Patient is a 54y old  Female who presents with a chief complaint of Diverticulitis (12 Nov 2020 08:17)    INTERVAL EVENTS: The patient reports slight improvement in abdominal pain. She was given clear liquids this morning and reports episode of vomiting shortly after.     Medications:  ALBUTerol    90 MICROgram(s) HFA Inhaler 2 Puff(s) Inhalation every 6 hours PRN  budesonide 160 MICROgram(s)/formoterol 4.5 MICROgram(s) Inhaler 2 Puff(s) Inhalation two times a day  buPROPion XL (24-Hour) Oral Tab/Cap - Peds 300 milliGRAM(s) Oral daily  chlorhexidine 4% Liquid 1 Application(s) Topical <User Schedule>  clonazePAM  Tablet 1 milliGRAM(s) Oral two times a day  enalapril 10 milliGRAM(s) Oral daily  enoxaparin Injectable 40 milliGRAM(s) SubCutaneous at bedtime  HYDROmorphone  Injectable 1.5 milliGRAM(s) IV Push every 8 hours  lactated ringers. 1000 milliLiter(s) IV Continuous <Continuous>  levothyroxine 125 MICROGram(s) Oral daily  meropenem  IVPB      meropenem  IVPB 1000 milliGRAM(s) IV Intermittent every 12 hours  metoclopramide Injectable 10 milliGRAM(s) IV Push once  montelukast 10 milliGRAM(s) Oral daily  tiotropium 18 MICROgram(s) Capsule 1 Capsule(s) Inhalation daily  zolpidem 5 milliGRAM(s) Oral at bedtime PRN      PMHX/PSHX:  Anxiety    Hemorrhoids    Left ear hearing loss    Cervical disc disease    Hypothyroid    Nasopharyngeal cancer    Pulmonary nodules/lesions, multiple    Diverticulitis    HTN (hypertension)    COPD (chronic obstructive pulmonary disease)    History of cholecystectomy    No significant past surgical history        Family history:  Family history of lung cancer (Father, Mother)        Social History:     Allergies:  ciprofloxacin (Urticaria; Other)        Review of Systems:  General:  No wt loss, fevers, chills, night sweats, fatigue or pruritis.  Eyes:  Good vision, no reported pain or redness.  ENT:  No sore throat, pain, runny nose, or difficulty swallowing  CV:  No pain, palpitations, hypo/hypertension  Resp:  No dyspnea, cough, tachypnea, wheezing  GI:  No pain, nausea, vomiting, dysphagia, heartburn, diarrhea, constipation, or weight loss. , No rectal bleeding, tarry stools, or hematemesis.  :  No pain, bleeding/discharges, incontinence, nocturia  Musculoskeletal:  No pain, weakness or fasciculations.  Neuro:  No weakness, tingling, memory problems or paresthesias  Psych:  No fatigue, insomnia, mood problems, depression  Endocrine:  No polyuria, polydipsia, cold/heat intolerance  Heme:  No petechiae, ecchymosis, easy bruisability  Skin:  No rash, pruritis, tattoos, scars, or edema      PHYSICAL EXAM:   Vital Signs:  Vital Signs Last 24 Hrs  T(C): 37.2 (12 Nov 2020 05:07), Max: 37.4 (11 Nov 2020 20:59)  T(F): 99 (12 Nov 2020 05:07), Max: 99.3 (11 Nov 2020 20:59)  HR: 80 (12 Nov 2020 05:07) (68 - 93)  BP: 122/58 (12 Nov 2020 05:07) (122/58 - 152/62)  BP(mean): --  RR: 18 (12 Nov 2020 05:07) (18 - 18)  SpO2: 94% (11 Nov 2020 19:51) (94% - 94%)  Daily     Daily     T(C): 37.2 (11-12-20 @ 05:07), Max: 37.4 (11-11-20 @ 20:59)  HR: 80 (11-12-20 @ 05:07) (68 - 93)  BP: 122/58 (11-12-20 @ 05:07) (122/58 - 152/62)  RR: 18 (11-12-20 @ 05:07) (18 - 18)  SpO2: 94% (11-11-20 @ 19:51) (94% - 94%)    GENERAL:  Appears stated age, well-groomed, well-nourished, no distress  HEENT:  Conjunctivae clear and pink, no thyromegaly, nodules, adenopathy, no JVD, sclera -anicteric  CHEST:  Full & symmetric excursion, no increased effort, breath sounds clear  HEART:  Regular rhythm, S1, S2, no murmur/rub/S3/S4, no abdominal bruit, no edema  ABDOMEN:  Soft, LLQ TENDERNSS AND REBOUND , non-distended, normoactive bowel sounds,  no masses ,no hepato-splenomegaly, no signs of chronic liver disease  EXTEREMITIES:  no cyanosis,clubbing or edema  SKIN:  No rash/erythema/ecchymoses/petechiae/wounds/abscess/warm/dry  NEURO:  Alert, oriented, no asterixis, no tremor, no encephalopathy    LABS:                        10.5   7.19  )-----------( 277      ( 11 Nov 2020 06:49 )             34.0     11-11    142  |  103  |  19  ----------------------------<  124<H>  4.8   |  27  |  1.1    Ca    9.2      11 Nov 2020 06:49  Mg     1.9     11-11    TPro  6.6  /  Alb  3.8  /  TBili  <0.2  /  DBili  x   /  AST  28  /  ALT  23  /  AlkPhos  83  11-10    LIVER FUNCTIONS - ( 10 Nov 2020 18:46 )  Alb: 3.8 g/dL / Pro: 6.6 g/dL / ALK PHOS: 83 U/L / ALT: 23 U/L / AST: 28 U/L / GGT: x             Imaging:      Assessment and Plan:      54 year old female with PMHx of hemorrhoids, diverticulitis, COPD not on home O2, HTN, Depression, Anxiety who presented to the ED with recurrence of abdominal pain, found to have recurrent diverticulitis ( no abscess/ perforation)      # Recurrent uncomplicated acute sigmoid diverticulitis - improving  # One episode of BRBPR prior to admission (hemodynamically stable) - resolved, likely hemorrhoidal  - no sepsis   - LIVIA negative   - CT A/P w/ IV Contrast: no abscess/ no perforation  - c/w IVF and IV Meropenem (patient allergic to cipro), consider longer course of IV ATB  - advance diet as tolerated  - Surgery evaluation   Colonoscopy in 6 weeks   Outpt follow up

## 2020-11-12 NOTE — PROGRESS NOTE ADULT - SUBJECTIVE AND OBJECTIVE BOX
Patient is a 54y old  Female who presents with a chief complaint of Diverticulitis (2020 10:44)      OVERNIGHT EVENTS: remained in pain requiring Dilaudid, passed BM with no blood, able to do CLL diet    SUBJECTIVE / INTERVAL HPI: Patient seen and examined at bedside.     VITAL SIGNS:  Vital Signs Last 24 Hrs  T(C): 37.2 (2020 05:07), Max: 37.4 (2020 20:59)  T(F): 99 (2020 05:07), Max: 99.3 (2020 20:59)  HR: 80 (2020 05:07) (68 - 93)  BP: 122/58 (2020 05:07) (122/58 - 152/62)  BP(mean): --  RR: 18 (2020 05:07) (18 - 18)  SpO2: 94% (2020 19:51) (94% - 94%)    PHYSICAL EXAM:    General: WDWN  HEENT: NC/AT; PERRL, clear conjunctiva  Neck: supple  Cardiovascular: +S1/S2; RRR  Respiratory: CTA b/l; no W/R/R  Gastrointestinal: signifcant tenderness at LLQ area, same as yesterday, NT/ND; +BSx4  Extremities: WWP; 2+ peripheral pulses; no edema   Neurological: AAOx3; no focal deficits    MEDICATIONS:  MEDICATIONS  (STANDING):  budesonide 160 MICROgram(s)/formoterol 4.5 MICROgram(s) Inhaler 2 Puff(s) Inhalation two times a day  buPROPion XL (24-Hour) Oral Tab/Cap - Peds 300 milliGRAM(s) Oral daily  chlorhexidine 4% Liquid 1 Application(s) Topical <User Schedule>  clonazePAM  Tablet 1 milliGRAM(s) Oral two times a day  enalapril 10 milliGRAM(s) Oral daily  heparin   Injectable 5000 Unit(s) SubCutaneous every 8 hours  HYDROmorphone  Injectable 1.5 milliGRAM(s) IV Push every 8 hours  lactated ringers. 1000 milliLiter(s) (75 mL/Hr) IV Continuous <Continuous>  levothyroxine 125 MICROGram(s) Oral daily  meropenem  IVPB 1000 milliGRAM(s) IV Intermittent every 12 hours  meropenem  IVPB      montelukast 10 milliGRAM(s) Oral daily  pantoprazole    Tablet 40 milliGRAM(s) Oral before breakfast  tiotropium 18 MICROgram(s) Capsule 1 Capsule(s) Inhalation daily    MEDICATIONS  (PRN):  ALBUTerol    90 MICROgram(s) HFA Inhaler 2 Puff(s) Inhalation every 6 hours PRN Shortness of Breath and/or Wheezing  zolpidem 5 milliGRAM(s) Oral at bedtime PRN Insomnia      ALLERGIES:  Allergies    ciprofloxacin (Urticaria; Other)    Intolerances        LABS:                        10.5   7.19  )-----------( 277      ( 2020 06:49 )             34.0         142  |  103  |  19  ----------------------------<  124<H>  4.8   |  27  |  1.1    Ca    9.2      2020 06:49  Mg     1.9         TPro  6.6  /  Alb  3.8  /  TBili  <0.2  /  DBili  x   /  AST  28  /  ALT  23  /  AlkPhos  83  11-10      Urinalysis Basic - ( 10 Nov 2020 20:48 )    Color: Yellow / Appearance: Clear / S.026 / pH: x  Gluc: x / Ketone: Negative  / Bili: Negative / Urobili: <2 mg/dL   Blood: x / Protein: Trace / Nitrite: Negative   Leuk Esterase: Negative / RBC: x / WBC x   Sq Epi: x / Non Sq Epi: x / Bacteria: x      CAPILLARY BLOOD GLUCOSE        < from: CT Abdomen and Pelvis w/ IV Cont (11.10.20 @ 21:38) >  IMPRESSION:    Acute sigmoid diverticulitis. No perforation or absce    < end of copied text >  < from: TTE Echo Complete w/o Contrast w/ Doppler (10.15.20 @ 12:23) >  Summary:   1. Left ventricular ejection fraction, by visual estimation, is 55 to 60%.   2. Mildly increased LV wall thickness.   3. Spectral Doppler shows impaired relaxation pattern of left ventricular myocardial filling (Grade I diastolic dysfunction).   4. Mildly enlarged left atrium.   5. Normal right atrial size.   6. No evidence of mitral valve regurgitation.   7. Mild tricuspid regurgitation.    PHYSICIAN INTERPRETATION:  Left Ventricle: The left ventricular internal cavity size is normal. Left ventricular wall thickness is mildly increased. Left ventricular ejection fraction, by visual estimation, is 55 to 60%. Spectral Doppler shows impaired relaxation pattern of left ventricularmyocardial filling (Grade I diastolic dysfunction).  Right Ventricle: Normal right ventricular size and function.  Left Atrium: Mildly enlarged left atrium.  Right Atrium: Normal right atrial size.  Pericardium: There is no evidence of pericardial effusion.  Mitral Valve: Structurally normal mitral valve, with normal leaflet excursion. No evidence of mitral valve regurgitation is seen.  Tricuspid Valve: Structurally normal tricuspid valve, with normal leaflet excursion. Mild tricuspid regurgitation is visualized.  Aortic Valve: The aortic valve was not well visualized. The aortic valve is trileaflet. No evidence of aortic stenosis. No evidence of aortic valve regurgitation is seen. Mildly thickened.  Pulmonic Valve: The pulmonic valve was notwell visualized. No indication of pulmonic valve regurgitation.  Aorta: The aortic root and ascending aorta are structurally normal, with no evidence of dilitation.  Pulmonary Artery: The main pulmonary artery is normal in size.    < end of copied text >

## 2020-11-12 NOTE — CONSULT NOTE ADULT - SUBJECTIVE AND OBJECTIVE BOX
GEOVANNA PUTNAM [id number]  54y Female  2d    HPI:  This is a 54 year old female with PMHx of Diverticulitis, COPD, HTN, Depression, Anxiety who presented to the ED with recurrence of abdominal pain. The patient reports that she has been having LLQ abdominal pain that is associated with sharp severe throbbing pain. The patient also states that she has been having bright red blood per rectum occasionally, blood is in the water and not stool. . Of note the patient was recently treated and released with IV antibiotic course for sigmoid diverticulitis. Last dose of ertapenem was approximately two weeks ago. Patient states that this is extremely similar to her prior recent bouts of diverticulitis.     In the ED initial vitals: T98.1, , /79, sat 95% on room air. Labs overall unremarkable when compared to prior, UA negative. CT noted for acute sigmoid diverticulitis. Admitted to medicine for IV antibiotics and ID evaluation,  (10 Nov 2020 23:34)    She states that her first episode of diverticulitis was approximately 10 years ago, with a couple of episodes spaced a few years apart, none requiring IV abx or hospitalization, until recently. She has had 4 episodes of Diverticulitis in the last 4 months, the first 2 episodes were treated with outpatient oral antibiotics and the last 2 (including current episode) required admission and IV antibiotics. She has not been able to undergo colonoscopic exam due to the frequency of episodes. Currently, she is complaining of LLQ pain and denies fevers/chills/nausea/vomiting/diarrhea.     PAST MEDICAL & SURGICAL HISTORY:  Anxiety    Hemorrhoids    Left ear hearing loss    Cervical disc disease  sequelae of radtion for nasopharyngeal cancer    Hypothyroid    Nasopharyngeal cancer    Pulmonary nodules/lesions, multiple    Diverticulitis    HTN (hypertension)    COPD (chronic obstructive pulmonary disease)    History of cholecystectomy          MEDICATIONS  (STANDING):  budesonide 160 MICROgram(s)/formoterol 4.5 MICROgram(s) Inhaler 2 Puff(s) Inhalation two times a day  buPROPion XL (24-Hour) Oral Tab/Cap - Peds 300 milliGRAM(s) Oral daily  chlorhexidine 4% Liquid 1 Application(s) Topical <User Schedule>  clonazePAM  Tablet 1 milliGRAM(s) Oral two times a day  enalapril 10 milliGRAM(s) Oral daily  enoxaparin Injectable 40 milliGRAM(s) SubCutaneous at bedtime  HYDROmorphone  Injectable 1.5 milliGRAM(s) IV Push every 8 hours  lactated ringers. 1000 milliLiter(s) (75 mL/Hr) IV Continuous <Continuous>  levothyroxine 125 MICROGram(s) Oral daily  meropenem  IVPB 1000 milliGRAM(s) IV Intermittent every 12 hours  meropenem  IVPB      montelukast 10 milliGRAM(s) Oral daily  tiotropium 18 MICROgram(s) Capsule 1 Capsule(s) Inhalation daily    MEDICATIONS  (PRN):  ALBUTerol    90 MICROgram(s) HFA Inhaler 2 Puff(s) Inhalation every 6 hours PRN Shortness of Breath and/or Wheezing  zolpidem 5 milliGRAM(s) Oral at bedtime PRN Insomnia      Allergies    ciprofloxacin (Urticaria; Other)    Intolerances        REVIEW OF SYSTEMS    [x] A ten-point review of systems was otherwise negative except as noted.  [ ] Due to altered mental status/intubation, subjective information were not able to be obtained from the patient. History was obtained, to the extent possible, from review of the chart and collateral sources of information.      Vital Signs Last 24 Hrs  T(C): 37.2 (2020 05:07), Max: 37.4 (2020 20:59)  T(F): 99 (2020 05:07), Max: 99.3 (2020 20:59)  HR: 80 (2020 05:07) (68 - 93)  BP: 122/58 (2020 05:07) (122/58 - 152/62)  BP(mean): --  RR: 18 (2020 05:07) (18 - 18)  SpO2: 94% (2020 19:51) (94% - 94%)    PHYSICAL EXAM:  GENERAL: NAD, non-toxic appearing  CHEST/LUNG: Clear to auscultation bilaterally  HEART: Regular rate and rhythm by radial pulse  ABDOMEN: Soft, +LLQ tenderness with guarding, no rebound, Nondistended.   EXTREMITIES:  No clubbing, cyanosis, or edema      LABS:  Labs:  CAPILLARY BLOOD GLUCOSE                              10.2   5.58  )-----------( 270      ( 2020 09:23 )             33.3       Auto Neutrophil %: 68.8 % (20 @ 09:23)  Auto Immature Granulocyte %: 0.2 % (20 @ 09:23)        140  |  102  |  11  ----------------------------<  160<H>  3.9   |  27  |  1.0      Calcium, Total Serum: 8.7 mg/dL (20 @ 09:23)      LFTs:             5.9  | 0.7  | 18       ------------------[81      ( 2020 09:23 )  3.3  | x    | 18          Lipase:x      Amylase:x         Lactate, Blood: 1.2 mmol/L (20 @ 09:23)  Lactate, Blood: 1.5 mmol/L (11-10-20 @ 18:46)      Coags:            Urinalysis Basic - ( 10 Nov 2020 20:48 )    Color: Yellow / Appearance: Clear / S.026 / pH: x  Gluc: x / Ketone: Negative  / Bili: Negative / Urobili: <2 mg/dL   Blood: x / Protein: Trace / Nitrite: Negative   Leuk Esterase: Negative / RBC: x / WBC x   Sq Epi: x / Non Sq Epi: x / Bacteria: x            RADIOLOGY & ADDITIONAL STUDIES:  < from: CT Abdomen and Pelvis w/ IV Cont (11.10.20 @ 21:38) >  PERITONEUM/RETROPERITONEUM/MESENTERY: No ascites, abscess radiation or free air.    PELVIC VISCERA: Unremarkable.    BONES AND SOFT TISSUES: Degenerative changes of the spine.      IMPRESSION:    Acute sigmoid diverticulitis. No perforation or abscess.      < end of copied text >

## 2020-11-12 NOTE — PROGRESS NOTE ADULT - ASSESSMENT
This is a 54 year old female with PMHx of Diverticulitis, COPD, HTN, Depression, Anxiety who presented to the ED with recurrence of abdominal pain.    #uncomplicated recurrent sigmoid diverticulitis (4 in the past 4 months, 6 in the past few yr), s/p ertapenem course last month   - no abscess, sepsis not present  - allergic to cipro, Meropenem for now, f/u Bcx,   - given recurrence despite multiple abx course and unable to make colonoscopy due to relapse will discuss with ID and surgery possible longer term abx, c-scope VS surgical resection option  - GI onboard  - still in pain but able to tolerates CLL  - IV hydration: LR at 75cc/hour      #COPD not on home O2  - Not in any acute exacerbation  - Continue on Symbicort, will use spiriva while here.  - Continue on montelukast 10mg daily   - Albuterol inhalation PRN  - Noted patient on other inhalers that are not supplied here. Will need to bring outside meds in to continue receiving them here.     #Depression/Anxiety  - Continue on Buproprion 300mg daily and Clonazepam 1mg BID     #HTN  - Continue on Enalapril 10mg daily     #Hypothyroidism  - Continue on Levothyroxine 125mcg daily    #GERD  - Protonix daily    Activity: As tolerated  Diet: CLL, might advance today to soft  DVT ppx: Lovenox  GI ppx: not indicated   Code Status: Full Code  DISPO: acute        This is a 54 year old female with PMHx of Diverticulitis, COPD, HTN, Depression, Anxiety who presented to the ED with recurrence of abdominal pain.    #uncomplicated recurrent sigmoid diverticulitis (4 in the past 4 months, 6 in the past few yr), s/p ertapenem course last month   - no abscess, sepsis not present  - allergic to cipro, Meropenem for now, f/u Bcx,   - given recurrence despite multiple abx course and unable to make colonoscopy due to frequent relapse   - need surgery eval, since having frequent relapses despite abx tx, and unable to achieve c-cope with frequent close relapses   - per ID, can do on Ertapenem for 4 weeks but doubt significant benefit   - GI onboard  - still in pain but able to tolerates CLL  - IV hydration: LR at 75cc/hour      #COPD not on home O2  - Not in any acute exacerbation  - Continue on Symbicort, will use spiriva while here.  - Continue on montelukast 10mg daily   - Albuterol inhalation PRN  - Noted patient on other inhalers that are not supplied here. Will need to bring outside meds in to continue receiving them here.     #Depression/Anxiety  - Continue on Buproprion 300mg daily and Clonazepam 1mg BID     #HTN  - Continue on Enalapril 10mg daily     #Hypothyroidism  - Continue on Levothyroxine 125mcg daily    #GERD  - Protonix daily    Activity: As tolerated  Diet: CLL, might advance today to soft  DVT ppx: Lovenox  GI ppx: not indicated   Code Status: Full Code  DISPO: acute

## 2020-11-12 NOTE — CONSULT NOTE ADULT - ATTENDING COMMENTS
above noted discussed case with surgical resident ct scan noted will discuss case with GI and ID
I have personally examined the patient and reviewed the documentation above.  Corrections and edits were made wherever needed.       Multiple recurrences of sigmoid diverticulitis.   Mostly recently was d/c'ed on 14 days of IV erta with improvement.     Sepsis ruled out, CT with diverticulitis.     - Continue meropenem  - Needs surgical resection    Please call with any questions or send a message on [employer] Teams  Spectra [phone]

## 2020-11-12 NOTE — PROGRESS NOTE ADULT - ASSESSMENT
ASSESSMENT  54 year old female with PMHx of recurrent Diverticulitis, COPD, HTN, Depression, Anxiety recently treated with IV ertapenem for acute diverticulitis presented to the ED with recurrence of LLQ abdominal pain.     IMPRESSION  # Uncomplicated recurrent sigmoid diverticulitis   #Sepsis ruled out on admission     RECOMMEND  - Midline x ertapenem 1g q24h IV until Surgery (should be within 4 weeks- confirm with Surgery)  - ESR, CRP   - Weekly CBC, CMP, ESR/CRP  - ID follow-up with Dr. Alphonse Gunderson for Telehealth. We will call the patient between 10:30-6:30      [address]       [phone]       Fax [phone]

## 2020-11-12 NOTE — PROGRESS NOTE ADULT - SUBJECTIVE AND OBJECTIVE BOX
INDYGEOVANNA  54y  Female      Patient is a 54y old  Female who presents with a chief complaint of Diverticulitis (2020 09:35)      INTERVAL HPI/OVERNIGHT EVENTS:  She is still with abdominal pain, she was vomiting after the food, no fever.   Vital Signs Last 24 Hrs  T(C): 37.2 (2020 05:07), Max: 37.4 (2020 20:59)  T(F): 99 (2020 05:07), Max: 99.3 (2020 20:59)  HR: 80 (2020 05:07) (68 - 93)  BP: 122/58 (2020 05:07) (122/58 - 152/62)  BP(mean): --  RR: 18 (2020 05:07) (18 - 18)  SpO2: 94% (2020 19:51) (94% - 94%)      20 @ 07:01  -  20 @ 07:00  --------------------------------------------------------  IN: 2111 mL / OUT: 0 mL / NET: 2111 mL            Consultant(s) Notes Reviewed:  [x ] YES  [ ] NO          MEDICATIONS  (STANDING):  budesonide 160 MICROgram(s)/formoterol 4.5 MICROgram(s) Inhaler 2 Puff(s) Inhalation two times a day  buPROPion XL (24-Hour) Oral Tab/Cap - Peds 300 milliGRAM(s) Oral daily  chlorhexidine 4% Liquid 1 Application(s) Topical <User Schedule>  clonazePAM  Tablet 1 milliGRAM(s) Oral two times a day  enalapril 10 milliGRAM(s) Oral daily  enoxaparin Injectable 40 milliGRAM(s) SubCutaneous at bedtime  HYDROmorphone  Injectable 1.5 milliGRAM(s) IV Push every 8 hours  lactated ringers. 1000 milliLiter(s) (75 mL/Hr) IV Continuous <Continuous>  levothyroxine 125 MICROGram(s) Oral daily  meropenem  IVPB 1000 milliGRAM(s) IV Intermittent every 12 hours  meropenem  IVPB      montelukast 10 milliGRAM(s) Oral daily  tiotropium 18 MICROgram(s) Capsule 1 Capsule(s) Inhalation daily    MEDICATIONS  (PRN):  ALBUTerol    90 MICROgram(s) HFA Inhaler 2 Puff(s) Inhalation every 6 hours PRN Shortness of Breath and/or Wheezing  zolpidem 5 milliGRAM(s) Oral at bedtime PRN Insomnia      LABS                          10.2   5.58  )-----------( 270      ( 2020 09:23 )             33.3     11-12    140  |  102  |  11  ----------------------------<  160<H>  3.9   |  27  |  1.0    Ca    8.7      2020 09:23  Mg     1.6         TPro  5.9<L>  /  Alb  3.3<L>  /  TBili  0.7  /  DBili  x   /  AST  18  /  ALT  18  /  AlkPhos  81  -12      Urinalysis Basic - ( 10 Nov 2020 20:48 )    Color: Yellow / Appearance: Clear / S.026 / pH: x  Gluc: x / Ketone: Negative  / Bili: Negative / Urobili: <2 mg/dL   Blood: x / Protein: Trace / Nitrite: Negative   Leuk Esterase: Negative / RBC: x / WBC x   Sq Epi: x / Non Sq Epi: x / Bacteria: x        Lactate Trend   @ 09:23 Lactate:1.2   -10 @ 18:46 Lactate:1.5         CAPILLARY BLOOD GLUCOSE            RADIOLOGY & ADDITIONAL TESTS:    Imaging Personally Reviewed:  [ ] YES  [ ] NO    HEALTH ISSUES - PROBLEM Dx:          PHYSICAL EXAM:  GENERAL: NAD, well-developed.  HEAD:  Atraumatic, Normocephalic.  EYES: EOMI, PERRLA, conjunctiva and sclera clear.  NECK: Supple, No JVD.  CHEST/LUNG: Clear to auscultation bilaterally; No wheeze.  HEART: Regular rate and rhythm; S1 S2.   ABDOMEN: Soft, LLQ tenderness with guarding, no rigidity.   EXTREMITIES:  2+ Peripheral Pulses, No clubbing, cyanosis, or edema.  PSYCH: AAOx3.  NEUROLOGY: non-focal.  SKIN: No rashes or lesions.

## 2020-11-12 NOTE — PROGRESS NOTE ADULT - SUBJECTIVE AND OBJECTIVE BOX
GEOVANNA PUTNAM  54y, Female  Allergy: ciprofloxacin (Urticaria; Other)      LOS  2d    CHIEF COMPLAINT: Diverticulitis (2020 13:11)      INTERVAL EVENTS/HPI  - No acute events overnight  - T(F): , Max: 99.3 (20 @ 20:59)  - Denies any worsening symptoms  - Tolerating medication  - WBC Count: 5.58 (20 @ 09:23)  WBC Count: 7.19 (20 @ 06:49)  - Creatinine, Serum: 1.0 (20 @ 09:23)  Creatinine, Serum: 1.1 (20 @ 06:49)       ROS  General: Denies rigors, nightsweats  HEENT: Denies headache, rhinorrhea, sore throat, eye pain  CV: Denies CP, palpitations  PULM: Denies wheezing, hemoptysis  GI: Denies hematemesis, hematochezia, melena  : Denies discharge, hematuria  MSK: Denies arthralgias, myalgias  SKIN: Denies rash, lesions  NEURO: Denies paresthesias, weakness  PSYCH: Denies depression, anxiety    VITALS:  T(F): 99, Max: 99.3 (20 @ 20:59)  HR: 80  BP: 122/58  RR: 18Vital Signs Last 24 Hrs  T(C): 37.2 (2020 05:07), Max: 37.4 (2020 20:59)  T(F): 99 (2020 05:07), Max: 99.3 (2020 20:59)  HR: 80 (2020 05:07) (68 - 93)  BP: 122/58 (2020 05:07) (122/58 - 152/62)  BP(mean): --  RR: 18 (2020 05:07) (18 - 18)  SpO2: 94% (2020 19:51) (94% - 94%)    PHYSICAL EXAM:  Gen: NAD, resting in bed  HEENT: Normocephalic, atraumatic  Neck: supple, no lymphadenopathy  CV: Regular rate & regular rhythm  Lungs: decreased BS at bases, no fremitus  Abdomen: Soft, BS present LLQ tenderness to palpation   Ext: Warm, well perfused  Neuro: non focal, awake  Skin: no rash, no erythema  Lines: no phlebitis    FH: Non-contributory  Social Hx: Non-contributory    TESTS & MEASUREMENTS:                        10.2   5.58  )-----------( 270      ( 2020 09:23 )             33.3         140  |  102  |  11  ----------------------------<  160<H>  3.9   |  27  |  1.0    Ca    8.7      2020 09:23  Mg     1.6         TPro  5.9<L>  /  Alb  3.3<L>  /  TBili  0.7  /  DBili  x   /  AST  18  /  ALT  18  /  AlkPhos  81      eGFR if : 73 mL/min/1.73M2 (20 @ 09:23)  eGFR if Non African American: 63 mL/min/1.73M2 (20 @ 09:23)    LIVER FUNCTIONS - ( 2020 09:23 )  Alb: 3.3 g/dL / Pro: 5.9 g/dL / ALK PHOS: 81 U/L / ALT: 18 U/L / AST: 18 U/L / GGT: x           Urinalysis Basic - ( 10 Nov 2020 20:48 )    Color: Yellow / Appearance: Clear / S.026 / pH: x  Gluc: x / Ketone: Negative  / Bili: Negative / Urobili: <2 mg/dL   Blood: x / Protein: Trace / Nitrite: Negative   Leuk Esterase: Negative / RBC: x / WBC x   Sq Epi: x / Non Sq Epi: x / Bacteria: x          Lactate, Blood: 1.2 mmol/L (20 @ 09:23)  Lactate, Blood: 1.5 mmol/L (11-10-20 @ 18:46)      INFECTIOUS DISEASES TESTING  COVID-19 PCR: NotDetec (11-10-20 @ 22:52)  COVID-19 PCR: NotDetec (10-11-20 @ 22:26)  COVID-19 PCR: NotDetec (10-01-20 @ 20:10)      INFLAMMATORY MARKERS  Sedimentation Rate, Erythrocyte: 52 mm/Hr (10-15-20 @ 15:40)  C-Reactive Protein, Serum: 5.73 mg/dL (10-15-20 @ 15:40)      RADIOLOGY & ADDITIONAL TESTS:  I have personally reviewed the last available Chest xray  CXR      CT  CT Abdomen and Pelvis w/ IV Cont:   EXAM:  CT ABDOMEN AND PELVIS IC            PROCEDURE DATE:  11/10/2020            INTERPRETATION:  CLINICAL STATEMENT: Left lower quadrant abdominal pain    TECHNIQUE: Contiguous CT images were obtained of the abdomen and pelvis.  Intravenous Contrast: 100 cc Omnipaque 350 intravenous contrast was administered.  Oral contrast: was not administered.      COMPARISON:  CT abdomen pelvis dated 10/12/2020    FINDINGS:    LOWER CHEST: Coronary artery calcifications.    LIVER: Suggestion of hepatic steatosis. No focal mass    SPLEEN: Unremarkable.    PANCREAS: Unremarkable.    GALLBLADDER AND BILIARY TREE: Cholecystectomy.    ADRENALS: 1.3 cm right adrenal gland nodule stable dating back to at least , therefore presumably benign. Unremarkable left adrenal gland.    KIDNEYS: Symmetric pattern of renal enhancement. No hydronephrosis. Right renal cyst.    LYMPH NODES: There are no enlarged abdominal or pelvic lymph nodes.    VASCULATURE: Normal caliber abdominal aorta with atherosclerotic changes.    BOWEL: No evidence for bowel obstruction. Colonic diverticulosis with focal inflamed diverticulum at the proximal sigmoid colon. Unremarkable appendix.    PERITONEUM/RETROPERITONEUM/MESENTERY: No ascites, abscess radiation or free air.    PELVIC VISCERA: Unremarkable.    BONES AND SOFT TISSUES: Degenerative changes of the spine.      IMPRESSION:    Acute sigmoid diverticulitis. No perforation or abscess.                  CLAUDE MILLER MD; Attending Radiologist  This document has been electronically signed. Nov 10 2020 10:03PM (11-10-20 @ 21:38)      CARDIOLOGY TESTING      MEDICATIONS  budesonide 160 MICROgram(s)/formoterol 4.5 MICROgram(s) Inhaler 2 Inhalation two times a day  buPROPion XL (24-Hour) Oral Tab/Cap - Peds 300 Oral daily  chlorhexidine 4% Liquid 1 Topical <User Schedule>  clonazePAM  Tablet 1 Oral two times a day  enalapril 10 Oral daily  enoxaparin Injectable 40 SubCutaneous at bedtime  HYDROmorphone  Injectable 1.5 IV Push every 8 hours  lactated ringers. 1000 IV Continuous <Continuous>  levothyroxine 125 Oral daily  meropenem  IVPB 1000 IV Intermittent every 12 hours  meropenem  IVPB     montelukast 10 Oral daily  tiotropium 18 MICROgram(s) Capsule 1 Inhalation daily      WEIGHT  Weight (kg): 135.2 (10-11-20 @ 21:47)  Creatinine, Serum: 1.0 mg/dL (20 @ 09:23)      ANTIBIOTICS:  meropenem  IVPB 1000 milliGRAM(s) IV Intermittent every 12 hours  meropenem  IVPB          All available historical records have been reviewed

## 2020-11-13 ENCOUNTER — TRANSCRIPTION ENCOUNTER (OUTPATIENT)
Age: 55
End: 2020-11-13

## 2020-11-13 LAB
ALBUMIN SERPL ELPH-MCNC: 3.9 G/DL — SIGNIFICANT CHANGE UP (ref 3.5–5.2)
ALP SERPL-CCNC: 89 U/L — SIGNIFICANT CHANGE UP (ref 30–115)
ALT FLD-CCNC: 21 U/L — SIGNIFICANT CHANGE UP (ref 0–41)
ANION GAP SERPL CALC-SCNC: 12 MMOL/L — SIGNIFICANT CHANGE UP (ref 7–14)
AST SERPL-CCNC: 22 U/L — SIGNIFICANT CHANGE UP (ref 0–41)
BASOPHILS # BLD AUTO: 0.02 K/UL — SIGNIFICANT CHANGE UP (ref 0–0.2)
BASOPHILS NFR BLD AUTO: 0.4 % — SIGNIFICANT CHANGE UP (ref 0–1)
BILIRUB SERPL-MCNC: 0.2 MG/DL — SIGNIFICANT CHANGE UP (ref 0.2–1.2)
BUN SERPL-MCNC: 11 MG/DL — SIGNIFICANT CHANGE UP (ref 10–20)
CALCIUM SERPL-MCNC: 9.1 MG/DL — SIGNIFICANT CHANGE UP (ref 8.5–10.1)
CHLORIDE SERPL-SCNC: 100 MMOL/L — SIGNIFICANT CHANGE UP (ref 98–110)
CO2 SERPL-SCNC: 27 MMOL/L — SIGNIFICANT CHANGE UP (ref 17–32)
CREAT SERPL-MCNC: 1 MG/DL — SIGNIFICANT CHANGE UP (ref 0.7–1.5)
EOSINOPHIL # BLD AUTO: 0.25 K/UL — SIGNIFICANT CHANGE UP (ref 0–0.7)
EOSINOPHIL NFR BLD AUTO: 4.5 % — SIGNIFICANT CHANGE UP (ref 0–8)
GLUCOSE SERPL-MCNC: 108 MG/DL — HIGH (ref 70–99)
HCT VFR BLD CALC: 36.8 % — LOW (ref 37–47)
HGB BLD-MCNC: 11.2 G/DL — LOW (ref 12–16)
IMM GRANULOCYTES NFR BLD AUTO: 0.2 % — SIGNIFICANT CHANGE UP (ref 0.1–0.3)
LYMPHOCYTES # BLD AUTO: 1.11 K/UL — LOW (ref 1.2–3.4)
LYMPHOCYTES # BLD AUTO: 20 % — LOW (ref 20.5–51.1)
MAGNESIUM SERPL-MCNC: 1.8 MG/DL — SIGNIFICANT CHANGE UP (ref 1.8–2.4)
MCHC RBC-ENTMCNC: 25.5 PG — LOW (ref 27–31)
MCHC RBC-ENTMCNC: 30.4 G/DL — LOW (ref 32–37)
MCV RBC AUTO: 83.8 FL — SIGNIFICANT CHANGE UP (ref 81–99)
MONOCYTES # BLD AUTO: 0.38 K/UL — SIGNIFICANT CHANGE UP (ref 0.1–0.6)
MONOCYTES NFR BLD AUTO: 6.8 % — SIGNIFICANT CHANGE UP (ref 1.7–9.3)
NEUTROPHILS # BLD AUTO: 3.79 K/UL — SIGNIFICANT CHANGE UP (ref 1.4–6.5)
NEUTROPHILS NFR BLD AUTO: 68.1 % — SIGNIFICANT CHANGE UP (ref 42.2–75.2)
NRBC # BLD: 0 /100 WBCS — SIGNIFICANT CHANGE UP (ref 0–0)
PLATELET # BLD AUTO: 302 K/UL — SIGNIFICANT CHANGE UP (ref 130–400)
POTASSIUM SERPL-MCNC: 4.1 MMOL/L — SIGNIFICANT CHANGE UP (ref 3.5–5)
POTASSIUM SERPL-SCNC: 4.1 MMOL/L — SIGNIFICANT CHANGE UP (ref 3.5–5)
PROT SERPL-MCNC: 6.7 G/DL — SIGNIFICANT CHANGE UP (ref 6–8)
RBC # BLD: 4.39 M/UL — SIGNIFICANT CHANGE UP (ref 4.2–5.4)
RBC # FLD: 16.4 % — HIGH (ref 11.5–14.5)
SODIUM SERPL-SCNC: 139 MMOL/L — SIGNIFICANT CHANGE UP (ref 135–146)
WBC # BLD: 5.56 K/UL — SIGNIFICANT CHANGE UP (ref 4.8–10.8)
WBC # FLD AUTO: 5.56 K/UL — SIGNIFICANT CHANGE UP (ref 4.8–10.8)

## 2020-11-13 PROCEDURE — 99233 SBSQ HOSP IP/OBS HIGH 50: CPT

## 2020-11-13 RX ORDER — HYDROMORPHONE HYDROCHLORIDE 2 MG/ML
1 INJECTION INTRAMUSCULAR; INTRAVENOUS; SUBCUTANEOUS EVERY 8 HOURS
Refills: 0 | Status: DISCONTINUED | OUTPATIENT
Start: 2020-11-13 | End: 2020-11-14

## 2020-11-13 RX ORDER — ONDANSETRON 8 MG/1
4 TABLET, FILM COATED ORAL ONCE
Refills: 0 | Status: COMPLETED | OUTPATIENT
Start: 2020-11-13 | End: 2020-11-13

## 2020-11-13 RX ORDER — HYDROMORPHONE HYDROCHLORIDE 2 MG/ML
2 INJECTION INTRAMUSCULAR; INTRAVENOUS; SUBCUTANEOUS ONCE
Refills: 0 | Status: DISCONTINUED | OUTPATIENT
Start: 2020-11-13 | End: 2020-11-13

## 2020-11-13 RX ORDER — ACETAMINOPHEN 500 MG
650 TABLET ORAL ONCE
Refills: 0 | Status: COMPLETED | OUTPATIENT
Start: 2020-11-13 | End: 2020-11-13

## 2020-11-13 RX ORDER — HYDROMORPHONE HYDROCHLORIDE 2 MG/ML
1 INJECTION INTRAMUSCULAR; INTRAVENOUS; SUBCUTANEOUS EVERY 12 HOURS
Refills: 0 | Status: DISCONTINUED | OUTPATIENT
Start: 2020-11-13 | End: 2020-11-13

## 2020-11-13 RX ORDER — HYDROMORPHONE HYDROCHLORIDE 2 MG/ML
1 INJECTION INTRAMUSCULAR; INTRAVENOUS; SUBCUTANEOUS EVERY 8 HOURS
Refills: 0 | Status: DISCONTINUED | OUTPATIENT
Start: 2020-11-13 | End: 2020-11-13

## 2020-11-13 RX ORDER — ERTAPENEM SODIUM 1 G/1
1 INJECTION, POWDER, LYOPHILIZED, FOR SOLUTION INTRAMUSCULAR; INTRAVENOUS
Qty: 24 | Refills: 0
Start: 2020-11-13 | End: 2020-12-06

## 2020-11-13 RX ADMIN — BUDESONIDE AND FORMOTEROL FUMARATE DIHYDRATE 2 PUFF(S): 160; 4.5 AEROSOL RESPIRATORY (INHALATION) at 09:56

## 2020-11-13 RX ADMIN — ZOLPIDEM TARTRATE 5 MILLIGRAM(S): 10 TABLET ORAL at 22:35

## 2020-11-13 RX ADMIN — ENOXAPARIN SODIUM 40 MILLIGRAM(S): 100 INJECTION SUBCUTANEOUS at 21:04

## 2020-11-13 RX ADMIN — Medication 650 MILLIGRAM(S): at 21:29

## 2020-11-13 RX ADMIN — HYDROMORPHONE HYDROCHLORIDE 1 MILLIGRAM(S): 2 INJECTION INTRAMUSCULAR; INTRAVENOUS; SUBCUTANEOUS at 13:47

## 2020-11-13 RX ADMIN — Medication 1 MILLIGRAM(S): at 05:38

## 2020-11-13 RX ADMIN — BUPROPION HYDROCHLORIDE 300 MILLIGRAM(S): 150 TABLET, EXTENDED RELEASE ORAL at 11:37

## 2020-11-13 RX ADMIN — HYDROMORPHONE HYDROCHLORIDE 2 MILLIGRAM(S): 2 INJECTION INTRAMUSCULAR; INTRAVENOUS; SUBCUTANEOUS at 18:40

## 2020-11-13 RX ADMIN — TIOTROPIUM BROMIDE 1 CAPSULE(S): 18 CAPSULE ORAL; RESPIRATORY (INHALATION) at 07:59

## 2020-11-13 RX ADMIN — Medication 650 MILLIGRAM(S): at 21:03

## 2020-11-13 RX ADMIN — HYDROMORPHONE HYDROCHLORIDE 1 MILLIGRAM(S): 2 INJECTION INTRAMUSCULAR; INTRAVENOUS; SUBCUTANEOUS at 22:35

## 2020-11-13 RX ADMIN — Medication 10 MILLIGRAM(S): at 05:39

## 2020-11-13 RX ADMIN — MONTELUKAST 10 MILLIGRAM(S): 4 TABLET, CHEWABLE ORAL at 11:38

## 2020-11-13 RX ADMIN — Medication 125 MICROGRAM(S): at 05:39

## 2020-11-13 RX ADMIN — Medication 1 MILLIGRAM(S): at 18:03

## 2020-11-13 RX ADMIN — MEROPENEM 100 MILLIGRAM(S): 1 INJECTION INTRAVENOUS at 06:02

## 2020-11-13 RX ADMIN — HYDROMORPHONE HYDROCHLORIDE 1 MILLIGRAM(S): 2 INJECTION INTRAMUSCULAR; INTRAVENOUS; SUBCUTANEOUS at 23:50

## 2020-11-13 RX ADMIN — SODIUM CHLORIDE 75 MILLILITER(S): 9 INJECTION, SOLUTION INTRAVENOUS at 14:58

## 2020-11-13 RX ADMIN — BUDESONIDE AND FORMOTEROL FUMARATE DIHYDRATE 2 PUFF(S): 160; 4.5 AEROSOL RESPIRATORY (INHALATION) at 20:36

## 2020-11-13 RX ADMIN — MEROPENEM 100 MILLIGRAM(S): 1 INJECTION INTRAVENOUS at 17:05

## 2020-11-13 RX ADMIN — HYDROMORPHONE HYDROCHLORIDE 1.5 MILLIGRAM(S): 2 INJECTION INTRAMUSCULAR; INTRAVENOUS; SUBCUTANEOUS at 06:20

## 2020-11-13 RX ADMIN — ONDANSETRON 4 MILLIGRAM(S): 8 TABLET, FILM COATED ORAL at 05:38

## 2020-11-13 RX ADMIN — HYDROMORPHONE HYDROCHLORIDE 1.5 MILLIGRAM(S): 2 INJECTION INTRAMUSCULAR; INTRAVENOUS; SUBCUTANEOUS at 06:03

## 2020-11-13 RX ADMIN — HYDROMORPHONE HYDROCHLORIDE 1 MILLIGRAM(S): 2 INJECTION INTRAMUSCULAR; INTRAVENOUS; SUBCUTANEOUS at 14:57

## 2020-11-13 NOTE — PROGRESS NOTE ADULT - ASSESSMENT
54 year old female with PMH of Diverticulitis, COPD, HTN, Depression, Anxiety who presented to the ED with recurrence of abdominal pain.    A/P:   Acute sigmoid diverticulitis: recurrent 4th episode in the last 4 months.   No sepsis on admission  Abdominal pain is improving, she is tolerating liquid diet.   Abdomen CT showed Acute sigmoid diverticulitis. No perforation or abscess.   Advance diet as tolerated. Continue IV RL   Continue Meropenem. Plan for Ertapenem 1g daily for 4 weeks outpatient.   GI follow up, she needs colonoscopy once infection improves, also surgery follow up. for possible resection.     COPD: Stable.   Continue Symbicort, Spiriva, Montelukast and Albuterol prn.     Depression/Anxiety  Continue on Buproprion 300mg daily and Clonazepam 1mg BID     HTN  Continue on Enalapril 10mg daily     Hypothyroidism  Continue on Levothyroxine 125mcg daily    Morbid Obesity: advised with weight loss, may benefit from weight loss procedure.   Outpatient follow up with bariatric surgery.     #Progress Note Handoff:  Pending (specify): improving abdominal pain, advancing diet, GI plan  Family discussion:  Disposition: Home.

## 2020-11-13 NOTE — DISCHARGE NOTE PROVIDER - HOSPITAL COURSE
54 year old female with PMHx of Diverticulitis, COPD, HTN, Depression, Anxiety who presented to the ED with recurrence of abdominal pain. The patient reports that she has been having LLQ abdominal pain that is associated with sharp severe throbbing pain. The patient also states that she has been having bright red blood per rectum. Of note the patient was recently treated and released with IV antibiotic course for sigmoid diverticulitis. Last dose of ertapenem was approximately two weeks ago, pt admitted for IV antibiotics, this is 4th attack of diverticulitis in 4 months, 54 year old female with PMHx of Diverticulitis, COPD, HTN, Depression, Anxiety who presented to the ED with recurrence of abdominal pain. The patient reports that she has been having LLQ abdominal pain that is associated with sharp severe throbbing pain. The patient also states that she has been having bright red blood per rectum. Of note the patient was recently treated and released with IV antibiotic course for sigmoid diverticulitis. Last dose of ertapenem was approximately two weeks ago, pt admitted for IV antibiotics, this is 4th attack of diverticulitis in 4 months.  Seen here by ID and recommend ertapenem daily at home until surgery.  Cleared for discharge by medicine. 54 year old female with PMHx of Diverticulitis, COPD, HTN, Depression, Anxiety who presented to the ED with recurrence of abdominal pain. The patient reports that she has been having LLQ abdominal pain that is associated with sharp severe throbbing pain. The patient also states that she has been having bright red blood per rectum. Of note the patient was recently treated and released with IV antibiotic course for sigmoid diverticulitis. Last dose of ertapenem was approximately two weeks ago, pt admitted for IV antibiotics, this is 4th attack of diverticulitis in 4 months.  Seen here by ID and recommend ertapenem daily at home until surgery.  Cleared for discharge by medicine.    A/P;   A/P:   Acute sigmoid diverticulitis: recurrent 4th episode in the last 4 months.   No sepsis on admission  Abdominal pain is improving, she is tolerating liquid diet.   Abdomen CT showed Acute sigmoid diverticulitis. No perforation or abscess.   Advance diet as tolerated. Continue IV RL   s/p Meropenem. Plan for discharge home on Ertapenem 1g daily for 4 weeks outpatient.   GI follow up, she needs colonoscopy once infection improves,  Surgery recommended outpatient follow up while still on antibiotics for possible surgical intervention.      COPD: Stable.   Continue Symbicort, Spiriva, Montelukast and Albuterol prn.     Depression/Anxiety  Continue on Buproprion 300mg daily and Clonazepam 1mg BID     HTN  Continue on Enalapril 10mg daily     Hypothyroidism  Continue on Levothyroxine 125mcg daily    Morbid Obesity: advised with weight loss, may benefit from weight loss procedure.   Outpatient follow up with bariatric surgery.

## 2020-11-13 NOTE — PROGRESS NOTE ADULT - SUBJECTIVE AND OBJECTIVE BOX
Chief Complaint: Patient is a 54y old  Female who presents with a chief complaint of Diverticulitis (13 Nov 2020 07:59)      INTERVAL EVENTS:  The patient reports     Medications:  ALBUTerol    90 MICROgram(s) HFA Inhaler 2 Puff(s) Inhalation every 6 hours PRN  budesonide 160 MICROgram(s)/formoterol 4.5 MICROgram(s) Inhaler 2 Puff(s) Inhalation two times a day  buPROPion XL (24-Hour) Oral Tab/Cap - Peds 300 milliGRAM(s) Oral daily  chlorhexidine 4% Liquid 1 Application(s) Topical <User Schedule>  clonazePAM  Tablet 1 milliGRAM(s) Oral two times a day  enalapril 10 milliGRAM(s) Oral daily  enoxaparin Injectable 40 milliGRAM(s) SubCutaneous at bedtime  HYDROmorphone  Injectable 1 milliGRAM(s) IV Push every 12 hours  lactated ringers. 1000 milliLiter(s) IV Continuous <Continuous>  levothyroxine 125 MICROGram(s) Oral daily  meropenem  IVPB 1000 milliGRAM(s) IV Intermittent every 12 hours  meropenem  IVPB      montelukast 10 milliGRAM(s) Oral daily  tiotropium 18 MICROgram(s) Capsule 1 Capsule(s) Inhalation daily  zolpidem 5 milliGRAM(s) Oral at bedtime PRN      PMHX/PSHX:  Anxiety    Hemorrhoids    Left ear hearing loss    Cervical disc disease    Hypothyroid    Nasopharyngeal cancer    Pulmonary nodules/lesions, multiple    Diverticulitis    HTN (hypertension)    COPD (chronic obstructive pulmonary disease)    History of cholecystectomy    No significant past surgical history        Family history:  Family history of lung cancer (Father, Mother)        Social History:     Allergies:  ciprofloxacin (Urticaria; Other)        Review of Systems:  General:  No wt loss, fevers, chills, night sweats, fatigue or pruritis.  Eyes:  Good vision, no reported pain or redness.  ENT:  No sore throat, pain, runny nose, or difficulty swallowing  CV:  No pain, palpitations, hypo/hypertension  Resp:  No dyspnea, cough, tachypnea, wheezing  GI:  No pain, nausea, vomiting, dysphagia, heartburn, diarrhea, constipation, or weight loss. , No rectal bleeding, tarry stools, or hematemesis.  :  No pain, bleeding/discharges, incontinence, nocturia  Musculoskeletal:  No pain, weakness or fasciculations.  Neuro:  No weakness, tingling, memory problems or paresthesias  Psych:  No fatigue, insomnia, mood problems, depression  Endocrine:  No polyuria, polydipsia, cold/heat intolerance  Heme:  No petechiae, ecchymosis, easy bruisability  Skin:  No rash, pruritis, tattoos, scars, or edema      PHYSICAL EXAM:   Vital Signs:  Vital Signs Last 24 Hrs  T(C): 36.9 (13 Nov 2020 04:37), Max: 36.9 (12 Nov 2020 21:25)  T(F): 98.5 (13 Nov 2020 04:37), Max: 98.5 (12 Nov 2020 21:25)  HR: 85 (13 Nov 2020 04:37) (83 - 95)  BP: 165/77 (13 Nov 2020 04:37) (113/57 - 165/77)  BP(mean): --  RR: 18 (13 Nov 2020 04:37) (18 - 18)  SpO2: --  Daily     Daily     T(C): 36.9 (11-13-20 @ 04:37), Max: 36.9 (11-12-20 @ 21:25)  HR: 85 (11-13-20 @ 04:37) (83 - 95)  BP: 165/77 (11-13-20 @ 04:37) (113/57 - 165/77)  RR: 18 (11-13-20 @ 04:37) (18 - 18)  SpO2: --    GENERAL:  Appears stated age, well-groomed, well-nourished, no distress  HEENT:  Conjunctivae clear and pink, no thyromegaly, nodules, adenopathy, no JVD, sclera -anicteric  CHEST:  Full & symmetric excursion, no increased effort, breath sounds clear  HEART:  Regular rhythm, S1, S2, no murmur/rub/S3/S4, no abdominal bruit, no edema  ABDOMEN:  Soft, non-tender, non-distended, normoactive bowel sounds,  no masses ,no hepato-splenomegaly, no signs of chronic liver disease  EXTEREMITIES:  no cyanosis,clubbing or edema  SKIN:  No rash/erythema/ecchymoses/petechiae/wounds/abscess/warm/dry  NEURO:  Alert, oriented, no asterixis, no tremor, no encephalopathy    LABS:                        11.2   5.56  )-----------( 302      ( 13 Nov 2020 05:27 )             36.8     11-13    139  |  100  |  11  ----------------------------<  108<H>  4.1   |  27  |  1.0    Ca    9.1      13 Nov 2020 05:27  Mg     1.8     11-13    TPro  6.7  /  Alb  3.9  /  TBili  0.2  /  DBili  x   /  AST  22  /  ALT  21  /  AlkPhos  89  11-13    LIVER FUNCTIONS - ( 13 Nov 2020 05:27 )  Alb: 3.9 g/dL / Pro: 6.7 g/dL / ALK PHOS: 89 U/L / ALT: 21 U/L / AST: 22 U/L / GGT: x           Imaging:      Assessment and Plan:      54 year old female with PMHx of hemorrhoids, diverticulitis, COPD not on home O2, HTN, Depression, Anxiety who presented to the ED with recurrence of abdominal pain, found to have recurrent diverticulitis ( no abscess/ perforation)      # Recurrent uncomplicated acute sigmoid diverticulitis - improving  # One episode of BRBPR prior to admission (hemodynamically stable) - resolved, likely hemorrhoidal  - no sepsis   - LIVIA negative   - CT A/P w/ IV Contrast: no abscess/ no perforation  - c/w IVF and IV Meropenem (patient allergic to cipro), consider longer course of IV ATB  - advance diet as tolerated  - Surgery evaluation   Colonoscopy in 6 weeks   Outpt follow up    *** THIS IS AN INCOMPLETE NOTE , FINAL ATTENDING RECOMMENDATIONS TO FOLLOW **         Chief Complaint: Patient is a 54y old  Female who presents with a chief complaint of Diverticulitis (13 Nov 2020 07:59)      INTERVAL EVENTS:  The patient reports improvement in symptoms . Was able to tolerate clear liquid diet last night without nausea/vomiting. Abdominal pain improved.     Medications:  ALBUTerol    90 MICROgram(s) HFA Inhaler 2 Puff(s) Inhalation every 6 hours PRN  budesonide 160 MICROgram(s)/formoterol 4.5 MICROgram(s) Inhaler 2 Puff(s) Inhalation two times a day  buPROPion XL (24-Hour) Oral Tab/Cap - Peds 300 milliGRAM(s) Oral daily  chlorhexidine 4% Liquid 1 Application(s) Topical <User Schedule>  clonazePAM  Tablet 1 milliGRAM(s) Oral two times a day  enalapril 10 milliGRAM(s) Oral daily  enoxaparin Injectable 40 milliGRAM(s) SubCutaneous at bedtime  HYDROmorphone  Injectable 1 milliGRAM(s) IV Push every 12 hours  lactated ringers. 1000 milliLiter(s) IV Continuous <Continuous>  levothyroxine 125 MICROGram(s) Oral daily  meropenem  IVPB 1000 milliGRAM(s) IV Intermittent every 12 hours  meropenem  IVPB      montelukast 10 milliGRAM(s) Oral daily  tiotropium 18 MICROgram(s) Capsule 1 Capsule(s) Inhalation daily  zolpidem 5 milliGRAM(s) Oral at bedtime PRN      PMHX/PSHX:  Anxiety    Hemorrhoids    Left ear hearing loss    Cervical disc disease    Hypothyroid    Nasopharyngeal cancer    Pulmonary nodules/lesions, multiple    Diverticulitis    HTN (hypertension)    COPD (chronic obstructive pulmonary disease)    History of cholecystectomy    No significant past surgical history        Family history:  Family history of lung cancer (Father, Mother)        Social History:     Allergies:  ciprofloxacin (Urticaria; Other)        Review of Systems:  General:  No wt loss, fevers, chills, night sweats, fatigue or pruritis.  Eyes:  Good vision, no reported pain or redness.  ENT:  No sore throat, pain, runny nose, or difficulty swallowing  CV:  No pain, palpitations, hypo/hypertension  Resp:  No dyspnea, cough, tachypnea, wheezing  GI:  No pain, nausea, vomiting, dysphagia, heartburn, diarrhea, constipation, or weight loss. , No rectal bleeding, tarry stools, or hematemesis.  :  No pain, bleeding/discharges, incontinence, nocturia  Musculoskeletal:  No pain, weakness or fasciculations.  Neuro:  No weakness, tingling, memory problems or paresthesias  Psych:  No fatigue, insomnia, mood problems, depression  Endocrine:  No polyuria, polydipsia, cold/heat intolerance  Heme:  No petechiae, ecchymosis, easy bruisability  Skin:  No rash, pruritis, tattoos, scars, or edema      PHYSICAL EXAM:   Vital Signs:  Vital Signs Last 24 Hrs  T(C): 36.9 (13 Nov 2020 04:37), Max: 36.9 (12 Nov 2020 21:25)  T(F): 98.5 (13 Nov 2020 04:37), Max: 98.5 (12 Nov 2020 21:25)  HR: 85 (13 Nov 2020 04:37) (83 - 95)  BP: 165/77 (13 Nov 2020 04:37) (113/57 - 165/77)  BP(mean): --  RR: 18 (13 Nov 2020 04:37) (18 - 18)  SpO2: --  Daily     Daily     T(C): 36.9 (11-13-20 @ 04:37), Max: 36.9 (11-12-20 @ 21:25)  HR: 85 (11-13-20 @ 04:37) (83 - 95)  BP: 165/77 (11-13-20 @ 04:37) (113/57 - 165/77)  RR: 18 (11-13-20 @ 04:37) (18 - 18)  SpO2: --    GENERAL:  Appears stated age, well-groomed, well-nourished, no distress  HEENT:  Conjunctivae clear and pink, no thyromegaly, nodules, adenopathy, no JVD, sclera -anicteric  CHEST:  Full & symmetric excursion, no increased effort, breath sounds clear  HEART:  Regular rhythm, S1, S2, no murmur/rub/S3/S4, no abdominal bruit, no edema  ABDOMEN:  Soft, non-tender, non-distended, normoactive bowel sounds,  no masses ,no hepato-splenomegaly, no signs of chronic liver disease  EXTEREMITIES:  no cyanosis,clubbing or edema  SKIN:  No rash/erythema/ecchymoses/petechiae/wounds/abscess/warm/dry  NEURO:  Alert, oriented, no asterixis, no tremor, no encephalopathy    LABS:                        11.2   5.56  )-----------( 302      ( 13 Nov 2020 05:27 )             36.8     11-13    139  |  100  |  11  ----------------------------<  108<H>  4.1   |  27  |  1.0    Ca    9.1      13 Nov 2020 05:27  Mg     1.8     11-13    TPro  6.7  /  Alb  3.9  /  TBili  0.2  /  DBili  x   /  AST  22  /  ALT  21  /  AlkPhos  89  11-13    LIVER FUNCTIONS - ( 13 Nov 2020 05:27 )  Alb: 3.9 g/dL / Pro: 6.7 g/dL / ALK PHOS: 89 U/L / ALT: 21 U/L / AST: 22 U/L / GGT: x           Imaging:      Assessment and Plan:      54 year old female with PMHx of hemorrhoids, diverticulitis, COPD not on home O2, HTN, Depression, Anxiety who presented to the ED with recurrence of abdominal pain, found to have recurrent diverticulitis ( no abscess/ perforation)      # Recurrent uncomplicated acute sigmoid diverticulitis - improving  # One episode of BRBPR prior to admission (hemodynamically stable) - resolved, likely hemorrhoidal  - no sepsis   - LIVIA negative   - CT A/P w/ IV Contrast: no abscess/ no perforation  - c/w IVF and IV Meropenem (patient allergic to cipro), consider longer course of IV ATB  - advance diet as tolerated  - Surgery evaluation   - Colonoscopy in 6 weeks   - Outpt follow up    *** THIS IS AN INCOMPLETE NOTE , FINAL ATTENDING RECOMMENDATIONS TO FOLLOW **         Chief Complaint: Patient is a 54y old  Female who presents with a chief complaint of Diverticulitis (13 Nov 2020 07:59)      INTERVAL EVENTS:  The patient reports improvement in symptoms . Was able to tolerate clear liquid diet last night without nausea/vomiting. Abdominal pain improved.     Medications:  ALBUTerol    90 MICROgram(s) HFA Inhaler 2 Puff(s) Inhalation every 6 hours PRN  budesonide 160 MICROgram(s)/formoterol 4.5 MICROgram(s) Inhaler 2 Puff(s) Inhalation two times a day  buPROPion XL (24-Hour) Oral Tab/Cap - Peds 300 milliGRAM(s) Oral daily  chlorhexidine 4% Liquid 1 Application(s) Topical <User Schedule>  clonazePAM  Tablet 1 milliGRAM(s) Oral two times a day  enalapril 10 milliGRAM(s) Oral daily  enoxaparin Injectable 40 milliGRAM(s) SubCutaneous at bedtime  HYDROmorphone  Injectable 1 milliGRAM(s) IV Push every 12 hours  lactated ringers. 1000 milliLiter(s) IV Continuous <Continuous>  levothyroxine 125 MICROGram(s) Oral daily  meropenem  IVPB 1000 milliGRAM(s) IV Intermittent every 12 hours  meropenem  IVPB      montelukast 10 milliGRAM(s) Oral daily  tiotropium 18 MICROgram(s) Capsule 1 Capsule(s) Inhalation daily  zolpidem 5 milliGRAM(s) Oral at bedtime PRN      PMHX/PSHX:  Anxiety    Hemorrhoids    Left ear hearing loss    Cervical disc disease    Hypothyroid    Nasopharyngeal cancer    Pulmonary nodules/lesions, multiple    Diverticulitis    HTN (hypertension)    COPD (chronic obstructive pulmonary disease)    History of cholecystectomy    No significant past surgical history        Family history:  Family history of lung cancer (Father, Mother)        Social History:     Allergies:  ciprofloxacin (Urticaria; Other)        Review of Systems:  General:  No wt loss, fevers, chills, night sweats, fatigue or pruritis.  Eyes:  Good vision, no reported pain or redness.  ENT:  No sore throat, pain, runny nose, or difficulty swallowing  CV:  No pain, palpitations, hypo/hypertension  Resp:  No dyspnea, cough, tachypnea, wheezing  GI:  improved LLQ abdominal pain, nausea, vomiting, dysphagia, heartburn, diarrhea, constipation, or weight loss. , No rectal bleeding, tarry stools, or hematemesis.  :  No pain, bleeding/discharges, incontinence, nocturia  Musculoskeletal:  No pain, weakness or fasciculations.  Neuro:  No weakness, tingling, memory problems or paresthesias  Psych:  No fatigue, insomnia, mood problems, depression  Endocrine:  No polyuria, polydipsia, cold/heat intolerance  Heme:  No petechiae, ecchymosis, easy bruisability  Skin:  No rash, pruritis, tattoos, scars, or edema      PHYSICAL EXAM:   Vital Signs:  Vital Signs Last 24 Hrs  T(C): 36.9 (13 Nov 2020 04:37), Max: 36.9 (12 Nov 2020 21:25)  T(F): 98.5 (13 Nov 2020 04:37), Max: 98.5 (12 Nov 2020 21:25)  HR: 85 (13 Nov 2020 04:37) (83 - 95)  BP: 165/77 (13 Nov 2020 04:37) (113/57 - 165/77)  BP(mean): --  RR: 18 (13 Nov 2020 04:37) (18 - 18)      T(C): 36.9 (11-13-20 @ 04:37), Max: 36.9 (11-12-20 @ 21:25)  HR: 85 (11-13-20 @ 04:37) (83 - 95)  BP: 165/77 (11-13-20 @ 04:37) (113/57 - 165/77)  RR: 18 (11-13-20 @ 04:37) (18 - 18)  SpO2: --    GENERAL:  Appears stated age, well-groomed, well-nourished, no distress  HEENT:  Conjunctivae clear and pink, no thyromegaly, nodules, adenopathy, no JVD, sclera -anicteric  CHEST:  Full & symmetric excursion, no increased effort, breath sounds clear  HEART:  Regular rhythm, S1, S2, no murmur/rub/S3/S4, no abdominal bruit, no edema  ABDOMEN:  Soft,  LLQ tenderness improved , rebound improved , non-distended, normoactive bowel sounds,  no masses ,no hepato-splenomegaly, no signs of chronic liver disease  EXTEREMITIES:  no cyanosis,clubbing or edema  SKIN:  No rash/erythema/ecchymoses/petechiae/wounds/abscess/warm/dry  NEURO:  Alert, oriented, no asterixis, no tremor, no encephalopathy    LABS:                        11.2   5.56  )-----------( 302      ( 13 Nov 2020 05:27 )             36.8     11-13    139  |  100  |  11  ----------------------------<  108<H>  4.1   |  27  |  1.0    Ca    9.1      13 Nov 2020 05:27  Mg     1.8     11-13    TPro  6.7  /  Alb  3.9  /  TBili  0.2  /  DBili  x   /  AST  22  /  ALT  21  /  AlkPhos  89  11-13    LIVER FUNCTIONS - ( 13 Nov 2020 05:27 )  Alb: 3.9 g/dL / Pro: 6.7 g/dL / ALK PHOS: 89 U/L / ALT: 21 U/L / AST: 22 U/L / GGT: x           Imaging:      Assessment and Plan:      54 year old female with PMHx of hemorrhoids, diverticulitis, COPD not on home O2, HTN, Depression, Anxiety who presented to the ED with recurrence of abdominal pain, found to have recurrent diverticulitis ( no abscess/ perforation)      # Recurrent uncomplicated acute sigmoid diverticulitis - improving  # One episode of BRBPR prior to admission (hemodynamically stable) - resolved, likely hemorrhoidal  - no sepsis   - LIVIA negative   - CT A/P w/ IV Contrast: no abscess/ no perforation  - c/w IVF and IV Meropenem (patient allergic to cipro), consider longer course of IV ATB  - advance diet as tolerated  - Surgery evaluation   - Colonoscopy in 6 weeks   - Outpt follow up    *** THIS IS AN INCOMPLETE NOTE , FINAL ATTENDING RECOMMENDATIONS TO FOLLOW **

## 2020-11-13 NOTE — DISCHARGE NOTE PROVIDER - NSDCCPCAREPLAN_GEN_ALL_CORE_FT
PRINCIPAL DISCHARGE DIAGNOSIS  Diagnosis: Diverticulitis  Assessment and Plan of Treatment: you was treated with antibiotics, this is your 4th time attack of divericulitis, follow up with the GI and surgery clinic as outpatient as instructed       PRINCIPAL DISCHARGE DIAGNOSIS  Diagnosis: Diverticulitis  Assessment and Plan of Treatment: You were treated here with a course of antibiotics.  The infectious disease team recommends that you continue daily antibiotics through the midline for a month during which you should have surgery done for your diverticulitis.  Please follow up with Dr. Spencer for your surgery as well as Dr. Robison from the infectious disease team.  Please call them if you do not get a phone call.  Please do a weekly CBC, CMP, ESR, and CRP (blood work).  Please also follow up with your GI doctor during this time.  Dr. Alphonse Robison Tuesdays for Telehealth. We will call between 10:30-6:30      [address] [facility]       [phone]       Fax [phone]

## 2020-11-13 NOTE — PROGRESS NOTE ADULT - ASSESSMENT
This is a 54 year old female with PMHx of Diverticulitis, COPD, HTN, Depression, Anxiety who presented to the ED with recurrence of abdominal pain.    #uncomplicated recurrent sigmoid diverticulitis (4 in the past 4 months, 6 in the past few yr), s/p ertapenem course last month   - no abscess, sepsis not present  - allergic to cipro, Meropenem for now, Bcx neg   - given recurrence despite multiple abx course and unable to make colonoscopy due to frequent relapse   - ID: recommends to have surgery within 4 weeks and will be covered by Ertapenem during the periode  - GI onboard: can do c-scope in periode < 6 weeks if no relapse  - pending final plan from surgery  - still in pain but able to tolerates CLL, advance to soft   - IV hydration: LR at 75cc/hour      #COPD not on home O2  - Not in any acute exacerbation  - Continue on Symbicort, will use spiriva while here.  - Continue on montelukast 10mg daily   - Albuterol inhalation PRN  - Noted patient on other inhalers that are not supplied here. Will need to bring outside meds in to continue receiving them here.     #Depression/Anxiety  - Continue on Buproprion 300mg daily and Clonazepam 1mg BID     #HTN  - Continue on Enalapril 10mg daily     #Hypothyroidism  - Continue on Levothyroxine 125mcg daily    #GERD  - Protonix daily    Activity: As tolerated  Diet: CLL, might advance today to soft  DVT ppx: Lovenox  GI ppx: not indicated   Code Status: Full Code  DISPO: acute

## 2020-11-13 NOTE — PROGRESS NOTE ADULT - ATTENDING COMMENTS
above noted abdomen soft no distension mild tenderness discussed case with GI pt to go home on IV AB
She is still with LLQ pain.   No fever.   Tender on exam  Recurrent diverticulitis, unable to schedule appointment outside for colonoscopy.   Plan:   Continue with antibiotics, consider ID consult for long term antibiotics.   Surgery and GI consult.

## 2020-11-13 NOTE — PROGRESS NOTE ADULT - SUBJECTIVE AND OBJECTIVE BOX
Progress Note: General Surgery  Patient: GEOVANNA PUTNAM , 54y (1965)Female   MRN: [id number]  [facility] [place] [address] [facility]  Visit: 11-10-20 Inpatient  Date: 11-13-20 @ 10:51    Procedure/Diagnosis:   HEALTH ISSUES - PROBLEM Dx:      Events/ 24h: No acute events overnight. Patient seen and examined at bedside. NAD. AFVSS.       Vitals: T(F): 98.5 (11-13-20 @ 04:37), Max: 98.5 (11-12-20 @ 21:25)  HR: 85 (11-13-20 @ 04:37)  BP: 165/77 (11-13-20 @ 04:37) (113/57 - 165/77)  RR: 18 (11-13-20 @ 04:37)    Diet: Diet, Soft (11-13-20 @ 08:05)    IV Fluids: lactated ringers. 1000 milliLiter(s) (75 mL/Hr) IV Continuous <Continuous>      Physical Examination:  General Appearance: NAD  HEENT: EOMI, sclera non-icteric.  Heart: RRR   Lungs: No increased work of breathing or accessory muscle use. Symmetric chest wall rise and fall.   Abdomen:  Soft, nontender, nondistended.   MSK/Extremities: Warm & well-perfused.   Skin: Warm, dry. No jaundice.       Medications: [Standing]  budesonide 160 MICROgram(s)/formoterol 4.5 MICROgram(s) Inhaler 2 Puff(s) Inhalation two times a day  buPROPion XL (24-Hour) Oral Tab/Cap - Peds 300 milliGRAM(s) Oral daily  chlorhexidine 4% Liquid 1 Application(s) Topical <User Schedule>  clonazePAM  Tablet 1 milliGRAM(s) Oral two times a day  enalapril 10 milliGRAM(s) Oral daily  enoxaparin Injectable 40 milliGRAM(s) SubCutaneous at bedtime  HYDROmorphone  Injectable 1 milliGRAM(s) IV Push every 12 hours  lactated ringers. 1000 milliLiter(s) (75 mL/Hr) IV Continuous <Continuous>  levothyroxine 125 MICROGram(s) Oral daily  meropenem  IVPB 1000 milliGRAM(s) IV Intermittent every 12 hours  meropenem  IVPB      montelukast 10 milliGRAM(s) Oral daily  tiotropium 18 MICROgram(s) Capsule 1 Capsule(s) Inhalation daily    DVT Prophylaxis: enoxaparin Injectable 40 milliGRAM(s) SubCutaneous at bedtime    GI Prophylaxis:   Antibiotics: meropenem  IVPB 1000 milliGRAM(s) IV Intermittent every 12 hours  meropenem  IVPB        Anticoagulation:   Medications:[PRN]  ALBUTerol    90 MICROgram(s) HFA Inhaler 2 Puff(s) Inhalation every 6 hours PRN  zolpidem 5 milliGRAM(s) Oral at bedtime PRN      Labs:                        11.2   5.56  )-----------( 302      ( 13 Nov 2020 05:27 )             36.8     11-13    139  |  100  |  11  ----------------------------<  108<H>  4.1   |  27  |  1.0    Ca    9.1      13 Nov 2020 05:27  Mg     1.8     11-13    TPro  6.7  /  Alb  3.9  /  TBili  0.2  /  DBili  x   /  AST  22  /  ALT  21  /  AlkPhos  89  11-13    LIVER FUNCTIONS - ( 13 Nov 2020 05:27 )  Alb: 3.9 g/dL / Pro: 6.7 g/dL / ALK PHOS: 89 U/L / ALT: 21 U/L / AST: 22 U/L / GGT: x                   Urine/Micro:    Culture - Blood (collected 11 Nov 2020 11:19)  Source: .Blood None  Preliminary Report (12 Nov 2020 23:02):    No growth to date.

## 2020-11-13 NOTE — PROGRESS NOTE ADULT - SUBJECTIVE AND OBJECTIVE BOX
Patient is a 54y old  Female who presents with a chief complaint of Diverticulitis (12 Nov 2020 13:28)      OVERNIGHT EVENTS: pain feels better than yesterday, tolerates CLL    SUBJECTIVE / INTERVAL HPI: Patient seen and examined at bedside.     VITAL SIGNS:  Vital Signs Last 24 Hrs  T(C): 36.9 (13 Nov 2020 04:37), Max: 36.9 (12 Nov 2020 21:25)  T(F): 98.5 (13 Nov 2020 04:37), Max: 98.5 (12 Nov 2020 21:25)  HR: 85 (13 Nov 2020 04:37) (83 - 95)  BP: 165/77 (13 Nov 2020 04:37) (113/57 - 165/77)  BP(mean): --  RR: 18 (13 Nov 2020 04:37) (18 - 18)  SpO2: --    PHYSICAL EXAM:    General: WDWN  HEENT: NC/AT; PERRL, clear conjunctiva  Neck: supple  Cardiovascular: +S1/S2; RRR  Respiratory: CTA b/l; no W/R/R  Gastrointestinal: LLQ tedneress same as yesterday, NT/ND; +BSx4, no rebound or guarding   Extremities: WWP; 2+ peripheral pulses; no edema   Neurological: AAOx3; no focal deficits    MEDICATIONS:  MEDICATIONS  (STANDING):  budesonide 160 MICROgram(s)/formoterol 4.5 MICROgram(s) Inhaler 2 Puff(s) Inhalation two times a day  buPROPion XL (24-Hour) Oral Tab/Cap - Peds 300 milliGRAM(s) Oral daily  chlorhexidine 4% Liquid 1 Application(s) Topical <User Schedule>  clonazePAM  Tablet 1 milliGRAM(s) Oral two times a day  enalapril 10 milliGRAM(s) Oral daily  enoxaparin Injectable 40 milliGRAM(s) SubCutaneous at bedtime  HYDROmorphone  Injectable 1.5 milliGRAM(s) IV Push every 8 hours  lactated ringers. 1000 milliLiter(s) (75 mL/Hr) IV Continuous <Continuous>  levothyroxine 125 MICROGram(s) Oral daily  meropenem  IVPB 1000 milliGRAM(s) IV Intermittent every 12 hours  meropenem  IVPB      montelukast 10 milliGRAM(s) Oral daily  tiotropium 18 MICROgram(s) Capsule 1 Capsule(s) Inhalation daily    MEDICATIONS  (PRN):  ALBUTerol    90 MICROgram(s) HFA Inhaler 2 Puff(s) Inhalation every 6 hours PRN Shortness of Breath and/or Wheezing  zolpidem 5 milliGRAM(s) Oral at bedtime PRN Insomnia      ALLERGIES:  Allergies    ciprofloxacin (Urticaria; Other)    Intolerances        LABS:                        11.2   5.56  )-----------( 302      ( 13 Nov 2020 05:27 )             36.8     11-13    139  |  100  |  11  ----------------------------<  108<H>  4.1   |  27  |  1.0    Ca    9.1      13 Nov 2020 05:27  Mg     1.8     11-13    TPro  6.7  /  Alb  3.9  /  TBili  0.2  /  DBili  x   /  AST  22  /  ALT  21  /  AlkPhos  89  11-13      < from: CT Abdomen and Pelvis w/ IV Cont (11.10.20 @ 21:38) >  IMPRESSION:    Acute sigmoid diverticulitis. No perforation or abscess.    < end of copied text >

## 2020-11-13 NOTE — DISCHARGE NOTE NURSING/CASE MANAGEMENT/SOCIAL WORK - PATIENT PORTAL LINK FT
You can access the FollowMyHealth Patient Portal offered by [facility] by registering at the following website: [email or web address]. By joining [facility]’s FollowMyHealth portal, you will also be able to view your health information using other applications (apps) compatible with our system.

## 2020-11-13 NOTE — DISCHARGE NOTE PROVIDER - NSDCMRMEDTOKEN_GEN_ALL_CORE_FT
albuterol 90 mcg/inh inhalation aerosol: 2 puff(s) inhaled every 6 hours, As needed, Shortness of Breath and/or Wheezing  BUPROPION HCL  MG TB24: orally once a day  clonazePAM 1 mg oral tablet: 1 tab(s) orally 2 times a day  enalapril 10 mg oral tablet: 1 tab(s) orally once a day  fluticasone-salmeterol 113 mcg-14 mcg/inh inhalation powder: 1 puff(s) inhaled 2 times a day  levothyroxine 125 mcg (0.125 mg) oral tablet: 1 tab(s) orally once a day  MONTELUKAST SODIUM 10 MG TABS:   naproxen 500 mg oral tablet: 1 tab(s) orally once a day  PANTOPRAZOLE SODIUM 40 MG TBEC: orally once a day  Symbicort 160 mcg-4.5 mcg/inh inhalation aerosol: 2 puff(s) inhaled 2 times a day  tiZANidine 4 mg oral tablet: 1 tab(s) orally 2 times a day  Trelegy Ellipta inhalation powder: 1 puff(s) inhaled once a day  ZOLPIDEM TARTRATE 10 MG TABS: orally once a day (at bedtime)   albuterol 90 mcg/inh inhalation aerosol: 2 puff(s) inhaled every 6 hours, As needed, Shortness of Breath and/or Wheezing  BUPROPION HCL  MG TB24: orally once a day  clonazePAM 1 mg oral tablet: 1 tab(s) orally 2 times a day  enalapril 10 mg oral tablet: 1 tab(s) orally once a day  ertapenem 1 g injection: 1 gram(s) intravenously once a day   end date 12/8/2020  fluticasone-salmeterol 113 mcg-14 mcg/inh inhalation powder: 1 puff(s) inhaled 2 times a day  levothyroxine 125 mcg (0.125 mg) oral tablet: 1 tab(s) orally once a day  MONTELUKAST SODIUM 10 MG TABS:   naproxen 500 mg oral tablet: 1 tab(s) orally once a day  PANTOPRAZOLE SODIUM 40 MG TBEC: orally once a day  Symbicort 160 mcg-4.5 mcg/inh inhalation aerosol: 2 puff(s) inhaled 2 times a day  tiZANidine 4 mg oral tablet: 1 tab(s) orally 2 times a day  Trelegy Ellipta inhalation powder: 1 puff(s) inhaled once a day  ZOLPIDEM TARTRATE 10 MG TABS: orally once a day (at bedtime)   albuterol 90 mcg/inh inhalation aerosol: 2 puff(s) inhaled every 6 hours, As needed, Shortness of Breath and/or Wheezing  BUPROPION HCL  MG TB24: orally once a day  clonazePAM 1 mg oral tablet: 1 tab(s) orally 2 times a day  enalapril 10 mg oral tablet: 1 tab(s) orally once a day  ertapenem: 1000 milligram(s) intravenously in NS 0.9% 50 mL once a day infuse over 30 minutes until 12/8/20  fluticasone-salmeterol 113 mcg-14 mcg/inh inhalation powder: 1 puff(s) inhaled 2 times a day  levothyroxine 125 mcg (0.125 mg) oral tablet: 1 tab(s) orally once a day  MONTELUKAST SODIUM 10 MG TABS:   naproxen 500 mg oral tablet: 1 tab(s) orally once a day  PANTOPRAZOLE SODIUM 40 MG TBEC: orally once a day  Symbicort 160 mcg-4.5 mcg/inh inhalation aerosol: 2 puff(s) inhaled 2 times a day  tiZANidine 4 mg oral tablet: 1 tab(s) orally 2 times a day  Trelegy Ellipta inhalation powder: 1 puff(s) inhaled once a day  ZOLPIDEM TARTRATE 10 MG TABS: orally once a day (at bedtime)   albuterol 90 mcg/inh inhalation aerosol: 2 puff(s) inhaled every 6 hours, As needed, Shortness of Breath and/or Wheezing  BUPROPION HCL  MG TB24: orally once a day  clonazePAM 1 mg oral tablet: 1 tab(s) orally 2 times a day  enalapril 10 mg oral tablet: 1 tab(s) orally once a day  ertapenem: 1000 milligram(s) intravenously in NS 0.9% 50 mL once a day infuse over 30 minutes until 12/8/20  fluticasone-salmeterol 113 mcg-14 mcg/inh inhalation powder: 1 puff(s) inhaled 2 times a day  levothyroxine 125 mcg (0.125 mg) oral tablet: 1 tab(s) orally once a day  MONTELUKAST SODIUM 10 MG TABS:   naproxen 500 mg oral tablet: 1 tab(s) orally once a day  oxyCODONE 10 mg oral tablet: 1 tab(s) orally every 6 hours MDD:4  PANTOPRAZOLE SODIUM 40 MG TBEC: orally once a day  Symbicort 160 mcg-4.5 mcg/inh inhalation aerosol: 2 puff(s) inhaled 2 times a day  tiZANidine 4 mg oral tablet: 1 tab(s) orally 2 times a day  Trelegy Ellipta inhalation powder: 1 puff(s) inhaled once a day  ZOLPIDEM TARTRATE 10 MG TABS: orally once a day (at bedtime)

## 2020-11-13 NOTE — DISCHARGE NOTE PROVIDER - CARE PROVIDER_API CALL
Brenda Christianson (MD)  Gastroenterology  [address] [place] [address]  Phone: [phone]  Fax: [phone]  Follow Up Time: 2 weeks    Fidelia Spencer  SURGERY  [address] [place] [address]  Phone: [phone]  Fax: [phone]  Follow Up Time: 2 weeks   Brenda Christianson)  Gastroenterology  [address] [place] [address]  Phone: [phone]  Fax: [phone]  Follow Up Time: 2 weeks    Fidelia Spencer  SURGERY  [address] [place] [address]  Phone: [phone]  Fax: [phone]  Follow Up Time: 2 weeks    Kayleigh Robison)  Internal Medicine  [address] [place] [address]  Phone: [phone]  Fax: [phone]  Follow Up Time: 2 weeks   Brenda Christianson)  Gastroenterology  [address] [place] [address]  Phone: [phone]  Fax: [phone]  Follow Up Time: 2 weeks    Fidelia Spencer  SURGERY  [address] [place] [address]  Phone: [phone]  Fax: [phone]  Follow Up Time: 1-3 days    Kayleigh Robison)  Internal Medicine  [address] [place] [address]  Phone: [phone]  Fax: [phone]  Follow Up Time: 2 weeks

## 2020-11-13 NOTE — DISCHARGE NOTE PROVIDER - CARE PROVIDERS DIRECT ADDRESSES
,[email or web address],DirectAddress_Unknown ,[email or web address],DirectAddress_Unknown,DirectAddress_Unknown

## 2020-11-13 NOTE — PROGRESS NOTE ADULT - SUBJECTIVE AND OBJECTIVE BOX
GEOVANNA PUTNAM  54y  Female      Patient is a 54y old  Female who presents with a chief complaint of Diverticulitis (13 Nov 2020 10:50)      INTERVAL HPI/OVERNIGHT EVENTS:  She feels better, abdominal pain is improving, no chest pain or SOB.   Vital Signs Last 24 Hrs  T(C): 36.9 (13 Nov 2020 04:37), Max: 36.9 (12 Nov 2020 21:25)  T(F): 98.5 (13 Nov 2020 04:37), Max: 98.5 (12 Nov 2020 21:25)  HR: 85 (13 Nov 2020 04:37) (83 - 95)  BP: 165/77 (13 Nov 2020 04:37) (113/57 - 165/77)  BP(mean): --  RR: 18 (13 Nov 2020 04:37) (18 - 18)  SpO2: --            Consultant(s) Notes Reviewed:  [x ] YES  [ ] NO          MEDICATIONS  (STANDING):  budesonide 160 MICROgram(s)/formoterol 4.5 MICROgram(s) Inhaler 2 Puff(s) Inhalation two times a day  buPROPion XL (24-Hour) Oral Tab/Cap - Peds 300 milliGRAM(s) Oral daily  chlorhexidine 4% Liquid 1 Application(s) Topical <User Schedule>  clonazePAM  Tablet 1 milliGRAM(s) Oral two times a day  enalapril 10 milliGRAM(s) Oral daily  enoxaparin Injectable 40 milliGRAM(s) SubCutaneous at bedtime  HYDROmorphone  Injectable 1 milliGRAM(s) IV Push every 12 hours  lactated ringers. 1000 milliLiter(s) (75 mL/Hr) IV Continuous <Continuous>  levothyroxine 125 MICROGram(s) Oral daily  meropenem  IVPB 1000 milliGRAM(s) IV Intermittent every 12 hours  meropenem  IVPB      montelukast 10 milliGRAM(s) Oral daily  tiotropium 18 MICROgram(s) Capsule 1 Capsule(s) Inhalation daily    MEDICATIONS  (PRN):  ALBUTerol    90 MICROgram(s) HFA Inhaler 2 Puff(s) Inhalation every 6 hours PRN Shortness of Breath and/or Wheezing  zolpidem 5 milliGRAM(s) Oral at bedtime PRN Insomnia      LABS                          11.2   5.56  )-----------( 302      ( 13 Nov 2020 05:27 )             36.8     11-13    139  |  100  |  11  ----------------------------<  108<H>  4.1   |  27  |  1.0    Ca    9.1      13 Nov 2020 05:27  Mg     1.8     11-13    TPro  6.7  /  Alb  3.9  /  TBili  0.2  /  DBili  x   /  AST  22  /  ALT  21  /  AlkPhos  89  11-13          Lactate Trend  11-12 @ 09:23 Lactate:1.2   11-10 @ 18:46 Lactate:1.5         CAPILLARY BLOOD GLUCOSE          Culture - Blood (collected 11-11-20 @ 11:19)  Source: .Blood None  Preliminary Report (11-12-20 @ 23:02):    No growth to date.        RADIOLOGY & ADDITIONAL TESTS:    Imaging Personally Reviewed:  [ ] YES  [ ] NO    HEALTH ISSUES - PROBLEM Dx:          PHYSICAL EXAM:  GENERAL: NAD, well-developed.  HEAD:  Atraumatic, Normocephalic.  EYES: EOMI, PERRLA, conjunctiva and sclera clear.  NECK: Supple, No JVD.  CHEST/LUNG: Clear to auscultation bilaterally; No wheeze.  HEART: Regular rate and rhythm; S1 S2.   ABDOMEN: Soft, LLQ tenderness with guarding, no rigidity.   EXTREMITIES:  2+ Peripheral Pulses, No clubbing, cyanosis, or edema.  PSYCH: AAOx3.  NEUROLOGY: non-focal.  SKIN: No rashes or lesions.

## 2020-11-13 NOTE — DISCHARGE NOTE PROVIDER - PROVIDER TOKENS
PROVIDER:[TOKEN:[[id number]],FOLLOWUP:[2 weeks]],PROVIDER:[TOKEN:[[id number]],FOLLOWUP:[2 weeks]] PROVIDER:[TOKEN:[[id number]],FOLLOWUP:[2 weeks]],PROVIDER:[TOKEN:[[id number]],FOLLOWUP:[2 weeks]],PROVIDER:[TOKEN:[[id number]],FOLLOWUP:[2 weeks]] PROVIDER:[TOKEN:[[id number]],FOLLOWUP:[2 weeks]],PROVIDER:[TOKEN:[[id number]],FOLLOWUP:[1-3 days]],PROVIDER:[TOKEN:[[id number]],FOLLOWUP:[2 weeks]]

## 2020-11-14 VITALS
HEART RATE: 92 BPM | RESPIRATION RATE: 18 BRPM | TEMPERATURE: 98 F | SYSTOLIC BLOOD PRESSURE: 161 MMHG | DIASTOLIC BLOOD PRESSURE: 76 MMHG

## 2020-11-14 LAB
ALBUMIN SERPL ELPH-MCNC: 3.5 G/DL — SIGNIFICANT CHANGE UP (ref 3.5–5.2)
ALP SERPL-CCNC: 76 U/L — SIGNIFICANT CHANGE UP (ref 30–115)
ALT FLD-CCNC: 22 U/L — SIGNIFICANT CHANGE UP (ref 0–41)
ANION GAP SERPL CALC-SCNC: 10 MMOL/L — SIGNIFICANT CHANGE UP (ref 7–14)
AST SERPL-CCNC: 24 U/L — SIGNIFICANT CHANGE UP (ref 0–41)
BASOPHILS # BLD AUTO: 0.02 K/UL — SIGNIFICANT CHANGE UP (ref 0–0.2)
BASOPHILS NFR BLD AUTO: 0.4 % — SIGNIFICANT CHANGE UP (ref 0–1)
BILIRUB SERPL-MCNC: <0.2 MG/DL — SIGNIFICANT CHANGE UP (ref 0.2–1.2)
BUN SERPL-MCNC: 12 MG/DL — SIGNIFICANT CHANGE UP (ref 10–20)
CALCIUM SERPL-MCNC: 8.9 MG/DL — SIGNIFICANT CHANGE UP (ref 8.5–10.1)
CHLORIDE SERPL-SCNC: 102 MMOL/L — SIGNIFICANT CHANGE UP (ref 98–110)
CO2 SERPL-SCNC: 28 MMOL/L — SIGNIFICANT CHANGE UP (ref 17–32)
CREAT SERPL-MCNC: 0.9 MG/DL — SIGNIFICANT CHANGE UP (ref 0.7–1.5)
EOSINOPHIL # BLD AUTO: 0.28 K/UL — SIGNIFICANT CHANGE UP (ref 0–0.7)
EOSINOPHIL NFR BLD AUTO: 5.7 % — SIGNIFICANT CHANGE UP (ref 0–8)
GLUCOSE SERPL-MCNC: 115 MG/DL — HIGH (ref 70–99)
HCT VFR BLD CALC: 34.5 % — LOW (ref 37–47)
HGB BLD-MCNC: 10.5 G/DL — LOW (ref 12–16)
IMM GRANULOCYTES NFR BLD AUTO: 0.2 % — SIGNIFICANT CHANGE UP (ref 0.1–0.3)
LYMPHOCYTES # BLD AUTO: 1.02 K/UL — LOW (ref 1.2–3.4)
LYMPHOCYTES # BLD AUTO: 20.7 % — SIGNIFICANT CHANGE UP (ref 20.5–51.1)
MAGNESIUM SERPL-MCNC: 1.8 MG/DL — SIGNIFICANT CHANGE UP (ref 1.8–2.4)
MCHC RBC-ENTMCNC: 25.5 PG — LOW (ref 27–31)
MCHC RBC-ENTMCNC: 30.4 G/DL — LOW (ref 32–37)
MCV RBC AUTO: 83.9 FL — SIGNIFICANT CHANGE UP (ref 81–99)
MONOCYTES # BLD AUTO: 0.34 K/UL — SIGNIFICANT CHANGE UP (ref 0.1–0.6)
MONOCYTES NFR BLD AUTO: 6.9 % — SIGNIFICANT CHANGE UP (ref 1.7–9.3)
NEUTROPHILS # BLD AUTO: 3.26 K/UL — SIGNIFICANT CHANGE UP (ref 1.4–6.5)
NEUTROPHILS NFR BLD AUTO: 66.1 % — SIGNIFICANT CHANGE UP (ref 42.2–75.2)
NRBC # BLD: 0 /100 WBCS — SIGNIFICANT CHANGE UP (ref 0–0)
PLATELET # BLD AUTO: 284 K/UL — SIGNIFICANT CHANGE UP (ref 130–400)
POTASSIUM SERPL-MCNC: 3.9 MMOL/L — SIGNIFICANT CHANGE UP (ref 3.5–5)
POTASSIUM SERPL-SCNC: 3.9 MMOL/L — SIGNIFICANT CHANGE UP (ref 3.5–5)
PROT SERPL-MCNC: 6 G/DL — SIGNIFICANT CHANGE UP (ref 6–8)
RBC # BLD: 4.11 M/UL — LOW (ref 4.2–5.4)
RBC # FLD: 16.4 % — HIGH (ref 11.5–14.5)
SODIUM SERPL-SCNC: 140 MMOL/L — SIGNIFICANT CHANGE UP (ref 135–146)
WBC # BLD: 4.93 K/UL — SIGNIFICANT CHANGE UP (ref 4.8–10.8)
WBC # FLD AUTO: 4.93 K/UL — SIGNIFICANT CHANGE UP (ref 4.8–10.8)

## 2020-11-14 PROCEDURE — 99239 HOSP IP/OBS DSCHRG MGMT >30: CPT

## 2020-11-14 RX ORDER — ERTAPENEM SODIUM 1 G/1
1000 INJECTION, POWDER, LYOPHILIZED, FOR SOLUTION INTRAMUSCULAR; INTRAVENOUS
Qty: 0 | Refills: 0 | DISCHARGE

## 2020-11-14 RX ORDER — OXYCODONE HYDROCHLORIDE 5 MG/1
1 TABLET ORAL
Qty: 16 | Refills: 0
Start: 2020-11-14 | End: 2020-11-17

## 2020-11-14 RX ORDER — ERTAPENEM SODIUM 1 G/1
1000 INJECTION, POWDER, LYOPHILIZED, FOR SOLUTION INTRAMUSCULAR; INTRAVENOUS EVERY 24 HOURS
Refills: 0 | Status: DISCONTINUED | OUTPATIENT
Start: 2020-11-14 | End: 2020-11-14

## 2020-11-14 RX ORDER — ACETAMINOPHEN 500 MG
650 TABLET ORAL EVERY 6 HOURS
Refills: 0 | Status: DISCONTINUED | OUTPATIENT
Start: 2020-11-14 | End: 2020-11-14

## 2020-11-14 RX ADMIN — BUDESONIDE AND FORMOTEROL FUMARATE DIHYDRATE 2 PUFF(S): 160; 4.5 AEROSOL RESPIRATORY (INHALATION) at 07:47

## 2020-11-14 RX ADMIN — BUPROPION HYDROCHLORIDE 300 MILLIGRAM(S): 150 TABLET, EXTENDED RELEASE ORAL at 12:11

## 2020-11-14 RX ADMIN — Medication 650 MILLIGRAM(S): at 12:41

## 2020-11-14 RX ADMIN — HYDROMORPHONE HYDROCHLORIDE 1 MILLIGRAM(S): 2 INJECTION INTRAMUSCULAR; INTRAVENOUS; SUBCUTANEOUS at 05:19

## 2020-11-14 RX ADMIN — MEROPENEM 100 MILLIGRAM(S): 1 INJECTION INTRAVENOUS at 05:12

## 2020-11-14 RX ADMIN — MONTELUKAST 10 MILLIGRAM(S): 4 TABLET, CHEWABLE ORAL at 12:11

## 2020-11-14 RX ADMIN — Medication 1 MILLIGRAM(S): at 05:12

## 2020-11-14 RX ADMIN — ERTAPENEM SODIUM 120 MILLIGRAM(S): 1 INJECTION, POWDER, LYOPHILIZED, FOR SOLUTION INTRAMUSCULAR; INTRAVENOUS at 12:41

## 2020-11-14 RX ADMIN — Medication 125 MICROGRAM(S): at 05:12

## 2020-11-14 RX ADMIN — Medication 650 MILLIGRAM(S): at 12:11

## 2020-11-14 RX ADMIN — Medication 30 MILLILITER(S): at 16:02

## 2020-11-14 RX ADMIN — HYDROMORPHONE HYDROCHLORIDE 1 MILLIGRAM(S): 2 INJECTION INTRAMUSCULAR; INTRAVENOUS; SUBCUTANEOUS at 05:12

## 2020-11-14 RX ADMIN — Medication 10 MILLIGRAM(S): at 05:12

## 2020-11-14 NOTE — PROGRESS NOTE ADULT - SUBJECTIVE AND OBJECTIVE BOX
GEOVANNA PUTNAM  54y Female   [id number]    Procedure/dx: diverticulitis    Patient is a 54y old  Female who presents with a chief complaint of Diverticulitis (13 Nov 2020 14:15)    PAST MEDICAL & SURGICAL HISTORY:  Anxiety    Hemorrhoids    Left ear hearing loss    Cervical disc disease  sequelae of radtion for nasopharyngeal cancer    Hypothyroid    Nasopharyngeal cancer    Pulmonary nodules/lesions, multiple    Diverticulitis    HTN (hypertension)    COPD (chronic obstructive pulmonary disease)    History of cholecystectomy    Vital Signs Last 24 Hrs  T(C): 36.7 (13 Nov 2020 20:20), Max: 36.9 (13 Nov 2020 04:37)  T(F): 98 (13 Nov 2020 20:20), Max: 98.5 (13 Nov 2020 04:37)  HR: 91 (13 Nov 2020 20:20) (85 - 91)  BP: 120/58 (13 Nov 2020 20:20) (120/58 - 165/77)  BP(mean): --  RR: 18 (13 Nov 2020 20:20) (18 - 18)      Diet, Soft (11-13-20 @ 08:05)      I&O's Detail      MEDICATIONS  (STANDING):  budesonide 160 MICROgram(s)/formoterol 4.5 MICROgram(s) Inhaler 2 Puff(s) Inhalation two times a day  buPROPion XL (24-Hour) Oral Tab/Cap - Peds 300 milliGRAM(s) Oral daily  chlorhexidine 4% Liquid 1 Application(s) Topical <User Schedule>  clonazePAM  Tablet 1 milliGRAM(s) Oral two times a day  enalapril 10 milliGRAM(s) Oral daily  enoxaparin Injectable 40 milliGRAM(s) SubCutaneous at bedtime  HYDROmorphone  Injectable 1 milliGRAM(s) IV Push every 8 hours  lactated ringers. 1000 milliLiter(s) (75 mL/Hr) IV Continuous <Continuous>  levothyroxine 125 MICROGram(s) Oral daily  meropenem  IVPB 1000 milliGRAM(s) IV Intermittent every 12 hours  meropenem  IVPB      montelukast 10 milliGRAM(s) Oral daily  tiotropium 18 MICROgram(s) Capsule 1 Capsule(s) Inhalation daily    MEDICATIONS  (PRN):  ALBUTerol    90 MICROgram(s) HFA Inhaler 2 Puff(s) Inhalation every 6 hours PRN Shortness of Breath and/or Wheezing  zolpidem 5 milliGRAM(s) Oral at bedtime PRN Insomnia      PHYSICAL EXAM:  GENERAL: NAD,   ABDOMEN: Soft, LLQ tenderness    LABS:                         11.2   5.56  )-----------( 302      ( 13 Nov 2020 05:27 )             36.8        11-13    139  |  100  |  11  ----------------------------<  108<H>  4.1   |  27  |  1.0    Ca    9.1      13 Nov 2020 05:27  Mg     1.8     11-13    TPro  6.7  /  Alb  3.9  /  TBili  0.2  /  DBili  x   /  AST  22  /  ALT  21  /  AlkPhos  89  11-13    LIVER FUNCTIONS - ( 13 Nov 2020 05:27 )  Alb: 3.9 g/dL / Pro: 6.7 g/dL / ALK PHOS: 89 U/L / ALT: 21 U/L / AST: 22 U/L / GGT: x           Culture - Blood (collected 11 Nov 2020 11:19)  Source: .Blood None  Preliminary Report (12 Nov 2020 23:02):    No growth to date.    IMAGING:  none

## 2020-11-14 NOTE — PROGRESS NOTE ADULT - SUBJECTIVE AND OBJECTIVE BOX
SUBJECTIVE:    Patient is a 54y old Female who presents with a chief complaint of Diverticulitis (14 Nov 2020 00:19)      HPI:  This is a 54 year old female with PMHx of Diverticulitis, COPD, HTN, Depression, Anxiety who presented to the ED with recurrence of abdominal pain. The patient reports that she has been having LLQ abdominal pain that is associated with sharp severe throbbing pain. The patient also states that she has been having bright red blood per rectum. Of note the patient was recently treated and released with IV antibiotic course for sigmoid diverticulitis. Last dose of ertapenem was approximately two weeks ago. Patient states that this is extremely similar to her prior recent bouts of diverticulitis.     In the ED initial vitals: T98.1, , /79, sat 95% on room air. Labs overall unremarkable when compared to prior, UA negative. CT noted for acute sigmoid diverticulitis. Admitted to medicine for IV antibiotics and ID evaluation,  (10 Nov 2020 23:34)      Currently admitted to medicine with the primary diagnosis of Diverticulitis    no pain today    Besides the pertinent positives and negatives described above, the ROS was within normal limits.    PAST MEDICAL & SURGICAL HISTORY  Anxiety    Hemorrhoids    Left ear hearing loss    Cervical disc disease  sequelae of radtion for nasopharyngeal cancer    Hypothyroid    Nasopharyngeal cancer    Pulmonary nodules/lesions, multiple    Diverticulitis    HTN (hypertension)    COPD (chronic obstructive pulmonary disease)    History of cholecystectomy      SOCIAL HISTORY:    ALLERGIES:  ciprofloxacin (Urticaria; Other)    MEDICATIONS:  STANDING MEDICATIONS  budesonide 160 MICROgram(s)/formoterol 4.5 MICROgram(s) Inhaler 2 Puff(s) Inhalation two times a day  buPROPion XL (24-Hour) Oral Tab/Cap - Peds 300 milliGRAM(s) Oral daily  chlorhexidine 4% Liquid 1 Application(s) Topical <User Schedule>  clonazePAM  Tablet 1 milliGRAM(s) Oral two times a day  enalapril 10 milliGRAM(s) Oral daily  enoxaparin Injectable 40 milliGRAM(s) SubCutaneous at bedtime  HYDROmorphone  Injectable 1 milliGRAM(s) IV Push every 8 hours  levothyroxine 125 MICROGram(s) Oral daily  meropenem  IVPB 1000 milliGRAM(s) IV Intermittent every 12 hours  meropenem  IVPB      montelukast 10 milliGRAM(s) Oral daily  tiotropium 18 MICROgram(s) Capsule 1 Capsule(s) Inhalation daily    PRN MEDICATIONS  ALBUTerol    90 MICROgram(s) HFA Inhaler 2 Puff(s) Inhalation every 6 hours PRN  zolpidem 5 milliGRAM(s) Oral at bedtime PRN    VITALS:   T(F): 98.3  HR: 70  BP: 166/70  RR: 17  SpO2: 95%    LABS:                        10.5   4.93  )-----------( 284      ( 14 Nov 2020 07:05 )             34.5     11-14    140  |  102  |  12  ----------------------------<  115<H>  3.9   |  28  |  0.9    Ca    8.9      14 Nov 2020 07:05  Mg     1.8     11-14    TPro  6.0  /  Alb  3.5  /  TBili  <0.2  /  DBili  x   /  AST  24  /  ALT  22  /  AlkPhos  76  11-14              Culture - Blood (collected 11 Nov 2020 11:19)  Source: .Blood None  Preliminary Report (12 Nov 2020 23:02):    No growth to date.          RADIOLOGY:    PHYSICAL EXAM:  GEN: No acute distress  LUNGS: Clear to auscultation bilaterally   HEART: Regular  ABD: Soft, non-tender, non-distended.  EXT: NC/NC/NE/2+PP/TEE/Skin Intact.   NEURO: AAOX3    Intravenous access: yes  NG tube: no  Perez Catheter: no

## 2020-11-14 NOTE — PROGRESS NOTE ADULT - ASSESSMENT
55 y/o female with recurring diverticulitis    Plan:   No surgical intervention, follow up outpatient with Dr Spencer  continue IV antibiotics

## 2020-11-14 NOTE — PROGRESS NOTE ADULT - SUBJECTIVE AND OBJECTIVE BOX
INDYGEOVANNA  54y  Female      Patient is a 54y old  Female who presents with a chief complaint of Diverticulitis (14 Nov 2020 09:03)      INTERVAL HPI/OVERNIGHT EVENTS:  She feels ok, her abdominal pain improved  Vital Signs Last 24 Hrs  T(C): 36.8 (14 Nov 2020 05:04), Max: 36.9 (13 Nov 2020 13:55)  T(F): 98.3 (14 Nov 2020 05:04), Max: 98.4 (13 Nov 2020 13:55)  HR: 70 (14 Nov 2020 05:04) (70 - 91)  BP: 166/70 (14 Nov 2020 05:04) (120/58 - 166/70)  BP(mean): --  RR: 17 (14 Nov 2020 05:04) (17 - 18)  SpO2: 95% (14 Nov 2020 08:12) (95% - 95%)      11-14-20 @ 07:01  -  11-14-20 @ 11:52  --------------------------------------------------------  IN: 75 mL / OUT: 0 mL / NET: 75 mL            Consultant(s) Notes Reviewed:  [x ] YES  [ ] NO          MEDICATIONS  (STANDING):  budesonide 160 MICROgram(s)/formoterol 4.5 MICROgram(s) Inhaler 2 Puff(s) Inhalation two times a day  buPROPion XL (24-Hour) Oral Tab/Cap - Peds 300 milliGRAM(s) Oral daily  chlorhexidine 4% Liquid 1 Application(s) Topical <User Schedule>  clonazePAM  Tablet 1 milliGRAM(s) Oral two times a day  enalapril 10 milliGRAM(s) Oral daily  enoxaparin Injectable 40 milliGRAM(s) SubCutaneous at bedtime  ertapenem  IVPB 1000 milliGRAM(s) IV Intermittent every 24 hours  HYDROmorphone  Injectable 1 milliGRAM(s) IV Push every 8 hours  levothyroxine 125 MICROGram(s) Oral daily  montelukast 10 milliGRAM(s) Oral daily  tiotropium 18 MICROgram(s) Capsule 1 Capsule(s) Inhalation daily    MEDICATIONS  (PRN):  ALBUTerol    90 MICROgram(s) HFA Inhaler 2 Puff(s) Inhalation every 6 hours PRN Shortness of Breath and/or Wheezing  zolpidem 5 milliGRAM(s) Oral at bedtime PRN Insomnia      LABS                          10.5   4.93  )-----------( 284      ( 14 Nov 2020 07:05 )             34.5     11-14    140  |  102  |  12  ----------------------------<  115<H>  3.9   |  28  |  0.9    Ca    8.9      14 Nov 2020 07:05  Mg     1.8     11-14    TPro  6.0  /  Alb  3.5  /  TBili  <0.2  /  DBili  x   /  AST  24  /  ALT  22  /  AlkPhos  76  11-14          Lactate Trend  11-12 @ 09:23 Lactate:1.2   11-10 @ 18:46 Lactate:1.5         CAPILLARY BLOOD GLUCOSE          Culture - Blood (collected 11-11-20 @ 11:19)  Source: .Blood None  Preliminary Report (11-12-20 @ 23:02):    No growth to date.        RADIOLOGY & ADDITIONAL TESTS:    Imaging Personally Reviewed:  [ ] YES  [ ] NO    HEALTH ISSUES - PROBLEM Dx:          PHYSICAL EXAM:  GENERAL: NAD, well-developed.  HEAD:  Atraumatic, Normocephalic.  EYES: EOMI, PERRLA, conjunctiva and sclera clear.  NECK: Supple, No JVD.  CHEST/LUNG: Clear to auscultation bilaterally; No wheeze.  HEART: Regular rate and rhythm; S1 S2.   ABDOMEN: Soft, LLQ tenderness with guarding, no rigidity.   EXTREMITIES:  2+ Peripheral Pulses, No clubbing, cyanosis, or edema.  PSYCH: AAOx3.  NEUROLOGY: non-focal.  SKIN: No rashes or lesions.

## 2020-11-14 NOTE — PROGRESS NOTE ADULT - ASSESSMENT
54 year old female with PMHx of Diverticulitis, COPD, HTN, Depression, Anxiety who presented to the ED with recurrence of abdominal pain secondary to acute diverticulitis, currently on meropenem which will be switched to ertapenem until surgery    #Uncomplicated recurrent sigmoid diverticulitis (4 in the past 4 months, 6 in the past few yr), s/p ertapenem course last month   - no abscess, sepsis not present  - allergic to cipro, Meropenem for now, Bcx neg   - given recurrence despite multiple abx course and unable to make colonoscopy due to frequent relapse   - ID: recommends to have surgery within 4 weeks and will be covered by Ertapenem during the periode  - GI onboard: can do c-scope in periode < 6 weeks if no relapse  - still in pain but able to tolerates CLL, advance to soft   - home abx for 4 weeks, should have surgery during this time    #COPD not on home O2  - Not in any acute exacerbation  - Continue on Symbicort, will use spiriva while here.  - Continue on montelukast 10mg daily   - Albuterol inhalation PRN  - Noted patient on other inhalers that are not supplied here. Will need to bring outside meds in to continue receiving them here.     #Depression/Anxiety  - Continue on Buproprion 300mg daily and Clonazepam 1mg BID     #HTN  - Continue on Enalapril 10mg daily     #Hypothyroidism  - Continue on Levothyroxine 125mcg daily    #GERD  - Protonix daily    PLAN: dc planning, home ertapenem through midline and surgery within 4 weeks    Activity: As tolerated  Diet: CLL, might advance today to soft  DVT ppx: Lovenox  GI ppx: not indicated   Code Status: Full Code  DISPO: acute    54 year old female with PMHx of Diverticulitis, COPD, HTN, Depression, Anxiety who presented to the ED with recurrence of abdominal pain secondary to acute diverticulitis, currently on meropenem which will be switched to ertapenem until surgery    #Uncomplicated recurrent sigmoid diverticulitis (4 in the past 4 months, 6 in the past few yr), s/p ertapenem course last month   - no abscess, sepsis not present  - allergic to cipro, Meropenem for now, Bcx neg   - given recurrence despite multiple abx course and unable to make colonoscopy due to frequent relapse   - ID: recommends to have surgery within 4 weeks and will be covered by Ertapenem during the periode  - GI onboard: can do c-scope in periode < 6 weeks if no relapse  - still in pain but able to tolerates CLL, advance to soft   - home abx for 4 weeks, should have surgery during this time    #COPD not on home O2  - Not in any acute exacerbation  - Continue on Symbicort, will use spiriva while here.  - Continue on montelukast 10mg daily   - Albuterol inhalation PRN  - Noted patient on other inhalers that are not supplied here. Will need to bring outside meds in to continue receiving them here.     #Depression/Anxiety  - Continue on Buproprion 300mg daily and Clonazepam 1mg BID     #HTN  - Continue on Enalapril 10mg daily     #Hypothyroidism  - Continue on Levothyroxine 125mcg daily    #GERD  - Protonix daily    PLAN: dc planning, home ertapenem through midline and surgery within 4 weeks    Activity: As tolerated  Diet: soft diet  DVT ppx: Lovenox  GI ppx: not indicated   Code Status: Full Code

## 2020-11-14 NOTE — PROGRESS NOTE ADULT - ASSESSMENT
54 year old female with PMH of Diverticulitis, COPD, HTN, Depression, Anxiety who presented to the ED with recurrence of abdominal pain.    A/P:   Acute sigmoid diverticulitis: recurrent 4th episode in the last 4 months.   No sepsis on admission  Abdominal pain is improving, she is tolerating liquid diet.   Abdomen CT showed Acute sigmoid diverticulitis. No perforation or abscess.   Advance diet as tolerated. Continue IV RL   s/p Meropenem. Plan for discharge home on Ertapenem 1g daily for 4 weeks outpatient.   GI follow up, she needs colonoscopy once infection improves,  Surgery recommended outpatient follow up while still on antibiotics for possible surgical intervention.      COPD: Stable.   Continue Symbicort, Spiriva, Montelukast and Albuterol prn.     Depression/Anxiety  Continue on Buproprion 300mg daily and Clonazepam 1mg BID     HTN  Continue on Enalapril 10mg daily     Hypothyroidism  Continue on Levothyroxine 125mcg daily    Morbid Obesity: advised with weight loss, may benefit from weight loss procedure.   Outpatient follow up with bariatric surgery.     #Progress Note Handoff:  Pending (specify): home today with IV antibiotics.   Family discussion:  Disposition: Home.

## 2020-11-16 LAB
CULTURE RESULTS: SIGNIFICANT CHANGE UP
SPECIMEN SOURCE: SIGNIFICANT CHANGE UP

## 2020-11-18 DIAGNOSIS — F32.9 MAJOR DEPRESSIVE DISORDER, SINGLE EPISODE, UNSPECIFIED: ICD-10-CM

## 2020-11-18 DIAGNOSIS — Z88.1 ALLERGY STATUS TO OTHER ANTIBIOTIC AGENTS STATUS: ICD-10-CM

## 2020-11-18 DIAGNOSIS — Z90.49 ACQUIRED ABSENCE OF OTHER SPECIFIED PARTS OF DIGESTIVE TRACT: ICD-10-CM

## 2020-11-18 DIAGNOSIS — M50.90 CERVICAL DISC DISORDER, UNSPECIFIED, UNSPECIFIED CERVICAL REGION: ICD-10-CM

## 2020-11-18 DIAGNOSIS — K57.32 DIVERTICULITIS OF LARGE INTESTINE WITHOUT PERFORATION OR ABSCESS WITHOUT BLEEDING: ICD-10-CM

## 2020-11-18 DIAGNOSIS — J44.9 CHRONIC OBSTRUCTIVE PULMONARY DISEASE, UNSPECIFIED: ICD-10-CM

## 2020-11-18 DIAGNOSIS — Z79.51 LONG TERM (CURRENT) USE OF INHALED STEROIDS: ICD-10-CM

## 2020-11-18 DIAGNOSIS — E03.9 HYPOTHYROIDISM, UNSPECIFIED: ICD-10-CM

## 2020-11-18 DIAGNOSIS — I10 ESSENTIAL (PRIMARY) HYPERTENSION: ICD-10-CM

## 2020-11-18 DIAGNOSIS — H91.92 UNSPECIFIED HEARING LOSS, LEFT EAR: ICD-10-CM

## 2020-11-18 DIAGNOSIS — K64.9 UNSPECIFIED HEMORRHOIDS: ICD-10-CM

## 2020-11-18 DIAGNOSIS — F41.9 ANXIETY DISORDER, UNSPECIFIED: ICD-10-CM

## 2020-11-18 DIAGNOSIS — E66.01 MORBID (SEVERE) OBESITY DUE TO EXCESS CALORIES: ICD-10-CM

## 2020-11-18 DIAGNOSIS — Z87.891 PERSONAL HISTORY OF NICOTINE DEPENDENCE: ICD-10-CM

## 2021-03-25 ENCOUNTER — INPATIENT (INPATIENT)
Facility: HOSPITAL | Age: 56
LOS: 8 days | Discharge: HOME | End: 2021-04-03
Attending: INTERNAL MEDICINE | Admitting: INTERNAL MEDICINE
Payer: MEDICAID

## 2021-03-25 VITALS
TEMPERATURE: 97 F | RESPIRATION RATE: 22 BRPM | OXYGEN SATURATION: 95 % | WEIGHT: 291.01 LBS | DIASTOLIC BLOOD PRESSURE: 61 MMHG | SYSTOLIC BLOOD PRESSURE: 132 MMHG | HEIGHT: 64 IN | HEART RATE: 89 BPM

## 2021-03-25 DIAGNOSIS — Z90.49 ACQUIRED ABSENCE OF OTHER SPECIFIED PARTS OF DIGESTIVE TRACT: Chronic | ICD-10-CM

## 2021-03-25 LAB
ALBUMIN SERPL ELPH-MCNC: 3.9 G/DL — SIGNIFICANT CHANGE UP (ref 3.5–5.2)
ALP SERPL-CCNC: 87 U/L — SIGNIFICANT CHANGE UP (ref 30–115)
ALT FLD-CCNC: 31 U/L — SIGNIFICANT CHANGE UP (ref 0–41)
ANION GAP SERPL CALC-SCNC: 13 MMOL/L — SIGNIFICANT CHANGE UP (ref 7–14)
AST SERPL-CCNC: 59 U/L — HIGH (ref 0–41)
BILIRUB SERPL-MCNC: <0.2 MG/DL — SIGNIFICANT CHANGE UP (ref 0.2–1.2)
BUN SERPL-MCNC: 21 MG/DL — HIGH (ref 10–20)
CALCIUM SERPL-MCNC: 8.5 MG/DL — SIGNIFICANT CHANGE UP (ref 8.5–10.1)
CHLORIDE SERPL-SCNC: 99 MMOL/L — SIGNIFICANT CHANGE UP (ref 98–110)
CO2 SERPL-SCNC: 22 MMOL/L — SIGNIFICANT CHANGE UP (ref 17–32)
CREAT SERPL-MCNC: 1.3 MG/DL — SIGNIFICANT CHANGE UP (ref 0.7–1.5)
D DIMER BLD IA.RAPID-MCNC: 301 NG/ML DDU — HIGH (ref 0–230)
GLUCOSE SERPL-MCNC: 109 MG/DL — HIGH (ref 70–99)
HCT VFR BLD CALC: 35.9 % — LOW (ref 37–47)
HGB BLD-MCNC: 11.1 G/DL — LOW (ref 12–16)
MCHC RBC-ENTMCNC: 26.6 PG — LOW (ref 27–31)
MCHC RBC-ENTMCNC: 30.9 G/DL — LOW (ref 32–37)
MCV RBC AUTO: 85.9 FL — SIGNIFICANT CHANGE UP (ref 81–99)
NRBC # BLD: 0 /100 WBCS — SIGNIFICANT CHANGE UP (ref 0–0)
PLATELET # BLD AUTO: 265 K/UL — SIGNIFICANT CHANGE UP (ref 130–400)
POTASSIUM SERPL-MCNC: 4.8 MMOL/L — SIGNIFICANT CHANGE UP (ref 3.5–5)
POTASSIUM SERPL-SCNC: 4.8 MMOL/L — SIGNIFICANT CHANGE UP (ref 3.5–5)
PROT SERPL-MCNC: 7.1 G/DL — SIGNIFICANT CHANGE UP (ref 6–8)
RBC # BLD: 4.18 M/UL — LOW (ref 4.2–5.4)
RBC # FLD: 17 % — HIGH (ref 11.5–14.5)
SODIUM SERPL-SCNC: 134 MMOL/L — LOW (ref 135–146)
WBC # BLD: 3.42 K/UL — LOW (ref 4.8–10.8)
WBC # FLD AUTO: 3.42 K/UL — LOW (ref 4.8–10.8)

## 2021-03-25 PROCEDURE — 93010 ELECTROCARDIOGRAM REPORT: CPT

## 2021-03-25 PROCEDURE — 99285 EMERGENCY DEPT VISIT HI MDM: CPT

## 2021-03-25 PROCEDURE — 71045 X-RAY EXAM CHEST 1 VIEW: CPT | Mod: 26

## 2021-03-25 RX ORDER — ACETAMINOPHEN 500 MG
650 TABLET ORAL ONCE
Refills: 0 | Status: COMPLETED | OUTPATIENT
Start: 2021-03-25 | End: 2021-03-25

## 2021-03-25 RX ORDER — METHOCARBAMOL 500 MG/1
1000 TABLET, FILM COATED ORAL ONCE
Refills: 0 | Status: COMPLETED | OUTPATIENT
Start: 2021-03-25 | End: 2021-03-25

## 2021-03-25 RX ORDER — DEXAMETHASONE 0.5 MG/5ML
6 ELIXIR ORAL ONCE
Refills: 0 | Status: COMPLETED | OUTPATIENT
Start: 2021-03-25 | End: 2021-03-25

## 2021-03-25 RX ADMIN — Medication 650 MILLIGRAM(S): at 22:22

## 2021-03-25 RX ADMIN — Medication 6 MILLIGRAM(S): at 21:26

## 2021-03-25 RX ADMIN — METHOCARBAMOL 1000 MILLIGRAM(S): 500 TABLET, FILM COATED ORAL at 22:22

## 2021-03-25 NOTE — ED PROVIDER NOTE - OBJECTIVE STATEMENT
55 year old female with pmhx of copd, presents with + COVID on sunday. Pt admits to body aches, chills, cough, and sob. sob worse with exertion. no nausea, vomiting, diarrhea, abd pain, chest pain, calf pain or swelling. ex smoker.

## 2021-03-25 NOTE — ED PROVIDER NOTE - ATTENDING CONTRIBUTION TO CARE
I personally evaluated the patient. I reviewed the Resident’s or Physician Assistant’s note (as assigned above), and agree with the findings and plan except as documented in my note.    55 year old female with pmhx of copd, presents with + COVID on sunday. Pt admits to body aches, chills, cough, and sob. sob worse with exertion. no nausea, vomiting, diarrhea, abd pain, chest pain, calf pain or swelling. ex smoker.    CONSTITUTIONAL: Well-developed; well-nourished; in no acute distress. Sitting up and providing appropriate history and physical examination  SKIN: skin exam is warm and dry, no acute rash.  HEAD: Normocephalic; atraumatic.  EYES: PERRL, 3 mm bilateral, no nystagmus, EOM intact; conjunctiva and sclera clear.  ENT: No nasal discharge; airway clear.  NECK: Supple; non tender.+ full passive ROM in all directions. No JVD  CARD: S1, S2 normal; no murmurs, gallops, or rubs. Regular rate and rhythm. + Symmetric Strong Pulses  RESP: No wheezes, rales or rhonchi. Good air movement bilaterally  ABD: soft; non-distended; non-tender. No Rebound, No Gaurding, No signs of peritnitis, No CVA tenderness  EXT: Normal ROM. No clubbing, cyanosis or edema. Dp and Pt Pulses intact. Cap refill less than 3 seconds  NEURO: CN 2-12 intact, normal finger to nose, normal romberg, stable gait, no sensory or motor deficits, Alert, oriented, grossly unremarkable. No Focal deficits. GCS 15. NIH 0  PSYCH: Cooperative, appropriate.    Plan- labs, CXR, reassess

## 2021-03-25 NOTE — ED PROVIDER NOTE - MDM PATIENT STATEMENT FOR ADDL TREATMENT
Concentration (Mg/Ml) Of Additional Medication: 2.5 Patient with one or more new problems requiring additional work-up/treatment.

## 2021-03-25 NOTE — ED PROVIDER NOTE - PHYSICAL EXAMINATION
CONST: Well appearing in NAD  EYES: PERRL, EOMI, Sclera and conjunctiva clear.   ENT: No nasal discharge. Oropharynx normal appearing, no erythema or exudates. No abscess or swelling. Uvula midline  NECK: Non-tender, no meningeal signs. normal ROM. supple   CARD: Normal S1 S2; Normal rate and rhythm  RESP: Equal BS B/L, No wheezes, rhonchi or rales. No distress  GI: Soft, non-tender, non-distended. no cva tenderness. normal BS  MS: Normal ROM in all extremities. No midline spinal tenderness. pulses 2 +. no calf tenderness or swelling  SKIN: Warm, dry, no acute rashes. Good turgor  NEURO: A&Ox3, No focal deficits.

## 2021-03-25 NOTE — ED PROVIDER NOTE - NS ED ROS FT
Review of Systems:  	•	CONSTITUTIONAL - no fever, no diaphoresis, no chills  	•	SKIN - no rash  	•	HEMATOLOGIC - no bleeding, no bruising  	•	EYES - no eye pain, no blurry vision  	•	ENT - no change in hearing, no sore throat, no ear pain or tinnitus  	•	RESPIRATORY - + shortness of breath, + cough  	•	CARDIAC - no chest pain, no palpitations  	•	GI - no abd pain, no nausea, no vomiting, no diarrhea, no constipation  	•	GENITO-URINARY - no discharge, no dysuria; no hematuria, no increased urinary frequency  	•	MUSCULOSKELETAL - no joint paint, no swelling, no redness  	•	NEUROLOGIC - no weakness, no headache, no paresthesias, no LOC

## 2021-03-25 NOTE — ED ADULT NURSE NOTE - OBJECTIVE STATEMENT
Pt is positive COVID, states today was the worst day. POx was fluctuating from low 90s to high 80s and was SOB

## 2021-03-25 NOTE — ED ADULT TRIAGE NOTE - CHIEF COMPLAINT QUOTE
I don't feel too good, they told me to get the antibodies. I have COPD and my oxygen was low - 89% - patient

## 2021-03-25 NOTE — ED ADULT NURSE NOTE - NSIMPLEMENTINTERV_GEN_ALL_ED
Implemented All Universal Safety Interventions:  [place] to call system. Call bell, personal items and telephone within reach. Instruct patient to call for assistance. Room bathroom lighting operational. Non-slip footwear when patient is off stretcher. Physically safe environment: no spills, clutter or unnecessary equipment. Stretcher in lowest position, wheels locked, appropriate side rails in place.

## 2021-03-26 LAB
ALBUMIN SERPL ELPH-MCNC: 3.7 G/DL — SIGNIFICANT CHANGE UP (ref 3.5–5.2)
ALP SERPL-CCNC: 91 U/L — SIGNIFICANT CHANGE UP (ref 30–115)
ALT FLD-CCNC: 27 U/L — SIGNIFICANT CHANGE UP (ref 0–41)
ANION GAP SERPL CALC-SCNC: 11 MMOL/L — SIGNIFICANT CHANGE UP (ref 7–14)
AST SERPL-CCNC: 30 U/L — SIGNIFICANT CHANGE UP (ref 0–41)
BASOPHILS # BLD AUTO: 0.01 K/UL — SIGNIFICANT CHANGE UP (ref 0–0.2)
BASOPHILS NFR BLD AUTO: 0.3 % — SIGNIFICANT CHANGE UP (ref 0–1)
BILIRUB SERPL-MCNC: <0.2 MG/DL — SIGNIFICANT CHANGE UP (ref 0.2–1.2)
BLD GP AB SCN SERPL QL: SIGNIFICANT CHANGE UP
BUN SERPL-MCNC: 22 MG/DL — HIGH (ref 10–20)
CALCIUM SERPL-MCNC: 8.6 MG/DL — SIGNIFICANT CHANGE UP (ref 8.5–10.1)
CHLORIDE SERPL-SCNC: 102 MMOL/L — SIGNIFICANT CHANGE UP (ref 98–110)
CO2 SERPL-SCNC: 23 MMOL/L — SIGNIFICANT CHANGE UP (ref 17–32)
CREAT SERPL-MCNC: 1.3 MG/DL — SIGNIFICANT CHANGE UP (ref 0.7–1.5)
CRP SERPL-MCNC: 94 MG/L — HIGH
EOSINOPHIL # BLD AUTO: 0 K/UL — SIGNIFICANT CHANGE UP (ref 0–0.7)
EOSINOPHIL NFR BLD AUTO: 0 % — SIGNIFICANT CHANGE UP (ref 0–8)
FERRITIN SERPL-MCNC: 96 NG/ML — SIGNIFICANT CHANGE UP (ref 15–150)
GLUCOSE SERPL-MCNC: 200 MG/DL — HIGH (ref 70–99)
HCT VFR BLD CALC: 35 % — LOW (ref 37–47)
HGB BLD-MCNC: 10.8 G/DL — LOW (ref 12–16)
IMM GRANULOCYTES NFR BLD AUTO: 0.6 % — HIGH (ref 0.1–0.3)
LACTATE SERPL-SCNC: 1.2 MMOL/L — SIGNIFICANT CHANGE UP (ref 0.7–2)
LYMPHOCYTES # BLD AUTO: 0.6 K/UL — LOW (ref 1.2–3.4)
LYMPHOCYTES # BLD AUTO: 17.7 % — LOW (ref 20.5–51.1)
MAGNESIUM SERPL-MCNC: 2 MG/DL — SIGNIFICANT CHANGE UP (ref 1.8–2.4)
MCHC RBC-ENTMCNC: 26.2 PG — LOW (ref 27–31)
MCHC RBC-ENTMCNC: 30.9 G/DL — LOW (ref 32–37)
MCV RBC AUTO: 84.7 FL — SIGNIFICANT CHANGE UP (ref 81–99)
MONOCYTES # BLD AUTO: 0.13 K/UL — SIGNIFICANT CHANGE UP (ref 0.1–0.6)
MONOCYTES NFR BLD AUTO: 3.8 % — SIGNIFICANT CHANGE UP (ref 1.7–9.3)
NEUTROPHILS # BLD AUTO: 2.63 K/UL — SIGNIFICANT CHANGE UP (ref 1.4–6.5)
NEUTROPHILS NFR BLD AUTO: 77.6 % — HIGH (ref 42.2–75.2)
NRBC # BLD: 0 /100 WBCS — SIGNIFICANT CHANGE UP (ref 0–0)
PHOSPHATE SERPL-MCNC: 3.4 MG/DL — SIGNIFICANT CHANGE UP (ref 2.1–4.9)
PLATELET # BLD AUTO: 254 K/UL — SIGNIFICANT CHANGE UP (ref 130–400)
POTASSIUM SERPL-MCNC: 4.8 MMOL/L — SIGNIFICANT CHANGE UP (ref 3.5–5)
POTASSIUM SERPL-SCNC: 4.8 MMOL/L — SIGNIFICANT CHANGE UP (ref 3.5–5)
PROCALCITONIN SERPL-MCNC: 0.08 NG/ML — SIGNIFICANT CHANGE UP (ref 0.02–0.1)
PROT SERPL-MCNC: 6.6 G/DL — SIGNIFICANT CHANGE UP (ref 6–8)
RAPID RVP RESULT: DETECTED
RBC # BLD: 4.13 M/UL — LOW (ref 4.2–5.4)
RBC # FLD: 16.7 % — HIGH (ref 11.5–14.5)
SARS-COV-2 RNA SPEC QL NAA+PROBE: DETECTED
SODIUM SERPL-SCNC: 136 MMOL/L — SIGNIFICANT CHANGE UP (ref 135–146)
WBC # BLD: 3.39 K/UL — LOW (ref 4.8–10.8)
WBC # FLD AUTO: 3.39 K/UL — LOW (ref 4.8–10.8)

## 2021-03-26 PROCEDURE — 93970 EXTREMITY STUDY: CPT | Mod: 26

## 2021-03-26 PROCEDURE — 71275 CT ANGIOGRAPHY CHEST: CPT | Mod: 26

## 2021-03-26 RX ORDER — PANTOPRAZOLE SODIUM 20 MG/1
40 TABLET, DELAYED RELEASE ORAL
Refills: 0 | Status: DISCONTINUED | OUTPATIENT
Start: 2021-03-26 | End: 2021-04-03

## 2021-03-26 RX ORDER — CLONAZEPAM 1 MG
1 TABLET ORAL
Refills: 0 | Status: COMPLETED | OUTPATIENT
Start: 2021-03-26 | End: 2021-04-02

## 2021-03-26 RX ORDER — LEVOTHYROXINE SODIUM 125 MCG
125 TABLET ORAL DAILY
Refills: 0 | Status: DISCONTINUED | OUTPATIENT
Start: 2021-03-26 | End: 2021-04-03

## 2021-03-26 RX ORDER — ERTAPENEM SODIUM 1 G/1
1000 INJECTION, POWDER, LYOPHILIZED, FOR SOLUTION INTRAMUSCULAR; INTRAVENOUS
Qty: 0 | Refills: 0 | DISCHARGE

## 2021-03-26 RX ORDER — BUPROPION HYDROCHLORIDE 150 MG/1
0 TABLET, EXTENDED RELEASE ORAL
Qty: 0 | Refills: 0 | DISCHARGE

## 2021-03-26 RX ORDER — IPRATROPIUM/ALBUTEROL SULFATE 18-103MCG
3 AEROSOL WITH ADAPTER (GRAM) INHALATION EVERY 6 HOURS
Refills: 0 | Status: ACTIVE | OUTPATIENT
Start: 2021-03-26 | End: 2022-02-22

## 2021-03-26 RX ORDER — BUPROPION HYDROCHLORIDE 150 MG/1
300 TABLET, EXTENDED RELEASE ORAL DAILY
Refills: 0 | Status: DISCONTINUED | OUTPATIENT
Start: 2021-03-26 | End: 2021-04-03

## 2021-03-26 RX ORDER — FERROUS SULFATE 325(65) MG
325 TABLET ORAL DAILY
Refills: 0 | Status: DISCONTINUED | OUTPATIENT
Start: 2021-03-26 | End: 2021-04-03

## 2021-03-26 RX ORDER — METHOCARBAMOL 500 MG/1
1000 TABLET, FILM COATED ORAL ONCE
Refills: 0 | Status: COMPLETED | OUTPATIENT
Start: 2021-03-26 | End: 2021-03-26

## 2021-03-26 RX ORDER — ENOXAPARIN SODIUM 100 MG/ML
40 INJECTION SUBCUTANEOUS AT BEDTIME
Refills: 0 | Status: DISCONTINUED | OUTPATIENT
Start: 2021-03-26 | End: 2021-03-26

## 2021-03-26 RX ORDER — MONTELUKAST 4 MG/1
10 TABLET, CHEWABLE ORAL DAILY
Refills: 0 | Status: DISCONTINUED | OUTPATIENT
Start: 2021-03-26 | End: 2021-04-03

## 2021-03-26 RX ORDER — GABAPENTIN 400 MG/1
300 CAPSULE ORAL DAILY
Refills: 0 | Status: DISCONTINUED | OUTPATIENT
Start: 2021-03-26 | End: 2021-04-03

## 2021-03-26 RX ORDER — CHLORHEXIDINE GLUCONATE 213 G/1000ML
1 SOLUTION TOPICAL
Refills: 0 | Status: DISCONTINUED | OUTPATIENT
Start: 2021-03-26 | End: 2021-04-03

## 2021-03-26 RX ORDER — ZOLPIDEM TARTRATE 10 MG/1
5 TABLET ORAL AT BEDTIME
Refills: 0 | Status: DISCONTINUED | OUTPATIENT
Start: 2021-03-26 | End: 2021-04-01

## 2021-03-26 RX ORDER — ENOXAPARIN SODIUM 100 MG/ML
40 INJECTION SUBCUTANEOUS
Refills: 0 | Status: DISCONTINUED | OUTPATIENT
Start: 2021-03-26 | End: 2021-04-03

## 2021-03-26 RX ORDER — SENNA PLUS 8.6 MG/1
2 TABLET ORAL AT BEDTIME
Refills: 0 | Status: DISCONTINUED | OUTPATIENT
Start: 2021-03-26 | End: 2021-04-03

## 2021-03-26 RX ORDER — OXYCODONE AND ACETAMINOPHEN 5; 325 MG/1; MG/1
1 TABLET ORAL EVERY 12 HOURS
Refills: 0 | Status: DISCONTINUED | OUTPATIENT
Start: 2021-03-26 | End: 2021-03-29

## 2021-03-26 RX ADMIN — Medication 1 MILLIGRAM(S): at 17:49

## 2021-03-26 RX ADMIN — METHOCARBAMOL 1000 MILLIGRAM(S): 500 TABLET, FILM COATED ORAL at 21:50

## 2021-03-26 RX ADMIN — Medication 325 MILLIGRAM(S): at 12:08

## 2021-03-26 RX ADMIN — ENOXAPARIN SODIUM 40 MILLIGRAM(S): 100 INJECTION SUBCUTANEOUS at 07:48

## 2021-03-26 RX ADMIN — ENOXAPARIN SODIUM 40 MILLIGRAM(S): 100 INJECTION SUBCUTANEOUS at 17:49

## 2021-03-26 RX ADMIN — BUPROPION HYDROCHLORIDE 300 MILLIGRAM(S): 150 TABLET, EXTENDED RELEASE ORAL at 12:08

## 2021-03-26 RX ADMIN — Medication 1 MILLIGRAM(S): at 05:55

## 2021-03-26 RX ADMIN — MONTELUKAST 10 MILLIGRAM(S): 4 TABLET, CHEWABLE ORAL at 12:08

## 2021-03-26 RX ADMIN — GABAPENTIN 300 MILLIGRAM(S): 400 CAPSULE ORAL at 12:08

## 2021-03-26 RX ADMIN — ZOLPIDEM TARTRATE 5 MILLIGRAM(S): 10 TABLET ORAL at 23:45

## 2021-03-26 RX ADMIN — SENNA PLUS 2 TABLET(S): 8.6 TABLET ORAL at 21:50

## 2021-03-26 RX ADMIN — Medication 125 MICROGRAM(S): at 05:56

## 2021-03-26 RX ADMIN — OXYCODONE AND ACETAMINOPHEN 1 TABLET(S): 5; 325 TABLET ORAL at 21:50

## 2021-03-26 RX ADMIN — OXYCODONE AND ACETAMINOPHEN 1 TABLET(S): 5; 325 TABLET ORAL at 22:50

## 2021-03-26 RX ADMIN — PANTOPRAZOLE SODIUM 40 MILLIGRAM(S): 20 TABLET, DELAYED RELEASE ORAL at 05:56

## 2021-03-26 NOTE — CONSULT NOTE ADULT - ASSESSMENT
ASSESSMENT  55y F admitted with COVID19 HYPOXIA    HPI:  55 year old morbidly obese female has medical history of hypothyroidism, Diverticulitis, COPD (not on oxygen), HTN, Depression, left hearing loss, S/P radiation for nasopharyngeal CA, pulmonary nodules, insomnia, and Anxiety presented with SOB, cough, chills and body aches.  She was tested positive for Covid on [place] 3/21/21, symptoms started on Sunday, progressively worsened to the point where she became dyspneic on exertion. SIRS ruled out on admission. Patient otherwise denies Nausea, vomiting, fever, headaches, weight loss, chest pain, abdominal pain, muscle weakness, lower extremity swelling, urinary or bowel habit changes.  Per patient she recorded her oxygen saturation on walking at 89%, she usually around 96%. Currently stable on room air. CT angio negative for pulmonary embolism.    IMPRESSION  #COVID19 PNA, Severe (O2 < or = 94% on RA and requiring supplemental O2)  CT with  Bilateral groundglass opacities consistent with a history of Covid pneumonia.  D-Dimer Assay, Quantitative: 301 ng/mL DDU (03-25-21 @ 20:28)    #Hyponatremia   #Transaminitis   #Morbid Obesity BMI (kg/m2): 49.9    #Abx allergy: ciprofloxacin (Urticaria; Other)    RECOMMENDATIONS  - Start Remdesivir D1: 200mg x1, Day 2 - Day 5 100mg IV daily. Monitor Cr/LFTs  - Order COVID Ab and if NEGATIVE, can offer and consent for convalescent Plasma: 1 unit over 2h   - Start Dexamethasone 6mg x 10 days or until Discharge (whichever is shorter), any taper per Pulm  - Order CRP & Procalcitonin  - A/C per primary team  - Prone positioning if possible  - Monitor off Abx

## 2021-03-26 NOTE — H&P ADULT - NSHPLABSRESULTS_GEN_ALL_CORE
cbc                        11.1   3.42  )-----------( 265      ( 25 Mar 2021 20:28 )             35.9     03-25    134<L>  |  99  |  21<H>  ----------------------------<  109<H>  4.8   |  22  |  1.3    Ca    8.5      25 Mar 2021 20:28    TPro  7.1  /  Alb  3.9  /  TBili  <0.2  /  DBili  x   /  AST  59<H>  /  ALT  31  /  AlkPhos  87  03-25    < from: CT Angio Chest PE Protocol w/ IV Cont (03.26.21 @ 02:13) >    IMPRESSION:      No CTA evidence of acute pulmonary embolism.    Bilateral groundglass opacities consistent with a history of Covid pneumonia.      < end of copied text >

## 2021-03-26 NOTE — CONSULT NOTE ADULT - SUBJECTIVE AND OBJECTIVE BOX
INDY, GEOVANNA  55y, Female  Allergy: ciprofloxacin (Urticaria; Other)      CHIEF COMPLAINT:   COVID 19 PNA (26 Mar 2021 04:46)      LOS      HPI  HPI:  55 year old morbidly obese female has medical history of hypothyroidism, Diverticulitis, COPD (not on oxygen), HTN, Depression, left hearing loss, S/P radiation for nasopharyngeal CA, pulmonary nodules, insomnia, and Anxiety presented with SOB, cough, chills and body aches.  She was tested positive for Covid on [place] 3/21/21, symptoms started on Sunday, progressively worsened to the point where she became dyspneic on exertion. SIRS ruled out on admission. Patient otherwise denies Nausea, vomiting, fever, headaches, weight loss, chest pain, abdominal pain, muscle weakness, lower extremity swelling, urinary or bowel habit changes.  Per patient she recorded her oxygen saturation on walking at 89%, she usually around 96%. Currently stable on room air. CT angio negative for pulmonary embolism.   (26 Mar 2021 04:46)      INFECTIOUS DISEASE HISTORY:  D-Dimer Assay, Quantitative: 301 ng/mL DDU (03-25-21 @ 20:28)      PMH  PAST MEDICAL & SURGICAL HISTORY:  Anxiety    Hemorrhoids    Left ear hearing loss    Cervical disc disease  sequelae of radiation for nasopharyngeal cancer    Hypothyroid    Nasopharyngeal cancer    Pulmonary nodules/lesions, multiple    Diverticulitis    HTN (hypertension)    COPD (chronic obstructive pulmonary disease)    History of cholecystectomy        FAMILY HISTORY  Family history of lung cancer (Father, Mother)        SOCIAL HISTORY  Social History:  Formal smoker  denies active smoking, drinking or use of illicit substance (26 Mar 2021 04:46)        ROS  General: Denies rigors, nightsweats  HEENT: Denies headache, rhinorrhea, sore throat, eye pain  CV: Denies CP, palpitations  PULM: Denies wheezing, hemoptysis  GI: Denies hematemesis, hematochezia, melena  : Denies discharge, hematuria  MSK: Denies arthralgias, myalgias  SKIN: Denies rash, lesions  NEURO: Denies paresthesias, weakness  PSYCH: Denies depression, anxiety    VITALS:  T(F): 97, Max: 97 (03-25-21 @ 19:46)  HR: 77  BP: 135/60  RR: 20Vital Signs Last 24 Hrs  T(C): 36.1 (26 Mar 2021 00:12), Max: 36.1 (25 Mar 2021 19:46)  T(F): 97 (26 Mar 2021 00:12), Max: 97 (25 Mar 2021 19:46)  HR: 77 (26 Mar 2021 00:12) (77 - 89)  BP: 135/60 (26 Mar 2021 00:12) (132/61 - 135/60)  BP(mean): --  RR: 20 (26 Mar 2021 00:12) (20 - 22)  SpO2: 96% (26 Mar 2021 00:12) (95% - 98%)    PHYSICAL EXAM:  ***    TESTS & MEASUREMENTS:                        11.1   3.42  )-----------( 265      ( 25 Mar 2021 20:28 )             35.9     03-25    134<L>  |  99  |  21<H>  ----------------------------<  109<H>  4.8   |  22  |  1.3    Ca    8.5      25 Mar 2021 20:28    TPro  7.1  /  Alb  3.9  /  TBili  <0.2  /  DBili  x   /  AST  59<H>  /  ALT  31  /  AlkPhos  87  03-25    eGFR if Non African American: 46 mL/min/1.73M2 (03-25-21 @ 20:28)  eGFR if : 53 mL/min/1.73M2 (03-25-21 @ 20:28)    LIVER FUNCTIONS - ( 25 Mar 2021 20:28 )  Alb: 3.9 g/dL / Pro: 7.1 g/dL / ALK PHOS: 87 U/L / ALT: 31 U/L / AST: 59 U/L / GGT: x               Culture - Blood (collected 11-11-20 @ 11:19)  Source: .Blood None  Final Report (11-16-20 @ 23:00):    No Growth Final    Culture - Urine (collected 10-12-20 @ 01:00)  Source: .Urine Clean Catch (Midstream)  Final Report (10-13-20 @ 09:59):    No growth    Culture - Blood (collected 10-02-20 @ 05:15)  Source: .Blood None  Final Report (10-07-20 @ 19:01):    No Growth Final    Culture - Urine (collected 10-01-20 @ 20:50)  Source: .Urine Clean Catch (Midstream)  Final Report (10-04-20 @ 12:25):    10,000 - 49,000 CFU/mL Morganella morganii  Organism: Morganella morganii (10-04-20 @ 12:25)  Organism: Morganella morganii (10-04-20 @ 12:25)      -  Amikacin: S <=16      -  Amoxicillin/Clavulanic Acid: R >16/8      -  Ampicillin: R >16 These ampicillin results predict results for amoxicillin      -  Ampicillin/Sulbactam: R >16/8 Enterobacter, Citrobacter, and Serratia may develop resistance during prolonged therapy (3-4 days)      -  Aztreonam: I 16      -  Cefazolin: R >16      -  Cefepime: S <=2      -  Cefoxitin: S <=8      -  Ceftriaxone: S <=1 Enterobacter, Citrobacter, and Serratia may develop resistance during prolonged therapy      -  Ciprofloxacin: S <=0.25      -  Ertapenem: S <=0.5      -  Gentamicin: S <=2      -  Imipenem: R 4      -  Levofloxacin: S <=0.5      -  Meropenem: S <=1      -  Nitrofurantoin: R <=32 Should not be used to treat pyelonephritis      -  Piperacillin/Tazobactam: S <=8      -  Tigecycline: R <=2      -  Tobramycin: S <=2      -  Trimethoprim/Sulfamethoxazole: S <=0.5/9.5      Method Type: [facility]            INFECTIOUS DISEASES TESTING  Rapid RVP Result: Detected (03-25-21 @ 20:28)  COVID-19 PCR: NotDetec (11-10-20 @ 22:52)  COVID-19 PCR: NotDetec (10-11-20 @ 22:26)  COVID-19 PCR: NotDetec (10-01-20 @ 20:10)      INFLAMMATORY MARKERS      RADIOLOGY & ADDITIONAL TESTS:  I have personally reviewed the last Chest xray  CXR      CT  CT Angio Chest PE Protocol w/ IV Cont:   EXAM:  CT ANGIO CHEST PE PROTOCOL IC            PROCEDURE DATE:  03/26/2021            INTERPRETATION:  CLINICAL HISTORY / REASON FOR EXAM: Shortness of breath. Covid pneumonia.    TECHNIQUE: Multislice helical sections were obtained from the thoracic inlet to the lung bases during rapid administration of intravenous contrast using a CTA pulmonary embolism protocol. Thin sections were reconstructed through the pulmonary vasculature. Coronal, sagittal and 3D/MIP reformatted images are also submitted. Refer to series 17 for optimal opacified pulmonary artery.    COMPARISON: CT ABDOMEN/PELVIS 11/10/2020.      FINDINGS:    PULMONARY EMBOLUS: No central or segmental pulmonary embolism.    LUNGS, PLEURA, AIRWAYS: Bilateral groundglass opacities consistent with a history of Covid pneumonia. No pneumothorax. No consolidation or pleural effusion. Central airways patent.    THORACIC NODES: Prominent subaortic lymph node likely reactive. No axillary lymphadenopathy.    MEDIASTINUM/GREAT VESSELS: No pericardial effusion. Heart size unremarkable. The great vessels are normal in caliber.    VISUALIZED UPPER ABDOMEN: Post cholecystectomy. Hepatic steatosis. Right adrenal nodule measuring 1.3 cm, stable since 2018.    BONES/SOFT TISSUES: Degenerative changes of the spine.      IMPRESSION:      No CTA evidence of acute pulmonary embolism.    Bilateral groundglass opacities consistent with a history of Covid pneumonia.            TALITA WELLINGTON M.D., RESIDENT RADIOLOGIST  This document has been electronically signed.  MAURIZIO BARILLAS MD; Attending Radiologist  This document has been electronically signed. Mar 26 2021  2:46AM (03-26-21 @ 02:13)      CARDIOLOGY TESTING  12 Lead ECG:   Ventricular Rate 77 BPM    Atrial Rate 77 BPM    P-R Interval 150 ms    QRS Duration 90 ms    Q-T Interval 398 ms    QTC Calculation(Bazett) 450 ms    P Axis 64 degrees    R Axis 11 degrees    T Axis 75 degrees    Diagnosis Line Normal sinus rhythm  Normal ECG    Confirmed by SHAWN MA MD ([id number]) on 3/25/2021 10:58:54 PM (03-25-21 @ 21:39)      MEDICATIONS  albuterol/ipratropium for Nebulization 3 Nebulizer every 6 hours  buPROPion XL . 300 Oral daily  chlorhexidine 4% Liquid 1 Topical <User Schedule>  clonazePAM  Tablet 1 Oral two times a day  enalapril 10 Oral daily  enoxaparin Injectable 40 SubCutaneous two times a day  ferrous    sulfate 325 Oral daily  gabapentin 300 Oral daily  levothyroxine 125 Oral daily  montelukast 10 Oral daily  pantoprazole    Tablet 40 Oral before breakfast  senna 2 Oral at bedtime      Weight  Weight (kg): 132 (03-25-21 @ 19:46)    ANTIBIOTICS:      ALLERGIES:  ciprofloxacin (Urticaria; Other)

## 2021-03-26 NOTE — H&P ADULT - NSHPPHYSICALEXAM_GEN_ALL_CORE
GENERAL: NAD, well-developed, AAOx3, morbidly obese  HEENT:  Atraumatic, Normocephalic. EOMI, PERRLA, conjunctiva and sclera clear, No JVD  PULMONARY: Clear to auscultation bilaterally; No wheeze  CARDIOVASCULAR: Regular rate and rhythm; No murmurs, rubs, or gallops  GASTROINTESTINAL: Soft, Nontender, Nondistended; Bowel sounds present  MUSCULOSKELETAL:  2+ Peripheral Pulses, No clubbing, cyanosis, or edema  NEUROLOGY: non-focal  SKIN: No rashes or lesions

## 2021-03-26 NOTE — H&P ADULT - HISTORY OF PRESENT ILLNESS
55 year old morbidly obese female has medical history of hypothyroidism, Diverticulitis, COPD (not on oxygen), HTN, Depression, Insomnia, and Anxiety presented with SOB, cough, chills and body aches.  She was tested positive for Covid on [place] 3/21/21, symptoms started on Sunday, progressively worsened to the point where she became dyspneic on exertion. SIRS ruled out on admission. Patient otherwise denies Nausea, vomiting, fever, headaches, weight loss, chest pain, abdominal pain, muscle weakness, lower extremity swelling, urinary or bowel habit changes.  Per patient she recorded her oxygen saturation on walking at 89%, she usually around 96%. Currently stable on room air. CT angio negative for pulmonary embolism.

## 2021-03-27 PROCEDURE — 71045 X-RAY EXAM CHEST 1 VIEW: CPT | Mod: 26

## 2021-03-27 RX ORDER — REMDESIVIR 5 MG/ML
INJECTION INTRAVENOUS
Refills: 0 | Status: COMPLETED | OUTPATIENT
Start: 2021-03-27 | End: 2021-03-31

## 2021-03-27 RX ORDER — METHOCARBAMOL 500 MG/1
1000 TABLET, FILM COATED ORAL THREE TIMES A DAY
Refills: 0 | Status: DISCONTINUED | OUTPATIENT
Start: 2021-03-27 | End: 2021-04-03

## 2021-03-27 RX ORDER — ACETAMINOPHEN 500 MG
650 TABLET ORAL EVERY 6 HOURS
Refills: 0 | Status: DISCONTINUED | OUTPATIENT
Start: 2021-03-27 | End: 2021-04-03

## 2021-03-27 RX ORDER — REMDESIVIR 5 MG/ML
100 INJECTION INTRAVENOUS EVERY 24 HOURS
Refills: 0 | Status: COMPLETED | OUTPATIENT
Start: 2021-03-28 | End: 2021-03-31

## 2021-03-27 RX ORDER — REMDESIVIR 5 MG/ML
200 INJECTION INTRAVENOUS EVERY 24 HOURS
Refills: 0 | Status: COMPLETED | OUTPATIENT
Start: 2021-03-27 | End: 2021-03-27

## 2021-03-27 RX ORDER — ACETAMINOPHEN 500 MG
975 TABLET ORAL ONCE
Refills: 0 | Status: COMPLETED | OUTPATIENT
Start: 2021-03-27 | End: 2021-03-27

## 2021-03-27 RX ORDER — SODIUM CHLORIDE 9 MG/ML
1000 INJECTION INTRAMUSCULAR; INTRAVENOUS; SUBCUTANEOUS ONCE
Refills: 0 | Status: COMPLETED | OUTPATIENT
Start: 2021-03-27 | End: 2021-03-27

## 2021-03-27 RX ADMIN — Medication 1 MILLIGRAM(S): at 06:05

## 2021-03-27 RX ADMIN — OXYCODONE AND ACETAMINOPHEN 1 TABLET(S): 5; 325 TABLET ORAL at 13:57

## 2021-03-27 RX ADMIN — Medication 975 MILLIGRAM(S): at 11:00

## 2021-03-27 RX ADMIN — Medication 1 MILLIGRAM(S): at 20:36

## 2021-03-27 RX ADMIN — Medication 125 MICROGRAM(S): at 06:00

## 2021-03-27 RX ADMIN — CHLORHEXIDINE GLUCONATE 1 APPLICATION(S): 213 SOLUTION TOPICAL at 05:59

## 2021-03-27 RX ADMIN — REMDESIVIR 500 MILLIGRAM(S): 5 INJECTION INTRAVENOUS at 14:26

## 2021-03-27 RX ADMIN — PANTOPRAZOLE SODIUM 40 MILLIGRAM(S): 20 TABLET, DELAYED RELEASE ORAL at 06:01

## 2021-03-27 RX ADMIN — ENOXAPARIN SODIUM 40 MILLIGRAM(S): 100 INJECTION SUBCUTANEOUS at 06:01

## 2021-03-27 RX ADMIN — ENOXAPARIN SODIUM 40 MILLIGRAM(S): 100 INJECTION SUBCUTANEOUS at 17:20

## 2021-03-27 RX ADMIN — Medication 325 MILLIGRAM(S): at 11:45

## 2021-03-27 RX ADMIN — Medication 975 MILLIGRAM(S): at 10:15

## 2021-03-27 RX ADMIN — OXYCODONE AND ACETAMINOPHEN 1 TABLET(S): 5; 325 TABLET ORAL at 14:50

## 2021-03-27 RX ADMIN — ZOLPIDEM TARTRATE 5 MILLIGRAM(S): 10 TABLET ORAL at 21:41

## 2021-03-27 RX ADMIN — SENNA PLUS 2 TABLET(S): 8.6 TABLET ORAL at 21:39

## 2021-03-27 RX ADMIN — MONTELUKAST 10 MILLIGRAM(S): 4 TABLET, CHEWABLE ORAL at 11:46

## 2021-03-27 RX ADMIN — GABAPENTIN 300 MILLIGRAM(S): 400 CAPSULE ORAL at 11:45

## 2021-03-27 RX ADMIN — BUPROPION HYDROCHLORIDE 300 MILLIGRAM(S): 150 TABLET, EXTENDED RELEASE ORAL at 11:45

## 2021-03-27 RX ADMIN — METHOCARBAMOL 1000 MILLIGRAM(S): 500 TABLET, FILM COATED ORAL at 10:18

## 2021-03-27 RX ADMIN — METHOCARBAMOL 1000 MILLIGRAM(S): 500 TABLET, FILM COATED ORAL at 21:41

## 2021-03-27 NOTE — PROGRESS NOTE ADULT - SUBJECTIVE AND OBJECTIVE BOX
OVERNIGHT EVENTS:  No acute events overnight.    SUBJECTIVE / INTERVAL HPI: Patient seen and examined at bedside. Complaints of chills.  BP noted.  repeat BP improved.  pending repeat labs.  today day 1 RDV,       VITAL SIGNS:  Vital Signs Last 24 Hrs  T(C): 36.7 (27 Mar 2021 06:31), Max: 36.9 (26 Mar 2021 12:42)  T(F): 98.1 (27 Mar 2021 06:31), Max: 98.5 (26 Mar 2021 12:42)  HR: 70 (27 Mar 2021 06:31) (69 - 79)  BP: 98/53 (27 Mar 2021 06:31) (95/59 - 128/71)  BP(mean): --  RR: 18 (27 Mar 2021 06:31) (18 - 18)  SpO2: 93% (27 Mar 2021 06:31) (93% - 98%)    PHYSICAL EXAM:    General: Lying in bed, NAD,  HEENT: NC/AT, PERRL, anicteric sclera; MMM  Neck: supple  Cardiovascular: +S1/S2, RRR  Respiratory: CTA B/L, no W/R/R, b/l rales  Gastrointestinal: soft, NT/ND, +BS  Extremities:  no edema or cyanosis  Vascular: 2+ radial, DP/PT pulses B/L  Neurological: AAOx3, no focal deficits    MEDICATIONS:  MEDICATIONS  (STANDING):  acetaminophen   Tablet .. 975 milliGRAM(s) Oral once  albuterol/ipratropium for Nebulization 3 milliLiter(s) Nebulizer every 6 hours  buPROPion XL . 300 milliGRAM(s) Oral daily  chlorhexidine 4% Liquid 1 Application(s) Topical <User Schedule>  clonazePAM  Tablet 1 milliGRAM(s) Oral two times a day  enalapril 10 milliGRAM(s) Oral daily  enoxaparin Injectable 40 milliGRAM(s) SubCutaneous two times a day  ferrous    sulfate 325 milliGRAM(s) Oral daily  gabapentin 300 milliGRAM(s) Oral daily  levothyroxine 125 MICROGram(s) Oral daily  montelukast 10 milliGRAM(s) Oral daily  pantoprazole    Tablet 40 milliGRAM(s) Oral before breakfast  remdesivir  IVPB   IV Intermittent   senna 2 Tablet(s) Oral at bedtime  sodium chloride 0.9% Bolus 1000 milliLiter(s) IV Bolus once    MEDICATIONS  (PRN):  oxycodone    5 mG/acetaminophen 325 mG 1 Tablet(s) Oral every 12 hours PRN Severe Pain (7 - 10)  zolpidem 5 milliGRAM(s) Oral at bedtime PRN Insomnia      ALLERGIES:  Allergies    ciprofloxacin (Urticaria; Other)    Intolerances        LABS:                        10.8   3.39  )-----------( 254      ( 26 Mar 2021 07:05 )             35.0     03-26    136  |  102  |  22<H>  ----------------------------<  200<H>  4.8   |  23  |  1.3    Ca    8.6      26 Mar 2021 07:05  Phos  3.4     03-26  Mg     2.0     03-26    TPro  6.6  /  Alb  3.7  /  TBili  <0.2  /  DBili  x   /  AST  30  /  ALT  27  /  AlkPhos  91  03-26        CAPILLARY BLOOD GLUCOSE          RADIOLOGY & ADDITIONAL TESTS: Reviewed.

## 2021-03-28 LAB
ALBUMIN SERPL ELPH-MCNC: 3.4 G/DL — LOW (ref 3.5–5.2)
ALP SERPL-CCNC: 79 U/L — SIGNIFICANT CHANGE UP (ref 30–115)
ALT FLD-CCNC: 34 U/L — SIGNIFICANT CHANGE UP (ref 0–41)
ANION GAP SERPL CALC-SCNC: 13 MMOL/L — SIGNIFICANT CHANGE UP (ref 7–14)
AST SERPL-CCNC: 46 U/L — HIGH (ref 0–41)
BASOPHILS # BLD AUTO: 0.02 K/UL — SIGNIFICANT CHANGE UP (ref 0–0.2)
BASOPHILS NFR BLD AUTO: 0.7 % — SIGNIFICANT CHANGE UP (ref 0–1)
BILIRUB SERPL-MCNC: <0.2 MG/DL — SIGNIFICANT CHANGE UP (ref 0.2–1.2)
BUN SERPL-MCNC: 18 MG/DL — SIGNIFICANT CHANGE UP (ref 10–20)
CALCIUM SERPL-MCNC: 8.2 MG/DL — LOW (ref 8.5–10.1)
CHLORIDE SERPL-SCNC: 104 MMOL/L — SIGNIFICANT CHANGE UP (ref 98–110)
CO2 SERPL-SCNC: 22 MMOL/L — SIGNIFICANT CHANGE UP (ref 17–32)
CREAT SERPL-MCNC: 1.2 MG/DL — SIGNIFICANT CHANGE UP (ref 0.7–1.5)
EOSINOPHIL # BLD AUTO: 0.03 K/UL — SIGNIFICANT CHANGE UP (ref 0–0.7)
EOSINOPHIL NFR BLD AUTO: 1 % — SIGNIFICANT CHANGE UP (ref 0–8)
GLUCOSE SERPL-MCNC: 96 MG/DL — SIGNIFICANT CHANGE UP (ref 70–99)
HCT VFR BLD CALC: 34.2 % — LOW (ref 37–47)
HGB BLD-MCNC: 10.5 G/DL — LOW (ref 12–16)
IMM GRANULOCYTES NFR BLD AUTO: 0.7 % — HIGH (ref 0.1–0.3)
LYMPHOCYTES # BLD AUTO: 0.96 K/UL — LOW (ref 1.2–3.4)
LYMPHOCYTES # BLD AUTO: 33.4 % — SIGNIFICANT CHANGE UP (ref 20.5–51.1)
MCHC RBC-ENTMCNC: 26.1 PG — LOW (ref 27–31)
MCHC RBC-ENTMCNC: 30.7 G/DL — LOW (ref 32–37)
MCV RBC AUTO: 84.9 FL — SIGNIFICANT CHANGE UP (ref 81–99)
MONOCYTES # BLD AUTO: 0.23 K/UL — SIGNIFICANT CHANGE UP (ref 0.1–0.6)
MONOCYTES NFR BLD AUTO: 8 % — SIGNIFICANT CHANGE UP (ref 1.7–9.3)
NEUTROPHILS # BLD AUTO: 1.61 K/UL — SIGNIFICANT CHANGE UP (ref 1.4–6.5)
NEUTROPHILS NFR BLD AUTO: 56.2 % — SIGNIFICANT CHANGE UP (ref 42.2–75.2)
NRBC # BLD: 0 /100 WBCS — SIGNIFICANT CHANGE UP (ref 0–0)
PLATELET # BLD AUTO: 228 K/UL — SIGNIFICANT CHANGE UP (ref 130–400)
POTASSIUM SERPL-MCNC: 3.9 MMOL/L — SIGNIFICANT CHANGE UP (ref 3.5–5)
POTASSIUM SERPL-SCNC: 3.9 MMOL/L — SIGNIFICANT CHANGE UP (ref 3.5–5)
PROT SERPL-MCNC: 6.4 G/DL — SIGNIFICANT CHANGE UP (ref 6–8)
RBC # BLD: 4.03 M/UL — LOW (ref 4.2–5.4)
RBC # FLD: 16.8 % — HIGH (ref 11.5–14.5)
SODIUM SERPL-SCNC: 139 MMOL/L — SIGNIFICANT CHANGE UP (ref 135–146)
WBC # BLD: 2.87 K/UL — LOW (ref 4.8–10.8)
WBC # FLD AUTO: 2.87 K/UL — LOW (ref 4.8–10.8)

## 2021-03-28 RX ORDER — SODIUM CHLORIDE 0.65 %
1 AEROSOL, SPRAY (ML) NASAL THREE TIMES A DAY
Refills: 0 | Status: DISCONTINUED | OUTPATIENT
Start: 2021-03-28 | End: 2021-04-03

## 2021-03-28 RX ORDER — LANOLIN ALCOHOL/MO/W.PET/CERES
5 CREAM (GRAM) TOPICAL AT BEDTIME
Refills: 0 | Status: DISCONTINUED | OUTPATIENT
Start: 2021-03-28 | End: 2021-04-03

## 2021-03-28 RX ORDER — LANOLIN ALCOHOL/MO/W.PET/CERES
5 CREAM (GRAM) TOPICAL ONCE
Refills: 0 | Status: COMPLETED | OUTPATIENT
Start: 2021-03-28 | End: 2021-03-28

## 2021-03-28 RX ORDER — SODIUM CHLORIDE 9 MG/ML
1000 INJECTION, SOLUTION INTRAVENOUS
Refills: 0 | Status: COMPLETED | OUTPATIENT
Start: 2021-03-28 | End: 2021-03-28

## 2021-03-28 RX ADMIN — Medication 650 MILLIGRAM(S): at 06:15

## 2021-03-28 RX ADMIN — GABAPENTIN 300 MILLIGRAM(S): 400 CAPSULE ORAL at 11:52

## 2021-03-28 RX ADMIN — ENOXAPARIN SODIUM 40 MILLIGRAM(S): 100 INJECTION SUBCUTANEOUS at 06:01

## 2021-03-28 RX ADMIN — Medication 10 MILLIGRAM(S): at 06:00

## 2021-03-28 RX ADMIN — SENNA PLUS 2 TABLET(S): 8.6 TABLET ORAL at 21:26

## 2021-03-28 RX ADMIN — BUPROPION HYDROCHLORIDE 300 MILLIGRAM(S): 150 TABLET, EXTENDED RELEASE ORAL at 11:52

## 2021-03-28 RX ADMIN — SODIUM CHLORIDE 500 MILLILITER(S): 9 INJECTION, SOLUTION INTRAVENOUS at 16:11

## 2021-03-28 RX ADMIN — Medication 1 MILLIGRAM(S): at 17:28

## 2021-03-28 RX ADMIN — MONTELUKAST 10 MILLIGRAM(S): 4 TABLET, CHEWABLE ORAL at 11:52

## 2021-03-28 RX ADMIN — REMDESIVIR 500 MILLIGRAM(S): 5 INJECTION INTRAVENOUS at 13:39

## 2021-03-28 RX ADMIN — SODIUM CHLORIDE 500 MILLILITER(S): 9 INJECTION, SOLUTION INTRAVENOUS at 11:03

## 2021-03-28 RX ADMIN — CHLORHEXIDINE GLUCONATE 1 APPLICATION(S): 213 SOLUTION TOPICAL at 06:01

## 2021-03-28 RX ADMIN — Medication 5 MILLIGRAM(S): at 22:55

## 2021-03-28 RX ADMIN — Medication 1 MILLIGRAM(S): at 06:06

## 2021-03-28 RX ADMIN — ENOXAPARIN SODIUM 40 MILLIGRAM(S): 100 INJECTION SUBCUTANEOUS at 17:29

## 2021-03-28 RX ADMIN — Medication 125 MICROGRAM(S): at 06:00

## 2021-03-28 RX ADMIN — Medication 650 MILLIGRAM(S): at 01:07

## 2021-03-28 RX ADMIN — Medication 5 MILLIGRAM(S): at 01:08

## 2021-03-28 RX ADMIN — Medication 650 MILLIGRAM(S): at 23:29

## 2021-03-28 RX ADMIN — ZOLPIDEM TARTRATE 5 MILLIGRAM(S): 10 TABLET ORAL at 21:26

## 2021-03-28 RX ADMIN — PANTOPRAZOLE SODIUM 40 MILLIGRAM(S): 20 TABLET, DELAYED RELEASE ORAL at 06:58

## 2021-03-28 RX ADMIN — Medication 325 MILLIGRAM(S): at 11:52

## 2021-03-28 NOTE — PROGRESS NOTE ADULT - SUBJECTIVE AND OBJECTIVE BOX
Name: GEOVANNA PUTNAM  Age: 55y  Gender: Female    Patient is a 55y old  Female who presents with a chief complaint of Covid-19    Interval events:  Pt was seen and examined.     Allergies:  ciprofloxacin (Urticaria; Other)      PHYSICAL EXAM:    Vitals:  Vital Signs Last 24 Hrs  T(C): 36.6 (28 Mar 2021 10:45), Max: 38 (27 Mar 2021 23:06)  T(F): 97.8 (28 Mar 2021 10:45), Max: 100.4 (27 Mar 2021 23:06)  HR: 72 (28 Mar 2021 10:45) (72 - 92)  BP: 107/58 (28 Mar 2021 10:45) (104/57 - 137/70)  BP(mean): --  RR: 20 (28 Mar 2021 10:45) (18 - 20)  SpO2: 92% (28 Mar 2021 10:45) (92% - 94%)    GENERAL: NAD  CHEST/LUNG: CTAB  HEART: S1,S2 RRR  ABDOMEN: Soft, NT/ND, +BS  EXTREMITIES:  2+ DP, no LE edema      LABS:                        10.5   2.87  )-----------( 228      ( 28 Mar 2021 06:30 )             34.2     03-28    139  |  104  |  18  ----------------------------<  96  3.9   |  22  |  1.2    Ca    8.2<L>      28 Mar 2021 04:30    TPro  6.4  /  Alb  3.4<L>  /  TBili  <0.2  /  DBili  x   /  AST  46<H>  /  ALT  34  /  AlkPhos  79  03-28    LIVER FUNCTIONS - ( 28 Mar 2021 04:30 )  Alb: 3.4 g/dL / Pro: 6.4 g/dL / ALK PHOS: 79 U/L / ALT: 34 U/L / AST: 46 U/L / GGT: x                 MEDICATIONS  (STANDING):  albuterol/ipratropium for Nebulization 3 milliLiter(s) Nebulizer every 6 hours  buPROPion XL . 300 milliGRAM(s) Oral daily  chlorhexidine 4% Liquid 1 Application(s) Topical <User Schedule>  clonazePAM  Tablet 1 milliGRAM(s) Oral two times a day  enalapril 10 milliGRAM(s) Oral daily  enoxaparin Injectable 40 milliGRAM(s) SubCutaneous two times a day  ferrous    sulfate 325 milliGRAM(s) Oral daily  gabapentin 300 milliGRAM(s) Oral daily  lactated ringers. 1000 milliLiter(s) (500 mL/Hr) IV Continuous <Continuous>  levothyroxine 125 MICROGram(s) Oral daily  montelukast 10 milliGRAM(s) Oral daily  pantoprazole    Tablet 40 milliGRAM(s) Oral before breakfast  remdesivir  IVPB 100 milliGRAM(s) IV Intermittent every 24 hours  remdesivir  IVPB   IV Intermittent   senna 2 Tablet(s) Oral at bedtime        RADIOLOGY & ADDITIONAL TESTS:    Imaging Personally Reviewed:  [x] YES  [ ] NO

## 2021-03-29 LAB
ALBUMIN SERPL ELPH-MCNC: 3.3 G/DL — LOW (ref 3.5–5.2)
ALP SERPL-CCNC: 74 U/L — SIGNIFICANT CHANGE UP (ref 30–115)
ALT FLD-CCNC: 30 U/L — SIGNIFICANT CHANGE UP (ref 0–41)
ANION GAP SERPL CALC-SCNC: 11 MMOL/L — SIGNIFICANT CHANGE UP (ref 7–14)
AST SERPL-CCNC: 37 U/L — SIGNIFICANT CHANGE UP (ref 0–41)
BILIRUB SERPL-MCNC: <0.2 MG/DL — SIGNIFICANT CHANGE UP (ref 0.2–1.2)
BUN SERPL-MCNC: 18 MG/DL — SIGNIFICANT CHANGE UP (ref 10–20)
CALCIUM SERPL-MCNC: 8.2 MG/DL — LOW (ref 8.5–10.1)
CHLORIDE SERPL-SCNC: 105 MMOL/L — SIGNIFICANT CHANGE UP (ref 98–110)
CO2 SERPL-SCNC: 24 MMOL/L — SIGNIFICANT CHANGE UP (ref 17–32)
CREAT SERPL-MCNC: 1.1 MG/DL — SIGNIFICANT CHANGE UP (ref 0.7–1.5)
GLUCOSE SERPL-MCNC: 93 MG/DL — SIGNIFICANT CHANGE UP (ref 70–99)
HCT VFR BLD CALC: 33.2 % — LOW (ref 37–47)
HGB BLD-MCNC: 10.1 G/DL — LOW (ref 12–16)
MCHC RBC-ENTMCNC: 26 PG — LOW (ref 27–31)
MCHC RBC-ENTMCNC: 30.4 G/DL — LOW (ref 32–37)
MCV RBC AUTO: 85.6 FL — SIGNIFICANT CHANGE UP (ref 81–99)
NRBC # BLD: 0 /100 WBCS — SIGNIFICANT CHANGE UP (ref 0–0)
PLATELET # BLD AUTO: 229 K/UL — SIGNIFICANT CHANGE UP (ref 130–400)
POTASSIUM SERPL-MCNC: 4.1 MMOL/L — SIGNIFICANT CHANGE UP (ref 3.5–5)
POTASSIUM SERPL-SCNC: 4.1 MMOL/L — SIGNIFICANT CHANGE UP (ref 3.5–5)
PROT SERPL-MCNC: 6 G/DL — SIGNIFICANT CHANGE UP (ref 6–8)
RBC # BLD: 3.88 M/UL — LOW (ref 4.2–5.4)
RBC # FLD: 16.4 % — HIGH (ref 11.5–14.5)
SODIUM SERPL-SCNC: 140 MMOL/L — SIGNIFICANT CHANGE UP (ref 135–146)
WBC # BLD: 2.99 K/UL — LOW (ref 4.8–10.8)
WBC # FLD AUTO: 2.99 K/UL — LOW (ref 4.8–10.8)

## 2021-03-29 RX ORDER — OXYCODONE AND ACETAMINOPHEN 5; 325 MG/1; MG/1
1 TABLET ORAL EVERY 6 HOURS
Refills: 0 | Status: DISCONTINUED | OUTPATIENT
Start: 2021-03-29 | End: 2021-04-03

## 2021-03-29 RX ADMIN — CHLORHEXIDINE GLUCONATE 1 APPLICATION(S): 213 SOLUTION TOPICAL at 07:01

## 2021-03-29 RX ADMIN — OXYCODONE AND ACETAMINOPHEN 1 TABLET(S): 5; 325 TABLET ORAL at 17:20

## 2021-03-29 RX ADMIN — MONTELUKAST 10 MILLIGRAM(S): 4 TABLET, CHEWABLE ORAL at 11:34

## 2021-03-29 RX ADMIN — Medication 1 MILLIGRAM(S): at 07:04

## 2021-03-29 RX ADMIN — ENOXAPARIN SODIUM 40 MILLIGRAM(S): 100 INJECTION SUBCUTANEOUS at 17:09

## 2021-03-29 RX ADMIN — Medication 125 MICROGRAM(S): at 07:02

## 2021-03-29 RX ADMIN — ENOXAPARIN SODIUM 40 MILLIGRAM(S): 100 INJECTION SUBCUTANEOUS at 07:02

## 2021-03-29 RX ADMIN — REMDESIVIR 500 MILLIGRAM(S): 5 INJECTION INTRAVENOUS at 15:51

## 2021-03-29 RX ADMIN — Medication 5 MILLIGRAM(S): at 22:00

## 2021-03-29 RX ADMIN — ZOLPIDEM TARTRATE 5 MILLIGRAM(S): 10 TABLET ORAL at 23:32

## 2021-03-29 RX ADMIN — Medication 325 MILLIGRAM(S): at 11:32

## 2021-03-29 RX ADMIN — PANTOPRAZOLE SODIUM 40 MILLIGRAM(S): 20 TABLET, DELAYED RELEASE ORAL at 07:05

## 2021-03-29 RX ADMIN — GABAPENTIN 300 MILLIGRAM(S): 400 CAPSULE ORAL at 11:34

## 2021-03-29 RX ADMIN — Medication 1 SPRAY(S): at 06:58

## 2021-03-29 RX ADMIN — Medication 650 MILLIGRAM(S): at 00:46

## 2021-03-29 RX ADMIN — BUPROPION HYDROCHLORIDE 300 MILLIGRAM(S): 150 TABLET, EXTENDED RELEASE ORAL at 11:34

## 2021-03-29 NOTE — PROGRESS NOTE ADULT - ATTENDING COMMENTS
55 yr old morbidly obese female seen this morning in no acute distress although reports some SOB. Pulse ox was used and patient was saturating at 89% although patient had acrylic nails on and as such getting an accurate pulse oximeter reading was difficult. Otherwise ROS was negative. Exam benign. Likely will need oxygen on discharge. Patient seen and examined with above provider. Agree with history, physical and assessment except where edited and annotated.

## 2021-03-29 NOTE — PROGRESS NOTE ADULT - SUBJECTIVE AND OBJECTIVE BOX
Name: GEOVANNA PUTNAM  Age: 55y  Gender: Female        HOD3  Pt was seen and examined. Pt increased from 2L->4L NC. Resting comfortably.      Allergies:  ciprofloxacin (Urticaria; Other)      PHYSICAL EXAM:    Vitals:  Vital Signs Last 24 Hrs  T(C): 37.2 (29 Mar 2021 15:42), Max: 37.9 (28 Mar 2021 23:10)  T(F): 98.9 (29 Mar 2021 15:42), Max: 100.2 (28 Mar 2021 23:10)  HR: 80 (29 Mar 2021 15:42) (69 - 88)  BP: 126/75 (29 Mar 2021 15:42) (108/61 - 153/63)  BP(mean): --  RR: 18 (29 Mar 2021 15:42) (16 - 18)  SpO2: 95% (29 Mar 2021 15:42) (90% - 95%)    GENERAL: NAD  CHEST/LUNG: CTAB  HEART: S1,S2 RRR  ABDOMEN: Soft, NT/ND, +BS  EXTREMITIES:  2+ DP, no LE edema      LABS:                        10.1   2.99  )-----------( 229      ( 29 Mar 2021 03:00 )             33.2     03-29    140  |  105  |  18  ----------------------------<  93  4.1   |  24  |  1.1    Ca    8.2<L>      29 Mar 2021 03:00    TPro  6.0  /  Alb  3.3<L>  /  TBili  <0.2  /  DBili  x   /  AST  37  /  ALT  30  /  AlkPhos  74  03-29    LIVER FUNCTIONS - ( 29 Mar 2021 03:00 )  Alb: 3.3 g/dL / Pro: 6.0 g/dL / ALK PHOS: 74 U/L / ALT: 30 U/L / AST: 37 U/L / GGT: x                 MEDICATIONS  (STANDING):  albuterol/ipratropium for Nebulization 3 milliLiter(s) Nebulizer every 6 hours  buPROPion XL . 300 milliGRAM(s) Oral daily  chlorhexidine 4% Liquid 1 Application(s) Topical <User Schedule>  clonazePAM  Tablet 1 milliGRAM(s) Oral two times a day  enalapril 10 milliGRAM(s) Oral daily  enoxaparin Injectable 40 milliGRAM(s) SubCutaneous two times a day  ferrous    sulfate 325 milliGRAM(s) Oral daily  gabapentin 300 milliGRAM(s) Oral daily  levothyroxine 125 MICROGram(s) Oral daily  melatonin 5 milliGRAM(s) Oral at bedtime  montelukast 10 milliGRAM(s) Oral daily  pantoprazole    Tablet 40 milliGRAM(s) Oral before breakfast  remdesivir  IVPB 100 milliGRAM(s) IV Intermittent every 24 hours  remdesivir  IVPB   IV Intermittent   senna 2 Tablet(s) Oral at bedtime        RADIOLOGY & ADDITIONAL TESTS:    Imaging Personally Reviewed:  [x] YES  [ ] NO

## 2021-03-30 RX ORDER — IBUPROFEN 200 MG
600 TABLET ORAL EVERY 6 HOURS
Refills: 0 | Status: DISCONTINUED | OUTPATIENT
Start: 2021-03-30 | End: 2021-04-03

## 2021-03-30 RX ADMIN — Medication 650 MILLIGRAM(S): at 17:33

## 2021-03-30 RX ADMIN — OXYCODONE AND ACETAMINOPHEN 1 TABLET(S): 5; 325 TABLET ORAL at 00:26

## 2021-03-30 RX ADMIN — GABAPENTIN 300 MILLIGRAM(S): 400 CAPSULE ORAL at 12:14

## 2021-03-30 RX ADMIN — PANTOPRAZOLE SODIUM 40 MILLIGRAM(S): 20 TABLET, DELAYED RELEASE ORAL at 07:01

## 2021-03-30 RX ADMIN — ENOXAPARIN SODIUM 40 MILLIGRAM(S): 100 INJECTION SUBCUTANEOUS at 07:02

## 2021-03-30 RX ADMIN — Medication 1 MILLIGRAM(S): at 21:34

## 2021-03-30 RX ADMIN — Medication 10 MILLIGRAM(S): at 07:01

## 2021-03-30 RX ADMIN — ZOLPIDEM TARTRATE 5 MILLIGRAM(S): 10 TABLET ORAL at 22:50

## 2021-03-30 RX ADMIN — BUPROPION HYDROCHLORIDE 300 MILLIGRAM(S): 150 TABLET, EXTENDED RELEASE ORAL at 12:14

## 2021-03-30 RX ADMIN — Medication 125 MICROGRAM(S): at 07:01

## 2021-03-30 RX ADMIN — Medication 5 MILLIGRAM(S): at 21:33

## 2021-03-30 RX ADMIN — OXYCODONE AND ACETAMINOPHEN 1 TABLET(S): 5; 325 TABLET ORAL at 01:53

## 2021-03-30 RX ADMIN — REMDESIVIR 500 MILLIGRAM(S): 5 INJECTION INTRAVENOUS at 12:15

## 2021-03-30 RX ADMIN — Medication 600 MILLIGRAM(S): at 21:57

## 2021-03-30 RX ADMIN — ENOXAPARIN SODIUM 40 MILLIGRAM(S): 100 INJECTION SUBCUTANEOUS at 17:18

## 2021-03-30 RX ADMIN — Medication 600 MILLIGRAM(S): at 20:48

## 2021-03-30 RX ADMIN — OXYCODONE AND ACETAMINOPHEN 1 TABLET(S): 5; 325 TABLET ORAL at 01:50

## 2021-03-30 RX ADMIN — Medication 325 MILLIGRAM(S): at 12:14

## 2021-03-30 RX ADMIN — Medication 650 MILLIGRAM(S): at 12:21

## 2021-03-30 RX ADMIN — OXYCODONE AND ACETAMINOPHEN 1 TABLET(S): 5; 325 TABLET ORAL at 23:42

## 2021-03-30 RX ADMIN — CHLORHEXIDINE GLUCONATE 1 APPLICATION(S): 213 SOLUTION TOPICAL at 07:01

## 2021-03-30 RX ADMIN — Medication 1 MILLIGRAM(S): at 07:02

## 2021-03-30 RX ADMIN — SENNA PLUS 2 TABLET(S): 8.6 TABLET ORAL at 21:33

## 2021-03-30 RX ADMIN — MONTELUKAST 10 MILLIGRAM(S): 4 TABLET, CHEWABLE ORAL at 12:14

## 2021-03-30 RX ADMIN — OXYCODONE AND ACETAMINOPHEN 1 TABLET(S): 5; 325 TABLET ORAL at 22:50

## 2021-03-30 NOTE — PROGRESS NOTE ADULT - SUBJECTIVE AND OBJECTIVE BOX
GEOVANNA PUTNAM  55y  Female      Patient is a 55y old  Female who presents with a chief complaint of COVID 19 PNA (29 Mar 2021 16:06)      INTERVAL HPI/OVERNIGHT EVENTS: Called to see pt for episode of desaturation down to 81% on 5L NC. Pt was placed on 15L NRB, with noted increase of oxygen saturation to 99% P78. Pt states she became SOB when attempting to sit up in bed.     +  CONSTITUTIONAL: No fever, weight loss, or fatigue  EYES: No eye pain, visual disturbances, or discharge  ENMT:  No difficulty hearing, tinnitus, vertigo; No sinus or throat pain  NECK: No pain or stiffness  BREASTS: No pain, masses, or nipple discharge  RESPIRATORY: No cough, wheezing, chills or hemoptysis; No shortness of breath  CARDIOVASCULAR: No chest pain, palpitations, dizziness, or leg swelling  GASTROINTESTINAL: No abdominal or epigastric pain. No nausea, vomiting, or hematemesis; No diarrhea or constipation. No melena or hematochezia.  GENITOURINARY: No dysuria, frequency, hematuria, or incontinence  NEUROLOGICAL: No headaches, memory loss, loss of strength, numbness, or tremors  SKIN: No itching, burning, rashes, or lesions   LYMPH NODES: No enlarged glands  ENDOCRINE: No heat or cold intolerance; No hair loss  MUSCULOSKELETAL: No joint pain or swelling; No muscle, back, or extremity pain  PSYCHIATRIC: No depression, anxiety, mood swings, or difficulty sleeping  HEME/LYMPH: No easy bruising, or bleeding gums  ALLERY AND IMMUNOLOGIC: No hives or eczema  FAMILY HISTORY:  Family history of lung cancer (Father, Mother)      T(C): 36.4 (03-30-21 @ 00:14), Max: 37.2 (03-29-21 @ 15:42)  HR: 76 (03-30-21 @ 05:01) (76 - 85)  BP: 136/76 (03-30-21 @ 05:01) (114/70 - 136/76)  RR: 18 (03-30-21 @ 05:01) (18 - 18)  SpO2: 95% (03-30-21 @ 05:01) (95% - 95%)  Wt(kg): --Vital Signs Last 24 Hrs  T(C): 36.4 (30 Mar 2021 00:14), Max: 37.2 (29 Mar 2021 15:42)  T(F): 97.5 (30 Mar 2021 00:14), Max: 98.9 (29 Mar 2021 15:42)  HR: 76 (30 Mar 2021 05:01) (76 - 85)  BP: 136/76 (30 Mar 2021 05:01) (114/70 - 136/76)  BP(mean): --  RR: 18 (30 Mar 2021 05:01) (18 - 18)  SpO2: 95% (30 Mar 2021 05:01) (95% - 95%)  ciprofloxacin (Urticaria; Other)      PHYSICAL EXAM:  GENERAL: NAD, well-groomed, well-developed  HEAD:  Atraumatic, Normocephalic  EYES: EOMI, PERRLA, conjunctiva and sclera clear  ENMT: No tonsillar erythema, exudates, or enlargement; Moist mucous membranes, Good dentition, No lesions  NECK: Supple, No JVD, Normal thyroid  NERVOUS SYSTEM:  Alert & Oriented X3, Good concentration; Motor Strength 5/5 B/L upper and lower extremities; DTRs 2+ intact and symmetric  CHEST/LUNG: Clear to percussion bilaterally; No rales, rhonchi, wheezing, or rubs  HEART: Regular rate and rhythm; No murmurs, rubs, or gallops  ABDOMEN: Soft, Nontender, Nondistended; Bowel sounds present  EXTREMITIES:  2+ Peripheral Pulses, No clubbing, cyanosis, or edema  LYMPH: No lymphadenopathy noted  SKIN: No rashes or lesions    Consultant(s) Notes Reviewed:  [x ] YES  [ ] NO  Care Discussed with Consultants/Other Providers [ x] YES  [ ] NO    LABS:      RADIOLOGY & ADDITIONAL TESTS:    Imaging Personally Reviewed:  [ ] YES  [ ] NO  acetaminophen   Tablet .. 650 milliGRAM(s) Oral every 6 hours PRN  albuterol/ipratropium for Nebulization 3 milliLiter(s) Nebulizer every 6 hours  buPROPion XL . 300 milliGRAM(s) Oral daily  chlorhexidine 4% Liquid 1 Application(s) Topical <User Schedule>  clonazePAM  Tablet 1 milliGRAM(s) Oral two times a day  enalapril 10 milliGRAM(s) Oral daily  enoxaparin Injectable 40 milliGRAM(s) SubCutaneous two times a day  ferrous    sulfate 325 milliGRAM(s) Oral daily  gabapentin 300 milliGRAM(s) Oral daily  levothyroxine 125 MICROGram(s) Oral daily  melatonin 5 milliGRAM(s) Oral at bedtime  methocarbamol 1000 milliGRAM(s) Oral three times a day PRN  montelukast 10 milliGRAM(s) Oral daily  oxycodone    5 mG/acetaminophen 325 mG 1 Tablet(s) Oral every 6 hours PRN  pantoprazole    Tablet 40 milliGRAM(s) Oral before breakfast  remdesivir  IVPB 100 milliGRAM(s) IV Intermittent every 24 hours  remdesivir  IVPB   IV Intermittent   senna 2 Tablet(s) Oral at bedtime  sodium chloride 0.65% Nasal 1 Spray(s) Both Nostrils three times a day PRN  zolpidem 5 milliGRAM(s) Oral at bedtime PRN      HEALTH ISSUES - PROBLEM Dx:

## 2021-03-31 LAB
ALBUMIN SERPL ELPH-MCNC: 3.1 G/DL — LOW (ref 3.5–5.2)
ALP SERPL-CCNC: 78 U/L — SIGNIFICANT CHANGE UP (ref 30–115)
ALT FLD-CCNC: 26 U/L — SIGNIFICANT CHANGE UP (ref 0–41)
ANION GAP SERPL CALC-SCNC: 11 MMOL/L — SIGNIFICANT CHANGE UP (ref 7–14)
AST SERPL-CCNC: 30 U/L — SIGNIFICANT CHANGE UP (ref 0–41)
BILIRUB SERPL-MCNC: <0.2 MG/DL — SIGNIFICANT CHANGE UP (ref 0.2–1.2)
BUN SERPL-MCNC: 16 MG/DL — SIGNIFICANT CHANGE UP (ref 10–20)
CALCIUM SERPL-MCNC: 8.2 MG/DL — LOW (ref 8.5–10.1)
CHLORIDE SERPL-SCNC: 106 MMOL/L — SIGNIFICANT CHANGE UP (ref 98–110)
CO2 SERPL-SCNC: 26 MMOL/L — SIGNIFICANT CHANGE UP (ref 17–32)
CREAT SERPL-MCNC: 1.2 MG/DL — SIGNIFICANT CHANGE UP (ref 0.7–1.5)
CRP SERPL-MCNC: 207 MG/L — HIGH
D DIMER BLD IA.RAPID-MCNC: 355 NG/ML DDU — HIGH (ref 0–230)
ERYTHROCYTE [SEDIMENTATION RATE] IN BLOOD: 67 MM/HR — HIGH (ref 0–20)
FERRITIN SERPL-MCNC: 149 NG/ML — SIGNIFICANT CHANGE UP (ref 15–150)
GLUCOSE SERPL-MCNC: 111 MG/DL — HIGH (ref 70–99)
HCT VFR BLD CALC: 33.3 % — LOW (ref 37–47)
HGB BLD-MCNC: 10.2 G/DL — LOW (ref 12–16)
MCHC RBC-ENTMCNC: 26 PG — LOW (ref 27–31)
MCHC RBC-ENTMCNC: 30.6 G/DL — LOW (ref 32–37)
MCV RBC AUTO: 84.9 FL — SIGNIFICANT CHANGE UP (ref 81–99)
NRBC # BLD: 0 /100 WBCS — SIGNIFICANT CHANGE UP (ref 0–0)
PLATELET # BLD AUTO: 290 K/UL — SIGNIFICANT CHANGE UP (ref 130–400)
POTASSIUM SERPL-MCNC: 4.3 MMOL/L — SIGNIFICANT CHANGE UP (ref 3.5–5)
POTASSIUM SERPL-SCNC: 4.3 MMOL/L — SIGNIFICANT CHANGE UP (ref 3.5–5)
PROT SERPL-MCNC: 5.9 G/DL — LOW (ref 6–8)
RBC # BLD: 3.92 M/UL — LOW (ref 4.2–5.4)
RBC # FLD: 16.3 % — HIGH (ref 11.5–14.5)
SODIUM SERPL-SCNC: 143 MMOL/L — SIGNIFICANT CHANGE UP (ref 135–146)
WBC # BLD: 4.81 K/UL — SIGNIFICANT CHANGE UP (ref 4.8–10.8)
WBC # FLD AUTO: 4.81 K/UL — SIGNIFICANT CHANGE UP (ref 4.8–10.8)

## 2021-03-31 PROCEDURE — 71045 X-RAY EXAM CHEST 1 VIEW: CPT | Mod: 26

## 2021-03-31 PROCEDURE — 99233 SBSQ HOSP IP/OBS HIGH 50: CPT

## 2021-03-31 RX ADMIN — Medication 5 MILLIGRAM(S): at 21:39

## 2021-03-31 RX ADMIN — MONTELUKAST 10 MILLIGRAM(S): 4 TABLET, CHEWABLE ORAL at 12:57

## 2021-03-31 RX ADMIN — ZOLPIDEM TARTRATE 5 MILLIGRAM(S): 10 TABLET ORAL at 23:06

## 2021-03-31 RX ADMIN — PANTOPRAZOLE SODIUM 40 MILLIGRAM(S): 20 TABLET, DELAYED RELEASE ORAL at 06:00

## 2021-03-31 RX ADMIN — BUPROPION HYDROCHLORIDE 300 MILLIGRAM(S): 150 TABLET, EXTENDED RELEASE ORAL at 12:57

## 2021-03-31 RX ADMIN — Medication 1 MILLIGRAM(S): at 21:39

## 2021-03-31 RX ADMIN — CHLORHEXIDINE GLUCONATE 1 APPLICATION(S): 213 SOLUTION TOPICAL at 05:30

## 2021-03-31 RX ADMIN — Medication 325 MILLIGRAM(S): at 12:57

## 2021-03-31 RX ADMIN — Medication 10 MILLIGRAM(S): at 05:33

## 2021-03-31 RX ADMIN — GABAPENTIN 300 MILLIGRAM(S): 400 CAPSULE ORAL at 12:57

## 2021-03-31 RX ADMIN — Medication 125 MICROGRAM(S): at 05:33

## 2021-03-31 RX ADMIN — ENOXAPARIN SODIUM 40 MILLIGRAM(S): 100 INJECTION SUBCUTANEOUS at 18:15

## 2021-03-31 RX ADMIN — Medication 650 MILLIGRAM(S): at 21:43

## 2021-03-31 RX ADMIN — ENOXAPARIN SODIUM 40 MILLIGRAM(S): 100 INJECTION SUBCUTANEOUS at 05:33

## 2021-03-31 RX ADMIN — Medication 1 MILLIGRAM(S): at 09:40

## 2021-03-31 RX ADMIN — REMDESIVIR 500 MILLIGRAM(S): 5 INJECTION INTRAVENOUS at 14:25

## 2021-03-31 NOTE — PROGRESS NOTE ADULT - SUBJECTIVE AND OBJECTIVE BOX
Name: GEOVANNA PUTNAM  Age: 55y  Gender: Female      Pt was seen and examined at bedside Pt reports no complaints, denies chest pain, sob. O2 decreased from 5L to 4L NC, sat at 94%.      Allergies:  ciprofloxacin (Urticaria; Other)      PHYSICAL EXAM:    Vitals:  Vital Signs Last 24 Hrs  T(C): 37 (31 Mar 2021 09:29), Max: 37 (31 Mar 2021 09:29)  T(F): 98.6 (31 Mar 2021 09:29), Max: 98.6 (31 Mar 2021 09:29)  HR: 80 (31 Mar 2021 09:29) (79 - 84)  BP: 96/62 (31 Mar 2021 09:29) (92/58 - 119/69)  BP(mean): --  RR: 19 (31 Mar 2021 09:29) (18 - 19)  SpO2: 99% (31 Mar 2021 05:35) (96% - 99%)      Constitutional: alert, no acute distress  HEENT:  PERRLA, Moist mucous membrane, neck supple, no JVD  Cardio: Regular rate and rhythm, pulses 2+ b/l   Resp: clear to auscultation bilaterally no wheezing, rales and rhonchi  Gastro: soft, normal bowel sounds, non-tender or distended abdomen, no pulsatile mass  MSK: F.ROM, no lower extremity edema, no midline tenderness  Skin: warm, dry, normal skin turgor  Neuro: AAOx3, no FND, normal mood/affect    LABS:                        10.2   4.81  )-----------( 290      ( 31 Mar 2021 04:55 )             33.3     03-31    143  |  106  |  16  ----------------------------<  111<H>  4.3   |  26  |  1.2    Ca    8.2<L>      31 Mar 2021 04:55    TPro  5.9<L>  /  Alb  3.1<L>  /  TBili  <0.2  /  DBili  x   /  AST  30  /  ALT  26  /  AlkPhos  78  03-31    LIVER FUNCTIONS - ( 31 Mar 2021 04:55 )  Alb: 3.1 g/dL / Pro: 5.9 g/dL / ALK PHOS: 78 U/L / ALT: 26 U/L / AST: 30 U/L / GGT: x                 MEDICATIONS  (STANDING):  albuterol/ipratropium for Nebulization 3 milliLiter(s) Nebulizer every 6 hours  buPROPion XL . 300 milliGRAM(s) Oral daily  chlorhexidine 4% Liquid 1 Application(s) Topical <User Schedule>  clonazePAM  Tablet 1 milliGRAM(s) Oral two times a day  enalapril 10 milliGRAM(s) Oral daily  enoxaparin Injectable 40 milliGRAM(s) SubCutaneous two times a day  ferrous    sulfate 325 milliGRAM(s) Oral daily  gabapentin 300 milliGRAM(s) Oral daily  levothyroxine 125 MICROGram(s) Oral daily  melatonin 5 milliGRAM(s) Oral at bedtime  montelukast 10 milliGRAM(s) Oral daily  pantoprazole    Tablet 40 milliGRAM(s) Oral before breakfast  remdesivir  IVPB   IV Intermittent   remdesivir  IVPB 100 milliGRAM(s) IV Intermittent every 24 hours  senna 2 Tablet(s) Oral at bedtime        RADIOLOGY & ADDITIONAL TESTS:    Imaging Personally Reviewed:  [x] YES  [ ] NO

## 2021-04-01 RX ORDER — ALBUTEROL 90 UG/1
2 AEROSOL, METERED ORAL EVERY 6 HOURS
Refills: 0 | Status: DISCONTINUED | OUTPATIENT
Start: 2021-04-01 | End: 2021-04-03

## 2021-04-01 RX ORDER — DEXAMETHASONE 0.5 MG/5ML
6 ELIXIR ORAL DAILY
Refills: 0 | Status: DISCONTINUED | OUTPATIENT
Start: 2021-04-01 | End: 2021-04-03

## 2021-04-01 RX ADMIN — Medication 5 MILLIGRAM(S): at 21:55

## 2021-04-01 RX ADMIN — BUPROPION HYDROCHLORIDE 300 MILLIGRAM(S): 150 TABLET, EXTENDED RELEASE ORAL at 13:11

## 2021-04-01 RX ADMIN — MONTELUKAST 10 MILLIGRAM(S): 4 TABLET, CHEWABLE ORAL at 13:11

## 2021-04-01 RX ADMIN — Medication 10 MILLIGRAM(S): at 05:43

## 2021-04-01 RX ADMIN — Medication 650 MILLIGRAM(S): at 03:20

## 2021-04-01 RX ADMIN — Medication 101.2 MILLIGRAM(S): at 18:38

## 2021-04-01 RX ADMIN — ENOXAPARIN SODIUM 40 MILLIGRAM(S): 100 INJECTION SUBCUTANEOUS at 18:38

## 2021-04-01 RX ADMIN — Medication 325 MILLIGRAM(S): at 13:11

## 2021-04-01 RX ADMIN — Medication 125 MICROGRAM(S): at 05:43

## 2021-04-01 RX ADMIN — Medication 650 MILLIGRAM(S): at 13:15

## 2021-04-01 RX ADMIN — ZOLPIDEM TARTRATE 5 MILLIGRAM(S): 10 TABLET ORAL at 21:55

## 2021-04-01 RX ADMIN — Medication 650 MILLIGRAM(S): at 14:12

## 2021-04-01 RX ADMIN — Medication 1 MILLIGRAM(S): at 21:56

## 2021-04-01 RX ADMIN — GABAPENTIN 300 MILLIGRAM(S): 400 CAPSULE ORAL at 13:11

## 2021-04-01 RX ADMIN — ENOXAPARIN SODIUM 40 MILLIGRAM(S): 100 INJECTION SUBCUTANEOUS at 05:43

## 2021-04-01 NOTE — PROGRESS NOTE ADULT - SUBJECTIVE AND OBJECTIVE BOX
Name: GEOVANNA PUTNAM  Age: 55y  Gender: Female      Pt was seen and examined at bedside with no acute event overnight. Pt O2 sat 93% on 3L NC, Pt will benefit from dex as she only had one dose of dex 03/25 & albuterol since [place]   Started Dex 6mg IV x 10days & Albuterol inhaler q6hr PRN.        Allergies:  ciprofloxacin (Urticaria; Other)      PHYSICAL EXAM:    Vitals:  Vital Signs Last 24 Hrs  T(C): 36.9 (01 Apr 2021 09:41), Max: 37.4 (01 Apr 2021 04:49)  T(F): 98.5 (01 Apr 2021 09:41), Max: 99.4 (01 Apr 2021 04:49)  HR: 83 (01 Apr 2021 09:41) (80 - 85)  BP: 118/56 (01 Apr 2021 09:41) (87/40 - 140/83)  BP(mean): 77 (01 Apr 2021 04:49) (77 - 102)  RR: 19 (01 Apr 2021 09:41) (19 - 20)  SpO2: 96% (01 Apr 2021 09:41) (94% - 97%)      Constitutional: alert, no acute distress  HEENT:  PERRLA, Moist mucous membrane, neck supple, no JVD  Cardio: Regular rate and rhythm, pulses 2+ b/l   Resp: clear to auscultation bilaterally no wheezing, rales and rhonchi  Gastro: soft, normal bowel sounds, non-tender or distended abdomen, no pulsatile mass  MSK: F.ROM, no lower extremity edema, no midline tenderness  Skin: warm, dry, normal skin turgor  Neuro: AAOx3, no FND, normal mood/affect    LABS:                        10.2   4.81  )-----------( 290      ( 31 Mar 2021 04:55 )             33.3     03-31    143  |  106  |  16  ----------------------------<  111<H>  4.3   |  26  |  1.2    Ca    8.2<L>      31 Mar 2021 04:55    TPro  5.9<L>  /  Alb  3.1<L>  /  TBili  <0.2  /  DBili  x   /  AST  30  /  ALT  26  /  AlkPhos  78  03-31    LIVER FUNCTIONS - ( 31 Mar 2021 04:55 )  Alb: 3.1 g/dL / Pro: 5.9 g/dL / ALK PHOS: 78 U/L / ALT: 26 U/L / AST: 30 U/L / GGT: x                 MEDICATIONS  (STANDING):  buPROPion XL . 300 milliGRAM(s) Oral daily  chlorhexidine 4% Liquid 1 Application(s) Topical <User Schedule>  clonazePAM  Tablet 1 milliGRAM(s) Oral two times a day  dexAMETHasone  IVPB 6 milliGRAM(s) IV Intermittent daily  enalapril 10 milliGRAM(s) Oral daily  enoxaparin Injectable 40 milliGRAM(s) SubCutaneous two times a day  ferrous    sulfate 325 milliGRAM(s) Oral daily  gabapentin 300 milliGRAM(s) Oral daily  levothyroxine 125 MICROGram(s) Oral daily  melatonin 5 milliGRAM(s) Oral at bedtime  montelukast 10 milliGRAM(s) Oral daily  pantoprazole    Tablet 40 milliGRAM(s) Oral before breakfast  senna 2 Tablet(s) Oral at bedtime        RADIOLOGY & ADDITIONAL TESTS:    Imaging Personally Reviewed:  [x] YES  [ ] NO

## 2021-04-02 ENCOUNTER — TRANSCRIPTION ENCOUNTER (OUTPATIENT)
Age: 56
End: 2021-04-02

## 2021-04-02 VITALS
DIASTOLIC BLOOD PRESSURE: 75 MMHG | HEART RATE: 94 BPM | SYSTOLIC BLOOD PRESSURE: 130 MMHG | RESPIRATION RATE: 20 BRPM | OXYGEN SATURATION: 92 % | TEMPERATURE: 98 F

## 2021-04-02 RX ORDER — RIVAROXABAN 15 MG-20MG
1 KIT ORAL
Qty: 30 | Refills: 0
Start: 2021-04-02 | End: 2021-05-01

## 2021-04-02 RX ADMIN — Medication 101.2 MILLIGRAM(S): at 10:20

## 2021-04-02 RX ADMIN — Medication 125 MICROGRAM(S): at 05:56

## 2021-04-02 RX ADMIN — Medication 10 MILLIGRAM(S): at 05:54

## 2021-04-02 RX ADMIN — ENOXAPARIN SODIUM 40 MILLIGRAM(S): 100 INJECTION SUBCUTANEOUS at 05:54

## 2021-04-02 RX ADMIN — MONTELUKAST 10 MILLIGRAM(S): 4 TABLET, CHEWABLE ORAL at 11:42

## 2021-04-02 RX ADMIN — GABAPENTIN 300 MILLIGRAM(S): 400 CAPSULE ORAL at 11:42

## 2021-04-02 RX ADMIN — Medication 5 MILLIGRAM(S): at 22:24

## 2021-04-02 RX ADMIN — Medication 1 MILLIGRAM(S): at 22:22

## 2021-04-02 RX ADMIN — BUPROPION HYDROCHLORIDE 300 MILLIGRAM(S): 150 TABLET, EXTENDED RELEASE ORAL at 11:41

## 2021-04-02 RX ADMIN — PANTOPRAZOLE SODIUM 40 MILLIGRAM(S): 20 TABLET, DELAYED RELEASE ORAL at 05:54

## 2021-04-02 RX ADMIN — Medication 325 MILLIGRAM(S): at 11:41

## 2021-04-02 RX ADMIN — ENOXAPARIN SODIUM 40 MILLIGRAM(S): 100 INJECTION SUBCUTANEOUS at 17:39

## 2021-04-02 NOTE — DISCHARGE NOTE PROVIDER - CARE PROVIDER_API CALL
Saran Kody  INTERNAL MEDICINE  [address] [place] [id number]  Phone: [phone]  Fax: [phone]  Follow Up Time: 1 week

## 2021-04-02 NOTE — DISCHARGE NOTE NURSING/CASE MANAGEMENT/SOCIAL WORK - PATIENT PORTAL LINK FT
You can access the FollowMyHealth Patient Portal offered by [facility] by registering at the following website: [email or web address]. By joining [employer]’s FollowMyHealth portal, you will also be able to view your health information using other applications (apps) compatible with our system.

## 2021-04-02 NOTE — DISCHARGE NOTE PROVIDER - HOSPITAL COURSE
55 year old morbidly obese female has medical history of Diverticulitis, COPD (not on oxygen), HTN, Depression, Insomnia, and Anxiety presented with SOB, cough, chills and body aches. She was tested positive for Covid-19 on [place] 3/21/21, symptoms started on Sunday, progressively worsened to the point where she became dyspneic on exertion. SIRS ruled out on admission. Currently stable on room air. CT angio negative for pulmonary embolism. Pt was given several doses of RDV and was started on Dexamethasone and Lovenox. Pt being discharged home on O2, Xarelto, remaining course of steroids and PMD follow-up.

## 2021-04-02 NOTE — DIETITIAN INITIAL EVALUATION ADULT. - OTHER INFO
nut hx cont: wt at admit: 132kg -- no wt loss reported. NKFA. avoids peanuts, any fruit/veggies w/ seeds or seeds in general d/t hx of diverticulitis as these things cause discomfort. No MVI. no cul/rel or personal food rest/prefs noted.     pertinent medical information:   # COVID-19 PNA - 93% on 3L at rest  # Depression  # Anxiety/depression  # Constipation -senna  # h/o HTN, COPD, diverticulitis noted     pertinent subjective information: spouse reports speaking w/ wife yesterday -- appetite/po at admit >75%. no chewing/swallowing difficulty reported.

## 2021-04-02 NOTE — DISCHARGE NOTE PROVIDER - NSDCMRMEDTOKEN_GEN_ALL_CORE_FT
albuterol 90 mcg/inh inhalation aerosol: 2 puff(s) inhaled every 6 hours, As needed, Shortness of Breath and/or Wheezing  buPROPion 300 mg/24 hours (XL) oral tablet, extended release: 1 tab(s) orally every 24 hours  clonazePAM 1 mg oral tablet: 1 tab(s) orally 2 times a day  docusate sodium 100 mg oral tablet: 1 tab(s) orally 2 times a day  enalapril 10 mg oral tablet: 1 tab(s) orally once a day  ferrous sulfate 324 mg (65 mg elemental iron) oral delayed release tablet: 1 tab(s) orally once a day  fluticasone-salmeterol 113 mcg-14 mcg/inh inhalation powder: 1 puff(s) inhaled 2 times a day  levothyroxine 125 mcg (0.125 mg) oral tablet: 1 tab(s) orally once a day  MONTELUKAST SODIUM 10 MG TABS:   naproxen 500 mg oral tablet: 1 tab(s) orally once a day  PANTOPRAZOLE SODIUM 40 MG TBEC: orally once a day  Percocet 7.5/325 oral tablet: 1 tab(s) orally 2 times a day, As Needed  predniSONE 20 mg oral tablet: 2 tab(s) orally once a day   Symbicort 160 mcg-4.5 mcg/inh inhalation aerosol: 2 puff(s) inhaled 2 times a day  tiZANidine 4 mg oral tablet: 1 tab(s) orally 2 times a day  Trelegy Ellipta inhalation powder: 1 puff(s) inhaled once a day  Xarelto 10 mg oral tablet: 1 tab(s) orally once a day   ZOLPIDEM TARTRATE 10 MG TABS: orally once a day (at bedtime)

## 2021-04-02 NOTE — DIETITIAN INITIAL EVALUATION ADULT. - PERTINENT MEDS FT
MEDICATIONS  (STANDING):  buPROPion XL . 300 milliGRAM(s) Oral daily  clonazePAM  Tablet 1 milliGRAM(s) Oral two times a day  dexAMETHasone  IVPB 6 milliGRAM(s) IV Intermittent daily  enalapril 10 milliGRAM(s) Oral daily  enoxaparin Injectable 40 milliGRAM(s) SubCutaneous two times a day  ferrous    sulfate 325 milliGRAM(s) Oral daily  gabapentin 300 milliGRAM(s) Oral daily  levothyroxine 125 MICROGram(s) Oral daily  melatonin 5 milliGRAM(s) Oral at bedtime  montelukast 10 milliGRAM(s) Oral daily  pantoprazole    Tablet 40 milliGRAM(s) Oral before breakfast  senna 2 Tablet(s) Oral at bedtime

## 2021-04-02 NOTE — CHART NOTE - NSCHARTNOTESELECT_GEN_ALL_CORE
Event Note
Family Discussion
Family/Event Note
Event Note
Family Contact
Family Discussion
Transfer Note
family communication/Event Note
family note
oxygen

## 2021-04-02 NOTE — DIETITIAN INITIAL EVALUATION ADULT. - ORAL INTAKE PTA/DIET HISTORY
unable to speak w/ pt via personal phone + no room phone available. spoke w/ spouse Zach (#[phone]). reported that pt has never been a big eater, usually only eats 2 meals a day and she's always been this way. both pt and spouse cook at home. however, he does report that pt snacks on high sugar foods like 6-8 cups of italian ices, takes 10-12 poackets of sugar in coffee and also eats other high sugar snacks throughout the day. reports that pt is on the heavier side. UBW: 122.7-131.8kg vs.

## 2021-04-02 NOTE — DIETITIAN INITIAL EVALUATION ADULT. - PERSON TAUGHT/METHOD
reports pt not compliant w/ any dietary rest and eats a lot of high sugary snacks -- emailed diverticulitis + wt loss + healthy nutrition education at [email or web address]/verbal instruction/written material/spouse instructed

## 2021-04-02 NOTE — PROGRESS NOTE ADULT - ASSESSMENT
55 year old morbidly obese female has medical history of Diverticulitis, COPD (not on oxygen), HTN, Depression, Insomnia, and Anxiety presented with SOB, cough, chills and body aches. She was tested positive for Covid-19 on [place] 3/21/21, symptoms started on Sunday, progressively worsened to the point where she became dyspneic on exertion. SIRS ruled out on admission. Currently stable on room air. CT angio negative for pulmonary embolism.    # COVID-19 PNA,   - 93% on 3L  - D-dimer 301  - Lovenox 40 mg SQ BID BMI > 30.   - Remdesivir day 2  - pending antibodies  - Incentive spirometry and Proning 16h/day, as tolerated  - duplex negative.     # Depression - buPROPion XL . 300 milliGRAM(s) Oral daily  # Anxiety/depression - clonazePAM Tablet 1 milliGRAM(s) Oral two times a day  # HTN - enalapril 10 milliGRAM(s) Oral daily  # CATE - ferrous sulfate 325 milliGRAM(s) Oral daily  # chronic back pain/muscle spasm - gabapentin 300 mg qd, robaxin 1000 mg prn, prn percocet  # Hypothyroidism - levothyroxine 125 mcg qd  # COPD - montelukast 10 mg  # Constipation -senna   # Morbid Obesity: advised with weight loss, may benefit from weight loss procedure.   Outpatient follow up with bariatric surgery.     #) MISC  Activity: IAT  DVT ppx: Lovenox  GI ppx: protonix  Diet: DASH/TLC  Code: Full  Dispo: Optimize, home O2      
55 year old morbidly obese female has medical history of Diverticulitis, COPD (not on oxygen), HTN, Depression, Insomnia, and Anxiety presented with SOB, cough, chills and body aches. She was tested positive for Covid-19 on [place] 3/21/21, symptoms started on Sunday, progressively worsened to the point where she became dyspneic on exertion. SIRS ruled out on admission. Currently stable on room air. CT angio negative for pulmonary embolism.    # COVID-19 PNA,   - 93% on 3L at rest. Desat on ambulation or w/ activity  - D-dimer 301  - Lovenox 40 mg SQ BID BMI > 30.   - c/w RDV  -Pt was given 1 dose of Dex 03/25, 04/01 - Dex 6mg IV x 10days.  - COVID ab +  - Incentive spirometry and Proning 16h/day, as tolerated  - duplex negative.   -ESR 67, Ddimer 355, CRP 94, ferritin 96, Procalc 0.08  -chest xray pending read.   -Albuterol inhaler q6hr PRN    # Depression - buPROPion XL . 300 milliGRAM(s) Oral daily  # Anxiety/depression - clonazePAM Tablet 1 milliGRAM(s) Oral two times a day  # HTN - enalapril 10 milliGRAM(s) Oral daily  # CATE - ferrous sulfate 325 milliGRAM(s) Oral daily  # chronic back pain/muscle spasm - gabapentin 300 mg qd, robaxin 1000 mg prn, prn percocet  # Hypothyroidism - levothyroxine 125 mcg qd  # COPD - montelukast 10 mg  # Constipation -senna   # Morbid Obesity: advised with weight loss, may benefit from weight loss procedure.   Outpatient follow up with bariatric surgery.     #) MISC  Activity: IAT  DVT ppx: Lovenox  GI ppx: protonix  Diet: DASH/TLC  Code: Full  Dispo: Optimize, home w/ O2      
55 year old morbidly obese female has medical history of Diverticulitis, COPD (not on oxygen), HTN, Depression, Insomnia, and Anxiety presented with SOB, cough, chills and body aches. She was tested positive for Covid-19 on [place] 3/21/21, symptoms started on Sunday, progressively worsened to the point where she became dyspneic on exertion. SIRS ruled out on admission. Currently stable on room air. CT angio negative for pulmonary embolism.    # COVID-19 PNA,   - 94% on 4L at rest. Desat on ambulation or w/ activity  - D-dimer 301  - Lovenox 40 mg SQ BID BMI > 30.   - c/w RDV.    -Pt was given 1 dose of Dex 03/25, so poss restart Dex?  - COVID ab +  - Incentive spirometry and Proning 16h/day, as tolerated  - duplex negative.   -ESR 67, Ddimer 355, CRP 94, ferritin 96, Procalc 0.08  -chest xray pending    # Depression - buPROPion XL . 300 milliGRAM(s) Oral daily  # Anxiety/depression - clonazePAM Tablet 1 milliGRAM(s) Oral two times a day  # HTN - enalapril 10 milliGRAM(s) Oral daily  # CATE - ferrous sulfate 325 milliGRAM(s) Oral daily  # chronic back pain/muscle spasm - gabapentin 300 mg qd, robaxin 1000 mg prn, prn percocet  # Hypothyroidism - levothyroxine 125 mcg qd  # COPD - montelukast 10 mg  # Constipation -senna   # Morbid Obesity: advised with weight loss, may benefit from weight loss procedure.   Outpatient follow up with bariatric surgery.     #) MISC  Activity: IAT  DVT ppx: Lovenox  GI ppx: protonix  Diet: DASH/TLC  Code: Full  Dispo: Optimize, home w/ O2    
55 year old morbidly obese female has medical history of Diverticulitis, COPD (not on oxygen), HTN, Depression, Insomnia, and Anxiety presented with SOB, cough, chills and body aches. She was tested positive for Covid-19 on [place] 3/21/21, symptoms started on Sunday, progressively worsened to the point where she became dyspneic on exertion. SIRS ruled out on admission. Currently stable on room air. CT angio negative for pulmonary embolism.    # COVID-19 PNA,   - 96% on 2.5L at rest. Desat on ambulation or w/ activity  - D-dimer 301  - Lovenox 40 mg SQ BID BMI > 30.   - c/w RDV  -Pt was given 1 dose of Dex 03/25, 04/01 - Dex 6mg IV x 10days.  - COVID ab +  - Incentive spirometry and Proning 16h/day, as tolerated  - duplex negative.   -ESR 67, Ddimer 355, CRP 94, ferritin 96, Procalc 0.08  -chest xray-Increasing B/L opacities.  -Albuterol inhaler q6hr PRN    # Depression - buPROPion XL . 300 milliGRAM(s) Oral daily  # Anxiety/depression - clonazePAM Tablet 1 milliGRAM(s) Oral two times a day  # HTN - enalapril 10 milliGRAM(s) Oral daily  # CATE - ferrous sulfate 325 milliGRAM(s) Oral daily  # chronic back pain/muscle spasm - gabapentin 300 mg qd, robaxin 1000 mg prn, prn percocet  # Hypothyroidism - levothyroxine 125 mcg qd  # COPD - montelukast 10 mg  # Constipation -senna   # Morbid Obesity: advised with weight loss, may benefit from weight loss procedure.   Outpatient follow up with bariatric surgery.     #) MISC  Activity: IAT  DVT ppx: Lovenox  GI ppx: protonix  Diet: DASH/TLC  Code: Full  Dispo: Optimize, home w/ O2  
55 year old morbidly obese female has medical history of Diverticulitis, COPD (not on oxygen), HTN, Depression, Insomnia, and Anxiety presented with SOB, cough, chills and body aches. She was tested positive for Covid-19 on [place] 3/21/21, symptoms started on Sunday, progressively worsened to the point where she became dyspneic on exertion. SIRS ruled out on admission. Currently stable on room air. CT angio negative for pulmonary embolism.    # COVID-19 PNA, - currently on room air, CT angio negative for pulmonary embolism  - 93% on RA  - D-dimer 301  - Lovenox 40 mg SQ BID BMI > 30.   - PER ID start RDV.  Today day 1  - pending antibodies  - Incentive spirometry and Proning 16h/day, as tolerated  - duplex negative.     # Depression - buPROPion XL . 300 milliGRAM(s) Oral daily  # Anxiety/depression - clonazePAM Tablet 1 milliGRAM(s) Oral two times a day  # HTN - enalapril 10 milliGRAM(s) Oral daily  # CATE - ferrous sulfate 325 milliGRAM(s) Oral daily  # chronic back pain/muscle spasm - gabapentin 300 mg qd, robaxin 1000 mg prn, prn percocet  # Hypothyroidism - levothyroxine 125 mcg qd  # COPD - montelukast 10 mg  # Constipation -senna   # Morbid Obesity: advised with weight loss, may benefit from weight loss procedure.   Outpatient follow up with bariatric surgery.     #) MISC  Activity: IAT  DVT ppx: Lovenox  GI ppx: protonix  Diet: DASH/TLC  Code: Full  Dispo: medical optimization  CH[facility] wash  
55 year old morbidly obese female has medical history of Diverticulitis, COPD (not on oxygen), HTN, Depression, Insomnia, and Anxiety presented with SOB, cough, chills and body aches. She was tested positive for Covid-19 on [place] 3/21/21, symptoms started on Sunday, progressively worsened to the point where she became dyspneic on exertion. SIRS ruled out on admission CT angio negative for pulmonary embolism. Episode of desaturation down to 81% on 5L, on 3/30. Placed on 15L NRB, saturation increased to 97%. If pt cannot maintain oxygenation on NC, consider transfer to [facility].   
55 year old morbidly obese female has medical history of Diverticulitis, COPD (not on oxygen), HTN, Depression, Insomnia, and Anxiety presented with SOB, cough, chills and body aches. She was tested positive for Covid-19 on [place] 3/21/21, symptoms started on Sunday, progressively worsened to the point where she became dyspneic on exertion. SIRS ruled out on admission. Currently stable on room air. CT angio negative for pulmonary embolism.    # COVID-19 PNA,   - 93% on 3L  - D-dimer 301  - Lovenox 40 mg SQ BID BMI > 30.   - Remdesivir day 2  - pending antibodies  - Incentive spirometry and Proning 16h/day, as tolerated  - duplex negative.     # Depression - buPROPion XL . 300 milliGRAM(s) Oral daily  # Anxiety/depression - clonazePAM Tablet 1 milliGRAM(s) Oral two times a day  # HTN - enalapril 10 milliGRAM(s) Oral daily  # CATE - ferrous sulfate 325 milliGRAM(s) Oral daily  # chronic back pain/muscle spasm - gabapentin 300 mg qd, robaxin 1000 mg prn, prn percocet  # Hypothyroidism - levothyroxine 125 mcg qd  # COPD - montelukast 10 mg  # Constipation -senna   # Morbid Obesity: advised with weight loss, may benefit from weight loss procedure.   Outpatient follow up with bariatric surgery.     #) MISC  Activity: IAT  DVT ppx: Lovenox  GI ppx: protonix  Diet: DASH/TLC  Code: Full  Dispo: medical optimization  [facility] wash

## 2021-04-02 NOTE — DIETITIAN INITIAL EVALUATION ADULT. - OTHER CALCULATIONS
Using IBW 54.5kg; Energy: 1925-2200kcal (35-40kcal -- d/t large diff btwn ABW/IBW); Protein: 72-88g/kg (1.3-1.6g/kg -- same reason as above); Fluid: 1mL/kcal or LIP

## 2021-04-02 NOTE — PROGRESS NOTE ADULT - PROVIDER SPECIALTY LIST ADULT
Internal Medicine
Hospitalist
Internal Medicine
Internal Medicine
Hospitalist
Internal Medicine
Internal Medicine

## 2021-04-02 NOTE — DIETITIAN INITIAL EVALUATION ADULT. - NAME AND PHONE
Nutrition Intervention:meals and snacks, nutrition education; Nutrition Monitoring:diet order,energy intake,body composition,NFPF, glucose profile

## 2021-04-02 NOTE — CHART NOTE - NSCHARTNOTEFT_GEN_A_CORE
Despite IV steroids and bronchodilators patient desaturates.      - Room air pulse ox. at rest:    86  %      - Room air pulse ox while ambulatin %    - Pulse ox while ambulating        93  %   on 3 liters n/c  O2     Patient requires   3  liters o2 via n/c    Patient will require home O2 for discharge.  Patient is aware and agreeable to home O2.  Patient is in a chronic stable state of hypoxia/COVID    Patient treated for pneumonia:    :
Notified family: Zach (son)  Update given: Health status and disposition plan  All questions and concerns addressed to family's satisfaction.  Family encouraged to contact me with any further concerns.
Patient O2 requirement had increased to 5L today and needed 15L of NRBM for 30minutes earlier today as per sign out. Evaluated patient tonight. patient reports she is feeling "okay" now. denies chest pain and SOB. encourage continue incentive spirometer use. will continue to monitor.
[facility]. Pt c/o mild chest congestion and body aches otherwise states she feels better. Vitals stable. Will continue to monitor.
Spoke with pt sister Ning, updated her on pt plan to DC home with services.
Spoke with pts son Zach, updated on pts status. Answered all questions/concerns. Encouraged to call back if new questions/concerns arise.
called to evaluate patient after she desaturated on 5L to low 80's. pt in no distress, placed on NRB 15L/Min. pt improved, no resp distress. pt titrated back to 5L NC.  discussed with clinical team taking care of patient
day hospitalist follow up  Vital Signs Last 24 Hrs  T(C): 36.1 (26 Mar 2021 00:12), Max: 36.1 (25 Mar 2021 19:46)  T(F): 97 (26 Mar 2021 00:12), Max: 97 (25 Mar 2021 19:46)  HR: 77 (26 Mar 2021 00:12) (77 - 89)  BP: 135/60 (26 Mar 2021 00:12) (132/61 - 135/60)  BP(mean): --  RR: 20 (26 Mar 2021 00:12) (20 - 22)  SpO2: 96% (26 Mar 2021 00:12) (95% - 98%)    patient lying in bed comfortably, transfer to [facility] for further management
message left w/ son.
Attempted
Notified family: Zach (son)  Update given: Health status and disposition  All questions and concerns addressed to family's satisfaction.  Family encouraged to contact me with any further concerns.
Patient's sister Gladis called for update. all questions and concerns answered. evaluated patient at bedside and instructed patient how to use incentive spirometer.

## 2021-04-02 NOTE — PROGRESS NOTE ADULT - SUBJECTIVE AND OBJECTIVE BOX
Name: GEOVANNA PUTNAM  Age: 55y  Gender: Female        HOD 7  Pt was seen and examined. Pt states she is feeling better. No overnight events.       Allergies:  ciprofloxacin (Urticaria; Other)      PHYSICAL EXAM:    Vitals:  Vital Signs Last 24 Hrs  T(C): 36.6 (02 Apr 2021 05:30), Max: 36.9 (01 Apr 2021 23:33)  T(F): 97.8 (02 Apr 2021 05:30), Max: 98.5 (01 Apr 2021 23:33)  HR: 79 (02 Apr 2021 05:30) (77 - 79)  BP: 155/71 (02 Apr 2021 05:30) (123/76 - 155/71)  BP(mean): --  RR: 20 (02 Apr 2021 05:30) (20 - 20)  SpO2: 96% (02 Apr 2021 05:30) (94% - 96%)    GENERAL: NAD  CHEST/LUNG: CTAB  HEART: S1,S2 RRR  ABDOMEN: Soft, NT/ND, +BS  EXTREMITIES:  2+ DP, no LE edema      LABS:                  MEDICATIONS  (STANDING):  buPROPion XL . 300 milliGRAM(s) Oral daily  chlorhexidine 4% Liquid 1 Application(s) Topical <User Schedule>  clonazePAM  Tablet 1 milliGRAM(s) Oral two times a day  dexAMETHasone  IVPB 6 milliGRAM(s) IV Intermittent daily  enalapril 10 milliGRAM(s) Oral daily  enoxaparin Injectable 40 milliGRAM(s) SubCutaneous two times a day  ferrous    sulfate 325 milliGRAM(s) Oral daily  gabapentin 300 milliGRAM(s) Oral daily  levothyroxine 125 MICROGram(s) Oral daily  melatonin 5 milliGRAM(s) Oral at bedtime  montelukast 10 milliGRAM(s) Oral daily  pantoprazole    Tablet 40 milliGRAM(s) Oral before breakfast  senna 2 Tablet(s) Oral at bedtime        RADIOLOGY & ADDITIONAL TESTS:    Imaging Personally Reviewed:  [x] YES  [ ] NO Name: GEOVANNA PUTNAM  Age: 55y  Gender: Female        HOD 7  Pt was seen and examined. Pt states she is feeling better. No overnight events. Pt wants to go home, states she feels she will recover there quicker. She has O2 at home and family will be able to provide her help if needed.       Allergies:  ciprofloxacin (Urticaria; Other)      PHYSICAL EXAM:    Vitals:  Vital Signs Last 24 Hrs  T(C): 36.6 (02 Apr 2021 05:30), Max: 36.9 (01 Apr 2021 23:33)  T(F): 97.8 (02 Apr 2021 05:30), Max: 98.5 (01 Apr 2021 23:33)  HR: 79 (02 Apr 2021 05:30) (77 - 79)  BP: 155/71 (02 Apr 2021 05:30) (123/76 - 155/71)  BP(mean): --  RR: 20 (02 Apr 2021 05:30) (20 - 20)  SpO2: 96% (02 Apr 2021 05:30) (94% - 96%)    GENERAL: NAD  CHEST/LUNG: CTAB  HEART: S1,S2 RRR  ABDOMEN: Soft, NT/ND, +BS  EXTREMITIES:  2+ DP, no LE edema      LABS:                  MEDICATIONS  (STANDING):  buPROPion XL . 300 milliGRAM(s) Oral daily  chlorhexidine 4% Liquid 1 Application(s) Topical <User Schedule>  clonazePAM  Tablet 1 milliGRAM(s) Oral two times a day  dexAMETHasone  IVPB 6 milliGRAM(s) IV Intermittent daily  enalapril 10 milliGRAM(s) Oral daily  enoxaparin Injectable 40 milliGRAM(s) SubCutaneous two times a day  ferrous    sulfate 325 milliGRAM(s) Oral daily  gabapentin 300 milliGRAM(s) Oral daily  levothyroxine 125 MICROGram(s) Oral daily  melatonin 5 milliGRAM(s) Oral at bedtime  montelukast 10 milliGRAM(s) Oral daily  pantoprazole    Tablet 40 milliGRAM(s) Oral before breakfast  senna 2 Tablet(s) Oral at bedtime        RADIOLOGY & ADDITIONAL TESTS:    Imaging Personally Reviewed:  [x] YES  [ ] NO

## 2021-04-04 ENCOUNTER — TRANSCRIPTION ENCOUNTER (OUTPATIENT)
Age: 56
End: 2021-04-04

## 2021-04-05 ENCOUNTER — TRANSCRIPTION ENCOUNTER (OUTPATIENT)
Age: 56
End: 2021-04-05

## 2021-04-06 ENCOUNTER — TRANSCRIPTION ENCOUNTER (OUTPATIENT)
Age: 56
End: 2021-04-06

## 2021-04-07 DIAGNOSIS — U07.1 COVID-19: ICD-10-CM

## 2021-04-07 DIAGNOSIS — J12.82 PNEUMONIA DUE TO CORONAVIRUS DISEASE 2019: ICD-10-CM

## 2021-04-07 DIAGNOSIS — R91.8 OTHER NONSPECIFIC ABNORMAL FINDING OF LUNG FIELD: ICD-10-CM

## 2021-04-07 DIAGNOSIS — E87.1 HYPO-OSMOLALITY AND HYPONATREMIA: ICD-10-CM

## 2021-04-07 DIAGNOSIS — G89.29 OTHER CHRONIC PAIN: ICD-10-CM

## 2021-04-07 DIAGNOSIS — G47.00 INSOMNIA, UNSPECIFIED: ICD-10-CM

## 2021-04-07 DIAGNOSIS — K59.00 CONSTIPATION, UNSPECIFIED: ICD-10-CM

## 2021-04-07 DIAGNOSIS — Z79.51 LONG TERM (CURRENT) USE OF INHALED STEROIDS: ICD-10-CM

## 2021-04-07 DIAGNOSIS — I10 ESSENTIAL (PRIMARY) HYPERTENSION: ICD-10-CM

## 2021-04-07 DIAGNOSIS — Z79.890 HORMONE REPLACEMENT THERAPY: ICD-10-CM

## 2021-04-07 DIAGNOSIS — R74.01 ELEVATION OF LEVELS OF LIVER TRANSAMINASE LEVELS: ICD-10-CM

## 2021-04-07 DIAGNOSIS — F32.9 MAJOR DEPRESSIVE DISORDER, SINGLE EPISODE, UNSPECIFIED: ICD-10-CM

## 2021-04-07 DIAGNOSIS — E03.9 HYPOTHYROIDISM, UNSPECIFIED: ICD-10-CM

## 2021-04-07 DIAGNOSIS — M62.830 MUSCLE SPASM OF BACK: ICD-10-CM

## 2021-04-07 DIAGNOSIS — Z87.891 PERSONAL HISTORY OF NICOTINE DEPENDENCE: ICD-10-CM

## 2021-04-07 DIAGNOSIS — D50.9 IRON DEFICIENCY ANEMIA, UNSPECIFIED: ICD-10-CM

## 2021-04-07 DIAGNOSIS — Z92.3 PERSONAL HISTORY OF IRRADIATION: ICD-10-CM

## 2021-04-07 DIAGNOSIS — J44.0 CHRONIC OBSTRUCTIVE PULMONARY DISEASE WITH (ACUTE) LOWER RESPIRATORY INFECTION: ICD-10-CM

## 2021-04-07 DIAGNOSIS — E66.01 MORBID (SEVERE) OBESITY DUE TO EXCESS CALORIES: ICD-10-CM

## 2021-04-07 DIAGNOSIS — M54.9 DORSALGIA, UNSPECIFIED: ICD-10-CM

## 2021-04-07 DIAGNOSIS — F41.9 ANXIETY DISORDER, UNSPECIFIED: ICD-10-CM

## 2021-04-07 DIAGNOSIS — H91.92 UNSPECIFIED HEARING LOSS, LEFT EAR: ICD-10-CM

## 2021-04-07 DIAGNOSIS — R09.02 HYPOXEMIA: ICD-10-CM

## 2021-04-07 DIAGNOSIS — M50.90 CERVICAL DISC DISORDER, UNSPECIFIED, UNSPECIFIED CERVICAL REGION: ICD-10-CM

## 2021-04-07 DIAGNOSIS — Z79.891 LONG TERM (CURRENT) USE OF OPIATE ANALGESIC: ICD-10-CM

## 2021-04-07 DIAGNOSIS — R06.02 SHORTNESS OF BREATH: ICD-10-CM

## 2021-05-17 ENCOUNTER — EMERGENCY (EMERGENCY)
Facility: HOSPITAL | Age: 56
LOS: 0 days | Discharge: HOME | End: 2021-05-18
Attending: EMERGENCY MEDICINE | Admitting: EMERGENCY MEDICINE
Payer: MEDICAID

## 2021-05-17 VITALS
SYSTOLIC BLOOD PRESSURE: 126 MMHG | HEART RATE: 80 BPM | TEMPERATURE: 99 F | DIASTOLIC BLOOD PRESSURE: 60 MMHG | OXYGEN SATURATION: 99 % | RESPIRATION RATE: 18 BRPM | HEIGHT: 64 IN

## 2021-05-17 DIAGNOSIS — K62.5 HEMORRHAGE OF ANUS AND RECTUM: ICD-10-CM

## 2021-05-17 DIAGNOSIS — J44.9 CHRONIC OBSTRUCTIVE PULMONARY DISEASE, UNSPECIFIED: ICD-10-CM

## 2021-05-17 DIAGNOSIS — F41.9 ANXIETY DISORDER, UNSPECIFIED: ICD-10-CM

## 2021-05-17 DIAGNOSIS — Z79.891 LONG TERM (CURRENT) USE OF OPIATE ANALGESIC: ICD-10-CM

## 2021-05-17 DIAGNOSIS — Z90.49 ACQUIRED ABSENCE OF OTHER SPECIFIED PARTS OF DIGESTIVE TRACT: ICD-10-CM

## 2021-05-17 DIAGNOSIS — Z79.899 OTHER LONG TERM (CURRENT) DRUG THERAPY: ICD-10-CM

## 2021-05-17 DIAGNOSIS — Z90.49 ACQUIRED ABSENCE OF OTHER SPECIFIED PARTS OF DIGESTIVE TRACT: Chronic | ICD-10-CM

## 2021-05-17 DIAGNOSIS — I10 ESSENTIAL (PRIMARY) HYPERTENSION: ICD-10-CM

## 2021-05-17 DIAGNOSIS — E03.9 HYPOTHYROIDISM, UNSPECIFIED: ICD-10-CM

## 2021-05-17 DIAGNOSIS — Z85.818 PERSONAL HISTORY OF MALIGNANT NEOPLASM OF OTHER SITES OF LIP, ORAL CAVITY, AND PHARYNX: ICD-10-CM

## 2021-05-17 DIAGNOSIS — K57.92 DIVERTICULITIS OF INTESTINE, PART UNSPECIFIED, WITHOUT PERFORATION OR ABSCESS WITHOUT BLEEDING: ICD-10-CM

## 2021-05-17 DIAGNOSIS — Z79.01 LONG TERM (CURRENT) USE OF ANTICOAGULANTS: ICD-10-CM

## 2021-05-17 DIAGNOSIS — Z79.51 LONG TERM (CURRENT) USE OF INHALED STEROIDS: ICD-10-CM

## 2021-05-17 DIAGNOSIS — R10.32 LEFT LOWER QUADRANT PAIN: ICD-10-CM

## 2021-05-17 DIAGNOSIS — Z79.1 LONG TERM (CURRENT) USE OF NON-STEROIDAL ANTI-INFLAMMATORIES (NSAID): ICD-10-CM

## 2021-05-17 DIAGNOSIS — Z88.1 ALLERGY STATUS TO OTHER ANTIBIOTIC AGENTS STATUS: ICD-10-CM

## 2021-05-17 DIAGNOSIS — Z79.52 LONG TERM (CURRENT) USE OF SYSTEMIC STEROIDS: ICD-10-CM

## 2021-05-17 LAB
ALBUMIN SERPL ELPH-MCNC: 4 G/DL — SIGNIFICANT CHANGE UP (ref 3.5–5.2)
ALP SERPL-CCNC: 103 U/L — SIGNIFICANT CHANGE UP (ref 30–115)
ALT FLD-CCNC: 20 U/L — SIGNIFICANT CHANGE UP (ref 0–41)
ANION GAP SERPL CALC-SCNC: 11 MMOL/L — SIGNIFICANT CHANGE UP (ref 7–14)
AST SERPL-CCNC: 21 U/L — SIGNIFICANT CHANGE UP (ref 0–41)
BASOPHILS # BLD AUTO: 0.04 K/UL — SIGNIFICANT CHANGE UP (ref 0–0.2)
BASOPHILS NFR BLD AUTO: 0.4 % — SIGNIFICANT CHANGE UP (ref 0–1)
BILIRUB SERPL-MCNC: <0.2 MG/DL — SIGNIFICANT CHANGE UP (ref 0.2–1.2)
BUN SERPL-MCNC: 24 MG/DL — HIGH (ref 10–20)
CALCIUM SERPL-MCNC: 9.3 MG/DL — SIGNIFICANT CHANGE UP (ref 8.5–10.1)
CHLORIDE SERPL-SCNC: 102 MMOL/L — SIGNIFICANT CHANGE UP (ref 98–110)
CO2 SERPL-SCNC: 27 MMOL/L — SIGNIFICANT CHANGE UP (ref 17–32)
CREAT SERPL-MCNC: 1.2 MG/DL — SIGNIFICANT CHANGE UP (ref 0.7–1.5)
EOSINOPHIL # BLD AUTO: 0.39 K/UL — SIGNIFICANT CHANGE UP (ref 0–0.7)
EOSINOPHIL NFR BLD AUTO: 4.1 % — SIGNIFICANT CHANGE UP (ref 0–8)
GLUCOSE SERPL-MCNC: 113 MG/DL — HIGH (ref 70–99)
HCT VFR BLD CALC: 35.2 % — LOW (ref 37–47)
HGB BLD-MCNC: 10.8 G/DL — LOW (ref 12–16)
IMM GRANULOCYTES NFR BLD AUTO: 0.4 % — HIGH (ref 0.1–0.3)
LIDOCAIN IGE QN: 27 U/L — SIGNIFICANT CHANGE UP (ref 7–60)
LYMPHOCYTES # BLD AUTO: 1.58 K/UL — SIGNIFICANT CHANGE UP (ref 1.2–3.4)
LYMPHOCYTES # BLD AUTO: 16.8 % — LOW (ref 20.5–51.1)
MCHC RBC-ENTMCNC: 25.2 PG — LOW (ref 27–31)
MCHC RBC-ENTMCNC: 30.7 G/DL — LOW (ref 32–37)
MCV RBC AUTO: 82.2 FL — SIGNIFICANT CHANGE UP (ref 81–99)
MONOCYTES # BLD AUTO: 0.72 K/UL — HIGH (ref 0.1–0.6)
MONOCYTES NFR BLD AUTO: 7.6 % — SIGNIFICANT CHANGE UP (ref 1.7–9.3)
NEUTROPHILS # BLD AUTO: 6.66 K/UL — HIGH (ref 1.4–6.5)
NEUTROPHILS NFR BLD AUTO: 70.7 % — SIGNIFICANT CHANGE UP (ref 42.2–75.2)
NRBC # BLD: 0 /100 WBCS — SIGNIFICANT CHANGE UP (ref 0–0)
PLATELET # BLD AUTO: 355 K/UL — SIGNIFICANT CHANGE UP (ref 130–400)
POTASSIUM SERPL-MCNC: 4.5 MMOL/L — SIGNIFICANT CHANGE UP (ref 3.5–5)
POTASSIUM SERPL-SCNC: 4.5 MMOL/L — SIGNIFICANT CHANGE UP (ref 3.5–5)
PROT SERPL-MCNC: 6.7 G/DL — SIGNIFICANT CHANGE UP (ref 6–8)
RBC # BLD: 4.28 M/UL — SIGNIFICANT CHANGE UP (ref 4.2–5.4)
RBC # FLD: 17.2 % — HIGH (ref 11.5–14.5)
SODIUM SERPL-SCNC: 140 MMOL/L — SIGNIFICANT CHANGE UP (ref 135–146)
WBC # BLD: 9.43 K/UL — SIGNIFICANT CHANGE UP (ref 4.8–10.8)
WBC # FLD AUTO: 9.43 K/UL — SIGNIFICANT CHANGE UP (ref 4.8–10.8)

## 2021-05-17 PROCEDURE — 99285 EMERGENCY DEPT VISIT HI MDM: CPT

## 2021-05-17 PROCEDURE — 74177 CT ABD & PELVIS W/CONTRAST: CPT | Mod: 26,MA

## 2021-05-17 RX ORDER — MORPHINE SULFATE 50 MG/1
6 CAPSULE, EXTENDED RELEASE ORAL ONCE
Refills: 0 | Status: DISCONTINUED | OUTPATIENT
Start: 2021-05-17 | End: 2021-05-17

## 2021-05-17 RX ORDER — METRONIDAZOLE 500 MG
500 TABLET ORAL ONCE
Refills: 0 | Status: COMPLETED | OUTPATIENT
Start: 2021-05-17 | End: 2021-05-17

## 2021-05-17 RX ORDER — MORPHINE SULFATE 50 MG/1
4 CAPSULE, EXTENDED RELEASE ORAL ONCE
Refills: 0 | Status: DISCONTINUED | OUTPATIENT
Start: 2021-05-17 | End: 2021-05-17

## 2021-05-17 RX ORDER — SODIUM CHLORIDE 9 MG/ML
1000 INJECTION INTRAMUSCULAR; INTRAVENOUS; SUBCUTANEOUS ONCE
Refills: 0 | Status: COMPLETED | OUTPATIENT
Start: 2021-05-17 | End: 2021-05-17

## 2021-05-17 RX ORDER — ONDANSETRON 8 MG/1
4 TABLET, FILM COATED ORAL ONCE
Refills: 0 | Status: COMPLETED | OUTPATIENT
Start: 2021-05-17 | End: 2021-05-17

## 2021-05-17 RX ADMIN — MORPHINE SULFATE 4 MILLIGRAM(S): 50 CAPSULE, EXTENDED RELEASE ORAL at 23:10

## 2021-05-17 RX ADMIN — MORPHINE SULFATE 6 MILLIGRAM(S): 50 CAPSULE, EXTENDED RELEASE ORAL at 20:34

## 2021-05-17 RX ADMIN — MORPHINE SULFATE 6 MILLIGRAM(S): 50 CAPSULE, EXTENDED RELEASE ORAL at 21:00

## 2021-05-17 RX ADMIN — SODIUM CHLORIDE 1000 MILLILITER(S): 9 INJECTION INTRAMUSCULAR; INTRAVENOUS; SUBCUTANEOUS at 20:34

## 2021-05-17 RX ADMIN — ONDANSETRON 4 MILLIGRAM(S): 8 TABLET, FILM COATED ORAL at 20:34

## 2021-05-17 NOTE — ED ADULT TRIAGE NOTE - CHIEF COMPLAINT QUOTE
pt c/o LLQ abdominal pain x1 day and bright red blood with bowel movements, states she is having a diverticulitis attack   pt denies n/v/d

## 2021-05-17 NOTE — ED PROVIDER NOTE - PHYSICAL EXAMINATION
VITALS:  I have reviewed the initial vital signs.  GENERAL: Well-developed, well-nourished, in no acute distress. Nontoxic.  HEENT: Sclera clear. No conjunctival pallor. EOMI, PERRLA. MMM.   NECK: supple w FROM.   CARDIO: RRR, nl S1 and S2. No murmurs, rubs, or gallops.  PULM: Normal effort. CTA b/l without wheezes, rales, or rhonchi.  MSK: Normal, steady gait.   GI: Normal bowel sounds. Abdomen soft and non-distended. +LLQ ttp w/o rebound or guarding. No BRBPR, melena, or hematochezia.  : No CVA tenderness b/l.  SKIN: Warm, dry. No pallor. No rash. No jaundice.   NEURO: A&Ox3. Speech clear. No focal deficits.  PSYCH: Calm and cooperative.

## 2021-05-17 NOTE — ED PROVIDER NOTE - OBJECTIVE STATEMENT
55 year old female w hx of HTN, cholecystectomy, diverticulitis (GI Dr. Pelayo) presents to the ED for evaluation of gradual onset constant LLQ pain with radiation into her back since last night. She also reports 1 episode of rectal bleeding after a bowel movement yesterday, no further episodes today. Denies fevers/chills, chest pain, sob, n/v/d, constipation, obstipation, back/flank pain, irritative voiding symptoms, black/bloody stools, recent travel/sick contacts/antibiotics.

## 2021-05-17 NOTE — ED PROVIDER NOTE - ATTENDING CONTRIBUTION TO CARE
55F pmh copd no home O2, htn, hl, hypothyroidism, diverticulitis in past, gastritis, covid Mar 2020 requiring hospitalization/supple O2, marquise on cpap p/w LLQ pain x 1d. Accomp by sm amt of brbpr mixed with brown stool yest, thinks may be 2/2 hemorrhoids (h/o similar sx in past). No f/c, cp/sob, nv, melena, diarrhea, flank pain, urinary sx, rash. PCP [facility] - last EGD/colonoscopy few mos ago.    PE:  nad  skin warm, dry  ncat  neck supple  rrr nl s1s2 no mrg  ctab no wrr  abd soft nd +llq ttp rest non-tender no palpable masses no rgr  back non-tender no cvat  ext no cce dpi  neuro aaox3 grossly nf exam

## 2021-05-17 NOTE — ED PROVIDER NOTE - PROGRESS NOTE DETAILS
CONOR: Patient reports improvement in pain, tolerating po, mild ttp in LLQ otherwise no rebound/guarding/rigidity. Results discussed with patient, printed copies given. Antibiotics sent to pharmacy, recommended GI f/u. Patient agreeable and verbalizes understanding of plan of care, f/u, and return precautions.

## 2021-05-17 NOTE — ED PROVIDER NOTE - CARE PROVIDER_API CALL
Venessa Pelayo  GASTROENTEROLOGY  [address] [place] [address]  Phone: [phone]  Fax: [phone]  Follow Up Time: 1-3 Days

## 2021-05-17 NOTE — ED PROVIDER NOTE - NS ED ROS FT
CONSTITUTIONAL: (-) fevers, (-) chills  EYES: (-) vision changes, (-) photophobia, (-) eye pain  ENT: (-) congestion, (-) rhinorrhea, (-) sore throat  NECK: (-) neck pain, (-) neck stiffness  CARDIO: (-) chest pain, (-) palpitations, (-) edema  PULM: (-) cough, (-) sputum, (-) chest tightness, (-) shortness of breath  GI: see HPI  : (-) dysuria, (-) hematuria, (-) frequency, (-) urgency, (-) flank pain  MSK: (-) back pain, (-) myalgias  SKIN: (-) rashes, (-) pallor, (-) jaundice  NEURO: (-) headache, (-) dizziness, (-) lightheadedness, (-) weakness    *all other systems negative except as documented above and in the HPI*

## 2021-05-17 NOTE — ED PROVIDER NOTE - PATIENT PORTAL LINK FT
You can access the FollowMyHealth Patient Portal offered by [facility] by registering at the following website: [email or web address]. By joining [employer]’s FollowMyHealth portal, you will also be able to view your health information using other applications (apps) compatible with our system.

## 2021-05-17 NOTE — ED PROVIDER NOTE - CLINICAL SUMMARY MEDICAL DECISION MAKING FREE TEXT BOX
LLQ pain 2/2 acute sigmoid diverticulitis - iv abx, all results d/w pt & copies given, strict return precautions discussed, rec outpt GI f/u

## 2021-05-18 RX ORDER — KETOROLAC TROMETHAMINE 30 MG/ML
15 SYRINGE (ML) INJECTION ONCE
Refills: 0 | Status: DISCONTINUED | OUTPATIENT
Start: 2021-05-18 | End: 2021-05-18

## 2021-05-18 RX ORDER — METRONIDAZOLE 500 MG
1 TABLET ORAL
Qty: 21 | Refills: 0
Start: 2021-05-18 | End: 2021-05-24

## 2021-05-18 RX ORDER — AMOXICILLIN 250 MG/5ML
1 SUSPENSION, RECONSTITUTED, ORAL (ML) ORAL
Qty: 14 | Refills: 0
Start: 2021-05-18 | End: 2021-05-24

## 2021-05-18 RX ADMIN — Medication 15 MILLIGRAM(S): at 00:12

## 2021-05-18 RX ADMIN — Medication 100 MILLIGRAM(S): at 00:12

## 2021-05-18 RX ADMIN — Medication 1 TABLET(S): at 00:13

## 2021-10-29 ENCOUNTER — OUTPATIENT (OUTPATIENT)
Dept: OUTPATIENT SERVICES | Facility: HOSPITAL | Age: 56
LOS: 1 days | Discharge: HOME | End: 2021-10-29
Payer: MEDICAID

## 2021-10-29 DIAGNOSIS — M54.2 CERVICALGIA: ICD-10-CM

## 2021-10-29 DIAGNOSIS — Z90.49 ACQUIRED ABSENCE OF OTHER SPECIFIED PARTS OF DIGESTIVE TRACT: Chronic | ICD-10-CM

## 2021-10-29 PROCEDURE — 72141 MRI NECK SPINE W/O DYE: CPT | Mod: 26

## 2021-11-11 ENCOUNTER — OUTPATIENT (OUTPATIENT)
Dept: OUTPATIENT SERVICES | Facility: HOSPITAL | Age: 56
LOS: 1 days | Discharge: HOME | End: 2021-11-11
Payer: MEDICAID

## 2021-11-11 DIAGNOSIS — Z90.49 ACQUIRED ABSENCE OF OTHER SPECIFIED PARTS OF DIGESTIVE TRACT: Chronic | ICD-10-CM

## 2021-11-11 DIAGNOSIS — Z12.31 ENCOUNTER FOR SCREENING MAMMOGRAM FOR MALIGNANT NEOPLASM OF BREAST: ICD-10-CM

## 2021-11-11 PROCEDURE — 77063 BREAST TOMOSYNTHESIS BI: CPT | Mod: 26

## 2021-11-11 PROCEDURE — 77067 SCR MAMMO BI INCL CAD: CPT | Mod: 26

## 2021-12-13 ENCOUNTER — OUTPATIENT (OUTPATIENT)
Dept: OUTPATIENT SERVICES | Facility: HOSPITAL | Age: 56
LOS: 1 days | Discharge: HOME | End: 2021-12-13
Payer: MEDICAID

## 2021-12-13 DIAGNOSIS — Z90.49 ACQUIRED ABSENCE OF OTHER SPECIFIED PARTS OF DIGESTIVE TRACT: Chronic | ICD-10-CM

## 2021-12-13 DIAGNOSIS — R10.2 PELVIC AND PERINEAL PAIN: ICD-10-CM

## 2021-12-13 PROCEDURE — 72197 MRI PELVIS W/O & W/DYE: CPT | Mod: 26

## 2022-03-29 NOTE — DISCHARGE NOTE NURSING/CASE MANAGEMENT/SOCIAL WORK - NSTRANSFERBELONGINGSRESP_GEN_A_NUR
no
Comment: - start compound scar cream from [facility] with Na hyaluronate1%/Sal Acid2%/Vit E1%/QS Pracasil  1-2 times a day \\n- f/u with DR Edmondson as needed with plastics for touch ups
Render Risk Assessment In Note?: no
Detail Level: Simple

## 2022-04-09 ENCOUNTER — TRANSCRIPTION ENCOUNTER (OUTPATIENT)
Age: 57
End: 2022-04-09

## 2022-07-13 ENCOUNTER — APPOINTMENT (OUTPATIENT)
Dept: PAIN MANAGEMENT | Facility: CLINIC | Age: 57
End: 2022-07-13

## 2022-07-13 VITALS
WEIGHT: 293 LBS | BODY MASS INDEX: 50.02 KG/M2 | HEIGHT: 64 IN | DIASTOLIC BLOOD PRESSURE: 79 MMHG | SYSTOLIC BLOOD PRESSURE: 131 MMHG | HEART RATE: 78 BPM

## 2022-07-13 DIAGNOSIS — Z78.9 OTHER SPECIFIED HEALTH STATUS: ICD-10-CM

## 2022-07-13 DIAGNOSIS — Z86.39 PERSONAL HISTORY OF OTHER ENDOCRINE, NUTRITIONAL AND METABOLIC DISEASE: ICD-10-CM

## 2022-07-13 DIAGNOSIS — Z86.59 PERSONAL HISTORY OF OTHER MENTAL AND BEHAVIORAL DISORDERS: ICD-10-CM

## 2022-07-13 DIAGNOSIS — Z86.79 PERSONAL HISTORY OF OTHER DISEASES OF THE CIRCULATORY SYSTEM: ICD-10-CM

## 2022-07-13 DIAGNOSIS — Z87.09 PERSONAL HISTORY OF OTHER DISEASES OF THE RESPIRATORY SYSTEM: ICD-10-CM

## 2022-07-13 PROCEDURE — 99214 OFFICE O/P EST MOD 30 MIN: CPT

## 2022-07-13 NOTE — ASSESSMENT
[FreeTextEntry1] : 56-year-old female with cervical radiculopathy, lumbar pain and right knee pain. For the neck, she is moving forward with the epidural injection with MAC. She will address the lumbar and the right knee afterwards. Medical management including Norco BID and baclofen 10 mg will continue without changes. She will follow up in 4 weeks for medication refill.\par \par \par \par

## 2022-07-13 NOTE — HISTORY OF PRESENT ILLNESS
[FreeTextEntry1] : ORIGINAL PRESENTATION: She is a 56-year old female patient with severe chronic neck pain with pain traveling down her right arm. She experiences weakness in her upper extremities bilaterally. In the past, cervical epidural injections have helped. Her last RADHAMES was done in August 2019, however she was unable to finish the series of injections as her  lost his insurance coverage at that time.\par \par TODAY: she presents for revisit encounter regarding her neck, lower back and more recently right knee pain.\par \par Her neck pain continues to be moderate to severe. Pain continues to be most bothersome in the neck with radiating pain into the arms. Previous epidural injections did provide moderate relief. She was previously approved for an epidural injection, however this was never done. She held off the injections as she is taking care of sick . However today, she notes the pain has been quite debilitating. She will like to move forward with the RADHAMES\par \par Also, she continues to complain of right knee pain. Previously, a right knee cortisone injection was discussed and approved. However this was previously cancelled due to COVID and a lapse of insurance. Again, she would let us know when she is ready to proceed with injections. She is currently wearing a knee brace which provides some temporary relief.\par \par For now she is managing solely with her regimen of Norco 7.5mg BID along with Baclofen 10mg as needed, which provides about 40% relief.\par \par \par UDS: 05/10/2022, see separate note\par SOAPP: Low risk.

## 2022-07-13 NOTE — PHYSICAL EXAM
[Flexion] : flexion [Extension] : extension [Bending to left] : bending to left [Bending to right] : bending to right [] : anterior tenderness [FreeTextEntry3] : wearing knee brace

## 2022-07-28 ENCOUNTER — APPOINTMENT (OUTPATIENT)
Dept: PAIN MANAGEMENT | Facility: CLINIC | Age: 57
End: 2022-07-28

## 2022-08-10 ENCOUNTER — APPOINTMENT (OUTPATIENT)
Dept: PAIN MANAGEMENT | Facility: CLINIC | Age: 57
End: 2022-08-10

## 2022-08-10 VITALS
BODY MASS INDEX: 50.02 KG/M2 | HEART RATE: 82 BPM | WEIGHT: 293 LBS | SYSTOLIC BLOOD PRESSURE: 136 MMHG | HEIGHT: 64 IN | DIASTOLIC BLOOD PRESSURE: 84 MMHG

## 2022-08-10 DIAGNOSIS — M62.838 OTHER MUSCLE SPASM: ICD-10-CM

## 2022-08-10 LAB — AMPHET UR-MCNC: NEGATIVE

## 2022-08-10 PROCEDURE — 99213 OFFICE O/P EST LOW 20 MIN: CPT

## 2022-08-10 PROCEDURE — 80305 DRUG TEST PRSMV DIR OPT OBS: CPT | Mod: QW

## 2022-08-10 NOTE — HISTORY OF PRESENT ILLNESS
[FreeTextEntry1] : ORIGINAL PRESENTATION: She is a 56-year old female patient with severe chronic neck pain with pain traveling down her right arm. She experiences weakness in her upper extremities bilaterally. In the past, cervical epidural injections have helped. Her last RADHAMES was done in August 2019, however she was unable to finish the series of injections as her  lost his insurance coverage at that time.\par \par TODAY: she presents for revisit encounter regarding her neck, lower back and right knee pain.\par \par Her neck pain continues to be moderate to severe. Pain continues to be most bothersome in the neck with radiating pain into the arms. Previous epidural injections did provide moderate relief. She is approved for an epidural injection, however she is holding off at this time as she is taking care of sick . \par \par Also, she continues to complain of right knee pain. Previously, a right knee cortisone injection was discussed. She would let us know when she is ready to proceed with injections. She is currently wearing a knee brace which provides some temporary relief.\par \par For now she is managing solely with her regimen of Norco 7.5mg BID along with Baclofen 10mg as needed, which provides about 40% relief.\par \par \par UDS: 8/10/2022, see separate note\par SOAPP: Low risk.

## 2022-08-10 NOTE — ASSESSMENT
[FreeTextEntry1] : 56-year-old female with cervical radiculopathy, lumbar pain and right knee pain. For the neck, she is moving forward with the epidural injection with MAC. She will address the lumbar and the right knee afterwards. Medical management including Norco BID and baclofen 10 mg will continue without changes. She will follow up in 8 weeks for revisit and will call in 4 weeks for medication refill. UDS done today\par \par \par \par

## 2022-09-17 ENCOUNTER — INPATIENT (INPATIENT)
Facility: HOSPITAL | Age: 57
LOS: 3 days | Discharge: HOME | End: 2022-09-21
Attending: SURGERY | Admitting: SURGERY

## 2022-09-17 VITALS
HEART RATE: 82 BPM | OXYGEN SATURATION: 99 % | WEIGHT: 293 LBS | RESPIRATION RATE: 16 BRPM | DIASTOLIC BLOOD PRESSURE: 65 MMHG | TEMPERATURE: 98 F | HEIGHT: 64 IN | SYSTOLIC BLOOD PRESSURE: 120 MMHG

## 2022-09-17 DIAGNOSIS — Z90.49 ACQUIRED ABSENCE OF OTHER SPECIFIED PARTS OF DIGESTIVE TRACT: Chronic | ICD-10-CM

## 2022-09-17 LAB
ALBUMIN SERPL ELPH-MCNC: 4 G/DL — SIGNIFICANT CHANGE UP (ref 3.5–5.2)
ALP SERPL-CCNC: 98 U/L — SIGNIFICANT CHANGE UP (ref 30–115)
ALT FLD-CCNC: 17 U/L — SIGNIFICANT CHANGE UP (ref 0–41)
ANION GAP SERPL CALC-SCNC: 10 MMOL/L — SIGNIFICANT CHANGE UP (ref 7–14)
AST SERPL-CCNC: 22 U/L — SIGNIFICANT CHANGE UP (ref 0–41)
BASOPHILS # BLD AUTO: 0.03 K/UL — SIGNIFICANT CHANGE UP (ref 0–0.2)
BASOPHILS NFR BLD AUTO: 0.3 % — SIGNIFICANT CHANGE UP (ref 0–1)
BILIRUB DIRECT SERPL-MCNC: <0.2 MG/DL — SIGNIFICANT CHANGE UP (ref 0–0.3)
BILIRUB INDIRECT FLD-MCNC: SIGNIFICANT CHANGE UP MG/DL (ref 0.2–1.2)
BILIRUB SERPL-MCNC: <0.2 MG/DL — SIGNIFICANT CHANGE UP (ref 0.2–1.2)
BUN SERPL-MCNC: 21 MG/DL — HIGH (ref 10–20)
CALCIUM SERPL-MCNC: 9.5 MG/DL — SIGNIFICANT CHANGE UP (ref 8.4–10.5)
CHLORIDE SERPL-SCNC: 101 MMOL/L — SIGNIFICANT CHANGE UP (ref 98–110)
CO2 SERPL-SCNC: 28 MMOL/L — SIGNIFICANT CHANGE UP (ref 17–32)
CREAT SERPL-MCNC: 1.2 MG/DL — SIGNIFICANT CHANGE UP (ref 0.7–1.5)
EGFR: 52 ML/MIN/1.73M2 — LOW
EOSINOPHIL # BLD AUTO: 0.29 K/UL — SIGNIFICANT CHANGE UP (ref 0–0.7)
EOSINOPHIL NFR BLD AUTO: 2.7 % — SIGNIFICANT CHANGE UP (ref 0–8)
GLUCOSE SERPL-MCNC: 112 MG/DL — HIGH (ref 70–99)
HCG SERPL QL: NEGATIVE — SIGNIFICANT CHANGE UP
HCT VFR BLD CALC: 33.6 % — LOW (ref 37–47)
HGB BLD-MCNC: 10.7 G/DL — LOW (ref 12–16)
IMM GRANULOCYTES NFR BLD AUTO: 0.3 % — SIGNIFICANT CHANGE UP (ref 0.1–0.3)
LIDOCAIN IGE QN: 30 U/L — SIGNIFICANT CHANGE UP (ref 7–60)
LYMPHOCYTES # BLD AUTO: 1.94 K/UL — SIGNIFICANT CHANGE UP (ref 1.2–3.4)
LYMPHOCYTES # BLD AUTO: 18.3 % — LOW (ref 20.5–51.1)
MCHC RBC-ENTMCNC: 25.1 PG — LOW (ref 27–31)
MCHC RBC-ENTMCNC: 31.8 G/DL — LOW (ref 32–37)
MCV RBC AUTO: 78.7 FL — LOW (ref 81–99)
MONOCYTES # BLD AUTO: 0.72 K/UL — HIGH (ref 0.1–0.6)
MONOCYTES NFR BLD AUTO: 6.8 % — SIGNIFICANT CHANGE UP (ref 1.7–9.3)
NEUTROPHILS # BLD AUTO: 7.6 K/UL — HIGH (ref 1.4–6.5)
NEUTROPHILS NFR BLD AUTO: 71.6 % — SIGNIFICANT CHANGE UP (ref 42.2–75.2)
NRBC # BLD: 0 /100 WBCS — SIGNIFICANT CHANGE UP (ref 0–0)
NT-PROBNP SERPL-SCNC: 410 PG/ML — HIGH (ref 0–300)
PLATELET # BLD AUTO: 348 K/UL — SIGNIFICANT CHANGE UP (ref 130–400)
POTASSIUM SERPL-MCNC: 4.5 MMOL/L — SIGNIFICANT CHANGE UP (ref 3.5–5)
POTASSIUM SERPL-SCNC: 4.5 MMOL/L — SIGNIFICANT CHANGE UP (ref 3.5–5)
PROT SERPL-MCNC: 6.7 G/DL — SIGNIFICANT CHANGE UP (ref 6–8)
RBC # BLD: 4.27 M/UL — SIGNIFICANT CHANGE UP (ref 4.2–5.4)
RBC # FLD: 17.2 % — HIGH (ref 11.5–14.5)
SARS-COV-2 RNA SPEC QL NAA+PROBE: SIGNIFICANT CHANGE UP
SODIUM SERPL-SCNC: 139 MMOL/L — SIGNIFICANT CHANGE UP (ref 135–146)
TROPONIN T SERPL-MCNC: <0.01 NG/ML — SIGNIFICANT CHANGE UP
WBC # BLD: 10.61 K/UL — SIGNIFICANT CHANGE UP (ref 4.8–10.8)
WBC # FLD AUTO: 10.61 K/UL — SIGNIFICANT CHANGE UP (ref 4.8–10.8)

## 2022-09-17 PROCEDURE — 93010 ELECTROCARDIOGRAM REPORT: CPT

## 2022-09-17 PROCEDURE — 71045 X-RAY EXAM CHEST 1 VIEW: CPT | Mod: 26

## 2022-09-17 PROCEDURE — 99285 EMERGENCY DEPT VISIT HI MDM: CPT

## 2022-09-17 PROCEDURE — 74177 CT ABD & PELVIS W/CONTRAST: CPT | Mod: 26,MA

## 2022-09-17 RX ORDER — SODIUM CHLORIDE 9 MG/ML
1000 INJECTION, SOLUTION INTRAVENOUS ONCE
Refills: 0 | Status: COMPLETED | OUTPATIENT
Start: 2022-09-17 | End: 2022-09-17

## 2022-09-17 RX ORDER — MORPHINE SULFATE 50 MG/1
4 CAPSULE, EXTENDED RELEASE ORAL ONCE
Refills: 0 | Status: DISCONTINUED | OUTPATIENT
Start: 2022-09-17 | End: 2022-09-17

## 2022-09-17 RX ORDER — DIPHENHYDRAMINE HCL 50 MG
50 CAPSULE ORAL ONCE
Refills: 0 | Status: COMPLETED | OUTPATIENT
Start: 2022-09-17 | End: 2022-09-17

## 2022-09-17 RX ADMIN — MORPHINE SULFATE 4 MILLIGRAM(S): 50 CAPSULE, EXTENDED RELEASE ORAL at 22:31

## 2022-09-17 RX ADMIN — Medication 102 MILLIGRAM(S): at 22:31

## 2022-09-17 RX ADMIN — Medication 125 MILLIGRAM(S): at 22:31

## 2022-09-18 LAB
ANION GAP SERPL CALC-SCNC: 12 MMOL/L — SIGNIFICANT CHANGE UP (ref 7–14)
APPEARANCE UR: CLEAR — SIGNIFICANT CHANGE UP
BACTERIA # UR AUTO: ABNORMAL
BILIRUB UR-MCNC: NEGATIVE — SIGNIFICANT CHANGE UP
BUN SERPL-MCNC: 24 MG/DL — HIGH (ref 10–20)
CALCIUM SERPL-MCNC: 9 MG/DL — SIGNIFICANT CHANGE UP (ref 8.4–10.5)
CHLORIDE SERPL-SCNC: 105 MMOL/L — SIGNIFICANT CHANGE UP (ref 98–110)
CO2 SERPL-SCNC: 24 MMOL/L — SIGNIFICANT CHANGE UP (ref 17–32)
COLOR SPEC: YELLOW — SIGNIFICANT CHANGE UP
CREAT SERPL-MCNC: 1 MG/DL — SIGNIFICANT CHANGE UP (ref 0.7–1.5)
DIFF PNL FLD: NEGATIVE — SIGNIFICANT CHANGE UP
EGFR: 65 ML/MIN/1.73M2 — SIGNIFICANT CHANGE UP
EPI CELLS # UR: 11 /HPF — HIGH (ref 0–5)
GLUCOSE SERPL-MCNC: 98 MG/DL — SIGNIFICANT CHANGE UP (ref 70–99)
GLUCOSE UR QL: NEGATIVE — SIGNIFICANT CHANGE UP
HCT VFR BLD CALC: 30.1 % — LOW (ref 37–47)
HGB BLD-MCNC: 9.7 G/DL — LOW (ref 12–16)
HYALINE CASTS # UR AUTO: 28 /LPF — HIGH (ref 0–7)
KETONES UR-MCNC: NEGATIVE — SIGNIFICANT CHANGE UP
LEUKOCYTE ESTERASE UR-ACNC: NEGATIVE — SIGNIFICANT CHANGE UP
MAGNESIUM SERPL-MCNC: 1.7 MG/DL — LOW (ref 1.8–2.4)
MCHC RBC-ENTMCNC: 25.1 PG — LOW (ref 27–31)
MCHC RBC-ENTMCNC: 32.2 G/DL — SIGNIFICANT CHANGE UP (ref 32–37)
MCV RBC AUTO: 77.8 FL — LOW (ref 81–99)
NITRITE UR-MCNC: NEGATIVE — SIGNIFICANT CHANGE UP
NRBC # BLD: 0 /100 WBCS — SIGNIFICANT CHANGE UP (ref 0–0)
PH UR: 6.5 — SIGNIFICANT CHANGE UP (ref 5–8)
PHOSPHATE SERPL-MCNC: 3.2 MG/DL — SIGNIFICANT CHANGE UP (ref 2.1–4.9)
PLATELET # BLD AUTO: 356 K/UL — SIGNIFICANT CHANGE UP (ref 130–400)
POTASSIUM SERPL-MCNC: 4.3 MMOL/L — SIGNIFICANT CHANGE UP (ref 3.5–5)
POTASSIUM SERPL-SCNC: 4.3 MMOL/L — SIGNIFICANT CHANGE UP (ref 3.5–5)
PROT UR-MCNC: ABNORMAL
RBC # BLD: 3.87 M/UL — LOW (ref 4.2–5.4)
RBC # FLD: 17.2 % — HIGH (ref 11.5–14.5)
RBC CASTS # UR COMP ASSIST: 1 /HPF — SIGNIFICANT CHANGE UP (ref 0–4)
SODIUM SERPL-SCNC: 141 MMOL/L — SIGNIFICANT CHANGE UP (ref 135–146)
SP GR SPEC: >1.05 (ref 1.01–1.03)
UROBILINOGEN FLD QL: SIGNIFICANT CHANGE UP
WBC # BLD: 10.89 K/UL — HIGH (ref 4.8–10.8)
WBC # FLD AUTO: 10.89 K/UL — HIGH (ref 4.8–10.8)
WBC UR QL: 3 /HPF — SIGNIFICANT CHANGE UP (ref 0–5)

## 2022-09-18 PROCEDURE — 99223 1ST HOSP IP/OBS HIGH 75: CPT

## 2022-09-18 RX ORDER — TIZANIDINE 4 MG/1
1 TABLET ORAL
Qty: 0 | Refills: 0 | DISCHARGE

## 2022-09-18 RX ORDER — MONTELUKAST 4 MG/1
10 TABLET, CHEWABLE ORAL DAILY
Refills: 0 | Status: DISCONTINUED | OUTPATIENT
Start: 2022-09-18 | End: 2022-09-21

## 2022-09-18 RX ORDER — METOPROLOL TARTRATE 50 MG
50 TABLET ORAL DAILY
Refills: 0 | Status: DISCONTINUED | OUTPATIENT
Start: 2022-09-18 | End: 2022-09-21

## 2022-09-18 RX ORDER — PANTOPRAZOLE SODIUM 20 MG/1
40 TABLET, DELAYED RELEASE ORAL
Refills: 0 | Status: DISCONTINUED | OUTPATIENT
Start: 2022-09-18 | End: 2022-09-21

## 2022-09-18 RX ORDER — OXYCODONE HYDROCHLORIDE 5 MG/1
5 TABLET ORAL EVERY 6 HOURS
Refills: 0 | Status: DISCONTINUED | OUTPATIENT
Start: 2022-09-18 | End: 2022-09-21

## 2022-09-18 RX ORDER — LEVOTHYROXINE SODIUM 125 MCG
125 TABLET ORAL DAILY
Refills: 0 | Status: DISCONTINUED | OUTPATIENT
Start: 2022-09-18 | End: 2022-09-21

## 2022-09-18 RX ORDER — ZOLPIDEM TARTRATE 10 MG/1
5 TABLET ORAL AT BEDTIME
Refills: 0 | Status: DISCONTINUED | OUTPATIENT
Start: 2022-09-18 | End: 2022-09-21

## 2022-09-18 RX ORDER — CHLORHEXIDINE GLUCONATE 213 G/1000ML
1 SOLUTION TOPICAL DAILY
Refills: 0 | Status: DISCONTINUED | OUTPATIENT
Start: 2022-09-18 | End: 2022-09-21

## 2022-09-18 RX ORDER — ZOLPIDEM TARTRATE 10 MG/1
5 TABLET ORAL AT BEDTIME
Refills: 0 | Status: DISCONTINUED | OUTPATIENT
Start: 2022-09-18 | End: 2022-09-19

## 2022-09-18 RX ORDER — METRONIDAZOLE 500 MG
500 TABLET ORAL EVERY 8 HOURS
Refills: 0 | Status: DISCONTINUED | OUTPATIENT
Start: 2022-09-18 | End: 2022-09-21

## 2022-09-18 RX ORDER — METRONIDAZOLE 500 MG
500 TABLET ORAL ONCE
Refills: 0 | Status: COMPLETED | OUTPATIENT
Start: 2022-09-18 | End: 2022-09-18

## 2022-09-18 RX ORDER — ESCITALOPRAM OXALATE 10 MG/1
5 TABLET, FILM COATED ORAL DAILY
Refills: 0 | Status: DISCONTINUED | OUTPATIENT
Start: 2022-09-18 | End: 2022-09-21

## 2022-09-18 RX ORDER — DOCUSATE SODIUM 100 MG
1 CAPSULE ORAL
Qty: 0 | Refills: 0 | DISCHARGE

## 2022-09-18 RX ORDER — FLUTICASONE PROPIONATE AND SALMETEROL 50; 250 UG/1; UG/1
1 POWDER ORAL; RESPIRATORY (INHALATION)
Qty: 0 | Refills: 0 | DISCHARGE

## 2022-09-18 RX ORDER — BUPROPION HYDROCHLORIDE 150 MG/1
150 TABLET, EXTENDED RELEASE ORAL DAILY
Refills: 0 | Status: DISCONTINUED | OUTPATIENT
Start: 2022-09-18 | End: 2022-09-21

## 2022-09-18 RX ORDER — INFLUENZA VIRUS VACCINE 15; 15; 15; 15 UG/.5ML; UG/.5ML; UG/.5ML; UG/.5ML
0.5 SUSPENSION INTRAMUSCULAR ONCE
Refills: 0 | Status: DISCONTINUED | OUTPATIENT
Start: 2022-09-18 | End: 2022-09-21

## 2022-09-18 RX ORDER — PANTOPRAZOLE SODIUM 20 MG/1
0 TABLET, DELAYED RELEASE ORAL
Qty: 0 | Refills: 0 | DISCHARGE

## 2022-09-18 RX ORDER — CEFEPIME 1 G/1
1000 INJECTION, POWDER, FOR SOLUTION INTRAMUSCULAR; INTRAVENOUS ONCE
Refills: 0 | Status: COMPLETED | OUTPATIENT
Start: 2022-09-18 | End: 2022-09-18

## 2022-09-18 RX ORDER — ALBUTEROL 90 UG/1
2 AEROSOL, METERED ORAL EVERY 6 HOURS
Refills: 0 | Status: DISCONTINUED | OUTPATIENT
Start: 2022-09-18 | End: 2022-09-21

## 2022-09-18 RX ORDER — ACETAMINOPHEN 500 MG
650 TABLET ORAL EVERY 6 HOURS
Refills: 0 | Status: DISCONTINUED | OUTPATIENT
Start: 2022-09-18 | End: 2022-09-21

## 2022-09-18 RX ORDER — FERROUS SULFATE 325(65) MG
1 TABLET ORAL
Qty: 0 | Refills: 0 | DISCHARGE

## 2022-09-18 RX ORDER — CLONAZEPAM 1 MG
1 TABLET ORAL
Refills: 0 | Status: DISCONTINUED | OUTPATIENT
Start: 2022-09-18 | End: 2022-09-21

## 2022-09-18 RX ORDER — MORPHINE SULFATE 50 MG/1
4 CAPSULE, EXTENDED RELEASE ORAL ONCE
Refills: 0 | Status: DISCONTINUED | OUTPATIENT
Start: 2022-09-18 | End: 2022-09-18

## 2022-09-18 RX ORDER — SODIUM CHLORIDE 9 MG/ML
1000 INJECTION, SOLUTION INTRAVENOUS
Refills: 0 | Status: DISCONTINUED | OUTPATIENT
Start: 2022-09-18 | End: 2022-09-19

## 2022-09-18 RX ORDER — BUDESONIDE AND FORMOTEROL FUMARATE DIHYDRATE 160; 4.5 UG/1; UG/1
2 AEROSOL RESPIRATORY (INHALATION)
Qty: 0 | Refills: 0 | DISCHARGE

## 2022-09-18 RX ORDER — ENOXAPARIN SODIUM 100 MG/ML
40 INJECTION SUBCUTANEOUS EVERY 12 HOURS
Refills: 0 | Status: DISCONTINUED | OUTPATIENT
Start: 2022-09-18 | End: 2022-09-21

## 2022-09-18 RX ORDER — CEFTRIAXONE 500 MG/1
1000 INJECTION, POWDER, FOR SOLUTION INTRAMUSCULAR; INTRAVENOUS EVERY 24 HOURS
Refills: 0 | Status: DISCONTINUED | OUTPATIENT
Start: 2022-09-18 | End: 2022-09-21

## 2022-09-18 RX ADMIN — Medication 100 MILLIGRAM(S): at 21:41

## 2022-09-18 RX ADMIN — CEFEPIME 100 MILLIGRAM(S): 1 INJECTION, POWDER, FOR SOLUTION INTRAMUSCULAR; INTRAVENOUS at 00:57

## 2022-09-18 RX ADMIN — BUPROPION HYDROCHLORIDE 150 MILLIGRAM(S): 150 TABLET, EXTENDED RELEASE ORAL at 11:11

## 2022-09-18 RX ADMIN — MORPHINE SULFATE 4 MILLIGRAM(S): 50 CAPSULE, EXTENDED RELEASE ORAL at 04:56

## 2022-09-18 RX ADMIN — Medication 50 MILLIGRAM(S): at 06:41

## 2022-09-18 RX ADMIN — Medication 650 MILLIGRAM(S): at 17:10

## 2022-09-18 RX ADMIN — ESCITALOPRAM OXALATE 5 MILLIGRAM(S): 10 TABLET, FILM COATED ORAL at 11:11

## 2022-09-18 RX ADMIN — MORPHINE SULFATE 4 MILLIGRAM(S): 50 CAPSULE, EXTENDED RELEASE ORAL at 00:15

## 2022-09-18 RX ADMIN — CEFTRIAXONE 100 MILLIGRAM(S): 500 INJECTION, POWDER, FOR SOLUTION INTRAMUSCULAR; INTRAVENOUS at 14:07

## 2022-09-18 RX ADMIN — OXYCODONE HYDROCHLORIDE 5 MILLIGRAM(S): 5 TABLET ORAL at 12:15

## 2022-09-18 RX ADMIN — MONTELUKAST 10 MILLIGRAM(S): 4 TABLET, CHEWABLE ORAL at 11:11

## 2022-09-18 RX ADMIN — Medication 125 MICROGRAM(S): at 06:41

## 2022-09-18 RX ADMIN — Medication 650 MILLIGRAM(S): at 06:41

## 2022-09-18 RX ADMIN — PANTOPRAZOLE SODIUM 40 MILLIGRAM(S): 20 TABLET, DELAYED RELEASE ORAL at 06:41

## 2022-09-18 RX ADMIN — OXYCODONE HYDROCHLORIDE 5 MILLIGRAM(S): 5 TABLET ORAL at 11:14

## 2022-09-18 RX ADMIN — SODIUM CHLORIDE 1000 MILLILITER(S): 9 INJECTION, SOLUTION INTRAVENOUS at 00:15

## 2022-09-18 RX ADMIN — Medication 1 MILLIGRAM(S): at 17:09

## 2022-09-18 RX ADMIN — Medication 650 MILLIGRAM(S): at 11:13

## 2022-09-18 RX ADMIN — ENOXAPARIN SODIUM 40 MILLIGRAM(S): 100 INJECTION SUBCUTANEOUS at 06:40

## 2022-09-18 RX ADMIN — Medication 100 MILLIGRAM(S): at 02:06

## 2022-09-18 RX ADMIN — Medication 100 MILLIGRAM(S): at 14:39

## 2022-09-18 RX ADMIN — Medication 100 MILLIGRAM(S): at 06:40

## 2022-09-18 RX ADMIN — ZOLPIDEM TARTRATE 5 MILLIGRAM(S): 10 TABLET ORAL at 21:55

## 2022-09-18 RX ADMIN — Medication 650 MILLIGRAM(S): at 12:15

## 2022-09-18 RX ADMIN — Medication 1 MILLIGRAM(S): at 06:41

## 2022-09-18 RX ADMIN — SODIUM CHLORIDE 125 MILLILITER(S): 9 INJECTION, SOLUTION INTRAVENOUS at 06:40

## 2022-09-18 RX ADMIN — Medication 650 MILLIGRAM(S): at 07:02

## 2022-09-18 RX ADMIN — ENOXAPARIN SODIUM 40 MILLIGRAM(S): 100 INJECTION SUBCUTANEOUS at 17:10

## 2022-09-18 NOTE — H&P ADULT - NS ATTEND AMEND GEN_ALL_CORE FT
obese woman with acute diverticulitis  abdominal pain and ttp  admit for serial abdominal exams and iv abx, npo

## 2022-09-18 NOTE — ED PROVIDER NOTE - OBJECTIVE STATEMENT
Patient is c/o abd pain, generalized abd pain, associated with nausea, h/o similar symptoms in the past from diverticulitis. Denies cp/sob, denies recent travel, denies recent abx use. +pain on urination. Denies vaginal discharge/bleeding.

## 2022-09-18 NOTE — ED ADULT NURSE NOTE - NSIMPLEMENTINTERV_GEN_ALL_ED
----- Message from Corona Vicente sent at 9/20/2021 12:21 PM EDT -----  Subject: Message to Provider    QUESTIONS  Information for Provider? Patient is calling after her appointment last   week. She wanted to ask some questions to the front office. ---------------------------------------------------------------------------  --------------  Jenae CASTELAN  What is the best way for the office to contact you? OK to leave message on   voicemail  Preferred Call Back Phone Number?  [phone]  ---------------------------------------------------------------------------  --------------  SCRIPT ANSWERS  undefined
Please have her return to the office for recheck
Pt will drop off urine.
Implemented All Universal Safety Interventions:  [place] to call system. Call bell, personal items and telephone within reach. Instruct patient to call for assistance. Room bathroom lighting operational. Non-slip footwear when patient is off stretcher. Physically safe environment: no spills, clutter or unnecessary equipment. Stretcher in lowest position, wheels locked, appropriate side rails in place.

## 2022-09-18 NOTE — H&P ADULT - NSHPPHYSICALEXAM_GEN_ALL_CORE
Vitals:   T(C): 36.8 (09-18-22 @ 01:55), Max: 36.8 (09-18-22 @ 01:55)  HR: 78 (09-18-22 @ 01:55) (78 - 82)  BP: 145/67 (09-18-22 @ 01:55) (120/65 - 145/67)  RR: 16 (09-18-22 @ 01:55) (16 - 16)  SpO2: 95% (09-18-22 @ 01:55) (95% - 99%)    PHYSICAL EXAM:  GENERAL: NAD, well-developed  NECK:Supple. No JVD.   CHEST/LUNG: Clear to auscultation bilaterally; No wheezing, ronchi, rales  HEART: Regular rate and rhythm;   ABDOMEN: Soft,  Nondistended; moderate lower abdominal tenderness  EXTREMITIES:  2+ Peripheral Pulses, No clubbing, cyanosis, or edema  PSYCH: AAOx3  NEUROLOGY: non-focal deficits  SKIN: No rashes or lesions

## 2022-09-18 NOTE — H&P ADULT - ASSESSMENT
55 year old morbidly obese female has medical history of hypothyroidism, Diverticulitis, COPD (not on oxygen), HTN, Depression, Insomnia, and Anxiety presents to ED c/o abdominal pain x 1 day.   WBC 10. CTAP shows acute sigmoid diverticulitis with evidence of microperforation.    Plan:   NPO, IVF  IV antibiotics: Rocephin, flagyl  pain control  incentive spirometry  DVT/GI prophylaxis  SCDs, IS  encourage ambulation    Above plan discussed with attending Dr Regan

## 2022-09-18 NOTE — PATIENT PROFILE ADULT - FALL HARM RISK - UNIVERSAL INTERVENTIONS
Bed in lowest position, wheels locked, appropriate side rails in place/Call bell, personal items and telephone in reach/Instruct patient to call for assistance before getting out of bed or chair/Non-slip footwear when patient is out of bed/[facility] to call system/Physically safe environment - no spills, clutter or unnecessary equipment/Purposeful Proactive Rounding/Room/bathroom lighting operational, light cord in reach

## 2022-09-18 NOTE — H&P ADULT - NSHPLABSRESULTS_GEN_ALL_CORE
LABS  CBC (09-17 @ 21:31)                              10.7<L>                         10.61   )----------------(  348        71.6  % Neutrophils, 18.3<L>% Lymphocytes, ANC: 7.60<H>                              33.6<L>    BMP (09-17 @ 21:31)             139     |  101     |  21<H> 		Ca++ --      Ca 9.5                ---------------------------------( 112<H>		Mg --                 4.5     |  28      |  1.2   			Ph --        LFTs (09-17 @ 21:31)      TPro 6.7 / Alb 4.0 / TBili <0.2 / DBili <0.2 / AST 22 / ALT 17 / AlkPhos 98    --------------------------------------------------------------------------------------------    MICROBIOLOGY  Urinalysis (09-17 @ 03:05):     Color: Yellow / Appearance: Clear / SG: >1.050<!> / pH: 6.5 / Gluc: Negative / Ketones: Negative / Bili: Negative / Urobili: <2 mg/dL / Protein :30 mg/dL<!> / Nitrites: Negative / Leuk.Est: Negative / RBC: 1 / WBC: 3 / Sq Epi:  / Non Sq Epi: 11<H> / Bacteria Few<!>       --------------------------------------------------------------------------------------------    IMAGING:   < from: CT Abdomen and Pelvis w/ IV Cont (09.17.22 @ 22:31) >    IMPRESSION:      Acute sigmoid diverticulitis with evidence of microperforation.  No focal drainable fluid collection.  Nonemergent colonoscopy may be of benefit when patient is clinically able.    < end of copied text >

## 2022-09-18 NOTE — H&P ADULT - NS ATTEND OPT1 GEN_ALL_CORE
no known allergies
no known allergies
I attest my time as attending is greater than 50% of the total combined time spent on qualifying patient care activities by the PA/NP and attending.

## 2022-09-18 NOTE — H&P ADULT - HISTORY OF PRESENT ILLNESS
55 year old morbidly obese female has medical history of hypothyroidism, Diverticulitis, COPD (not on oxygen), HTN, Depression, Insomnia, and Anxiety presents to ED c/o abdominal pain x 1 day. States that She had diverticulitis 1 year ago and after that she had colonoscopy with no abnormal findings. Reports nausea but no vomiting, Denies any fever, chills,

## 2022-09-19 LAB
ANION GAP SERPL CALC-SCNC: 9 MMOL/L — SIGNIFICANT CHANGE UP (ref 7–14)
BASOPHILS # BLD AUTO: 0.03 K/UL — SIGNIFICANT CHANGE UP (ref 0–0.2)
BASOPHILS NFR BLD AUTO: 0.5 % — SIGNIFICANT CHANGE UP (ref 0–1)
BUN SERPL-MCNC: 17 MG/DL — SIGNIFICANT CHANGE UP (ref 10–20)
CALCIUM SERPL-MCNC: 8.3 MG/DL — LOW (ref 8.4–10.4)
CHLORIDE SERPL-SCNC: 104 MMOL/L — SIGNIFICANT CHANGE UP (ref 98–110)
CO2 SERPL-SCNC: 26 MMOL/L — SIGNIFICANT CHANGE UP (ref 17–32)
CREAT SERPL-MCNC: 1 MG/DL — SIGNIFICANT CHANGE UP (ref 0.7–1.5)
EGFR: 65 ML/MIN/1.73M2 — SIGNIFICANT CHANGE UP
EOSINOPHIL # BLD AUTO: 0.21 K/UL — SIGNIFICANT CHANGE UP (ref 0–0.7)
EOSINOPHIL NFR BLD AUTO: 3.6 % — SIGNIFICANT CHANGE UP (ref 0–8)
GLUCOSE SERPL-MCNC: 123 MG/DL — HIGH (ref 70–99)
HCT VFR BLD CALC: 29.4 % — LOW (ref 37–47)
HGB BLD-MCNC: 9.4 G/DL — LOW (ref 12–16)
IMM GRANULOCYTES NFR BLD AUTO: 0.2 % — SIGNIFICANT CHANGE UP (ref 0.1–0.3)
LYMPHOCYTES # BLD AUTO: 1.26 K/UL — SIGNIFICANT CHANGE UP (ref 1.2–3.4)
LYMPHOCYTES # BLD AUTO: 21.4 % — SIGNIFICANT CHANGE UP (ref 20.5–51.1)
MAGNESIUM SERPL-MCNC: 2.1 MG/DL — SIGNIFICANT CHANGE UP (ref 1.8–2.4)
MCHC RBC-ENTMCNC: 25.2 PG — LOW (ref 27–31)
MCHC RBC-ENTMCNC: 32 G/DL — SIGNIFICANT CHANGE UP (ref 32–37)
MCV RBC AUTO: 78.8 FL — LOW (ref 81–99)
MONOCYTES # BLD AUTO: 0.39 K/UL — SIGNIFICANT CHANGE UP (ref 0.1–0.6)
MONOCYTES NFR BLD AUTO: 6.6 % — SIGNIFICANT CHANGE UP (ref 1.7–9.3)
NEUTROPHILS # BLD AUTO: 3.99 K/UL — SIGNIFICANT CHANGE UP (ref 1.4–6.5)
NEUTROPHILS NFR BLD AUTO: 67.7 % — SIGNIFICANT CHANGE UP (ref 42.2–75.2)
NRBC # BLD: 0 /100 WBCS — SIGNIFICANT CHANGE UP (ref 0–0)
PHOSPHATE SERPL-MCNC: 2.7 MG/DL — SIGNIFICANT CHANGE UP (ref 2.1–4.9)
PLATELET # BLD AUTO: 290 K/UL — SIGNIFICANT CHANGE UP (ref 130–400)
POTASSIUM SERPL-MCNC: 4.1 MMOL/L — SIGNIFICANT CHANGE UP (ref 3.5–5)
POTASSIUM SERPL-SCNC: 4.1 MMOL/L — SIGNIFICANT CHANGE UP (ref 3.5–5)
RBC # BLD: 3.73 M/UL — LOW (ref 4.2–5.4)
RBC # FLD: 17.2 % — HIGH (ref 11.5–14.5)
SODIUM SERPL-SCNC: 139 MMOL/L — SIGNIFICANT CHANGE UP (ref 135–146)
WBC # BLD: 5.89 K/UL — SIGNIFICANT CHANGE UP (ref 4.8–10.8)
WBC # FLD AUTO: 5.89 K/UL — SIGNIFICANT CHANGE UP (ref 4.8–10.8)

## 2022-09-19 PROCEDURE — 99232 SBSQ HOSP IP/OBS MODERATE 35: CPT

## 2022-09-19 RX ORDER — MAGNESIUM SULFATE 500 MG/ML
2 VIAL (ML) INJECTION ONCE
Refills: 0 | Status: COMPLETED | OUTPATIENT
Start: 2022-09-19 | End: 2022-09-19

## 2022-09-19 RX ORDER — SODIUM CHLORIDE 9 MG/ML
1000 INJECTION, SOLUTION INTRAVENOUS
Refills: 0 | Status: DISCONTINUED | OUTPATIENT
Start: 2022-09-19 | End: 2022-09-20

## 2022-09-19 RX ORDER — ONDANSETRON 8 MG/1
4 TABLET, FILM COATED ORAL EVERY 6 HOURS
Refills: 0 | Status: DISCONTINUED | OUTPATIENT
Start: 2022-09-19 | End: 2022-09-21

## 2022-09-19 RX ADMIN — ONDANSETRON 4 MILLIGRAM(S): 8 TABLET, FILM COATED ORAL at 20:20

## 2022-09-19 RX ADMIN — Medication 650 MILLIGRAM(S): at 12:09

## 2022-09-19 RX ADMIN — Medication 1 MILLIGRAM(S): at 05:21

## 2022-09-19 RX ADMIN — Medication 650 MILLIGRAM(S): at 06:53

## 2022-09-19 RX ADMIN — OXYCODONE HYDROCHLORIDE 5 MILLIGRAM(S): 5 TABLET ORAL at 09:23

## 2022-09-19 RX ADMIN — OXYCODONE HYDROCHLORIDE 5 MILLIGRAM(S): 5 TABLET ORAL at 21:24

## 2022-09-19 RX ADMIN — Medication 650 MILLIGRAM(S): at 18:45

## 2022-09-19 RX ADMIN — Medication 1 MILLIGRAM(S): at 17:38

## 2022-09-19 RX ADMIN — MONTELUKAST 10 MILLIGRAM(S): 4 TABLET, CHEWABLE ORAL at 11:56

## 2022-09-19 RX ADMIN — Medication 100 MILLIGRAM(S): at 14:24

## 2022-09-19 RX ADMIN — Medication 650 MILLIGRAM(S): at 11:56

## 2022-09-19 RX ADMIN — ENOXAPARIN SODIUM 40 MILLIGRAM(S): 100 INJECTION SUBCUTANEOUS at 05:21

## 2022-09-19 RX ADMIN — Medication 100 MILLIGRAM(S): at 05:22

## 2022-09-19 RX ADMIN — PANTOPRAZOLE SODIUM 40 MILLIGRAM(S): 20 TABLET, DELAYED RELEASE ORAL at 05:22

## 2022-09-19 RX ADMIN — Medication 50 MILLIGRAM(S): at 05:22

## 2022-09-19 RX ADMIN — ZOLPIDEM TARTRATE 5 MILLIGRAM(S): 10 TABLET ORAL at 22:27

## 2022-09-19 RX ADMIN — Medication 650 MILLIGRAM(S): at 05:21

## 2022-09-19 RX ADMIN — ESCITALOPRAM OXALATE 5 MILLIGRAM(S): 10 TABLET, FILM COATED ORAL at 11:56

## 2022-09-19 RX ADMIN — OXYCODONE HYDROCHLORIDE 5 MILLIGRAM(S): 5 TABLET ORAL at 11:57

## 2022-09-19 RX ADMIN — ENOXAPARIN SODIUM 40 MILLIGRAM(S): 100 INJECTION SUBCUTANEOUS at 17:39

## 2022-09-19 RX ADMIN — Medication 12.5 GRAM(S): at 05:24

## 2022-09-19 RX ADMIN — Medication 650 MILLIGRAM(S): at 08:04

## 2022-09-19 RX ADMIN — BUPROPION HYDROCHLORIDE 150 MILLIGRAM(S): 150 TABLET, EXTENDED RELEASE ORAL at 11:56

## 2022-09-19 RX ADMIN — Medication 100 MILLIGRAM(S): at 21:22

## 2022-09-19 RX ADMIN — CHLORHEXIDINE GLUCONATE 1 APPLICATION(S): 213 SOLUTION TOPICAL at 11:56

## 2022-09-19 RX ADMIN — SODIUM CHLORIDE 125 MILLILITER(S): 9 INJECTION, SOLUTION INTRAVENOUS at 08:04

## 2022-09-19 RX ADMIN — Medication 650 MILLIGRAM(S): at 17:38

## 2022-09-19 RX ADMIN — Medication 125 MICROGRAM(S): at 05:22

## 2022-09-19 RX ADMIN — CEFTRIAXONE 100 MILLIGRAM(S): 500 INJECTION, POWDER, FOR SOLUTION INTRAMUSCULAR; INTRAVENOUS at 14:24

## 2022-09-19 NOTE — PROGRESS NOTE ADULT - SUBJECTIVE AND OBJECTIVE BOX
GENERAL SURGERY PROGRESS NOTE    Patient: GEOVANNA PUTNAM , 56y ([birth year])Female   MRN: [id number]  Location: [address]  Visit: 09-18-22 Inpatient  Date: 09-19-22 @ 11:41    Hospital Day #: 2  Post-Op Day #:    Procedure/Dx/Injuries: sigmoid diverticulitis    Events of past 24 hours: The patient is , NPO on sips and chips. Will switch to maintenance fluids.     PAST MEDICAL & SURGICAL HISTORY:  COPD (chronic obstructive pulmonary disease)  HTN (hypertension)  Diverticulitis  Pulmonary nodules/lesions, multiple  Nasopharyngeal cancer  Hypothyroid  Cervical disc disease  sequelae of radtion for nasopharyngeal cancer  Left ear hearing loss  Hemorrhoids  Anxiety  History of cholecystectomy        Vitals:   T(F): 96.5 (09-19-22 @ 08:16), Max: 97.4 (09-19-22 @ 05:03)  HR: 78 (09-19-22 @ 08:16)  BP: 157/72 (09-19-22 @ 08:16)  RR: 18 (09-19-22 @ 08:16)  SpO2: 93% (09-19-22 @ 08:16)      Diet, NPO:   Except Medications  With Ice Chips/Sips of Water      Fluids: dextrose 5% + sodium chloride 0.45%.: Solution, 1000 milliLiter(s) infuse at 125 mL/Hr      I & O's:    09-18-22 @ 07:01  -  09-19-22 @ 07:00  --------------------------------------------------------  IN:    IV PiggyBack: 150 mL    Lactated Ringers: 2250 mL  Total IN: 2400 mL    OUT:  Total OUT: 0 mL    Total NET: 2400 mL      PHYSICAL EXAM:  GENERAL: NAD, well-appearing  CHEST/LUNG: Clear to auscultation bilaterally  HEART: Regular rate and rhythm  ABDOMEN: LLQ tenderness Soft, Nontender, Nondistended;   EXTREMITIES:  No clubbing, cyanosis, or edema      MEDICATIONS  (STANDING):  acetaminophen     Tablet .. 650 milliGRAM(s) Oral every 6 hours  buPROPion XL (24-Hour) . 150 milliGRAM(s) Oral daily  cefTRIAXone   IVPB 1000 milliGRAM(s) IV Intermittent every 24 hours  chlorhexidine 2% Cloths 1 Application(s) Topical daily  clonazePAM  Tablet 1 milliGRAM(s) Oral two times a day  dextrose 5% + sodium chloride 0.45%. 1000 milliLiter(s) (125 mL/Hr) IV Continuous <Continuous>  enoxaparin Injectable 40 milliGRAM(s) SubCutaneous every 12 hours  escitalopram 5 milliGRAM(s) Oral daily  influenza   Vaccine 0.5 milliLiter(s) IntraMuscular once  levothyroxine 125 MICROGram(s) Oral daily  metoprolol succinate ER 50 milliGRAM(s) Oral daily  metroNIDAZOLE  IVPB 500 milliGRAM(s) IV Intermittent every 8 hours  montelukast 10 milliGRAM(s) Oral daily  pantoprazole    Tablet 40 milliGRAM(s) Oral before breakfast    MEDICATIONS  (PRN):  ALBUTerol    90 MICROgram(s) HFA Inhaler 2 Puff(s) Inhalation every 6 hours PRN Shortness of Breath and/or Wheezing  oxyCODONE    IR 5 milliGRAM(s) Oral every 6 hours PRN Moderate Pain (4 - 6)  zolpidem 5 milliGRAM(s) Oral at bedtime PRN Insomnia  zolpidem 5 milliGRAM(s) Oral at bedtime PRN Insomnia      DVT PROPHYLAXIS: enoxaparin Injectable 40 milliGRAM(s) SubCutaneous every 12 hours  GI PROPHYLAXIS: pantoprazole    Tablet 40 milliGRAM(s) Oral before breakfast    ANTICOAGULATION:   ANTIBIOTICS:  cefTRIAXone   IVPB 1000 milliGRAM(s)  metroNIDAZOLE  IVPB 500 milliGRAM(s)      LAB/STUDIES:  Labs:  CAPILLARY BLOOD GLUCOSE                        9.7    10.89 )-----------( 356      ( 18 Sep 2022 21:05 )             30.1         09-18    141  |  105  |  24<H>  ----------------------------<  98  4.3   |  24  |  1.0      Calcium, Total Serum: 9.0 mg/dL (09-18-22 @ 21:05)      LFTs:             6.7  | <0.2 | 22       ------------------[98      ( 17 Sep 2022 21:31 )  4.0  | <0.2 | 17          Lipase:30     Coags:    CARDIAC MARKERS ( 17 Sep 2022 21:31 )  x     / <0.01 ng/mL / x     / x     / x          Serum Pro-Brain Natriuretic Peptide: 410 pg/mL (09-17-22 @ 21:31)    IMAGING:  n/a    ACCESS/ DEVICES:  [x ] Peripheral IV

## 2022-09-20 LAB
ANION GAP SERPL CALC-SCNC: 8 MMOL/L — SIGNIFICANT CHANGE UP (ref 7–14)
BASOPHILS # BLD AUTO: 0.02 K/UL — SIGNIFICANT CHANGE UP (ref 0–0.2)
BASOPHILS NFR BLD AUTO: 0.3 % — SIGNIFICANT CHANGE UP (ref 0–1)
BUN SERPL-MCNC: 12 MG/DL — SIGNIFICANT CHANGE UP (ref 10–20)
CALCIUM SERPL-MCNC: 8.1 MG/DL — LOW (ref 8.4–10.5)
CHLORIDE SERPL-SCNC: 106 MMOL/L — SIGNIFICANT CHANGE UP (ref 98–110)
CO2 SERPL-SCNC: 26 MMOL/L — SIGNIFICANT CHANGE UP (ref 17–32)
CREAT SERPL-MCNC: 0.9 MG/DL — SIGNIFICANT CHANGE UP (ref 0.7–1.5)
EGFR: 74 ML/MIN/1.73M2 — SIGNIFICANT CHANGE UP
EOSINOPHIL # BLD AUTO: 0.26 K/UL — SIGNIFICANT CHANGE UP (ref 0–0.7)
EOSINOPHIL NFR BLD AUTO: 4 % — SIGNIFICANT CHANGE UP (ref 0–8)
GLUCOSE SERPL-MCNC: 110 MG/DL — HIGH (ref 70–99)
HCT VFR BLD CALC: 30.6 % — LOW (ref 37–47)
HGB BLD-MCNC: 9.6 G/DL — LOW (ref 12–16)
IMM GRANULOCYTES NFR BLD AUTO: 0.3 % — SIGNIFICANT CHANGE UP (ref 0.1–0.3)
LYMPHOCYTES # BLD AUTO: 1.52 K/UL — SIGNIFICANT CHANGE UP (ref 1.2–3.4)
LYMPHOCYTES # BLD AUTO: 23.3 % — SIGNIFICANT CHANGE UP (ref 20.5–51.1)
MAGNESIUM SERPL-MCNC: 1.9 MG/DL — SIGNIFICANT CHANGE UP (ref 1.8–2.4)
MCHC RBC-ENTMCNC: 25.1 PG — LOW (ref 27–31)
MCHC RBC-ENTMCNC: 31.4 G/DL — LOW (ref 32–37)
MCV RBC AUTO: 79.9 FL — LOW (ref 81–99)
MONOCYTES # BLD AUTO: 0.55 K/UL — SIGNIFICANT CHANGE UP (ref 0.1–0.6)
MONOCYTES NFR BLD AUTO: 8.4 % — SIGNIFICANT CHANGE UP (ref 1.7–9.3)
NEUTROPHILS # BLD AUTO: 4.16 K/UL — SIGNIFICANT CHANGE UP (ref 1.4–6.5)
NEUTROPHILS NFR BLD AUTO: 63.7 % — SIGNIFICANT CHANGE UP (ref 42.2–75.2)
NRBC # BLD: 0 /100 WBCS — SIGNIFICANT CHANGE UP (ref 0–0)
PHOSPHATE SERPL-MCNC: 2.9 MG/DL — SIGNIFICANT CHANGE UP (ref 2.1–4.9)
PLATELET # BLD AUTO: 331 K/UL — SIGNIFICANT CHANGE UP (ref 130–400)
POTASSIUM SERPL-MCNC: 4.1 MMOL/L — SIGNIFICANT CHANGE UP (ref 3.5–5)
POTASSIUM SERPL-SCNC: 4.1 MMOL/L — SIGNIFICANT CHANGE UP (ref 3.5–5)
RBC # BLD: 3.83 M/UL — LOW (ref 4.2–5.4)
RBC # FLD: 17 % — HIGH (ref 11.5–14.5)
SODIUM SERPL-SCNC: 140 MMOL/L — SIGNIFICANT CHANGE UP (ref 135–146)
WBC # BLD: 6.53 K/UL — SIGNIFICANT CHANGE UP (ref 4.8–10.8)
WBC # FLD AUTO: 6.53 K/UL — SIGNIFICANT CHANGE UP (ref 4.8–10.8)

## 2022-09-20 PROCEDURE — 99232 SBSQ HOSP IP/OBS MODERATE 35: CPT

## 2022-09-20 RX ORDER — SODIUM CHLORIDE 9 MG/ML
1000 INJECTION, SOLUTION INTRAVENOUS
Refills: 0 | Status: DISCONTINUED | OUTPATIENT
Start: 2022-09-20 | End: 2022-09-21

## 2022-09-20 RX ORDER — SODIUM CHLORIDE 9 MG/ML
1000 INJECTION, SOLUTION INTRAVENOUS
Refills: 0 | Status: DISCONTINUED | OUTPATIENT
Start: 2022-09-20 | End: 2022-09-20

## 2022-09-20 RX ADMIN — OXYCODONE HYDROCHLORIDE 5 MILLIGRAM(S): 5 TABLET ORAL at 22:06

## 2022-09-20 RX ADMIN — Medication 650 MILLIGRAM(S): at 18:15

## 2022-09-20 RX ADMIN — Medication 50 MILLIGRAM(S): at 05:38

## 2022-09-20 RX ADMIN — CHLORHEXIDINE GLUCONATE 1 APPLICATION(S): 213 SOLUTION TOPICAL at 13:23

## 2022-09-20 RX ADMIN — BUPROPION HYDROCHLORIDE 150 MILLIGRAM(S): 150 TABLET, EXTENDED RELEASE ORAL at 13:22

## 2022-09-20 RX ADMIN — Medication 1 MILLIGRAM(S): at 05:37

## 2022-09-20 RX ADMIN — Medication 650 MILLIGRAM(S): at 05:38

## 2022-09-20 RX ADMIN — Medication 100 MILLIGRAM(S): at 22:05

## 2022-09-20 RX ADMIN — Medication 650 MILLIGRAM(S): at 13:21

## 2022-09-20 RX ADMIN — ZOLPIDEM TARTRATE 5 MILLIGRAM(S): 10 TABLET ORAL at 22:07

## 2022-09-20 RX ADMIN — PANTOPRAZOLE SODIUM 40 MILLIGRAM(S): 20 TABLET, DELAYED RELEASE ORAL at 05:38

## 2022-09-20 RX ADMIN — MONTELUKAST 10 MILLIGRAM(S): 4 TABLET, CHEWABLE ORAL at 13:21

## 2022-09-20 RX ADMIN — OXYCODONE HYDROCHLORIDE 5 MILLIGRAM(S): 5 TABLET ORAL at 22:36

## 2022-09-20 RX ADMIN — Medication 100 MILLIGRAM(S): at 13:52

## 2022-09-20 RX ADMIN — SODIUM CHLORIDE 125 MILLILITER(S): 9 INJECTION, SOLUTION INTRAVENOUS at 05:38

## 2022-09-20 RX ADMIN — ESCITALOPRAM OXALATE 5 MILLIGRAM(S): 10 TABLET, FILM COATED ORAL at 13:22

## 2022-09-20 RX ADMIN — Medication 650 MILLIGRAM(S): at 13:23

## 2022-09-20 RX ADMIN — SODIUM CHLORIDE 75 MILLILITER(S): 9 INJECTION, SOLUTION INTRAVENOUS at 13:55

## 2022-09-20 RX ADMIN — Medication 1 MILLIGRAM(S): at 17:55

## 2022-09-20 RX ADMIN — SODIUM CHLORIDE 75 MILLILITER(S): 9 INJECTION, SOLUTION INTRAVENOUS at 08:00

## 2022-09-20 RX ADMIN — ENOXAPARIN SODIUM 40 MILLIGRAM(S): 100 INJECTION SUBCUTANEOUS at 05:39

## 2022-09-20 RX ADMIN — Medication 650 MILLIGRAM(S): at 17:51

## 2022-09-20 RX ADMIN — Medication 62.5 MILLIMOLE(S): at 03:46

## 2022-09-20 RX ADMIN — ONDANSETRON 4 MILLIGRAM(S): 8 TABLET, FILM COATED ORAL at 16:24

## 2022-09-20 RX ADMIN — Medication 125 MICROGRAM(S): at 05:38

## 2022-09-20 RX ADMIN — CEFTRIAXONE 100 MILLIGRAM(S): 500 INJECTION, POWDER, FOR SOLUTION INTRAMUSCULAR; INTRAVENOUS at 13:51

## 2022-09-20 RX ADMIN — Medication 100 MILLIGRAM(S): at 05:37

## 2022-09-20 NOTE — PROGRESS NOTE ADULT - SUBJECTIVE AND OBJECTIVE BOX
GENERAL SURGERY PROGRESS NOTE    Patient: GEOVANNA PUTNAM , 56y ([birth year])Female   MRN: [id number]  Location: [address]  Visit: 09-18-22 Inpatient  Date: 09-20-22 @ 03:43    Hospital Day #: 2  Post-Op Day #:    Procedure/Dx/Injuries: acute sigmoid diverticulitis with microperforation    Events of past 24 hours:  HAWA Villasenor was repleted overnight    PAST MEDICAL & SURGICAL HISTORY:  COPD (chronic obstructive pulmonary disease)      HTN (hypertension)      Diverticulitis      Pulmonary nodules/lesions, multiple      Nasopharyngeal cancer      Hypothyroid      Cervical disc disease  sequelae of radtion for nasopharyngeal cancer      Left ear hearing loss      Hemorrhoids      Anxiety      History of cholecystectomy          Vitals:   T(F): 97.2 (09-20-22 @ 00:53), Max: 97.4 (09-19-22 @ 05:03)  HR: 71 (09-20-22 @ 00:53)  BP: 121/57 (09-20-22 @ 00:53)  RR: 18 (09-20-22 @ 00:53)  SpO2: 94% (09-20-22 @ 00:53)      Diet, NPO:   Except Medications  With Ice Chips/Sips of Water      Fluids: dextrose 5% + sodium chloride 0.45%.: Solution, 1000 milliLiter(s) infuse at 125 mL/Hr      I & O's:    09-18-22 @ 07:01  -  09-19-22 @ 07:00  --------------------------------------------------------  IN:    IV PiggyBack: 150 mL    Lactated Ringers: 2250 mL  Total IN: 2400 mL    OUT:  Total OUT: 0 mL    Total NET: 2400 mL    PHYSICAL EXAM:  GENERAL: NAD, well-appearing  CHEST/LUNG: Clear to auscultation bilaterally  HEART: Regular rate and rhythm  ABDOMEN: LLQ tenderness Soft, Nontender, Nondistended;   EXTREMITIES:  No clubbing, cyanosis, or edema    MEDICATIONS  (STANDING):  acetaminophen     Tablet .. 650 milliGRAM(s) Oral every 6 hours  buPROPion XL (24-Hour) . 150 milliGRAM(s) Oral daily  cefTRIAXone   IVPB 1000 milliGRAM(s) IV Intermittent every 24 hours  chlorhexidine 2% Cloths 1 Application(s) Topical daily  clonazePAM  Tablet 1 milliGRAM(s) Oral two times a day  dextrose 5% + sodium chloride 0.45%. 1000 milliLiter(s) (125 mL/Hr) IV Continuous <Continuous>  enoxaparin Injectable 40 milliGRAM(s) SubCutaneous every 12 hours  escitalopram 5 milliGRAM(s) Oral daily  influenza   Vaccine 0.5 milliLiter(s) IntraMuscular once  levothyroxine 125 MICROGram(s) Oral daily  metoprolol succinate ER 50 milliGRAM(s) Oral daily  metroNIDAZOLE  IVPB 500 milliGRAM(s) IV Intermittent every 8 hours  montelukast 10 milliGRAM(s) Oral daily  pantoprazole    Tablet 40 milliGRAM(s) Oral before breakfast  sodium phosphate IVPB 15 milliMole(s) IV Intermittent once    MEDICATIONS  (PRN):  ALBUTerol    90 MICROgram(s) HFA Inhaler 2 Puff(s) Inhalation every 6 hours PRN Shortness of Breath and/or Wheezing  ondansetron Injectable 4 milliGRAM(s) IV Push every 6 hours PRN Nausea and/or Vomiting  oxyCODONE    IR 5 milliGRAM(s) Oral every 6 hours PRN Moderate Pain (4 - 6)  zolpidem 5 milliGRAM(s) Oral at bedtime PRN Insomnia      DVT PROPHYLAXIS: enoxaparin Injectable 40 milliGRAM(s) SubCutaneous every 12 hours    GI PROPHYLAXIS: pantoprazole    Tablet 40 milliGRAM(s) Oral before breakfast    ANTICOAGULATION:   ANTIBIOTICS:  cefTRIAXone   IVPB 1000 milliGRAM(s)  metroNIDAZOLE  IVPB 500 milliGRAM(s)            LAB/STUDIES:  Labs:  CAPILLARY BLOOD GLUCOSE                              9.4    5.89  )-----------( 290      ( 19 Sep 2022 21:38 )             29.4       Auto Neutrophil %: 67.7 % (09-19-22 @ 21:38)  Auto Immature Granulocyte %: 0.2 % (09-19-22 @ 21:38)    09-19    139  |  104  |  17  ----------------------------<  123<H>  4.1   |  26  |  1.0      Calcium, Total Serum: 8.3 mg/dL (09-19-22 @ 21:38)

## 2022-09-21 ENCOUNTER — TRANSCRIPTION ENCOUNTER (OUTPATIENT)
Age: 57
End: 2022-09-21

## 2022-09-21 VITALS
HEART RATE: 64 BPM | OXYGEN SATURATION: 94 % | TEMPERATURE: 97 F | DIASTOLIC BLOOD PRESSURE: 70 MMHG | RESPIRATION RATE: 18 BRPM | SYSTOLIC BLOOD PRESSURE: 149 MMHG

## 2022-09-21 LAB — SARS-COV-2 RNA SPEC QL NAA+PROBE: SIGNIFICANT CHANGE UP

## 2022-09-21 PROCEDURE — 99238 HOSP IP/OBS DSCHRG MGMT 30/<: CPT | Mod: GC

## 2022-09-21 RX ORDER — METRONIDAZOLE 500 MG
1 TABLET ORAL
Qty: 21 | Refills: 0
Start: 2022-09-21 | End: 2022-09-27

## 2022-09-21 RX ORDER — MONTELUKAST 4 MG/1
0 TABLET, CHEWABLE ORAL
Qty: 30 | Refills: 0 | DISCHARGE

## 2022-09-21 RX ADMIN — Medication 650 MILLIGRAM(S): at 12:35

## 2022-09-21 RX ADMIN — Medication 125 MICROGRAM(S): at 05:43

## 2022-09-21 RX ADMIN — Medication 650 MILLIGRAM(S): at 13:04

## 2022-09-21 RX ADMIN — Medication 100 MILLIGRAM(S): at 05:41

## 2022-09-21 RX ADMIN — Medication 100 MILLIGRAM(S): at 14:12

## 2022-09-21 RX ADMIN — CHLORHEXIDINE GLUCONATE 1 APPLICATION(S): 213 SOLUTION TOPICAL at 12:36

## 2022-09-21 RX ADMIN — OXYCODONE HYDROCHLORIDE 5 MILLIGRAM(S): 5 TABLET ORAL at 15:36

## 2022-09-21 RX ADMIN — BUPROPION HYDROCHLORIDE 150 MILLIGRAM(S): 150 TABLET, EXTENDED RELEASE ORAL at 12:36

## 2022-09-21 RX ADMIN — Medication 650 MILLIGRAM(S): at 17:57

## 2022-09-21 RX ADMIN — ESCITALOPRAM OXALATE 5 MILLIGRAM(S): 10 TABLET, FILM COATED ORAL at 12:35

## 2022-09-21 RX ADMIN — Medication 650 MILLIGRAM(S): at 06:12

## 2022-09-21 RX ADMIN — OXYCODONE HYDROCHLORIDE 5 MILLIGRAM(S): 5 TABLET ORAL at 16:16

## 2022-09-21 RX ADMIN — CEFTRIAXONE 100 MILLIGRAM(S): 500 INJECTION, POWDER, FOR SOLUTION INTRAMUSCULAR; INTRAVENOUS at 14:19

## 2022-09-21 RX ADMIN — Medication 50 MILLIGRAM(S): at 05:43

## 2022-09-21 RX ADMIN — Medication 1 MILLIGRAM(S): at 05:45

## 2022-09-21 RX ADMIN — MONTELUKAST 10 MILLIGRAM(S): 4 TABLET, CHEWABLE ORAL at 12:36

## 2022-09-21 RX ADMIN — PANTOPRAZOLE SODIUM 40 MILLIGRAM(S): 20 TABLET, DELAYED RELEASE ORAL at 05:42

## 2022-09-21 RX ADMIN — Medication 650 MILLIGRAM(S): at 05:42

## 2022-09-21 RX ADMIN — Medication 1 MILLIGRAM(S): at 17:58

## 2022-09-21 RX ADMIN — Medication 650 MILLIGRAM(S): at 18:24

## 2022-09-21 NOTE — DISCHARGE NOTE PROVIDER - CARE PROVIDER_API CALL
Malissa Batres (MD)  Surgery; Surgical Critical Care  [address] [place] [id number]  Phone: [phone]  Fax: [phone]  Follow Up Time:

## 2022-09-21 NOTE — DISCHARGE NOTE PROVIDER - HOSPITAL COURSE
9/18/22  55 year old morbidly obese female has medical history of hypothyroidism, Diverticulitis, COPD (not on oxygen), HTN, Depression, Insomnia, and Anxiety presents to ED c/o abdominal pain x 1 day. States that She had diverticulitis 1 year ago and after that she had colonoscopy with no abnormal findings. Reports nausea but no vomiting, Denies any fever, chills. Physical exam revealed moderate abdominal tenderness in the lower quadrants.     CT Scan was obtained which demonstrated acute sigmoid diverticulitis with evidence of microperforation.     Patient was admitted to the floor for non-surgical treatment of acute sigmoid diverticulitis. Patient was placed NPO and IV antibiotics were administered. Patient progressed on the floors over the next few days. Her abdominal tenderness decreased and she was restarted on a diet. She was able to tolerate clear liquids and now is able to tolerate regular diet without nausea/vomiting.    9/21  Today, patient is tolerating diet, having bowel movements, passing gas, her pain has resolved, and she is ambulating without difficulty. She is medically appropriate for discharge.

## 2022-09-21 NOTE — PROGRESS NOTE ADULT - ASSESSMENT
55 year old morbidly obese female has medical history of hypothyroidism, Diverticulitis, COPD (not on oxygen), HTN, Depression, Insomnia, and Anxiety presents to ED c/o abdominal pain x 1 day.   WBC 10. CTAP shows acute sigmoid diverticulitis with evidence of microperforation.    Plan:   NPO, IVF  IV antibiotics: Rocephin, flagyl  pain control  incentive spirometry  DVT/GI prophylaxis  SCDs, IS  encourage ambulation
55 year old morbidly obese female has medical history of hypothyroidism, Diverticulitis, COPD (not on oxygen), HTN, Depression, Insomnia, and Anxiety presents to ED c/o abdominal pain x 1 day.   WBC 10. CTAP shows acute sigmoid diverticulitis with evidence of microperforation.    Plan:   NPO, IVF  IV antibiotics: Rocephin, flagyl  pain control  incentive spirometry  DVT/GI prophylaxis  SCDs, IS  encourage ambulation    Above plan discussed with attending Dr Regan 
Assessment:  55 year old morbidly obese female has medical history of hypothyroidism, Diverticulitis, COPD (not on oxygen), HTN, Depression, Insomnia, and Anxiety presents to ED c/o abdominal pain x 1 day.   WBC 10. CTAP shows acute sigmoid diverticulitis with evidence of microperforation.    Plan:   Regular diet  IV antibiotics: Rocephin, flagyl  pain control  incentive spirometry  DVT/GI prophylaxis  SCDs, IS  encourage ambulation    Trauma Surgery [id number]

## 2022-09-21 NOTE — DISCHARGE NOTE NURSING/CASE MANAGEMENT/SOCIAL WORK - PATIENT PORTAL LINK FT
You can access the FollowMyHealth Patient Portal offered by [facility] by registering at the following website: [email or web address]. By joining [facility]’s FollowMyHealth portal, you will also be able to view your health information using other applications (apps) compatible with our system.

## 2022-09-21 NOTE — DISCHARGE NOTE PROVIDER - NSDCMRMEDTOKEN_GEN_ALL_CORE_FT
albuterol 90 mcg/inh inhalation aerosol: 2 puff(s) inhaled every 6 hours, As needed, Shortness of Breath and/or Wheezing  buPROPion 300 mg/24 hours (XL) oral tablet, extended release: 1 tab(s) orally every 24 hours  clonazePAM 1 mg oral tablet: 1 tab(s) orally 2 times a day  escitalopram 5 mg oral tablet: 1 tab(s) orally once a day  levothyroxine 125 mcg (0.125 mg) oral tablet: 1 tab(s) orally once a day  metoprolol succinate 50 mg oral tablet, extended release: 1 tab(s) orally once a day  metroNIDAZOLE 500 mg oral tablet: 1 tab(s) orally every 8 hours MDD:3 tabs  MONTELUKAST SODIUM 10 MG TABS: 1 dose(s) orally once a day  sulfamethoxazole-trimethoprim 800 mg-160 mg oral tablet: 1 tab(s) orally 2 times a day MDD:2 tabs  Trelegy Ellipta inhalation powder: 1 puff(s) inhaled once a day  ZOLPIDEM TARTRATE 10 MG TABS: orally once a day (at bedtime)

## 2022-09-21 NOTE — DISCHARGE NOTE PROVIDER - NSDCFUADDINST_GEN_ALL_CORE_FT
-Diet: Continue low fiber diet as tolerated.  -Activity: Resume normal activity.   -Medications: Please stop at your pharmacy to  your medications. Please take sulfamethoxazole-trimethoprim 800mg every 12 hours and metronidazole every 8 hours. You may resume your other home medications.   -Follow-up: Please call the number provided to follow up in the [facility] trauma clinic with Dr. balderas in 2 weeks.   -Please call the office or return to the ED with persistent fever greater than 100.4F, chest pain, shortness of breath, uncontrollable nausea/vomiting/abdominal pain, constipation, bloody bowel movements, abdominal distention, inability to tolerate oral intake. Complex Repair And Xenograft Text: The defect edges were debeveled with a #15 scalpel blade.  The primary defect was closed partially with a complex linear closure.  Given the location of the defect, shape of the defect and the proximity to free margins a xenograft was deemed most appropriate to repair the remaining defect.  The graft was trimmed to fit the size of the remaining defect.  The graft was then placed in the primary defect, oriented appropriately, and sutured into place.

## 2022-09-21 NOTE — PROGRESS NOTE ADULT - SUBJECTIVE AND OBJECTIVE BOX
GENERAL SURGERY PROGRESS NOTE    Patient: GEOVANNA PUTNAM , 56y ([birth year])Female   MRN: [id number]  Location: [address]  Visit: 09-18-22 Inpatient  Date: 09-21-22 @ 09:46    Hospital Day #: 4    Events of past 24 hours:    Patient seen and examined at the bedside.  States pain controlled.    Tolerating diet. Denies nausea/vomiting.  Endorses flatus and BM.  Endorses voiding.  Endorses ambulating.      PAST MEDICAL & SURGICAL HISTORY:  COPD (chronic obstructive pulmonary disease)    HTN (hypertension)    Diverticulitis    Pulmonary nodules/lesions, multiple    Nasopharyngeal cancer    Hypothyroid    Cervical disc disease  sequelae of radiation for nasopharyngeal cancer    Left ear hearing loss    Hemorrhoids    Anxiety    History of cholecystectomy      Vitals:   T(F): 97.7 (09-21-22 @ 09:27), Max: 97.7 (09-21-22 @ 09:27)  HR: 75 (09-21-22 @ 09:27)  BP: 140/71 (09-21-22 @ 09:27)  RR: 18 (09-21-22 @ 09:27)  SpO2: 96% (09-21-22 @ 09:27)      Diet, Regular:   Fiber/Residue Restricted      Fluids: dextrose 5% + sodium chloride 0.45%.: Solution, 1000 milliLiter(s) infuse at 75 mL/Hr  Provider's Contact #: [phone]      I & O's:    09-20-22 @ 07:01  -  09-21-22 @ 07:00  --------------------------------------------------------  IN:    dextrose 5% + sodium chloride 0.45%: 300 mL    dextrose 5% + sodium chloride 0.45%: 300 mL    Oral Fluid: 520 mL  Total IN: 1120 mL    OUT:    Voided (mL): 475 mL  Total OUT: 475 mL    Total NET: 645 mL    PHYSICAL EXAM:  General: NAD, calm and cooperative.  Cardiac: RRR.  Respiratory: On RA. Speaks in complete sentences. No accessory muscles of respiration in use.  Abdomen: Soft, non-distended, non-tender, no rebound, no guarding.   Neuro: Moves all extremities.  Skin: Warm/dry, normal color, no jaundice.      MEDICATIONS  (STANDING):  acetaminophen     Tablet .. 650 milliGRAM(s) Oral every 6 hours  buPROPion XL (24-Hour) . 150 milliGRAM(s) Oral daily  cefTRIAXone   IVPB 1000 milliGRAM(s) IV Intermittent every 24 hours  chlorhexidine 2% Cloths 1 Application(s) Topical daily  clonazePAM  Tablet 1 milliGRAM(s) Oral two times a day  dextrose 5% + sodium chloride 0.45%. 1000 milliLiter(s) (75 mL/Hr) IV Continuous <Continuous>  enoxaparin Injectable 40 milliGRAM(s) SubCutaneous every 12 hours  escitalopram 5 milliGRAM(s) Oral daily  influenza   Vaccine 0.5 milliLiter(s) IntraMuscular once  levothyroxine 125 MICROGram(s) Oral daily  metoprolol succinate ER 50 milliGRAM(s) Oral daily  metroNIDAZOLE  IVPB 500 milliGRAM(s) IV Intermittent every 8 hours  montelukast 10 milliGRAM(s) Oral daily  pantoprazole    Tablet 40 milliGRAM(s) Oral before breakfast    MEDICATIONS  (PRN):  ALBUTerol    90 MICROgram(s) HFA Inhaler 2 Puff(s) Inhalation every 6 hours PRN Shortness of Breath and/or Wheezing  ondansetron Injectable 4 milliGRAM(s) IV Push every 6 hours PRN Nausea and/or Vomiting  oxyCODONE    IR 5 milliGRAM(s) Oral every 6 hours PRN Moderate Pain (4 - 6)  zolpidem 5 milliGRAM(s) Oral at bedtime PRN Insomnia      DVT PROPHYLAXIS: enoxaparin Injectable 40 milliGRAM(s) SubCutaneous every 12 hours    GI PROPHYLAXIS: pantoprazole    Tablet 40 milliGRAM(s) Oral before breakfast    ANTICOAGULATION:   ANTIBIOTICS:  cefTRIAXone   IVPB 1000 milliGRAM(s)  metroNIDAZOLE  IVPB 500 milliGRAM(s)        Isolation Precautions:     Isolation Type: [place];  Indication for Isolation: COVID-19 (09-20-22 @ 11:26)      LAB/STUDIES:  Labs:  CAPILLARY BLOOD GLUCOSE                          9.6    6.53  )-----------( 331      ( 20 Sep 2022 22:00 )             30.6       Auto Neutrophil %: 63.7 % (09-20-22 @ 22:00)  Auto Immature Granulocyte %: 0.3 % (09-20-22 @ 22:00)    09-20    140  |  106  |  12  ----------------------------<  110<H>  4.1   |  26  |  0.9      Calcium, Total Serum: 8.1 mg/dL (09-20-22 @ 22:00)      Serum Pro-Brain Natriuretic Peptide: 410 pg/mL (09-17-22 @ 21:31)      IMAGING:  No new imaging    ACCESS/ DEVICES:  [ ] Peripheral IV

## 2022-09-21 NOTE — DISCHARGE NOTE PROVIDER - NSDCCPCAREPLAN_GEN_ALL_CORE_FT
PRINCIPAL DISCHARGE DIAGNOSIS  Diagnosis: Diverticulitis of colon with perforation  Assessment and Plan of Treatment: Treated with IV antibiotics and oral antibiotics

## 2022-09-21 NOTE — DISCHARGE NOTE PROVIDER - NSDCFUSCHEDAPPT_GEN_ALL_CORE_FT
Dawood Herrera  [facility]  [id number]  Scheduled Appointment: 10/05/2022

## 2022-09-21 NOTE — DISCHARGE NOTE PROVIDER - NSFOLLOWUPCLINICS_GEN_ALL_ED_FT
[facility]  Trauma Surgery  [address] [place] [address]  Phone: [phone]  Fax:

## 2022-09-21 NOTE — PROGRESS NOTE ADULT - REASON FOR ADMISSION
acute sigmoid diverticulitis with microperforation

## 2022-09-28 DIAGNOSIS — Z28.21 IMMUNIZATION NOT CARRIED OUT BECAUSE OF PATIENT REFUSAL: ICD-10-CM

## 2022-09-28 DIAGNOSIS — J44.9 CHRONIC OBSTRUCTIVE PULMONARY DISEASE, UNSPECIFIED: ICD-10-CM

## 2022-09-28 DIAGNOSIS — Z88.1 ALLERGY STATUS TO OTHER ANTIBIOTIC AGENTS STATUS: ICD-10-CM

## 2022-09-28 DIAGNOSIS — K57.20 DIVERTICULITIS OF LARGE INTESTINE WITH PERFORATION AND ABSCESS WITHOUT BLEEDING: ICD-10-CM

## 2022-09-28 DIAGNOSIS — R91.8 OTHER NONSPECIFIC ABNORMAL FINDING OF LUNG FIELD: ICD-10-CM

## 2022-09-28 DIAGNOSIS — Z85.818 PERSONAL HISTORY OF MALIGNANT NEOPLASM OF OTHER SITES OF LIP, ORAL CAVITY, AND PHARYNX: ICD-10-CM

## 2022-09-28 DIAGNOSIS — F32.A DEPRESSION, UNSPECIFIED: ICD-10-CM

## 2022-09-28 DIAGNOSIS — M50.90 CERVICAL DISC DISORDER, UNSPECIFIED, UNSPECIFIED CERVICAL REGION: ICD-10-CM

## 2022-09-28 DIAGNOSIS — G47.00 INSOMNIA, UNSPECIFIED: ICD-10-CM

## 2022-09-28 DIAGNOSIS — Z79.52 LONG TERM (CURRENT) USE OF SYSTEMIC STEROIDS: ICD-10-CM

## 2022-09-28 DIAGNOSIS — E83.42 HYPOMAGNESEMIA: ICD-10-CM

## 2022-09-28 DIAGNOSIS — E03.9 HYPOTHYROIDISM, UNSPECIFIED: ICD-10-CM

## 2022-09-28 DIAGNOSIS — E66.01 MORBID (SEVERE) OBESITY DUE TO EXCESS CALORIES: ICD-10-CM

## 2022-09-28 DIAGNOSIS — I10 ESSENTIAL (PRIMARY) HYPERTENSION: ICD-10-CM

## 2022-09-28 DIAGNOSIS — F41.9 ANXIETY DISORDER, UNSPECIFIED: ICD-10-CM

## 2022-09-28 DIAGNOSIS — Z90.49 ACQUIRED ABSENCE OF OTHER SPECIFIED PARTS OF DIGESTIVE TRACT: ICD-10-CM

## 2022-09-28 DIAGNOSIS — H91.92 UNSPECIFIED HEARING LOSS, LEFT EAR: ICD-10-CM

## 2022-09-28 DIAGNOSIS — Z79.890 HORMONE REPLACEMENT THERAPY: ICD-10-CM

## 2022-10-13 ENCOUNTER — APPOINTMENT (OUTPATIENT)
Dept: PAIN MANAGEMENT | Facility: CLINIC | Age: 57
End: 2022-10-13

## 2022-10-20 ENCOUNTER — APPOINTMENT (OUTPATIENT)
Dept: PAIN MANAGEMENT | Facility: CLINIC | Age: 57
End: 2022-10-20

## 2022-10-20 VITALS
BODY MASS INDEX: 50.02 KG/M2 | SYSTOLIC BLOOD PRESSURE: 162 MMHG | HEART RATE: 76 BPM | WEIGHT: 293 LBS | DIASTOLIC BLOOD PRESSURE: 90 MMHG | HEIGHT: 64 IN

## 2022-10-20 DIAGNOSIS — Z79.899 OTHER LONG TERM (CURRENT) DRUG THERAPY: ICD-10-CM

## 2022-10-20 PROCEDURE — 99214 OFFICE O/P EST MOD 30 MIN: CPT

## 2022-10-20 NOTE — ASSESSMENT
[FreeTextEntry1] : 56-year-old female with cervical radiculopathy and right knee pain. As for her neck pain, As for her neck pain, Patient is presenting with radicular pain with impairment in ADLs and functionality.  The pain has not responded to conservative care, including medications, stretching, as well as active modalities, such as physical therapy.  Imaging studies as well as physical exam findings corroborate the symptomatology and radicular pain.  We will proceed with an epidural at this point. I will proceed with a cervical epidural steroid injection with sedation.   As for her right knee pain, I will proceed with a right knee injection under fluoroscopic guidance. She will continue with her current medication regimen. Follow up in 6 weeks will be made for reassessment.\par \par Patient had a MRI that shows a radicular component along with pain referred into the upper extremity. Patient has trialed rehab (Home exercise, physical therapy or chiropractic care) and medications. I will schedule a C7-T1 cervical epidural steroid injection.\par \par Patient has decrease range of motion and pain in the right knee joint. We will schedule a right knee joint injection with fluoroscopic guidance to better visualize the joint. If ongoing relief of greater than 30% for 4 months can be repeated in 4-6 months VS Synvisc or Euflexxa or PRP or PDA or Stem Cells.\par \par Risk, benefits, pros and cons of procedure were explained to the patient using models and diagrams and their questions were answered. \par \par \par The patient has severe anxiety of procedures that necessitates monitored anesthesia care (MAC). The procedure performed will be close to major nerves, arteries, and spinal cord and/or joint structures. Due to the proximity of these structures, we need the patient to be still during the procedure.  With the help of MAC, this will be safely achieved and decrease the risk of any complications.\par \par Overall there is at least a 30-50% reduction in pain with the prescribed analgesics. The patient denies any adverse side effects due to the medication (sleeping disturbance, constipation, sleepiness, hallucinations and/or urination problems).\par \par Entered by Thea Chris, acting as scribe for Dr. Herrera.\par  \par The documentation recorded by the scribe, in my presence, accurately reflects the service I personally performed, and the decisions made by me with my edits as appropriate.\par  \par Best Regards, \par Dawood Herrera MD \par Board Certified, Anesthesiology \par Board Certified, Pain Medicine\par \par \par \par \par \par

## 2022-10-20 NOTE — HISTORY OF PRESENT ILLNESS
[FreeTextEntry1] : ORIGINAL PRESENTATION: She is a 56-year old female patient with severe chronic neck pain with pain traveling down her right arm. She experiences weakness in her upper extremities bilaterally. In the past, cervical epidural injections have helped. Her last RADHAMES was done in August 2019, however she was unable to finish the series of injections as her  lost his insurance coverage at that time.\par \par TODAY: she presents for revisit encounter regarding her neck, lower back and right knee pain.\par \par Her neck pain continues to be moderate to severe. Pain continues to be most bothersome in the neck with radiating pain into the arms. Previous epidural injections did provide moderate relief. She is approved for an epidural injection, however she is holding off at this time as she is taking care of sick . He is in [facility].\par \par Also, she continues to complain of right knee pain. Previously, a right knee cortisone injection was discussed for which she would now like to proceed with. She is currently wearing a knee brace which provides some temporary relief.\par \par For now she is managing solely with her regimen of Norco 7.5mg BID along with Baclofen 10mg as needed, which provides about 40% relief.\par \par \par UDS: 8/10/2022

## 2022-11-01 ENCOUNTER — APPOINTMENT (OUTPATIENT)
Dept: PAIN MANAGEMENT | Facility: CLINIC | Age: 57
End: 2022-11-01

## 2022-11-03 ENCOUNTER — APPOINTMENT (OUTPATIENT)
Dept: PAIN MANAGEMENT | Facility: CLINIC | Age: 57
End: 2022-11-03

## 2022-11-03 PROCEDURE — 77002 NEEDLE LOCALIZATION BY XRAY: CPT

## 2022-11-03 PROCEDURE — 73560 X-RAY EXAM OF KNEE 1 OR 2: CPT

## 2022-11-03 PROCEDURE — 20610 DRAIN/INJ JOINT/BURSA W/O US: CPT

## 2022-11-03 NOTE — PROCEDURE
[FreeTextEntry1] : Right Knee Injection under Fluoroscopy [FreeTextEntry3] : Date:  2022\par \par Patient: Oneyda Servin\par \par :  1965\par \par \par \par  \par Preoperative Diagnosis: Osteoarthritis, Right knee\par Post-op Diagnosis: Same\par Procedure: Right Steroid Knee injection using fluoroscopy\par Physician: Dawood Herrera M.D. \par Anesthesia: Local\par Medical necessity: Failure of conservative medical management\par Indication for Fluoroscopy:  This procedure requires the precise placement of the spinal needle.  It is the only way to accurately and safely perform the injection.\par \par CONSENT: The possible complications including infection, bleeding, nerve damage, hospital admission, death or failure of the procedure, though unusual, are theoretically possible. The patient was educated about the of the procedure and alternative therapies. All questions were answered and the patient freely gave consent to proceed.\par Monitoring:  Patient had continuous blood pressure, EKG, and pulse oximetry throughout the case.\par  \par PROCEDURE NOTE:\par The patient was placed in a supine position.  The Right knee was prepped with Hibiclens x 3.  A time out was performed. A 25 gauge 1 inch needle was directed into the joint using fluoroscopy visualizing the knee in a lateral projection. Next, 0.75 mL of Omnipaque was injected which showed joint space narrowing.  Next, 6 cc of solution containing dexamethasone 10mg + lidocaine 2% was injected. The needle was removed.\par  \par Findings: AP and lateral views of the knee with x-ray show degenerative changes\par  \par Complications: None. The patient tolerated the procedure well.\par \par Disposition: I have examined the patient and there are no new physical findings since the original presentation.  Sensory and motor function were intact. The patient met discharge criteria; see nurses' notes. The discharge instruction sheet was reviewed and given to the patient. The patient was discharged home with a .\par \par Comments: 1st injection today, if greater than 50% relief and ongoing pain relief for 3 months, can be repeated in 6 months vs Viscosupplementation injection vs regenerative medicine. Follow up office. Call if any problems\par \par \par \par \par Dawood Herrera MD \par Board Certified, Anesthesiology \par Board Certified, Pain Medicine \par

## 2022-11-14 ENCOUNTER — OUTPATIENT (OUTPATIENT)
Dept: OUTPATIENT SERVICES | Facility: HOSPITAL | Age: 57
LOS: 1 days | Discharge: HOME | End: 2022-11-14

## 2022-11-14 DIAGNOSIS — Z12.31 ENCOUNTER FOR SCREENING MAMMOGRAM FOR MALIGNANT NEOPLASM OF BREAST: ICD-10-CM

## 2022-11-14 DIAGNOSIS — Z90.49 ACQUIRED ABSENCE OF OTHER SPECIFIED PARTS OF DIGESTIVE TRACT: Chronic | ICD-10-CM

## 2022-11-14 PROCEDURE — 77067 SCR MAMMO BI INCL CAD: CPT | Mod: 26

## 2022-11-14 PROCEDURE — 77063 BREAST TOMOSYNTHESIS BI: CPT | Mod: 26

## 2022-12-01 ENCOUNTER — APPOINTMENT (OUTPATIENT)
Dept: PAIN MANAGEMENT | Facility: CLINIC | Age: 57
End: 2022-12-01

## 2022-12-19 NOTE — ED ADULT NURSE NOTE - PAIN: NONVERBAL INDICATORS
Addended by: Jessika Galindo on: 12/19/2022 04:21 PM     Modules accepted: Orders, SmartSet moaning/restless

## 2022-12-29 ENCOUNTER — APPOINTMENT (OUTPATIENT)
Dept: PAIN MANAGEMENT | Facility: CLINIC | Age: 57
End: 2022-12-29

## 2023-01-25 ENCOUNTER — APPOINTMENT (OUTPATIENT)
Dept: PAIN MANAGEMENT | Facility: CLINIC | Age: 58
End: 2023-01-25
Payer: MEDICAID

## 2023-01-25 ENCOUNTER — OUTPATIENT (OUTPATIENT)
Dept: OUTPATIENT SERVICES | Facility: HOSPITAL | Age: 58
LOS: 1 days | Discharge: HOME | End: 2023-01-25
Payer: MEDICAID

## 2023-01-25 VITALS — BODY MASS INDEX: 50.02 KG/M2 | WEIGHT: 293 LBS | HEIGHT: 64 IN

## 2023-01-25 DIAGNOSIS — M25.562 PAIN IN LEFT KNEE: ICD-10-CM

## 2023-01-25 DIAGNOSIS — Z90.49 ACQUIRED ABSENCE OF OTHER SPECIFIED PARTS OF DIGESTIVE TRACT: Chronic | ICD-10-CM

## 2023-01-25 PROCEDURE — 99214 OFFICE O/P EST MOD 30 MIN: CPT

## 2023-01-25 PROCEDURE — 73562 X-RAY EXAM OF KNEE 3: CPT | Mod: 26,LT

## 2023-01-25 NOTE — PHYSICAL EXAM
[Flexion] : flexion [Extension] : extension [Bending to left] : bending to left [Bending to right] : bending to right [Left] : left knee [] : tenderness [FreeTextEntry3] : wearing knee brace [FreeTextEntry8] : swelling noted at the left knee joint.

## 2023-01-25 NOTE — HISTORY OF PRESENT ILLNESS
[FreeTextEntry1] : ORIGINAL PRESENTATION: She is a 56-year old female patient with severe chronic neck pain with pain traveling down her right arm. She experiences weakness in her upper extremities bilaterally. In the past, cervical epidural injections have helped. Her last RADHAMES was done in August 2019, however she was unable to finish the series of injections as her  lost his insurance coverage at that time.\par \par TODAY: Since last visit, she underwent a right knee injection on  11-3-2022 with 50% relief for 2 weeks following this procedure. She still continues with ongoing right knee pain however she is now having severe left knee pain. She did go to the urgent care for which they stated that she has bursitis. She went to her PCP and he stated that he does not think it is bursitis. She states that she cannot stand secondary to the severe pain in the knee. She has pain with any sort of rotational movements. She states her left knee is swollen. She states the pain shoots to her head and is severe. She states the pain is rated at a 8/10 on the pain scale and is constant. She states she has not seen a Orthopedist yet. \par \par Her neck pain continues to be moderate to severe. Pain continues to be most bothersome in the neck with radiating pain into the arms. Previous epidural injections did provide moderate relief. She continues with pain radiating into the upper extremities with associated numbness, tingling and spasms. She states the pain is rated at a 7/10 on the pain scale. She states the pain is present daily. \par \par For now she is managing solely with her regimen of Norco 7.5mg BID along with Baclofen 10mg as needed, which provides about 40% relief.\par \par UDS: 8/10/2022

## 2023-01-25 NOTE — ASSESSMENT
[FreeTextEntry1] : 56-year-old female with cervical radiculopathy and bilateral knee pain. As for her neck pain, As for her neck pain, Patient is presenting with radicular pain with impairment in ADLs and functionality.  The pain has not responded to conservative care, including medications, stretching, as well as active modalities, such as physical therapy.  Imaging studies as well as physical exam findings corroborate the symptomatology and radicular pain.  We will proceed with an epidural at this point. I will proceed with a cervical epidural steroid injection with sedation.   As for her bilateral knee pain, I advised her to see Orthopedics. As far as her medications, we will discontinue Baclofen as it is not beneficial. She will continue with Hydrocodone 7.5 mg BID. Follow up in 6 weeks will be made for reassessment. \par \par Patient had a MRI that shows a radicular component along with pain referred into the upper extremity. Patient has trialed rehab (Home exercise, physical therapy or chiropractic care) and medications. I will schedule a C7-T1 cervical epidural steroid injection.\par \par Patient has decrease range of motion and pain in the right knee joint. We will schedule a right knee joint injection with fluoroscopic guidance to better visualize the joint. If ongoing relief of greater than 30% for 4 months can be repeated in 4-6 months VS Synvisc or Euflexxa or PRP or PDA or Stem Cells.\par \par Risk, benefits, pros and cons of procedure were explained to the patient using models and diagrams and their questions were answered. \par \par \par The patient has severe anxiety of procedures that necessitates monitored anesthesia care (MAC). The procedure performed will be close to major nerves, arteries, and spinal cord and/or joint structures. Due to the proximity of these structures, we need the patient to be still during the procedure.  With the help of MAC, this will be safely achieved and decrease the risk of any complications.\par \par Overall there is at least a 30-50% reduction in pain with the prescribed analgesics. The patient denies any adverse side effects due to the medication (sleeping disturbance, constipation, sleepiness, hallucinations and/or urination problems).\par \par Entered by Thea Chris, acting as scribe for Dr. Herrera.\par  \par The documentation recorded by the scribe, in my presence, accurately reflects the service I personally performed, and the decisions made by me with my edits as appropriate.\par  \par Best Regards, \par Dawood Herrera MD \par Board Certified, Anesthesiology \par Board Certified, Pain Medicine\par \par \par \par \par \par

## 2023-01-30 ENCOUNTER — RX RENEWAL (OUTPATIENT)
Age: 58
End: 2023-01-30

## 2023-02-07 ENCOUNTER — APPOINTMENT (OUTPATIENT)
Dept: PAIN MANAGEMENT | Facility: CLINIC | Age: 58
End: 2023-02-07

## 2023-02-28 ENCOUNTER — APPOINTMENT (OUTPATIENT)
Dept: PAIN MANAGEMENT | Facility: CLINIC | Age: 58
End: 2023-02-28
Payer: MEDICAID

## 2023-02-28 VITALS
DIASTOLIC BLOOD PRESSURE: 74 MMHG | WEIGHT: 293 LBS | HEIGHT: 64 IN | SYSTOLIC BLOOD PRESSURE: 143 MMHG | BODY MASS INDEX: 50.02 KG/M2 | HEART RATE: 76 BPM

## 2023-02-28 PROCEDURE — 99214 OFFICE O/P EST MOD 30 MIN: CPT

## 2023-02-28 RX ORDER — HYDROCODONE BITARTRATE AND ACETAMINOPHEN 7.5; 325 MG/1; MG/1
7.5-325 TABLET ORAL
Qty: 60 | Refills: 0 | Status: ACTIVE | COMMUNITY
Start: 2022-05-10 | End: 1900-01-01

## 2023-03-02 NOTE — HISTORY OF PRESENT ILLNESS
[FreeTextEntry1] : ORIGINAL PRESENTATION: She is a 56-year old female patient with severe chronic neck pain with pain traveling down her right arm. She experiences weakness in her upper extremities bilaterally. In the past, cervical epidural injections have helped. Her last RADHAMES was done in August 2019, however she was unable to finish the series of injections as her  lost his insurance coverage at that time.\par \par TODAY: Her neck pain continues to be moderate to severe. Pain continues to have pain radiating into the arms. Previous epidural injections did provide moderate relief. She notes ocassional numbness, tingling and spasms. She states the pain is rated at a 7/10 on the pain scale. She states the pain is present daily. \par \par For now she is managing solely with her regimen of Hydrocodone 7.5mg BID along with Baclofen 10mg as needed, which provides about 40% relief.\par \par UDS: 8/10/2022

## 2023-03-02 NOTE — ASSESSMENT
[FreeTextEntry1] : 56-year-old female with cervical radiculopathy. Patient reports her  is currently severely ill and hospitalized. She does not have the means to continue forward with an epidural injection due to conflicting schedules and inability to obtain a  for the procedure. At this point we will hold off on any further injections. She will follow up in 4 weeks to revisit the matter. \par \par I have explained the findings to the patient and all questions have been answered.\par \par  Entered by Dorene Sylvester, acting as scribe for Dr. Herrera.\par  \par The documentation recorded by the scribe, in my presence, accurately reflects the service I personally performed, and the decisions made by me with my edits as appropriate.\par  \par Best Regards, \par Dawood Herrera MD \par Board Certified, Anesthesiology \par Board Certified, Pain Medicine

## 2023-03-02 NOTE — DATA REVIEWED
[FreeTextEntry1] : SOAPP: Patient has combination of a low risk SOAP and no risk factors. UDS would be repeated randomly every quarter\par \par [place] REGISTRY: Reviewed .  \par \par UDS: No data obtained today.\par

## 2023-03-02 NOTE — PHYSICAL EXAM
[Flexion] : flexion [Extension] : extension [Bending to left] : bending to left [Bending to right] : bending to right [Left] : left knee [] : anterior tenderness [FreeTextEntry3] : wearing knee brace [FreeTextEntry8] : swelling noted at the left knee joint.

## 2023-03-06 ENCOUNTER — RX RENEWAL (OUTPATIENT)
Age: 58
End: 2023-03-06

## 2023-03-06 RX ORDER — BACLOFEN 10 MG/1
10 TABLET ORAL TWICE DAILY
Qty: 60 | Refills: 1 | Status: ACTIVE | COMMUNITY
Start: 2022-03-29 | End: 1900-01-01

## 2023-03-13 NOTE — PATIENT PROFILE ADULT - NSPROEDAREADYLEARN_GEN_A_NUR
Proceed with cycle 10 without bevacizumab  Echocardiogram scheduled for 3/17/23  Call as needed  Follow-up prior to cycle 11 none

## 2023-03-27 ENCOUNTER — APPOINTMENT (OUTPATIENT)
Dept: PAIN MANAGEMENT | Facility: CLINIC | Age: 58
End: 2023-03-27

## 2023-03-27 ENCOUNTER — INPATIENT (INPATIENT)
Facility: HOSPITAL | Age: 58
LOS: 13 days | Discharge: HOME | DRG: 24 | End: 2023-04-10
Attending: STUDENT IN AN ORGANIZED HEALTH CARE EDUCATION/TRAINING PROGRAM | Admitting: STUDENT IN AN ORGANIZED HEALTH CARE EDUCATION/TRAINING PROGRAM
Payer: MEDICAID

## 2023-03-27 VITALS
BODY MASS INDEX: 50.02 KG/M2 | SYSTOLIC BLOOD PRESSURE: 147 MMHG | HEART RATE: 83 BPM | DIASTOLIC BLOOD PRESSURE: 86 MMHG | HEIGHT: 64 IN | WEIGHT: 293 LBS

## 2023-03-27 DIAGNOSIS — R91.1 SOLITARY PULMONARY NODULE: ICD-10-CM

## 2023-03-27 DIAGNOSIS — Z79.899 OTHER LONG TERM (CURRENT) DRUG THERAPY: ICD-10-CM

## 2023-03-27 DIAGNOSIS — E27.9 DISORDER OF ADRENAL GLAND, UNSPECIFIED: ICD-10-CM

## 2023-03-27 DIAGNOSIS — R29.898 OTHER SYMPTOMS AND SIGNS INVOLVING THE MUSCULOSKELETAL SYSTEM: ICD-10-CM

## 2023-03-27 DIAGNOSIS — H91.92 UNSPECIFIED HEARING LOSS, LEFT EAR: ICD-10-CM

## 2023-03-27 DIAGNOSIS — D64.9 ANEMIA, UNSPECIFIED: ICD-10-CM

## 2023-03-27 DIAGNOSIS — Z92.3 PERSONAL HISTORY OF IRRADIATION: ICD-10-CM

## 2023-03-27 DIAGNOSIS — Z99.89 DEPENDENCE ON OTHER ENABLING MACHINES AND DEVICES: ICD-10-CM

## 2023-03-27 DIAGNOSIS — E03.9 HYPOTHYROIDISM, UNSPECIFIED: ICD-10-CM

## 2023-03-27 DIAGNOSIS — Z80.1 FAMILY HISTORY OF MALIGNANT NEOPLASM OF TRACHEA, BRONCHUS AND LUNG: ICD-10-CM

## 2023-03-27 DIAGNOSIS — Z87.891 PERSONAL HISTORY OF NICOTINE DEPENDENCE: ICD-10-CM

## 2023-03-27 DIAGNOSIS — Z88.1 ALLERGY STATUS TO OTHER ANTIBIOTIC AGENTS STATUS: ICD-10-CM

## 2023-03-27 DIAGNOSIS — K57.32 DIVERTICULITIS OF LARGE INTESTINE WITHOUT PERFORATION OR ABSCESS WITHOUT BLEEDING: ICD-10-CM

## 2023-03-27 DIAGNOSIS — I65.23 OCCLUSION AND STENOSIS OF BILATERAL CAROTID ARTERIES: ICD-10-CM

## 2023-03-27 DIAGNOSIS — Z79.51 LONG TERM (CURRENT) USE OF INHALED STEROIDS: ICD-10-CM

## 2023-03-27 DIAGNOSIS — G47.33 OBSTRUCTIVE SLEEP APNEA (ADULT) (PEDIATRIC): ICD-10-CM

## 2023-03-27 DIAGNOSIS — E66.01 MORBID (SEVERE) OBESITY DUE TO EXCESS CALORIES: ICD-10-CM

## 2023-03-27 DIAGNOSIS — Z79.890 HORMONE REPLACEMENT THERAPY: ICD-10-CM

## 2023-03-27 DIAGNOSIS — F41.9 ANXIETY DISORDER, UNSPECIFIED: ICD-10-CM

## 2023-03-27 DIAGNOSIS — I10 ESSENTIAL (PRIMARY) HYPERTENSION: ICD-10-CM

## 2023-03-27 DIAGNOSIS — K57.92 DIVERTICULITIS OF INTESTINE, PART UNSPECIFIED, WITHOUT PERFORATION OR ABSCESS WITHOUT BLEEDING: ICD-10-CM

## 2023-03-27 DIAGNOSIS — I65.21 OCCLUSION AND STENOSIS OF RIGHT CAROTID ARTERY: ICD-10-CM

## 2023-03-27 DIAGNOSIS — J44.9 CHRONIC OBSTRUCTIVE PULMONARY DISEASE, UNSPECIFIED: ICD-10-CM

## 2023-03-27 DIAGNOSIS — Z90.49 ACQUIRED ABSENCE OF OTHER SPECIFIED PARTS OF DIGESTIVE TRACT: Chronic | ICD-10-CM

## 2023-03-27 DIAGNOSIS — R19.7 DIARRHEA, UNSPECIFIED: ICD-10-CM

## 2023-03-27 DIAGNOSIS — E83.42 HYPOMAGNESEMIA: ICD-10-CM

## 2023-03-27 DIAGNOSIS — Z85.819 PERSONAL HISTORY OF MALIGNANT NEOPLASM OF UNSPECIFIED SITE OF LIP, ORAL CAVITY, AND PHARYNX: ICD-10-CM

## 2023-03-27 DIAGNOSIS — H53.8 OTHER VISUAL DISTURBANCES: ICD-10-CM

## 2023-03-27 DIAGNOSIS — Z92.21 PERSONAL HISTORY OF ANTINEOPLASTIC CHEMOTHERAPY: ICD-10-CM

## 2023-03-27 DIAGNOSIS — F32.9 MAJOR DEPRESSIVE DISORDER, SINGLE EPISODE, UNSPECIFIED: ICD-10-CM

## 2023-03-27 PROCEDURE — 85027 COMPLETE CBC AUTOMATED: CPT

## 2023-03-27 PROCEDURE — 84100 ASSAY OF PHOSPHORUS: CPT

## 2023-03-27 PROCEDURE — 80048 BASIC METABOLIC PNL TOTAL CA: CPT

## 2023-03-27 PROCEDURE — 85610 PROTHROMBIN TIME: CPT

## 2023-03-27 PROCEDURE — 83550 IRON BINDING TEST: CPT

## 2023-03-27 PROCEDURE — 83540 ASSAY OF IRON: CPT

## 2023-03-27 PROCEDURE — C1884: CPT

## 2023-03-27 PROCEDURE — C1894: CPT

## 2023-03-27 PROCEDURE — 83735 ASSAY OF MAGNESIUM: CPT

## 2023-03-27 PROCEDURE — 84443 ASSAY THYROID STIM HORMONE: CPT

## 2023-03-27 PROCEDURE — 74177 CT ABD & PELVIS W/CONTRAST: CPT | Mod: 26

## 2023-03-27 PROCEDURE — 83036 HEMOGLOBIN GLYCOSYLATED A1C: CPT

## 2023-03-27 PROCEDURE — C1876: CPT

## 2023-03-27 PROCEDURE — 70551 MRI BRAIN STEM W/O DYE: CPT

## 2023-03-27 PROCEDURE — 93010 ELECTROCARDIOGRAM REPORT: CPT

## 2023-03-27 PROCEDURE — 97167 OT EVAL HIGH COMPLEX 60 MIN: CPT | Mod: GO

## 2023-03-27 PROCEDURE — C1887: CPT

## 2023-03-27 PROCEDURE — 86850 RBC ANTIBODY SCREEN: CPT

## 2023-03-27 PROCEDURE — 82607 VITAMIN B-12: CPT

## 2023-03-27 PROCEDURE — C1725: CPT

## 2023-03-27 PROCEDURE — C9113: CPT

## 2023-03-27 PROCEDURE — 82728 ASSAY OF FERRITIN: CPT

## 2023-03-27 PROCEDURE — 87493 C DIFF AMPLIFIED PROBE: CPT

## 2023-03-27 PROCEDURE — 99285 EMERGENCY DEPT VISIT HI MDM: CPT

## 2023-03-27 PROCEDURE — 70450 CT HEAD/BRAIN W/O DYE: CPT | Mod: 26,59

## 2023-03-27 PROCEDURE — 97535 SELF CARE MNGMENT TRAINING: CPT | Mod: GO

## 2023-03-27 PROCEDURE — 70496 CT ANGIOGRAPHY HEAD: CPT | Mod: 26

## 2023-03-27 PROCEDURE — 85025 COMPLETE CBC W/AUTO DIFF WBC: CPT

## 2023-03-27 PROCEDURE — 82746 ASSAY OF FOLIC ACID SERUM: CPT

## 2023-03-27 PROCEDURE — 70450 CT HEAD/BRAIN W/O DYE: CPT

## 2023-03-27 PROCEDURE — 94640 AIRWAY INHALATION TREATMENT: CPT

## 2023-03-27 PROCEDURE — 37216 TRANSCATH STENT CCA W/O EPS: CPT

## 2023-03-27 PROCEDURE — C1769: CPT

## 2023-03-27 PROCEDURE — 36415 COLL VENOUS BLD VENIPUNCTURE: CPT

## 2023-03-27 PROCEDURE — 85730 THROMBOPLASTIN TIME PARTIAL: CPT

## 2023-03-27 PROCEDURE — 80053 COMPREHEN METABOLIC PANEL: CPT

## 2023-03-27 PROCEDURE — 70498 CT ANGIOGRAPHY NECK: CPT | Mod: 26

## 2023-03-27 PROCEDURE — 99285 EMERGENCY DEPT VISIT HI MDM: CPT | Mod: 25

## 2023-03-27 PROCEDURE — 97162 PT EVAL MOD COMPLEX 30 MIN: CPT | Mod: GP

## 2023-03-27 PROCEDURE — 71045 X-RAY EXAM CHEST 1 VIEW: CPT | Mod: 26

## 2023-03-27 PROCEDURE — 71250 CT THORAX DX C-: CPT

## 2023-03-27 PROCEDURE — U0003: CPT

## 2023-03-27 PROCEDURE — 93005 ELECTROCARDIOGRAM TRACING: CPT

## 2023-03-27 PROCEDURE — U0005: CPT

## 2023-03-27 PROCEDURE — 80061 LIPID PANEL: CPT

## 2023-03-27 PROCEDURE — 86900 BLOOD TYPING SEROLOGIC ABO: CPT

## 2023-03-27 PROCEDURE — 87507 IADNA-DNA/RNA PROBE TQ 12-25: CPT

## 2023-03-27 PROCEDURE — C9399: CPT

## 2023-03-27 PROCEDURE — 97110 THERAPEUTIC EXERCISES: CPT | Mod: GO

## 2023-03-27 PROCEDURE — 86901 BLOOD TYPING SEROLOGIC RH(D): CPT

## 2023-03-28 VITALS
RESPIRATION RATE: 18 BRPM | TEMPERATURE: 98 F | OXYGEN SATURATION: 96 % | DIASTOLIC BLOOD PRESSURE: 61 MMHG | HEART RATE: 67 BPM | SYSTOLIC BLOOD PRESSURE: 104 MMHG

## 2023-03-28 LAB
ALBUMIN SERPL ELPH-MCNC: 3.5 G/DL — SIGNIFICANT CHANGE UP (ref 3.5–5.2)
ALP SERPL-CCNC: 81 U/L — SIGNIFICANT CHANGE UP (ref 30–115)
ALT FLD-CCNC: 17 U/L — SIGNIFICANT CHANGE UP (ref 0–41)
ANION GAP SERPL CALC-SCNC: 11 MMOL/L — SIGNIFICANT CHANGE UP (ref 7–14)
AST SERPL-CCNC: 17 U/L — SIGNIFICANT CHANGE UP (ref 0–41)
BASOPHILS # BLD AUTO: 0.03 K/UL — SIGNIFICANT CHANGE UP (ref 0–0.2)
BASOPHILS NFR BLD AUTO: 0.5 % — SIGNIFICANT CHANGE UP (ref 0–1)
BILIRUB SERPL-MCNC: <0.2 MG/DL — SIGNIFICANT CHANGE UP (ref 0.2–1.2)
BUN SERPL-MCNC: 21 MG/DL — HIGH (ref 10–20)
CALCIUM SERPL-MCNC: 8.8 MG/DL — SIGNIFICANT CHANGE UP (ref 8.4–10.4)
CHLORIDE SERPL-SCNC: 104 MMOL/L — SIGNIFICANT CHANGE UP (ref 98–110)
CO2 SERPL-SCNC: 25 MMOL/L — SIGNIFICANT CHANGE UP (ref 17–32)
CREAT SERPL-MCNC: 1.1 MG/DL — SIGNIFICANT CHANGE UP (ref 0.7–1.5)
EGFR: 58 ML/MIN/1.73M2 — LOW
EOSINOPHIL # BLD AUTO: 0.34 K/UL — SIGNIFICANT CHANGE UP (ref 0–0.7)
EOSINOPHIL NFR BLD AUTO: 5.7 % — SIGNIFICANT CHANGE UP (ref 0–8)
GLUCOSE SERPL-MCNC: 110 MG/DL — HIGH (ref 70–99)
HCT VFR BLD CALC: 32.2 % — LOW (ref 37–47)
HGB BLD-MCNC: 10 G/DL — LOW (ref 12–16)
IMM GRANULOCYTES NFR BLD AUTO: 0.3 % — SIGNIFICANT CHANGE UP (ref 0.1–0.3)
LYMPHOCYTES # BLD AUTO: 1.38 K/UL — SIGNIFICANT CHANGE UP (ref 1.2–3.4)
LYMPHOCYTES # BLD AUTO: 23 % — SIGNIFICANT CHANGE UP (ref 20.5–51.1)
MAGNESIUM SERPL-MCNC: 1.6 MG/DL — LOW (ref 1.8–2.4)
MCHC RBC-ENTMCNC: 25.5 PG — LOW (ref 27–31)
MCHC RBC-ENTMCNC: 31.1 G/DL — LOW (ref 32–37)
MCV RBC AUTO: 82.1 FL — SIGNIFICANT CHANGE UP (ref 81–99)
MONOCYTES # BLD AUTO: 0.47 K/UL — SIGNIFICANT CHANGE UP (ref 0.1–0.6)
MONOCYTES NFR BLD AUTO: 7.8 % — SIGNIFICANT CHANGE UP (ref 1.7–9.3)
NEUTROPHILS # BLD AUTO: 3.76 K/UL — SIGNIFICANT CHANGE UP (ref 1.4–6.5)
NEUTROPHILS NFR BLD AUTO: 62.7 % — SIGNIFICANT CHANGE UP (ref 42.2–75.2)
NRBC # BLD: 0 /100 WBCS — SIGNIFICANT CHANGE UP (ref 0–0)
PLATELET # BLD AUTO: 283 K/UL — SIGNIFICANT CHANGE UP (ref 130–400)
POTASSIUM SERPL-MCNC: 4.3 MMOL/L — SIGNIFICANT CHANGE UP (ref 3.5–5)
POTASSIUM SERPL-SCNC: 4.3 MMOL/L — SIGNIFICANT CHANGE UP (ref 3.5–5)
PROT SERPL-MCNC: 5.9 G/DL — LOW (ref 6–8)
RBC # BLD: 3.92 M/UL — LOW (ref 4.2–5.4)
RBC # FLD: 17.2 % — HIGH (ref 11.5–14.5)
SODIUM SERPL-SCNC: 140 MMOL/L — SIGNIFICANT CHANGE UP (ref 135–146)
WBC # BLD: 6 K/UL — SIGNIFICANT CHANGE UP (ref 4.8–10.8)
WBC # FLD AUTO: 6 K/UL — SIGNIFICANT CHANGE UP (ref 4.8–10.8)

## 2023-03-28 PROCEDURE — 99223 1ST HOSP IP/OBS HIGH 75: CPT

## 2023-03-28 PROCEDURE — 70551 MRI BRAIN STEM W/O DYE: CPT | Mod: 26

## 2023-03-28 PROCEDURE — 99222 1ST HOSP IP/OBS MODERATE 55: CPT

## 2023-03-28 RX ORDER — MONTELUKAST 4 MG/1
10 TABLET, CHEWABLE ORAL DAILY
Refills: 0 | Status: DISCONTINUED | OUTPATIENT
Start: 2023-03-28 | End: 2023-04-10

## 2023-03-28 RX ORDER — ACETAMINOPHEN 500 MG
650 TABLET ORAL EVERY 6 HOURS
Refills: 0 | Status: DISCONTINUED | OUTPATIENT
Start: 2023-03-28 | End: 2023-04-02

## 2023-03-28 RX ORDER — MEROPENEM 1 G/30ML
1000 INJECTION INTRAVENOUS ONCE
Refills: 0 | Status: COMPLETED | OUTPATIENT
Start: 2023-03-28 | End: 2023-03-28

## 2023-03-28 RX ORDER — MAGNESIUM SULFATE 500 MG/ML
2 VIAL (ML) INJECTION EVERY 4 HOURS
Refills: 0 | Status: COMPLETED | OUTPATIENT
Start: 2023-03-28 | End: 2023-03-28

## 2023-03-28 RX ORDER — LEVOTHYROXINE SODIUM 125 MCG
125 TABLET ORAL DAILY
Refills: 0 | Status: DISCONTINUED | OUTPATIENT
Start: 2023-03-28 | End: 2023-04-10

## 2023-03-28 RX ORDER — HEPARIN SODIUM 5000 [USP'U]/ML
5000 INJECTION INTRAVENOUS; SUBCUTANEOUS EVERY 8 HOURS
Refills: 0 | Status: DISCONTINUED | OUTPATIENT
Start: 2023-03-28 | End: 2023-03-31

## 2023-03-28 RX ORDER — HYDROMORPHONE HYDROCHLORIDE 2 MG/ML
0.5 INJECTION INTRAMUSCULAR; INTRAVENOUS; SUBCUTANEOUS ONCE
Refills: 0 | Status: COMPLETED | OUTPATIENT
Start: 2023-03-28 | End: 2023-03-28

## 2023-03-28 RX ORDER — HYDROMORPHONE HYDROCHLORIDE 2 MG/ML
0.5 INJECTION INTRAMUSCULAR; INTRAVENOUS; SUBCUTANEOUS EVERY 6 HOURS
Refills: 0 | Status: DISCONTINUED | OUTPATIENT
Start: 2023-03-28 | End: 2023-03-31

## 2023-03-28 RX ORDER — METOPROLOL TARTRATE 50 MG
100 TABLET ORAL DAILY
Refills: 0 | Status: DISCONTINUED | OUTPATIENT
Start: 2023-03-28 | End: 2023-04-04

## 2023-03-28 RX ORDER — ATORVASTATIN CALCIUM 80 MG/1
80 TABLET, FILM COATED ORAL AT BEDTIME
Refills: 0 | Status: DISCONTINUED | OUTPATIENT
Start: 2023-03-28 | End: 2023-04-10

## 2023-03-28 RX ORDER — SODIUM CHLORIDE 9 MG/ML
1000 INJECTION, SOLUTION INTRAVENOUS
Refills: 0 | Status: DISCONTINUED | OUTPATIENT
Start: 2023-03-28 | End: 2023-03-31

## 2023-03-28 RX ORDER — ALBUTEROL 90 UG/1
2 AEROSOL, METERED ORAL EVERY 6 HOURS
Refills: 0 | Status: DISCONTINUED | OUTPATIENT
Start: 2023-03-28 | End: 2023-04-10

## 2023-03-28 RX ORDER — MEROPENEM 1 G/30ML
1000 INJECTION INTRAVENOUS EVERY 12 HOURS
Refills: 0 | Status: DISCONTINUED | OUTPATIENT
Start: 2023-03-28 | End: 2023-03-29

## 2023-03-28 RX ORDER — MEROPENEM 1 G/30ML
INJECTION INTRAVENOUS
Refills: 0 | Status: DISCONTINUED | OUTPATIENT
Start: 2023-03-28 | End: 2023-03-29

## 2023-03-28 RX ORDER — METOPROLOL TARTRATE 50 MG
50 TABLET ORAL DAILY
Refills: 0 | Status: DISCONTINUED | OUTPATIENT
Start: 2023-03-28 | End: 2023-03-28

## 2023-03-28 RX ORDER — CLONAZEPAM 1 MG
1 TABLET ORAL
Refills: 0 | Status: DISCONTINUED | OUTPATIENT
Start: 2023-03-28 | End: 2023-04-03

## 2023-03-28 RX ORDER — BUPROPION HYDROCHLORIDE 150 MG/1
300 TABLET, EXTENDED RELEASE ORAL DAILY
Refills: 0 | Status: DISCONTINUED | OUTPATIENT
Start: 2023-03-28 | End: 2023-04-10

## 2023-03-28 RX ORDER — PANTOPRAZOLE SODIUM 20 MG/1
40 TABLET, DELAYED RELEASE ORAL
Refills: 0 | Status: DISCONTINUED | OUTPATIENT
Start: 2023-03-28 | End: 2023-03-29

## 2023-03-28 RX ORDER — BUDESONIDE AND FORMOTEROL FUMARATE DIHYDRATE 160; 4.5 UG/1; UG/1
2 AEROSOL RESPIRATORY (INHALATION)
Refills: 0 | Status: DISCONTINUED | OUTPATIENT
Start: 2023-03-28 | End: 2023-04-10

## 2023-03-28 RX ORDER — ESCITALOPRAM OXALATE 10 MG/1
5 TABLET, FILM COATED ORAL DAILY
Refills: 0 | Status: DISCONTINUED | OUTPATIENT
Start: 2023-03-28 | End: 2023-04-10

## 2023-03-28 RX ORDER — TIOTROPIUM BROMIDE 18 UG/1
2 CAPSULE ORAL; RESPIRATORY (INHALATION) DAILY
Refills: 0 | Status: DISCONTINUED | OUTPATIENT
Start: 2023-03-28 | End: 2023-04-10

## 2023-03-28 RX ORDER — ASPIRIN/CALCIUM CARB/MAGNESIUM 324 MG
81 TABLET ORAL DAILY
Refills: 0 | Status: DISCONTINUED | OUTPATIENT
Start: 2023-03-28 | End: 2023-04-10

## 2023-03-28 RX ORDER — INFLUENZA VIRUS VACCINE 15; 15; 15; 15 UG/.5ML; UG/.5ML; UG/.5ML; UG/.5ML
0.5 SUSPENSION INTRAMUSCULAR ONCE
Refills: 0 | Status: DISCONTINUED | OUTPATIENT
Start: 2023-03-28 | End: 2023-04-10

## 2023-03-28 RX ORDER — BACLOFEN 100 %
5 POWDER (GRAM) MISCELLANEOUS EVERY 12 HOURS
Refills: 0 | Status: DISCONTINUED | OUTPATIENT
Start: 2023-03-28 | End: 2023-04-10

## 2023-03-28 RX ORDER — ZOLPIDEM TARTRATE 10 MG/1
5 TABLET ORAL AT BEDTIME
Refills: 0 | Status: DISCONTINUED | OUTPATIENT
Start: 2023-03-28 | End: 2023-04-04

## 2023-03-28 RX ORDER — HYDROXYZINE HCL 10 MG
25 TABLET ORAL THREE TIMES A DAY
Refills: 0 | Status: DISCONTINUED | OUTPATIENT
Start: 2023-03-28 | End: 2023-04-10

## 2023-03-28 RX ADMIN — SODIUM CHLORIDE 75 MILLILITER(S): 9 INJECTION, SOLUTION INTRAVENOUS at 10:43

## 2023-03-28 RX ADMIN — Medication 25 GRAM(S): at 21:46

## 2023-03-28 RX ADMIN — HYDROMORPHONE HYDROCHLORIDE 0.5 MILLIGRAM(S): 2 INJECTION INTRAMUSCULAR; INTRAVENOUS; SUBCUTANEOUS at 12:30

## 2023-03-28 RX ADMIN — Medication 125 MICROGRAM(S): at 06:00

## 2023-03-28 RX ADMIN — ATORVASTATIN CALCIUM 80 MILLIGRAM(S): 80 TABLET, FILM COATED ORAL at 22:42

## 2023-03-28 RX ADMIN — MONTELUKAST 10 MILLIGRAM(S): 4 TABLET, CHEWABLE ORAL at 12:00

## 2023-03-28 RX ADMIN — Medication 25 GRAM(S): at 17:42

## 2023-03-28 RX ADMIN — Medication 2 MILLIGRAM(S): at 20:59

## 2023-03-28 RX ADMIN — Medication 1 MILLIGRAM(S): at 06:00

## 2023-03-28 RX ADMIN — ESCITALOPRAM OXALATE 5 MILLIGRAM(S): 10 TABLET, FILM COATED ORAL at 06:00

## 2023-03-28 RX ADMIN — HYDROMORPHONE HYDROCHLORIDE 0.5 MILLIGRAM(S): 2 INJECTION INTRAMUSCULAR; INTRAVENOUS; SUBCUTANEOUS at 17:54

## 2023-03-28 RX ADMIN — HYDROMORPHONE HYDROCHLORIDE 0.5 MILLIGRAM(S): 2 INJECTION INTRAMUSCULAR; INTRAVENOUS; SUBCUTANEOUS at 11:50

## 2023-03-28 RX ADMIN — BUPROPION HYDROCHLORIDE 300 MILLIGRAM(S): 150 TABLET, EXTENDED RELEASE ORAL at 06:00

## 2023-03-28 RX ADMIN — MEROPENEM 100 MILLIGRAM(S): 1 INJECTION INTRAVENOUS at 11:49

## 2023-03-28 NOTE — CONSULT NOTE ADULT - SUBJECTIVE AND OBJECTIVE BOX
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 Patient reports having this episode again for 5 minutes when getting up from a seated position. Patient reports theses episodes happening every other day for a few weeks. In the ER patient also complaining of LLQ pain worse when attempting to pass gas.          PAST MEDICAL & SURGICAL HISTORY:  COPD (chronic obstructive pulmonary disease)      HTN (hypertension)      Diverticulitis      Pulmonary nodules/lesions, multiple      Nasopharyngeal cancer      Hypothyroid      Cervical disc disease  sequelae of radtion for nasopharyngeal cancer      Left ear hearing loss      Hemorrhoids      Anxiety      History of cholecystectomy          FAMILY HISTORY:  Family history of lung cancer (Father, Mother)        Social History: (-) x 3    Allergies    ciprofloxacin (Urticaria; Other)    Intolerances        MEDICATIONS  (STANDING):    MEDICATIONS  (PRN):          Examination:  Cognitive/Language:  The patient is oriented to person, place, time and date.  Recent and remote memory intact.  Fund of knowledge is intact and normal.  Language with normal repetition, comprehension and naming.  Nondysarthric.    Eyes: intact VA, VFF.  EOMI w/o nystagmus, skew or reported double vision.  PERRL.  No ptosis/weakness of eyelid closure.    Face:  Facial sensation normal V1 - 3, no facial asymmetry.    Formal Muscle Strength Testing: (MRC grade R/L) 5/5 UE; 5/5 LE.    Sensory examination:   Intact to light touch in all extremities.  Cerebellum:   FTN/HKS intact with normal LANDEN in all limbs.  No dysmetria or dysdiadokinesia.  Gait deferred      NIHSS 0    Labs:   CBC Full  -  ( 27 Mar 2023 20:56 )  WBC Count : 8.15 K/uL  RBC Count : 4.14 M/uL  Hemoglobin : 10.7 g/dL  Hematocrit : 34.3 %  Platelet Count - Automated : 352 K/uL  Mean Cell Volume : 82.9 fL  Mean Cell Hemoglobin : 25.8 pg  Mean Cell Hemoglobin Concentration : 31.2 g/dL  Auto Neutrophil # : 5.44 K/uL  Auto Lymphocyte # : 1.76 K/uL  Auto Monocyte # : 0.57 K/uL  Auto Eosinophil # : 0.31 K/uL  Auto Basophil # : 0.04 K/uL  Auto Neutrophil % : 66.7 %  Auto Lymphocyte % : 21.6 %  Auto Monocyte % : 7.0 %  Auto Eosinophil % : 3.8 %  Auto Basophil % : 0.5 %        142  |  102  |  27<H>  ----------------------------<  97  4.6   |  27  |  1.3    Ca    9.6      27 Mar 2023 20:56  Mg     1.7         TPro  6.7  /  Alb  3.9  /  TBili  <0.2  /  DBili  x   /  AST  19  /  ALT  16  /  AlkPhos  88      LIVER FUNCTIONS - ( 27 Mar 2023 20:56 )  Alb: 3.9 g/dL / Pro: 6.7 g/dL / ALK PHOS: 88 U/L / ALT: 16 U/L / AST: 19 U/L / GGT: x           PT/INR - ( 27 Mar 2023 20:56 )   PT: 10.80 sec;   INR: 0.95 ratio         PTT - ( 27 Mar 2023 20:56 )  PTT:31.6 sec  Urinalysis Basic - ( 27 Mar 2023 20:53 )    Color: Yellow / Appearance: Clear / S.015 / pH: x  Gluc: x / Ketone: Negative  / Bili: Negative / Urobili: <2 mg/dL   Blood: x / Protein: Trace / Nitrite: Negative   Leuk Esterase: Negative / RBC: x / WBC x   Sq Epi: x / Non Sq Epi: x / Bacteria: x          Neuroimaging:  NCHCT: CT Head No Cont:   ACC: [id number] EXAM:  CT BRAIN   ORDERED BY: [facility]   IMPRESSION:    No acute intracranial pathology.    --- End of Report ---          CT head: No acute intracranial pathology.  CTA NECK:  1.  Severe high-grade stenosis of the proximal right internal carotid artery caused by noncalcified plaque.  2.  Moderate stenosis of the proximal left internal carotid artery caused by calcified plaque.  3.  No evidence of vertebral artery stenosis.  CTA HEAD:  1.  Moderate stenosis of the cavernous segments of the bilateral internal carotid arteries  2.  Severe narrowing of the P3 segment of the right posterior cerebral artery.  3.  Otherwise, no evidence of flow-limiting stenosis, occlusion or aneurysm.  Other: Right upper lobe 1.2 cm pulmonary nodule, as well as several groundglass opacities. Consider follow-up CT of the chest in 3-6 months.  CT AP: Acute diverticulitis of a short segment of the sigmoid colon. No abscess or perforation. Further evaluation with nonemergent colonoscopy may be of benefit when patient is clinically able.       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 Patient reports having this episode again for 5 minutes when getting up from a seated position. Patient reports theses episodes happening every other day for a few weeks. In the ER patient also complaining of LLQ pain worse when attempting to pass gas.          PAST MEDICAL & SURGICAL HISTORY:  COPD (chronic obstructive pulmonary disease)      HTN (hypertension)      Diverticulitis      Pulmonary nodules/lesions, multiple      Nasopharyngeal cancer      Hypothyroid      Cervical disc disease  sequelae of radiation for nasopharyngeal cancer      Left ear hearing loss      Hemorrhoids      Anxiety      History of cholecystectomy          FAMILY HISTORY:  Family history of lung cancer (Father, Mother)        Social History: (-) x 3    Allergies    ciprofloxacin (Urticaria; Other)    Intolerances        MEDICATIONS  (STANDING):    MEDICATIONS  (PRN):          Examination:  Cognitive/Language:  The patient is oriented to person, place, time and date.  Recent and remote memory intact.  Fund of knowledge is intact and normal.  Language with normal repetition, comprehension and naming.  Nondysarthric.    Eyes: intact VA, VFF.  EOMI w/o nystagmus, skew or reported double vision.  PERRL.  No ptosis/weakness of eyelid closure.    Face:  Facial sensation normal V1 - 3, no facial asymmetry.    Formal Muscle Strength Testing: (MRC grade R/L) 5/5 UE; 5/5 LE.    Sensory examination:   Intact to light touch in all extremities.  Cerebellum:   FTN/HKS intact with normal LANDEN in all limbs.  No dysmetria or dysdiadokinesia.  Gait deferred      NIHSS 0    Labs:   CBC Full  -  ( 27 Mar 2023 20:56 )  WBC Count : 8.15 K/uL  RBC Count : 4.14 M/uL  Hemoglobin : 10.7 g/dL  Hematocrit : 34.3 %  Platelet Count - Automated : 352 K/uL  Mean Cell Volume : 82.9 fL  Mean Cell Hemoglobin : 25.8 pg  Mean Cell Hemoglobin Concentration : 31.2 g/dL  Auto Neutrophil # : 5.44 K/uL  Auto Lymphocyte # : 1.76 K/uL  Auto Monocyte # : 0.57 K/uL  Auto Eosinophil # : 0.31 K/uL  Auto Basophil # : 0.04 K/uL  Auto Neutrophil % : 66.7 %  Auto Lymphocyte % : 21.6 %  Auto Monocyte % : 7.0 %  Auto Eosinophil % : 3.8 %  Auto Basophil % : 0.5 %        142  |  102  |  27<H>  ----------------------------<  97  4.6   |  27  |  1.3    Ca    9.6      27 Mar 2023 20:56  Mg     1.7         TPro  6.7  /  Alb  3.9  /  TBili  <0.2  /  DBili  x   /  AST  19  /  ALT  16  /  AlkPhos  88      LIVER FUNCTIONS - ( 27 Mar 2023 20:56 )  Alb: 3.9 g/dL / Pro: 6.7 g/dL / ALK PHOS: 88 U/L / ALT: 16 U/L / AST: 19 U/L / GGT: x           PT/INR - ( 27 Mar 2023 20:56 )   PT: 10.80 sec;   INR: 0.95 ratio         PTT - ( 27 Mar 2023 20:56 )  PTT:31.6 sec  Urinalysis Basic - ( 27 Mar 2023 20:53 )    Color: Yellow / Appearance: Clear / S.015 / pH: x  Gluc: x / Ketone: Negative  / Bili: Negative / Urobili: <2 mg/dL   Blood: x / Protein: Trace / Nitrite: Negative   Leuk Esterase: Negative / RBC: x / WBC x   Sq Epi: x / Non Sq Epi: x / Bacteria: x          Neuroimaging:  NCHCT: CT Head No Cont:   ACC: [id number] EXAM:  CT BRAIN   ORDERED BY: [facility]   IMPRESSION:    No acute intracranial pathology.    --- End of Report ---          CT head: No acute intracranial pathology.  CTA NECK:  1.  Severe high-grade stenosis of the proximal right internal carotid artery caused by noncalcified plaque.  2.  Moderate stenosis of the proximal left internal carotid artery caused by calcified plaque.  3.  No evidence of vertebral artery stenosis.  CTA HEAD:  1.  Moderate stenosis of the cavernous segments of the bilateral internal carotid arteries  2.  Severe narrowing of the P3 segment of the right posterior cerebral artery.  3.  Otherwise, no evidence of flow-limiting stenosis, occlusion or aneurysm.  Other: Right upper lobe 1.2 cm pulmonary nodule, as well as several groundglass opacities. Consider follow-up CT of the chest in 3-6 months.  CT AP: Acute diverticulitis of a short segment of the sigmoid colon. No abscess or perforation. Further evaluation with nonemergent colonoscopy may be of benefit when patient is clinically able.

## 2023-03-28 NOTE — ED PROVIDER NOTE - CLINICAL SUMMARY MEDICAL DECISION MAKING FREE TEXT BOX
pt seen by neurology, cta noted, recommends medical admission, needs mri as inpt. diverticulitis also noted- iv abx given, admit to medicine

## 2023-03-28 NOTE — CONSULT NOTE ADULT - ASSESSMENT
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 These episodes are recurrent over the last few weeks. In the ER patient also complaining of LLQ pain, found to have acute diverticulitis. Patients CTA head and neck revealed severe high-grade stenosis of the proximal right internal carotid artery caused by noncalcified plaque. Moderate stenosis of the proximal left internal carotid artery caused by calcified plaque. Moderate stenosis of the cavernous segments of the bilateral internal carotid arteries and severe narrowing of the P3 segment of the right posterior cerebral artery. Exam non focal, etiology of symptoms may be due to flow related cause.     Suggestion:  Discussed with Dr. Gaffney  -MRI brain without hu  -LDL A1C  -Telemetry monitoring  -Keep -180  -Avoid hypotension, hypovolemia and dehydration  -Suggest high dose statin.   -Would suggest  mg PO x1 and 81 mg of ASA daily if medically able  -Would suggest Plavix 75 mg daily if cleared by GI and medicine.  -Consult NeuroENDOvascular for intra/extracranial stenosis.    Impression:  57 year old morbidly obese female has medical history of nasopharyngeal cancer in remission s/p chemoradiation 2013, hypothyroidism, Diverticulitis, COPD (not on oxygen), HTN, Depression, and Anxiety presents to ED c/o of sudden onset lightheadedness which lasted 5 minutes and associated binocular blurry vision that lasted 15 minutes while driving. These episodes are recurrent over the last few weeks. In the ER patient also complaining of LLQ pain, found to have acute diverticulitis. Patients CTA head and neck revealed severe high-grade stenosis of the proximal right internal carotid artery caused by noncalcified plaque. Moderate stenosis of the proximal left internal carotid artery caused by calcified plaque. Moderate stenosis of the cavernous segments of the bilateral internal carotid arteries and severe narrowing of the P3 segment of the right posterior cerebral artery. Exam non focal, etiology of symptoms may be due to flow related cause.     Suggestion:  Discussed with Dr. Gaffney  -MRI brain without hu  -LDL A1C  -Telemetry monitoring  -Keep -180  -Avoid hypotension, hypovolemia and dehydration  -Suggest high dose statin.   -Would suggest  mg PO x1 and 81 mg of ASA daily if medically able  -Would suggest Plavix 75 mg daily if cleared by GI and medicine.  -Consult NeuroENDOvascular for intra/extracranial stenosis.

## 2023-03-28 NOTE — H&P ADULT - ATTENDING SUPERVISION STATEMENT
[facility] Alpha-Blocker Refill Protocol Passed 10/31/2022 01:12 PM   Protocol Details  Seen by prescribing provider or same department within the last 12 months or has a future appt in 3 months - IF FAILED PLEASE LOOK AT CHART REVIEW FOR LAST VISIT AND PROCEED ACCORDINGLY    Medication (including dose and sig) on current meds list        Pt will call  to April to schedule an appt.   Resident

## 2023-03-28 NOTE — ED PROVIDER NOTE - ATTENDING CONTRIBUTION TO CARE
57F PMH diverticulitis, nasal pharygeal cancer s/p radiation w residual L deafness and no salivary ducts, hypothyroid, HTN, obese, p/w blurry vision, started 4pm, lasted 15  mins. happened again at doctors office for a 1 second. pt has had 2 -3 weeks of intermittent dizziness. now asymptomatic. no ha, numbness, weakness, blurry vision at this time, slurred speech. pt also reports LLQ pain similar to prior diverticulitis. no nvdc. no fever, cough. no cp, sob. no dysuria, freq, hematuria.     on exam, AFVSS, well meaghan nad, ncat, eomi, perrla, mmm, lctab, rrr nl s1s2 no mrg, abd soft LLQ ttp, no rebound or rigidity, no cvat, nd, aaox3, CN 2-12 intact, No nystagmus.  5/5 motor x 4 ext, SILT x 4 extremities, No facial droop or slurred speech. No pronator drift.  Normal rapid alternating movement and finger nose finger bilaterally. No midline C/T/L tenderness to palpation or step off. Normal gait, No ataxia. , no le edema or calf ttp,     a/p; Blurry vision, resolved, NV intact. asymptomatic at this time. Abd pain, r/o diverticulitis, will do labs, ua, ct, neuro consult re-eval

## 2023-03-28 NOTE — CONSULT NOTE ADULT - SUBJECTIVE AND OBJECTIVE BOX
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 Episode happened while patient was driving and she continued to drive normally. She reports associated pain behind the eyes. Resolved and did not happen again. In the ER patient also complaining of LLQ pain worse when attempting to pass gas. Denies any fever, chills, headaches, SOB, CP, NVD, Dysuria, or MSK pain.     In the ER:  Vitals: /58, HR 82, RR 20, SpO2 100 RA, T 98.4 oral  EKst deg av block  Sig Labs: WBC 8.15, Hgb 10.7 at baseline, Cr 1.3  eGFR 47, Mg 1.7, , Trop -ve, UA -ve, COVID -ve   Imaging:  CT head: No acute intracranial pathology.  CTA NECK:  1.  Severe high-grade stenosis of the proximal right internal carotid artery caused by noncalcified plaque.  2.  Moderate stenosis of the proximal left internal carotid artery caused by calcified plaque.  3.  No evidence of vertebral artery stenosis.  CTA HEAD:  1.  Moderate stenosis of the cavernous segments of the bilateral internal carotid arteries  2.  Severe narrowing of the P3 segment of the right posterior cerebral artery.  3.  Otherwise, no evidence of flow-limiting stenosis, occlusion or aneurysm.  Other: Right upper lobe 1.2 cm pulmonary nodule, as well as several groundglass opacities. Consider follow-up CT of the chest in 3-6 months.  CT AP: Acute diverticulitis of a short segment of the sigmoid colon. No abscess or perforation. Further evaluation with nonemergent colonoscopy may be of benefit when patient is clinically able.    In the ER: Given Dilaudid 0.5, LR 1L bolus, Zofran 4mg IV, Magnesium 2g, and Zosyn 3.375 IV (28 Mar 2023 01:30)    GI consulted for diverticulitis. In ED pt c/o of significant LLQ pain. Pt has history of diverticular disease with multiple episodes of diverticulitis. Pt reports this pain feels similar to previous episodes. Pt reports some nausea yesterday. Pt has had brown loose stools for past few days. Pt states that she frequently fluctuates between diarrhea and constipation. Pt has cancer in family but no known gastric or colon cancer. Pt states that she goes every year for colonoscopy and endoscopy. Last performed last year, but normal per pt. 2 polyps removed on colonoscopy per pt. Pt denies tobacco, alcohol or marijuana use. Pt denies frequent NSAID or tylenol use. Pt states that she is not on any blood thinners. On exam pt c/o significant LLQ pain, tender to palpation. Pt denies N/V.     Prior EGD: last year, wnl per pt    Prior Colonoscopy: last year, diverticular disease, removed 2 polyps per pt      PAST MEDICAL & SURGICAL HISTORY:  COPD (chronic obstructive pulmonary disease)      HTN (hypertension)      Diverticulitis      Pulmonary nodules/lesions, multiple      Nasopharyngeal cancer      Hypothyroid      Cervical disc disease  sequelae of radtion for nasopharyngeal cancer      Left ear hearing loss      Hemorrhoids      Anxiety      History of cholecystectomy            FAMILY HISTORY:  Family history of lung cancer (Father, Mother)        Social History:  Tobacco: none  Alcohol: none  Drugs: none    Home Medications:  albuterol 90 mcg/inh inhalation aerosol: 2 puff(s) inhaled every 6 hours, As needed, Shortness of Breath and/or Wheezing (28 Mar 2023 13:59)  buPROPion 300 mg/24 hours (XL) oral tablet, extended release: 1 tab(s) orally every 24 hours (28 Mar 2023 13:59)  clonazePAM 1 mg oral tablet: 1 tab(s) orally 2 times a day (28 Mar 2023 13:59)  escitalopram 5 mg oral tablet: 1 tab(s) orally once a day (28 Mar 2023 13:59)  metoprolol succinate 50 mg oral tablet, extended release: 1 tab(s) orally once a day (28 Mar 2023 13:59)  MONTELUKAST SODIUM 10 MG TABS: 1 dose(s) orally once a day (28 Mar 2023 13:59)  Trelegy Ellipta inhalation powder: 1 puff(s) inhaled once a day (28 Mar 2023 13:59)  ZOLPIDEM TARTRATE 10 MG TABS: orally once a day (at bedtime) (28 Mar 2023 13:59)        MEDICATIONS  (STANDING):  aspirin  chewable 81 milliGRAM(s) Oral daily  atorvastatin 80 milliGRAM(s) Oral at bedtime  budesonide  80 MICROgram(s)/formoterol 4.5 MICROgram(s) Inhaler 2 Puff(s) Inhalation two times a day  buPROPion XL (24-Hour) . 300 milliGRAM(s) Oral daily  escitalopram 5 milliGRAM(s) Oral daily  heparin   Injectable 5000 Unit(s) SubCutaneous every 8 hours  lactated ringers. 1000 milliLiter(s) (75 mL/Hr) IV Continuous <Continuous>  levothyroxine 125 MICROGram(s) Oral daily  magnesium sulfate  IVPB 2 Gram(s) IV Intermittent every 4 hours  meropenem  IVPB      meropenem  IVPB 1000 milliGRAM(s) IV Intermittent every 12 hours  metoprolol succinate  milliGRAM(s) Oral daily  montelukast 10 milliGRAM(s) Oral daily  pantoprazole    Tablet 40 milliGRAM(s) Oral before breakfast  tiotropium 2.5 MICROgram(s) Inhaler 2 Puff(s) Inhalation daily    MEDICATIONS  (PRN):  albuterol    90 MICROgram(s) HFA Inhaler 2 Puff(s) Inhalation every 6 hours PRN Shortness of Breath and/or Wheezing  baclofen 5 milliGRAM(s) Oral every 12 hours PRN Musculoskeletal Pain  clonazePAM  Tablet 1 milliGRAM(s) Oral two times a day PRN anxiety  HYDROmorphone  Injectable 0.5 milliGRAM(s) IV Push every 6 hours PRN Severe Pain (7 - 10)  hydrOXYzine hydrochloride 25 milliGRAM(s) Oral three times a day PRN Anxiety  zolpidem 5 milliGRAM(s) Oral at bedtime PRN Insomnia      Allergies  ciprofloxacin (Urticaria; Other)      Review of Systems:   Constitutional:  No Fever, No Chills  ENT/Mouth:  No Hearing Changes,  No Difficulty Swallowing  Eyes:  No Eye Pain, No Vision Changes  Cardiovascular:  No Chest Pain, No Palpitations  Respiratory:  No Cough, No Dyspnea  Gastrointestinal:  As described in HPI  Musculoskeletal:  No Joint Swelling, No Back Pain  Skin:  No Skin Lesions, No Jaundice  Neuro:  No Syncope, No Dizziness  Heme/Lymph:  No Bruising, No Bleeding.          Physical Examination:  T(C): 36.4 (23 @ 07:37), Max: 36.4 (23 @ 07:37)  HR: 67 (23 @ 07:37) (67 - 67)  BP: 104/61 (23 @ 07:37) (104/61 - 104/61)  RR: 18 (23 @ 07:37) (18 - 18)  SpO2: 96% (23 @ 07:37) (96% - 96%)          GENERAL: AAOx3, no acute distress.  HEAD:  Atraumatic, Normocephalic  EYES: conjunctiva and sclera clear  NECK: Supple, no JVD or thyromegaly  CHEST/LUNG: Clear to auscultation bilaterally; No wheeze, rhonchi, or rales  HEART: Regular rate and rhythm; normal S1, S2, No murmurs.  ABDOMEN: Soft, tender to palpation LLQ, nondistended; Bowel sounds present  NEUROLOGY: No asterixis or tremor.   SKIN: Intact, no jaundice        Data:                        10.0   6.00  )-----------( 283      ( 28 Mar 2023 12:12 )             32.2     Hgb Trend:  10.0  23 @ 12:12  10.7  23 @ 20:56          140  |  104  |  21<H>  ----------------------------<  110<H>  4.3   |  25  |  1.1    Ca    8.8      28 Mar 2023 12:12  Mg     1.6         TPro  5.9<L>  /  Alb  3.5  /  TBili  <0.2  /  DBili  x   /  AST  17  /  ALT  17  /  AlkPhos  81      Liver panel trend:  TBili <0.2   /   AST 17   /   ALT 17   /   AlkP 81   /   Tptn 5.9   /   Alb 3.5    /   DBili --        TBili <0.2   /   AST 19   /   ALT 16   /   AlkP 88   /   Tptn 6.7   /   Alb 3.9    /   DBili --            PT/INR - ( 27 Mar 2023 20:56 )   PT: 10.80 sec;   INR: 0.95 ratio         PTT - ( 27 Mar 2023 20:56 )  PTT:31.6 sec        Radiology:  CT Abdomen and Pelvis w/ IV Cont:   ACC: [id number] EXAM:  CT ABDOMEN AND PELVIS IC   ORDERED BY: [facility]     PROCEDURE DATE:  2023          INTERPRETATION:  CLINICAL HISTORY: Left lower quadrant pain.    TECHNIQUE: Contiguous axial CT images were obtained from the lower chest   to the pubic symphysis following administration of intravenous contrast.   Oral contrast was not administered. Reformatted images in the coronal and   sagittal planes were acquired.    COMPARISON: CT abdomen and pelvis 2022.    FINDINGS:    LOWER CHEST: Stable left lower lobe 7 mm solid pulmonary nodule (5/62)   Bilateral basilar subsegmental atelectasis.    HEPATOBILIARY: Post cholecystectomy.    SPLEEN: Unremarkable.    PANCREAS: Unremarkable.    ADRENAL GLANDS: Stable 1.3 cm right adrenal nodule.    KIDNEYS: Symmetric enhancement. No hydronephrosis. Right renal cyst.   Excreted contrast within the collecting systems.    ABDOMINOPELVIC NODES: No lymphadenopathy.    PELVIC ORGANS: Unremarkable.    PERITONEUM/MESENTERY/BOWEL: Short segment of sigmoid colon mural   thickening with mild pericolonic fat stranding in the setting of   diverticular disease, compatible with acute diverticulitis. No bowel   obstruction, ascites or intraperitoneal free air. Unremarkable appendix.    BONES/SOFT TISSUES: Degenerative changes of the spine. Stable trace   anterolisthesis of L4-5 with lower lumbar facet arthropathy..    OTHER: Atherosclerotic calcification.      IMPRESSION:      Acute diverticulitis of a short segment of the sigmoid colon. No abscess   or perforation. Further evaluation with nonemergent colonoscopy may be of   benefit when patient is clinically able.    --- End of Report ---          TALITA WELLINGTON MD; Resident Radiologist  This document has been electronically signed.  MAURIZIO BARILLAS MD; Attending Radiologist  This document has been electronically signed. Mar 27 2023 10:36PM (23 @ 21:22)       Gastroenterology Consultation:    Patient is a 57y old  Female who presents with a chief complaint of B/L Blurry Vision (28 Mar 2023 07:14)        Admitted on: 23      HPI:  HPI: 57 year old morbidly obese female has medical history of nasopharyngeal cancer in remission s/p chemoradiation , hypothyroidism, Diverticulitis, COPD (not on oxygen), HTN, Depression, Insomnia, and Anxiety presents to ED c/o of sudden onset B/L blurry vision that lasted 15 mins earlier in the morning. Episode happened while patient was driving and she continued to drive normally. She reports associated pain behind the eyes. Resolved and did not happen again. In the ER patient also complaining of LLQ pain worse when attempting to pass gas. Denies any fever, chills, headaches, SOB, CP, NVD, Dysuria, or MSK pain.     In the ER:  Vitals: /58, HR 82, RR 20, SpO2 100 RA, T 98.4 oral  EKst deg av block  Sig Labs: WBC 8.15, Hgb 10.7 at baseline, Cr 1.3  eGFR 47, Mg 1.7, , Trop -ve, UA -ve, COVID -ve   Imaging:  CT head: No acute intracranial pathology.  CTA NECK:  1.  Severe high-grade stenosis of the proximal right internal carotid artery caused by noncalcified plaque.  2.  Moderate stenosis of the proximal left internal carotid artery caused by calcified plaque.  3.  No evidence of vertebral artery stenosis.  CTA HEAD:  1.  Moderate stenosis of the cavernous segments of the bilateral internal carotid arteries  2.  Severe narrowing of the P3 segment of the right posterior cerebral artery.  3.  Otherwise, no evidence of flow-limiting stenosis, occlusion or aneurysm.  Other: Right upper lobe 1.2 cm pulmonary nodule, as well as several groundglass opacities. Consider follow-up CT of the chest in 3-6 months.  CT AP: Acute diverticulitis of a short segment of the sigmoid colon. No abscess or perforation. Further evaluation with nonemergent colonoscopy may be of benefit when patient is clinically able.    In the ER: Given Dilaudid 0.5, LR 1L bolus, Zofran 4mg IV, Magnesium 2g, and Zosyn 3.375 IV (28 Mar 2023 01:30)    GI consulted for diverticulitis. In ED pt c/o of significant LLQ pain. Pt has history of diverticular disease with multiple episodes of diverticulitis. Pt reports this pain feels similar to previous episodes. Pt reports some nausea yesterday. Pt has had brown loose stools for past few days. Pt states that she frequently fluctuates between diarrhea and constipation. Pt has cancer in family but no known gastric or colon cancer. Pt states that she goes every year for colonoscopy and endoscopy. Last performed last year, but normal per pt. 2 polyps removed on colonoscopy per pt. Pt denies tobacco, alcohol or marijuana use. Pt denies frequent NSAID or tylenol use. Pt states that she is not on any blood thinners. On exam pt c/o significant LLQ pain, tender to palpation. Pt denies N/V.     Prior EGD: last year, wnl per pt    Prior Colonoscopy: last year, diverticular disease, removed 2 polyps per pt      PAST MEDICAL & SURGICAL HISTORY:  COPD (chronic obstructive pulmonary disease)      HTN (hypertension)      Diverticulitis      Pulmonary nodules/lesions, multiple      Nasopharyngeal cancer      Hypothyroid      Cervical disc disease  sequelae of radtion for nasopharyngeal cancer      Left ear hearing loss      Hemorrhoids      Anxiety      History of cholecystectomy            FAMILY HISTORY:  Family history of lung cancer (Father, Mother)        Social History:  Tobacco: none  Alcohol: none  Drugs: none    Home Medications:  albuterol 90 mcg/inh inhalation aerosol: 2 puff(s) inhaled every 6 hours, As needed, Shortness of Breath and/or Wheezing (28 Mar 2023 13:59)  buPROPion 300 mg/24 hours (XL) oral tablet, extended release: 1 tab(s) orally every 24 hours (28 Mar 2023 13:59)  clonazePAM 1 mg oral tablet: 1 tab(s) orally 2 times a day (28 Mar 2023 13:59)  escitalopram 5 mg oral tablet: 1 tab(s) orally once a day (28 Mar 2023 13:59)  metoprolol succinate 50 mg oral tablet, extended release: 1 tab(s) orally once a day (28 Mar 2023 13:59)  MONTELUKAST SODIUM 10 MG TABS: 1 dose(s) orally once a day (28 Mar 2023 13:59)  Trelegy Ellipta inhalation powder: 1 puff(s) inhaled once a day (28 Mar 2023 13:59)  ZOLPIDEM TARTRATE 10 MG TABS: orally once a day (at bedtime) (28 Mar 2023 13:59)        MEDICATIONS  (STANDING):  aspirin  chewable 81 milliGRAM(s) Oral daily  atorvastatin 80 milliGRAM(s) Oral at bedtime  budesonide  80 MICROgram(s)/formoterol 4.5 MICROgram(s) Inhaler 2 Puff(s) Inhalation two times a day  buPROPion XL (24-Hour) . 300 milliGRAM(s) Oral daily  escitalopram 5 milliGRAM(s) Oral daily  heparin   Injectable 5000 Unit(s) SubCutaneous every 8 hours  lactated ringers. 1000 milliLiter(s) (75 mL/Hr) IV Continuous <Continuous>  levothyroxine 125 MICROGram(s) Oral daily  magnesium sulfate  IVPB 2 Gram(s) IV Intermittent every 4 hours  meropenem  IVPB      meropenem  IVPB 1000 milliGRAM(s) IV Intermittent every 12 hours  metoprolol succinate  milliGRAM(s) Oral daily  montelukast 10 milliGRAM(s) Oral daily  pantoprazole    Tablet 40 milliGRAM(s) Oral before breakfast  tiotropium 2.5 MICROgram(s) Inhaler 2 Puff(s) Inhalation daily    MEDICATIONS  (PRN):  albuterol    90 MICROgram(s) HFA Inhaler 2 Puff(s) Inhalation every 6 hours PRN Shortness of Breath and/or Wheezing  baclofen 5 milliGRAM(s) Oral every 12 hours PRN Musculoskeletal Pain  clonazePAM  Tablet 1 milliGRAM(s) Oral two times a day PRN anxiety  HYDROmorphone  Injectable 0.5 milliGRAM(s) IV Push every 6 hours PRN Severe Pain (7 - 10)  hydrOXYzine hydrochloride 25 milliGRAM(s) Oral three times a day PRN Anxiety  zolpidem 5 milliGRAM(s) Oral at bedtime PRN Insomnia      Allergies  ciprofloxacin (Urticaria; Other)      Review of Systems:   Constitutional:  No Fever, No Chills  ENT/Mouth:  No Hearing Changes,  No Difficulty Swallowing  Eyes:  No Eye Pain, + Vision Changes as noted in HPI  Cardiovascular:  No Chest Pain, No Palpitations  Respiratory:  No Cough, No Dyspnea  Gastrointestinal:  As described in HPI  Musculoskeletal:  No Joint Swelling, No Back Pain  Skin:  No Skin Lesions, No Jaundice  Neuro:  No Syncope, No Dizziness  Heme/Lymph:  No Bruising, No Bleeding.          Physical Examination:  T(C): 36.4 (23 @ 07:37), Max: 36.4 (23 @ 07:37)  HR: 67 (23 @ 07:37) (67 - 67)  BP: 104/61 (23 @ 07:37) (104/61 - 104/61)  RR: 18 (23 @ 07:37) (18 - 18)  SpO2: 96% (23 @ 07:37) (96% - 96%)          GENERAL: AAOx3, no acute distress.  HEAD:  Atraumatic, Normocephalic  EYES: conjunctiva and sclera clear  NECK: Supple, no JVD or thyromegaly  CHEST/LUNG: Clear to auscultation bilaterally; No wheeze, rhonchi, or rales  HEART: Regular rate and rhythm; normal S1, S2, No murmurs.  ABDOMEN: Soft, tender to palpation LLQ, nondistended; Bowel sounds present  NEUROLOGY: No asterixis or tremor.   SKIN: Intact, no jaundice        Data:                        10.0   6.00  )-----------( 283      ( 28 Mar 2023 12:12 )             32.2     Hgb Trend:  10.0  23 @ 12:12  10.7  23 @ 20:56          140  |  104  |  21<H>  ----------------------------<  110<H>  4.3   |  25  |  1.1    Ca    8.8      28 Mar 2023 12:12  Mg     1.6         TPro  5.9<L>  /  Alb  3.5  /  TBili  <0.2  /  DBili  x   /  AST  17  /  ALT  17  /  AlkPhos  81      Liver panel trend:  TBili <0.2   /   AST 17   /   ALT 17   /   AlkP 81   /   Tptn 5.9   /   Alb 3.5    /   DBili --        TBili <0.2   /   AST 19   /   ALT 16   /   AlkP 88   /   Tptn 6.7   /   Alb 3.9    /   DBili --            PT/INR - ( 27 Mar 2023 20:56 )   PT: 10.80 sec;   INR: 0.95 ratio         PTT - ( 27 Mar 2023 20:56 )  PTT:31.6 sec        Radiology:  CT Abdomen and Pelvis w/ IV Cont:   ACC: [id number] EXAM:  CT ABDOMEN AND PELVIS IC   ORDERED BY: [facility]     PROCEDURE DATE:  2023          INTERPRETATION:  CLINICAL HISTORY: Left lower quadrant pain.    TECHNIQUE: Contiguous axial CT images were obtained from the lower chest   to the pubic symphysis following administration of intravenous contrast.   Oral contrast was not administered. Reformatted images in the coronal and   sagittal planes were acquired.    COMPARISON: CT abdomen and pelvis 2022.    FINDINGS:    LOWER CHEST: Stable left lower lobe 7 mm solid pulmonary nodule (5/62)   Bilateral basilar subsegmental atelectasis.    HEPATOBILIARY: Post cholecystectomy.    SPLEEN: Unremarkable.    PANCREAS: Unremarkable.    ADRENAL GLANDS: Stable 1.3 cm right adrenal nodule.    KIDNEYS: Symmetric enhancement. No hydronephrosis. Right renal cyst.   Excreted contrast within the collecting systems.    ABDOMINOPELVIC NODES: No lymphadenopathy.    PELVIC ORGANS: Unremarkable.    PERITONEUM/MESENTERY/BOWEL: Short segment of sigmoid colon mural   thickening with mild pericolonic fat stranding in the setting of   diverticular disease, compatible with acute diverticulitis. No bowel   obstruction, ascites or intraperitoneal free air. Unremarkable appendix.    BONES/SOFT TISSUES: Degenerative changes of the spine. Stable trace   anterolisthesis of L4-5 with lower lumbar facet arthropathy..    OTHER: Atherosclerotic calcification.      IMPRESSION:      Acute diverticulitis of a short segment of the sigmoid colon. No abscess   or perforation. Further evaluation with nonemergent colonoscopy may be of   benefit when patient is clinically able.    --- End of Report ---          TALITA WELLINGTON MD; Resident Radiologist  This document has been electronically signed.  MAURIZIO BARILLAS MD; Attending Radiologist  This document has been electronically signed. Mar 27 2023 10:36PM (23 @ 21:22)

## 2023-03-28 NOTE — CONSULT NOTE ADULT - ASSESSMENT
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 2013  -vitals stable  -WBC 6.00  -CTAP showing acute diverticulitis of short segment of the colon   -RBC 3.92 Hgb 1.0 hct 32.2 MCV 82.1 RDW 17.2    Plan:  #LLQ pain 2/2 diverticulitis  -clear liquid diet  -PPI BID  -pain control prn  -zofran prn  -type and screen  -pt is allergic to ciprofloxacin   -f/up ID recs  -outpatient f/up with colonoscopy 4-6 weeks       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 2013  -vitals stable  -WBC 6.00  -CTAP showing acute diverticulitis of short segment of the colon   -RBC 3.92 Hgb 1.0 hct 32.2 MCV 82.1 RDW 17.2    Plan:  #LLQ pain 2/2 diverticulitis  -clear liquid diet  -PPI BID  -pain control prn  -zofran prn  -type and screen  -pt is allergic to ciprofloxacin --   -f/up ID recs  -outpatient f/up with colonoscopy 4-6 weeks       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 2013  -vitals stable  -WBC 6.00  -CTAP showing acute diverticulitis of short segment of the colon   -RBC 3.92 Hgb 1.0 hct 32.2 MCV 82.1 RDW 17.2    Plan:  #LLQ pain 2/2 diverticulitis  -clear liquid diet  -PPI BID  -pain control prn  -zofran prn  -type and screen  -pt is allergic to ciprofloxacin- IV metronidazole and ceftriaxone  -f/up ID recs  -outpatient f/up with colonoscopy 6-8 weeks       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 2013  -vitals stable  -WBC 6.00  -CTAP showing acute diverticulitis of short segment of the colon   -RBC 3.92 Hgb 1.0 hct 32.2 MCV 82.1 RDW 17.2    Plan:  #LLQ pain 2/2 diverticulitis  -clear liquid diet  -PPI BID  -pain control prn  -zofran prn  -type and screen  -pt is allergic to ciprofloxacin- IV metronidazole and ceftriaxone  -f/up ID recs  -outpatient f/up with colonoscopy 6-8 weeks  -ambulation encouraged          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 2013  -vitals stable  -WBC 6.00  -CTAP showing acute diverticulitis of short segment of the colon   -RBC 3.92 Hgb 1.0 hct 32.2 MCV 82.1 RDW 17.2    Plan:  #LLQ pain 2/2 diverticulitis  -clear liquid diet  -PPI BID  -pain control prn  -zofran prn  -type and screen  -pt is allergic to ciprofloxacin- IV metronidazole and ceftriaxone  -outpatient f/up with Dr. Pelayo for colonoscopy 6-8 weeks  -ambulation encouraged          #LLQ pain 2/2 diverticulitis  -Pt reports significant LLQ pain  -Pt has known diverticular disease, with multiple episodes of diverticulitis in the past  -Pt reports nausea yesterday   -Pt reports episodes of brown diarrhea for last few days  -Last colonoscopy one year ago, 2 polyps removed, otherwise normal per pt  -no known hx of gastric or colon cancer  - nasopharyngeal cancer in remission s/p chemoradiation 2013  -vitals stable  -WBC 6.00  -CTAP showing acute diverticulitis of short segment of the colon   -RBC 3.92 Hgb 1.0 hct 32.2 MCV 82.1 RDW 17.2    Plan:  #LLQ pain 2/2 diverticulitis  -clear liquid diet  -PPI BID  -pain control prn  -zofran prn  -type and screen  -pt is allergic to ciprofloxacin- IV metronidazole and ceftriaxone  -outpatient f/up with Dr. Pelayo for colonoscopy 6-8 weeks  -ambulation encouraged

## 2023-03-28 NOTE — CONSULT NOTE ADULT - NS ATTEND AMEND GEN_ALL_CORE FT
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 NIHSS 0. Denies h/o left sided weakness, numbness, clumsiness, vision loss. No known h/o stroke. CT head without acute process. CTA head/neck with severe right ICA proximal stenosis, asymptomatic per history, MRI brain pending. Recommend DAPT/statin if no plan for procedures, if there is then ASA for now. R ICA stenosis likely 2/2 radiation. Agree with LAURIE consult. Avoid hypotension. Check orthostats.

## 2023-03-28 NOTE — ED ADULT TRIAGE NOTE - CHIEF COMPLAINT QUOTE
patient presented to ED with complaint of sudden onset of B/L blurry vision while driving and pressure behind both eyes

## 2023-03-28 NOTE — H&P ADULT - HISTORY OF PRESENT ILLNESS
HPI: 57 year old morbidly obese female has medical history of hypothyroidism, Diverticulitis, COPD (not on oxygen), HTN, Depression, Insomnia, and Anxiety presents to ED c/o of sudden onset B/L blurry vision.  HPI: 57 year old morbidly obese female has medical history of 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 , hypothyroidism, Diverticulitis, COPD (not on oxygen), HTN, Depression, Insomnia, and Anxiety presents to ED c/o of sudden onset B/L blurry vision that lasted 15 mins earlier in the morning. Episode happened while patient was driving and she continued to drive normally. She reports associated pain behind the eyes. Resolved and did not happen again. In the ER patient also complaining of LLQ pain worse when attempting to pass gas. Denies any fever, chills, headaches, SOB, CP, NVD, Dysuria, or MSK pain.     In the ER:  Vitals: /58, HR 82, RR 20, SpO2 100 RA, T 98.4 oral  EKst deg av block  Sig Labs: WBC 8.15, Hgb 10.7 at baseline, Cr 1.3  eGFR 47, Mg 1.7, , Trop -ve, UA -ve, COVID -ve   Imaging:  CT head: No acute intracranial pathology.  CTA NECK:  1.  Severe high-grade stenosis of the proximal right internal carotid artery caused by noncalcified plaque.  2.  Moderate stenosis of the proximal left internal carotid artery caused by calcified plaque.  3.  No evidence of vertebral artery stenosis.  CTA HEAD:  1.  Moderate stenosis of the cavernous segments of the bilateral internal carotid arteries  2.  Severe narrowing of the P3 segment of the right posterior cerebral artery.  3.  Otherwise, no evidence of flow-limiting stenosis, occlusion or aneurysm.  Other: Right upper lobe 1.2 cm pulmonary nodule, as well as several groundglass opacities. Consider follow-up CT of the chest in 3-6 months.  CT AP: Acute diverticulitis of a short segment of the sigmoid colon. No abscess or perforation. Further evaluation with nonemergent colonoscopy may be of benefit when patient is clinically able.    In the ER: Given Dilaudid 0.5, LR 1L bolus, Zofran 4mg IV, Magnesium 2g, and Zosyn 3.375 IV

## 2023-03-28 NOTE — PATIENT PROFILE ADULT - FALL HARM RISK - UNIVERSAL INTERVENTIONS
Bed in lowest position, wheels locked, appropriate side rails in place/Call bell, personal items and telephone in reach/Instruct patient to call for assistance before getting out of bed or chair/Non-slip footwear when patient is out of bed/[place] to call system/Physically safe environment - no spills, clutter or unnecessary equipment/Purposeful Proactive Rounding/Room/bathroom lighting operational, light cord in reach

## 2023-03-28 NOTE — H&P ADULT - ASSESSMENT
HPI: 57 year old morbidly obese female has medical history of 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 , hypothyroidism, Diverticulitis, COPD (not on oxygen), HTN, Depression, Insomnia, and Anxiety presents to ED c/o of sudden onset B/L blurry vision that lasted 15 mins earlier in the morning. Episode happened while patient was driving and she continued to drive normally. She reports associated pain behind the eyes. Resolved and did not happen again. In the ER patient also complaining of LLQ pain worse when attempting to pass gas. Denies any fever, chills, headaches, SOB, CP, NVD, Dysuria, or MSK pain.     In the ER:  Vitals: /58, HR 82, RR 20, SpO2 100 RA, T 98.4 oral  EKst deg av block  Sig Labs: WBC 8.15, Hgb 10.7 at baseline, Cr 1.3  eGFR 47, Mg 1.7, , Trop -ve, UA -ve, COVID -ve   Imaging:  CT head: No acute intracranial pathology.  CTA NECK:  1.  Severe high-grade stenosis of the proximal right internal carotid artery caused by noncalcified plaque.  2.  Moderate stenosis of the proximal left internal carotid artery caused by calcified plaque.  3.  No evidence of vertebral artery stenosis.  CTA HEAD:  1.  Moderate stenosis of the cavernous segments of the bilateral internal carotid arteries  2.  Severe narrowing of the P3 segment of the right posterior cerebral artery.  3.  Otherwise, no evidence of flow-limiting stenosis, occlusion or aneurysm.  Other: Right upper lobe 1.2 cm pulmonary nodule, as well as several groundglass opacities. Consider follow-up CT of the chest in 3-6 months.  CT AP: Acute diverticulitis of a short segment of the sigmoid colon. No abscess or perforation. Further evaluation with nonemergent colonoscopy may be of benefit when patient is clinically able.    In the ER: Given Dilaudid 0.5, LR 1L bolus, Zofran 4mg IV, Magnesium 2g, and Zosyn 3.375 IV    # High Grade R ICA Stenosis   # TIA/Blurry Vision   - Patient clinically and hemodynamically stable   - Permissive -180  - MRI brain w/o hu  - Aspirin 325 stat followed by 81 QD  - Lipitor 80 QD  - Neurology on board   - Would suggest plavix 75 QD (pending GI input)  - Neuro checks q 6  - A1C, TSH, Lipid profile in AM     # Acute Diverticulitis   - S/P Dilaudid 0.5, LR 1L bolus, Zofran 4mg IV, Magnesium 2g, and Zosyn 3.375 IV in ER  - C/w IV Abx  - GI Consulted   - Pain control Dilaudid 0.5 Q4 PRN    # HTN  - C/w home metop succ 50 QD    # COPD not on home O2  - C/w home meds     # Hypothyroidism  - C/w home levothyroxine     # Depression  # Insomnia  # Anxiety   - C/w home meds     # Right upper lobe 1.2 cm pulmonary nodule, as well as several groundglass opacities.   - Consider follow-up CT of the chest in 3-6 months.    DVT PPX: Hep Subq  GI PPX: Pantoprazole PO  Diet: NPO, LR 75 cc/hr  Activity: IAT  Code: Full  Dispo: Acute

## 2023-03-28 NOTE — H&P ADULT - ATTENDING COMMENTS
My note supersedes the resident's note in the event of a discrepancy -    Patient seen and examined this morning  lying in bed  complains of LLQ abdominal pain     57 year old morbidly obese female has medical history of nasopharyngeal cancer in remission s/p chemo/radiation 2013, hypothyroidism, Diverticulitis, COPD (not on oxygen), HTN, Depression, Insomnia, and Anxiety presents to ED c/o of sudden onset B/L blurry vision that lasted 15 mins earlier in the morning. Episode happened while patient was driving and she continued to drive normally. She reports associated pain behind the eyes. Resolved and did not happen again. In the ER patient also complaining of LLQ pain worse when attempting to pass gas. Denies any fever, chills, headaches, SOB, CP, NVD, Dysuria, or MSK pain.       CTA NECK:  1.  Severe high-grade stenosis of the proximal right internal carotid artery caused by noncalcified plaque.  2.  Moderate stenosis of the proximal left internal carotid artery caused by calcified plaque.  3.  No evidence of vertebral artery stenosis.  CTA HEAD:  1.  Moderate stenosis of the cavernous segments of the bilateral internal carotid arteries  2.  Severe narrowing of the P3 segment of the right posterior cerebral artery.  3.  Otherwise, no evidence of flow-limiting stenosis, occlusion or aneurysm.  Other: Right upper lobe 1.2 cm pulmonary nodule, as well as several groundglass opacities. Consider follow-up CT of the chest in 3-6 months.  CT AP: Acute diverticulitis of a short segment of the sigmoid colon. No abscess or perforation. Further evaluation with nonemergent colonoscopy may be of benefit when patient is clinically able.    # TIA/Blurry Vision due to high grade R ICA Stenosis   - neurology following - await MRI brain  - continue asa/statin  - consulted neuroendovascular today  - neuroccks q6  - follow up lipid profile, tsh and HbA1c  - permissive hypertension     # Recurrent Acute Diverticulitis   - ordered meropenem with ID consult due to recurrent episodes, patient was on radha during prior attack as per ID and has an allergy to cipro  - started IVF  - NPO  - pain control with dilaudid     # Right upper lobe 1.2 cm pulmonary nodule, as well as several groundglass opacities  - Consider follow-up CT of the chest in 3-6 months    # HTN  - stable on current tx    # COPD not in exacerbation - continue home meds  # Hypothyroidism- on levothyroxine   # Depression  # Insomnia  # Anxiety   - continue home meds     Progress Note Handoff  Pending Consults: neuroendovascular   Pending Tests: MRI  Pending Results: labs  Family Discussion: discussed abdominal pain, labs, medication, consults and overall plan of care with pt and medical staff. All questions answered   Disposition: Home_x____/SNF______/Other_____/Unknown at this time_____  Spent over 75 min reviewing chart, speaking with patient/family and on coordinating patient care during interdisciplinary rounds

## 2023-03-29 LAB
ALBUMIN SERPL ELPH-MCNC: 3.7 G/DL — SIGNIFICANT CHANGE UP (ref 3.5–5.2)
ALP SERPL-CCNC: 82 U/L — SIGNIFICANT CHANGE UP (ref 30–115)
ALT FLD-CCNC: 16 U/L — SIGNIFICANT CHANGE UP (ref 0–41)
ANION GAP SERPL CALC-SCNC: 10 MMOL/L — SIGNIFICANT CHANGE UP (ref 7–14)
AST SERPL-CCNC: 19 U/L — SIGNIFICANT CHANGE UP (ref 0–41)
BASOPHILS # BLD AUTO: 0.04 K/UL — SIGNIFICANT CHANGE UP (ref 0–0.2)
BASOPHILS NFR BLD AUTO: 0.8 % — SIGNIFICANT CHANGE UP (ref 0–1)
BILIRUB SERPL-MCNC: 0.2 MG/DL — SIGNIFICANT CHANGE UP (ref 0.2–1.2)
BUN SERPL-MCNC: 17 MG/DL — SIGNIFICANT CHANGE UP (ref 10–20)
CALCIUM SERPL-MCNC: 9 MG/DL — SIGNIFICANT CHANGE UP (ref 8.4–10.5)
CHLORIDE SERPL-SCNC: 102 MMOL/L — SIGNIFICANT CHANGE UP (ref 98–110)
CO2 SERPL-SCNC: 28 MMOL/L — SIGNIFICANT CHANGE UP (ref 17–32)
CREAT SERPL-MCNC: 1.1 MG/DL — SIGNIFICANT CHANGE UP (ref 0.7–1.5)
EGFR: 58 ML/MIN/1.73M2 — LOW
EOSINOPHIL # BLD AUTO: 0.31 K/UL — SIGNIFICANT CHANGE UP (ref 0–0.7)
EOSINOPHIL NFR BLD AUTO: 6.1 % — SIGNIFICANT CHANGE UP (ref 0–8)
GLUCOSE SERPL-MCNC: 94 MG/DL — SIGNIFICANT CHANGE UP (ref 70–99)
HCT VFR BLD CALC: 33.1 % — LOW (ref 37–47)
HGB BLD-MCNC: 10.1 G/DL — LOW (ref 12–16)
IMM GRANULOCYTES NFR BLD AUTO: 0.8 % — HIGH (ref 0.1–0.3)
LYMPHOCYTES # BLD AUTO: 1.46 K/UL — SIGNIFICANT CHANGE UP (ref 1.2–3.4)
LYMPHOCYTES # BLD AUTO: 28.5 % — SIGNIFICANT CHANGE UP (ref 20.5–51.1)
MAGNESIUM SERPL-MCNC: 1.8 MG/DL — SIGNIFICANT CHANGE UP (ref 1.8–2.4)
MCHC RBC-ENTMCNC: 25.1 PG — LOW (ref 27–31)
MCHC RBC-ENTMCNC: 30.5 G/DL — LOW (ref 32–37)
MCV RBC AUTO: 82.3 FL — SIGNIFICANT CHANGE UP (ref 81–99)
MONOCYTES # BLD AUTO: 0.4 K/UL — SIGNIFICANT CHANGE UP (ref 0.1–0.6)
MONOCYTES NFR BLD AUTO: 7.8 % — SIGNIFICANT CHANGE UP (ref 1.7–9.3)
NEUTROPHILS # BLD AUTO: 2.87 K/UL — SIGNIFICANT CHANGE UP (ref 1.4–6.5)
NEUTROPHILS NFR BLD AUTO: 56 % — SIGNIFICANT CHANGE UP (ref 42.2–75.2)
NRBC # BLD: 0 /100 WBCS — SIGNIFICANT CHANGE UP (ref 0–0)
PLATELET # BLD AUTO: 322 K/UL — SIGNIFICANT CHANGE UP (ref 130–400)
POTASSIUM SERPL-MCNC: 4.2 MMOL/L — SIGNIFICANT CHANGE UP (ref 3.5–5)
POTASSIUM SERPL-SCNC: 4.2 MMOL/L — SIGNIFICANT CHANGE UP (ref 3.5–5)
PROT SERPL-MCNC: 6.3 G/DL — SIGNIFICANT CHANGE UP (ref 6–8)
RBC # BLD: 4.02 M/UL — LOW (ref 4.2–5.4)
RBC # FLD: 16.9 % — HIGH (ref 11.5–14.5)
SODIUM SERPL-SCNC: 140 MMOL/L — SIGNIFICANT CHANGE UP (ref 135–146)
WBC # BLD: 5.12 K/UL — SIGNIFICANT CHANGE UP (ref 4.8–10.8)
WBC # FLD AUTO: 5.12 K/UL — SIGNIFICANT CHANGE UP (ref 4.8–10.8)

## 2023-03-29 PROCEDURE — 99222 1ST HOSP IP/OBS MODERATE 55: CPT

## 2023-03-29 PROCEDURE — 99233 SBSQ HOSP IP/OBS HIGH 50: CPT

## 2023-03-29 PROCEDURE — 99232 SBSQ HOSP IP/OBS MODERATE 35: CPT

## 2023-03-29 RX ORDER — PANTOPRAZOLE SODIUM 20 MG/1
40 TABLET, DELAYED RELEASE ORAL EVERY 12 HOURS
Refills: 0 | Status: DISCONTINUED | OUTPATIENT
Start: 2023-03-29 | End: 2023-04-01

## 2023-03-29 RX ORDER — SODIUM CHLORIDE 9 MG/ML
1000 INJECTION INTRAMUSCULAR; INTRAVENOUS; SUBCUTANEOUS
Refills: 0 | Status: DISCONTINUED | OUTPATIENT
Start: 2023-03-30 | End: 2023-03-31

## 2023-03-29 RX ORDER — CEFTRIAXONE 500 MG/1
2000 INJECTION, POWDER, FOR SOLUTION INTRAMUSCULAR; INTRAVENOUS EVERY 12 HOURS
Refills: 0 | Status: DISCONTINUED | OUTPATIENT
Start: 2023-03-29 | End: 2023-03-31

## 2023-03-29 RX ORDER — CLOPIDOGREL BISULFATE 75 MG/1
75 TABLET, FILM COATED ORAL DAILY
Refills: 0 | Status: DISCONTINUED | OUTPATIENT
Start: 2023-03-29 | End: 2023-04-03

## 2023-03-29 RX ORDER — METRONIDAZOLE 500 MG
500 TABLET ORAL EVERY 8 HOURS
Refills: 0 | Status: DISCONTINUED | OUTPATIENT
Start: 2023-03-29 | End: 2023-03-30

## 2023-03-29 RX ADMIN — PANTOPRAZOLE SODIUM 40 MILLIGRAM(S): 20 TABLET, DELAYED RELEASE ORAL at 05:15

## 2023-03-29 RX ADMIN — ESCITALOPRAM OXALATE 5 MILLIGRAM(S): 10 TABLET, FILM COATED ORAL at 11:08

## 2023-03-29 RX ADMIN — HYDROMORPHONE HYDROCHLORIDE 0.5 MILLIGRAM(S): 2 INJECTION INTRAMUSCULAR; INTRAVENOUS; SUBCUTANEOUS at 21:38

## 2023-03-29 RX ADMIN — HYDROMORPHONE HYDROCHLORIDE 0.5 MILLIGRAM(S): 2 INJECTION INTRAMUSCULAR; INTRAVENOUS; SUBCUTANEOUS at 11:08

## 2023-03-29 RX ADMIN — Medication 100 MILLIGRAM(S): at 05:15

## 2023-03-29 RX ADMIN — HEPARIN SODIUM 5000 UNIT(S): 5000 INJECTION INTRAVENOUS; SUBCUTANEOUS at 14:39

## 2023-03-29 RX ADMIN — HYDROMORPHONE HYDROCHLORIDE 0.5 MILLIGRAM(S): 2 INJECTION INTRAMUSCULAR; INTRAVENOUS; SUBCUTANEOUS at 00:12

## 2023-03-29 RX ADMIN — PANTOPRAZOLE SODIUM 40 MILLIGRAM(S): 20 TABLET, DELAYED RELEASE ORAL at 17:04

## 2023-03-29 RX ADMIN — Medication 100 MILLIGRAM(S): at 21:38

## 2023-03-29 RX ADMIN — Medication 81 MILLIGRAM(S): at 11:08

## 2023-03-29 RX ADMIN — MEROPENEM 100 MILLIGRAM(S): 1 INJECTION INTRAVENOUS at 10:17

## 2023-03-29 RX ADMIN — Medication 1 MILLIGRAM(S): at 05:22

## 2023-03-29 RX ADMIN — CLOPIDOGREL BISULFATE 75 MILLIGRAM(S): 75 TABLET, FILM COATED ORAL at 22:38

## 2023-03-29 RX ADMIN — MONTELUKAST 10 MILLIGRAM(S): 4 TABLET, CHEWABLE ORAL at 11:09

## 2023-03-29 RX ADMIN — ZOLPIDEM TARTRATE 5 MILLIGRAM(S): 10 TABLET ORAL at 21:38

## 2023-03-29 RX ADMIN — Medication 100 MILLIGRAM(S): at 14:47

## 2023-03-29 RX ADMIN — Medication 125 MICROGRAM(S): at 05:15

## 2023-03-29 RX ADMIN — Medication 1 MILLIGRAM(S): at 21:38

## 2023-03-29 RX ADMIN — ATORVASTATIN CALCIUM 80 MILLIGRAM(S): 80 TABLET, FILM COATED ORAL at 21:37

## 2023-03-29 RX ADMIN — HYDROMORPHONE HYDROCHLORIDE 0.5 MILLIGRAM(S): 2 INJECTION INTRAMUSCULAR; INTRAVENOUS; SUBCUTANEOUS at 10:17

## 2023-03-29 RX ADMIN — CEFTRIAXONE 100 MILLIGRAM(S): 500 INJECTION, POWDER, FOR SOLUTION INTRAMUSCULAR; INTRAVENOUS at 17:03

## 2023-03-29 RX ADMIN — HYDROMORPHONE HYDROCHLORIDE 0.5 MILLIGRAM(S): 2 INJECTION INTRAMUSCULAR; INTRAVENOUS; SUBCUTANEOUS at 22:30

## 2023-03-29 RX ADMIN — MEROPENEM 100 MILLIGRAM(S): 1 INJECTION INTRAVENOUS at 00:12

## 2023-03-29 RX ADMIN — BUPROPION HYDROCHLORIDE 300 MILLIGRAM(S): 150 TABLET, EXTENDED RELEASE ORAL at 11:08

## 2023-03-29 NOTE — CONSULT NOTE ADULT - TIME BILLING
Review of imaging and chart; obtaining history; examination of pt; discussion and coordination of care.
Counseled patient about diagnostic testing and treatment plan. All questions answered. Abnormal lab/radiographical/microbiological data reviewed.

## 2023-03-29 NOTE — PROGRESS NOTE ADULT - SUBJECTIVE AND OBJECTIVE BOX
Patient is a 57y old  Female who presents with a chief complaint of B/L Blurry Vision (29 Mar 2023 12:09)    Patient was seen and examined.    Denies any other complaints.  All systems reviewed positive history as mentioned above.    PAST MEDICAL & SURGICAL HISTORY:  COPD (chronic obstructive pulmonary disease)    HTN (hypertension)    Diverticulitis    Pulmonary nodules/lesions, multiple    Nasopharyngeal cancer    Hypothyroid    Cervical disc disease  sequelae of radtion for nasopharyngeal cancer    Left ear hearing loss    Hemorrhoids    Anxiety    History of cholecystectomy        Allergies    ciprofloxacin (Urticaria; Other)    Intolerances      MEDICATIONS  (STANDING):  aspirin  chewable 81 milliGRAM(s) Oral daily  atorvastatin 80 milliGRAM(s) Oral at bedtime  budesonide  80 MICROgram(s)/formoterol 4.5 MICROgram(s) Inhaler 2 Puff(s) Inhalation two times a day  buPROPion XL (24-Hour) . 300 milliGRAM(s) Oral daily  cefTRIAXone   IVPB 2000 milliGRAM(s) IV Intermittent every 12 hours  clopidogrel Tablet 75 milliGRAM(s) Oral daily  escitalopram 5 milliGRAM(s) Oral daily  heparin   Injectable 5000 Unit(s) SubCutaneous every 8 hours  influenza   Vaccine 0.5 milliLiter(s) IntraMuscular once  lactated ringers. 1000 milliLiter(s) (75 mL/Hr) IV Continuous <Continuous>  levothyroxine 125 MICROGram(s) Oral daily  metoprolol succinate  milliGRAM(s) Oral daily  metroNIDAZOLE  IVPB 500 milliGRAM(s) IV Intermittent every 8 hours  montelukast 10 milliGRAM(s) Oral daily  pantoprazole  Injectable 40 milliGRAM(s) IV Push every 12 hours  tiotropium 2.5 MICROgram(s) Inhaler 2 Puff(s) Inhalation daily    MEDICATIONS  (PRN):  acetaminophen     Tablet .. 650 milliGRAM(s) Oral every 6 hours PRN Moderate Pain (4 - 6)  albuterol    90 MICROgram(s) HFA Inhaler 2 Puff(s) Inhalation every 6 hours PRN Shortness of Breath and/or Wheezing  baclofen 5 milliGRAM(s) Oral every 12 hours PRN Musculoskeletal Pain  clonazePAM  Tablet 1 milliGRAM(s) Oral two times a day PRN anxiety  HYDROmorphone  Injectable 0.5 milliGRAM(s) IV Push every 6 hours PRN Severe Pain (7 - 10)  hydrOXYzine hydrochloride 25 milliGRAM(s) Oral three times a day PRN Anxiety  zolpidem 5 milliGRAM(s) Oral at bedtime PRN Insomnia    Vital Signs Last 24 Hrs  T(C): 35.5  T(F): 95.9  HR: 81  BP: 152/72  BP(mean): --  RR: 18  SpO2: 91%  O/E:  Awake, alert, not in distress.  HEENT: atraumatic, EOMI.  Chest: clear.  CVS: SIS2 +, no murmur.  P/A: Soft, BS+  CNS: non focal.  Ext: no edema feet.  Skin: no rash, no ulcers.  All systems reviewed positive findings as above.  CAPILLARY BLOOD GLUCOSE      CAPILLARY BLOOD GLUCOSE                              10.1<L>  5.12  )-----------( 322      ( 29 Mar 2023 07:45 )             33.1<L>                        10.0<L>  6.00  )-----------( 283      ( 28 Mar 2023 12:12 )             32.2<L>    03    140  |  102  |  17  ----------------------------<  94  4.2   |  28  |  1.1      140  |  104  |  21<H>  ----------------------------<  110<H>  4.3   |  25  |  1.1    Ca    9.0      29 Mar 2023 07:45  Ca    8.8      28 Mar 2023 12:12  Ca    9.6      27 Mar 2023 20:56  Mg     1.8         TPro  6.3  /  Alb  3.7  /  TBili  0.2  /  DBili  x   /  AST  19  /  ALT  16  /  AlkPhos  82    TPro  5.9<L>  /  Alb  3.5  /  TBili  <0.2  /  DBili  x   /  AST  17  /  ALT  17  /  AlkPhos  81    TPro  6.7  /  Alb  3.9  /  TBili  <0.2  /  DBili  x   /  AST  19  /  ALT  16  /  AlkPhos  88        CARDIAC MARKERS ( 27 Mar 2023 20:56 )  x     / <0.01 ng/mL / x     / x     / x          PT/INR - ( 27 Mar 2023 20:56 )   PT: 10.80 sec;   INR: 0.95 ratio         PTT - ( 27 Mar 2023 20:56 )  PTT:31.6 sec  Urinalysis Basic - ( 27 Mar 2023 20:53 )    Color: Yellow / Appearance: Clear / S.015 / pH: x  Gluc: x / Ketone: Negative  / Bili: Negative / Urobili: <2 mg/dL   Blood: x / Protein: Trace / Nitrite: Negative   Leuk Esterase: Negative / RBC: x / WBC x   Sq Epi: x / Non Sq Epi: x / Bacteria: x           Patient is a 57y old  Female who presents with a chief complaint of B/L Blurry Vision (29 Mar 2023 12:09)    Patient was seen and examined.  C/o LLQ pain  Denies any other complaints.  All systems reviewed positive history as mentioned above.    PAST MEDICAL & SURGICAL HISTORY:  COPD (chronic obstructive pulmonary disease)  HTN (hypertension)  Diverticulitis  Pulmonary nodules/lesions, multiple  Nasopharyngeal cancer  Hypothyroid  Cervical disc disease  sequelae of radiation for nasopharyngeal cancer  Left ear hearing loss  Hemorrhoids  Anxiety  History of cholecystectomy    Allergies  ciprofloxacin (Urticaria; Other)    MEDICATIONS  (STANDING):  aspirin  chewable 81 milliGRAM(s) Oral daily  atorvastatin 80 milliGRAM(s) Oral at bedtime  budesonide  80 MICROgram(s)/formoterol 4.5 MICROgram(s) Inhaler 2 Puff(s) Inhalation two times a day  buPROPion XL (24-Hour) . 300 milliGRAM(s) Oral daily  cefTRIAXone   IVPB 2000 milliGRAM(s) IV Intermittent every 12 hours  clopidogrel Tablet 75 milliGRAM(s) Oral daily  escitalopram 5 milliGRAM(s) Oral daily  heparin   Injectable 5000 Unit(s) SubCutaneous every 8 hours  influenza   Vaccine 0.5 milliLiter(s) IntraMuscular once  lactated ringers. 1000 milliLiter(s) (75 mL/Hr) IV Continuous <Continuous>  levothyroxine 125 MICROGram(s) Oral daily  metoprolol succinate  milliGRAM(s) Oral daily  metroNIDAZOLE  IVPB 500 milliGRAM(s) IV Intermittent every 8 hours  montelukast 10 milliGRAM(s) Oral daily  pantoprazole  Injectable 40 milliGRAM(s) IV Push every 12 hours  tiotropium 2.5 MICROgram(s) Inhaler 2 Puff(s) Inhalation daily    MEDICATIONS  (PRN):  acetaminophen     Tablet .. 650 milliGRAM(s) Oral every 6 hours PRN Moderate Pain (4 - 6)  albuterol    90 MICROgram(s) HFA Inhaler 2 Puff(s) Inhalation every 6 hours PRN Shortness of Breath and/or Wheezing  baclofen 5 milliGRAM(s) Oral every 12 hours PRN Musculoskeletal Pain  clonazePAM  Tablet 1 milliGRAM(s) Oral two times a day PRN anxiety  HYDROmorphone  Injectable 0.5 milliGRAM(s) IV Push every 6 hours PRN Severe Pain (7 - 10)  hydrOXYzine hydrochloride 25 milliGRAM(s) Oral three times a day PRN Anxiety  zolpidem 5 milliGRAM(s) Oral at bedtime PRN Insomnia    Vital Signs Last 24 Hrs  T(C): 35.5  T(F): 95.9  HR: 81  BP: 152/72  BP(mean): --  RR: 18  SpO2: 91%    O/E:  Awake, alert, not in distress.  HEENT: atraumatic, EOMI.  Chest: clear.  CVS: SIS2 +, no murmur.  P/A: Soft, BS+, /LLQ tenderness+  CNS: awake, alert  Ext: no edema feet.  Skin: no rash, no ulcers.  All systems reviewed positive findings as above.                            10.1<L>  5.12  )-----------( 322      ( 29 Mar 2023 07:45 )             33.1<L>                        10.0<L>  6.00  )-----------( 283      ( 28 Mar 2023 12:12 )             32.2<L>    03    140  |  102  |  17  ----------------------------<  94  4.2   |  28  |  1.1      140  |  104  |  21<H>  ----------------------------<  110<H>  4.3   |  25  |  1.1    Ca    9.0      29 Mar 2023 07:45  Ca    8.8      28 Mar 2023 12:12  Ca    9.6      27 Mar 2023 20:56  Mg     1.8         TPro  6.3  /  Alb  3.7  /  TBili  0.2  /  DBili  x   /  AST  19  /  ALT  16  /  AlkPhos  82    TPro  5.9<L>  /  Alb  3.5  /  TBili  <0.2  /  DBili  x   /  AST  17  /  ALT  17  /  AlkPhos  81    TPro  6.7  /  Alb  3.9  /  TBili  <0.2  /  DBili  x   /  AST  19  /  ALT  16  /  AlkPhos  88        CARDIAC MARKERS ( 27 Mar 2023 20:56 )  x     / <0.01 ng/mL / x     / x     / x          PT/INR - ( 27 Mar 2023 20:56 )   PT: 10.80 sec;   INR: 0.95 ratio         PTT - ( 27 Mar 2023 20:56 )  PTT:31.6 sec  Urinalysis Basic - ( 27 Mar 2023 20:53 )    Color: Yellow / Appearance: Clear / S.015 / pH: x  Gluc: x / Ketone: Negative  / Bili: Negative / Urobili: <2 mg/dL   Blood: x / Protein: Trace / Nitrite: Negative   Leuk Esterase: Negative / RBC: x / WBC x   Sq Epi: x / Non Sq Epi: x / Bacteria: x

## 2023-03-29 NOTE — CONSULT NOTE ADULT - ASSESSMENT
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 Also complaining of LLQ pain worse when attempting to pass gas. Denies any fever, chills, headaches, SOB, CP, NVD, Dysuria, or MSK pain.       CT head: No acute intracranial pathology.  CTA NECK:  1.  Severe high-grade stenosis of the proximal right internal carotid artery caused by noncalcified plaque.  2.  Moderate stenosis of the proximal left internal carotid artery caused by calcified plaque.  3.  No evidence of vertebral artery stenosis.  CTA HEAD:  1.  Moderate stenosis of the cavernous segments of the bilateral internal carotid arteries  2.  Severe narrowing of the P3 segment of the right posterior cerebral artery.  3.  Otherwise, no evidence of flow-limiting stenosis, occlusion or aneurysm.  Other: Right upper lobe 1.2 cm pulmonary nodule, as well as several groundglass opacities. Consider follow-up CT of the chest in 3-6 months.  CT AP: Acute diverticulitis of a short segment of the sigmoid colon. No abscess or perforation. Further evaluation with nonemergent colonoscopy may be of benefit when patient is clinically able.    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 Also complaining of LLQ pain worse when attempting to pass gas. Denies any fever, chills, headaches, SOB, CP, NVD, Dysuria, or MSK pain.       CT head: No acute intracranial pathology.  CTA NECK:  1.  Severe high-grade stenosis of the proximal right internal carotid artery caused by noncalcified plaque.  2.  Moderate stenosis of the proximal left internal carotid artery caused by calcified plaque.  3.  No evidence of vertebral artery stenosis.  CTA HEAD:  1.  Moderate stenosis of the cavernous segments of the bilateral internal carotid arteries  2.  Severe narrowing of the P3 segment of the right posterior cerebral artery.  3.  Otherwise, no evidence of flow-limiting stenosis, occlusion or aneurysm.    IMPRESSION/RECOMMENDATIONS  Acute sigmoid diverticulitis with no abscess/drainable collection  -Rocephin 2 gm iv q12h  -flagyl 500 mg iv q8h  -in 24-48h change to po Ciprofloxacin 750 mg q12h and po falgyl 500 mg q8h till 4/10  Please do not hesitate to recall ID if any questions arise either through [employer] or through [employer]   Other: Right upper lobe 1.2 cm pulmonary nodule, as well as several groundglass opacities. Consider follow-up CT of the chest in 3-6 months.  CT AP: Acute diverticulitis of a short segment of the sigmoid colon. No abscess or perforation. Further evaluation with nonemergent colonoscopy may be of benefit when patient is clinically able.    IMPRESSION/RECOMMENDATIONS

## 2023-03-29 NOTE — PROGRESS NOTE ADULT - ASSESSMENT
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     # High Grade R ICA Stenosis   # TIA/Blurry Vision   - Patient clinically and hemodynamically stable   - Permissive -180  CT head: No acute intracranial pathology  CTA NECK:  1.  Severe high-grade stenosis of the proximal right internal carotid artery caused by noncalcified plaque  2.  Moderate stenosis of the proximal left internal carotid artery caused by calcified plaque.  3.  No evidence of vertebral artery stenosis.  CTA HEAD:  1.  Moderate stenosis of the cavernous segments of the bilateral internal carotid arteries  2.  Severe narrowing of the P3 segment of the right posterior cerebral artery.  3.  Otherwise, no evidence of flow-limiting stenosis, occlusion or aneurysm.  - MRI brain >> no acute pathology  - Aspirin 325 stat followed by 81 QD  - Lipitor 80 QD  - Neurology on board   - plavix 75 QD   - Neuro checks q 6  - Neuoendovascular consulted >> they were planning cerebral angiogram on , but pt needs time to think and discuss with family and her oncologist before making the decision. Pt will inform us later, procedure is on hold for now  - A1C, TSH, Lipid profile in AM     # Acute Diverticulitis   - S/P Dilaudid 0.5, LR 1L bolus, Zofran 4mg IV, Magnesium 2g, and Zosyn 3.375 IV in ER  - dc meropenem and start Rocephin and Flagyl as per ID  - GI Consulted appreciated  - Pain control Dilaudid 0.5 Q4 PRN    # HTN  - C/w home metop succ 50 QD    # COPD not on home O2  - C/w home meds     # Hypothyroidism  - C/w home levothyroxine     # Depression  # Insomnia  # Anxiety   - C/w home meds     # Right upper lobe 1.2 cm pulmonary nodule, as well as several groundglass opacities.   - Consider follow-up CT of the chest in 3-6 months.    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     # High Grade R ICA Stenosis   # TIA/Blurry Vision   - Patient clinically and hemodynamically stable   - Permissive -180  CT head: No acute intracranial pathology  CTA NECK:  1.  Severe high-grade stenosis of the proximal right internal carotid artery caused by noncalcified plaque  2.  Moderate stenosis of the proximal left internal carotid artery caused by calcified plaque.  3.  No evidence of vertebral artery stenosis.  CTA HEAD:  1.  Moderate stenosis of the cavernous segments of the bilateral internal carotid arteries  2.  Severe narrowing of the P3 segment of the right posterior cerebral artery.  3.  Otherwise, no evidence of flow-limiting stenosis, occlusion or aneurysm.  - MRI brain >> no acute pathology  - Aspirin 325 stat followed by 81 QD  - Lipitor 80 QD  - Neurology on board   - plavix 75 QD   - Neuro checks q 6  - Neuoendovascular consulted >> they were planning cerebral angiogram on , but pt needs time to think and discuss with family and her oncologist before making the decision. Pt will inform us later, procedure is on hold for now  - PRU blood test on  was 298 (this was asked neuroendo team, Tara Rodas was informed about the results)  - A1C, TSH, Lipid profile in AM     # Acute Diverticulitis   - S/P Dilaudid 0.5, LR 1L bolus, Zofran 4mg IV, Magnesium 2g, and Zosyn 3.375 IV in ER  - dc meropenem and start Rocephin and Flagyl as per ID  - GI Consulted appreciated  - Pain control Dilaudid 0.5 Q4 PRN    # HTN  - C/w home metop succ 50 QD    # COPD not on home O2  - C/w home meds     # Hypothyroidism  - C/w home levothyroxine     # Depression  # Insomnia  # Anxiety   - C/w home meds     # Right upper lobe 1.2 cm pulmonary nodule, as well as several groundglass opacities.   - Consider follow-up CT of the chest in 3-6 months.    DVT PPX: Hep Subq  GI PPX: Pantoprazole PO  Diet: NPO, LR 75 cc/hr  Activity: IAT  Code: Full  Dispo: Acute     Pendin) pt will make decision about the cerebral angio   2) clinical improvement diverticulitis

## 2023-03-29 NOTE — PROGRESS NOTE ADULT - SUBJECTIVE AND OBJECTIVE BOX
Gastroenterology progress note:     Patient is a 57y old  Female who presents with a chief complaint of B/L Blurry Vision (29 Mar 2023 06:24)       Admitted on: 03-27-23    We are following the patient for: diverticulitis     Interval History: Pt states that she still has significant LLQ pain. Denies N/V overnight. Pt states that she is still having regular brown colored bowel movements. Denies diarrhea and constipation. Pt asked for pain control.    Patient's medical problems are stable   Prior records reviewed (Y/N): Y  History obtained from someone other than patient (Y/N): N      PAST MEDICAL & SURGICAL HISTORY:  COPD (chronic obstructive pulmonary disease)      HTN (hypertension)      Diverticulitis      Pulmonary nodules/lesions, multiple      Nasopharyngeal cancer      Hypothyroid      Cervical disc disease  sequelae of radtion for nasopharyngeal cancer      Left ear hearing loss      Hemorrhoids      Anxiety      History of cholecystectomy          MEDICATIONS  (STANDING):  aspirin  chewable 81 milliGRAM(s) Oral daily  atorvastatin 80 milliGRAM(s) Oral at bedtime  budesonide  80 MICROgram(s)/formoterol 4.5 MICROgram(s) Inhaler 2 Puff(s) Inhalation two times a day  buPROPion XL (24-Hour) . 300 milliGRAM(s) Oral daily  escitalopram 5 milliGRAM(s) Oral daily  heparin   Injectable 5000 Unit(s) SubCutaneous every 8 hours  influenza   Vaccine 0.5 milliLiter(s) IntraMuscular once  lactated ringers. 1000 milliLiter(s) (75 mL/Hr) IV Continuous <Continuous>  levothyroxine 125 MICROGram(s) Oral daily  meropenem  IVPB      meropenem  IVPB 1000 milliGRAM(s) IV Intermittent every 12 hours  metoprolol succinate  milliGRAM(s) Oral daily  montelukast 10 milliGRAM(s) Oral daily  pantoprazole  Injectable 40 milliGRAM(s) IV Push every 12 hours  tiotropium 2.5 MICROgram(s) Inhaler 2 Puff(s) Inhalation daily    MEDICATIONS  (PRN):  acetaminophen     Tablet .. 650 milliGRAM(s) Oral every 6 hours PRN Moderate Pain (4 - 6)  albuterol    90 MICROgram(s) HFA Inhaler 2 Puff(s) Inhalation every 6 hours PRN Shortness of Breath and/or Wheezing  baclofen 5 milliGRAM(s) Oral every 12 hours PRN Musculoskeletal Pain  clonazePAM  Tablet 1 milliGRAM(s) Oral two times a day PRN anxiety  HYDROmorphone  Injectable 0.5 milliGRAM(s) IV Push every 6 hours PRN Severe Pain (7 - 10)  hydrOXYzine hydrochloride 25 milliGRAM(s) Oral three times a day PRN Anxiety  zolpidem 5 milliGRAM(s) Oral at bedtime PRN Insomnia      Allergies  ciprofloxacin (Urticaria; Other)      Review of Systems:   Cardiovascular:  No Chest Pain, No Palpitations  Respiratory:  No Cough, No Dyspnea  Gastrointestinal:  As described in HPI    Physical Examination:  T(C): 35.5 (03-29-23 @ 07:00), Max: 36.5 (03-29-23 @ 00:42)  HR: 81 (03-29-23 @ 07:00) (65 - 81)  BP: 152/72 (03-29-23 @ 07:00) (111/52 - 152/72)  RR: 18 (03-29-23 @ 07:00) (18 - 18)  SpO2: 91% (03-29-23 @ 07:00) (91% - 97%)  Weight (kg): 133.8 (03-28-23 @ 15:54)    03-28-23 @ 07:01  -  03-29-23 @ 07:00  --------------------------------------------------------  IN: 75 mL / OUT: 0 mL / NET: 75 mL    03-29-23 @ 07:01  -  03-29-23 @ 10:31  --------------------------------------------------------  IN: 350 mL / OUT: 0 mL / NET: 350 mL      Constitutional: No acute distress.  Respiratory:  No signs of respiratory distress. Lung sounds are clear bilaterally.  Cardiovascular:  S1 S2, Regular rate and rhythm.  Abdominal: Abdomen is soft, symmetric, and non-tender without distention. There are no visible lesions. Bowel sounds are present and normoactive in all four quadrants. No masses, hepatomegaly, or splenomegaly are noted.   Skin: No rashes, No Jaundice.        Data: (reviewed by attending)                        10.1   5.12  )-----------( 322      ( 29 Mar 2023 07:45 )             33.1     Hgb trend:  10.1  03-29-23 @ 07:45  10.0  03-28-23 @ 12:12  10.7  03-27-23 @ 20:56        03-29    140  |  102  |  17  ----------------------------<  94  4.2   |  28  |  1.1    Ca    9.0      29 Mar 2023 07:45  Mg     1.8     03-29    TPro  6.3  /  Alb  3.7  /  TBili  0.2  /  DBili  x   /  AST  19  /  ALT  16  /  AlkPhos  82  03-29    Liver panel trend:  TBili 0.2   /   AST 19   /   ALT 16   /   AlkP 82   /   Tptn 6.3   /   Alb 3.7    /   DBili --      03-29  TBili <0.2   /   AST 17   /   ALT 17   /   AlkP 81   /   Tptn 5.9   /   Alb 3.5    /   DBili --      03-28  TBili <0.2   /   AST 19   /   ALT 16   /   AlkP 88   /   Tptn 6.7   /   Alb 3.9    /   DBili --      03-27      PT/INR - ( 27 Mar 2023 20:56 )   PT: 10.80 sec;   INR: 0.95 ratio         PTT - ( 27 Mar 2023 20:56 )  PTT:31.6 sec       Radiology: (reviewed by attending)       Gastroenterology progress note:     Patient is a 57y old  Female who presents with a chief complaint of B/L Blurry Vision (29 Mar 2023 06:24)       Admitted on: 03-27-23    We are following the patient for: diverticulitis     Interval History: Pt states that she still has significant LLQ pain. Denies N/V overnight. Pt states that she is still having regular brown colored bowel movements. Denies diarrhea and constipation. Pt asked for pain control.    Patient's medical problems are stable   Prior records reviewed (Y/N): Y  History obtained from someone other than patient (Y/N): N      PAST MEDICAL & SURGICAL HISTORY:  COPD (chronic obstructive pulmonary disease)      HTN (hypertension)      Diverticulitis      Pulmonary nodules/lesions, multiple      Nasopharyngeal cancer      Hypothyroid      Cervical disc disease  sequelae of radtion for nasopharyngeal cancer      Left ear hearing loss      Hemorrhoids      Anxiety      History of cholecystectomy          MEDICATIONS  (STANDING):  aspirin  chewable 81 milliGRAM(s) Oral daily  atorvastatin 80 milliGRAM(s) Oral at bedtime  budesonide  80 MICROgram(s)/formoterol 4.5 MICROgram(s) Inhaler 2 Puff(s) Inhalation two times a day  buPROPion XL (24-Hour) . 300 milliGRAM(s) Oral daily  escitalopram 5 milliGRAM(s) Oral daily  heparin   Injectable 5000 Unit(s) SubCutaneous every 8 hours  influenza   Vaccine 0.5 milliLiter(s) IntraMuscular once  lactated ringers. 1000 milliLiter(s) (75 mL/Hr) IV Continuous <Continuous>  levothyroxine 125 MICROGram(s) Oral daily  meropenem  IVPB      meropenem  IVPB 1000 milliGRAM(s) IV Intermittent every 12 hours  metoprolol succinate  milliGRAM(s) Oral daily  montelukast 10 milliGRAM(s) Oral daily  pantoprazole  Injectable 40 milliGRAM(s) IV Push every 12 hours  tiotropium 2.5 MICROgram(s) Inhaler 2 Puff(s) Inhalation daily    MEDICATIONS  (PRN):  acetaminophen     Tablet .. 650 milliGRAM(s) Oral every 6 hours PRN Moderate Pain (4 - 6)  albuterol    90 MICROgram(s) HFA Inhaler 2 Puff(s) Inhalation every 6 hours PRN Shortness of Breath and/or Wheezing  baclofen 5 milliGRAM(s) Oral every 12 hours PRN Musculoskeletal Pain  clonazePAM  Tablet 1 milliGRAM(s) Oral two times a day PRN anxiety  HYDROmorphone  Injectable 0.5 milliGRAM(s) IV Push every 6 hours PRN Severe Pain (7 - 10)  hydrOXYzine hydrochloride 25 milliGRAM(s) Oral three times a day PRN Anxiety  zolpidem 5 milliGRAM(s) Oral at bedtime PRN Insomnia      Allergies  ciprofloxacin (Urticaria; Other)      Review of Systems:   Cardiovascular:  No Chest Pain, No Palpitations  Respiratory:  No Cough, No Dyspnea  Gastrointestinal:  As described in HPI    Physical Examination:  T(C): 35.5 (03-29-23 @ 07:00), Max: 36.5 (03-29-23 @ 00:42)  HR: 81 (03-29-23 @ 07:00) (65 - 81)  BP: 152/72 (03-29-23 @ 07:00) (111/52 - 152/72)  RR: 18 (03-29-23 @ 07:00) (18 - 18)  SpO2: 91% (03-29-23 @ 07:00) (91% - 97%)  Weight (kg): 133.8 (03-28-23 @ 15:54)    03-28-23 @ 07:01  -  03-29-23 @ 07:00  --------------------------------------------------------  IN: 75 mL / OUT: 0 mL / NET: 75 mL    03-29-23 @ 07:01  -  03-29-23 @ 10:31  --------------------------------------------------------  IN: 350 mL / OUT: 0 mL / NET: 350 mL      Constitutional: No acute distress.  Respiratory:  No signs of respiratory distress. Lung sounds are clear bilaterally.  Cardiovascular:  S1 S2, Regular rate and rhythm.  Abdominal: Abdomen is soft, symmetric, without distention. Tenderness to palpation LLQ. There are no visible lesions. Bowel sounds are present and normoactive in all four quadrants. No masses, hepatomegaly, or splenomegaly are noted.   Skin: No rashes, No Jaundice.        Data: (reviewed by attending)                        10.1   5.12  )-----------( 322      ( 29 Mar 2023 07:45 )             33.1     Hgb trend:  10.1  03-29-23 @ 07:45  10.0  03-28-23 @ 12:12  10.7  03-27-23 @ 20:56        03-29    140  |  102  |  17  ----------------------------<  94  4.2   |  28  |  1.1    Ca    9.0      29 Mar 2023 07:45  Mg     1.8     03-29    TPro  6.3  /  Alb  3.7  /  TBili  0.2  /  DBili  x   /  AST  19  /  ALT  16  /  AlkPhos  82  03-29    Liver panel trend:  TBili 0.2   /   AST 19   /   ALT 16   /   AlkP 82   /   Tptn 6.3   /   Alb 3.7    /   DBili --      03-29  TBili <0.2   /   AST 17   /   ALT 17   /   AlkP 81   /   Tptn 5.9   /   Alb 3.5    /   DBili --      03-28  TBili <0.2   /   AST 19   /   ALT 16   /   AlkP 88   /   Tptn 6.7   /   Alb 3.9    /   DBili --      03-27      PT/INR - ( 27 Mar 2023 20:56 )   PT: 10.80 sec;   INR: 0.95 ratio         PTT - ( 27 Mar 2023 20:56 )  PTT:31.6 sec       Radiology: (reviewed by attending)

## 2023-03-29 NOTE — PROGRESS NOTE ADULT - ASSESSMENT
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 hct 32.2 mcv 82.1 RDW 17.2, stable    Plan:  #LLQ pain 2/2 diverticulitis  -clear liquid diet  -PPI BID  -pain control prn  -zofran prn  -type and screen  -pt is allergic to ciprofloxacin- IV metronidazole and ceftriaxone  -outpatient f/up with Dr. 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 hct 32.2 mcv 82.1 RDW 17.2, stable    Plan:  #LLQ pain 2/2 diverticulitis  -clear liquid diet  -PPI BID  -Tylenol prn  -zofran prn  -type and screen  -pt is allergic to ciprofloxacin- IV metronidazole and ceftriaxone  -outpatient f/up with Dr. 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 hct 32.2 mcv 82.1 RDW 17.2, stable    Plan:  #LLQ pain 2/2 diverticulitis  -NPO  -PPI BID  -pain control prn  -zofran prn  -type and screen  -pt is allergic to ciprofloxacin- IV metronidazole and ceftriaxone  -outpatient f/up with Dr. 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 hct 32.2 mcv 82.1 RDW 17.2, stable    Plan:  -NPO  -PPI BID  -pain control prn  -zofran prn  -type and screen  -pt is allergic to ciprofloxacin- IV metronidazole and ceftriaxone  -outpatient f/up with Dr. 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 hct 32.2 mcv 82.1 RDW 17.2, stable    Plan:  -NPO for now  -PPI BID  -pain control prn  -zofran prn  -type and screen  -pt is allergic to ciprofloxacin- IV metronidazole and ceftriaxone  -outpatient f/up with Dr. Pelayo for colonoscopy 6-8 weeks  -if pain persists consider repeat CTAP  -avoid narcotics  -ambulation encouraged

## 2023-03-29 NOTE — CONSULT NOTE ADULT - SUBJECTIVE AND OBJECTIVE BOX
GEOVANNA PUTNAM  57y, Female  Allergy: ciprofloxacin (Urticaria; Other)      All historical available data reviewed.    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 Episode happened while patient was driving and she continued to drive normally. She reports associated pain behind the eyes. Resolved and did not happen again. In the ER patient also complaining of LLQ pain worse when attempting to pass gas. Denies any fever, chills, headaches, SOB, CP, NVD, Dysuria, or MSK pain.     In the ER:  Vitals: /58, HR 82, RR 20, SpO2 100 RA, T 98.4 oral  EKst deg av block  Sig Labs: WBC 8.15, Hgb 10.7 at baseline, Cr 1.3  eGFR 47, Mg 1.7, , Trop -ve, UA -ve, COVID -ve   Imaging:  CT head: No acute intracranial pathology.  CTA NECK:  1.  Severe high-grade stenosis of the proximal right internal carotid artery caused by noncalcified plaque.  2.  Moderate stenosis of the proximal left internal carotid artery caused by calcified plaque.  3.  No evidence of vertebral artery stenosis.  CTA HEAD:  1.  Moderate stenosis of the cavernous segments of the bilateral internal carotid arteries  2.  Severe narrowing of the P3 segment of the right posterior cerebral artery.  3.  Otherwise, no evidence of flow-limiting stenosis, occlusion or aneurysm.  Other: Right upper lobe 1.2 cm pulmonary nodule, as well as several groundglass opacities. Consider follow-up CT of the chest in 3-6 months.  CT AP: Acute diverticulitis of a short segment of the sigmoid colon. No abscess or perforation. Further evaluation with nonemergent colonoscopy may be of benefit when patient is clinically able.    In the ER: Given Dilaudid 0.5, LR 1L bolus, Zofran 4mg IV, Magnesium 2g, and Zosyn 3.375 IV (28 Mar 2023 01:30)  ID called for diverticulitis    FAMILY HISTORY:  Family history of lung cancer (Father, Mother)      PAST MEDICAL & SURGICAL HISTORY:  COPD (chronic obstructive pulmonary disease)      HTN (hypertension)      Diverticulitis      Pulmonary nodules/lesions, multiple      Nasopharyngeal cancer      Hypothyroid      Cervical disc disease  sequelae of radtion for nasopharyngeal cancer      Left ear hearing loss      Hemorrhoids      Anxiety      History of cholecystectomy            VITALS:  T(F): 97.7, Max: 97.7 (23 @ 00:42)  HR: 75  BP: 120/58  RR: 18Vital Signs Last 24 Hrs  T(C): 36.5 (29 Mar 2023 00:42), Max: 36.5 (29 Mar 2023 00:42)  T(F): 97.7 (29 Mar 2023 00:42), Max: 97.7 (29 Mar 2023 00:42)  HR: 75 (29 Mar 2023 00:42) (65 - 75)  BP: 120/58 (29 Mar 2023 00:42) (104/61 - 145/63)  BP(mean): 76 (28 Mar 2023 07:37) (76 - 76)  RR: 18 (29 Mar 2023 00:42) (18 - 18)  SpO2: 97% (28 Mar 2023 19:15) (96% - 97%)    Parameters below as of 28 Mar 2023 15:54  Patient On (Oxygen Delivery Method): room air        TESTS & MEASUREMENTS:                        10.0   6.00  )-----------( 283      ( 28 Mar 2023 12:12 )             32.2         140  |  104  |  21<H>  ----------------------------<  110<H>  4.3   |  25  |  1.1    Ca    8.8      28 Mar 2023 12:12  Mg     1.6         TPro  5.9<L>  /  Alb  3.5  /  TBili  <0.2  /  DBili  x   /  AST  17  /  ALT  17  /  AlkPhos  81      LIVER FUNCTIONS - ( 28 Mar 2023 12:12 )  Alb: 3.5 g/dL / Pro: 5.9 g/dL / ALK PHOS: 81 U/L / ALT: 17 U/L / AST: 17 U/L / GGT: x             Urinalysis Basic - ( 27 Mar 2023 20:53 )    Color: Yellow / Appearance: Clear / S.015 / pH: x  Gluc: x / Ketone: Negative  / Bili: Negative / Urobili: <2 mg/dL   Blood: x / Protein: Trace / Nitrite: Negative   Leuk Esterase: Negative / RBC: x / WBC x   Sq Epi: x / Non Sq Epi: x / Bacteria: x          RADIOLOGY & ADDITIONAL TESTS:  Personal review of radiological diagnostics performed  Echo and EKG results noted when applicable.     MEDICATIONS:  acetaminophen     Tablet .. 650 milliGRAM(s) Oral every 6 hours PRN  albuterol    90 MICROgram(s) HFA Inhaler 2 Puff(s) Inhalation every 6 hours PRN  aspirin  chewable 81 milliGRAM(s) Oral daily  atorvastatin 80 milliGRAM(s) Oral at bedtime  baclofen 5 milliGRAM(s) Oral every 12 hours PRN  budesonide  80 MICROgram(s)/formoterol 4.5 MICROgram(s) Inhaler 2 Puff(s) Inhalation two times a day  buPROPion XL (24-Hour) . 300 milliGRAM(s) Oral daily  clonazePAM  Tablet 1 milliGRAM(s) Oral two times a day PRN  escitalopram 5 milliGRAM(s) Oral daily  heparin   Injectable 5000 Unit(s) SubCutaneous every 8 hours  HYDROmorphone  Injectable 0.5 milliGRAM(s) IV Push every 6 hours PRN  hydrOXYzine hydrochloride 25 milliGRAM(s) Oral three times a day PRN  influenza   Vaccine 0.5 milliLiter(s) IntraMuscular once  lactated ringers. 1000 milliLiter(s) IV Continuous <Continuous>  levothyroxine 125 MICROGram(s) Oral daily  meropenem  IVPB      meropenem  IVPB 1000 milliGRAM(s) IV Intermittent every 12 hours  metoprolol succinate  milliGRAM(s) Oral daily  montelukast 10 milliGRAM(s) Oral daily  pantoprazole    Tablet 40 milliGRAM(s) Oral before breakfast  tiotropium 2.5 MICROgram(s) Inhaler 2 Puff(s) Inhalation daily  zolpidem 5 milliGRAM(s) Oral at bedtime PRN      ANTIBIOTICS:  meropenem  IVPB      meropenem  IVPB 1000 milliGRAM(s) IV Intermittent every 12 hours

## 2023-03-29 NOTE — CONSULT NOTE ADULT - ASSESSMENT
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     plan  - care per primary team tomorrow  - spoke with patient and oncologist. 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     plan  - care per primary team   - spoke with patient and oncologist. Oncologist recommending to adhere to neuroendovascular plans for angiogram  - Dr Mckeon to see patient  - Plan to follow

## 2023-03-29 NOTE — PROGRESS NOTE ADULT - ASSESSMENT
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 Episode happened while patient was driving and she continued to drive normally. She reports associated pain behind the eyes. Resolved and did not happen again. In the ER patient also complaining of LLQ pain worse when attempting to pass gas.     # Severe high-grade stenosis of the proximal right internal carotid artery,  # Moderate left ICA stenosis, right P3, and b/l cavernous ICA stenosis.  - MR Head No Cont (03.28.23 @ 21:37) >Unremarkable noncontrast MRI of thebrain.  -  CT Angio Neck w/ IV Cont (03.27.23 @ 21:23) >Severe high-grade stenosis of the proximal right internal carotid artery caused by noncalcified plaque. Moderate stenosis of the proximal left internal carotid artery caused by calcified plaque. No evidence of vertebral artery stenosis.  - CTA HEAD:  Moderate stenosis of the cavernous segments of the bilateral internal . carotid arteries. Severe narrowing of the P3 segment of the right posterior cerebral artery.  - Neuroendovascular: - Please keep NPO except medication and on IV fluid after midnight tonight for a planned diagnostic cerebral angiogram +/- angioplasty and stenting with Dr. Blank tomorrow, which is pending patient consent   - c/w ASA, statin, start plavix  - F/u LDL, 2 D echo    # Acute sigmoid diverticulitis   - CT abd: Acute diverticulitis of a short segment of the sigmoid colon. No abscess or perforation. Further evaluation with nonemergent colonoscopy may be of benefit when patient is clinically able.  - ID F/u c/w Rocephin 2 gm iv q12h, flagyl 500 mg iv q8h, in 24-48h change to po Ciprofloxacin 750 mg q12h and po falgyl 500 mg q8h till 4/10  - Start clear diet    # Hypertension  - c/w metoprolol    # Hypothyroidism  - c/w synthroid    # Right upper lobe 1.2 cm pulmonary nodule, as well as several groundglass opacities.   - Consider follow-up CT of the chest in 3-6 months.    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 Episode happened while patient was driving and she continued to drive normally. She reports associated pain behind the eyes. Resolved and did not happen again. In the ER patient also complaining of LLQ pain worse when attempting to pass gas.     # Severe high-grade stenosis of the proximal right internal carotid artery,  # Moderate left ICA stenosis, right P3, and b/l cavernous ICA stenosis.  - MR Head No Cont (03.28.23 @ 21:37) >Unremarkable noncontrast MRI of thebrain.  -  CT Angio Neck w/ IV Cont (03.27.23 @ 21:23) >Severe high-grade stenosis of the proximal right internal carotid artery caused by noncalcified plaque. Moderate stenosis of the proximal left internal carotid artery caused by calcified plaque. No evidence of vertebral artery stenosis.  - CTA HEAD:  Moderate stenosis of the cavernous segments of the bilateral internal . carotid arteries. Severe narrowing of the P3 segment of the right posterior cerebral artery.  - Neuroendovascular: - Please keep NPO except medication and on IV fluid after midnight tonight for a planned diagnostic cerebral angiogram +/- angioplasty and stenting with Dr. Blank tomorrow, which is pending patient consent   - c/w ASA, statin, start plavix  - F/u LDL, 2 D echo    # Acute sigmoid diverticulitis   - CT abd: Acute diverticulitis of a short segment of the sigmoid colon. No abscess or perforation. Further evaluation with nonemergent colonoscopy may be of benefit when patient is clinically able.  - ID F/u c/w Rocephin 2 gm iv q12h, flagyl 500 mg iv q8h, in 24-48h change to po Ciprofloxacin 750 mg q12h and po falgyl 500 mg q8h till 4/10  - Start clear diet    # Hypertension  - c/w metoprolol    # Hypothyroidism  - c/w synthroid    # COPD- stable  - c/w budesonide, spiriva    # Depression  - c/w home meds    # Right upper lobe 1.2 cm pulmonary nodule, as well as several groundglass opacities.   - Consider follow-up CT of the chest in 3-6 months.    # Morbidly obese  - BMI: 50.6    # DVT prophylaxis    # Full code    # Pending: diagnostic cerebral angiogram +/- angioplasty and stenting tomorrow, which is pending patient consent   # Discussed plan of care with patient  # Disposition: Home when stable

## 2023-03-29 NOTE — CONSULT NOTE ADULT - SUBJECTIVE AND OBJECTIVE BOX
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 Episode happened while patient was driving and she continued to drive normally. She reports associated pain behind the eyes. Resolved and did not happen again. In the ER patient also complaining of LLQ pain worse when attempting to pass gas. Denies any fever, chills, headaches, SOB, CP, NVD, Dysuria, or MSK pain.     In the ER:  Vitals: /58, HR 82, RR 20, SpO2 100 RA, T 98.4 oral  EKst deg av block  Sig Labs: WBC 8.15, Hgb 10.7 at baseline, Cr 1.3  eGFR 47, Mg 1.7, , Trop -ve, UA -ve, COVID -ve   Imaging:  CT head: No acute intracranial pathology.  CTA NECK:  1.  Severe high-grade stenosis of the proximal right internal carotid artery caused by noncalcified plaque.  2.  Moderate stenosis of the proximal left internal carotid artery caused by calcified plaque.  3.  No evidence of vertebral artery stenosis.  CTA HEAD:  1.  Moderate stenosis of the cavernous segments of the bilateral internal carotid arteries  2.  Severe narrowing of the P3 segment of the right posterior cerebral artery.  3.  Otherwise, no evidence of flow-limiting stenosis, occlusion or aneurysm.  Other: Right upper lobe 1.2 cm pulmonary nodule, as well as several groundglass opacities. Consider follow-up CT of the chest in 3-6 months.  CT AP: Acute diverticulitis of a short segment of the sigmoid colon. No abscess or perforation. Further evaluation with nonemergent colonoscopy may be of benefit when patient is clinically able.    In the ER: Given Dilaudid 0.5, LR 1L bolus, Zofran 4mg IV, Magnesium 2g, and Zosyn 3.375 IV (28 Mar 2023 01:30)    Interval HPI:   A neuroendovascular consult was placed for severe high-grade stenosis of the proximal right internal carotid artery, which was identified on CTA. The patient also has moderate left ICA stenosis, right P3, and b/l cavernous ICA stenosis.    PAST MEDICAL & SURGICAL HISTORY:  COPD (chronic obstructive pulmonary disease)  HTN (hypertension)  Diverticulitis  Pulmonary nodules/lesions, multiple  Nasopharyngeal cancer  Hypothyroid  Cervical disc disease  sequelae of radtion for nasopharyngeal cancer  Left ear hearing loss  Hemorrhoids  Anxiety  History of cholecystectomy    Pertinent PMHx     MEDICATIONS  (STANDING):  aspirin  chewable 81 milliGRAM(s) Oral daily  atorvastatin 80 milliGRAM(s) Oral at bedtime  budesonide  80 MICROgram(s)/formoterol 4.5 MICROgram(s) Inhaler 2 Puff(s) Inhalation two times a day  buPROPion XL (24-Hour) . 300 milliGRAM(s) Oral daily  cefTRIAXone   IVPB 2000 milliGRAM(s) IV Intermittent every 12 hours  escitalopram 5 milliGRAM(s) Oral daily  heparin   Injectable 5000 Unit(s) SubCutaneous every 8 hours  influenza   Vaccine 0.5 milliLiter(s) IntraMuscular once  lactated ringers. 1000 milliLiter(s) (75 mL/Hr) IV Continuous <Continuous>  levothyroxine 125 MICROGram(s) Oral daily  metoprolol succinate  milliGRAM(s) Oral daily  metroNIDAZOLE  IVPB 500 milliGRAM(s) IV Intermittent every 8 hours  montelukast 10 milliGRAM(s) Oral daily  pantoprazole  Injectable 40 milliGRAM(s) IV Push every 12 hours  tiotropium 2.5 MICROgram(s) Inhaler 2 Puff(s) Inhalation daily    MEDICATIONS  (PRN):  acetaminophen     Tablet .. 650 milliGRAM(s) Oral every 6 hours PRN Moderate Pain (4 - 6)  albuterol    90 MICROgram(s) HFA Inhaler 2 Puff(s) Inhalation every 6 hours PRN Shortness of Breath and/or Wheezing  baclofen 5 milliGRAM(s) Oral every 12 hours PRN Musculoskeletal Pain  clonazePAM  Tablet 1 milliGRAM(s) Oral two times a day PRN anxiety  HYDROmorphone  Injectable 0.5 milliGRAM(s) IV Push every 6 hours PRN Severe Pain (7 - 10)  hydrOXYzine hydrochloride 25 milliGRAM(s) Oral three times a day PRN Anxiety  zolpidem 5 milliGRAM(s) Oral at bedtime PRN Insomnia    Allergies  ciprofloxacin (Urticaria; Other)    FAMILY HISTORY:  Family history of lung cancer (Father, Mother)    Physical Exam:   Vital Signs Last 24 Hrs  T(C): 35.5 (29 Mar 2023 07:00), Max: 36.5 (29 Mar 2023 00:42)  T(F): 95.9 (29 Mar 2023 07:00), Max: 97.7 (29 Mar 2023 00:42)  HR: 81 (29 Mar 2023 07:00) (65 - 81)  BP: 152/72 (29 Mar 2023 07:00) (111/52 - 152/72)  BP(mean): --  RR: 18 (29 Mar 2023 07:) (18 - 18)  SpO2: 91% (29 Mar 2023 07:00) (91% - 97%)    Parameters below as of 29 Mar 2023 07:  Patient On (Oxygen Delivery Method): room air    General:  NAD  Neuro: AAOx3, visual fields full with no reported blurry vision or diplopia, no dysarthria, no aphasia, naming and repetition intact, face symmetrical, +bilateral hearing loss, moving all extremities antigravity with + bilateral LE 4/5, sensation intact to light touch throughout.   NIHSS: 0    Labs:                         10.1   5.12  )-----------( 322      ( 29 Mar 2023 07:45 )             33.1     03-    140  |  102  |  17  ----------------------------<  94  4.2   |  28  |  1.1    Ca    9.0      29 Mar 2023 07:45  Mg     1.8         TPro  6.3  /  Alb  3.7  /  TBili  0.2  /  DBili  x   /  AST  19  /  ALT  16  /  AlkPhos  82  0329    PT/INR - ( 27 Mar 2023 20:56 )   PT: 10.80 sec;   INR: 0.95 ratio         PTT - ( 27 Mar 2023 20:56 )  PTT:31.6 sec    Pertinent labs:                      10.1   5.12  )-----------( 322      ( 29 Mar 2023 07:45 )             33.1       03-    140  |  102  |  17  ----------------------------<  94  4.2   |  28  |  1.1    Ca    9.0      29 Mar 2023 07:45  Mg     1.8     03    TPro  6.3  /  Alb  3.7  /  TBili  0.2  /  DBili  x   /  AST  19  /  ALT  16  /  AlkPhos  82        PT/INR - ( 27 Mar 2023 20:56 )   PT: 10.80 sec;   INR: 0.95 ratio         PTT - ( 27 Mar 2023 20:56 )  PTT:31.6 sec    Radiology & Additional Studies:   Radiology imaging reviewed.     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 The patient reports to have had these episodes multiple times since  (cannot quantify exactly how many). The patient was also found to have diverticulitis in the ED. A neuroendovascular consult was placed for severe high-grade stenosis of the proximal right internal carotid artery, which was identified on CTA. The patient also has moderate left ICA stenosis, right P3, and b/l cavernous ICA stenosis. On exam the patient reports vision is back to baseline. The risks and benefits of a diagnostic cerebral angiogram +/- carotid angioplasty and stenting were discussed with the patient who elected to contact her oncologist first (Arcadio Estrada MD at [facility]- [phone]).     Suggestions:   - Please keep NPO except medication and on IV fluid after midnight tonight for a planned diagnostic cerebral angiogram +/- angioplasty and stenting with Dr. Blank tomorrow, which is pending patient consent and discussion with the patient's oncologist. Updates to be provided tomorrow AM. AM coags/ CBC/CMP recommended.  - PRU recommended today.   - Management per primary team/ neurovascular  [id number] Neuroendovascular         Suggestions:   -   -   -     Risks, benefits, and alternatives to treatment discussed. All questions answered with understanding.   Thank you for the courtesy of this consult, please call [facility]ERY YODER [phone] with any further questions.    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 Episode happened while patient was driving and she continued to drive normally. She reports associated pain behind the eyes. Resolved and did not happen again. In the ER patient also complaining of LLQ pain worse when attempting to pass gas. Denies any fever, chills, headaches, SOB, CP, NVD, Dysuria, or MSK pain.     In the ER:  Vitals: /58, HR 82, RR 20, SpO2 100 RA, T 98.4 oral  EKst deg av block  Sig Labs: WBC 8.15, Hgb 10.7 at baseline, Cr 1.3  eGFR 47, Mg 1.7, , Trop -ve, UA -ve, COVID -ve   Imaging:  CT head: No acute intracranial pathology.  CTA NECK:  1.  Severe high-grade stenosis of the proximal right internal carotid artery caused by noncalcified plaque.  2.  Moderate stenosis of the proximal left internal carotid artery caused by calcified plaque.  3.  No evidence of vertebral artery stenosis.  CTA HEAD:  1.  Moderate stenosis of the cavernous segments of the bilateral internal carotid arteries  2.  Severe narrowing of the P3 segment of the right posterior cerebral artery.  3.  Otherwise, no evidence of flow-limiting stenosis, occlusion or aneurysm.  Other: Right upper lobe 1.2 cm pulmonary nodule, as well as several groundglass opacities. Consider follow-up CT of the chest in 3-6 months.  CT AP: Acute diverticulitis of a short segment of the sigmoid colon. No abscess or perforation. Further evaluation with nonemergent colonoscopy may be of benefit when patient is clinically able.    In the ER: Given Dilaudid 0.5, LR 1L bolus, Zofran 4mg IV, Magnesium 2g, and Zosyn 3.375 IV (28 Mar 2023 01:30)    Interval HPI:   A neuroendovascular consult was placed for severe high-grade stenosis of the proximal right internal carotid artery, which was identified on CTA. The patient also has moderate left ICA stenosis, right P3, and b/l cavernous ICA stenosis.    PAST MEDICAL & SURGICAL HISTORY:  COPD (chronic obstructive pulmonary disease)  HTN (hypertension)  Diverticulitis  Pulmonary nodules/lesions, multiple  Nasopharyngeal cancer  Hypothyroid  Cervical disc disease  sequelae of radtion for nasopharyngeal cancer  Left ear hearing loss  Hemorrhoids  Anxiety  History of cholecystectomy    Pertinent PMHx     MEDICATIONS  (STANDING):  aspirin  chewable 81 milliGRAM(s) Oral daily  atorvastatin 80 milliGRAM(s) Oral at bedtime  budesonide  80 MICROgram(s)/formoterol 4.5 MICROgram(s) Inhaler 2 Puff(s) Inhalation two times a day  buPROPion XL (24-Hour) . 300 milliGRAM(s) Oral daily  cefTRIAXone   IVPB 2000 milliGRAM(s) IV Intermittent every 12 hours  escitalopram 5 milliGRAM(s) Oral daily  heparin   Injectable 5000 Unit(s) SubCutaneous every 8 hours  influenza   Vaccine 0.5 milliLiter(s) IntraMuscular once  lactated ringers. 1000 milliLiter(s) (75 mL/Hr) IV Continuous <Continuous>  levothyroxine 125 MICROGram(s) Oral daily  metoprolol succinate  milliGRAM(s) Oral daily  metroNIDAZOLE  IVPB 500 milliGRAM(s) IV Intermittent every 8 hours  montelukast 10 milliGRAM(s) Oral daily  pantoprazole  Injectable 40 milliGRAM(s) IV Push every 12 hours  tiotropium 2.5 MICROgram(s) Inhaler 2 Puff(s) Inhalation daily    MEDICATIONS  (PRN):  acetaminophen     Tablet .. 650 milliGRAM(s) Oral every 6 hours PRN Moderate Pain (4 - 6)  albuterol    90 MICROgram(s) HFA Inhaler 2 Puff(s) Inhalation every 6 hours PRN Shortness of Breath and/or Wheezing  baclofen 5 milliGRAM(s) Oral every 12 hours PRN Musculoskeletal Pain  clonazePAM  Tablet 1 milliGRAM(s) Oral two times a day PRN anxiety  HYDROmorphone  Injectable 0.5 milliGRAM(s) IV Push every 6 hours PRN Severe Pain (7 - 10)  hydrOXYzine hydrochloride 25 milliGRAM(s) Oral three times a day PRN Anxiety  zolpidem 5 milliGRAM(s) Oral at bedtime PRN Insomnia    Allergies  ciprofloxacin (Urticaria; Other)    FAMILY HISTORY:  Family history of lung cancer (Father, Mother)    Physical Exam:   Vital Signs Last 24 Hrs  T(C): 35.5 (29 Mar 2023 07:00), Max: 36.5 (29 Mar 2023 00:42)  T(F): 95.9 (29 Mar 2023 07:00), Max: 97.7 (29 Mar 2023 00:42)  HR: 81 (29 Mar 2023 07:00) (65 - 81)  BP: 152/72 (29 Mar 2023 07:00) (111/52 - 152/72)  BP(mean): --  RR: 18 (29 Mar 2023 07:) (18 - 18)  SpO2: 91% (29 Mar 2023 07:00) (91% - 97%)    Parameters below as of 29 Mar 2023 07:  Patient On (Oxygen Delivery Method): room air    General:  NAD  Neuro: AAOx3, visual fields full with no reported blurry vision or diplopia, no dysarthria, no aphasia, naming and repetition intact, face symmetrical, +bilateral hearing loss, moving all extremities antigravity with + bilateral LE 4/5, sensation intact to light touch throughout.   NIHSS: 0    Labs:                         10.1   5.12  )-----------( 322      ( 29 Mar 2023 07:45 )             33.1     03-    140  |  102  |  17  ----------------------------<  94  4.2   |  28  |  1.1    Ca    9.0      29 Mar 2023 07:45  Mg     1.8         TPro  6.3  /  Alb  3.7  /  TBili  0.2  /  DBili  x   /  AST  19  /  ALT  16  /  AlkPhos  82  0329    PT/INR - ( 27 Mar 2023 20:56 )   PT: 10.80 sec;   INR: 0.95 ratio         PTT - ( 27 Mar 2023 20:56 )  PTT:31.6 sec    Pertinent labs:                      10.1   5.12  )-----------( 322      ( 29 Mar 2023 07:45 )             33.1       03-    140  |  102  |  17  ----------------------------<  94  4.2   |  28  |  1.1    Ca    9.0      29 Mar 2023 07:45  Mg     1.8     03    TPro  6.3  /  Alb  3.7  /  TBili  0.2  /  DBili  x   /  AST  19  /  ALT  16  /  AlkPhos  82        PT/INR - ( 27 Mar 2023 20:56 )   PT: 10.80 sec;   INR: 0.95 ratio         PTT - ( 27 Mar 2023 20:56 )  PTT:31.6 sec    Radiology & Additional Studies:   Radiology imaging reviewed.     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 The patient reports to have had these episodes multiple times since  (cannot quantify exactly how many). The patient was also found to have diverticulitis in the ED. A neuroendovascular consult was placed for severe high-grade stenosis of the proximal right internal carotid artery, which was identified on CTA. The patient also has moderate left ICA stenosis, right P3, and b/l cavernous ICA stenosis. On exam the patient reports vision is back to baseline. The risks and benefits of a diagnostic cerebral angiogram +/- carotid angioplasty and stenting were discussed with the patient who elected to contact her oncologist first (Arcadio Estrada MD at [facility]- [phone]).     Suggestions:   - Per discussion among the patient, Dr. Blank, and Dr. Mckeon; and after discussion with the patient's oncologist, the patient has been tentatively scheduled for a diagnostic cerebral angiogram + possible carotid angioplasty and endovascular stent recanalization this coming  with Dr. Blank in neuro IR.   - Recommending that the patient start DAPT Brilinta 90 mg BID and Aspirin 81 mg QD in preparation for the procedure and if the neurovascular/ primary teams are in agreement.   - Management per primary team  [id number] Neuroendovascular     Thank you for the courtesy of this consult.    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 Episode happened while patient was driving and she continued to drive normally. She reports associated pain behind the eyes. Resolved and did not happen again. In the ER patient also complaining of LLQ pain worse when attempting to pass gas. Denies any fever, chills, headaches, SOB, CP, NVD, Dysuria, or MSK pain.     In the ER:  Vitals: /58, HR 82, RR 20, SpO2 100 RA, T 98.4 oral  EKst deg av block  Sig Labs: WBC 8.15, Hgb 10.7 at baseline, Cr 1.3  eGFR 47, Mg 1.7, , Trop -ve, UA -ve, COVID -ve   Imaging:  CT head: No acute intracranial pathology.  CTA NECK:  1.  Severe high-grade stenosis of the proximal right internal carotid artery caused by noncalcified plaque.  2.  Moderate stenosis of the proximal left internal carotid artery caused by calcified plaque.  3.  No evidence of vertebral artery stenosis.  CTA HEAD:  1.  Moderate stenosis of the cavernous segments of the bilateral internal carotid arteries  2.  Severe narrowing of the P3 segment of the right posterior cerebral artery.  3.  Otherwise, no evidence of flow-limiting stenosis, occlusion or aneurysm.  Other: Right upper lobe 1.2 cm pulmonary nodule, as well as several groundglass opacities. Consider follow-up CT of the chest in 3-6 months.  CT AP: Acute diverticulitis of a short segment of the sigmoid colon. No abscess or perforation. Further evaluation with nonemergent colonoscopy may be of benefit when patient is clinically able.    In the ER: Given Dilaudid 0.5, LR 1L bolus, Zofran 4mg IV, Magnesium 2g, and Zosyn 3.375 IV (28 Mar 2023 01:30)    Interval HPI:   A neuroendovascular consult was placed for severe high-grade stenosis of the proximal right internal carotid artery, which was identified on CTA. The patient also has moderate left ICA stenosis, right P3, and b/l cavernous ICA stenosis.    PAST MEDICAL & SURGICAL HISTORY:  COPD (chronic obstructive pulmonary disease)  HTN (hypertension)  Diverticulitis  Pulmonary nodules/lesions, multiple  Nasopharyngeal cancer  Hypothyroid  Cervical disc disease  sequelae of radtion for nasopharyngeal cancer  Left ear hearing loss  Hemorrhoids  Anxiety  History of cholecystectomy    Pertinent PMHx     MEDICATIONS  (STANDING):  aspirin  chewable 81 milliGRAM(s) Oral daily  atorvastatin 80 milliGRAM(s) Oral at bedtime  budesonide  80 MICROgram(s)/formoterol 4.5 MICROgram(s) Inhaler 2 Puff(s) Inhalation two times a day  buPROPion XL (24-Hour) . 300 milliGRAM(s) Oral daily  cefTRIAXone   IVPB 2000 milliGRAM(s) IV Intermittent every 12 hours  escitalopram 5 milliGRAM(s) Oral daily  heparin   Injectable 5000 Unit(s) SubCutaneous every 8 hours  influenza   Vaccine 0.5 milliLiter(s) IntraMuscular once  lactated ringers. 1000 milliLiter(s) (75 mL/Hr) IV Continuous <Continuous>  levothyroxine 125 MICROGram(s) Oral daily  metoprolol succinate  milliGRAM(s) Oral daily  metroNIDAZOLE  IVPB 500 milliGRAM(s) IV Intermittent every 8 hours  montelukast 10 milliGRAM(s) Oral daily  pantoprazole  Injectable 40 milliGRAM(s) IV Push every 12 hours  tiotropium 2.5 MICROgram(s) Inhaler 2 Puff(s) Inhalation daily    MEDICATIONS  (PRN):  acetaminophen     Tablet .. 650 milliGRAM(s) Oral every 6 hours PRN Moderate Pain (4 - 6)  albuterol    90 MICROgram(s) HFA Inhaler 2 Puff(s) Inhalation every 6 hours PRN Shortness of Breath and/or Wheezing  baclofen 5 milliGRAM(s) Oral every 12 hours PRN Musculoskeletal Pain  clonazePAM  Tablet 1 milliGRAM(s) Oral two times a day PRN anxiety  HYDROmorphone  Injectable 0.5 milliGRAM(s) IV Push every 6 hours PRN Severe Pain (7 - 10)  hydrOXYzine hydrochloride 25 milliGRAM(s) Oral three times a day PRN Anxiety  zolpidem 5 milliGRAM(s) Oral at bedtime PRN Insomnia    Allergies  ciprofloxacin (Urticaria; Other)    FAMILY HISTORY:  Family history of lung cancer (Father, Mother)    Physical Exam:   Vital Signs Last 24 Hrs  T(C): 35.5 (29 Mar 2023 07:00), Max: 36.5 (29 Mar 2023 00:42)  T(F): 95.9 (29 Mar 2023 07:00), Max: 97.7 (29 Mar 2023 00:42)  HR: 81 (29 Mar 2023 07:00) (65 - 81)  BP: 152/72 (29 Mar 2023 07:00) (111/52 - 152/72)  BP(mean): --  RR: 18 (29 Mar 2023 07:) (18 - 18)  SpO2: 91% (29 Mar 2023 07:00) (91% - 97%)    Parameters below as of 29 Mar 2023 07:  Patient On (Oxygen Delivery Method): room air    General:  NAD  Neuro: AAOx3, visual fields full with no reported blurry vision or diplopia, no dysarthria, no aphasia, naming and repetition intact, face symmetrical, +bilateral hearing loss, moving all extremities antigravity with + bilateral LE 4/5, sensation intact to light touch throughout.   NIHSS: 0    Labs:                         10.1   5.12  )-----------( 322      ( 29 Mar 2023 07:45 )             33.1     03-    140  |  102  |  17  ----------------------------<  94  4.2   |  28  |  1.1    Ca    9.0      29 Mar 2023 07:45  Mg     1.8         TPro  6.3  /  Alb  3.7  /  TBili  0.2  /  DBili  x   /  AST  19  /  ALT  16  /  AlkPhos  82  0329    PT/INR - ( 27 Mar 2023 20:56 )   PT: 10.80 sec;   INR: 0.95 ratio         PTT - ( 27 Mar 2023 20:56 )  PTT:31.6 sec    Pertinent labs:                      10.1   5.12  )-----------( 322      ( 29 Mar 2023 07:45 )             33.1       03-    140  |  102  |  17  ----------------------------<  94  4.2   |  28  |  1.1    Ca    9.0      29 Mar 2023 07:45  Mg     1.8     03    TPro  6.3  /  Alb  3.7  /  TBili  0.2  /  DBili  x   /  AST  19  /  ALT  16  /  AlkPhos  82        PT/INR - ( 27 Mar 2023 20:56 )   PT: 10.80 sec;   INR: 0.95 ratio         PTT - ( 27 Mar 2023 20:56 )  PTT:31.6 sec    Radiology & Additional Studies:   Radiology imaging reviewed.     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 The patient reports to have had these episodes multiple times since  (cannot quantify exactly how many). The patient was also found to have diverticulitis in the ED. A neuroendovascular consult was placed for severe high-grade stenosis of the proximal right internal carotid artery, which was identified on CTA. The patient also has moderate left ICA stenosis, right P3, and b/l cavernous ICA stenosis. On exam the patient reports vision is back to baseline. The risks and benefits of a diagnostic cerebral angiogram +/- carotid angioplasty and stenting were discussed with the patient who elected to contact her oncologist first (Arcadio Estrada MD at [facility]- [phone]).     Suggestions:   - Per discussion among the patient, Dr. Blank, and Dr. Mckeon; and after discussion with the patient's oncologist, the patient has been tentatively scheduled for a diagnostic cerebral angiogram + possible carotid angioplasty and endovascular stent recanalization this coming  with Dr. Blank in neuro IR.   - Recommending that the patient remain on DAPT in preparation for the procedure.   - Management per primary team  [id number] Neuroendovascular     Thank you for the courtesy of this consult.

## 2023-03-29 NOTE — PROGRESS NOTE ADULT - SUBJECTIVE AND OBJECTIVE BOX
GEOVANNA PUTNAM 57y Female  MRN#: [id number]   Hospital Day: 2d    SUBJECTIVE  Patient is a 57y old Female who presents with a chief complaint of B/L Blurry Vision (29 Mar 2023 10:30)  Currently admitted to medicine with the primary diagnosis of Acute diverticulitis      INTERVAL HPI AND OVERNIGHT EVENTS:  Patient was examined and seen at bedside. This morning she is resting comfortably in bed. No issues or overnight events.    OBJECTIVE  PAST MEDICAL & SURGICAL HISTORY  COPD (chronic obstructive pulmonary disease)    HTN (hypertension)    Diverticulitis    Pulmonary nodules/lesions, multiple    Nasopharyngeal cancer    Hypothyroid    Cervical disc disease  sequelae of radtion for nasopharyngeal cancer    Left ear hearing loss    Hemorrhoids    Anxiety    History of cholecystectomy      ALLERGIES:  ciprofloxacin (Urticaria; Other)    MEDICATIONS:  STANDING MEDICATIONS  aspirin  chewable 81 milliGRAM(s) Oral daily  atorvastatin 80 milliGRAM(s) Oral at bedtime  budesonide  80 MICROgram(s)/formoterol 4.5 MICROgram(s) Inhaler 2 Puff(s) Inhalation two times a day  buPROPion XL (24-Hour) . 300 milliGRAM(s) Oral daily  cefTRIAXone   IVPB 2000 milliGRAM(s) IV Intermittent every 12 hours  escitalopram 5 milliGRAM(s) Oral daily  heparin   Injectable 5000 Unit(s) SubCutaneous every 8 hours  influenza   Vaccine 0.5 milliLiter(s) IntraMuscular once  lactated ringers. 1000 milliLiter(s) IV Continuous <Continuous>  levothyroxine 125 MICROGram(s) Oral daily  metoprolol succinate  milliGRAM(s) Oral daily  metroNIDAZOLE  IVPB 500 milliGRAM(s) IV Intermittent every 8 hours  montelukast 10 milliGRAM(s) Oral daily  pantoprazole  Injectable 40 milliGRAM(s) IV Push every 12 hours  tiotropium 2.5 MICROgram(s) Inhaler 2 Puff(s) Inhalation daily    PRN MEDICATIONS  acetaminophen     Tablet .. 650 milliGRAM(s) Oral every 6 hours PRN  albuterol    90 MICROgram(s) HFA Inhaler 2 Puff(s) Inhalation every 6 hours PRN  baclofen 5 milliGRAM(s) Oral every 12 hours PRN  clonazePAM  Tablet 1 milliGRAM(s) Oral two times a day PRN  HYDROmorphone  Injectable 0.5 milliGRAM(s) IV Push every 6 hours PRN  hydrOXYzine hydrochloride 25 milliGRAM(s) Oral three times a day PRN  zolpidem 5 milliGRAM(s) Oral at bedtime PRN      VITAL SIGNS: Last 24 Hours  T(C): 35.5 (29 Mar 2023 07:00), Max: 36.5 (29 Mar 2023 00:42)  T(F): 95.9 (29 Mar 2023 07:00), Max: 97.7 (29 Mar 2023 00:42)  HR: 81 (29 Mar 2023 07:00) (65 - 81)  BP: 152/72 (29 Mar 2023 07:00) (111/52 - 152/72)  BP(mean): --  RR: 18 (29 Mar 2023 07:00) (18 - 18)  SpO2: 91% (29 Mar 2023 07:00) (91% - 97%)    LABS:                        10.1   5.12  )-----------( 322      ( 29 Mar 2023 07:45 )             33.1     03-29    140  |  102  |  17  ----------------------------<  94  4.2   |  28  |  1.1    Ca    9.0      29 Mar 2023 07:45  Mg     1.8     03-29    TPro  6.3  /  Alb  3.7  /  TBili  0.2  /  DBili  x   /  AST  19  /  ALT  16  /  AlkPhos  82  03-29    PT/INR - ( 27 Mar 2023 20:56 )   PT: 10.80 sec;   INR: 0.95 ratio         PTT - ( 27 Mar 2023 20:56 )  PTT:31.6 sec  Urinalysis Basic - ( 27 Mar 2023 20:53 )    Color: Yellow / Appearance: Clear / S.015 / pH: x  Gluc: x / Ketone: Negative  / Bili: Negative / Urobili: <2 mg/dL   Blood: x / Protein: Trace / Nitrite: Negative   Leuk Esterase: Negative / RBC: x / WBC x   Sq Epi: x / Non Sq Epi: x / Bacteria: x            CARDIAC MARKERS ( 27 Mar 2023 20:56 )  x     / <0.01 ng/mL / x     / x     / x        Physical Exam:  CONSTITUTIONAL: No acute distress, obese, AxO=3  HEAD: Atraumatic, normocephalic  EYES: EOM intact, PERRLA, conjunctiva and sclera clear  PULMONARY: clear on RA  CARDIOVASCULAR: Regular rate and rhythm  GASTROINTESTINAL: mild tenderness on LLQ  NEUROLOGY: non-focal

## 2023-03-29 NOTE — CONSULT NOTE ADULT - SUBJECTIVE AND OBJECTIVE BOX
VASCULAR SURGERY CONSULT NOTE    Patient: GEOVANNA PUTNAM , 57y (61)Female   MRN: [id number]  Location: [place] [address] A  Visit: 23 Inpatient  Date: 23 @ 17:19    HPI: 57 year old morbidly obese female has medical history of 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 , hypothyroidism, Diverticulitis, COPD (not on oxygen), HTN, Depression, Insomnia, and Anxiety presents to ED c/o of sudden onset B/L blurry vision that lasted 15 mins earlier in the morning. Episode happened while patient was driving and she continued to drive normally. She reports associated pain behind the eyes. Resolved and did not happen again. In the ER patient also complaining of LLQ pain worse when attempting to pass gas. Denies any fever, chills, headaches, SOB, CP, NVD, Dysuria, or MSK pain.     In the ER:  Vitals: /58, HR 82, RR 20, SpO2 100 RA, T 98.4 oral  EKst deg av block  Sig Labs: WBC 8.15, Hgb 10.7 at baseline, Cr 1.3  eGFR 47, Mg 1.7, , Trop -ve, UA -ve, COVID -ve   Imaging:  CT head: No acute intracranial pathology.  CTA NECK:  1.  Severe high-grade stenosis of the proximal right internal carotid artery caused by noncalcified plaque.  2.  Moderate stenosis of the proximal left internal carotid artery caused by calcified plaque.  3.  No evidence of vertebral artery stenosis.  CTA HEAD:  1.  Moderate stenosis of the cavernous segments of the bilateral internal carotid arteries  2.  Severe narrowing of the P3 segment of the right posterior cerebral artery.  3.  Otherwise, no evidence of flow-limiting stenosis, occlusion or aneurysm.  Other: Right upper lobe 1.2 cm pulmonary nodule, as well as several groundglass opacities. Consider follow-up CT of the chest in 3-6 months.  CT AP: Acute diverticulitis of a short segment of the sigmoid colon. No abscess or perforation. Further evaluation with nonemergent colonoscopy may be of benefit when patient is clinically able.    In the ER: Given Dilaudid 0.5, LR 1L bolus, Zofran 4mg IV, Magnesium 2g, and Zosyn 3.375 IV (28 Mar 2023 01:30)      PAST MEDICAL & SURGICAL HISTORY:  COPD (chronic obstructive pulmonary disease)      HTN (hypertension)      Diverticulitis      Pulmonary nodules/lesions, multiple      Nasopharyngeal cancer      Hypothyroid      Cervical disc disease  sequelae of radtion for nasopharyngeal cancer      Left ear hearing loss      Hemorrhoids      Anxiety      History of cholecystectomy          Home Medications:  albuterol 90 mcg/inh inhalation aerosol: 2 puff(s) inhaled every 6 hours, As needed, Shortness of Breath and/or Wheezing (28 Mar 2023 13:59)  buPROPion 300 mg/24 hours (XL) oral tablet, extended release: 1 tab(s) orally every 24 hours (28 Mar 2023 13:59)  clonazePAM 1 mg oral tablet: 1 tab(s) orally 2 times a day (28 Mar 2023 13:59)  escitalopram 5 mg oral tablet: 1 tab(s) orally once a day (28 Mar 2023 13:59)  metoprolol succinate 50 mg oral tablet, extended release: 1 tab(s) orally once a day (28 Mar 2023 13:59)  MONTELUKAST SODIUM 10 MG TABS: 1 dose(s) orally once a day (28 Mar 2023 13:59)  Trelegy Ellipta inhalation powder: 1 puff(s) inhaled once a day (28 Mar 2023 13:59)  ZOLPIDEM TARTRATE 10 MG TABS: orally once a day (at bedtime) (28 Mar 2023 13:59)        VITALS:  T(F): 96.8 (23 @ 15:25), Max: 97.7 (23 @ 00:42)  HR: 64 (23 @ 15:25) (64 - 81)  BP: 163/72 (23 @ 15:25) (120/58 - 163/72)  RR: 18 (23 @ 15:25) (18 - 18)  SpO2: 94% (23 @ 15:25) (91% - 97%)    PHYSICAL EXAM:  General: NAD, AAOx3, Obese  HEENT: NCAT, YEE, EOMI, Trachea ML, Neck supple  Cardiac: RRR S1, S2, no Murmurs, rubs or gallops  Respiratory: CTAB, normal respiratory effort, breath sounds equal BL, no wheeze, rhonchi or crackles  Abdomen: Soft, non-distended, non-tender, no rebound, no guarding. +BS.  Rectal: Good tone, +stool, no blood, no bella-anal masses/lesions, no fistulas, fissures, hemorrhoids  Musculoskeletal: Strength 5/5 BL UE/LE, ROM intact, compartments soft  Neuro: Sensation grossly intact and equal throughout, no focal deficits  Vascular: Pulses 2+ throughout, extremities well perfused    MEDICATIONS  (STANDING):  aspirin  chewable 81 milliGRAM(s) Oral daily  atorvastatin 80 milliGRAM(s) Oral at bedtime  budesonide  80 MICROgram(s)/formoterol 4.5 MICROgram(s) Inhaler 2 Puff(s) Inhalation two times a day  buPROPion XL (24-Hour) . 300 milliGRAM(s) Oral daily  cefTRIAXone   IVPB 2000 milliGRAM(s) IV Intermittent every 12 hours  clopidogrel Tablet 75 milliGRAM(s) Oral daily  escitalopram 5 milliGRAM(s) Oral daily  heparin   Injectable 5000 Unit(s) SubCutaneous every 8 hours  influenza   Vaccine 0.5 milliLiter(s) IntraMuscular once  lactated ringers. 1000 milliLiter(s) (75 mL/Hr) IV Continuous <Continuous>  levothyroxine 125 MICROGram(s) Oral daily  metoprolol succinate  milliGRAM(s) Oral daily  metroNIDAZOLE  IVPB 500 milliGRAM(s) IV Intermittent every 8 hours  montelukast 10 milliGRAM(s) Oral daily  pantoprazole  Injectable 40 milliGRAM(s) IV Push every 12 hours  tiotropium 2.5 MICROgram(s) Inhaler 2 Puff(s) Inhalation daily    MEDICATIONS  (PRN):  acetaminophen     Tablet .. 650 milliGRAM(s) Oral every 6 hours PRN Moderate Pain (4 - 6)  albuterol    90 MICROgram(s) HFA Inhaler 2 Puff(s) Inhalation every 6 hours PRN Shortness of Breath and/or Wheezing  baclofen 5 milliGRAM(s) Oral every 12 hours PRN Musculoskeletal Pain  clonazePAM  Tablet 1 milliGRAM(s) Oral two times a day PRN anxiety  HYDROmorphone  Injectable 0.5 milliGRAM(s) IV Push every 6 hours PRN Severe Pain (7 - 10)  hydrOXYzine hydrochloride 25 milliGRAM(s) Oral three times a day PRN Anxiety  zolpidem 5 milliGRAM(s) Oral at bedtime PRN Insomnia      LAB/STUDIES:                        10.1   5.12  )-----------( 322      ( 29 Mar 2023 07:45 )             33.1         140  |  102  |  17  ----------------------------<  94  4.2   |  28  |  1.1    Ca    9.0      29 Mar 2023 07:45  Mg     1.8         TPro  6.3  /  Alb  3.7  /  TBili  0.2  /  DBili  x   /  AST  19  /  ALT  16  /  AlkPhos  82      PT/INR - ( 27 Mar 2023 20:56 )   PT: 10.80 sec;   INR: 0.95 ratio         PTT - ( 27 Mar 2023 20:56 )  PTT:31.6 sec  LIVER FUNCTIONS - ( 29 Mar 2023 07:45 )  Alb: 3.7 g/dL / Pro: 6.3 g/dL / ALK PHOS: 82 U/L / ALT: 16 U/L / AST: 19 U/L / GGT: x           Urinalysis Basic - ( 27 Mar 2023 20:53 )    Color: Yellow / Appearance: Clear / S.015 / pH: x  Gluc: x / Ketone: Negative  / Bili: Negative / Urobili: <2 mg/dL   Blood: x / Protein: Trace / Nitrite: Negative   Leuk Esterase: Negative / RBC: x / WBC x   Sq Epi: x / Non Sq Epi: x / Bacteria: x      CARDIAC MARKERS ( 27 Mar 2023 20:56 )  x     / <0.01 ng/mL / x     / x     / x          IMAGING:  < from: CT Angio Head w/ IV Cont (23 @ 21:24) >  IMPRESSION:    CTA NECK:  1.  Severe high-grade stenosis of the proximal right internal carotid   artery caused by noncalcified plaque.  2.  Moderate stenosis of the proximal left internal carotid artery caused   by calcified plaque.  3.  No evidence of vertebral artery stenosis.    CTA HEAD:  1.  Moderate stenosis of the cavernous segments of the bilateral internal   carotid arteries  2.  Severe narrowing of the P3 segment of the right posterior cerebral   artery.  3.  Otherwise, no evidence of flow-limiting stenosis, occlusion or   aneurysm.    Other: Right upper lobe 1.2 cm pulmonary nodule, as well as several   groundglass opacities. Consider follow-up CT of the chest in 3-6 months.    < end of copied text >   VASCULAR SURGERY CONSULT NOTE    Patient: GEOVANNA PUTNAM , 57y (61)Female   MRN: [id number]  Location: [address]  Visit: 23 Inpatient  Date: 23 @ 17:19    HPI: 57 year old morbidly obese female has medical history of nasopharyngeal cancer in remission s/p chemoradiation , hypothyroidism, Diverticulitis, COPD (not on oxygen), HTN, Depression, Insomnia, and Anxiety presents to ED c/o of sudden onset B/L blurry vision that lasted 15 mins earlier in the morning. Episode happened while patient was driving and she continued to drive normally. She reports associated pain behind the eyes. Resolved and did not happen again. In the ER patient also complaining of LLQ pain worse when attempting to pass gas. Denies any fever, chills, headaches, SOB, CP, NVD, Dysuria, or MSK pain.     In the ER:  Vitals: /58, HR 82, RR 20, SpO2 100 RA, T 98.4 oral  EKst deg av block  Sig Labs: WBC 8.15, Hgb 10.7 at baseline, Cr 1.3  eGFR 47, Mg 1.7, , Trop -ve, UA -ve, COVID -ve   Imaging:  CT head: No acute intracranial pathology.  CTA NECK:  1.  Severe high-grade stenosis of the proximal right internal carotid artery caused by noncalcified plaque.  2.  Moderate stenosis of the proximal left internal carotid artery caused by calcified plaque.  3.  No evidence of vertebral artery stenosis.  CTA HEAD:  1.  Moderate stenosis of the cavernous segments of the bilateral internal carotid arteries  2.  Severe narrowing of the P3 segment of the right posterior cerebral artery.  3.  Otherwise, no evidence of flow-limiting stenosis, occlusion or aneurysm.  Other: Right upper lobe 1.2 cm pulmonary nodule, as well as several groundglass opacities. Consider follow-up CT of the chest in 3-6 months.  CT AP: Acute diverticulitis of a short segment of the sigmoid colon. No abscess or perforation. Further evaluation with nonemergent colonoscopy may be of benefit when patient is clinically able.    In the ER: Given Dilaudid 0.5, LR 1L bolus, Zofran 4mg IV, Magnesium 2g, and Zosyn 3.375 IV (28 Mar 2023 01:30)      PAST MEDICAL & SURGICAL HISTORY:  COPD (chronic obstructive pulmonary disease)      HTN (hypertension)      Diverticulitis      Pulmonary nodules/lesions, multiple      Nasopharyngeal cancer      Hypothyroid      Cervical disc disease  sequelae of radtion for nasopharyngeal cancer      Left ear hearing loss      Hemorrhoids      Anxiety      History of cholecystectomy          Home Medications:  albuterol 90 mcg/inh inhalation aerosol: 2 puff(s) inhaled every 6 hours, As needed, Shortness of Breath and/or Wheezing (28 Mar 2023 13:59)  buPROPion 300 mg/24 hours (XL) oral tablet, extended release: 1 tab(s) orally every 24 hours (28 Mar 2023 13:59)  clonazePAM 1 mg oral tablet: 1 tab(s) orally 2 times a day (28 Mar 2023 13:59)  escitalopram 5 mg oral tablet: 1 tab(s) orally once a day (28 Mar 2023 13:59)  metoprolol succinate 50 mg oral tablet, extended release: 1 tab(s) orally once a day (28 Mar 2023 13:59)  MONTELUKAST SODIUM 10 MG TABS: 1 dose(s) orally once a day (28 Mar 2023 13:59)  Trelegy Ellipta inhalation powder: 1 puff(s) inhaled once a day (28 Mar 2023 13:59)  ZOLPIDEM TARTRATE 10 MG TABS: orally once a day (at bedtime) (28 Mar 2023 13:59)        VITALS:  T(F): 96.8 (23 @ 15:25), Max: 97.7 (23 @ 00:42)  HR: 64 (23 @ 15:25) (64 - 81)  BP: 163/72 (23 @ 15:25) (120/58 - 163/72)  RR: 18 (23 @ 15:25) (18 - 18)  SpO2: 94% (23 @ 15:25) (91% - 97%)    PHYSICAL EXAM:  General: NAD, AAOx3, Obese  HEENT: NCAT, YEE, EOMI, Trachea ML, Neck supple  Cardiac: RRR S1, S2, no Murmurs, rubs or gallops  Respiratory: CTAB, normal respiratory effort, breath sounds equal BL, no wheeze, rhonchi or crackles  Abdomen: Soft, non-distended, non-tender, no rebound, no guarding. +BS.  Rectal: Good tone, +stool, no blood, no bella-anal masses/lesions, no fistulas, fissures, hemorrhoids  Musculoskeletal: Strength 5/5 BL UE/LE, ROM intact, compartments soft  Neuro: Sensation grossly intact and equal throughout, no focal deficits  Vascular: Pulses 2+ throughout, extremities well perfused    MEDICATIONS  (STANDING):  aspirin  chewable 81 milliGRAM(s) Oral daily  atorvastatin 80 milliGRAM(s) Oral at bedtime  budesonide  80 MICROgram(s)/formoterol 4.5 MICROgram(s) Inhaler 2 Puff(s) Inhalation two times a day  buPROPion XL (24-Hour) . 300 milliGRAM(s) Oral daily  cefTRIAXone   IVPB 2000 milliGRAM(s) IV Intermittent every 12 hours  clopidogrel Tablet 75 milliGRAM(s) Oral daily  escitalopram 5 milliGRAM(s) Oral daily  heparin   Injectable 5000 Unit(s) SubCutaneous every 8 hours  influenza   Vaccine 0.5 milliLiter(s) IntraMuscular once  lactated ringers. 1000 milliLiter(s) (75 mL/Hr) IV Continuous <Continuous>  levothyroxine 125 MICROGram(s) Oral daily  metoprolol succinate  milliGRAM(s) Oral daily  metroNIDAZOLE  IVPB 500 milliGRAM(s) IV Intermittent every 8 hours  montelukast 10 milliGRAM(s) Oral daily  pantoprazole  Injectable 40 milliGRAM(s) IV Push every 12 hours  tiotropium 2.5 MICROgram(s) Inhaler 2 Puff(s) Inhalation daily    MEDICATIONS  (PRN):  acetaminophen     Tablet .. 650 milliGRAM(s) Oral every 6 hours PRN Moderate Pain (4 - 6)  albuterol    90 MICROgram(s) HFA Inhaler 2 Puff(s) Inhalation every 6 hours PRN Shortness of Breath and/or Wheezing  baclofen 5 milliGRAM(s) Oral every 12 hours PRN Musculoskeletal Pain  clonazePAM  Tablet 1 milliGRAM(s) Oral two times a day PRN anxiety  HYDROmorphone  Injectable 0.5 milliGRAM(s) IV Push every 6 hours PRN Severe Pain (7 - 10)  hydrOXYzine hydrochloride 25 milliGRAM(s) Oral three times a day PRN Anxiety  zolpidem 5 milliGRAM(s) Oral at bedtime PRN Insomnia      LAB/STUDIES:                        10.1   5.12  )-----------( 322      ( 29 Mar 2023 07:45 )             33.1         140  |  102  |  17  ----------------------------<  94  4.2   |  28  |  1.1    Ca    9.0      29 Mar 2023 07:45  Mg     1.8         TPro  6.3  /  Alb  3.7  /  TBili  0.2  /  DBili  x   /  AST  19  /  ALT  16  /  AlkPhos  82      PT/INR - ( 27 Mar 2023 20:56 )   PT: 10.80 sec;   INR: 0.95 ratio         PTT - ( 27 Mar 2023 20:56 )  PTT:31.6 sec  LIVER FUNCTIONS - ( 29 Mar 2023 07:45 )  Alb: 3.7 g/dL / Pro: 6.3 g/dL / ALK PHOS: 82 U/L / ALT: 16 U/L / AST: 19 U/L / GGT: x           Urinalysis Basic - ( 27 Mar 2023 20:53 )    Color: Yellow / Appearance: Clear / S.015 / pH: x  Gluc: x / Ketone: Negative  / Bili: Negative / Urobili: <2 mg/dL   Blood: x / Protein: Trace / Nitrite: Negative   Leuk Esterase: Negative / RBC: x / WBC x   Sq Epi: x / Non Sq Epi: x / Bacteria: x      CARDIAC MARKERS ( 27 Mar 2023 20:56 )  x     / <0.01 ng/mL / x     / x     / x          IMAGING:  < from: CT Angio Head w/ IV Cont (23 @ 21:24) >  IMPRESSION:    CTA NECK:  1.  Severe high-grade stenosis of the proximal right internal carotid   artery caused by noncalcified plaque.  2.  Moderate stenosis of the proximal left internal carotid artery caused   by calcified plaque.  3.  No evidence of vertebral artery stenosis.    CTA HEAD:  1.  Moderate stenosis of the cavernous segments of the bilateral internal   carotid arteries  2.  Severe narrowing of the P3 segment of the right posterior cerebral   artery.  3.  Otherwise, no evidence of flow-limiting stenosis, occlusion or   aneurysm.    Other: Right upper lobe 1.2 cm pulmonary nodule, as well as several   groundglass opacities. Consider follow-up CT of the chest in 3-6 months.    < end of copied text >

## 2023-03-30 LAB
A1C WITH ESTIMATED AVERAGE GLUCOSE RESULT: 6.9 % — HIGH (ref 4–5.6)
ANION GAP SERPL CALC-SCNC: 12 MMOL/L — SIGNIFICANT CHANGE UP (ref 7–14)
APTT BLD: 31.5 SEC — SIGNIFICANT CHANGE UP (ref 27–39.2)
BASOPHILS # BLD AUTO: 0.03 K/UL — SIGNIFICANT CHANGE UP (ref 0–0.2)
BASOPHILS NFR BLD AUTO: 0.5 % — SIGNIFICANT CHANGE UP (ref 0–1)
BUN SERPL-MCNC: 12 MG/DL — SIGNIFICANT CHANGE UP (ref 10–20)
CALCIUM SERPL-MCNC: 8.8 MG/DL — SIGNIFICANT CHANGE UP (ref 8.4–10.5)
CHLORIDE SERPL-SCNC: 101 MMOL/L — SIGNIFICANT CHANGE UP (ref 98–110)
CHOLEST SERPL-MCNC: 235 MG/DL — HIGH
CO2 SERPL-SCNC: 26 MMOL/L — SIGNIFICANT CHANGE UP (ref 17–32)
CREAT SERPL-MCNC: 0.9 MG/DL — SIGNIFICANT CHANGE UP (ref 0.7–1.5)
EGFR: 74 ML/MIN/1.73M2 — SIGNIFICANT CHANGE UP
EOSINOPHIL # BLD AUTO: 0.25 K/UL — SIGNIFICANT CHANGE UP (ref 0–0.7)
EOSINOPHIL NFR BLD AUTO: 3.9 % — SIGNIFICANT CHANGE UP (ref 0–8)
ESTIMATED AVERAGE GLUCOSE: 151 MG/DL — HIGH (ref 68–114)
GLUCOSE SERPL-MCNC: 101 MG/DL — HIGH (ref 70–99)
HCT VFR BLD CALC: 33.1 % — LOW (ref 37–47)
HDLC SERPL-MCNC: 51 MG/DL — SIGNIFICANT CHANGE UP
HGB BLD-MCNC: 10.5 G/DL — LOW (ref 12–16)
IMM GRANULOCYTES NFR BLD AUTO: 0.2 % — SIGNIFICANT CHANGE UP (ref 0.1–0.3)
INR BLD: 1.04 RATIO — SIGNIFICANT CHANGE UP (ref 0.65–1.3)
LIPID PNL WITH DIRECT LDL SERPL: 149 MG/DL — HIGH
LYMPHOCYTES # BLD AUTO: 1.5 K/UL — SIGNIFICANT CHANGE UP (ref 1.2–3.4)
LYMPHOCYTES # BLD AUTO: 23.7 % — SIGNIFICANT CHANGE UP (ref 20.5–51.1)
MAGNESIUM SERPL-MCNC: 1.8 MG/DL — SIGNIFICANT CHANGE UP (ref 1.8–2.4)
MCHC RBC-ENTMCNC: 25.9 PG — LOW (ref 27–31)
MCHC RBC-ENTMCNC: 31.7 G/DL — LOW (ref 32–37)
MCV RBC AUTO: 81.7 FL — SIGNIFICANT CHANGE UP (ref 81–99)
MONOCYTES # BLD AUTO: 0.44 K/UL — SIGNIFICANT CHANGE UP (ref 0.1–0.6)
MONOCYTES NFR BLD AUTO: 6.9 % — SIGNIFICANT CHANGE UP (ref 1.7–9.3)
NEUTROPHILS # BLD AUTO: 4.11 K/UL — SIGNIFICANT CHANGE UP (ref 1.4–6.5)
NEUTROPHILS NFR BLD AUTO: 64.8 % — SIGNIFICANT CHANGE UP (ref 42.2–75.2)
NON HDL CHOLESTEROL: 184 MG/DL — HIGH
NRBC # BLD: 0 /100 WBCS — SIGNIFICANT CHANGE UP (ref 0–0)
PLATELET # BLD AUTO: 307 K/UL — SIGNIFICANT CHANGE UP (ref 130–400)
POTASSIUM SERPL-MCNC: 4 MMOL/L — SIGNIFICANT CHANGE UP (ref 3.5–5)
POTASSIUM SERPL-SCNC: 4 MMOL/L — SIGNIFICANT CHANGE UP (ref 3.5–5)
PROTHROM AB SERPL-ACNC: 11.9 SEC — SIGNIFICANT CHANGE UP (ref 9.95–12.87)
RBC # BLD: 4.05 M/UL — LOW (ref 4.2–5.4)
RBC # FLD: 16.7 % — HIGH (ref 11.5–14.5)
SODIUM SERPL-SCNC: 139 MMOL/L — SIGNIFICANT CHANGE UP (ref 135–146)
TRIGL SERPL-MCNC: 175 MG/DL — HIGH
TSH SERPL-MCNC: 2.81 UIU/ML — SIGNIFICANT CHANGE UP (ref 0.27–4.2)
WBC # BLD: 6.34 K/UL — SIGNIFICANT CHANGE UP (ref 4.8–10.8)
WBC # FLD AUTO: 6.34 K/UL — SIGNIFICANT CHANGE UP (ref 4.8–10.8)

## 2023-03-30 PROCEDURE — 99233 SBSQ HOSP IP/OBS HIGH 50: CPT

## 2023-03-30 PROCEDURE — 99232 SBSQ HOSP IP/OBS MODERATE 35: CPT

## 2023-03-30 RX ORDER — LOSARTAN POTASSIUM 100 MG/1
25 TABLET, FILM COATED ORAL DAILY
Refills: 0 | Status: DISCONTINUED | OUTPATIENT
Start: 2023-03-30 | End: 2023-04-04

## 2023-03-30 RX ORDER — METRONIDAZOLE 500 MG
500 TABLET ORAL EVERY 8 HOURS
Refills: 0 | Status: COMPLETED | OUTPATIENT
Start: 2023-03-31 | End: 2023-04-10

## 2023-03-30 RX ADMIN — BUDESONIDE AND FORMOTEROL FUMARATE DIHYDRATE 2 PUFF(S): 160; 4.5 AEROSOL RESPIRATORY (INHALATION) at 14:58

## 2023-03-30 RX ADMIN — ZOLPIDEM TARTRATE 5 MILLIGRAM(S): 10 TABLET ORAL at 21:44

## 2023-03-30 RX ADMIN — PANTOPRAZOLE SODIUM 40 MILLIGRAM(S): 20 TABLET, DELAYED RELEASE ORAL at 06:23

## 2023-03-30 RX ADMIN — CEFTRIAXONE 100 MILLIGRAM(S): 500 INJECTION, POWDER, FOR SOLUTION INTRAMUSCULAR; INTRAVENOUS at 06:24

## 2023-03-30 RX ADMIN — ESCITALOPRAM OXALATE 5 MILLIGRAM(S): 10 TABLET, FILM COATED ORAL at 17:28

## 2023-03-30 RX ADMIN — MONTELUKAST 10 MILLIGRAM(S): 4 TABLET, CHEWABLE ORAL at 11:52

## 2023-03-30 RX ADMIN — Medication 81 MILLIGRAM(S): at 11:51

## 2023-03-30 RX ADMIN — Medication 25 MILLIGRAM(S): at 21:45

## 2023-03-30 RX ADMIN — HYDROMORPHONE HYDROCHLORIDE 0.5 MILLIGRAM(S): 2 INJECTION INTRAMUSCULAR; INTRAVENOUS; SUBCUTANEOUS at 07:49

## 2023-03-30 RX ADMIN — CEFTRIAXONE 100 MILLIGRAM(S): 500 INJECTION, POWDER, FOR SOLUTION INTRAMUSCULAR; INTRAVENOUS at 18:58

## 2023-03-30 RX ADMIN — HYDROMORPHONE HYDROCHLORIDE 0.5 MILLIGRAM(S): 2 INJECTION INTRAMUSCULAR; INTRAVENOUS; SUBCUTANEOUS at 18:43

## 2023-03-30 RX ADMIN — CLOPIDOGREL BISULFATE 75 MILLIGRAM(S): 75 TABLET, FILM COATED ORAL at 11:52

## 2023-03-30 RX ADMIN — Medication 125 MICROGRAM(S): at 06:24

## 2023-03-30 RX ADMIN — Medication 1 MILLIGRAM(S): at 21:44

## 2023-03-30 RX ADMIN — Medication 100 MILLIGRAM(S): at 14:12

## 2023-03-30 RX ADMIN — LOSARTAN POTASSIUM 25 MILLIGRAM(S): 100 TABLET, FILM COATED ORAL at 20:56

## 2023-03-30 RX ADMIN — Medication 100 MILLIGRAM(S): at 06:25

## 2023-03-30 RX ADMIN — HYDROMORPHONE HYDROCHLORIDE 0.5 MILLIGRAM(S): 2 INJECTION INTRAMUSCULAR; INTRAVENOUS; SUBCUTANEOUS at 18:59

## 2023-03-30 RX ADMIN — Medication 5 MILLIGRAM(S): at 21:45

## 2023-03-30 RX ADMIN — PANTOPRAZOLE SODIUM 40 MILLIGRAM(S): 20 TABLET, DELAYED RELEASE ORAL at 18:42

## 2023-03-30 RX ADMIN — BUPROPION HYDROCHLORIDE 300 MILLIGRAM(S): 150 TABLET, EXTENDED RELEASE ORAL at 17:28

## 2023-03-30 RX ADMIN — ATORVASTATIN CALCIUM 80 MILLIGRAM(S): 80 TABLET, FILM COATED ORAL at 21:43

## 2023-03-30 RX ADMIN — Medication 100 MILLIGRAM(S): at 06:24

## 2023-03-30 RX ADMIN — TIOTROPIUM BROMIDE 2 PUFF(S): 18 CAPSULE ORAL; RESPIRATORY (INHALATION) at 14:58

## 2023-03-30 RX ADMIN — HEPARIN SODIUM 5000 UNIT(S): 5000 INJECTION INTRAVENOUS; SUBCUTANEOUS at 14:11

## 2023-03-30 RX ADMIN — Medication 1 MILLIGRAM(S): at 11:57

## 2023-03-30 NOTE — PROGRESS NOTE ADULT - ASSESSMENT
#LLQ pain 2/2 diverticulitis  -Pt reports significant LLQ pain, states pain is improving   -Pt states pain was 10/10, now 7/10  -Pt tender to palpation on exam LLQ  -Pt has known diverticular disease, with multiple episodes of diverticulitis in the past  -Pt reports episodes of brown diarrhea for last few days  -Last colonoscopy one year ago, 2 polyps removed, otherwise normal per pt  -no known hx of gastric or colon cancer  - nasopharyngeal cancer in remission s/p chemoradiation 2013  -vitals stable  -WBC 6.34  -CTAP showing acute diverticulitis of short segment of the colon   -hgb 10.5 hct 33.1 mcv 81.7 RDW 16.7    Plan:  -advance to liquid diet  -PPI BID  -pain control prn  -zofran prn  -type and screen  -pt is allergic to ciprofloxacin- IV metronidazole and ceftriaxone  -outpatient f/up with Dr. Pelayo for colonoscopy 6-8 weeks  -if pain persists consider repeat CTAP  -avoid narcotics  -ambulation encouraged #LLQ pain 2/2 diverticulitis  -Pt reports significant LLQ pain, states pain is improving   -Pt states pain was 10/10, now 7/10  -Pt tender to palpation on exam LLQ  -Pt has known diverticular disease, with multiple episodes of diverticulitis in the past  -Pt reports episodes of brown diarrhea for last few days  -Last colonoscopy one year ago, 2 polyps removed, otherwise normal per pt  -no known hx of gastric or colon cancer  - nasopharyngeal cancer in remission s/p chemoradiation 2013  -vitals stable  -WBC 6.34  -CTAP showing acute diverticulitis of short segment of the colon   -hgb 10.5 hct 33.1 mcv 81.7 RDW 16.7    Plan:  -advance to clear liquid diet  -PPI BID  -pain control prn  -zofran prn  -type and screen  -pt is allergic to ciprofloxacin- IV metronidazole and ceftriaxone  -PO abx tomorrow if sx improvement   -outpatient f/up with Dr. Pelayo for colonoscopy 6-8 weeks  -if pain persists consider repeat CTAP  -avoid narcotics  -ambulation encouraged #LLQ pain 2/2 diverticulitis  -Pt reports significant LLQ pain, states pain is improving   -Pt states pain was 10/10, now 7/10  -Pt tender to palpation on exam LLQ  -Pt has known diverticular disease, with multiple episodes of diverticulitis in the past  -Pt reports episodes of brown diarrhea for last few days  -Last colonoscopy one year ago, 2 polyps removed, otherwise normal per pt  -no known hx of gastric or colon cancer  - nasopharyngeal cancer in remission s/p chemoradiation 2013  -vitals stable  -WBC 6.34  -CTAP showing acute diverticulitis of short segment of the colon   -hgb 10.5 hct 33.1 mcv 81.7 RDW 16.7    Plan:  -advance to clear liquid diet and then advance as tolerated  -PPI BID  -pain control prn  -zofran prn  -type and screen  -pt is allergic to ciprofloxacin- IV metronidazole and ceftriaxone  -PO abx tomorrow if sx improvement   -outpatient f/up with Dr. Pelayo for colonoscopy 6-8 weeks  -if pain persists consider repeat CTAP  -avoid narcotics  -ambulation encouraged #LLQ pain 2/2 diverticulitis  -Pt reports significant LLQ pain, states pain is improving   -Pt states pain was 10/10, now 7/10  -Pt tender to palpation on exam LLQ  -Pt has known diverticular disease, with multiple episodes of diverticulitis in the past  -Pt reports episodes of brown diarrhea for last few days  -Last colonoscopy one year ago, 2 polyps removed, otherwise normal per pt  -no known hx of gastric or colon cancer  - nasopharyngeal cancer in remission s/p chemoradiation 2013  -vitals stable  -WBC 6.34  -CTAP showing acute diverticulitis of short segment of the colon   -hgb 10.5 hct 33.1 mcv 81.7 RDW 16.7    Plan:  -advance to clear liquid diet and then advance as tolerated (high fiber)  -PPI BID  -pain control prn  -zofran prn  -type and screen  -pt is allergic to ciprofloxacin- IV metronidazole and ceftriaxone  -PO abx tomorrow if sx improvement   -outpatient f/up with Dr. Pelayo for colonoscopy 6-8 weeks  -if pain recurs/persists consider repeat CTAP  -avoid narcotics  -ambulation encouraged

## 2023-03-30 NOTE — PROGRESS NOTE ADULT - ASSESSMENT
58 y/o woman w PMHx HTN, COPD no home O2, hypothyroidism, CKD w baseline Cr 1.3, diverticulitis, nasopharyngeal CA s/p chemo/RT 2013, MIN and follows w [facility] Onc Dr Arcadio Estrada [phone], h/o depression/anxiety, insomnia, presented to ED 3/27/23 for sudden onset 15min episode of blurry vision in b/l eyes w associated pain behind eyes, w CTA Head/Neck showing mod-severe stenosis of b/l ICA, also reported LLQ pain found on CT Abd/Pelvis to have diverticulitis, admitted for further management of both. Labs in ED notable for WBC 8.15, Cr at baseline 1.3.     While admitted, has been seen by multiple services:   GI: ctx/flagyl, NPO for now   ID: ctx/flagyl IV -> PO in 24-48h  Vascular:   Neuro: MR brain neg, plan for DSA, antiplt as per LAURIE   Neuroendovascular x2405 Dr Blank: tentative dx cerebral angiogram. Poss carotid angioplasty and endovascular stent recanalization Tues 23, and to remain on DAPT meanwhile    # High Grade R ICA Stenosis   # TIA/Blurry Vision   - Patient clinically and hemodynamically stable   - Permissive -180  CT head: No acute intracranial pathology  CTA Head/Neck: sev prox R ICA stenosis, mod prox L ICA stenosis, mod stenosis cavernous segments of b/l ICA, severe narrowing P3 seg R PCA   - MRI brain >> no acute pathology  - Aspirin 325 stat followed by 81 QD, Lipitor 80 QD  - Neurology on board   - plavix 75 QD   - Neuro checks q 6  - Neuoendovascular consulted >> they were planning cerebral angiogram on , but pt needs time to think and discuss with family and her oncologist before making the decision. Pt will inform us later, procedure is on hold for now  - PRU blood test on  was 298 (this was asked neuroendo team, Tara Rodas was informed about the results)  - A1C, TSH, Lipid profile in AM     # Acute Diverticulitis   - S/P Dilaudid 0.5, LR 1L bolus, Zofran 4mg IV, Magnesium 2g, and Zosyn 3.375 IV in ER  - dc meropenem and start Rocephin and Flagyl as per ID  - GI Consulted appreciated  - Pain control Dilaudid 0.5 Q4 PRN    # HTN  - C/w home metop succ 50 QD    # COPD not on home O2  - C/w home meds     # Hypothyroidism  - C/w home levothyroxine     # Depression  # Insomnia  # Anxiety   - C/w home meds     # Right upper lobe 1.2 cm pulmonary nodule, as well as several groundglass opacities.   - Consider follow-up CT of the chest in 3-6 months.    DVT PPX: Hep Subq  GI PPX: Pantoprazole PO  Diet: NPO, LR 75 cc/hr  Activity: IAT  Code: Full  Dispo: Acute     Pendin) pt will make decision about the cerebral angio   2) clinical improvement diverticulitis                                                                                  ----------------------------------------------------  # DVT prophylaxis:   # GI prophylaxis:   # Diet:   # Activity:   # Code status:   # Disposition:                                                                            --------------------------------------------------------    # Handoff:      56 y/o woman w PMHx HTN, COPD no home O2, hypothyroidism, CKD w baseline Cr 1.3, diverticulitis, nasopharyngeal CA s/p chemo/RT 2013, MIN and follows w [facility] Onc Dr Arcadio Estrada [phone], h/o depression/anxiety, insomnia, presented to ED 3/27/23 for sudden onset 15min episode of blurry vision in b/l eyes w associated pain behind eyes, w CTA Head/Neck showing mod-severe stenosis of b/l ICA, also reported LLQ pain found on CT Abd/Pelvis to have diverticulitis, admitted for further management of both. Labs in ED notable for WBC 8.15, Cr at baseline 1.3.     While admitted, has been seen by multiple services:   GI: ctx/flagyl, NPO for now   ID: ctx/flagyl IV -> PO in 24-48h  Vascular:   Neuro: MR brain neg, plan for DSA, antiplt as per LAURIE   Neuroendovascular x2405 Dr Blank: tentative dx cerebral angiogram. Poss carotid angioplasty and endovascular stent recanalization Tues 4/4/23, and to remain on DAPT meanwhile    # High Grade R ICA Stenosis   # TIA/Blurry Vision   - Patient clinically and hemodynamically stable   - Permissive -180  CT head: No acute intracranial pathology  CTA Head/Neck: sev prox R ICA stenosis, mod prox L ICA stenosis, mod stenosis cavernous segments of b/l ICA, severe narrowing P3 seg R PCA   - MRI brain >> no acute pathology  - Aspirin 325 stat followed by 81 QD, Lipitor 80 QD  - Neurology on board   - plavix 75 QD   - Neuro checks q 6  - Neuoendovascular consulted >> they were planning cerebral angiogram on 03/30, but pt needs time to think and discuss with family and her oncologist before making the decision. Pt will inform us later, procedure is on hold for now  - PRU blood test on 03/29 was 298 (this was asked neuroendo team, Tara Rodas was informed about the results)  - A1C, TSH, Lipid profile in AM     # Acute Diverticulitis   - S/P Dilaudid 0.5, LR 1L bolus, Zofran 4mg IV, Magnesium 2g, and Zosyn 3.375 IV in ER  - dc meropenem and start Rocephin and Flagyl as per ID  - GI Consulted appreciated  - Pain control Dilaudid 0.5 Q4 PRN    # HTN  - C/w home metop succ 50 QD    # COPD not on home O2  - C/w home meds     # Hypothyroidism  - C/w home levothyroxine     # Depression  # Insomnia  # Anxiety   - C/w home meds     # Right upper lobe 1.2 cm pulmonary nodule, as well as several groundglass opacities.   - Consider follow-up CT of the chest in 3-6 months.    DVT PPX: Hep Subq  GI PPX: Pantoprazole PO  Diet: NPO, LR 75 cc/hr  Activity: IAT  Code: Full  Dispo: Acute                                                                                   ----------------------------------------------------  # DVT prophylaxis:   # GI prophylaxis:   # Diet:   # Activity:   # Code status:   # Disposition:                                                                            --------------------------------------------------------    # Handoff:   - Angio 4/4   - Diverticulitis improvement, diet    58 y/o woman w PMHx HTN, COPD no home O2, hypothyroidism, CKD w baseline Cr 1.3, diverticulitis, nasopharyngeal CA s/p chemo/RT 2013, MIN and follows w [facility] Onc Dr Arcadio Estrada [phone], h/o depression/anxiety, insomnia, presented to ED 3/27/23 for sudden onset 15min episode of blurry vision in b/l eyes w associated pain behind eyes, w CTA Head/Neck showing mod-severe stenosis of b/l ICA, also reported LLQ pain found on CT Abd/Pelvis to have diverticulitis, admitted for further management of both. Labs in ED notable for WBC 8.15, Cr at baseline 1.3.     Consults:   GI: ctx/flagyl, NPO for now   ID: ctx/flagyl IV -> PO in 24-48h  Vascular: Defer to neuroendovascular recs   Neuro: MR brain neg, plan for DSA, antiplt as per LAURIE   Neuroendovascular x2405 Dr Blank: tentative dx cerebral angiogram, carotid angioplasty and endovascular stent recanalization Tues 4/4/23, and to remain on DAPT meanwhile    # High Grade R ICA Stenosis   # TIA/Blurry Vision   - Patient clinically and hemodynamically stable   - Permissive -180  CT head: No acute intracranial pathology  CTA Head/Neck: sev prox R ICA stenosis, mod prox L ICA stenosis, mod stenosis cavernous segments of b/l ICA, severe narrowing P3 seg R PCA   - MRI brain >> no acute pathology  - Aspirin 325 stat followed by 81 QD, Lipitor 80 QD  - Neurology on board   - plavix 75 QD   - Neuro checks q 6  - Neuroendovascular [id number] Dr Blank: tentative dx cerebral angiogram, carotid angioplasty and endovascular stent recanalization Tues 4/4/23, and to remain on DAPT meanwhile  - PRU blood test on 03/29 was 298 (this was asked neuroendo team, Tara Rodas was informed about the results)  - A1C, TSH, Lipid profile in AM     # Acute Diverticulitis   - S/P Dilaudid 0.5, LR 1L bolus, Zofran 4mg IV, Magnesium 2g, and Zosyn 3.375 IV in ER  - dc meropenem and start Rocephin and Flagyl as per ID  - GI Consulted appreciated  - Pain control Dilaudid 0.5 Q4 PRN    # HTN - C/w home metop succ 50 QD  # COPD not on home O2 - C/w home meds   # Hypothyroidism - C/w home levothyroxine     # Depression  # Insomnia  # Anxiety   - C/w home meds     # Right upper lobe 1.2 cm pulmonary nodule, as well as several groundglass opacities.   - Consider follow-up CT of the chest in 3-6 months.                                                                                ----------------------------------------------------  # DVT prophylaxis: Heparin 5000u SQ q8h  # GI prophylaxis: Pantoprazole 40mg IV q12h  # Diet: Full liquid diet, red dye, +ensure   # Activity: IAT   # Code status: FULL CODE   # Disposition: Angio 4/4                                                                            --------------------------------------------------------    # Handoff:   - Angio 4/4   - Diverticulitis improvement, diet

## 2023-03-30 NOTE — PROGRESS NOTE ADULT - SUBJECTIVE AND OBJECTIVE BOX
GEOVANNA PUTNAM 57y Female  MRN#: [id number]   CODE STATUS:     Admission Date: 03-27-23  Hospital Day: 3d    Pt is currently admitted with the primary diagnosis of     SUBJECTIVE  Hospital Course  56 y/o woman w PMHx HTN, COPD no home O2, hypothyroidism, CKD w baseline Cr 1.3, diverticulitis, nasopharyngeal CA s/p chemo/RT 2013, MIN and follows w [facility] Onc Dr Arcadio Estrada [phone], h/o depression/anxiety, insomnia, presented to ED 3/27/23 for sudden onset 15min episode of blurry vision in b/l eyes w associated pain behind eyes, w CTA Head/Neck showing mod-severe stenosis of b/l ICA, also reported LLQ pain found on CT Abd/Pelvis to have diverticulitis, admitted for further management of both. Labs in ED notable for WBC 8.15, Cr at baseline 1.3.     While admitted, has been seen by multiple services:   GI: ctx/flagyl, NPO for now   ID: ctx/flagyl IV -> PO in 24-48h  Vascular:   Neuro: MR brain neg, plan for DSA, antiplt as per LAURIE   Neuroendovascular [id number] Dr Blank: tentative dx cerebral angiogram. Poss carotid angioplasty and endovascular stent recanalization Tues 4/4/23, and to remain on DAPT meanwhile    CTA Head and Neck 3/27/23:   CTA NECK:  1.  Severe high-grade stenosis of the proximal R ICA caused by noncalcified plaque.  2.  Moderate stenosis of the proximal L ICA caused by calcified plaque.  3.  No evidence of vertebral artery stenosis.  CTA HEAD:  1.  Moderate stenosis of the cavernous segments of the b/l ICA  2.  Severe narrowing of the P3 segment of the R PCA.   3.  Otherwise, no evidence of flow-limiting stenosis, occlusion or aneurysm.    CT Abd/Pelvis 3/27/23:   Stable LLL nodule and stable R adrenal nodule.   PERITONEUM/MESENTERY/BOWEL: Short segment of sigmoid colon mural thickening with mild pericolonic fat stranding in the setting of diverticular disease, compatible with acute diverticulitis. No bowel obstruction, ascites or intraperitoneal free air. Unremarkable appendix.  OTHER: Atherosclerotic calcification.    A neuroendovascular consult was placed for severe high-grade stenosis of the proximal right internal carotid artery, which was identified on CTA. The patient also has moderate left ICA stenosis, right P3, and b/l cavernous ICA stenosis.        Overnight events      Subjective complaints        T(C): 36.4 (03-30-23 @ 05:13)  T(F): 97.6 (03-30-23 @ 05:13)  HR: 84 (03-30-23 @ 05:13)  BP: 156/68 (03-30-23 @ 05:13)  RR: 18 (03-30-23 @ 05:13)  SpO2: 96% (03-30-23 @ 00:49)        Present Today:   - Perez:  No [  ], Yes [  ] : Indication:   - Type of IV Access:       .. CVC/Piccline:  No [  ], Yes [  ] : Indication:       .. Midline: No [  ], Yes [  ] : Indication:                                              OBJECTIVE  PAST MEDICAL & SURGICAL HISTORY  COPD (chronic obstructive pulmonary disease)    HTN (hypertension)    Diverticulitis    Pulmonary nodules/lesions, multiple    Nasopharyngeal cancer    Hypothyroid    Cervical disc disease  sequelae of radtion for nasopharyngeal cancer    Left ear hearing loss    Hemorrhoids    Anxiety    History of cholecystectomy                                                ALLERGIES:  ciprofloxacin (Urticaria; Other)                           HOME MEDICATIONS  Home Medications:  albuterol 90 mcg/inh inhalation aerosol: 2 puff(s) inhaled every 6 hours, As needed, Shortness of Breath and/or Wheezing (28 Mar 2023 13:59)  buPROPion 300 mg/24 hours (XL) oral tablet, extended release: 1 tab(s) orally every 24 hours (28 Mar 2023 13:59)  clonazePAM 1 mg oral tablet: 1 tab(s) orally 2 times a day (28 Mar 2023 13:59)  escitalopram 5 mg oral tablet: 1 tab(s) orally once a day (28 Mar 2023 13:59)  metoprolol succinate 50 mg oral tablet, extended release: 1 tab(s) orally once a day (28 Mar 2023 13:59)  MONTELUKAST SODIUM 10 MG TABS: 1 dose(s) orally once a day (28 Mar 2023 13:59)  Trelegy Ellipta inhalation powder: 1 puff(s) inhaled once a day (28 Mar 2023 13:59)  ZOLPIDEM TARTRATE 10 MG TABS: orally once a day (at bedtime) (28 Mar 2023 13:59)                           MEDICATIONS:  STANDING MEDICATIONS  aspirin  chewable 81 milliGRAM(s) Oral daily  atorvastatin 80 milliGRAM(s) Oral at bedtime  budesonide  80 MICROgram(s)/formoterol 4.5 MICROgram(s) Inhaler 2 Puff(s) Inhalation two times a day  buPROPion XL (24-Hour) . 300 milliGRAM(s) Oral daily  cefTRIAXone   IVPB 2000 milliGRAM(s) IV Intermittent every 12 hours  clopidogrel Tablet 75 milliGRAM(s) Oral daily  escitalopram 5 milliGRAM(s) Oral daily  heparin   Injectable 5000 Unit(s) SubCutaneous every 8 hours  influenza   Vaccine 0.5 milliLiter(s) IntraMuscular once  lactated ringers. 1000 milliLiter(s) IV Continuous <Continuous>  levothyroxine 125 MICROGram(s) Oral daily  metoprolol succinate  milliGRAM(s) Oral daily  metroNIDAZOLE  IVPB 500 milliGRAM(s) IV Intermittent every 8 hours  montelukast 10 milliGRAM(s) Oral daily  pantoprazole  Injectable 40 milliGRAM(s) IV Push every 12 hours  sodium chloride 0.9%. 1000 milliLiter(s) IV Continuous <Continuous>  tiotropium 2.5 MICROgram(s) Inhaler 2 Puff(s) Inhalation daily    PRN MEDICATIONS  acetaminophen     Tablet .. 650 milliGRAM(s) Oral every 6 hours PRN  albuterol    90 MICROgram(s) HFA Inhaler 2 Puff(s) Inhalation every 6 hours PRN  baclofen 5 milliGRAM(s) Oral every 12 hours PRN  clonazePAM  Tablet 1 milliGRAM(s) Oral two times a day PRN  HYDROmorphone  Injectable 0.5 milliGRAM(s) IV Push every 6 hours PRN  hydrOXYzine hydrochloride 25 milliGRAM(s) Oral three times a day PRN  zolpidem 5 milliGRAM(s) Oral at bedtime PRN                                            ------------------------------------------------------------  T(C): 36.4 (03-30-23 @ 05:13), Max: 36.4 (03-30-23 @ 05:13)  T(F): 97.6 (03-30-23 @ 05:13), Max: 97.6 (03-30-23 @ 05:13)  HR: 84 (03-30-23 @ 05:13) (64 - 84)  BP: 156/68 (03-30-23 @ 05:13) (152/72 - 167/87)  RR: 18 (03-30-23 @ 05:13) (18 - 18)  SpO2: 96% (03-30-23 @ 00:49) (91% - 96%)      03-28-23 @ 07:01  -  03-29-23 @ 07:00  --------------------------------------------------------  IN: 75 mL / OUT: 0 mL / NET: 75 mL    03-29-23 @ 07:01  -  03-30-23 @ 06:20  --------------------------------------------------------  IN: 1025 mL / OUT: 0 mL / NET: 1025 mL                                               LABS:                        10.1   5.12  )-----------( 322      ( 29 Mar 2023 07:45 )             33.1     03-29    140  |  102  |  17  ----------------------------<  94  4.2   |  28  |  1.1    Ca    9.0      29 Mar 2023 07:45  Mg     1.8     03-29    TPro  6.3  /  Alb  3.7  /  TBili  0.2  /  DBili  x   /  AST  19  /  ALT  16  /  AlkPhos  82  03-29                Culture - Urine (collected 27 Mar 2023 20:53)  Source: .Urine None  Final Report (29 Mar 2023 17:14):    Normal Urogenital jaden present                    RADIOLOGY:      MR Head 3/28/23:   Unremarkable noncontrast MRI of thebrain.      CTA Head and Neck 3/27/23:   CTA NECK:  1.  Severe high-grade stenosis of the proximal R ICA caused by noncalcified plaque.  2.  Moderate stenosis of the proximal L ICA caused by calcified plaque.  3.  No evidence of vertebral artery stenosis.  CTA HEAD:  1.  Moderate stenosis of the cavernous segments of the b/l ICA  2.  Severe narrowing of the P3 segment of the right posterior cerebral artery.  3.  Otherwise, no evidence of flow-limiting stenosis, occlusion or aneurysm.        CT Abd/Pelvis 3/27/23:   LOWER CHEST: Stable left lower lobe 7 mm solid pulmonary nodule (5/62)   Bilateral basilar subsegmental atelectasis.  HEPATOBILIARY: Post cholecystectomy.  SPLEEN: Unremarkable.  PANCREAS: Unremarkable.  ADRENAL GLANDS: Stable 1.3 cm right adrenal nodule.  KIDNEYS: Symmetric enhancement. No hydronephrosis. Right renal cyst. Excreted contrast within the collecting systems.  ABDOMINOPELVIC NODES: No lymphadenopathy.  PELVIC ORGANS: Unremarkable.  PERITONEUM/MESENTERY/BOWEL: Short segment of sigmoid colon mural thickening with mild pericolonic fat stranding in the setting of diverticular disease, compatible with acute diverticulitis. No bowel obstruction, ascites or intraperitoneal free air. Unremarkable appendix.  BONES/SOFT TISSUES: Degenerative changes of the spine. Stable trace anterolisthesis of L4-5 with lower lumbar facet arthropathy..  OTHER: Atherosclerotic calcification.      CT Head noncon 3/27/23:   No acute intracranial pathology.   GEOVANNA PUTNAM 57y Female  MRN#: [id number]   CODE STATUS: FULL CODE     Admission Date: 03-27-23  Hospital Day: 3d    Pt is currently admitted with the primary diagnosis of blurry vision     SUBJECTIVE  Hospital Course  56 y/o woman w PMHx HTN, COPD no home O2, hypothyroidism, CKD w baseline Cr 1.3, diverticulitis, nasopharyngeal CA s/p chemo/RT 2013, MIN and follows w [facility] Onc Dr Arcadio Estrada [phone], h/o depression/anxiety, insomnia, presented to ED 3/27/23 for sudden onset 15min episode of blurry vision in b/l eyes w associated pain behind eyes, w CTA Head/Neck showing mod-severe stenosis of b/l ICA, also reported LLQ pain found on CT Abd/Pelvis to have diverticulitis, admitted for further management of both. Labs in ED notable for WBC 8.15, Cr at baseline 1.3.     While admitted, has been seen by multiple services:   GI: ctx/flagyl, NPO for now   ID: ctx/flagyl IV -> PO in 24-48h  Vascular: Defer to neuroendovascular recs   Neuro: MR brain neg, plan for DSA, antiplt as per LAURIE   Neuroendovascular [id number] Dr Blank: tentative dx cerebral angiogram. Poss carotid angioplasty and endovascular stent recanalization Tues 4/4/23, and to remain on DAPT meanwhile    CTA Head and Neck 3/27/23:   CTA NECK:  1.  Severe high-grade stenosis of the proximal R ICA caused by noncalcified plaque.  2.  Moderate stenosis of the proximal L ICA caused by calcified plaque.  3.  No evidence of vertebral artery stenosis.  CTA HEAD:  1.  Moderate stenosis of the cavernous segments of the b/l ICA  2.  Severe narrowing of the P3 segment of the R PCA.   3.  Otherwise, no evidence of flow-limiting stenosis, occlusion or aneurysm.    CT Abd/Pelvis 3/27/23:   Stable LLL nodule and stable R adrenal nodule.   PERITONEUM/MESENTERY/BOWEL: Short segment of sigmoid colon mural thickening with mild pericolonic fat stranding in the setting of diverticular disease, compatible with acute diverticulitis. No bowel obstruction, ascites or intraperitoneal free air. Unremarkable appendix.  OTHER: Atherosclerotic calcification.    A neuroendovascular consult was placed for severe high-grade stenosis of the proximal right internal carotid artery, which was identified on CTA. The patient also has moderate left ICA stenosis, right P3, and b/l cavernous ICA stenosis.        Overnight events  None    Subjective complaints  Did not have a good night's sleep due to being awoken multiple times. Continued mild abd pain, but is very hungry.       T(C): 36.4 (03-30-23 @ 05:13)  T(F): 97.6 (03-30-23 @ 05:13)  HR: 84 (03-30-23 @ 05:13)  BP: 156/68 (03-30-23 @ 05:13)  RR: 18 (03-30-23 @ 05:13)  SpO2: 96% (03-30-23 @ 00:49)      PHYSICAL EXAM:  GENERAL: NAD, sleeping in bed comfortably, awakens to light touch   HEAD:  Atraumatic, Normocephalic  EYES: EOMI, sclera clear  ENT: Moist mucous membranes  NECK: Supple, trachea midline  CHEST/LUNG: Clear to auscultation anteriorly bilaterally; No rales, rhonchi, wheezing, or rubs. Unlabored respirations  HEART: Regular rate and rhythm; No appreciable murmurs, rubs, or gallops  ABDOMEN: (+)BS; Soft, nondistended. Mild tenderness w palpation at LLQ   EXTREMITIES:  2+ Radial Pulses, brisk capillary refill. No clubbing, cyanosis, or edema  NERVOUS SYSTEM:  A&Ox3, no noted facial droop. Moving all extremities w/o difficulty.   SKIN: No rashes or lesions to b/l forearm, face.       Present Today:   - Perez:  No [  ], Yes [  ] : Indication:   - Type of IV Access:       .. CVC/Piccline:  No [  ], Yes [  ] : Indication:       .. Midline: No [  ], Yes [  ] : Indication:                                              OBJECTIVE  PAST MEDICAL & SURGICAL HISTORY  COPD (chronic obstructive pulmonary disease)    HTN (hypertension)    Diverticulitis    Pulmonary nodules/lesions, multiple    Nasopharyngeal cancer    Hypothyroid    Cervical disc disease  sequelae of radtion for nasopharyngeal cancer    Left ear hearing loss    Hemorrhoids    Anxiety    History of cholecystectomy                                                ALLERGIES:  ciprofloxacin (Urticaria; Other)                           HOME MEDICATIONS  Home Medications:  albuterol 90 mcg/inh inhalation aerosol: 2 puff(s) inhaled every 6 hours, As needed, Shortness of Breath and/or Wheezing (28 Mar 2023 13:59)  buPROPion 300 mg/24 hours (XL) oral tablet, extended release: 1 tab(s) orally every 24 hours (28 Mar 2023 13:59)  clonazePAM 1 mg oral tablet: 1 tab(s) orally 2 times a day (28 Mar 2023 13:59)  escitalopram 5 mg oral tablet: 1 tab(s) orally once a day (28 Mar 2023 13:59)  metoprolol succinate 50 mg oral tablet, extended release: 1 tab(s) orally once a day (28 Mar 2023 13:59)  MONTELUKAST SODIUM 10 MG TABS: 1 dose(s) orally once a day (28 Mar 2023 13:59)  Trelegy Ellipta inhalation powder: 1 puff(s) inhaled once a day (28 Mar 2023 13:59)  ZOLPIDEM TARTRATE 10 MG TABS: orally once a day (at bedtime) (28 Mar 2023 13:59)                           MEDICATIONS:  STANDING MEDICATIONS  aspirin  chewable 81 milliGRAM(s) Oral daily  atorvastatin 80 milliGRAM(s) Oral at bedtime  budesonide  80 MICROgram(s)/formoterol 4.5 MICROgram(s) Inhaler 2 Puff(s) Inhalation two times a day  buPROPion XL (24-Hour) . 300 milliGRAM(s) Oral daily  cefTRIAXone   IVPB 2000 milliGRAM(s) IV Intermittent every 12 hours  clopidogrel Tablet 75 milliGRAM(s) Oral daily  escitalopram 5 milliGRAM(s) Oral daily  heparin   Injectable 5000 Unit(s) SubCutaneous every 8 hours  influenza   Vaccine 0.5 milliLiter(s) IntraMuscular once  lactated ringers. 1000 milliLiter(s) IV Continuous <Continuous>  levothyroxine 125 MICROGram(s) Oral daily  metoprolol succinate  milliGRAM(s) Oral daily  metroNIDAZOLE  IVPB 500 milliGRAM(s) IV Intermittent every 8 hours  montelukast 10 milliGRAM(s) Oral daily  pantoprazole  Injectable 40 milliGRAM(s) IV Push every 12 hours  sodium chloride 0.9%. 1000 milliLiter(s) IV Continuous <Continuous>  tiotropium 2.5 MICROgram(s) Inhaler 2 Puff(s) Inhalation daily    PRN MEDICATIONS  acetaminophen     Tablet .. 650 milliGRAM(s) Oral every 6 hours PRN  albuterol    90 MICROgram(s) HFA Inhaler 2 Puff(s) Inhalation every 6 hours PRN  baclofen 5 milliGRAM(s) Oral every 12 hours PRN  clonazePAM  Tablet 1 milliGRAM(s) Oral two times a day PRN  HYDROmorphone  Injectable 0.5 milliGRAM(s) IV Push every 6 hours PRN  hydrOXYzine hydrochloride 25 milliGRAM(s) Oral three times a day PRN  zolpidem 5 milliGRAM(s) Oral at bedtime PRN                                            ------------------------------------------------------------  T(C): 36.4 (03-30-23 @ 05:13), Max: 36.4 (03-30-23 @ 05:13)  T(F): 97.6 (03-30-23 @ 05:13), Max: 97.6 (03-30-23 @ 05:13)  HR: 84 (03-30-23 @ 05:13) (64 - 84)  BP: 156/68 (03-30-23 @ 05:13) (152/72 - 167/87)  RR: 18 (03-30-23 @ 05:13) (18 - 18)  SpO2: 96% (03-30-23 @ 00:49) (91% - 96%)      03-28-23 @ 07:01  -  03-29-23 @ 07:00  --------------------------------------------------------  IN: 75 mL / OUT: 0 mL / NET: 75 mL    03-29-23 @ 07:01  -  03-30-23 @ 06:20  --------------------------------------------------------  IN: 1025 mL / OUT: 0 mL / NET: 1025 mL                                               LABS:                        10.1   5.12  )-----------( 322      ( 29 Mar 2023 07:45 )             33.1     03-29    140  |  102  |  17  ----------------------------<  94  4.2   |  28  |  1.1    Ca    9.0      29 Mar 2023 07:45  Mg     1.8     03-29    TPro  6.3  /  Alb  3.7  /  TBili  0.2  /  DBili  x   /  AST  19  /  ALT  16  /  AlkPhos  82  03-29                Culture - Urine (collected 27 Mar 2023 20:53)  Source: .Urine None  Final Report (29 Mar 2023 17:14):    Normal Urogenital jaden present                    RADIOLOGY:      MR Head 3/28/23:   Unremarkable noncontrast MRI of thebrain.      CTA Head and Neck 3/27/23:   CTA NECK:  1.  Severe high-grade stenosis of the proximal R ICA caused by noncalcified plaque.  2.  Moderate stenosis of the proximal L ICA caused by calcified plaque.  3.  No evidence of vertebral artery stenosis.  CTA HEAD:  1.  Moderate stenosis of the cavernous segments of the b/l ICA  2.  Severe narrowing of the P3 segment of the right posterior cerebral artery.  3.  Otherwise, no evidence of flow-limiting stenosis, occlusion or aneurysm.        CT Abd/Pelvis 3/27/23:   LOWER CHEST: Stable left lower lobe 7 mm solid pulmonary nodule (5/62)   Bilateral basilar subsegmental atelectasis.  HEPATOBILIARY: Post cholecystectomy.  SPLEEN: Unremarkable.  PANCREAS: Unremarkable.  ADRENAL GLANDS: Stable 1.3 cm right adrenal nodule.  KIDNEYS: Symmetric enhancement. No hydronephrosis. Right renal cyst. Excreted contrast within the collecting systems.  ABDOMINOPELVIC NODES: No lymphadenopathy.  PELVIC ORGANS: Unremarkable.  PERITONEUM/MESENTERY/BOWEL: Short segment of sigmoid colon mural thickening with mild pericolonic fat stranding in the setting of diverticular disease, compatible with acute diverticulitis. No bowel obstruction, ascites or intraperitoneal free air. Unremarkable appendix.  BONES/SOFT TISSUES: Degenerative changes of the spine. Stable trace anterolisthesis of L4-5 with lower lumbar facet arthropathy..  OTHER: Atherosclerotic calcification.      CT Head noncon 3/27/23:   No acute intracranial pathology.

## 2023-03-30 NOTE — PROGRESS NOTE ADULT - SUBJECTIVE AND OBJECTIVE BOX
Gastroenterology progress note:     Patient is a 57y old  Female who presents with a chief complaint of B/L Blurry Vision (30 Mar 2023 06:20)       Admitted on: 03-27-23    We are following the patient for: diverticulitis     Interval History:  Patient states that today the pain is improving. Yesterday pain was 10/10, today would say pain is 7/10. Pt denies fever or chills. Pt denies vomiting. States had some nausea earlier this morning. Pt states that has been having orange tinged diarrhea. Pt states did not sleep well over night due to other medical news regarding right ICA stenosis. Pt states she would like to try advancing diet.    Patient's medical problems are improving   Prior records reviewed (Y/N): Y  History obtained from someone other than patient (Y/N): N      PAST MEDICAL & SURGICAL HISTORY:  COPD (chronic obstructive pulmonary disease)      HTN (hypertension)      Diverticulitis      Pulmonary nodules/lesions, multiple      Nasopharyngeal cancer      Hypothyroid      Cervical disc disease  sequelae of radtion for nasopharyngeal cancer      Left ear hearing loss      Hemorrhoids      Anxiety      History of cholecystectomy          MEDICATIONS  (STANDING):  aspirin  chewable 81 milliGRAM(s) Oral daily  atorvastatin 80 milliGRAM(s) Oral at bedtime  budesonide  80 MICROgram(s)/formoterol 4.5 MICROgram(s) Inhaler 2 Puff(s) Inhalation two times a day  buPROPion XL (24-Hour) . 300 milliGRAM(s) Oral daily  cefTRIAXone   IVPB 2000 milliGRAM(s) IV Intermittent every 12 hours  clopidogrel Tablet 75 milliGRAM(s) Oral daily  escitalopram 5 milliGRAM(s) Oral daily  heparin   Injectable 5000 Unit(s) SubCutaneous every 8 hours  influenza   Vaccine 0.5 milliLiter(s) IntraMuscular once  lactated ringers. 1000 milliLiter(s) (75 mL/Hr) IV Continuous <Continuous>  levothyroxine 125 MICROGram(s) Oral daily  metoprolol succinate  milliGRAM(s) Oral daily  metroNIDAZOLE  IVPB 500 milliGRAM(s) IV Intermittent every 8 hours  montelukast 10 milliGRAM(s) Oral daily  pantoprazole  Injectable 40 milliGRAM(s) IV Push every 12 hours  sodium chloride 0.9%. 1000 milliLiter(s) (70 mL/Hr) IV Continuous <Continuous>  tiotropium 2.5 MICROgram(s) Inhaler 2 Puff(s) Inhalation daily    MEDICATIONS  (PRN):  acetaminophen     Tablet .. 650 milliGRAM(s) Oral every 6 hours PRN Moderate Pain (4 - 6)  albuterol    90 MICROgram(s) HFA Inhaler 2 Puff(s) Inhalation every 6 hours PRN Shortness of Breath and/or Wheezing  baclofen 5 milliGRAM(s) Oral every 12 hours PRN Musculoskeletal Pain  clonazePAM  Tablet 1 milliGRAM(s) Oral two times a day PRN anxiety  HYDROmorphone  Injectable 0.5 milliGRAM(s) IV Push every 6 hours PRN Severe Pain (7 - 10)  hydrOXYzine hydrochloride 25 milliGRAM(s) Oral three times a day PRN Anxiety  zolpidem 5 milliGRAM(s) Oral at bedtime PRN Insomnia      Allergies  ciprofloxacin (Urticaria; Other)      Review of Systems:   Cardiovascular:  No Chest Pain, No Palpitations  Respiratory:  No Cough, No Dyspnea  Gastrointestinal:  As described in HPI    Physical Examination:  T(C): 36.4 (03-30-23 @ 05:13), Max: 36.4 (03-30-23 @ 05:13)  HR: 84 (03-30-23 @ 05:13) (64 - 84)  BP: 156/68 (03-30-23 @ 05:13) (156/68 - 167/87)  RR: 18 (03-30-23 @ 05:13) (18 - 18)  SpO2: 96% (03-30-23 @ 00:49) (94% - 96%)      03-29-23 @ 07:01  -  03-30-23 @ 07:00  --------------------------------------------------------  IN: 1175 mL / OUT: 400 mL / NET: 775 mL      Constitutional: No acute distress.  Respiratory:  No signs of respiratory distress. Lung sounds are clear bilaterally.  Cardiovascular:  S1 S2, Regular rate and rhythm.  Abdominal: Abdomen is soft, symmetric, without distention. Tender to palpation. There are no visible lesions. Bowel sounds are present and normoactive in all four quadrants. No masses, hepatomegaly, or splenomegaly are noted.   Skin: No rashes, No Jaundice.        Data: (reviewed by attending)                        10.5   6.34  )-----------( 307      ( 30 Mar 2023 06:33 )             33.1     Hgb trend:  10.5  03-30-23 @ 06:33  10.1  03-29-23 @ 07:45  10.0  03-28-23 @ 12:12  10.7  03-27-23 @ 20:56        03-30    139  |  101  |  12  ----------------------------<  101<H>  4.0   |  26  |  0.9    Ca    8.8      30 Mar 2023 06:33  Mg     1.8     03-30    TPro  6.3  /  Alb  3.7  /  TBili  0.2  /  DBili  x   /  AST  19  /  ALT  16  /  AlkPhos  82  03-29    Liver panel trend:  TBili 0.2   /   AST 19   /   ALT 16   /   AlkP 82   /   Tptn 6.3   /   Alb 3.7    /   DBili --      03-29  TBili <0.2   /   AST 17   /   ALT 17   /   AlkP 81   /   Tptn 5.9   /   Alb 3.5    /   DBili --      03-28  TBili <0.2   /   AST 19   /   ALT 16   /   AlkP 88   /   Tptn 6.7   /   Alb 3.9    /   DBili --      03-27      PT/INR - ( 30 Mar 2023 06:33 )   PT: 11.90 sec;   INR: 1.04 ratio         PTT - ( 30 Mar 2023 06:33 )  PTT:31.5 sec    Culture - Urine (collected 27 Mar 2023 20:53)  Source: .Urine None  Final Report (29 Mar 2023 17:14):    Normal Urogenital jaden present         Radiology: (reviewed by attending)       Gastroenterology progress note:     Patient is a 57y old  Female who presents with a chief complaint of B/L Blurry Vision (30 Mar 2023 06:20)       Admitted on: 03-27-23    We are following the patient for: diverticulitis     Interval History:  Patient states that today the pain is improving. Yesterday pain was 10/10, today would say pain is 7/10. Pt denies fever or chills. Pt denies vomiting. States had some nausea earlier this morning. Pt states that has been having orange tinged diarrhea. Pt states did not sleep well over night due to other medical news regarding right ICA stenosis. Pt states she would like to try advancing diet.    Patient's medical problems are improving   Prior records reviewed (Y/N): Y  History obtained from someone other than patient (Y/N): N      PAST MEDICAL & SURGICAL HISTORY:  COPD (chronic obstructive pulmonary disease)      HTN (hypertension)      Diverticulitis      Pulmonary nodules/lesions, multiple      Nasopharyngeal cancer      Hypothyroid      Cervical disc disease  sequelae of radtion for nasopharyngeal cancer      Left ear hearing loss      Hemorrhoids      Anxiety      History of cholecystectomy          MEDICATIONS  (STANDING):  aspirin  chewable 81 milliGRAM(s) Oral daily  atorvastatin 80 milliGRAM(s) Oral at bedtime  budesonide  80 MICROgram(s)/formoterol 4.5 MICROgram(s) Inhaler 2 Puff(s) Inhalation two times a day  buPROPion XL (24-Hour) . 300 milliGRAM(s) Oral daily  cefTRIAXone   IVPB 2000 milliGRAM(s) IV Intermittent every 12 hours  clopidogrel Tablet 75 milliGRAM(s) Oral daily  escitalopram 5 milliGRAM(s) Oral daily  heparin   Injectable 5000 Unit(s) SubCutaneous every 8 hours  influenza   Vaccine 0.5 milliLiter(s) IntraMuscular once  lactated ringers. 1000 milliLiter(s) (75 mL/Hr) IV Continuous <Continuous>  levothyroxine 125 MICROGram(s) Oral daily  metoprolol succinate  milliGRAM(s) Oral daily  metroNIDAZOLE  IVPB 500 milliGRAM(s) IV Intermittent every 8 hours  montelukast 10 milliGRAM(s) Oral daily  pantoprazole  Injectable 40 milliGRAM(s) IV Push every 12 hours  sodium chloride 0.9%. 1000 milliLiter(s) (70 mL/Hr) IV Continuous <Continuous>  tiotropium 2.5 MICROgram(s) Inhaler 2 Puff(s) Inhalation daily    MEDICATIONS  (PRN):  acetaminophen     Tablet .. 650 milliGRAM(s) Oral every 6 hours PRN Moderate Pain (4 - 6)  albuterol    90 MICROgram(s) HFA Inhaler 2 Puff(s) Inhalation every 6 hours PRN Shortness of Breath and/or Wheezing  baclofen 5 milliGRAM(s) Oral every 12 hours PRN Musculoskeletal Pain  clonazePAM  Tablet 1 milliGRAM(s) Oral two times a day PRN anxiety  HYDROmorphone  Injectable 0.5 milliGRAM(s) IV Push every 6 hours PRN Severe Pain (7 - 10)  hydrOXYzine hydrochloride 25 milliGRAM(s) Oral three times a day PRN Anxiety  zolpidem 5 milliGRAM(s) Oral at bedtime PRN Insomnia      Allergies  ciprofloxacin (Urticaria; Other)      Review of Systems:   Cardiovascular:  No Chest Pain, No Palpitations  Respiratory:  No Cough, No Dyspnea  Gastrointestinal:  As described in HPI    Physical Examination:  T(C): 36.4 (03-30-23 @ 05:13), Max: 36.4 (03-30-23 @ 05:13)  HR: 84 (03-30-23 @ 05:13) (64 - 84)  BP: 156/68 (03-30-23 @ 05:13) (156/68 - 167/87)  RR: 18 (03-30-23 @ 05:13) (18 - 18)  SpO2: 96% (03-30-23 @ 00:49) (94% - 96%)      03-29-23 @ 07:01  -  03-30-23 @ 07:00  --------------------------------------------------------  IN: 1175 mL / OUT: 400 mL / NET: 775 mL      Constitutional: No acute distress.  Respiratory:  No signs of respiratory distress. Lung sounds are clear bilaterally.  Cardiovascular:  S1 S2, Regular rate and rhythm.  Abdominal: Abdomen is soft, symmetric, without distention. Mildly tender to palpation at LLQ. There are no visible lesions. Bowel sounds are present and normoactive in all four quadrants. No masses, hepatomegaly, or splenomegaly are noted.   Skin: No rashes, No Jaundice.        Data: (reviewed by attending)                        10.5   6.34  )-----------( 307      ( 30 Mar 2023 06:33 )             33.1     Hgb trend:  10.5  03-30-23 @ 06:33  10.1  03-29-23 @ 07:45  10.0  03-28-23 @ 12:12  10.7  03-27-23 @ 20:56        03-30    139  |  101  |  12  ----------------------------<  101<H>  4.0   |  26  |  0.9    Ca    8.8      30 Mar 2023 06:33  Mg     1.8     03-30    TPro  6.3  /  Alb  3.7  /  TBili  0.2  /  DBili  x   /  AST  19  /  ALT  16  /  AlkPhos  82  03-29    Liver panel trend:  TBili 0.2   /   AST 19   /   ALT 16   /   AlkP 82   /   Tptn 6.3   /   Alb 3.7    /   DBili --      03-29  TBili <0.2   /   AST 17   /   ALT 17   /   AlkP 81   /   Tptn 5.9   /   Alb 3.5    /   DBili --      03-28  TBili <0.2   /   AST 19   /   ALT 16   /   AlkP 88   /   Tptn 6.7   /   Alb 3.9    /   DBili --      03-27      PT/INR - ( 30 Mar 2023 06:33 )   PT: 11.90 sec;   INR: 1.04 ratio         PTT - ( 30 Mar 2023 06:33 )  PTT:31.5 sec    Culture - Urine (collected 27 Mar 2023 20:53)  Source: .Urine None  Final Report (29 Mar 2023 17:14):    Normal Urogenital jaden present         Radiology: (reviewed by attending)

## 2023-03-30 NOTE — PROGRESS NOTE ADULT - SUBJECTIVE AND OBJECTIVE BOX
GEOVANNA PUTNAM  57y  Female      Patient is a 57y old  Female who presents with a chief complaint of B/L Blurry Vision (30 Mar 2023 11:40)      INTERVAL HPI/OVERNIGHT EVENTS:  Still with mild LLQ abdominal pain, no fever, no new blurry vision or weakness.   Vital Signs Last 24 Hrs  T(C): 37.1 (30 Mar 2023 14:12), Max: 37.1 (30 Mar 2023 14:12)  T(F): 98.7 (30 Mar 2023 14:12), Max: 98.7 (30 Mar 2023 14:12)  HR: 69 (30 Mar 2023 14:12) (69 - 84)  BP: 125/83 (30 Mar 2023 14:12) (125/83 - 167/87)  BP(mean): --  RR: 18 (30 Mar 2023 14:12) (18 - 18)  SpO2: 96% (30 Mar 2023 00:49) (95% - 96%)    Parameters below as of 30 Mar 2023 00:49  Patient On (Oxygen Delivery Method): room air          03-29-23 @ 07:01  -  03-30-23 @ 07:00  --------------------------------------------------------  IN: 1175 mL / OUT: 400 mL / NET: 775 mL    03-30-23 @ 07:01  -  03-30-23 @ 17:02  --------------------------------------------------------  IN: 600 mL / OUT: 1200 mL / NET: -600 mL            Consultant(s) Notes Reviewed:  [x ] YES  [ ] NO          MEDICATIONS  (STANDING):  aspirin  chewable 81 milliGRAM(s) Oral daily  atorvastatin 80 milliGRAM(s) Oral at bedtime  budesonide  80 MICROgram(s)/formoterol 4.5 MICROgram(s) Inhaler 2 Puff(s) Inhalation two times a day  buPROPion XL (24-Hour) . 300 milliGRAM(s) Oral daily  cefTRIAXone   IVPB 2000 milliGRAM(s) IV Intermittent every 12 hours  clopidogrel Tablet 75 milliGRAM(s) Oral daily  escitalopram 5 milliGRAM(s) Oral daily  heparin   Injectable 5000 Unit(s) SubCutaneous every 8 hours  influenza   Vaccine 0.5 milliLiter(s) IntraMuscular once  lactated ringers. 1000 milliLiter(s) (75 mL/Hr) IV Continuous <Continuous>  levothyroxine 125 MICROGram(s) Oral daily  metoprolol succinate  milliGRAM(s) Oral daily  metroNIDAZOLE  IVPB 500 milliGRAM(s) IV Intermittent every 8 hours  montelukast 10 milliGRAM(s) Oral daily  pantoprazole  Injectable 40 milliGRAM(s) IV Push every 12 hours  sodium chloride 0.9%. 1000 milliLiter(s) (70 mL/Hr) IV Continuous <Continuous>  tiotropium 2.5 MICROgram(s) Inhaler 2 Puff(s) Inhalation daily    MEDICATIONS  (PRN):  acetaminophen     Tablet .. 650 milliGRAM(s) Oral every 6 hours PRN Moderate Pain (4 - 6)  albuterol    90 MICROgram(s) HFA Inhaler 2 Puff(s) Inhalation every 6 hours PRN Shortness of Breath and/or Wheezing  baclofen 5 milliGRAM(s) Oral every 12 hours PRN Musculoskeletal Pain  clonazePAM  Tablet 1 milliGRAM(s) Oral two times a day PRN anxiety  HYDROmorphone  Injectable 0.5 milliGRAM(s) IV Push every 6 hours PRN Severe Pain (7 - 10)  hydrOXYzine hydrochloride 25 milliGRAM(s) Oral three times a day PRN Anxiety  zolpidem 5 milliGRAM(s) Oral at bedtime PRN Insomnia      LABS                          10.5   6.34  )-----------( 307      ( 30 Mar 2023 06:33 )             33.1     03-30    139  |  101  |  12  ----------------------------<  101<H>  4.0   |  26  |  0.9    Ca    8.8      30 Mar 2023 06:33  Mg     1.8     03-30    TPro  6.3  /  Alb  3.7  /  TBili  0.2  /  DBili  x   /  AST  19  /  ALT  16  /  AlkPhos  82  03-29        PT/INR - ( 30 Mar 2023 06:33 )   PT: 11.90 sec;   INR: 1.04 ratio         PTT - ( 30 Mar 2023 06:33 )  PTT:31.5 sec  Lactate Trend        CAPILLARY BLOOD GLUCOSE          Culture - Urine (collected 03-27-23 @ 20:53)  Source: .Urine None  Final Report (03-29-23 @ 17:14):    Normal Urogenital jaden present        RADIOLOGY & ADDITIONAL TESTS:    Imaging Personally Reviewed:  [ ] YES  [ ] NO    HEALTH ISSUES - PROBLEM Dx:          PHYSICAL EXAM:  GENERAL: NAD, well-developed.  HEAD:  Atraumatic, Normocephalic.  EYES: EOMI, PERRLA, conjunctiva and sclera clear.  NECK: Supple, No JVD.  CHEST/LUNG: Clear to auscultation bilaterally; No wheeze.  HEART: Regular rate and rhythm; S1 S2.   ABDOMEN: Soft, Nontender, Nondistended; mild left lower quadrant tenderness.   EXTREMITIES:  2+ Peripheral Pulses, No clubbing, cyanosis, or edema.  PSYCH: AAOx3.  NEUROLOGY: non-focal.  SKIN: No rashes or lesions.

## 2023-03-30 NOTE — PROGRESS NOTE ADULT - ASSESSMENT
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 Episode happened while patient was driving and she continued to drive normally. She reports associated pain behind the eyes. Resolved and did not happen again. In the ER patient also complaining of LLQ pain worse when attempting to pass gas.     A/P;   Transient Blurry vision: Suspect TIA.   Carotid Artery Stenosis:   Patient presented with transient blurry vision lasted for 15 minutes, no other weakness.   Neuro exam is non-focal.   Severe high-grade stenosis of the proximal right internal carotid artery,  Moderate left ICA stenosis, right P3, and b/l cavernous ICA stenosis.  Brain MRI 3/28 was Unremarkable, no acute infarct.   CTA Neck 3/27 Severe high-grade stenosis of the proximal right ICA. Moderate stenosis of the proximal left ICA.   CTA HEAD:  Moderate stenosis of the cavernous segments of the bilateral internal . carotid arteries. Severe narrowing of the P3 segment of the right posterior cerebral artery.  Neuroendovascular plan for diagnostic cerebral angiogram 4/4  Continue ASA, Plavix and Lipitor.     Acute sigmoid diverticulitis   CT abd: Acute diverticulitis of a short segment of the sigmoid colon. No abscess or perforation. Further evaluation with nonemergent colonoscopy may be of benefit when patient is clinically able.  Continue Rocephin 2 gm iv q12h, flagyl 500 mg iv q8h, in 24-48h change to po Ciprofloxacin 750 mg q12h and po falgyl 500 mg q8h till 4/10  On clear diet, advance diet as tolerated.   GI recommended colonoscopy outpatient after 6-8 weeks.     Hypertension: metoprolol  Hypothyroidism: synthroid  COPD- stable, Continue Budesonide, Spiriva and Montelukast.      Depression: Continue Lexapro and Clonazepam.     Right upper lobe 1.2 cm pulmonary nodule, as well as several groundglass opacities.   Follow-up CT of the chest in 3-6 months.    Morbidly obese  BMI: 50.6    # DVT prophylaxis    Full Code.   #Progress Note Handoff:  Pending (specify): diagnostic cerebral angiogram on 4/4.   Family discussion:  Disposition: Home.

## 2023-03-31 LAB
ANION GAP SERPL CALC-SCNC: 10 MMOL/L — SIGNIFICANT CHANGE UP (ref 7–14)
BASOPHILS # BLD AUTO: 0.04 K/UL — SIGNIFICANT CHANGE UP (ref 0–0.2)
BASOPHILS NFR BLD AUTO: 0.6 % — SIGNIFICANT CHANGE UP (ref 0–1)
BUN SERPL-MCNC: 11 MG/DL — SIGNIFICANT CHANGE UP (ref 10–20)
CALCIUM SERPL-MCNC: 8.8 MG/DL — SIGNIFICANT CHANGE UP (ref 8.4–10.5)
CHLORIDE SERPL-SCNC: 105 MMOL/L — SIGNIFICANT CHANGE UP (ref 98–110)
CO2 SERPL-SCNC: 26 MMOL/L — SIGNIFICANT CHANGE UP (ref 17–32)
CREAT SERPL-MCNC: 1 MG/DL — SIGNIFICANT CHANGE UP (ref 0.7–1.5)
EGFR: 65 ML/MIN/1.73M2 — SIGNIFICANT CHANGE UP
EOSINOPHIL # BLD AUTO: 0.27 K/UL — SIGNIFICANT CHANGE UP (ref 0–0.7)
EOSINOPHIL NFR BLD AUTO: 4.3 % — SIGNIFICANT CHANGE UP (ref 0–8)
GLUCOSE SERPL-MCNC: 94 MG/DL — SIGNIFICANT CHANGE UP (ref 70–99)
HCT VFR BLD CALC: 32.3 % — LOW (ref 37–47)
HGB BLD-MCNC: 10 G/DL — LOW (ref 12–16)
IMM GRANULOCYTES NFR BLD AUTO: 0.3 % — SIGNIFICANT CHANGE UP (ref 0.1–0.3)
LYMPHOCYTES # BLD AUTO: 1.62 K/UL — SIGNIFICANT CHANGE UP (ref 1.2–3.4)
LYMPHOCYTES # BLD AUTO: 25.6 % — SIGNIFICANT CHANGE UP (ref 20.5–51.1)
MAGNESIUM SERPL-MCNC: 1.8 MG/DL — SIGNIFICANT CHANGE UP (ref 1.8–2.4)
MCHC RBC-ENTMCNC: 25.5 PG — LOW (ref 27–31)
MCHC RBC-ENTMCNC: 31 G/DL — LOW (ref 32–37)
MCV RBC AUTO: 82.4 FL — SIGNIFICANT CHANGE UP (ref 81–99)
MONOCYTES # BLD AUTO: 0.48 K/UL — SIGNIFICANT CHANGE UP (ref 0.1–0.6)
MONOCYTES NFR BLD AUTO: 7.6 % — SIGNIFICANT CHANGE UP (ref 1.7–9.3)
NEUTROPHILS # BLD AUTO: 3.91 K/UL — SIGNIFICANT CHANGE UP (ref 1.4–6.5)
NEUTROPHILS NFR BLD AUTO: 61.6 % — SIGNIFICANT CHANGE UP (ref 42.2–75.2)
NRBC # BLD: 0 /100 WBCS — SIGNIFICANT CHANGE UP (ref 0–0)
PLATELET # BLD AUTO: 302 K/UL — SIGNIFICANT CHANGE UP (ref 130–400)
POTASSIUM SERPL-MCNC: 3.7 MMOL/L — SIGNIFICANT CHANGE UP (ref 3.5–5)
POTASSIUM SERPL-SCNC: 3.7 MMOL/L — SIGNIFICANT CHANGE UP (ref 3.5–5)
RBC # BLD: 3.92 M/UL — LOW (ref 4.2–5.4)
RBC # FLD: 16.9 % — HIGH (ref 11.5–14.5)
SODIUM SERPL-SCNC: 141 MMOL/L — SIGNIFICANT CHANGE UP (ref 135–146)
WBC # BLD: 6.34 K/UL — SIGNIFICANT CHANGE UP (ref 4.8–10.8)
WBC # FLD AUTO: 6.34 K/UL — SIGNIFICANT CHANGE UP (ref 4.8–10.8)

## 2023-03-31 PROCEDURE — 99232 SBSQ HOSP IP/OBS MODERATE 35: CPT

## 2023-03-31 PROCEDURE — 99233 SBSQ HOSP IP/OBS HIGH 50: CPT

## 2023-03-31 RX ORDER — CEFPODOXIME PROXETIL 100 MG
200 TABLET ORAL EVERY 12 HOURS
Refills: 0 | Status: COMPLETED | OUTPATIENT
Start: 2023-04-01 | End: 2023-04-10

## 2023-03-31 RX ORDER — KETOROLAC TROMETHAMINE 30 MG/ML
15 SYRINGE (ML) INJECTION ONCE
Refills: 0 | Status: DISCONTINUED | OUTPATIENT
Start: 2023-03-31 | End: 2023-04-01

## 2023-03-31 RX ORDER — MAGNESIUM SULFATE 500 MG/ML
2 VIAL (ML) INJECTION ONCE
Refills: 0 | Status: COMPLETED | OUTPATIENT
Start: 2023-03-31 | End: 2023-03-31

## 2023-03-31 RX ORDER — POTASSIUM CHLORIDE 20 MEQ
20 PACKET (EA) ORAL
Refills: 0 | Status: COMPLETED | OUTPATIENT
Start: 2023-03-31 | End: 2023-03-31

## 2023-03-31 RX ORDER — ENOXAPARIN SODIUM 100 MG/ML
40 INJECTION SUBCUTANEOUS EVERY 24 HOURS
Refills: 0 | Status: DISCONTINUED | OUTPATIENT
Start: 2023-03-31 | End: 2023-04-05

## 2023-03-31 RX ADMIN — MONTELUKAST 10 MILLIGRAM(S): 4 TABLET, CHEWABLE ORAL at 11:32

## 2023-03-31 RX ADMIN — HYDROMORPHONE HYDROCHLORIDE 0.5 MILLIGRAM(S): 2 INJECTION INTRAMUSCULAR; INTRAVENOUS; SUBCUTANEOUS at 06:28

## 2023-03-31 RX ADMIN — TIOTROPIUM BROMIDE 2 PUFF(S): 18 CAPSULE ORAL; RESPIRATORY (INHALATION) at 08:20

## 2023-03-31 RX ADMIN — ESCITALOPRAM OXALATE 5 MILLIGRAM(S): 10 TABLET, FILM COATED ORAL at 11:32

## 2023-03-31 RX ADMIN — LOSARTAN POTASSIUM 25 MILLIGRAM(S): 100 TABLET, FILM COATED ORAL at 06:09

## 2023-03-31 RX ADMIN — Medication 100 MILLIGRAM(S): at 06:10

## 2023-03-31 RX ADMIN — Medication 81 MILLIGRAM(S): at 11:32

## 2023-03-31 RX ADMIN — Medication 650 MILLIGRAM(S): at 11:36

## 2023-03-31 RX ADMIN — CLOPIDOGREL BISULFATE 75 MILLIGRAM(S): 75 TABLET, FILM COATED ORAL at 11:32

## 2023-03-31 RX ADMIN — Medication 1 MILLIGRAM(S): at 22:12

## 2023-03-31 RX ADMIN — BUDESONIDE AND FORMOTEROL FUMARATE DIHYDRATE 2 PUFF(S): 160; 4.5 AEROSOL RESPIRATORY (INHALATION) at 08:21

## 2023-03-31 RX ADMIN — Medication 500 MILLIGRAM(S): at 06:10

## 2023-03-31 RX ADMIN — Medication 500 MILLIGRAM(S): at 14:04

## 2023-03-31 RX ADMIN — Medication 650 MILLIGRAM(S): at 09:45

## 2023-03-31 RX ADMIN — ATORVASTATIN CALCIUM 80 MILLIGRAM(S): 80 TABLET, FILM COATED ORAL at 22:13

## 2023-03-31 RX ADMIN — PANTOPRAZOLE SODIUM 40 MILLIGRAM(S): 20 TABLET, DELAYED RELEASE ORAL at 17:33

## 2023-03-31 RX ADMIN — ENOXAPARIN SODIUM 40 MILLIGRAM(S): 100 INJECTION SUBCUTANEOUS at 17:33

## 2023-03-31 RX ADMIN — Medication 25 GRAM(S): at 09:15

## 2023-03-31 RX ADMIN — Medication 20 MILLIEQUIVALENT(S): at 09:16

## 2023-03-31 RX ADMIN — HYDROMORPHONE HYDROCHLORIDE 0.5 MILLIGRAM(S): 2 INJECTION INTRAMUSCULAR; INTRAVENOUS; SUBCUTANEOUS at 07:03

## 2023-03-31 RX ADMIN — Medication 500 MILLIGRAM(S): at 22:13

## 2023-03-31 RX ADMIN — CEFTRIAXONE 100 MILLIGRAM(S): 500 INJECTION, POWDER, FOR SOLUTION INTRAMUSCULAR; INTRAVENOUS at 06:10

## 2023-03-31 RX ADMIN — Medication 125 MICROGRAM(S): at 06:10

## 2023-03-31 RX ADMIN — HYDROMORPHONE HYDROCHLORIDE 0.5 MILLIGRAM(S): 2 INJECTION INTRAMUSCULAR; INTRAVENOUS; SUBCUTANEOUS at 00:46

## 2023-03-31 RX ADMIN — Medication 20 MILLIEQUIVALENT(S): at 10:26

## 2023-03-31 RX ADMIN — Medication 1 MILLIGRAM(S): at 09:15

## 2023-03-31 RX ADMIN — CEFTRIAXONE 100 MILLIGRAM(S): 500 INJECTION, POWDER, FOR SOLUTION INTRAMUSCULAR; INTRAVENOUS at 17:33

## 2023-03-31 RX ADMIN — HYDROMORPHONE HYDROCHLORIDE 0.5 MILLIGRAM(S): 2 INJECTION INTRAMUSCULAR; INTRAVENOUS; SUBCUTANEOUS at 01:15

## 2023-03-31 RX ADMIN — Medication 650 MILLIGRAM(S): at 23:03

## 2023-03-31 RX ADMIN — PANTOPRAZOLE SODIUM 40 MILLIGRAM(S): 20 TABLET, DELAYED RELEASE ORAL at 06:09

## 2023-03-31 RX ADMIN — BUPROPION HYDROCHLORIDE 300 MILLIGRAM(S): 150 TABLET, EXTENDED RELEASE ORAL at 11:32

## 2023-03-31 RX ADMIN — HEPARIN SODIUM 5000 UNIT(S): 5000 INJECTION INTRAVENOUS; SUBCUTANEOUS at 14:04

## 2023-03-31 RX ADMIN — ZOLPIDEM TARTRATE 5 MILLIGRAM(S): 10 TABLET ORAL at 22:12

## 2023-03-31 NOTE — PROGRESS NOTE ADULT - SUBJECTIVE AND OBJECTIVE BOX
GEOVANNA PUTNAM 57y Female  MRN#: [id number]   CODE STATUS: FULL CODE     Admission Date: 03-27-23  Hospital Day: 4d    Pt is currently admitted with the primary diagnosis of blurry vision     SUBJECTIVE  Hospital Course  58 y/o woman w PMHx HTN, COPD no home O2, hypothyroidism, CKD w baseline Cr 1.3, diverticulitis, nasopharyngeal CA s/p chemo/RT 2013, MIN and follows w [facility] Onc Dr Arcadio Estrada [phone], h/o depression/anxiety, insomnia, presented to ED 3/27/23 for sudden onset 15min episode of blurry vision in b/l eyes w associated pain behind eyes, w CTA Head/Neck showing mod-severe stenosis of b/l ICA, also reported LLQ pain found on CT Abd/Pelvis to have diverticulitis, admitted for further management of both. Labs in ED notable for WBC 8.15, Cr at baseline 1.3.     While admitted, has been seen by multiple services:   GI: ctx/flagyl, NPO for now   ID: ctx/flagyl IV -> PO in 24-48h  Vascular: Defer to neuroendovascular recs   Neuro: MR brain neg, plan for DSA, antiplt as per LAURIE   Neuroendovascular [id number] Dr Blank: tentative dx cerebral angiogram. Poss carotid angioplasty and endovascular stent recanalization Tues 4/4/23, and to remain on DAPT meanwhile    3/31/23 day 4: Regular diet. D/c Dilaudid-> Toradol PRN, can try Ofirmev or Tramadol if refractory.       CTA Head and Neck 3/27/23:   CTA NECK:  1.  Severe high-grade stenosis of the proximal R ICA caused by noncalcified plaque.  2.  Moderate stenosis of the proximal L ICA caused by calcified plaque.  3.  No evidence of vertebral artery stenosis.  CTA HEAD:  1.  Moderate stenosis of the cavernous segments of the b/l ICA  2.  Severe narrowing of the P3 segment of the R PCA.   3.  Otherwise, no evidence of flow-limiting stenosis, occlusion or aneurysm.    CT Abd/Pelvis 3/27/23:   Stable LLL nodule and stable R adrenal nodule.   PERITONEUM/MESENTERY/BOWEL: Short segment of sigmoid colon mural thickening with mild pericolonic fat stranding in the setting of diverticular disease, compatible with acute diverticulitis. No bowel obstruction, ascites or intraperitoneal free air. Unremarkable appendix.  OTHER: Atherosclerotic calcification.          Overnight events  None    Subjective complaints  Better night of sleep, but (+)holocephalic headache, no associated vision changes or speech changes. Tolerating full liquid diet           PHYSICAL EXAM:  GENERAL: NAD, sleeping in bed comfortably, awakens to light touch   HEAD:  Atraumatic, Normocephalic  EYES: EOMI, sclera clear  ENT: Moist mucous membranes  NECK: Supple, trachea midline  CHEST/LUNG: Clear to auscultation anteriorly bilaterally; No rales, rhonchi, wheezing, or rubs. Unlabored respirations  HEART: Regular rate and rhythm; No appreciable murmurs, rubs, or gallops  ABDOMEN: (+)BS; Soft, nondistended. Mild tenderness w palpation at LLQ   EXTREMITIES:  2+ Radial Pulses, brisk capillary refill. No clubbing, cyanosis, or edema  NERVOUS SYSTEM:  A&Ox3, no noted facial droop. Moving all extremities w/o difficulty.   SKIN: No rashes or lesions to b/l forearm, face.       Present Today:   - Perez:  No [ X ], Yes [  ] : Indication:   - Type of IV Access:       .. CVC/Piccline:  No [ X ], Yes [  ] : Indication:       .. Midline: No [ X ], Yes [  ] : Indication:           T(C): 36.4 (03-31-23 @ 05:59)  T(F): 97.5 (03-31-23 @ 05:59)  HR: 66 (03-31-23 @ 12:50)  BP: 125/68 (03-31-23 @ 12:50)  RR: 16 (03-31-23 @ 12:50)  SpO2: --                                                   OBJECTIVE  PAST MEDICAL & SURGICAL HISTORY  COPD (chronic obstructive pulmonary disease)    HTN (hypertension)    Diverticulitis    Pulmonary nodules/lesions, multiple    Nasopharyngeal cancer    Hypothyroid    Cervical disc disease  sequelae of radtion for nasopharyngeal cancer    Left ear hearing loss    Hemorrhoids    Anxiety    History of cholecystectomy                                                ALLERGIES:  ciprofloxacin (Urticaria; Other)                           HOME MEDICATIONS  Home Medications:  albuterol 90 mcg/inh inhalation aerosol: 2 puff(s) inhaled every 6 hours, As needed, Shortness of Breath and/or Wheezing (28 Mar 2023 13:59)  buPROPion 300 mg/24 hours (XL) oral tablet, extended release: 1 tab(s) orally every 24 hours (28 Mar 2023 13:59)  clonazePAM 1 mg oral tablet: 1 tab(s) orally 2 times a day (28 Mar 2023 13:59)  escitalopram 5 mg oral tablet: 1 tab(s) orally once a day (28 Mar 2023 13:59)  metoprolol succinate 50 mg oral tablet, extended release: 1 tab(s) orally once a day (28 Mar 2023 13:59)  MONTELUKAST SODIUM 10 MG TABS: 1 dose(s) orally once a day (28 Mar 2023 13:59)  Trelegy Ellipta inhalation powder: 1 puff(s) inhaled once a day (28 Mar 2023 13:59)  ZOLPIDEM TARTRATE 10 MG TABS: orally once a day (at bedtime) (28 Mar 2023 13:59)                           MEDICATIONS:  STANDING MEDICATIONS  aspirin  chewable 81 milliGRAM(s) Oral daily  atorvastatin 80 milliGRAM(s) Oral at bedtime  budesonide  80 MICROgram(s)/formoterol 4.5 MICROgram(s) Inhaler 2 Puff(s) Inhalation two times a day  buPROPion XL (24-Hour) . 300 milliGRAM(s) Oral daily  cefTRIAXone   IVPB 2000 milliGRAM(s) IV Intermittent every 12 hours  clopidogrel Tablet 75 milliGRAM(s) Oral daily  escitalopram 5 milliGRAM(s) Oral daily  heparin   Injectable 5000 Unit(s) SubCutaneous every 8 hours  influenza   Vaccine 0.5 milliLiter(s) IntraMuscular once  levothyroxine 125 MICROGram(s) Oral daily  losartan 25 milliGRAM(s) Oral daily  metoprolol succinate  milliGRAM(s) Oral daily  metroNIDAZOLE    Tablet 500 milliGRAM(s) Oral every 8 hours  montelukast 10 milliGRAM(s) Oral daily  pantoprazole  Injectable 40 milliGRAM(s) IV Push every 12 hours  tiotropium 2.5 MICROgram(s) Inhaler 2 Puff(s) Inhalation daily    PRN MEDICATIONS  acetaminophen     Tablet .. 650 milliGRAM(s) Oral every 6 hours PRN  albuterol    90 MICROgram(s) HFA Inhaler 2 Puff(s) Inhalation every 6 hours PRN  baclofen 5 milliGRAM(s) Oral every 12 hours PRN  clonazePAM  Tablet 1 milliGRAM(s) Oral two times a day PRN  hydrOXYzine hydrochloride 25 milliGRAM(s) Oral three times a day PRN  ketorolac   Injectable 15 milliGRAM(s) IV Push once PRN  zolpidem 5 milliGRAM(s) Oral at bedtime PRN                                            ------------------------------------------------------------  T(C): 36.4 (03-31-23 @ 05:59), Max: 36.4 (03-31-23 @ 05:59)  T(F): 97.5 (03-31-23 @ 05:59), Max: 97.5 (03-31-23 @ 05:59)  HR: 66 (03-31-23 @ 12:50) (66 - 80)  BP: 125/68 (03-31-23 @ 12:50) (125/68 - 185/85)  RR: 16 (03-31-23 @ 12:50) (16 - 19)  SpO2: --      03-30-23 @ 07:01  -  03-31-23 @ 07:00  --------------------------------------------------------  IN: 1352 mL / OUT: 1200 mL / NET: 152 mL                                               LABS:                        10.0   6.34  )-----------( 302      ( 31 Mar 2023 06:01 )             32.3     03-31    141  |  105  |  11  ----------------------------<  94  3.7   |  26  |  1.0    Ca    8.8      31 Mar 2023 06:01  Mg     1.8     03-31      PT/INR - ( 30 Mar 2023 06:33 )   PT: 11.90 sec;   INR: 1.04 ratio         PTT - ( 30 Mar 2023 06:33 )  PTT:31.5 sec                                RADIOLOGY:      MR Head 3/28/23:   Unremarkable noncontrast MRI of thebrain.      CTA Head and Neck 3/27/23:   CTA NECK:  1.  Severe high-grade stenosis of the proximal R ICA caused by noncalcified plaque.  2.  Moderate stenosis of the proximal L ICA caused by calcified plaque.  3.  No evidence of vertebral artery stenosis.  CTA HEAD:  1.  Moderate stenosis of the cavernous segments of the b/l ICA  2.  Severe narrowing of the P3 segment of the right posterior cerebral artery.  3.  Otherwise, no evidence of flow-limiting stenosis, occlusion or aneurysm.        CT Abd/Pelvis 3/27/23:   LOWER CHEST: Stable left lower lobe 7 mm solid pulmonary nodule (5/62)   Bilateral basilar subsegmental atelectasis.  HEPATOBILIARY: Post cholecystectomy.  SPLEEN: Unremarkable.  PANCREAS: Unremarkable.  ADRENAL GLANDS: Stable 1.3 cm right adrenal nodule.  KIDNEYS: Symmetric enhancement. No hydronephrosis. Right renal cyst. Excreted contrast within the collecting systems.  ABDOMINOPELVIC NODES: No lymphadenopathy.  PELVIC ORGANS: Unremarkable.  PERITONEUM/MESENTERY/BOWEL: Short segment of sigmoid colon mural thickening with mild pericolonic fat stranding in the setting of diverticular disease, compatible with acute diverticulitis. No bowel obstruction, ascites or intraperitoneal free air. Unremarkable appendix.  BONES/SOFT TISSUES: Degenerative changes of the spine. Stable trace anterolisthesis of L4-5 with lower lumbar facet arthropathy..  OTHER: Atherosclerotic calcification.      CT Head noncon 3/27/23:   No acute intracranial pathology.

## 2023-03-31 NOTE — PROGRESS NOTE ADULT - ASSESSMENT
#LLQ pain 2/2 diverticulitis  -Pt reports significant LLQ pain, states pain is improving   -Pt states pain was 10/10, now 7/10  -Pt tender to palpation on exam LLQ  -Pt has known diverticular disease, with multiple episodes of diverticulitis in the past  -Pt reports episodes of brown diarrhea for last few days  -Last colonoscopy one year ago, 2 polyps removed, otherwise normal per pt  -no known hx of gastric or colon cancer  - nasopharyngeal cancer in remission s/p chemoradiation 2013  -vitals stable  -WBC 6.34  -CTAP showing acute diverticulitis of short segment of the colon   -hgb 10.5 hct 33.1 mcv 81.7 RDW 16.7    Plan:  -pt tolerating full liquid diet, advance as tolerated (high fiber)  -PPI BID  -pain control prn  -zofran prn  -type and screen  -pt is allergic to ciprofloxacin- IV metronidazole and ceftriaxone  -PO abx tomorrow if sx improvement   -outpatient f/up with Dr. Pelayo for colonoscopy 6-8 weeks  -if pain recurs/persists consider repeat CTAP  -avoid narcotics  -ambulation encouraged #LLQ pain 2/2 diverticulitis  -Pt reports significant LLQ pain, states pain is improving   -Pt states pain was 10/10, now 7/10  -Pt tender to palpation on exam LLQ  -Pt has known diverticular disease, with multiple episodes of diverticulitis in the past  -Pt reports episodes of brown diarrhea for last few days  -Last colonoscopy one year ago, 2 polyps removed, otherwise normal per pt  -no known hx of gastric or colon cancer  - nasopharyngeal cancer in remission s/p chemoradiation 2013  -vitals stable  -WBC 6.34  -CTAP showing acute diverticulitis of short segment of the colon   -hgb 10.5 hct 33.1 mcv 81.7 RDW 16.7    Plan:  - c/w full liquid diet  -PPI BID  -pain control prn  -zofran prn  -type and screen  -pt is allergic to ciprofloxacin- IV metronidazole and ceftriaxone  -c/w IV abx  -outpatient f/up with Dr. Pelayo for colonoscopy 6-8 weeks  -if pain recurs/persists consider repeat CTAP  -avoid narcotics  -ambulation encouraged #LLQ pain 2/2 diverticulitis  -Pt reports significant LLQ pain, states pain is improving   -Pt states pain was 10/10 on admission, now 5-6  -Pt tender to palpation on exam LLQ  -Pt has known diverticular disease, with multiple episodes of diverticulitis in the past  -Pt reports episodes of brown diarrhea for last few days  -Last colonoscopy one year ago, 2 polyps removed, otherwise normal per pt  -no known hx of gastric or colon cancer  - nasopharyngeal cancer in remission s/p chemoradiation 2013  -vitals stable  -WBC 6.34  -CTAP showing acute diverticulitis of short segment of the colon   -hgb 10.0 hct 32.3 mcv 82.4 RDW 16.9    Plan:  - c/w full liquid diet, advance once pt tolerating with fully formed BMs  -PPI BID  -pain control prn  -zofran prn  -type and screen  -pt is allergic to ciprofloxacin- IV metronidazole and ceftriaxone  -c/w IV abx  -outpatient f/up with Dr. Pelayo for colonoscopy 6-8 weeks  -if pain recurs/persists consider repeat CTAP  -avoid narcotics  -ambulation encouraged

## 2023-03-31 NOTE — PROGRESS NOTE ADULT - SUBJECTIVE AND OBJECTIVE BOX
Gastroenterology progress note:     Patient is a 57y old  Female who presents with a chief complaint of B/L Blurry Vision (30 Mar 2023 16:51)       Admitted on: 03-27-23    We are following the patient for: diverticulitis      Interval History: Pt states that she is feeling better today. If pain was 7/10 yesterday would rate pain 5-6/10 today. Pt is tolerating full liquid diet with no N/V. Pt still admits to yellow colored stools, states they are small, "look like fish." Pt would like to continue advancing diet.    Patient's medical problems are improving   Prior records reviewed (Y/N): Y  History obtained from someone other than patient (Y/N): N      PAST MEDICAL & SURGICAL HISTORY:  COPD (chronic obstructive pulmonary disease)      HTN (hypertension)      Diverticulitis      Pulmonary nodules/lesions, multiple      Nasopharyngeal cancer      Hypothyroid      Cervical disc disease  sequelae of radtion for nasopharyngeal cancer      Left ear hearing loss      Hemorrhoids      Anxiety      History of cholecystectomy          MEDICATIONS  (STANDING):  aspirin  chewable 81 milliGRAM(s) Oral daily  atorvastatin 80 milliGRAM(s) Oral at bedtime  budesonide  80 MICROgram(s)/formoterol 4.5 MICROgram(s) Inhaler 2 Puff(s) Inhalation two times a day  buPROPion XL (24-Hour) . 300 milliGRAM(s) Oral daily  cefTRIAXone   IVPB 2000 milliGRAM(s) IV Intermittent every 12 hours  clopidogrel Tablet 75 milliGRAM(s) Oral daily  escitalopram 5 milliGRAM(s) Oral daily  heparin   Injectable 5000 Unit(s) SubCutaneous every 8 hours  influenza   Vaccine 0.5 milliLiter(s) IntraMuscular once  lactated ringers. 1000 milliLiter(s) (75 mL/Hr) IV Continuous <Continuous>  levothyroxine 125 MICROGram(s) Oral daily  losartan 25 milliGRAM(s) Oral daily  magnesium sulfate  IVPB 2 Gram(s) IV Intermittent once  metoprolol succinate  milliGRAM(s) Oral daily  metroNIDAZOLE    Tablet 500 milliGRAM(s) Oral every 8 hours  montelukast 10 milliGRAM(s) Oral daily  pantoprazole  Injectable 40 milliGRAM(s) IV Push every 12 hours  potassium chloride    Tablet ER 20 milliEquivalent(s) Oral every 2 hours  sodium chloride 0.9%. 1000 milliLiter(s) (70 mL/Hr) IV Continuous <Continuous>  tiotropium 2.5 MICROgram(s) Inhaler 2 Puff(s) Inhalation daily    MEDICATIONS  (PRN):  acetaminophen     Tablet .. 650 milliGRAM(s) Oral every 6 hours PRN Moderate Pain (4 - 6)  albuterol    90 MICROgram(s) HFA Inhaler 2 Puff(s) Inhalation every 6 hours PRN Shortness of Breath and/or Wheezing  baclofen 5 milliGRAM(s) Oral every 12 hours PRN Musculoskeletal Pain  clonazePAM  Tablet 1 milliGRAM(s) Oral two times a day PRN anxiety  HYDROmorphone  Injectable 0.5 milliGRAM(s) IV Push every 6 hours PRN Severe Pain (7 - 10)  hydrOXYzine hydrochloride 25 milliGRAM(s) Oral three times a day PRN Anxiety  zolpidem 5 milliGRAM(s) Oral at bedtime PRN Insomnia      Allergies  ciprofloxacin (Urticaria; Other)      Review of Systems:   Cardiovascular:  No Chest Pain, No Palpitations  Respiratory:  No Cough, No Dyspnea  Gastrointestinal:  As described in HPI    Physical Examination:  T(C): 36.4 (03-31-23 @ 05:59), Max: 37.1 (03-30-23 @ 14:12)  HR: 80 (03-31-23 @ 05:59) (69 - 80)  BP: 185/85 (03-31-23 @ 05:59) (125/83 - 185/85)  RR: 19 (03-31-23 @ 05:59) (18 - 19)  SpO2: --      03-30-23 @ 07:01  -  03-31-23 @ 07:00  --------------------------------------------------------  IN: 1352 mL / OUT: 1200 mL / NET: 152 mL      Constitutional: No acute distress.  Respiratory:  No signs of respiratory distress. Lung sounds are clear bilaterally.  Cardiovascular:  S1 S2, Regular rate and rhythm.  Abdominal: Abdomen is soft, symmetric, and non-tender without distention. There are no visible lesions. Bowel sounds are present and normoactive in all four quadrants. No masses, hepatomegaly, or splenomegaly are noted.   Skin: No rashes, No Jaundice.        Data: (reviewed by attending)                        10.0   6.34  )-----------( 302      ( 31 Mar 2023 06:01 )             32.3     Hgb trend:  10.0  03-31-23 @ 06:01  10.5  03-30-23 @ 06:33  10.1  03-29-23 @ 07:45  10.0  03-28-23 @ 12:12        03-31    141  |  105  |  11  ----------------------------<  94  3.7   |  26  |  1.0    Ca    8.8      31 Mar 2023 06:01  Mg     1.8     03-31      Liver panel trend:  TBili 0.2   /   AST 19   /   ALT 16   /   AlkP 82   /   Tptn 6.3   /   Alb 3.7    /   DBili --      03-29  TBili <0.2   /   AST 17   /   ALT 17   /   AlkP 81   /   Tptn 5.9   /   Alb 3.5    /   DBili --      03-28  TBili <0.2   /   AST 19   /   ALT 16   /   AlkP 88   /   Tptn 6.7   /   Alb 3.9    /   DBili --      03-27      PT/INR - ( 30 Mar 2023 06:33 )   PT: 11.90 sec;   INR: 1.04 ratio         PTT - ( 30 Mar 2023 06:33 )  PTT:31.5 sec       Radiology: (reviewed by attending)       Gastroenterology progress note:     Patient is a 57y old  Female who presents with a chief complaint of B/L Blurry Vision (30 Mar 2023 16:51)       Admitted on: 03-27-23    We are following the patient for: diverticulitis      Interval History: Pt states that she is feeling better today. If pain was 7/10 yesterday would rate pain 5-6/10 today. Pt is tolerating full liquid diet with no N/V. Pt still admits to yellow colored stools, states they are small, "look like fish." Pt states she is passing gas. Pt would like to continue advancing diet.    Patient's medical problems are improving   Prior records reviewed (Y/N): Y  History obtained from someone other than patient (Y/N): N      PAST MEDICAL & SURGICAL HISTORY:  COPD (chronic obstructive pulmonary disease)      HTN (hypertension)      Diverticulitis      Pulmonary nodules/lesions, multiple      Nasopharyngeal cancer      Hypothyroid      Cervical disc disease  sequelae of radtion for nasopharyngeal cancer      Left ear hearing loss      Hemorrhoids      Anxiety      History of cholecystectomy          MEDICATIONS  (STANDING):  aspirin  chewable 81 milliGRAM(s) Oral daily  atorvastatin 80 milliGRAM(s) Oral at bedtime  budesonide  80 MICROgram(s)/formoterol 4.5 MICROgram(s) Inhaler 2 Puff(s) Inhalation two times a day  buPROPion XL (24-Hour) . 300 milliGRAM(s) Oral daily  cefTRIAXone   IVPB 2000 milliGRAM(s) IV Intermittent every 12 hours  clopidogrel Tablet 75 milliGRAM(s) Oral daily  escitalopram 5 milliGRAM(s) Oral daily  heparin   Injectable 5000 Unit(s) SubCutaneous every 8 hours  influenza   Vaccine 0.5 milliLiter(s) IntraMuscular once  lactated ringers. 1000 milliLiter(s) (75 mL/Hr) IV Continuous <Continuous>  levothyroxine 125 MICROGram(s) Oral daily  losartan 25 milliGRAM(s) Oral daily  magnesium sulfate  IVPB 2 Gram(s) IV Intermittent once  metoprolol succinate  milliGRAM(s) Oral daily  metroNIDAZOLE    Tablet 500 milliGRAM(s) Oral every 8 hours  montelukast 10 milliGRAM(s) Oral daily  pantoprazole  Injectable 40 milliGRAM(s) IV Push every 12 hours  potassium chloride    Tablet ER 20 milliEquivalent(s) Oral every 2 hours  sodium chloride 0.9%. 1000 milliLiter(s) (70 mL/Hr) IV Continuous <Continuous>  tiotropium 2.5 MICROgram(s) Inhaler 2 Puff(s) Inhalation daily    MEDICATIONS  (PRN):  acetaminophen     Tablet .. 650 milliGRAM(s) Oral every 6 hours PRN Moderate Pain (4 - 6)  albuterol    90 MICROgram(s) HFA Inhaler 2 Puff(s) Inhalation every 6 hours PRN Shortness of Breath and/or Wheezing  baclofen 5 milliGRAM(s) Oral every 12 hours PRN Musculoskeletal Pain  clonazePAM  Tablet 1 milliGRAM(s) Oral two times a day PRN anxiety  HYDROmorphone  Injectable 0.5 milliGRAM(s) IV Push every 6 hours PRN Severe Pain (7 - 10)  hydrOXYzine hydrochloride 25 milliGRAM(s) Oral three times a day PRN Anxiety  zolpidem 5 milliGRAM(s) Oral at bedtime PRN Insomnia      Allergies  ciprofloxacin (Urticaria; Other)      Review of Systems:   Cardiovascular:  No Chest Pain, No Palpitations  Respiratory:  No Cough, No Dyspnea  Gastrointestinal:  As described in HPI    Physical Examination:  T(C): 36.4 (03-31-23 @ 05:59), Max: 37.1 (03-30-23 @ 14:12)  HR: 80 (03-31-23 @ 05:59) (69 - 80)  BP: 185/85 (03-31-23 @ 05:59) (125/83 - 185/85)  RR: 19 (03-31-23 @ 05:59) (18 - 19)  SpO2: --      03-30-23 @ 07:01  -  03-31-23 @ 07:00  --------------------------------------------------------  IN: 1352 mL / OUT: 1200 mL / NET: 152 mL      Constitutional: No acute distress.  Respiratory:  No signs of respiratory distress. Lung sounds are clear bilaterally.  Cardiovascular:  S1 S2, Regular rate and rhythm.  Abdominal: Abdomen is soft, symmetric, without distention, tenderness to palpation LLQ. There are no visible lesions. Bowel sounds are present and normoactive in all four quadrants. No masses, hepatomegaly, or splenomegaly are noted.   Skin: No rashes, No Jaundice.        Data: (reviewed by attending)                        10.0   6.34  )-----------( 302      ( 31 Mar 2023 06:01 )             32.3     Hgb trend:  10.0  03-31-23 @ 06:01  10.5  03-30-23 @ 06:33  10.1  03-29-23 @ 07:45  10.0  03-28-23 @ 12:12        03-31    141  |  105  |  11  ----------------------------<  94  3.7   |  26  |  1.0    Ca    8.8      31 Mar 2023 06:01  Mg     1.8     03-31      Liver panel trend:  TBili 0.2   /   AST 19   /   ALT 16   /   AlkP 82   /   Tptn 6.3   /   Alb 3.7    /   DBili --      03-29  TBili <0.2   /   AST 17   /   ALT 17   /   AlkP 81   /   Tptn 5.9   /   Alb 3.5    /   DBili --      03-28  TBili <0.2   /   AST 19   /   ALT 16   /   AlkP 88   /   Tptn 6.7   /   Alb 3.9    /   DBili --      03-27      PT/INR - ( 30 Mar 2023 06:33 )   PT: 11.90 sec;   INR: 1.04 ratio         PTT - ( 30 Mar 2023 06:33 )  PTT:31.5 sec       Radiology: (reviewed by attending)

## 2023-03-31 NOTE — PROGRESS NOTE ADULT - SUBJECTIVE AND OBJECTIVE BOX
GEOVANNA PUTNAM  57y  Female      Patient is a 57y old  Female who presents with a chief complaint of B/L Blurry Vision (31 Mar 2023 15:35)      INTERVAL HPI/OVERNIGHT EVENTS:  She feels better, abdominal pain improved.   Vital Signs Last 24 Hrs  T(C): 36.4 (31 Mar 2023 05:59), Max: 36.4 (31 Mar 2023 05:59)  T(F): 97.5 (31 Mar 2023 05:59), Max: 97.5 (31 Mar 2023 05:59)  HR: 66 (31 Mar 2023 12:50) (66 - 80)  BP: 125/68 (31 Mar 2023 12:50) (125/68 - 185/85)  BP(mean): --  RR: 16 (31 Mar 2023 12:50) (16 - 19)  SpO2: --          03-30-23 @ 07:01  -  03-31-23 @ 07:00  --------------------------------------------------------  IN: 1352 mL / OUT: 1200 mL / NET: 152 mL            Consultant(s) Notes Reviewed:  [x ] YES  [ ] NO          MEDICATIONS  (STANDING):  aspirin  chewable 81 milliGRAM(s) Oral daily  atorvastatin 80 milliGRAM(s) Oral at bedtime  budesonide  80 MICROgram(s)/formoterol 4.5 MICROgram(s) Inhaler 2 Puff(s) Inhalation two times a day  buPROPion XL (24-Hour) . 300 milliGRAM(s) Oral daily  cefTRIAXone   IVPB 2000 milliGRAM(s) IV Intermittent every 12 hours  clopidogrel Tablet 75 milliGRAM(s) Oral daily  enoxaparin Injectable 40 milliGRAM(s) SubCutaneous every 24 hours  escitalopram 5 milliGRAM(s) Oral daily  influenza   Vaccine 0.5 milliLiter(s) IntraMuscular once  levothyroxine 125 MICROGram(s) Oral daily  losartan 25 milliGRAM(s) Oral daily  metoprolol succinate  milliGRAM(s) Oral daily  metroNIDAZOLE    Tablet 500 milliGRAM(s) Oral every 8 hours  montelukast 10 milliGRAM(s) Oral daily  pantoprazole  Injectable 40 milliGRAM(s) IV Push every 12 hours  tiotropium 2.5 MICROgram(s) Inhaler 2 Puff(s) Inhalation daily    MEDICATIONS  (PRN):  acetaminophen     Tablet .. 650 milliGRAM(s) Oral every 6 hours PRN Moderate Pain (4 - 6)  albuterol    90 MICROgram(s) HFA Inhaler 2 Puff(s) Inhalation every 6 hours PRN Shortness of Breath and/or Wheezing  baclofen 5 milliGRAM(s) Oral every 12 hours PRN Musculoskeletal Pain  clonazePAM  Tablet 1 milliGRAM(s) Oral two times a day PRN anxiety  hydrOXYzine hydrochloride 25 milliGRAM(s) Oral three times a day PRN Anxiety  ketorolac   Injectable 15 milliGRAM(s) IV Push once PRN Severe Pain (7 - 10)  zolpidem 5 milliGRAM(s) Oral at bedtime PRN Insomnia      LABS                          10.0   6.34  )-----------( 302      ( 31 Mar 2023 06:01 )             32.3     03-31    141  |  105  |  11  ----------------------------<  94  3.7   |  26  |  1.0    Ca    8.8      31 Mar 2023 06:01  Mg     1.8     03-31          PT/INR - ( 30 Mar 2023 06:33 )   PT: 11.90 sec;   INR: 1.04 ratio         PTT - ( 30 Mar 2023 06:33 )  PTT:31.5 sec  Lactate Trend        CAPILLARY BLOOD GLUCOSE          Culture - Urine (collected 03-27-23 @ 20:53)  Source: .Urine None  Final Report (03-29-23 @ 17:14):    Normal Urogenital jaden present        RADIOLOGY & ADDITIONAL TESTS:    Imaging Personally Reviewed:  [ ] YES  [ ] NO    HEALTH ISSUES - PROBLEM Dx:          PHYSICAL EXAM:  GENERAL: NAD, well-developed.  HEAD:  Atraumatic, Normocephalic.  EYES: EOMI, PERRLA, conjunctiva and sclera clear.  NECK: Supple, No JVD.  CHEST/LUNG: Clear to auscultation bilaterally; No wheeze.  HEART: Regular rate and rhythm; S1 S2.   ABDOMEN: Soft, Nontender, Nondistended; mild left lower quadrant tenderness.   EXTREMITIES:  2+ Peripheral Pulses, No clubbing, cyanosis, or edema.  PSYCH: AAOx3.  NEUROLOGY: non-focal.  SKIN: No rashes or lesions.

## 2023-03-31 NOTE — PROGRESS NOTE ADULT - ASSESSMENT
56 y/o woman w PMHx HTN, COPD no home O2, hypothyroidism, CKD w baseline Cr 1.3, diverticulitis, nasopharyngeal CA s/p chemo/RT 2013, MIN and follows w [facility] Onc Dr Arcadio Estrada [phone], h/o depression/anxiety, insomnia, presented to ED 3/27/23 for sudden onset 15min episode of blurry vision in b/l eyes w associated pain behind eyes, w CTA Head/Neck showing mod-severe stenosis of b/l ICA, also reported LLQ pain found on CT Abd/Pelvis to have diverticulitis, admitted for further management of both. Labs in ED notable for WBC 8.15, Cr at baseline 1.3.     Consults:   GI: ctx/flagyl, liquid diet   ID: ctx/flagyl IV -> vantin/flagyl PO   Vascular: Defer to neuroendovascular recs   Neuro: MR brain neg, plan for DSA, antiplt as per LAURIE   Neuroendovascular [id number] Dr Blank: tentative dx cerebral angiogram, carotid angioplasty and endovascular stent recanalization Tues 4/4/23, and to remain on DAPT meanwhile    # High Grade R ICA Stenosis   # TIA/Blurry Vision   - Patient clinically and hemodynamically stable   - Permissive -180  CT head: No acute intracranial pathology  CTA Head/Neck: sev prox R ICA stenosis, mod prox L ICA stenosis, mod stenosis cavernous segments of b/l ICA, severe narrowing P3 seg R PCA   - MRI brain >> no acute pathology  - Aspirin 325 stat followed by 81 QD, Lipitor 80 QD  - Neurology on board   - plavix 75 QD   - Neuro checks q 6  - Neuroendovascular [id number] Dr Blank: tentative dx cerebral angiogram, carotid angioplasty and endovascular stent recanalization Tues 4/4/23, and to remain on DAPT meanwhile  - PRU blood test on 03/29 was 298 (this was asked neuroendo team, Tara Rodas was informed about the results)  - A1C, TSH, Lipid profile in AM     # Acute Diverticulitis   - S/P Dilaudid 0.5, LR 1L bolus, Zofran 4mg IV, Magnesium 2g, and Zosyn 3.375 IV in ER  - dc meropenem and start Rocephin and Flagyl as per ID  - GI Consulted appreciated  - Pain control Toradol 15mg IV x1 as needed, can try Ofirmev or Tramadol if refractory. Avoid opiates.     # HTN - C/w home metop succ 50 QD  # COPD not on home O2 - C/w home meds   # Hypothyroidism - C/w home levothyroxine     # Depression  # Insomnia  # Anxiety   - C/w home meds     # Right upper lobe 1.2 cm pulmonary nodule, as well as several groundglass opacities.   - Consider follow-up CT of the chest in 3-6 months.                                                                                ----------------------------------------------------  # DVT prophylaxis: Heparin 5000u SQ q8h  # GI prophylaxis: Pantoprazole 40mg IV q12h  # Diet: DASH TLC CC, No red dye, +ensure   # Activity: IAT   # Code status: FULL CODE   # Disposition: Angio 4/4                                                                            --------------------------------------------------------    # Handoff:   - Angio 4/4   - Diverticulitis improvement, diet

## 2023-03-31 NOTE — PROGRESS NOTE ADULT - ASSESSMENT
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 Episode happened while patient was driving and she continued to drive normally. She reports associated pain behind the eyes. Resolved and did not happen again. In the ER patient also complaining of LLQ pain worse when attempting to pass gas.     A/P;   Transient Blurry vision: Suspect TIA.   Carotid Artery Stenosis:   Patient presented with transient blurry vision lasted for 15 minutes, no other weakness.   Neuro exam is non-focal.   Severe high-grade stenosis of the proximal right internal carotid artery,  Moderate left ICA stenosis, right P3, and b/l cavernous ICA stenosis.  Brain MRI 3/28 was Unremarkable, no acute infarct.   CTA Neck 3/27 Severe high-grade stenosis of the proximal right ICA. Moderate stenosis of the proximal left ICA.   CTA HEAD:  Moderate stenosis of the cavernous segments of the bilateral internal . carotid arteries. Severe narrowing of the P3 segment of the right posterior cerebral artery.  Neuroendovascular plan for diagnostic cerebral angiogram 4/4  Continue ASA, Plavix and Lipitor.     Acute sigmoid diverticulitis   CT abd: Acute diverticulitis of a short segment of the sigmoid colon. No abscess or perforation. Further evaluation with nonemergent colonoscopy may be of benefit when patient is clinically able.  Continue Rocephin 2 gm iv q12h, flagyl 500 mg iv q8h, in 24-48h change to po Ciprofloxacin 750 mg q12h and po falgyl 500 mg q8h till 4/10  On clear diet, advance diet as tolerated.   GI recommended colonoscopy outpatient after 6-8 weeks.     Hypertension: metoprolol  Hypothyroidism: synthroid  COPD- stable, Continue Budesonide, Spiriva and Montelukast.      Depression: Continue Lexapro and Clonazepam.     Right upper lobe 1.2 cm pulmonary nodule, as well as several groundglass opacities.   Follow-up CT of the chest in 3-6 months.    Morbidly obese  BMI: 50.6    DVT prophylaxis: Heparin SC.     Full Code.   #Progress Note Handoff:  Pending (specify): diagnostic cerebral angiogram on 4/4.   Family discussion:  Disposition: Home.

## 2023-03-31 NOTE — PROGRESS NOTE ADULT - SUBJECTIVE AND OBJECTIVE BOX
Neuroendovascular Progress Note:     Interval History:  A neuroendovascular consult was placed for severe high-grade stenosis of the proximal right internal carotid artery, which was identified on CTA. The patient also has moderate left ICA stenosis, right P3, and b/l cavernous ICA stenosis. On exam the patient reports vision is back to baseline. The risks and benefits of a diagnostic cerebral angiogram +/- carotid angioplasty and stenting were discussed with the patient who elected to contact her oncologist first (Arcadio Estrada MD at [facility] [phone]). After discussion between Dr. Blank and Dr. Mckeon, it was determined that the patient would be a good candidate for a neuroendovascular intervention. On exam this morning the patient has no acute complaints.    Past Medical History:  COPD (chronic obstructive pulmonary disease)  HTN (hypertension)  Diverticulitis  Pulmonary nodules/lesions, multiple  Nasopharyngeal cancer  Hypothyroid  Cervical disc disease  Left ear hearing loss  Hemorrhoids  Anxiety    24-Hour Events: None    Medication:  aspirin  chewable 81 milliGRAM(s) Oral daily  atorvastatin 80 milliGRAM(s) Oral at bedtime  budesonide  80 MICROgram(s)/formoterol 4.5 MICROgram(s) Inhaler 2 Puff(s) Inhalation two times a day  buPROPion XL (24-Hour) . 300 milliGRAM(s) Oral daily  clopidogrel Tablet 75 milliGRAM(s) Oral daily  enoxaparin Injectable 40 milliGRAM(s) SubCutaneous every 24 hours  escitalopram 5 milliGRAM(s) Oral daily  influenza   Vaccine 0.5 milliLiter(s) IntraMuscular once  levothyroxine 125 MICROGram(s) Oral daily  losartan 25 milliGRAM(s) Oral daily  metoprolol succinate  milliGRAM(s) Oral daily  metroNIDAZOLE    Tablet 500 milliGRAM(s) Oral every 8 hours  montelukast 10 milliGRAM(s) Oral daily  pantoprazole  Injectable 40 milliGRAM(s) IV Push every 12 hours  tiotropium 2.5 MICROgram(s) Inhaler 2 Puff(s) Inhalation daily    Allergies:  ciprofloxacin (Urticaria; Other)    Recent Vitals:  T(F): 97.5 (03-31-23 @ 05:59), Max: 97.5 (03-31-23 @ 05:59)  HR: 66 (03-31-23 @ 12:50) (66 - 80)  BP: 125/68 (03-31-23 @ 12:50) (125/68 - 185/85)  RR: 16 (03-31-23 @ 12:50) (16 - 19)  SpO2: --    COVID-19 PCR: NotDetec (27 Mar 2023 20:24)    Recent Labs:                        10.0   6.34  )-----------( 302      ( 31 Mar 2023 06:01 )             32.3     03-31    141  |  105  |  11  ----------------------------<  94  3.7   |  26  |  1.0    Ca    8.8      31 Mar 2023 06:01  Mg     1.8     03-31      PT/INR - ( 30 Mar 2023 06:33 )   PT: 11.90 sec;   INR: 1.04 ratio        PTT - ( 30 Mar 2023 06:33 )  PTT:31.5 sec    Exam:   General:  NAD  Neuro: AAOx3, visual fields full with no reported blurry vision or diplopia, no dysarthria, no aphasia, naming and repetition intact, face symmetrical, +bilateral hearing loss, moving all extremities antigravity with + bilateral LE 4/5, sensation intact to light touch throughout.     Assessment:   The patient is a 57-year-old, morbidly obese female with a past medical history of 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 2013, hypothyroidism, Diverticulitis, COPD (not on oxygen), HTN, Depression, Insomnia, and anxiety who presented with sudden onset blurry vision, lasting 15 minutes, and associated eye pain. The patient reports to have had these episodes multiple times since 2021 (cannot quantify exactly how many). The patient was also found to have diverticulitis in the ED. A neuroendovascular consult was placed for severe high-grade stenosis of the proximal right internal carotid artery, which was identified on CTA. The patient also has moderate left ICA stenosis, right P3, and b/l cavernous ICA stenosis. On exam the patient reports vision is back to baseline. The risks and benefits of a diagnostic cerebral angiogram +/- carotid angioplasty and stenting were discussed with the patient who elected to contact her oncologist first (Arcadio Estrada MD at [facility]- [phone]). After discussion among the patient, Dr. Mckeon, and Dr. Blank, as well as with the patient's oncologist, it was determined that the patient may be a good candidate for neuroendovascular carotid artery angioplasty and stenting. Risks and benefits of the procedure were discussed with the patient again this AM at bedside, along with the patient's sister. The patient reports to want to proceed with procedural planning for this coming Tuesday 4/4.     Suggestions:  - The patient is tentatively scheduled for a diagnostic cerebral angiogram + right carotid artery angioplasty and stenting with Dr. Blank Tuesday 4/4. Please keep NPO except medication and on IV fluid midnight prior to the procedure. Coags/CBC/CMP 4/4 AM  - Continue ASA 81mg qd and Plavix 75mg qd  - PRU recommended 4/2  - Updates to be provided on Monday  - Management per primary team  [id number] Neuroendovascular

## 2023-04-01 LAB
ANION GAP SERPL CALC-SCNC: 14 MMOL/L — SIGNIFICANT CHANGE UP (ref 7–14)
BASOPHILS # BLD AUTO: 0.03 K/UL — SIGNIFICANT CHANGE UP (ref 0–0.2)
BASOPHILS NFR BLD AUTO: 0.5 % — SIGNIFICANT CHANGE UP (ref 0–1)
BLD GP AB SCN SERPL QL: SIGNIFICANT CHANGE UP
BUN SERPL-MCNC: 12 MG/DL — SIGNIFICANT CHANGE UP (ref 10–20)
CALCIUM SERPL-MCNC: 9.1 MG/DL — SIGNIFICANT CHANGE UP (ref 8.4–10.5)
CHLORIDE SERPL-SCNC: 104 MMOL/L — SIGNIFICANT CHANGE UP (ref 98–110)
CO2 SERPL-SCNC: 23 MMOL/L — SIGNIFICANT CHANGE UP (ref 17–32)
CREAT SERPL-MCNC: 1 MG/DL — SIGNIFICANT CHANGE UP (ref 0.7–1.5)
EGFR: 65 ML/MIN/1.73M2 — SIGNIFICANT CHANGE UP
EOSINOPHIL # BLD AUTO: 0.26 K/UL — SIGNIFICANT CHANGE UP (ref 0–0.7)
EOSINOPHIL NFR BLD AUTO: 4.3 % — SIGNIFICANT CHANGE UP (ref 0–8)
GLUCOSE SERPL-MCNC: 104 MG/DL — HIGH (ref 70–99)
HCT VFR BLD CALC: 32.7 % — LOW (ref 37–47)
HGB BLD-MCNC: 10.3 G/DL — LOW (ref 12–16)
IMM GRANULOCYTES NFR BLD AUTO: 0.5 % — HIGH (ref 0.1–0.3)
LYMPHOCYTES # BLD AUTO: 1.48 K/UL — SIGNIFICANT CHANGE UP (ref 1.2–3.4)
LYMPHOCYTES # BLD AUTO: 24.2 % — SIGNIFICANT CHANGE UP (ref 20.5–51.1)
MAGNESIUM SERPL-MCNC: 2 MG/DL — SIGNIFICANT CHANGE UP (ref 1.8–2.4)
MCHC RBC-ENTMCNC: 25.8 PG — LOW (ref 27–31)
MCHC RBC-ENTMCNC: 31.5 G/DL — LOW (ref 32–37)
MCV RBC AUTO: 82 FL — SIGNIFICANT CHANGE UP (ref 81–99)
MONOCYTES # BLD AUTO: 0.48 K/UL — SIGNIFICANT CHANGE UP (ref 0.1–0.6)
MONOCYTES NFR BLD AUTO: 7.9 % — SIGNIFICANT CHANGE UP (ref 1.7–9.3)
NEUTROPHILS # BLD AUTO: 3.83 K/UL — SIGNIFICANT CHANGE UP (ref 1.4–6.5)
NEUTROPHILS NFR BLD AUTO: 62.6 % — SIGNIFICANT CHANGE UP (ref 42.2–75.2)
NRBC # BLD: 0 /100 WBCS — SIGNIFICANT CHANGE UP (ref 0–0)
PLATELET # BLD AUTO: 345 K/UL — SIGNIFICANT CHANGE UP (ref 130–400)
POTASSIUM SERPL-MCNC: 4 MMOL/L — SIGNIFICANT CHANGE UP (ref 3.5–5)
POTASSIUM SERPL-SCNC: 4 MMOL/L — SIGNIFICANT CHANGE UP (ref 3.5–5)
RBC # BLD: 3.99 M/UL — LOW (ref 4.2–5.4)
RBC # FLD: 16.9 % — HIGH (ref 11.5–14.5)
SODIUM SERPL-SCNC: 141 MMOL/L — SIGNIFICANT CHANGE UP (ref 135–146)
WBC # BLD: 6.11 K/UL — SIGNIFICANT CHANGE UP (ref 4.8–10.8)
WBC # FLD AUTO: 6.11 K/UL — SIGNIFICANT CHANGE UP (ref 4.8–10.8)

## 2023-04-01 PROCEDURE — 99233 SBSQ HOSP IP/OBS HIGH 50: CPT

## 2023-04-01 RX ORDER — PANTOPRAZOLE SODIUM 20 MG/1
40 TABLET, DELAYED RELEASE ORAL
Refills: 0 | Status: DISCONTINUED | OUTPATIENT
Start: 2023-04-01 | End: 2023-04-10

## 2023-04-01 RX ADMIN — Medication 1 MILLIGRAM(S): at 22:41

## 2023-04-01 RX ADMIN — Medication 500 MILLIGRAM(S): at 13:31

## 2023-04-01 RX ADMIN — Medication 200 MILLIGRAM(S): at 17:01

## 2023-04-01 RX ADMIN — PANTOPRAZOLE SODIUM 40 MILLIGRAM(S): 20 TABLET, DELAYED RELEASE ORAL at 05:56

## 2023-04-01 RX ADMIN — BUPROPION HYDROCHLORIDE 300 MILLIGRAM(S): 150 TABLET, EXTENDED RELEASE ORAL at 13:31

## 2023-04-01 RX ADMIN — ENOXAPARIN SODIUM 40 MILLIGRAM(S): 100 INJECTION SUBCUTANEOUS at 17:01

## 2023-04-01 RX ADMIN — Medication 500 MILLIGRAM(S): at 22:41

## 2023-04-01 RX ADMIN — Medication 100 MILLIGRAM(S): at 05:57

## 2023-04-01 RX ADMIN — Medication 200 MILLIGRAM(S): at 05:56

## 2023-04-01 RX ADMIN — MONTELUKAST 10 MILLIGRAM(S): 4 TABLET, CHEWABLE ORAL at 13:31

## 2023-04-01 RX ADMIN — LOSARTAN POTASSIUM 25 MILLIGRAM(S): 100 TABLET, FILM COATED ORAL at 05:57

## 2023-04-01 RX ADMIN — CLOPIDOGREL BISULFATE 75 MILLIGRAM(S): 75 TABLET, FILM COATED ORAL at 13:31

## 2023-04-01 RX ADMIN — TIOTROPIUM BROMIDE 2 PUFF(S): 18 CAPSULE ORAL; RESPIRATORY (INHALATION) at 07:24

## 2023-04-01 RX ADMIN — Medication 15 MILLIGRAM(S): at 12:18

## 2023-04-01 RX ADMIN — ZOLPIDEM TARTRATE 5 MILLIGRAM(S): 10 TABLET ORAL at 22:41

## 2023-04-01 RX ADMIN — Medication 125 MICROGRAM(S): at 05:57

## 2023-04-01 RX ADMIN — ATORVASTATIN CALCIUM 80 MILLIGRAM(S): 80 TABLET, FILM COATED ORAL at 22:41

## 2023-04-01 RX ADMIN — Medication 81 MILLIGRAM(S): at 13:31

## 2023-04-01 RX ADMIN — PANTOPRAZOLE SODIUM 40 MILLIGRAM(S): 20 TABLET, DELAYED RELEASE ORAL at 17:01

## 2023-04-01 RX ADMIN — Medication 500 MILLIGRAM(S): at 05:57

## 2023-04-01 RX ADMIN — ESCITALOPRAM OXALATE 5 MILLIGRAM(S): 10 TABLET, FILM COATED ORAL at 13:30

## 2023-04-01 NOTE — PROGRESS NOTE ADULT - SUBJECTIVE AND OBJECTIVE BOX
GEOVANNA PUTNAM  57y  Female      Patient is a 57y old  Female who presents with a chief complaint of B/L Blurry Vision (01 Apr 2023 09:53)      INTERVAL HPI/OVERNIGHT EVENTS:  She feels ok, abdominal pain improved.   Vital Signs Last 24 Hrs  T(C): 37.1 (01 Apr 2023 13:32), Max: 37.1 (01 Apr 2023 13:32)  T(F): 98.7 (01 Apr 2023 13:32), Max: 98.7 (01 Apr 2023 13:32)  HR: 78 (01 Apr 2023 13:32) (66 - 78)  BP: 175/79 (01 Apr 2023 13:32) (147/69 - 200/100)  BP(mean): --  RR: 18 (01 Apr 2023 13:32) (18 - 18)  SpO2: --          03-31-23 @ 07:01  -  04-01-23 @ 07:00  --------------------------------------------------------  IN: 80 mL / OUT: 0 mL / NET: 80 mL    04-01-23 @ 07:01  -  04-01-23 @ 14:41  --------------------------------------------------------  IN: 1343 mL / OUT: 940 mL / NET: 403 mL            Consultant(s) Notes Reviewed:  [x ] YES  [ ] NO          MEDICATIONS  (STANDING):  aspirin  chewable 81 milliGRAM(s) Oral daily  atorvastatin 80 milliGRAM(s) Oral at bedtime  budesonide  80 MICROgram(s)/formoterol 4.5 MICROgram(s) Inhaler 2 Puff(s) Inhalation two times a day  buPROPion XL (24-Hour) . 300 milliGRAM(s) Oral daily  cefpodoxime 200 milliGRAM(s) Oral every 12 hours  clopidogrel Tablet 75 milliGRAM(s) Oral daily  enoxaparin Injectable 40 milliGRAM(s) SubCutaneous every 24 hours  escitalopram 5 milliGRAM(s) Oral daily  influenza   Vaccine 0.5 milliLiter(s) IntraMuscular once  levothyroxine 125 MICROGram(s) Oral daily  losartan 25 milliGRAM(s) Oral daily  metoprolol succinate  milliGRAM(s) Oral daily  metroNIDAZOLE    Tablet 500 milliGRAM(s) Oral every 8 hours  montelukast 10 milliGRAM(s) Oral daily  pantoprazole    Tablet 40 milliGRAM(s) Oral two times a day  tiotropium 2.5 MICROgram(s) Inhaler 2 Puff(s) Inhalation daily    MEDICATIONS  (PRN):  acetaminophen     Tablet .. 650 milliGRAM(s) Oral every 6 hours PRN Moderate Pain (4 - 6)  albuterol    90 MICROgram(s) HFA Inhaler 2 Puff(s) Inhalation every 6 hours PRN Shortness of Breath and/or Wheezing  baclofen 5 milliGRAM(s) Oral every 12 hours PRN Musculoskeletal Pain  clonazePAM  Tablet 1 milliGRAM(s) Oral two times a day PRN anxiety  hydrOXYzine hydrochloride 25 milliGRAM(s) Oral three times a day PRN Anxiety  zolpidem 5 milliGRAM(s) Oral at bedtime PRN Insomnia      LABS                          10.3   6.11  )-----------( 345      ( 01 Apr 2023 07:17 )             32.7     04-01    141  |  104  |  12  ----------------------------<  104<H>  4.0   |  23  |  1.0    Ca    9.1      01 Apr 2023 07:17  Mg     2.0     04-01            Lactate Trend        CAPILLARY BLOOD GLUCOSE          Culture - Urine (collected 03-27-23 @ 20:53)  Source: .Urine None  Final Report (03-29-23 @ 17:14):    Normal Urogenital jaden present        RADIOLOGY & ADDITIONAL TESTS:    Imaging Personally Reviewed:  [ ] YES  [ ] NO    HEALTH ISSUES - PROBLEM Dx:          PHYSICAL EXAM:  GENERAL: NAD, well-developed.  HEAD:  Atraumatic, Normocephalic.  EYES: EOMI, PERRLA, conjunctiva and sclera clear.  NECK: Supple, No JVD.  CHEST/LUNG: Clear to auscultation bilaterally; No wheeze.  HEART: Regular rate and rhythm; S1 S2.   ABDOMEN: Soft, Nontender, Nondistended; mild left lower quadrant tenderness.   EXTREMITIES:  2+ Peripheral Pulses, No clubbing, cyanosis, or edema.  PSYCH: AAOx3.  NEUROLOGY: non-focal.  SKIN: No rashes or lesions.

## 2023-04-01 NOTE — PROGRESS NOTE ADULT - SUBJECTIVE AND OBJECTIVE BOX
Gastroenterology progress note:     Patient is a 57y old  Female who presents with a chief complaint of B/L Blurry Vision (01 Apr 2023 09:53)       Admitted on: 03-27-23    We are following the patient for: diverticulitis        Interval History:    No acute events overnight.   - Diet - tolerating regular diet   - last BM - this am, soft   - Abdominal pain - denies      PAST MEDICAL & SURGICAL HISTORY:  COPD (chronic obstructive pulmonary disease)      HTN (hypertension)      Diverticulitis      Pulmonary nodules/lesions, multiple      Nasopharyngeal cancer      Hypothyroid      Cervical disc disease  sequelae of radtion for nasopharyngeal cancer      Left ear hearing loss      Hemorrhoids      Anxiety      History of cholecystectomy          MEDICATIONS  (STANDING):  aspirin  chewable 81 milliGRAM(s) Oral daily  atorvastatin 80 milliGRAM(s) Oral at bedtime  budesonide  80 MICROgram(s)/formoterol 4.5 MICROgram(s) Inhaler 2 Puff(s) Inhalation two times a day  buPROPion XL (24-Hour) . 300 milliGRAM(s) Oral daily  cefpodoxime 200 milliGRAM(s) Oral every 12 hours  clopidogrel Tablet 75 milliGRAM(s) Oral daily  enoxaparin Injectable 40 milliGRAM(s) SubCutaneous every 24 hours  escitalopram 5 milliGRAM(s) Oral daily  influenza   Vaccine 0.5 milliLiter(s) IntraMuscular once  levothyroxine 125 MICROGram(s) Oral daily  losartan 25 milliGRAM(s) Oral daily  metoprolol succinate  milliGRAM(s) Oral daily  metroNIDAZOLE    Tablet 500 milliGRAM(s) Oral every 8 hours  montelukast 10 milliGRAM(s) Oral daily  pantoprazole    Tablet 40 milliGRAM(s) Oral two times a day  tiotropium 2.5 MICROgram(s) Inhaler 2 Puff(s) Inhalation daily    MEDICATIONS  (PRN):  acetaminophen     Tablet .. 650 milliGRAM(s) Oral every 6 hours PRN Moderate Pain (4 - 6)  albuterol    90 MICROgram(s) HFA Inhaler 2 Puff(s) Inhalation every 6 hours PRN Shortness of Breath and/or Wheezing  baclofen 5 milliGRAM(s) Oral every 12 hours PRN Musculoskeletal Pain  clonazePAM  Tablet 1 milliGRAM(s) Oral two times a day PRN anxiety  hydrOXYzine hydrochloride 25 milliGRAM(s) Oral three times a day PRN Anxiety  zolpidem 5 milliGRAM(s) Oral at bedtime PRN Insomnia      Allergies  ciprofloxacin (Urticaria; Other)      Review of Systems:   Cardiovascular:  No Chest Pain, No Palpitations  Respiratory:  No Cough, No Dyspnea  Gastrointestinal:  As described in HPI  Skin:  No Skin Lesions, No Jaundice  Neuro:  No Syncope, No Dizziness    Physical Examination:  T(C): 36.1 (04-01-23 @ 05:59), Max: 36.6 (03-31-23 @ 19:53)  HR: 70 (04-01-23 @ 06:28) (66 - 73)  BP: 147/69 (04-01-23 @ 06:28) (147/69 - 200/100)  RR: 18 (04-01-23 @ 05:59) (18 - 18)  SpO2: --      03-31-23 @ 07:01  -  04-01-23 @ 07:00  --------------------------------------------------------  IN: 80 mL / OUT: 0 mL / NET: 80 mL        GENERAL: AAOx3, no acute distress.  HEAD:  Atraumatic, Normocephalic  EYES: conjunctiva and sclera clear  NECK: Supple, no JVD or thyromegaly  CHEST/LUNG: Clear to auscultation bilaterally; No wheeze, rhonchi, or rales  HEART: Regular rate and rhythm; normal S1, S2, No murmurs.  ABDOMEN: Soft, nontender, nondistended; Bowel sounds present  NEUROLOGY: No asterixis or tremor.   SKIN: Intact, no jaundice     Data:                        10.3   6.11  )-----------( 345      ( 01 Apr 2023 07:17 )             32.7     Hgb trend:  10.3  04-01-23 @ 07:17  10.0  03-31-23 @ 06:01  10.5  03-30-23 @ 06:33        04-01    141  |  104  |  12  ----------------------------<  104<H>  4.0   |  23  |  1.0    Ca    9.1      01 Apr 2023 07:17  Mg     2.0     04-01      Liver panel trend:  TBili 0.2   /   AST 19   /   ALT 16   /   AlkP 82   /   Tptn 6.3   /   Alb 3.7    /   DBili --      03-29  TBili <0.2   /   AST 17   /   ALT 17   /   AlkP 81   /   Tptn 5.9   /   Alb 3.5    /   DBili -- 03-28  TBili <0.2   /   AST 19   /   ALT 16   /   AlkP 88   /   Tptn 6.7   /   Alb 3.9    /   DBili -- 03-27

## 2023-04-01 NOTE — PROGRESS NOTE ADULT - TIME BILLING
35 minutes spent on total encounter; more than 50% of the visit was spent counseling and / or coordinating care by the attending physician.  The necessity of the time spent during the encounter on this date of service was due to: Coordination of care.
Coordination of care

## 2023-04-01 NOTE — PROGRESS NOTE ADULT - ASSESSMENT
#LLQ pain 2/2 diverticulitis  -Pt reports significant LLQ pain, states pain is improving   -Pt states pain was 10/10 on admission, now 5-6  -Pt tender to palpation on exam LLQ  -Pt has known diverticular disease, with multiple episodes of diverticulitis in the past  -Pt reports episodes of brown diarrhea for last few days  -Last colonoscopy one year ago, 2 polyps removed, otherwise normal per pt  -no known hx of gastric or colon cancer  - nasopharyngeal cancer in remission s/p chemoradiation 2013  -vitals stable  -WBC 6.34  -CTAP showing acute diverticulitis of short segment of the colon   -hgb 10.0 hct 32.3 mcv 82.4 RDW 16.9  - tolerating regular diet     Plan:  -PPI BID  -pain control prn  -zofran prn  -type and screen  -pt is allergic to ciprofloxacin- IV metronidazole and ceftriaxone  -c/w IV abx  -outpatient f/up with Dr. Pelayo for colonoscopy 6-8 weeks  -if pain recurs/persists consider repeat CTAP  -avoid narcotics  -ambulation encouraged    recall PRN

## 2023-04-02 LAB
ANION GAP SERPL CALC-SCNC: 12 MMOL/L — SIGNIFICANT CHANGE UP (ref 7–14)
BASOPHILS # BLD AUTO: 0.04 K/UL — SIGNIFICANT CHANGE UP (ref 0–0.2)
BASOPHILS NFR BLD AUTO: 0.6 % — SIGNIFICANT CHANGE UP (ref 0–1)
BUN SERPL-MCNC: 14 MG/DL — SIGNIFICANT CHANGE UP (ref 10–20)
CALCIUM SERPL-MCNC: 8.8 MG/DL — SIGNIFICANT CHANGE UP (ref 8.4–10.4)
CHLORIDE SERPL-SCNC: 105 MMOL/L — SIGNIFICANT CHANGE UP (ref 98–110)
CO2 SERPL-SCNC: 25 MMOL/L — SIGNIFICANT CHANGE UP (ref 17–32)
CREAT SERPL-MCNC: 1 MG/DL — SIGNIFICANT CHANGE UP (ref 0.7–1.5)
EGFR: 65 ML/MIN/1.73M2 — SIGNIFICANT CHANGE UP
EOSINOPHIL # BLD AUTO: 0.3 K/UL — SIGNIFICANT CHANGE UP (ref 0–0.7)
EOSINOPHIL NFR BLD AUTO: 4.7 % — SIGNIFICANT CHANGE UP (ref 0–8)
GLUCOSE SERPL-MCNC: 99 MG/DL — SIGNIFICANT CHANGE UP (ref 70–99)
HCT VFR BLD CALC: 32.2 % — LOW (ref 37–47)
HGB BLD-MCNC: 10.1 G/DL — LOW (ref 12–16)
IMM GRANULOCYTES NFR BLD AUTO: 0.5 % — HIGH (ref 0.1–0.3)
LYMPHOCYTES # BLD AUTO: 1.56 K/UL — SIGNIFICANT CHANGE UP (ref 1.2–3.4)
LYMPHOCYTES # BLD AUTO: 24.5 % — SIGNIFICANT CHANGE UP (ref 20.5–51.1)
MAGNESIUM SERPL-MCNC: 2 MG/DL — SIGNIFICANT CHANGE UP (ref 1.8–2.4)
MCHC RBC-ENTMCNC: 25.6 PG — LOW (ref 27–31)
MCHC RBC-ENTMCNC: 31.4 G/DL — LOW (ref 32–37)
MCV RBC AUTO: 81.7 FL — SIGNIFICANT CHANGE UP (ref 81–99)
MONOCYTES # BLD AUTO: 0.52 K/UL — SIGNIFICANT CHANGE UP (ref 0.1–0.6)
MONOCYTES NFR BLD AUTO: 8.2 % — SIGNIFICANT CHANGE UP (ref 1.7–9.3)
NEUTROPHILS # BLD AUTO: 3.91 K/UL — SIGNIFICANT CHANGE UP (ref 1.4–6.5)
NEUTROPHILS NFR BLD AUTO: 61.5 % — SIGNIFICANT CHANGE UP (ref 42.2–75.2)
NRBC # BLD: 0 /100 WBCS — SIGNIFICANT CHANGE UP (ref 0–0)
PLATELET # BLD AUTO: 304 K/UL — SIGNIFICANT CHANGE UP (ref 130–400)
POTASSIUM SERPL-MCNC: 4 MMOL/L — SIGNIFICANT CHANGE UP (ref 3.5–5)
POTASSIUM SERPL-SCNC: 4 MMOL/L — SIGNIFICANT CHANGE UP (ref 3.5–5)
RBC # BLD: 3.94 M/UL — LOW (ref 4.2–5.4)
RBC # FLD: 16.7 % — HIGH (ref 11.5–14.5)
SODIUM SERPL-SCNC: 142 MMOL/L — SIGNIFICANT CHANGE UP (ref 135–146)
WBC # BLD: 6.36 K/UL — SIGNIFICANT CHANGE UP (ref 4.8–10.8)
WBC # FLD AUTO: 6.36 K/UL — SIGNIFICANT CHANGE UP (ref 4.8–10.8)

## 2023-04-02 PROCEDURE — 99232 SBSQ HOSP IP/OBS MODERATE 35: CPT

## 2023-04-02 RX ORDER — ACETAMINOPHEN 500 MG
975 TABLET ORAL EVERY 8 HOURS
Refills: 0 | Status: DISCONTINUED | OUTPATIENT
Start: 2023-04-02 | End: 2023-04-03

## 2023-04-02 RX ADMIN — CLOPIDOGREL BISULFATE 75 MILLIGRAM(S): 75 TABLET, FILM COATED ORAL at 13:28

## 2023-04-02 RX ADMIN — MONTELUKAST 10 MILLIGRAM(S): 4 TABLET, CHEWABLE ORAL at 13:28

## 2023-04-02 RX ADMIN — Medication 975 MILLIGRAM(S): at 22:27

## 2023-04-02 RX ADMIN — PANTOPRAZOLE SODIUM 40 MILLIGRAM(S): 20 TABLET, DELAYED RELEASE ORAL at 18:00

## 2023-04-02 RX ADMIN — Medication 100 MILLIGRAM(S): at 05:55

## 2023-04-02 RX ADMIN — ATORVASTATIN CALCIUM 80 MILLIGRAM(S): 80 TABLET, FILM COATED ORAL at 22:19

## 2023-04-02 RX ADMIN — Medication 650 MILLIGRAM(S): at 18:00

## 2023-04-02 RX ADMIN — ENOXAPARIN SODIUM 40 MILLIGRAM(S): 100 INJECTION SUBCUTANEOUS at 18:00

## 2023-04-02 RX ADMIN — LOSARTAN POTASSIUM 25 MILLIGRAM(S): 100 TABLET, FILM COATED ORAL at 05:55

## 2023-04-02 RX ADMIN — ESCITALOPRAM OXALATE 5 MILLIGRAM(S): 10 TABLET, FILM COATED ORAL at 13:28

## 2023-04-02 RX ADMIN — Medication 500 MILLIGRAM(S): at 05:55

## 2023-04-02 RX ADMIN — Medication 125 MICROGRAM(S): at 05:55

## 2023-04-02 RX ADMIN — Medication 81 MILLIGRAM(S): at 13:28

## 2023-04-02 RX ADMIN — Medication 200 MILLIGRAM(S): at 18:00

## 2023-04-02 RX ADMIN — ZOLPIDEM TARTRATE 5 MILLIGRAM(S): 10 TABLET ORAL at 22:18

## 2023-04-02 RX ADMIN — BUPROPION HYDROCHLORIDE 300 MILLIGRAM(S): 150 TABLET, EXTENDED RELEASE ORAL at 13:28

## 2023-04-02 RX ADMIN — PANTOPRAZOLE SODIUM 40 MILLIGRAM(S): 20 TABLET, DELAYED RELEASE ORAL at 05:55

## 2023-04-02 RX ADMIN — Medication 1 MILLIGRAM(S): at 22:17

## 2023-04-02 RX ADMIN — TIOTROPIUM BROMIDE 2 PUFF(S): 18 CAPSULE ORAL; RESPIRATORY (INHALATION) at 07:41

## 2023-04-02 RX ADMIN — Medication 500 MILLIGRAM(S): at 13:28

## 2023-04-02 RX ADMIN — Medication 650 MILLIGRAM(S): at 19:26

## 2023-04-02 RX ADMIN — Medication 200 MILLIGRAM(S): at 05:55

## 2023-04-02 RX ADMIN — Medication 500 MILLIGRAM(S): at 22:20

## 2023-04-02 NOTE — PROGRESS NOTE ADULT - ASSESSMENT
58 y/o woman w PMHx HTN, COPD no home O2, hypothyroidism, CKD w baseline Cr 1.3, diverticulitis, nasopharyngeal CA s/p chemo/RT 2013, MIN and follows w [facility] Onc Dr Arcadio Estrada [phone], h/o depression/anxiety, insomnia, presented to ED 3/27/23 for sudden onset 15min episode of blurry vision in b/l eyes w associated pain behind eyes, w CTA Head/Neck showing mod-severe stenosis of b/l ICA, also reported LLQ pain found on CT Abd/Pelvis to have diverticulitis, admitted for further management of both. Labs in ED notable for WBC 8.15, Cr at baseline 1.3.     Consults:   GI: ctx/flagyl, liquid diet   ID: ctx/flagyl IV -> vantin/flagyl PO   Vascular: Defer to neuroendovascular recs   Neuro: MR brain neg, plan for DSA, antiplt as per LAURIE   Neuroendovascular [id number] Dr Blank: tentative dx cerebral angiogram, carotid angioplasty and endovascular stent recanalization Tues 4/4/23, and to remain on DAPT meanwhile    # High Grade R ICA Stenosis   # TIA/Blurry Vision   - Patient clinically and hemodynamically stable   - Permissive -180  CT head: No acute intracranial pathology  CTA Head/Neck: sev prox R ICA stenosis, mod prox L ICA stenosis, mod stenosis cavernous segments of b/l ICA, severe narrowing P3 seg R PCA   - MRI brain >> no acute pathology  - Aspirin 325 stat followed by 81 QD, Lipitor 80 QD  - Neurology on board   - plavix 75 QD   - Neuro checks q 6  - Neuroendovascular [id number] Dr Blank: tentative dx cerebral angiogram, carotid angioplasty and endovascular stent recanalization Tues 4/4/23, and to remain on DAPT meanwhile  - PRU blood test on 03/29 was 298 (this was asked neuroendo team, Tara Rodas was informed about the results)  - Repeat PRU on 4/2/23   - A1C, TSH, Lipid profile in AM     # Acute Diverticulitis   - S/P Dilaudid 0.5, LR 1L bolus, Zofran 4mg IV, Magnesium 2g, and Zosyn 3.375 IV in ER  - dc meropenem and start Rocephin and Flagyl as per ID  - GI Consulted appreciated  - Pain control Toradol 15mg IV x1 as needed, can try Ofirmev or Tramadol if refractory. Avoid opiates.     # HTN - C/w home metop succ 50 QD  # COPD not on home O2 - C/w home meds   # Hypothyroidism - C/w home levothyroxine     # Depression  # Insomnia  # Anxiety   - C/w home meds     # Right upper lobe 1.2 cm pulmonary nodule, as well as several groundglass opacities.   - Consider follow-up CT of the chest in 3-6 months.                                                                                ----------------------------------------------------  # DVT prophylaxis: Heparin 5000u SQ q8h  # GI prophylaxis: Pantoprazole 40mg IV q12h  # Diet: DASH TLC CC, No red dye, +ensure   # Activity: IAT   # Code status: FULL CODE   # Disposition: Angio 4/4                                                                            --------------------------------------------------------    # Handoff:   - tentatively scheduled for a diagnostic cerebral angiogram + right carotid artery angioplasty and stenting with Dr. Blank Tuesday 4/4. Please keep NPO except medication and on IV fluid midnight prior to the procedure. Coags/CBC/CMP 4/4 AM  - PRU repeat 4/2/23   - Continue ASA 81mg qd and Plavix 75mg qd  - Diverticulitis improvement, diet

## 2023-04-02 NOTE — PROGRESS NOTE ADULT - ASSESSMENT
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 Episode happened while patient was driving and she continued to drive normally. She reports associated pain behind the eyes. Resolved and did not happen again. In the ER patient also complaining of LLQ pain worse when attempting to pass gas.     A/P;   Transient Blurry vision: Suspect TIA.   Carotid Artery Stenosis:   Patient presented with transient blurry vision lasted for 15 minutes, no other weakness.   Neuro exam is non-focal.   Severe high-grade stenosis of the proximal right internal carotid artery,  Moderate left ICA stenosis, right P3, and b/l cavernous ICA stenosis.  Brain MRI 3/28 was Unremarkable, no acute infarct.   CTA Neck 3/27 Severe high-grade stenosis of the proximal right ICA. Moderate stenosis of the proximal left ICA.   CTA HEAD:  Moderate stenosis of the cavernous segments of the bilateral internal . carotid arteries. Severe narrowing of the P3 segment of the right posterior cerebral artery.  Neuroendovascular plan for diagnostic cerebral angiogram 4/4  Continue ASA, Plavix and Lipitor.     Acute sigmoid diverticulitis   CT abd: Acute diverticulitis of a short segment of the sigmoid colon. No abscess or perforation. Further evaluation with nonemergent colonoscopy may be of benefit when patient is clinically able.  Continue Vantin 200mg mg q12h and Flafyl 500 mg q8h till 4/10  GI recommended colonoscopy outpatient after 6-8 weeks.     Hypertension: metoprolol  Hypothyroidism: synthroid  COPD- stable, Continue Budesonide, Spiriva and Montelukast.      Depression: Continue Lexapro and Clonazepam.     Right upper lobe 1.2 cm pulmonary nodule, as well as several groundglass opacities.   Follow-up CT of the chest in 3-6 months.    Morbidly obese  BMI: 50.6    DVT prophylaxis: Lovenox SC.      Full Code.   #Progress Note Handoff:  Pending (specify): diagnostic cerebral angiogram on 4/4.   Family discussion:  Disposition: Home.

## 2023-04-02 NOTE — PROGRESS NOTE ADULT - SUBJECTIVE AND OBJECTIVE BOX
GEOVANNA PUTNAM 57y Female  MRN#: [id number]   CODE STATUS: FULL CODE     Admission Date: 03-27-23  Hospital Day: 6d    Pt is currently admitted with the primary diagnosis of blurry vision     SUBJECTIVE  Hospital Course  58 y/o woman w PMHx HTN, COPD no home O2, hypothyroidism, CKD w baseline Cr 1.3, diverticulitis, nasopharyngeal CA s/p chemo/RT 2013, MIN and follows w [facility] Onc Dr Arcadio Estrada [phone], h/o depression/anxiety, insomnia, presented to ED 3/27/23 for sudden onset 15min episode of blurry vision in b/l eyes w associated pain behind eyes, w CTA Head/Neck showing mod-severe stenosis of b/l ICA, also reported LLQ pain found on CT Abd/Pelvis to have diverticulitis, admitted for further management of both. Labs in ED notable for WBC 8.15, Cr at baseline 1.3.     While admitted, has been seen by multiple services:   GI: ctx/flagyl, NPO for now   ID: ctx/flagyl IV -> PO in 24-48h  Vascular: Defer to neuroendovascular recs   Neuro: MR brain neg, plan for DSA, antiplt as per LAURIE   Neuroendovascular x2405 Dr Blank: tentative dx cerebral angiogram. Poss carotid angioplasty and endovascular stent recanalization Tues 4/4/23, and to remain on DAPT meanwhile    3/31/23 day 4: Regular diet. D/c Dilaudid-> Toradol PRN, can try Ofirmev or Tramadol if refractory.       CTA Head and Neck 3/27/23:   CTA NECK:  1.  Severe high-grade stenosis of the proximal R ICA caused by noncalcified plaque.  2.  Moderate stenosis of the proximal L ICA caused by calcified plaque.  3.  No evidence of vertebral artery stenosis.  CTA HEAD:  1.  Moderate stenosis of the cavernous segments of the b/l ICA  2.  Severe narrowing of the P3 segment of the R PCA.   3.  Otherwise, no evidence of flow-limiting stenosis, occlusion or aneurysm.    CT Abd/Pelvis 3/27/23:   Stable LLL nodule and stable R adrenal nodule.   PERITONEUM/MESENTERY/BOWEL: Short segment of sigmoid colon mural thickening with mild pericolonic fat stranding in the setting of diverticular disease, compatible with acute diverticulitis. No bowel obstruction, ascites or intraperitoneal free air. Unremarkable appendix.  OTHER: Atherosclerotic calcification.          Overnight events  None          PHYSICAL EXAM:  GENERAL: NAD, sleeping in bed comfortably, awakens to light touch   HEAD:  Atraumatic, Normocephalic  EYES: EOMI, sclera clear  ENT: Moist mucous membranes  NECK: Supple, trachea midline  CHEST/LUNG: Clear to auscultation anteriorly bilaterally; No rales, rhonchi, wheezing, or rubs. Unlabored respirations  HEART: Regular rate and rhythm; No appreciable murmurs, rubs, or gallops  ABDOMEN: (+)BS; Soft, nondistended. Mild tenderness w palpation at LLQ   EXTREMITIES:  2+ Radial Pulses, brisk capillary refill. No clubbing, cyanosis, or edema  NERVOUS SYSTEM:  A&Ox3, no noted facial droop. Moving all extremities w/o difficulty.   SKIN: No rashes or lesions to b/l forearm, face.       Present Today:   - Perez:  No [ X ], Yes [  ] : Indication:   - Type of IV Access:       .. CVC/Piccline:  No [ X ], Yes [  ] : Indication:       .. Midline: No [ X ], Yes [  ] : Indication:         T(C): 36.5 (04-02-23 @ 05:41)  T(F): 97.7 (04-02-23 @ 05:41)  HR: 70 (04-02-23 @ 05:41)  BP: 179/79 (04-02-23 @ 05:41)  RR: 18 (04-02-23 @ 05:41)  SpO2: --                                                   OBJECTIVE  PAST MEDICAL & SURGICAL HISTORY  COPD (chronic obstructive pulmonary disease)    HTN (hypertension)    Diverticulitis    Pulmonary nodules/lesions, multiple    Nasopharyngeal cancer    Hypothyroid    Cervical disc disease  sequelae of radtion for nasopharyngeal cancer    Left ear hearing loss    Hemorrhoids    Anxiety    History of cholecystectomy                                                ALLERGIES:  ciprofloxacin (Urticaria; Other)                           HOME MEDICATIONS  Home Medications:  albuterol 90 mcg/inh inhalation aerosol: 2 puff(s) inhaled every 6 hours, As needed, Shortness of Breath and/or Wheezing (28 Mar 2023 13:59)  buPROPion 300 mg/24 hours (XL) oral tablet, extended release: 1 tab(s) orally every 24 hours (28 Mar 2023 13:59)  clonazePAM 1 mg oral tablet: 1 tab(s) orally 2 times a day (28 Mar 2023 13:59)  escitalopram 5 mg oral tablet: 1 tab(s) orally once a day (28 Mar 2023 13:59)  metoprolol succinate 50 mg oral tablet, extended release: 1 tab(s) orally once a day (28 Mar 2023 13:59)  MONTELUKAST SODIUM 10 MG TABS: 1 dose(s) orally once a day (28 Mar 2023 13:59)  Trelegy Ellipta inhalation powder: 1 puff(s) inhaled once a day (28 Mar 2023 13:59)  ZOLPIDEM TARTRATE 10 MG TABS: orally once a day (at bedtime) (28 Mar 2023 13:59)                           MEDICATIONS:  STANDING MEDICATIONS  aspirin  chewable 81 milliGRAM(s) Oral daily  atorvastatin 80 milliGRAM(s) Oral at bedtime  budesonide  80 MICROgram(s)/formoterol 4.5 MICROgram(s) Inhaler 2 Puff(s) Inhalation two times a day  buPROPion XL (24-Hour) . 300 milliGRAM(s) Oral daily  cefpodoxime 200 milliGRAM(s) Oral every 12 hours  clopidogrel Tablet 75 milliGRAM(s) Oral daily  enoxaparin Injectable 40 milliGRAM(s) SubCutaneous every 24 hours  escitalopram 5 milliGRAM(s) Oral daily  influenza   Vaccine 0.5 milliLiter(s) IntraMuscular once  levothyroxine 125 MICROGram(s) Oral daily  losartan 25 milliGRAM(s) Oral daily  metoprolol succinate  milliGRAM(s) Oral daily  metroNIDAZOLE    Tablet 500 milliGRAM(s) Oral every 8 hours  montelukast 10 milliGRAM(s) Oral daily  pantoprazole    Tablet 40 milliGRAM(s) Oral two times a day  tiotropium 2.5 MICROgram(s) Inhaler 2 Puff(s) Inhalation daily    PRN MEDICATIONS  acetaminophen     Tablet .. 650 milliGRAM(s) Oral every 6 hours PRN  albuterol    90 MICROgram(s) HFA Inhaler 2 Puff(s) Inhalation every 6 hours PRN  baclofen 5 milliGRAM(s) Oral every 12 hours PRN  clonazePAM  Tablet 1 milliGRAM(s) Oral two times a day PRN  hydrOXYzine hydrochloride 25 milliGRAM(s) Oral three times a day PRN  zolpidem 5 milliGRAM(s) Oral at bedtime PRN                                            ------------------------------------------------------------  T(C): 36.5 (04-02-23 @ 05:41), Max: 37.1 (04-01-23 @ 13:32)  T(F): 97.7 (04-02-23 @ 05:41), Max: 98.7 (04-01-23 @ 13:32)  HR: 70 (04-02-23 @ 05:41) (69 - 78)  BP: 179/79 (04-02-23 @ 05:41) (147/69 - 179/79)  RR: 18 (04-02-23 @ 05:41) (18 - 18)  SpO2: --      03-31-23 @ 07:01  -  04-01-23 @ 07:00  --------------------------------------------------------  IN: 80 mL / OUT: 0 mL / NET: 80 mL    04-01-23 @ 07:01  -  04-02-23 @ 06:07  --------------------------------------------------------  IN: 1903 mL / OUT: 940 mL / NET: 963 mL                                               LABS:                        10.3   6.11  )-----------( 345      ( 01 Apr 2023 07:17 )             32.7     04-01    141  |  104  |  12  ----------------------------<  104<H>  4.0   |  23  |  1.0    Ca    9.1      01 Apr 2023 07:17  Mg     2.0     04-01                                            RADIOLOGY:      MR Head 3/28/23:   Unremarkable noncontrast MRI of thebrain.      CTA Head and Neck 3/27/23:   CTA NECK:  1.  Severe high-grade stenosis of the proximal R ICA caused by noncalcified plaque.  2.  Moderate stenosis of the proximal L ICA caused by calcified plaque.  3.  No evidence of vertebral artery stenosis.  CTA HEAD:  1.  Moderate stenosis of the cavernous segments of the b/l ICA  2.  Severe narrowing of the P3 segment of the right posterior cerebral artery.  3.  Otherwise, no evidence of flow-limiting stenosis, occlusion or aneurysm.        CT Abd/Pelvis 3/27/23:   LOWER CHEST: Stable left lower lobe 7 mm solid pulmonary nodule (5/62)   Bilateral basilar subsegmental atelectasis.  HEPATOBILIARY: Post cholecystectomy.  SPLEEN: Unremarkable.  PANCREAS: Unremarkable.  ADRENAL GLANDS: Stable 1.3 cm right adrenal nodule.  KIDNEYS: Symmetric enhancement. No hydronephrosis. Right renal cyst. Excreted contrast within the collecting systems.  ABDOMINOPELVIC NODES: No lymphadenopathy.  PELVIC ORGANS: Unremarkable.  PERITONEUM/MESENTERY/BOWEL: Short segment of sigmoid colon mural thickening with mild pericolonic fat stranding in the setting of diverticular disease, compatible with acute diverticulitis. No bowel obstruction, ascites or intraperitoneal free air. Unremarkable appendix.  BONES/SOFT TISSUES: Degenerative changes of the spine. Stable trace anterolisthesis of L4-5 with lower lumbar facet arthropathy..  OTHER: Atherosclerotic calcification.      CT Head noncon 3/27/23:   No acute intracranial pathology.

## 2023-04-02 NOTE — PROGRESS NOTE ADULT - SUBJECTIVE AND OBJECTIVE BOX
GEOVANNA PUTNAM  57y  Female      Patient is a 57y old  Female who presents with a chief complaint of B/L Blurry Vision (02 Apr 2023 06:06)      INTERVAL HPI/OVERNIGHT EVENTS:  She feels ok, no abdominal pain, no events on telemetry.   Vital Signs Last 24 Hrs  T(C): 36.8 (02 Apr 2023 12:44), Max: 36.8 (02 Apr 2023 12:44)  T(F): 98.3 (02 Apr 2023 12:44), Max: 98.3 (02 Apr 2023 12:44)  HR: 71 (02 Apr 2023 12:44) (69 - 71)  BP: 141/87 (02 Apr 2023 12:44) (141/87 - 179/79)  BP(mean): --  RR: 17 (02 Apr 2023 12:44) (17 - 18)  SpO2: --          04-01-23 @ 07:01  -  04-02-23 @ 07:00  --------------------------------------------------------  IN: 1903 mL / OUT: 940 mL / NET: 963 mL    04-02-23 @ 07:01  -  04-02-23 @ 13:49  --------------------------------------------------------  IN: 596 mL / OUT: 0 mL / NET: 596 mL            Consultant(s) Notes Reviewed:  [x ] YES  [ ] NO          MEDICATIONS  (STANDING):  aspirin  chewable 81 milliGRAM(s) Oral daily  atorvastatin 80 milliGRAM(s) Oral at bedtime  budesonide  80 MICROgram(s)/formoterol 4.5 MICROgram(s) Inhaler 2 Puff(s) Inhalation two times a day  buPROPion XL (24-Hour) . 300 milliGRAM(s) Oral daily  cefpodoxime 200 milliGRAM(s) Oral every 12 hours  clopidogrel Tablet 75 milliGRAM(s) Oral daily  enoxaparin Injectable 40 milliGRAM(s) SubCutaneous every 24 hours  escitalopram 5 milliGRAM(s) Oral daily  influenza   Vaccine 0.5 milliLiter(s) IntraMuscular once  levothyroxine 125 MICROGram(s) Oral daily  losartan 25 milliGRAM(s) Oral daily  metoprolol succinate  milliGRAM(s) Oral daily  metroNIDAZOLE    Tablet 500 milliGRAM(s) Oral every 8 hours  montelukast 10 milliGRAM(s) Oral daily  pantoprazole    Tablet 40 milliGRAM(s) Oral two times a day  tiotropium 2.5 MICROgram(s) Inhaler 2 Puff(s) Inhalation daily    MEDICATIONS  (PRN):  acetaminophen     Tablet .. 650 milliGRAM(s) Oral every 6 hours PRN Moderate Pain (4 - 6)  albuterol    90 MICROgram(s) HFA Inhaler 2 Puff(s) Inhalation every 6 hours PRN Shortness of Breath and/or Wheezing  baclofen 5 milliGRAM(s) Oral every 12 hours PRN Musculoskeletal Pain  clonazePAM  Tablet 1 milliGRAM(s) Oral two times a day PRN anxiety  hydrOXYzine hydrochloride 25 milliGRAM(s) Oral three times a day PRN Anxiety  zolpidem 5 milliGRAM(s) Oral at bedtime PRN Insomnia      LABS                          10.1   6.36  )-----------( 304      ( 02 Apr 2023 06:25 )             32.2     04-02    142  |  105  |  14  ----------------------------<  99  4.0   |  25  |  1.0    Ca    8.8      02 Apr 2023 06:25  Mg     2.0     04-02            Lactate Trend        CAPILLARY BLOOD GLUCOSE          Culture - Urine (collected 03-27-23 @ 20:53)  Source: .Urine None  Final Report (03-29-23 @ 17:14):    Normal Urogenital jaden present        RADIOLOGY & ADDITIONAL TESTS:    Imaging Personally Reviewed:  [ ] YES  [ ] NO    HEALTH ISSUES - PROBLEM Dx:          PHYSICAL EXAM:  GENERAL: NAD, well-developed.  HEAD:  Atraumatic, Normocephalic.  EYES: EOMI, PERRLA, conjunctiva and sclera clear.  NECK: Supple, No JVD.  CHEST/LUNG: Clear to auscultation bilaterally; No wheeze.  HEART: Regular rate and rhythm; S1 S2.   ABDOMEN: Soft, Nontender, Nondistended; mild left lower quadrant tenderness.   EXTREMITIES:  2+ Peripheral Pulses, No clubbing, cyanosis, or edema.  PSYCH: AAOx3.  NEUROLOGY: non-focal.  SKIN: No rashes or lesions.

## 2023-04-03 LAB
ANION GAP SERPL CALC-SCNC: 15 MMOL/L — HIGH (ref 7–14)
BASOPHILS # BLD AUTO: 0.05 K/UL — SIGNIFICANT CHANGE UP (ref 0–0.2)
BASOPHILS NFR BLD AUTO: 0.6 % — SIGNIFICANT CHANGE UP (ref 0–1)
BLD GP AB SCN SERPL QL: SIGNIFICANT CHANGE UP
BUN SERPL-MCNC: 14 MG/DL — SIGNIFICANT CHANGE UP (ref 10–20)
CALCIUM SERPL-MCNC: 9 MG/DL — SIGNIFICANT CHANGE UP (ref 8.4–10.4)
CHLORIDE SERPL-SCNC: 106 MMOL/L — SIGNIFICANT CHANGE UP (ref 98–110)
CO2 SERPL-SCNC: 21 MMOL/L — SIGNIFICANT CHANGE UP (ref 17–32)
CREAT SERPL-MCNC: 1.1 MG/DL — SIGNIFICANT CHANGE UP (ref 0.7–1.5)
EGFR: 58 ML/MIN/1.73M2 — LOW
EOSINOPHIL # BLD AUTO: 0.44 K/UL — SIGNIFICANT CHANGE UP (ref 0–0.7)
EOSINOPHIL NFR BLD AUTO: 4.9 % — SIGNIFICANT CHANGE UP (ref 0–8)
GLUCOSE SERPL-MCNC: 100 MG/DL — HIGH (ref 70–99)
HCT VFR BLD CALC: 33.9 % — LOW (ref 37–47)
HGB BLD-MCNC: 10.6 G/DL — LOW (ref 12–16)
IMM GRANULOCYTES NFR BLD AUTO: 0.3 % — SIGNIFICANT CHANGE UP (ref 0.1–0.3)
LYMPHOCYTES # BLD AUTO: 2.23 K/UL — SIGNIFICANT CHANGE UP (ref 1.2–3.4)
LYMPHOCYTES # BLD AUTO: 24.7 % — SIGNIFICANT CHANGE UP (ref 20.5–51.1)
MAGNESIUM SERPL-MCNC: 1.9 MG/DL — SIGNIFICANT CHANGE UP (ref 1.8–2.4)
MCHC RBC-ENTMCNC: 25.7 PG — LOW (ref 27–31)
MCHC RBC-ENTMCNC: 31.3 G/DL — LOW (ref 32–37)
MCV RBC AUTO: 82.1 FL — SIGNIFICANT CHANGE UP (ref 81–99)
MONOCYTES # BLD AUTO: 0.7 K/UL — HIGH (ref 0.1–0.6)
MONOCYTES NFR BLD AUTO: 7.8 % — SIGNIFICANT CHANGE UP (ref 1.7–9.3)
NEUTROPHILS # BLD AUTO: 5.57 K/UL — SIGNIFICANT CHANGE UP (ref 1.4–6.5)
NEUTROPHILS NFR BLD AUTO: 61.7 % — SIGNIFICANT CHANGE UP (ref 42.2–75.2)
NRBC # BLD: 0 /100 WBCS — SIGNIFICANT CHANGE UP (ref 0–0)
PLATELET # BLD AUTO: 343 K/UL — SIGNIFICANT CHANGE UP (ref 130–400)
POTASSIUM SERPL-MCNC: 4.3 MMOL/L — SIGNIFICANT CHANGE UP (ref 3.5–5)
POTASSIUM SERPL-SCNC: 4.3 MMOL/L — SIGNIFICANT CHANGE UP (ref 3.5–5)
RBC # BLD: 4.13 M/UL — LOW (ref 4.2–5.4)
RBC # FLD: 17 % — HIGH (ref 11.5–14.5)
SARS-COV-2 RNA SPEC QL NAA+PROBE: SIGNIFICANT CHANGE UP
SODIUM SERPL-SCNC: 142 MMOL/L — SIGNIFICANT CHANGE UP (ref 135–146)
WBC # BLD: 9.02 K/UL — SIGNIFICANT CHANGE UP (ref 4.8–10.8)
WBC # FLD AUTO: 9.02 K/UL — SIGNIFICANT CHANGE UP (ref 4.8–10.8)

## 2023-04-03 PROCEDURE — 99232 SBSQ HOSP IP/OBS MODERATE 35: CPT

## 2023-04-03 RX ORDER — ACETAMINOPHEN 500 MG
975 TABLET ORAL EVERY 6 HOURS
Refills: 0 | Status: DISCONTINUED | OUTPATIENT
Start: 2023-04-03 | End: 2023-04-10

## 2023-04-03 RX ORDER — SODIUM CHLORIDE 9 MG/ML
1000 INJECTION INTRAMUSCULAR; INTRAVENOUS; SUBCUTANEOUS
Refills: 0 | Status: DISCONTINUED | OUTPATIENT
Start: 2023-04-03 | End: 2023-04-05

## 2023-04-03 RX ORDER — TICAGRELOR 90 MG/1
90 TABLET ORAL EVERY 12 HOURS
Refills: 0 | Status: DISCONTINUED | OUTPATIENT
Start: 2023-04-03 | End: 2023-04-10

## 2023-04-03 RX ADMIN — Medication 1 MILLIGRAM(S): at 22:33

## 2023-04-03 RX ADMIN — ESCITALOPRAM OXALATE 5 MILLIGRAM(S): 10 TABLET, FILM COATED ORAL at 12:37

## 2023-04-03 RX ADMIN — Medication 81 MILLIGRAM(S): at 12:37

## 2023-04-03 RX ADMIN — TIOTROPIUM BROMIDE 2 PUFF(S): 18 CAPSULE ORAL; RESPIRATORY (INHALATION) at 09:21

## 2023-04-03 RX ADMIN — Medication 975 MILLIGRAM(S): at 17:50

## 2023-04-03 RX ADMIN — ENOXAPARIN SODIUM 40 MILLIGRAM(S): 100 INJECTION SUBCUTANEOUS at 17:02

## 2023-04-03 RX ADMIN — BUDESONIDE AND FORMOTEROL FUMARATE DIHYDRATE 2 PUFF(S): 160; 4.5 AEROSOL RESPIRATORY (INHALATION) at 08:35

## 2023-04-03 RX ADMIN — Medication 975 MILLIGRAM(S): at 22:33

## 2023-04-03 RX ADMIN — Medication 1 MILLIGRAM(S): at 13:00

## 2023-04-03 RX ADMIN — MONTELUKAST 10 MILLIGRAM(S): 4 TABLET, CHEWABLE ORAL at 12:37

## 2023-04-03 RX ADMIN — PANTOPRAZOLE SODIUM 40 MILLIGRAM(S): 20 TABLET, DELAYED RELEASE ORAL at 05:50

## 2023-04-03 RX ADMIN — Medication 975 MILLIGRAM(S): at 00:12

## 2023-04-03 RX ADMIN — Medication 100 MILLIGRAM(S): at 05:50

## 2023-04-03 RX ADMIN — TICAGRELOR 90 MILLIGRAM(S): 90 TABLET ORAL at 17:03

## 2023-04-03 RX ADMIN — CLOPIDOGREL BISULFATE 75 MILLIGRAM(S): 75 TABLET, FILM COATED ORAL at 12:37

## 2023-04-03 RX ADMIN — PANTOPRAZOLE SODIUM 40 MILLIGRAM(S): 20 TABLET, DELAYED RELEASE ORAL at 17:02

## 2023-04-03 RX ADMIN — Medication 975 MILLIGRAM(S): at 16:47

## 2023-04-03 RX ADMIN — Medication 125 MICROGRAM(S): at 05:51

## 2023-04-03 RX ADMIN — BUPROPION HYDROCHLORIDE 300 MILLIGRAM(S): 150 TABLET, EXTENDED RELEASE ORAL at 12:37

## 2023-04-03 RX ADMIN — LOSARTAN POTASSIUM 25 MILLIGRAM(S): 100 TABLET, FILM COATED ORAL at 05:51

## 2023-04-03 RX ADMIN — Medication 200 MILLIGRAM(S): at 17:02

## 2023-04-03 RX ADMIN — ATORVASTATIN CALCIUM 80 MILLIGRAM(S): 80 TABLET, FILM COATED ORAL at 22:32

## 2023-04-03 RX ADMIN — Medication 500 MILLIGRAM(S): at 13:00

## 2023-04-03 RX ADMIN — Medication 500 MILLIGRAM(S): at 05:50

## 2023-04-03 RX ADMIN — Medication 500 MILLIGRAM(S): at 22:33

## 2023-04-03 RX ADMIN — ZOLPIDEM TARTRATE 5 MILLIGRAM(S): 10 TABLET ORAL at 22:34

## 2023-04-03 RX ADMIN — Medication 200 MILLIGRAM(S): at 05:51

## 2023-04-03 RX ADMIN — Medication 975 MILLIGRAM(S): at 23:33

## 2023-04-03 NOTE — PROGRESS NOTE ADULT - SUBJECTIVE AND OBJECTIVE BOX
Neuroendovascular Progress Note:     HPI:  A neuroendovascular consult was placed for severe high-grade stenosis of the proximal right internal carotid artery, which was identified on CTA. The patient also has moderate left ICA stenosis, right P3, and b/l cavernous ICA stenosis. On exam the patient reports vision is back to baseline. The risks and benefits of a diagnostic cerebral angiogram +/- carotid angioplasty and stenting were discussed with the patient who elected to contact her oncologist first (Arcadio Estrada MD at [facility] [phone]). After discussion between Dr. Blank and Dr. Mckeon, it was determined that the patient would be a good candidate for a neuroendovascular intervention. On exam this morning the patient has no acute complaints. The risks and benefits of a diagnostic cerebral angiogram + carotid angioplasty and stenting were again reviewed with the patient who has no questions at this time.    Past Medical History:   COPD (chronic obstructive pulmonary disease)  HTN (hypertension)  Diverticulitis  Pulmonary nodules/lesions, multiple  Nasopharyngeal cancer  Hypothyroid  Cervical disc disease  Left ear hearing loss  Hemorrhoids  Anxiety    24-Hour Events: None    Medication:  aspirin  chewable 81 milliGRAM(s) Oral daily  atorvastatin 80 milliGRAM(s) Oral at bedtime  budesonide  80 MICROgram(s)/formoterol 4.5 MICROgram(s) Inhaler 2 Puff(s) Inhalation two times a day  buPROPion XL (24-Hour) . 300 milliGRAM(s) Oral daily  cefpodoxime 200 milliGRAM(s) Oral every 12 hours  clopidogrel Tablet 75 milliGRAM(s) Oral daily  enoxaparin Injectable 40 milliGRAM(s) SubCutaneous every 24 hours  escitalopram 5 milliGRAM(s) Oral daily  influenza   Vaccine 0.5 milliLiter(s) IntraMuscular once  levothyroxine 125 MICROGram(s) Oral daily  losartan 25 milliGRAM(s) Oral daily  metoprolol succinate  milliGRAM(s) Oral daily  metroNIDAZOLE    Tablet 500 milliGRAM(s) Oral every 8 hours  montelukast 10 milliGRAM(s) Oral daily  pantoprazole    Tablet 40 milliGRAM(s) Oral two times a day  tiotropium 2.5 MICROgram(s) Inhaler 2 Puff(s) Inhalation daily    Allergies:  ciprofloxacin (Urticaria; Other)    Recent Vitals:  T(F): 96.6 (04-03-23 @ 05:15), Max: 98.3 (04-02-23 @ 12:44)  HR: 69 (04-03-23 @ 05:15) (65 - 71)  BP: 154/70 (04-03-23 @ 05:15) (128/72 - 154/70)  RR: 18 (04-03-23 @ 05:15) (17 - 18)  SpO2: --    COVID-19 PCR: NotDetec (27 Mar 2023 20:24)    Recent Labs:                        10.6   9.02  )-----------( 343      ( 03 Apr 2023 05:43 )             33.9     04-03    142  |  106  |  14  ----------------------------<  100<H>  4.3   |  21  |  1.1    Ca    9.0      03 Apr 2023 05:43  Mg     1.9     04-03    Exam:   General:  NAD  Neuro: AAOx3, visual fields full with no reported blurry vision or diplopia, no dysarthria, no aphasia, naming and repetition intact, face symmetrical, +bilateral hearing loss, moving all extremities antigravity with + bilateral LE 4/5, sensation intact to light touch throughout.     Radiology:     ACC: [id number] EXAM: MR BRAIN ORDERED BY: LUPE GREY    PROCEDURE DATE: 03/28/2023    IMPRESSION:  Unremarkable noncontrast MRI of the brain.    Assessment:   A neuroendovascular consult was placed for severe high-grade stenosis of the proximal right internal carotid artery, which was identified on CTA. The patient also has moderate left ICA stenosis, right P3, and b/l cavernous ICA stenosis. On exam the patient reports vision is back to baseline. The risks and benefits of a diagnostic cerebral angiogram +/- carotid angioplasty and stenting were discussed with the patient who elected to contact her oncologist first (Arcadio Estrada MD at [facility]- [phone]). After discussion between Dr. Blank and Dr. Mckeon, it was determined that the patient would be a good candidate for a neuroendovascular intervention. On exam this morning the patient has no acute complaints. The risks and benefits of a diagnostic cerebral angiogram + carotid angioplasty and stenting were again reviewed with the patient who has no questions at this time.    Suggestions:  - The patient is tentatively scheduled for a diagnostic cerebral angiogram + carotid angioplasty and stenting tomorrow with Dr. Blank in IR  - Recommending PRU and Covid PCR today; AM coag panel/ CBC/ CMP tomorrow AM   - NPO except medication and on IV fluid after midnight tonight in preparation for the procedure tomorrow  - Management per primary team  [id number] Neuroendovascular with any questions/ concerns Neuroendovascular Progress Note:     HPI:  A neuroendovascular consult was placed for severe high-grade stenosis of the proximal right internal carotid artery, which was identified on CTA. The patient also has moderate left ICA stenosis, right P3, and b/l cavernous ICA stenosis. On exam the patient reports vision is back to baseline. The risks and benefits of a diagnostic cerebral angiogram +/- carotid angioplasty and stenting were discussed with the patient who elected to contact her oncologist first (Arcadio Estrada MD at [facility] [phone]). After discussion between Dr. Blank and Dr. Mckeon, it was determined that the patient would be a good candidate for a neuroendovascular intervention. On exam this morning the patient has no acute complaints. The risks and benefits of a diagnostic cerebral angiogram + carotid angioplasty and stenting were again reviewed with the patient who has no questions at this time.    Past Medical History:   COPD (chronic obstructive pulmonary disease)  HTN (hypertension)  Diverticulitis  Pulmonary nodules/lesions, multiple  Nasopharyngeal cancer  Hypothyroid  Cervical disc disease  Left ear hearing loss  Hemorrhoids  Anxiety    24-Hour Events: None    Medication:  aspirin  chewable 81 milliGRAM(s) Oral daily  atorvastatin 80 milliGRAM(s) Oral at bedtime  budesonide  80 MICROgram(s)/formoterol 4.5 MICROgram(s) Inhaler 2 Puff(s) Inhalation two times a day  buPROPion XL (24-Hour) . 300 milliGRAM(s) Oral daily  cefpodoxime 200 milliGRAM(s) Oral every 12 hours  clopidogrel Tablet 75 milliGRAM(s) Oral daily  enoxaparin Injectable 40 milliGRAM(s) SubCutaneous every 24 hours  escitalopram 5 milliGRAM(s) Oral daily  influenza   Vaccine 0.5 milliLiter(s) IntraMuscular once  levothyroxine 125 MICROGram(s) Oral daily  losartan 25 milliGRAM(s) Oral daily  metoprolol succinate  milliGRAM(s) Oral daily  metroNIDAZOLE    Tablet 500 milliGRAM(s) Oral every 8 hours  montelukast 10 milliGRAM(s) Oral daily  pantoprazole    Tablet 40 milliGRAM(s) Oral two times a day  tiotropium 2.5 MICROgram(s) Inhaler 2 Puff(s) Inhalation daily    Allergies:  ciprofloxacin (Urticaria; Other)    Recent Vitals:  T(F): 96.6 (04-03-23 @ 05:15), Max: 98.3 (04-02-23 @ 12:44)  HR: 69 (04-03-23 @ 05:15) (65 - 71)  BP: 154/70 (04-03-23 @ 05:15) (128/72 - 154/70)  RR: 18 (04-03-23 @ 05:15) (17 - 18)  SpO2: --    COVID-19 PCR: NotDetec (27 Mar 2023 20:24)    Recent Labs:                        10.6   9.02  )-----------( 343      ( 03 Apr 2023 05:43 )             33.9     04-03    142  |  106  |  14  ----------------------------<  100<H>  4.3   |  21  |  1.1    Ca    9.0      03 Apr 2023 05:43  Mg     1.9     04-03    Exam:   General:  NAD  Neuro: AAOx3, visual fields full with no reported blurry vision or diplopia, no dysarthria, no aphasia, naming and repetition intact, face symmetrical, +bilateral hearing loss, moving all extremities antigravity with + bilateral LE 4/5, sensation intact to light touch throughout.     Radiology:     ACC: [id number] EXAM: MR BRAIN ORDERED BY: LUPE GREY    PROCEDURE DATE: 03/28/2023    IMPRESSION:  Unremarkable noncontrast MRI of the brain.    Assessment:   A neuroendovascular consult was placed for severe high-grade stenosis of the proximal right internal carotid artery, which was identified on CTA. The patient also has moderate left ICA stenosis, right P3, and b/l cavernous ICA stenosis. On exam the patient reports vision is back to baseline. The risks and benefits of a diagnostic cerebral angiogram +/- carotid angioplasty and stenting were discussed with the patient who elected to contact her oncologist first (Arcadio Estrada MD at [facility]- [phone]). After discussion between Dr. Blank and Dr. Mckeon, it was determined that the patient would be a good candidate for a neuroendovascular intervention. On exam this morning the patient has no acute complaints. The risks and benefits of a diagnostic cerebral angiogram + carotid angioplasty and stenting were again reviewed with the patient who has no questions at this time.    Suggestions:  - The patient is tentatively scheduled for a diagnostic cerebral angiogram + carotid angioplasty and stenting tomorrow with Dr. Blank in IR  - Recommending PRU and Covid PCR today; AM coag panel/ CBC/ CMP tomorrow AM   - NPO except medication and on IV fluid after midnight tonight in preparation for the procedure tomorrow  - Management per primary team  [id number] Neuroendovascular with any questions/ concerns    Update 15:30: The patient's PRU today was 228. The patient should be switched to Brilinta 90 mg q 12 hours and remain on Aspirin 81 mg qd leading up to the procedure tomorrow.

## 2023-04-03 NOTE — PROGRESS NOTE ADULT - ASSESSMENT
56 y/o woman w PMHx HTN, COPD no home O2, hypothyroidism, CKD w baseline Cr 1.3, diverticulitis, nasopharyngeal CA s/p chemo/RT 2013, MIN and follows w [facility] Onc Dr Arcadio Estrada [phone], h/o depression/anxiety, insomnia, presented to ED 3/27/23 for sudden onset 15min episode of blurry vision in b/l eyes w associated pain behind eyes, w CTA Head/Neck showing mod-severe stenosis of b/l ICA, also reported LLQ pain found on CT Abd/Pelvis to have diverticulitis, admitted for further management of both. Labs in ED notable for WBC 8.15, Cr at baseline 1.3.     Consults:   GI: ctx/flagyl, liquid diet   ID: ctx/flagyl IV -> vantin/flagyl PO   Vascular: Defer to neuroendovascular recs   Neuro: MR brain neg, plan for DSA, antiplt as per LAURIE   Neuroendovascular [id number] Dr Blank: tentative dx cerebral angiogram, carotid angioplasty and endovascular stent recanalization Tues 4/4/23, and to remain on DAPT meanwhile    # High Grade R ICA Stenosis   # TIA/Blurry Vision   - Patient clinically and hemodynamically stable   - Permissive -180  CT head: No acute intracranial pathology  CTA Head/Neck: sev prox R ICA stenosis, mod prox L ICA stenosis, mod stenosis cavernous segments of b/l ICA, severe narrowing P3 seg R PCA   - MRI brain >> no acute pathology  - Aspirin 325 stat followed by 81 QD, Lipitor 80 QD  - Neurology on board   - plavix 75 QD   - Neuro checks q 6  - Neuroendovascular [id number] Dr Blank: tentative dx cerebral angiogram, carotid angioplasty and endovascular stent recanalization Tues 4/4/23, and to remain on DAPT meanwhile  - PRU blood test on 03/29 was 298 (this was asked neuroendo team, Tara Rodas was informed about the results)  - Repeat PRU on 4/3/23---> 228> will switch to brilinta 90 BID   - A1C, TSH, Lipid profile in AM     # Acute Diverticulitis   - S/P Dilaudid 0.5, LR 1L bolus, Zofran 4mg IV, Magnesium 2g, and Zosyn 3.375 IV in ER  - dc meropenem and start Rocephin and Flagyl as per ID  - GI Consulted appreciated  - Pain control Toradol 15mg IV x1 as needed, can try Ofirmev or Tramadol if refractory. Avoid opiates.     # HTN - C/w home metop succ 50 QD  # COPD not on home O2 - C/w home meds   # Hypothyroidism - C/w home levothyroxine     # Depression  # Insomnia  # Anxiety   - C/w home meds     # Right upper lobe 1.2 cm pulmonary nodule, as well as several groundglass opacities.   - Consider follow-up CT of the chest in 3-6 months.                                                                                ----------------------------------------------------  # DVT prophylaxis: Heparin 5000u SQ q8h  # GI prophylaxis: Pantoprazole 40mg IV q12h  # Diet: DASH TLC CC, No red dye, +ensure   # Activity: IAT   # Code status: FULL CODE   # Disposition: Angio 4/4                                                                            --------------------------------------------------------    # Handoff:   - tentatively scheduled for a diagnostic cerebral angiogram + right carotid artery angioplasty and stenting with Dr. Blank Tuesday 4/4. Please keep NPO except medication and on IV fluid midnight prior to the procedure. Coags/CBC/CMP 4/4 AM  - PRU repeat 4/2/23   - Continue ASA 81mg qd and Plavix 75mg qd  - Diverticulitis improvement, diet

## 2023-04-03 NOTE — PROGRESS NOTE ADULT - SUBJECTIVE AND OBJECTIVE BOX
GEOVANNA PUTNAM  57y  Female      Patient is a 57y old  Female who presents with a chief complaint of B/L Blurry Vision (03 Apr 2023 15:28)      INTERVAL HPI/OVERNIGHT EVENTS:  She feels ok, no abdominal pain or diarrhea.   Vital Signs Last 24 Hrs  T(C): 36.8 (03 Apr 2023 14:08), Max: 36.8 (03 Apr 2023 14:08)  T(F): 98.3 (03 Apr 2023 14:08), Max: 98.3 (03 Apr 2023 14:08)  HR: 73 (03 Apr 2023 14:08) (65 - 73)  BP: 106/61 (03 Apr 2023 14:08) (106/61 - 154/70)  BP(mean): --  RR: 18 (03 Apr 2023 14:08) (18 - 18)  SpO2: --          04-02-23 @ 07:01  -  04-03-23 @ 07:00  --------------------------------------------------------  IN: 1069 mL / OUT: 0 mL / NET: 1069 mL            Consultant(s) Notes Reviewed:  [x ] YES  [ ] NO          MEDICATIONS  (STANDING):  aspirin  chewable 81 milliGRAM(s) Oral daily  atorvastatin 80 milliGRAM(s) Oral at bedtime  budesonide  80 MICROgram(s)/formoterol 4.5 MICROgram(s) Inhaler 2 Puff(s) Inhalation two times a day  buPROPion XL (24-Hour) . 300 milliGRAM(s) Oral daily  cefpodoxime 200 milliGRAM(s) Oral every 12 hours  enoxaparin Injectable 40 milliGRAM(s) SubCutaneous every 24 hours  escitalopram 5 milliGRAM(s) Oral daily  influenza   Vaccine 0.5 milliLiter(s) IntraMuscular once  levothyroxine 125 MICROGram(s) Oral daily  losartan 25 milliGRAM(s) Oral daily  metoprolol succinate  milliGRAM(s) Oral daily  metroNIDAZOLE    Tablet 500 milliGRAM(s) Oral every 8 hours  montelukast 10 milliGRAM(s) Oral daily  pantoprazole    Tablet 40 milliGRAM(s) Oral two times a day  sodium chloride 0.9%. 1000 milliLiter(s) (60 mL/Hr) IV Continuous <Continuous>  ticagrelor 90 milliGRAM(s) Oral every 12 hours  tiotropium 2.5 MICROgram(s) Inhaler 2 Puff(s) Inhalation daily    MEDICATIONS  (PRN):  acetaminophen     Tablet .. 975 milliGRAM(s) Oral every 8 hours PRN Temp greater or equal to 38C (100.4F), Mild Pain (1 - 3), Moderate Pain (4 - 6)  albuterol    90 MICROgram(s) HFA Inhaler 2 Puff(s) Inhalation every 6 hours PRN Shortness of Breath and/or Wheezing  baclofen 5 milliGRAM(s) Oral every 12 hours PRN Musculoskeletal Pain  clonazePAM  Tablet 1 milliGRAM(s) Oral two times a day PRN anxiety  hydrOXYzine hydrochloride 25 milliGRAM(s) Oral three times a day PRN Anxiety  zolpidem 5 milliGRAM(s) Oral at bedtime PRN Insomnia      LABS                          10.6   9.02  )-----------( 343      ( 03 Apr 2023 05:43 )             33.9     04-03    142  |  106  |  14  ----------------------------<  100<H>  4.3   |  21  |  1.1    Ca    9.0      03 Apr 2023 05:43  Mg     1.9     04-03            Lactate Trend        CAPILLARY BLOOD GLUCOSE          Culture - Urine (collected 03-27-23 @ 20:53)  Source: .Urine None  Final Report (03-29-23 @ 17:14):    Normal Urogenital jaden present        RADIOLOGY & ADDITIONAL TESTS:    Imaging Personally Reviewed:  [ ] YES  [ ] NO    HEALTH ISSUES - PROBLEM Dx:          PHYSICAL EXAM:  GENERAL: NAD, well-developed.  HEAD:  Atraumatic, Normocephalic.  EYES: EOMI, PERRLA, conjunctiva and sclera clear.  NECK: Supple, No JVD.  CHEST/LUNG: Clear to auscultation bilaterally; No wheeze.  HEART: Regular rate and rhythm; S1 S2.   ABDOMEN: Soft, Nontender, Nondistended; mild left lower quadrant tenderness.   EXTREMITIES:  2+ Peripheral Pulses, No clubbing, cyanosis, or edema.  PSYCH: AAOx3.  NEUROLOGY: non-focal.  SKIN: No rashes or lesions.

## 2023-04-03 NOTE — PROGRESS NOTE ADULT - SUBJECTIVE AND OBJECTIVE BOX
Patient is a 57y old  Female who presents with a chief complaint of B/L Blurry Vision (2023 10:38)      HPI:  HPI: 57 year old morbidly obese female has medical history of 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 , hypothyroidism, Diverticulitis, COPD (not on oxygen), HTN, Depression, Insomnia, and Anxiety presents to ED c/o of sudden onset B/L blurry vision that lasted 15 mins earlier in the morning. Episode happened while patient was driving and she continued to drive normally. She reports associated pain behind the eyes. Resolved and did not happen again. In the ER patient also complaining of LLQ pain worse when attempting to pass gas. Denies any fever, chills, headaches, SOB, CP, NVD, Dysuria, or MSK pain.     In the ER:  Vitals: /58, HR 82, RR 20, SpO2 100 RA, T 98.4 oral  EKst deg av block  Sig Labs: WBC 8.15, Hgb 10.7 at baseline, Cr 1.3  eGFR 47, Mg 1.7, , Trop -ve, UA -ve, COVID -ve   Imaging:  CT head: No acute intracranial pathology.  CTA NECK:  1.  Severe high-grade stenosis of the proximal right internal carotid artery caused by noncalcified plaque.  2.  Moderate stenosis of the proximal left internal carotid artery caused by calcified plaque.  3.  No evidence of vertebral artery stenosis.  CTA HEAD:  1.  Moderate stenosis of the cavernous segments of the bilateral internal carotid arteries  2.  Severe narrowing of the P3 segment of the right posterior cerebral artery.  3.  Otherwise, no evidence of flow-limiting stenosis, occlusion or aneurysm.  Other: Right upper lobe 1.2 cm pulmonary nodule, as well as several groundglass opacities. Consider follow-up CT of the chest in 3-6 months.  CT AP: Acute diverticulitis of a short segment of the sigmoid colon. No abscess or perforation. Further evaluation with nonemergent colonoscopy may be of benefit when patient is clinically able.    In the ER: Given Dilaudid 0.5, LR 1L bolus, Zofran 4mg IV, Magnesium 2g, and Zosyn 3.375 IV (28 Mar 2023 01:30)      PAST MEDICAL & SURGICAL HISTORY:  COPD (chronic obstructive pulmonary disease)      HTN (hypertension)      Diverticulitis      Pulmonary nodules/lesions, multiple      Nasopharyngeal cancer      Hypothyroid      Cervical disc disease  sequelae of radtion for nasopharyngeal cancer      Left ear hearing loss      Hemorrhoids      Anxiety      History of cholecystectomy          Home Medications:  albuterol 90 mcg/inh inhalation aerosol: 2 puff(s) inhaled every 6 hours, As needed, Shortness of Breath and/or Wheezing (28 Mar 2023 13:59)  buPROPion 300 mg/24 hours (XL) oral tablet, extended release: 1 tab(s) orally every 24 hours (28 Mar 2023 13:59)  clonazePAM 1 mg oral tablet: 1 tab(s) orally 2 times a day (28 Mar 2023 13:59)  escitalopram 5 mg oral tablet: 1 tab(s) orally once a day (28 Mar 2023 13:59)  metoprolol succinate 50 mg oral tablet, extended release: 1 tab(s) orally once a day (28 Mar 2023 13:59)  MONTELUKAST SODIUM 10 MG TABS: 1 dose(s) orally once a day (28 Mar 2023 13:59)  Trelegy Ellipta inhalation powder: 1 puff(s) inhaled once a day (28 Mar 2023 13:59)  ZOLPIDEM TARTRATE 10 MG TABS: orally once a day (at bedtime) (28 Mar 2023 13:59)      .  Tobacco use:   EtOH use:  Illicit drug use:    Living situation:    Allergies:  ciprofloxacin (Urticaria; Other)      FAMILY HISTORY:  Family history of lung cancer (Father, Mother)        Hospital Medications:  acetaminophen     Tablet .. 975 milliGRAM(s) Oral every 8 hours PRN  albuterol    90 MICROgram(s) HFA Inhaler 2 Puff(s) Inhalation every 6 hours PRN  aspirin  chewable 81 milliGRAM(s) Oral daily  atorvastatin 80 milliGRAM(s) Oral at bedtime  baclofen 5 milliGRAM(s) Oral every 12 hours PRN  budesonide  80 MICROgram(s)/formoterol 4.5 MICROgram(s) Inhaler 2 Puff(s) Inhalation two times a day  buPROPion XL (24-Hour) . 300 milliGRAM(s) Oral daily  cefpodoxime 200 milliGRAM(s) Oral every 12 hours  clonazePAM  Tablet 1 milliGRAM(s) Oral two times a day PRN  enoxaparin Injectable 40 milliGRAM(s) SubCutaneous every 24 hours  escitalopram 5 milliGRAM(s) Oral daily  hydrOXYzine hydrochloride 25 milliGRAM(s) Oral three times a day PRN  influenza   Vaccine 0.5 milliLiter(s) IntraMuscular once  levothyroxine 125 MICROGram(s) Oral daily  losartan 25 milliGRAM(s) Oral daily  metoprolol succinate  milliGRAM(s) Oral daily  metroNIDAZOLE    Tablet 500 milliGRAM(s) Oral every 8 hours  montelukast 10 milliGRAM(s) Oral daily  pantoprazole    Tablet 40 milliGRAM(s) Oral two times a day  ticagrelor 90 milliGRAM(s) Oral every 12 hours  tiotropium 2.5 MICROgram(s) Inhaler 2 Puff(s) Inhalation daily  zolpidem 5 milliGRAM(s) Oral at bedtime PRN      Vital Signs Last 24 Hrs  T(C): 36.8 (2023 14:08), Max: 36.8 (2023 14:08)  T(F): 98.3 (2023 14:08), Max: 98.3 (2023 14:08)  HR: 73 (2023 14:08) (65 - 73)  BP: 106/61 (2023 14:08) (106/61 - 154/70)  BP(mean): --  RR: 18 (2023 14:08) (18 - 18)  SpO2: --        I&O's Summary    2023 07:01  -  2023 07:00  --------------------------------------------------------  IN: 1069 mL / OUT: 0 mL / NET: 1069 mL        LABS:                        10.6   9.02  )-----------( 343      ( 2023 05:43 )             33.9       04-03    142  |  106  |  14  ----------------------------<  100<H>  4.3   |  21  |  1.1    Ca    9.0      2023 05:43  Mg     1.9     04-03                  PHYSICAL EXAM:  GENERAL: NAD, lying in bed comfortably  HEAD:  Atraumatic, Normocephalic  EYES: EOMI, PERRLA, conjunctiva and sclera clear  ENT: Moist mucous membranes  NECK: Supple, No JVD  CHEST/LUNG: Clear to auscultation bilaterally; No rales, rhonchi, wheezing, or rubs. Unlabored respirations  HEART: Regular rate and rhythm; No murmurs, rubs, or gallops  ABDOMEN: Bowel sounds present; Soft, Nontender, Nondistended. No hepatomegally  EXTREMITIES:  2+ Peripheral Pulses, brisk capillary refill. No clubbing, cyanosis, or edema  NERVOUS SYSTEM:  Alert & Oriented X3, speech clear. No deficits   MSK: FROM all 4 extremities, full and equal strength  SKIN: No rashes or lesions    IMAGES:

## 2023-04-04 ENCOUNTER — TRANSCRIPTION ENCOUNTER (OUTPATIENT)
Age: 58
End: 2023-04-04

## 2023-04-04 PROCEDURE — ZZZZZ: CPT

## 2023-04-04 PROCEDURE — 76937 US GUIDE VASCULAR ACCESS: CPT | Mod: 26

## 2023-04-04 PROCEDURE — 99291 CRITICAL CARE FIRST HOUR: CPT

## 2023-04-04 PROCEDURE — 37215 TRANSCATH STENT CCA W/EPS: CPT | Mod: RT

## 2023-04-04 RX ORDER — LANOLIN ALCOHOL/MO/W.PET/CERES
5 CREAM (GRAM) TOPICAL AT BEDTIME
Refills: 0 | Status: DISCONTINUED | OUTPATIENT
Start: 2023-04-04 | End: 2023-04-10

## 2023-04-04 RX ORDER — EPINEPHRINE 11.25MG/ML
0.5 SOLUTION, NON-ORAL INHALATION ONCE
Refills: 0 | Status: DISCONTINUED | OUTPATIENT
Start: 2023-04-04 | End: 2023-04-05

## 2023-04-04 RX ORDER — METOPROLOL TARTRATE 50 MG
100 TABLET ORAL DAILY
Refills: 0 | Status: DISCONTINUED | OUTPATIENT
Start: 2023-04-04 | End: 2023-04-10

## 2023-04-04 RX ORDER — DEXAMETHASONE 0.5 MG/5ML
10 ELIXIR ORAL ONCE
Refills: 0 | Status: DISCONTINUED | OUTPATIENT
Start: 2023-04-04 | End: 2023-04-05

## 2023-04-04 RX ORDER — NIFEDIPINE 30 MG
60 TABLET, EXTENDED RELEASE 24 HR ORAL ONCE
Refills: 0 | Status: COMPLETED | OUTPATIENT
Start: 2023-04-04 | End: 2023-04-04

## 2023-04-04 RX ORDER — LOSARTAN POTASSIUM 100 MG/1
25 TABLET, FILM COATED ORAL DAILY
Refills: 0 | Status: DISCONTINUED | OUTPATIENT
Start: 2023-04-04 | End: 2023-04-10

## 2023-04-04 RX ADMIN — Medication 125 MICROGRAM(S): at 05:36

## 2023-04-04 RX ADMIN — LOSARTAN POTASSIUM 25 MILLIGRAM(S): 100 TABLET, FILM COATED ORAL at 05:35

## 2023-04-04 RX ADMIN — SODIUM CHLORIDE 60 MILLILITER(S): 9 INJECTION INTRAMUSCULAR; INTRAVENOUS; SUBCUTANEOUS at 05:37

## 2023-04-04 RX ADMIN — Medication 975 MILLIGRAM(S): at 23:18

## 2023-04-04 RX ADMIN — Medication 100 MILLIGRAM(S): at 05:35

## 2023-04-04 RX ADMIN — TICAGRELOR 90 MILLIGRAM(S): 90 TABLET ORAL at 05:35

## 2023-04-04 RX ADMIN — Medication 60 MILLIGRAM(S): at 01:12

## 2023-04-04 RX ADMIN — Medication 500 MILLIGRAM(S): at 21:42

## 2023-04-04 RX ADMIN — Medication 975 MILLIGRAM(S): at 23:48

## 2023-04-04 RX ADMIN — PANTOPRAZOLE SODIUM 40 MILLIGRAM(S): 20 TABLET, DELAYED RELEASE ORAL at 05:35

## 2023-04-04 RX ADMIN — Medication 200 MILLIGRAM(S): at 05:35

## 2023-04-04 RX ADMIN — TIOTROPIUM BROMIDE 2 PUFF(S): 18 CAPSULE ORAL; RESPIRATORY (INHALATION) at 07:47

## 2023-04-04 RX ADMIN — ATORVASTATIN CALCIUM 80 MILLIGRAM(S): 80 TABLET, FILM COATED ORAL at 21:42

## 2023-04-04 RX ADMIN — Medication 500 MILLIGRAM(S): at 05:35

## 2023-04-04 NOTE — BRIEF OPERATIVE NOTE - OPERATION/FINDINGS
Procedure: Diagnostic Cerebral Angiogram + Right Carotid Angioplasty and Stenting    Amount and type of contrast: Visipaque 320  Medications given during procedure: Verapamil 2.5mg IA, Heparin 3000 IU IA, Nitroglycerin 200 mcg IA, see also anesthesiology note  Implants placed: 1 stent in the right proximal ICA  Complications: None    Post-procedure exam:   NIHSSP: 0  Extremity - RUE c/d/i with no bleeding from the site. +Mild swelling proximal to the arteriotomy site with 4/10 tenderness to palpation Ice pack applied for 15 minutes and elevation. Radial and brachial pulses intact.  Management per Primary team/ Neuro ICU  SBP <140  Please contact a provider if there is any acute change in NIHSS, vitals outside parameters, or absent radial/brachial pulses.   [id number]

## 2023-04-04 NOTE — PRE PROCEDURE NOTE - GENERAL PROCEDURE NAME
Diagnostic Cerebral Angiogram + Diagnostic Cerebral Angiogram + Planned Right Carotid Angioplasty and Stenting

## 2023-04-04 NOTE — CHART NOTE - NSCHARTNOTEFT_GEN_A_CORE
PACU ANESTHESIA ADMISSION NOTE      Procedure:   Post op diagnosis:      ____  Intubated  TV:______       Rate: ______      FiO2: ______    __x__  Patent Airway    __x__  Full return of protective reflexes    _x___  Full recovery from anesthesia / back to baseline status    Vitals:  temp(F) 98  /61  spo2 97  RR 17  pulse 80    Mental Status:  ___x_ Awake   _____ Alert   _____ Drowsy   _____ Sedated    Nausea/Vomiting:  ___x_ NO  ______Yes,   See Post - Op Orders          Pain Scale (0-10):  _____    Treatment: ____ None    x____ See Post - Op/PCA Orders  x  Post - Operative Fluids:   ____ Oral   _x ___ See Post - Op Orders    Plan: Discharge:   ____Home       _____Floor     x _____Critical Care    _____  Other:_________________    Comments: uneventful anesthesia course no complications. Vitals stable. Pt transferred to PACU

## 2023-04-04 NOTE — CONSULT NOTE ADULT - CRITICAL CARE ATTENDING COMMENT
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  POD #0: s/p right carotid stent and angioplasty,     PLAN:   NEURO:  - Neuro checks q1 hr   - On Brilinta/ASA  - Continue lexapro, clonazepam  - Continue baclofen  Activity: [X] Increase as tolerated [] Bedrest [X] PT [X] OT  2 L NC while asleep

## 2023-04-04 NOTE — BRIEF OPERATIVE NOTE - COMMENTS
Brief Findings: Patient is s/p diagnostic cerebral angiogram +successful right carotid angioplasty and stenting with deployment of 1 stent. The patient was intubated for the procedure with GA, and was extubated following the procedure. Patient w/ complaint of mild throat irritation/ soreness post procedure.

## 2023-04-04 NOTE — CONSULT NOTE ADULT - SUBJECTIVE AND OBJECTIVE BOX
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 Episode happened while patient was driving and she continued to drive normally. She reports associated pain behind the eyes. Resolved and did not happen again. In the ER patient also complaining of LLQ pain worse when attempting to pass gas. Denies any fever, chills, headaches, SOB, CP, NVD, Dysuria, or MSK pain.   (28 Mar 2023 01:30)    INTERVAL EVENTS:     ADMISSION SCORES: GCS: HH: MF: NIHSS: RASS: CAM-ICU: ICP:    Allergies: ciprofloxacin (Urticaria; Other)        REVIEW OF SYSTEMS: [ ] Unable to Assess due to neurologic exam   [ ] All ROS addressed below are non-contributory, except:  Neuro: [ ] Headache [ ] Back pain [ ] Numbness [ ] Weakness [ ] Ataxia [ ] Dizziness [ ] Aphasia [ ] Dysarthria [ ] Visual disturbance  Resp: [ ] Shortness of breath/dyspnea, [ ] Orthopnea [ ] Cough  CV: [ ] Chest pain [ ] Palpitation [ ] Lightheadedness [ ] Syncope  Renal: [ ] Thirst [ ] Edema  GI: [ ] Nausea [ ] Emesis [ ] Abdominal pain [ ] Constipation [ ] Diarrhea  Hem: [ ] Hematemesis [ ] bright red blood per rectum  ID: [ ] Fever [ ] Chills [ ] Dysuria  ENT: [ ] Rhinorrhea    DEVICES:   [ ] Restraints [ ] ET tube [ ] central line [ ] arterial line [ ] isaacs [ ] NGT/OGT [ ] EVD [ ] LD [ ] JANIS/HMV [ ] Trach [ ] PEG [ ] Chest Tube     VITALS: [X] Reviewed  Vital Signs Last 24 Hrs  T(C): 36.3 (04 Apr 2023 16:00), Max: 36.6 (04 Apr 2023 15:10)  T(F): 97.4 (04 Apr 2023 16:00), Max: 97.8 (04 Apr 2023 15:10)  HR: 66 (04 Apr 2023 16:00) (62 - 84)  BP: 145/61 (04 Apr 2023 13:20) (118/62 - 214/90)  BP(mean): 76 (04 Apr 2023 11:38) (76 - 76)  RR: 18 (04 Apr 2023 16:00) (16 - 18)  SpO2: 97% (04 Apr 2023 16:00) (95% - 100%)    Parameters below as of 04 Apr 2023 15:30  Patient On (Oxygen Delivery Method): nasal cannula  O2 Flow (L/min): 2    CAPILLARY BLOOD GLUCOSE      LABS:    04-03    142  |  106  |  14  ----------------------------<  100<H>  4.3   |  21  |  1.1    Ca    9.0      03 Apr 2023 05:43  Mg     1.9     04-03                            10.6   9.02  )-----------( 343      ( 03 Apr 2023 05:43 )             33.9         IMAGING/DATA: [X] Reviewed    IVF FLUIDS/MEDICATIONS: [X] Reviewed  MEDICATIONS  (STANDING):  aspirin  chewable 81 milliGRAM(s) Oral daily  atorvastatin 80 milliGRAM(s) Oral at bedtime  budesonide  80 MICROgram(s)/formoterol 4.5 MICROgram(s) Inhaler 2 Puff(s) Inhalation two times a day  buPROPion XL (24-Hour) . 300 milliGRAM(s) Oral daily  cefpodoxime 200 milliGRAM(s) Oral every 12 hours  dexAMETHasone  Injectable 10 milliGRAM(s) IV Push once  enoxaparin Injectable 40 milliGRAM(s) SubCutaneous every 24 hours  escitalopram 5 milliGRAM(s) Oral daily  influenza   Vaccine 0.5 milliLiter(s) IntraMuscular once  levothyroxine 125 MICROGram(s) Oral daily  losartan 25 milliGRAM(s) Oral daily  metoprolol succinate  milliGRAM(s) Oral daily  metroNIDAZOLE    Tablet 500 milliGRAM(s) Oral every 8 hours  montelukast 10 milliGRAM(s) Oral daily  pantoprazole    Tablet 40 milliGRAM(s) Oral two times a day  racepinephrine  2.25% Inhalation 0.5 milliLiter(s) Inhalation once  racepinephrine  2.25% Inhalation 0.5 milliLiter(s) Inhalation once  sodium chloride 0.9%. 1000 milliLiter(s) (60 mL/Hr) IV Continuous <Continuous>  ticagrelor 90 milliGRAM(s) Oral every 12 hours  tiotropium 2.5 MICROgram(s) Inhaler 2 Puff(s) Inhalation daily    MEDICATIONS  (PRN):  acetaminophen     Tablet .. 975 milliGRAM(s) Oral every 6 hours PRN Temp greater or equal to 38C (100.4F), Mild Pain (1 - 3), Moderate Pain (4 - 6)  albuterol    90 MICROgram(s) HFA Inhaler 2 Puff(s) Inhalation every 6 hours PRN Shortness of Breath and/or Wheezing  baclofen 5 milliGRAM(s) Oral every 12 hours PRN Musculoskeletal Pain  clonazePAM  Tablet 1 milliGRAM(s) Oral two times a day PRN anxiety  hydrOXYzine hydrochloride 25 milliGRAM(s) Oral three times a day PRN Anxiety  zolpidem 5 milliGRAM(s) Oral at bedtime PRN Insomnia    I&O's Summary    03 Apr 2023 07:01  -  04 Apr 2023 07:00  --------------------------------------------------------  IN: 457 mL / OUT: 0 mL / NET: 457 mL        EXAMINATION:  General: No acute distress  HEENT: Anicteric sclerae  Cardiac: [id number]  Lungs: Clear  Abdomen: Soft, non-tender, +BS  Extremities: No c/c/e  Skin/Incision Site: Clean, dry and intact  Neurologic: Awake, alert, fully oriented, follows commands, PERRL, VFFtc, EOMI, face symmetric, tongue midline, no drift, full strength           SUMMARY:HPI:  HPI: 57 year old morbidly obese female has medical history of 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 Episode happened while patient was driving and she continued to drive normally. She reports associated pain behind the eyes. Resolved and did not happen again. In the ER patient also complaining of LLQ pain worse when attempting to pass gas. Denies any fever, chills, headaches, SOB, CP, NVD, Dysuria, or MSK pain.   (28 Mar 2023 01:30)    INTERVAL EVENTS: s/p right carotid stent and angioplasty, 4/4    ADMISSION SCORES: GCS: HH: MF: NIHSS: RASS: CAM-ICU: ICP:    Allergies: ciprofloxacin (Urticaria; Other)        REVIEW OF SYSTEMS: [ ] Unable to Assess due to neurologic exam   [ ] All ROS addressed below are non-contributory, except:  Neuro: [ ] Headache [ ] Back pain [ ] Numbness [ ] Weakness [ ] Ataxia [ ] Dizziness [ ] Aphasia [ ] Dysarthria [ ] Visual disturbance  Resp: [ ] Shortness of breath/dyspnea, [ ] Orthopnea [ ] Cough  CV: [ ] Chest pain [ ] Palpitation [ ] Lightheadedness [ ] Syncope  Renal: [ ] Thirst [ ] Edema  GI: [ ] Nausea [ ] Emesis [ ] Abdominal pain [ ] Constipation [ ] Diarrhea  Hem: [ ] Hematemesis [ ] bright red blood per rectum  ID: [ ] Fever [ ] Chills [ ] Dysuria  ENT: [ ] Rhinorrhea    DEVICES:   [ ] Restraints [ ] ET tube [ ] central line [ ] arterial line [ ] isaacs [ ] NGT/OGT [ ] EVD [ ] LD [ ] JANIS/HMV [ ] Trach [ ] PEG [ ] Chest Tube     VITALS: [X] Reviewed  Vital Signs Last 24 Hrs  T(C): 36.3 (04 Apr 2023 16:00), Max: 36.6 (04 Apr 2023 15:10)  T(F): 97.4 (04 Apr 2023 16:00), Max: 97.8 (04 Apr 2023 15:10)  HR: 66 (04 Apr 2023 16:00) (62 - 84)  BP: 145/61 (04 Apr 2023 13:20) (118/62 - 214/90)  BP(mean): 76 (04 Apr 2023 11:38) (76 - 76)  RR: 18 (04 Apr 2023 16:00) (16 - 18)  SpO2: 97% (04 Apr 2023 16:00) (95% - 100%)    Parameters below as of 04 Apr 2023 15:30  Patient On (Oxygen Delivery Method): nasal cannula  O2 Flow (L/min): 2    CAPILLARY BLOOD GLUCOSE      LABS:    04-03    142  |  106  |  14  ----------------------------<  100<H>  4.3   |  21  |  1.1    Ca    9.0      03 Apr 2023 05:43  Mg     1.9     04-03                            10.6   9.02  )-----------( 343      ( 03 Apr 2023 05:43 )             33.9         IMAGING/DATA: [X] Reviewed    IVF FLUIDS/MEDICATIONS: [X] Reviewed  MEDICATIONS  (STANDING):  aspirin  chewable 81 milliGRAM(s) Oral daily  atorvastatin 80 milliGRAM(s) Oral at bedtime  budesonide  80 MICROgram(s)/formoterol 4.5 MICROgram(s) Inhaler 2 Puff(s) Inhalation two times a day  buPROPion XL (24-Hour) . 300 milliGRAM(s) Oral daily  cefpodoxime 200 milliGRAM(s) Oral every 12 hours  dexAMETHasone  Injectable 10 milliGRAM(s) IV Push once  enoxaparin Injectable 40 milliGRAM(s) SubCutaneous every 24 hours  escitalopram 5 milliGRAM(s) Oral daily  influenza   Vaccine 0.5 milliLiter(s) IntraMuscular once  levothyroxine 125 MICROGram(s) Oral daily  losartan 25 milliGRAM(s) Oral daily  metoprolol succinate  milliGRAM(s) Oral daily  metroNIDAZOLE    Tablet 500 milliGRAM(s) Oral every 8 hours  montelukast 10 milliGRAM(s) Oral daily  pantoprazole    Tablet 40 milliGRAM(s) Oral two times a day  racepinephrine  2.25% Inhalation 0.5 milliLiter(s) Inhalation once  racepinephrine  2.25% Inhalation 0.5 milliLiter(s) Inhalation once  sodium chloride 0.9%. 1000 milliLiter(s) (60 mL/Hr) IV Continuous <Continuous>  ticagrelor 90 milliGRAM(s) Oral every 12 hours  tiotropium 2.5 MICROgram(s) Inhaler 2 Puff(s) Inhalation daily    MEDICATIONS  (PRN):  acetaminophen     Tablet .. 975 milliGRAM(s) Oral every 6 hours PRN Temp greater or equal to 38C (100.4F), Mild Pain (1 - 3), Moderate Pain (4 - 6)  albuterol    90 MICROgram(s) HFA Inhaler 2 Puff(s) Inhalation every 6 hours PRN Shortness of Breath and/or Wheezing  baclofen 5 milliGRAM(s) Oral every 12 hours PRN Musculoskeletal Pain  clonazePAM  Tablet 1 milliGRAM(s) Oral two times a day PRN anxiety  hydrOXYzine hydrochloride 25 milliGRAM(s) Oral three times a day PRN Anxiety  zolpidem 5 milliGRAM(s) Oral at bedtime PRN Insomnia    I&O's Summary    03 Apr 2023 07:01  -  04 Apr 2023 07:00  --------------------------------------------------------  IN: 457 mL / OUT: 0 mL / NET: 457 mL        EXAMINATION:  General: No acute distress  HEENT: Anicteric sclerae  Cardiac: [id number]  Lungs: Clear  Abdomen: Soft, non-tender, +BS  Extremities: No c/c/e  Skin/Incision Site: Clean, dry and intact  Neurologic: Awake, alert, fully oriented, hypophonic, no dysarthria or aphasia, follows commands, PERRL, VFFtc, EOMI, face symmetric, tongue midline, able to lift B/L UE and LE AG, no drift, full strength 5/5, sensation intact, no neglect            SUMMARY:HPI:  HPI: 57 year old morbidly obese female has medical history of 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 Episode happened while patient was driving and she continued to drive normally. She reports associated pain behind the eyes. Resolved and did not happen again. In the ER patient also complaining of LLQ pain worse when attempting to pass gas. Denies any fever, chills, headaches, SOB, CP, NVD, Dysuria, or MSK pain.   (28 Mar 2023 01:30)    INTERVAL EVENTS: s/p right carotid stent and angioplasty, 4/4    ADMISSION SCORES: GCS: HH: MF: NIHSS: RASS: CAM-ICU: ICP:    Allergies: ciprofloxacin (Urticaria; Other)        REVIEW OF SYSTEMS: [ ] Unable to Assess due to neurologic exam   [ ] All ROS addressed below are non-contributory, except:  Neuro: [ ] Headache [ ] Back pain [ ] Numbness [ ] Weakness [ ] Ataxia [ ] Dizziness [ ] Aphasia [ ] Dysarthria [ ] Visual disturbance  Resp: [ ] Shortness of breath/dyspnea, [ ] Orthopnea [ ] Cough  CV: [ ] Chest pain [ ] Palpitation [ ] Lightheadedness [ ] Syncope  Renal: [ ] Thirst [ ] Edema  GI: [ ] Nausea [ ] Emesis [ ] Abdominal pain [ ] Constipation [ ] Diarrhea  Hem: [ ] Hematemesis [ ] bright red blood per rectum  ID: [ ] Fever [ ] Chills [ ] Dysuria  ENT: [ ] Rhinorrhea    DEVICES:   [ ] Restraints [ ] ET tube [ ] central line [ ] arterial line [ ] isaacs [ ] NGT/OGT [ ] EVD [ ] LD [ ] JANIS/HMV [ ] Trach [ ] PEG [ ] Chest Tube     VITALS: [X] Reviewed  Vital Signs Last 24 Hrs  T(C): 36.3 (04 Apr 2023 16:00), Max: 36.6 (04 Apr 2023 15:10)  T(F): 97.4 (04 Apr 2023 16:00), Max: 97.8 (04 Apr 2023 15:10)  HR: 66 (04 Apr 2023 16:00) (62 - 84)  BP: 145/61 (04 Apr 2023 13:20) (118/62 - 214/90)  BP(mean): 76 (04 Apr 2023 11:38) (76 - 76)  RR: 18 (04 Apr 2023 16:00) (16 - 18)  SpO2: 97% (04 Apr 2023 16:00) (95% - 100%)    Parameters below as of 04 Apr 2023 15:30  Patient On (Oxygen Delivery Method): nasal cannula  O2 Flow (L/min): 2    CAPILLARY BLOOD GLUCOSE      LABS:    04-03    142  |  106  |  14  ----------------------------<  100<H>  4.3   |  21  |  1.1    Ca    9.0      03 Apr 2023 05:43  Mg     1.9     04-03                            10.6   9.02  )-----------( 343      ( 03 Apr 2023 05:43 )             33.9         IMAGING/DATA: [X] Reviewed    IVF FLUIDS/MEDICATIONS: [X] Reviewed  MEDICATIONS  (STANDING):  aspirin  chewable 81 milliGRAM(s) Oral daily  atorvastatin 80 milliGRAM(s) Oral at bedtime  budesonide  80 MICROgram(s)/formoterol 4.5 MICROgram(s) Inhaler 2 Puff(s) Inhalation two times a day  buPROPion XL (24-Hour) . 300 milliGRAM(s) Oral daily  cefpodoxime 200 milliGRAM(s) Oral every 12 hours  dexAMETHasone  Injectable 10 milliGRAM(s) IV Push once  enoxaparin Injectable 40 milliGRAM(s) SubCutaneous every 24 hours  escitalopram 5 milliGRAM(s) Oral daily  influenza   Vaccine 0.5 milliLiter(s) IntraMuscular once  levothyroxine 125 MICROGram(s) Oral daily  losartan 25 milliGRAM(s) Oral daily  metoprolol succinate  milliGRAM(s) Oral daily  metroNIDAZOLE    Tablet 500 milliGRAM(s) Oral every 8 hours  montelukast 10 milliGRAM(s) Oral daily  pantoprazole    Tablet 40 milliGRAM(s) Oral two times a day  racepinephrine  2.25% Inhalation 0.5 milliLiter(s) Inhalation once  racepinephrine  2.25% Inhalation 0.5 milliLiter(s) Inhalation once  sodium chloride 0.9%. 1000 milliLiter(s) (60 mL/Hr) IV Continuous <Continuous>  ticagrelor 90 milliGRAM(s) Oral every 12 hours  tiotropium 2.5 MICROgram(s) Inhaler 2 Puff(s) Inhalation daily    MEDICATIONS  (PRN):  acetaminophen     Tablet .. 975 milliGRAM(s) Oral every 6 hours PRN Temp greater or equal to 38C (100.4F), Mild Pain (1 - 3), Moderate Pain (4 - 6)  albuterol    90 MICROgram(s) HFA Inhaler 2 Puff(s) Inhalation every 6 hours PRN Shortness of Breath and/or Wheezing  baclofen 5 milliGRAM(s) Oral every 12 hours PRN Musculoskeletal Pain  clonazePAM  Tablet 1 milliGRAM(s) Oral two times a day PRN anxiety  hydrOXYzine hydrochloride 25 milliGRAM(s) Oral three times a day PRN Anxiety  zolpidem 5 milliGRAM(s) Oral at bedtime PRN Insomnia    I&O's Summary    03 Apr 2023 07:01  -  04 Apr 2023 07:00  --------------------------------------------------------  IN: 457 mL / OUT: 0 mL / NET: 457 mL     CT Angio Head w/ IV Cont (03.27.23 @ 21:24) >    IMPRESSION:    CTA NECK:  1.  Severe high-grade stenosis of the proximal right internal carotid   artery caused by noncalcified plaque.  2.  Moderate stenosis of the proximal left internal carotid artery caused   by calcified plaque.  3.  No evidence of vertebral artery stenosis.    CTA HEAD:  1.  Moderate stenosis of the cavernous segments of the bilateral internal   carotid arteries  2.  Severe narrowing of the P3 segment of the right posterior cerebral   artery.  3.  Otherwise, no evidence of flow-limiting stenosis, occlusion or   aneurysm.    < end of copied text >      EXAMINATION:  General: No acute distress  HEENT: Anicteric sclerae  Cardiac: [id number]  Lungs: Clear  Abdomen: Soft, non-tender, +BS  Extremities: No c/c/e  Skin/Incision Site: Clean, dry and intact  Neurologic: Awake, alert, fully oriented, hypophonic, no dysarthria or aphasia, follows commands, PERRL, VFFtc, EOMI, face symmetric, tongue midline, able to lift B/L UE and LE AG, no drift, full strength 5/5, sensation intact, no neglect

## 2023-04-04 NOTE — PRE-ANESTHESIA EVALUATION ADULT - BP NONINVASIVE SYSTOLIC (MM HG)
[General Appearance - Alert] : alert [General Appearance - In No Acute Distress] : in no acute distress [Sclera] : the sclera and conjunctiva were normal [PERRL With Normal Accommodation] : pupils were equal in size, round, reactive to light [Extraocular Movements] : extraocular movements were intact 123 [Outer Ear] : the ears and nose were normal in appearance [Oropharynx] : the oropharynx was normal with no thrush [Neck Appearance] : the appearance of the neck was normal [Neck Cervical Mass (___cm)] : no neck mass was observed [Jugular Venous Distention Increased] : there was no jugular-venous distention [Thyroid Diffuse Enlargement] : the thyroid was not enlarged [Auscultation Breath Sounds / Voice Sounds] : lungs were clear to auscultation bilaterally [Heart Rate And Rhythm] : heart rate was normal and rhythm regular [Heart Sounds] : normal S1 and S2 [Heart Sounds Gallop] : no gallops [Murmurs] : no murmurs [Heart Sounds Pericardial Friction Rub] : no pericardial rub [Full Pulse] : the pedal pulses are present [Edema] : there was no peripheral edema [Bowel Sounds] : normal bowel sounds [Abdomen Soft] : soft [Abdomen Tenderness] : non-tender [Abdomen Mass (___ Cm)] : no abdominal mass palpated [Costovertebral Angle Tenderness] : no CVA tenderness [No Palpable Adenopathy] : no palpable adenopathy [Musculoskeletal - Swelling] : no joint swelling [Nail Clubbing] : no clubbing  or cyanosis of the fingernails [FreeTextEntry1] : Decreased ROM of both hips [Skin Color & Pigmentation] : normal skin color and pigmentation [] : no rash [Deep Tendon Reflexes (DTR)] : deep tendon reflexes were 2+ and symmetric [No Focal Deficits] : no focal deficits [Oriented To Time, Place, And Person] : oriented to person, place, and time [Affect] : the affect was normal

## 2023-04-04 NOTE — CONSULT NOTE ADULT - ASSESSMENT
ASSESSMENT:    PLAN:   NEURO:  - Neuro checks/NIHSS as per post-NI protocol   - On Brilinta/ASA  Activity: [X] Increase as tolerated [] Bedrest [X] PT [X] OT [] PMNR    PULM:  - HOB > 45 degrees  - Aspiration precautions  - Keep SaO2 > 95%    CV:  - Keep  - < 140  - Telemetry monitoring  - 12-lead ECG  - TTE/2D echo  - LDL, 149  - Daily statin  - Losartan     RENAL:  - Strict I/Os, daily weights  - Keep euvolemic  - Keep normonatremic   - Keep Magnesium level > 2; Potassium > 4  - Monitor lytes, replete as needed  - IVF/IVL when tolerating PO intake    GI: diverticulitis  Diet: Dysphagia screen and then advance diet as tolerated  GI prophylaxis: Protonix  Bowel regimen [X] Miralax [X] senna [] other:  - On Flagyl and Vantin for active diverticulitis; GI recommend outpatient follow-up 6-8 weeks with ABTx until 4/10    ENDO:   - Goal serum glucose 140-180  - HgA1c, 3/30: 6.9  - TSH, 3/30: 2.81    HEME/ONC:  VTE prophylaxis: [X] SCDs [] chemoprophylaxis [] hold chemoprophylaxis due to: [] high risk of DVT/PE on admission due to:    ID:  - Keep normothermic, avoid fevers  - On Flagyl and Vantin until 4/10 for diverticulitis     MISC:  PT/OT/SLP    CODE STATUS:  [X] Full Code [] DNR [] DNI [] Palliative/Comfort Care    DISPOSITION:  [X] NCCU [] Stroke Unit [] Floor [] EMU [] RCU [] PCU                 ASSESSMENT:    PLAN:   NEURO:  - Neuro checks/NIHSS as per post-NI protocol   - On Brilinta/ASA  - Continue lexapro, clonazepam  - Continue baclofen  Activity: [X] Increase as tolerated [] Bedrest [X] PT [X] OT [] PMNR    PULM:  - HOB > 45 degrees  - Aspiration precautions  - Keep SaO2 > 95%  - DAVID, on BIPAP at home  - Continue singulair, spiriva, symbicort     CV:  - Keep  - < 140  - Telemetry monitoring  - 12-lead ECG  - TTE/2D echo  - LDL, 149  - Daily statin  - Continue losartan, metoprolol     RENAL:  - Strict I/Os, daily weights  - Keep euvolemic  - Keep normonatremic   - Keep Magnesium level > 2; Potassium > 4  - Monitor lytes, replete as needed  - IVF/IVL when tolerating PO intake    GI: diverticulitis  Diet: Dysphagia screen and then advance diet as tolerated  GI prophylaxis: Protonix  Bowel regimen [X] Miralax [X] senna [] other:  - On Flagyl and Vantin for active diverticulitis; GI recommend outpatient follow-up 6-8 weeks with ABTx until 4/10    ENDO:   - Goal serum glucose 140-180  - HgA1c, 3/30: 6.9  - TSH, 3/30: 2.81  - Continue synthroid 125mcg     HEME/ONC:  VTE prophylaxis: [X] SCDs  - on ASA/Brilinta  - Lovenox for DVT PPX     ID:  - Keep normothermic, avoid fevers  - On Flagyl and Vantin until 4/10 for diverticulitis     MISC:  PT/OT/SLP    CODE STATUS:  [X] Full Code [] DNR [] DNI [] Palliative/Comfort Care    DISPOSITION:  [X] NCCU [] Stroke Unit [] Floor [] EMU [] RCU [] PCU                 ASSESSMENT:  57 year old morbidly obese female has medical history of 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  POD #0: s/p right carotid stent and angioplasty,     PLAN:   NEURO:  - Neuro checks/NIHSS as per post-NI protocol   - On Brilinta/ASA  - Continue lexapro, clonazepam  - Continue baclofen  Activity: [X] Increase as tolerated [] Bedrest [X] PT [X] OT [] PMNR    PULM:  - HOB > 45 degrees  - Aspiration precautions  - Keep SaO2 > 95%  - DAVID, on BIPAP at home  - Continue singulair, spiriva, symbicort     CV:  - Keep  - < 140  - Telemetry monitoring  - 12-lead ECG  - TTE/2D echo  - LDL, 149  - Daily statin  - Continue losartan, metoprolol     RENAL:  - Strict I/Os, daily weights  - Keep euvolemic  - Keep normonatremic   - Keep Magnesium level > 2; Potassium > 4  - Monitor lytes, replete as needed  - IVF/IVL when tolerating PO intake    GI: diverticulitis  Diet: Dysphagia screen and then advance diet as tolerated  GI prophylaxis: Protonix  Bowel regimen [X] Miralax [X] senna [] other:  - On Flagyl and Vantin for active diverticulitis; GI recommend outpatient follow-up 6-8 weeks with ABTx until 4/10    ENDO:   - Goal serum glucose 140-180  - HgA1c, 3/30: 6.9  - TSH, 3/30: 2.81  - Continue synthroid 125mcg     HEME/ONC:  VTE prophylaxis: [X] SCDs  - on ASA/Brilinta  - Lovenox for DVT PPX     ID:  - Keep normothermic, avoid fevers  - On Flagyl and Vantin until 4/10 for diverticulitis     MISC:  PT/OT/SLP    CODE STATUS:  [X] Full Code [] DNR [] DNI [] Palliative/Comfort Care    DISPOSITION:  [X] NCCU [] Stroke Unit [] Floor [] EMU [] RCU [] PCU

## 2023-04-04 NOTE — PROGRESS NOTE ADULT - SUBJECTIVE AND OBJECTIVE BOX
Hospital day # : 8d  Events Overnight: transferred from telemetry    Subjective: no complaints, denies blurry vision abd pain n/v/d.     Physical Exam:  General: no acute distress, morbidly obese  Head: atraumatic, normocephalic  Eyes: EOMI, no icterus  Lungs/Chest: Clear to auscultation bilaterally, no wheeze  Heart: regular rate, regular rhythm, S1/S2  Abdomen: BS audible, non-distended, soft, non-tender  Extremities: no clubbing, no cyanosis, no edema  Neuro: alert, oriented, speech clear and fluent, moving all extremities    Recent data:  T(C): 36.3 (04-04-23 @ 09:23), Max: 36.8 (04-03-23 @ 14:08)  HR: 71 (04-04-23 @ 09:23) (68 - 84)  BP: 123/58 (04-04-23 @ 09:23) (106/61 - 214/90)  RR: 16 (04-04-23 @ 09:23) (16 - 18)  SpO2: 95% (04-04-23 @ 09:23) (95% - 97%)    I&O's Summary    03 Apr 2023 07:01  -  04 Apr 2023 07:00  --------------------------------------------------------  IN: 457 mL / OUT: 0 mL / NET: 457 mL                              10.6   9.02  )-----------( 343      ( 03 Apr 2023 05:43 )             33.9       03 Apr 2023 05:43    142    |  106    |  14     ----------------------------<  100    4.3     |  21     |  1.1       Ca    9.0        03 Apr 2023 05:43  Mg     1.9       03 Apr 2023 05:43        Creatine Kinase        Assessment/Plan:  57 year old morbidly obese female has medical history of 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 Episode happened while patient was driving and she continued to drive normally. She reports associated pain behind the eyes. Resolved and did not happen again. In the ER patient also complaining of LLQ pain worse when attempting to pass gas.     #Transient Blurry vision: Suspect TIA.   #Carotid Artery Stenosis  Patient presented with transient blurry vision lasted for 15 minutes, no other weakness.   Neuro exam is non-focal.   Severe high-grade stenosis of the proximal right internal carotid artery,  Moderate left ICA stenosis, right P3, and b/l cavernous ICA stenosis.  Brain MRI 3/28 was Unremarkable, no acute infarct.   CTA Neck 3/27 Severe high-grade stenosis of the proximal right ICA. Moderate stenosis of the proximal left ICA.   CTA HEAD:  Moderate stenosis of the cavernous segments of the bilateral internal . carotid arteries. Severe narrowing of the P3 segment of the right posterior cerebral artery.  Neuroendovascular plan for diagnostic cerebral angiogram 4/4  Continue ASA, Plavix and Lipitor    #Acute sigmoid diverticulitis   CT abd: Acute diverticulitis of a short segment of the sigmoid colon. No abscess or perforation. Further evaluation with nonemergent colonoscopy may be of benefit when patient is clinically able.  Continue Vantin 200mg mg q12h and Flafyl 500 mg q8h till 4/10  GI recommended colonoscopy outpatient after 6-8 weeks.     #Hypertension: metoprolol  #Hypothyroidism: synthroid  #COPD- stable, Continue Budesonide, Spiriva and Montelukast.      #Depression: Continue Lexapro and Clonazepam.     #Right upper lobe 1.2 cm pulmonary nodule, as well as several groundglass opacities.   Follow-up CT of the chest in 3-6 months.    #Morbid obesity  BMI: 50.6    DVT prophylaxis: Lovenox SC    Full Code.   Pending (specify): diagnostic cerebral angiogram on 4/4.   Disposition: Home.

## 2023-04-04 NOTE — PRE PROCEDURE NOTE - PRE PROCEDURE EVALUATION
Interventional Neuro Radiology  Pre-Procedure Note PA-C    This is a 57-year-old, right-hand-dominant female      HPI:  The patient is a 57-year-old female with a PMHx of HTN, hearing loss, COPD, hypothyroidism, CKD, diverticulitis, nasopharyngeal CA s/p chenmo/RT 2013, MIN, and anxiety who presented for sudden onset blurry vision, lasting 15 min, and eye pain. A neuroendovascular consult was placed for severe high-grade stenosis of the proximal right internal carotid artery, which was identified on CTA. The patient also has moderate left ICA stenosis, right P3, and b/l cavernous ICA stenosis. On exam the patient reports vision is back to baseline. The risks and benefits of a diagnostic cerebral angiogram +/- carotid angioplasty and stenting were discussed with the patient who elected to contact her oncologist first (Arcadio Estrada MD at [facility] [phone]). After discussion between Dr. Blank and Dr. Mckeon, it was determined that the patient would be a good candidate for a neuroendovascular intervention. On exam this morning the patient has no acute complaints. The risks and benefits of a diagnostic cerebral angiogram + carotid angioplasty and stenting were again reviewed with the patient who has no questions at this time.  On Brilinta 90 mg bid ASA 81 mg qd  NPO after midnight   NIHSS 0    Allergies: ciprofloxacin (Urticaria; Other)    PAST MEDICAL & SURGICAL HISTORY:  COPD (chronic obstructive pulmonary disease)  HTN (hypertension)  Diverticulitis  Pulmonary nodules/lesions, multiple  Nasopharyngeal cancer  Hypothyroid  Cervical disc disease  sequelae of radtion for nasopharyngeal cancer  Left ear hearing loss  Hemorrhoids  Anxiety  History of cholecystectomy    FAMILY HISTORY:  Family history of lung cancer (Father, Mother)    Current Medications: acetaminophen     Tablet .. 975 milliGRAM(s) Oral every 6 hours PRN  albuterol    90 MICROgram(s) HFA Inhaler 2 Puff(s) Inhalation every 6 hours PRN  aspirin  chewable 81 milliGRAM(s) Oral daily  atorvastatin 80 milliGRAM(s) Oral at bedtime  baclofen 5 milliGRAM(s) Oral every 12 hours PRN  budesonide  80 MICROgram(s)/formoterol 4.5 MICROgram(s) Inhaler 2 Puff(s) Inhalation two times a day  buPROPion XL (24-Hour) . 300 milliGRAM(s) Oral daily  cefpodoxime 200 milliGRAM(s) Oral every 12 hours  clonazePAM  Tablet 1 milliGRAM(s) Oral two times a day PRN  enoxaparin Injectable 40 milliGRAM(s) SubCutaneous every 24 hours  escitalopram 5 milliGRAM(s) Oral daily  hydrOXYzine hydrochloride 25 milliGRAM(s) Oral three times a day PRN  influenza   Vaccine 0.5 milliLiter(s) IntraMuscular once  levothyroxine 125 MICROGram(s) Oral daily  losartan 25 milliGRAM(s) Oral daily  metoprolol succinate  milliGRAM(s) Oral daily  metroNIDAZOLE    Tablet 500 milliGRAM(s) Oral every 8 hours  montelukast 10 milliGRAM(s) Oral daily  pantoprazole    Tablet 40 milliGRAM(s) Oral two times a day  sodium chloride 0.9%. 1000 milliLiter(s) IV Continuous <Continuous>  ticagrelor 90 milliGRAM(s) Oral every 12 hours  tiotropium 2.5 MICROgram(s) Inhaler 2 Puff(s) Inhalation daily  zolpidem 5 milliGRAM(s) Oral at bedtime PRN      Labs:                         10.6   9.02  )-----------( 343      ( 03 Apr 2023 05:43 )             33.9       04-03    142  |  106  |  14  ----------------------------<  100<H>  4.3   |  21  |  1.1    Ca    9.0      03 Apr 2023 05:43  Mg     1.9     04-03            Blood Bank: 04-03-23  --  A POS  --    Assessment/Plan:   This is a 57-year-old, right-hand-dominant female presents to neuro IR for diagnostic cerebral angiogram +   Procedure, goals, risks, benefits and alternatives  were discussed with patient and patient's family.  All questions were answered to best understanding.   Risks discussed include but are not limited to stroke, vessel injury, hemorrhage, and/or hematoma.  Patient demonstrates understanding of all risks involved with this procedure and wishes to continue.     Appropriate consent was obtained from patient and consent is in the patient's chart.     Interventional Neuro Radiology  Pre-Procedure Note PA-C    This is a 57-year-old, right-hand-dominant female      HPI:  The patient is a 57-year-old female with a PMHx of HTN, hearing loss, COPD, hypothyroidism, CKD, diverticulitis, nasopharyngeal CA s/p chenmo/RT 2013, MIN, and anxiety who presented for sudden onset blurry vision, lasting 15 min, and eye pain. A neuroendovascular consult was placed for severe high-grade stenosis of the proximal right internal carotid artery, which was identified on CTA. The patient also has moderate left ICA stenosis, right P3, and b/l cavernous ICA stenosis. On exam the patient reports vision is back to baseline. The risks and benefits of a diagnostic cerebral angiogram +/- carotid angioplasty and stenting were discussed with the patient who elected to contact her oncologist first (Arcadio Estrada MD at [facility] [phone]). After discussion between Dr. Blank and Dr. Mckeon, it was determined that the patient would be a good candidate for a neuroendovascular intervention. On exam this morning the patient has no acute complaints. The risks and benefits of a diagnostic cerebral angiogram + carotid angioplasty and stenting were again reviewed with the patient who has no questions at this time.  On Brilinta 90 mg bid ASA 81 mg qd  NPO after midnight   NIHSS 0    Allergies: ciprofloxacin (Urticaria; Other)    PAST MEDICAL & SURGICAL HISTORY:  COPD (chronic obstructive pulmonary disease)  HTN (hypertension)  Diverticulitis  Pulmonary nodules/lesions, multiple  Nasopharyngeal cancer  Hypothyroid  Cervical disc disease  sequelae of radtion for nasopharyngeal cancer  Left ear hearing loss  Hemorrhoids  Anxiety  History of cholecystectomy    FAMILY HISTORY:  Family history of lung cancer (Father, Mother)    Current Medications: acetaminophen     Tablet .. 975 milliGRAM(s) Oral every 6 hours PRN  albuterol    90 MICROgram(s) HFA Inhaler 2 Puff(s) Inhalation every 6 hours PRN  aspirin  chewable 81 milliGRAM(s) Oral daily  atorvastatin 80 milliGRAM(s) Oral at bedtime  baclofen 5 milliGRAM(s) Oral every 12 hours PRN  budesonide  80 MICROgram(s)/formoterol 4.5 MICROgram(s) Inhaler 2 Puff(s) Inhalation two times a day  buPROPion XL (24-Hour) . 300 milliGRAM(s) Oral daily  cefpodoxime 200 milliGRAM(s) Oral every 12 hours  clonazePAM  Tablet 1 milliGRAM(s) Oral two times a day PRN  enoxaparin Injectable 40 milliGRAM(s) SubCutaneous every 24 hours  escitalopram 5 milliGRAM(s) Oral daily  hydrOXYzine hydrochloride 25 milliGRAM(s) Oral three times a day PRN  influenza   Vaccine 0.5 milliLiter(s) IntraMuscular once  levothyroxine 125 MICROGram(s) Oral daily  losartan 25 milliGRAM(s) Oral daily  metoprolol succinate  milliGRAM(s) Oral daily  metroNIDAZOLE    Tablet 500 milliGRAM(s) Oral every 8 hours  montelukast 10 milliGRAM(s) Oral daily  pantoprazole    Tablet 40 milliGRAM(s) Oral two times a day  sodium chloride 0.9%. 1000 milliLiter(s) IV Continuous <Continuous>  ticagrelor 90 milliGRAM(s) Oral every 12 hours  tiotropium 2.5 MICROgram(s) Inhaler 2 Puff(s) Inhalation daily  zolpidem 5 milliGRAM(s) Oral at bedtime PRN      Labs:                         10.6   9.02  )-----------( 343      ( 03 Apr 2023 05:43 )             33.9       04-03    142  |  106  |  14  ----------------------------<  100<H>  4.3   |  21  |  1.1    Ca    9.0      03 Apr 2023 05:43  Mg     1.9     04-03            Blood Bank: 04-03-23  --  A POS  --    Assessment/Plan:   This is a 57-year-old, right-hand-dominant female presents to neuro IR for diagnostic cerebral angiogram + right carotid artery angioplasty and stenting. The patient had been noted to have severe right proximal ICA stenosis on CTA.   Procedure, goals, risks, benefits and alternatives  were discussed with patient and patient's family.  All questions were answered to best understanding.   Risks discussed include but are not limited to stroke, vessel injury, hemorrhage, and/or hematoma.  Patient demonstrates understanding of all risks involved with this procedure and wishes to continue.     Appropriate consent was obtained from patient and consent is in the patient's chart.

## 2023-04-04 NOTE — PROGRESS NOTE ADULT - ATTENDING COMMENTS
Improving non complicated diverticulitis
patient's plan of care discussed early this morning during MDR around 8:15am - patient was unavailable in the room and already taken for neuroendovascular intervention/cerebral angiogram   -on ASA, Brilinta and Lipitor  -I was informed by 3B Unit clerk that patient is assigned Critical care bed - not coming back to 3B  -never had encounter with the patient due to above (new case on 3B)  -care as per Neurocrit icu team
I edited the note.
Abdominal pain improving. Recommend advance diet as tolerated. Continue course of antibiotics. Pt will resume outpt GI follow up with Dr. Pelayo to discuss colonoscopy.
Pt continues to report LLQ pain, abd soft, minimally improved. Recommend continue IV antibiotics and keep npo for now. If does not improve recommend repeat CT abd/pelvis and consider surgery consult.

## 2023-04-05 LAB
ALBUMIN SERPL ELPH-MCNC: 3.5 G/DL — SIGNIFICANT CHANGE UP (ref 3.5–5.2)
ALP SERPL-CCNC: 77 U/L — SIGNIFICANT CHANGE UP (ref 30–115)
ALT FLD-CCNC: 22 U/L — SIGNIFICANT CHANGE UP (ref 0–41)
ANION GAP SERPL CALC-SCNC: 9 MMOL/L — SIGNIFICANT CHANGE UP (ref 7–14)
AST SERPL-CCNC: 23 U/L — SIGNIFICANT CHANGE UP (ref 0–41)
BASOPHILS # BLD AUTO: 0.01 K/UL — SIGNIFICANT CHANGE UP (ref 0–0.2)
BASOPHILS # BLD AUTO: 0.02 K/UL — SIGNIFICANT CHANGE UP (ref 0–0.2)
BASOPHILS NFR BLD AUTO: 0.1 % — SIGNIFICANT CHANGE UP (ref 0–1)
BASOPHILS NFR BLD AUTO: 0.2 % — SIGNIFICANT CHANGE UP (ref 0–1)
BILIRUB SERPL-MCNC: <0.2 MG/DL — SIGNIFICANT CHANGE UP (ref 0.2–1.2)
BUN SERPL-MCNC: 17 MG/DL — SIGNIFICANT CHANGE UP (ref 10–20)
CALCIUM SERPL-MCNC: 8.5 MG/DL — SIGNIFICANT CHANGE UP (ref 8.4–10.5)
CHLORIDE SERPL-SCNC: 108 MMOL/L — SIGNIFICANT CHANGE UP (ref 98–110)
CO2 SERPL-SCNC: 22 MMOL/L — SIGNIFICANT CHANGE UP (ref 17–32)
CREAT SERPL-MCNC: 1 MG/DL — SIGNIFICANT CHANGE UP (ref 0.7–1.5)
EGFR: 65 ML/MIN/1.73M2 — SIGNIFICANT CHANGE UP
EOSINOPHIL # BLD AUTO: 0 K/UL — SIGNIFICANT CHANGE UP (ref 0–0.7)
EOSINOPHIL # BLD AUTO: 0.01 K/UL — SIGNIFICANT CHANGE UP (ref 0–0.7)
EOSINOPHIL NFR BLD AUTO: 0 % — SIGNIFICANT CHANGE UP (ref 0–8)
EOSINOPHIL NFR BLD AUTO: 0.1 % — SIGNIFICANT CHANGE UP (ref 0–8)
GLUCOSE SERPL-MCNC: 116 MG/DL — HIGH (ref 70–99)
HCT VFR BLD CALC: 29.9 % — LOW (ref 37–47)
HCT VFR BLD CALC: 30.2 % — LOW (ref 37–47)
HGB BLD-MCNC: 9.2 G/DL — LOW (ref 12–16)
HGB BLD-MCNC: 9.5 G/DL — LOW (ref 12–16)
IMM GRANULOCYTES NFR BLD AUTO: 0.3 % — SIGNIFICANT CHANGE UP (ref 0.1–0.3)
IMM GRANULOCYTES NFR BLD AUTO: 0.5 % — HIGH (ref 0.1–0.3)
IRON SATN MFR SERPL: 11 % — LOW (ref 15–50)
IRON SATN MFR SERPL: 32 UG/DL — LOW (ref 35–150)
LYMPHOCYTES # BLD AUTO: 1.2 K/UL — SIGNIFICANT CHANGE UP (ref 1.2–3.4)
LYMPHOCYTES # BLD AUTO: 1.46 K/UL — SIGNIFICANT CHANGE UP (ref 1.2–3.4)
LYMPHOCYTES # BLD AUTO: 12.6 % — LOW (ref 20.5–51.1)
LYMPHOCYTES # BLD AUTO: 16.4 % — LOW (ref 20.5–51.1)
MAGNESIUM SERPL-MCNC: 1.8 MG/DL — SIGNIFICANT CHANGE UP (ref 1.8–2.4)
MCHC RBC-ENTMCNC: 25.3 PG — LOW (ref 27–31)
MCHC RBC-ENTMCNC: 25.9 PG — LOW (ref 27–31)
MCHC RBC-ENTMCNC: 30.8 G/DL — LOW (ref 32–37)
MCHC RBC-ENTMCNC: 31.5 G/DL — LOW (ref 32–37)
MCV RBC AUTO: 82.3 FL — SIGNIFICANT CHANGE UP (ref 81–99)
MCV RBC AUTO: 82.4 FL — SIGNIFICANT CHANGE UP (ref 81–99)
MONOCYTES # BLD AUTO: 0.39 K/UL — SIGNIFICANT CHANGE UP (ref 0.1–0.6)
MONOCYTES # BLD AUTO: 0.48 K/UL — SIGNIFICANT CHANGE UP (ref 0.1–0.6)
MONOCYTES NFR BLD AUTO: 4.4 % — SIGNIFICANT CHANGE UP (ref 1.7–9.3)
MONOCYTES NFR BLD AUTO: 5 % — SIGNIFICANT CHANGE UP (ref 1.7–9.3)
NEUTROPHILS # BLD AUTO: 7.02 K/UL — HIGH (ref 1.4–6.5)
NEUTROPHILS # BLD AUTO: 7.79 K/UL — HIGH (ref 1.4–6.5)
NEUTROPHILS NFR BLD AUTO: 78.7 % — HIGH (ref 42.2–75.2)
NEUTROPHILS NFR BLD AUTO: 81.7 % — HIGH (ref 42.2–75.2)
NRBC # BLD: 0 /100 WBCS — SIGNIFICANT CHANGE UP (ref 0–0)
NRBC # BLD: 0 /100 WBCS — SIGNIFICANT CHANGE UP (ref 0–0)
PHOSPHATE SERPL-MCNC: 3.9 MG/DL — SIGNIFICANT CHANGE UP (ref 2.1–4.9)
PLATELET # BLD AUTO: 331 K/UL — SIGNIFICANT CHANGE UP (ref 130–400)
PLATELET # BLD AUTO: 334 K/UL — SIGNIFICANT CHANGE UP (ref 130–400)
POTASSIUM SERPL-MCNC: 4.2 MMOL/L — SIGNIFICANT CHANGE UP (ref 3.5–5)
POTASSIUM SERPL-SCNC: 4.2 MMOL/L — SIGNIFICANT CHANGE UP (ref 3.5–5)
PROT SERPL-MCNC: 5.9 G/DL — LOW (ref 6–8)
RBC # BLD: 3.63 M/UL — LOW (ref 4.2–5.4)
RBC # BLD: 3.67 M/UL — LOW (ref 4.2–5.4)
RBC # FLD: 17 % — HIGH (ref 11.5–14.5)
RBC # FLD: 17.1 % — HIGH (ref 11.5–14.5)
SODIUM SERPL-SCNC: 139 MMOL/L — SIGNIFICANT CHANGE UP (ref 135–146)
TIBC SERPL-MCNC: 302 UG/DL — SIGNIFICANT CHANGE UP (ref 220–430)
UIBC SERPL-MCNC: 270 UG/DL — SIGNIFICANT CHANGE UP (ref 110–370)
WBC # BLD: 8.92 K/UL — SIGNIFICANT CHANGE UP (ref 4.8–10.8)
WBC # BLD: 9.54 K/UL — SIGNIFICANT CHANGE UP (ref 4.8–10.8)
WBC # FLD AUTO: 8.92 K/UL — SIGNIFICANT CHANGE UP (ref 4.8–10.8)
WBC # FLD AUTO: 9.54 K/UL — SIGNIFICANT CHANGE UP (ref 4.8–10.8)

## 2023-04-05 PROCEDURE — 93010 ELECTROCARDIOGRAM REPORT: CPT

## 2023-04-05 PROCEDURE — 99232 SBSQ HOSP IP/OBS MODERATE 35: CPT

## 2023-04-05 RX ORDER — CLONAZEPAM 1 MG
1 TABLET ORAL EVERY 12 HOURS
Refills: 0 | Status: DISCONTINUED | OUTPATIENT
Start: 2023-04-05 | End: 2023-04-05

## 2023-04-05 RX ORDER — CHLORHEXIDINE GLUCONATE 213 G/1000ML
1 SOLUTION TOPICAL
Refills: 0 | Status: DISCONTINUED | OUTPATIENT
Start: 2023-04-05 | End: 2023-04-10

## 2023-04-05 RX ORDER — LACTOBACILLUS ACIDOPHILUS 100MM CELL
1 CAPSULE ORAL DAILY
Refills: 0 | Status: DISCONTINUED | OUTPATIENT
Start: 2023-04-05 | End: 2023-04-10

## 2023-04-05 RX ORDER — MAGNESIUM SULFATE 500 MG/ML
1 VIAL (ML) INJECTION ONCE
Refills: 0 | Status: COMPLETED | OUTPATIENT
Start: 2023-04-05 | End: 2023-04-05

## 2023-04-05 RX ORDER — ENOXAPARIN SODIUM 100 MG/ML
30 INJECTION SUBCUTANEOUS EVERY 12 HOURS
Refills: 0 | Status: DISCONTINUED | OUTPATIENT
Start: 2023-04-05 | End: 2023-04-10

## 2023-04-05 RX ORDER — MAGNESIUM SULFATE 500 MG/ML
2 VIAL (ML) INJECTION ONCE
Refills: 0 | Status: COMPLETED | OUTPATIENT
Start: 2023-04-05 | End: 2023-04-05

## 2023-04-05 RX ORDER — CLONAZEPAM 1 MG
1 TABLET ORAL
Refills: 0 | Status: DISCONTINUED | OUTPATIENT
Start: 2023-04-05 | End: 2023-04-10

## 2023-04-05 RX ADMIN — Medication 1 TABLET(S): at 11:48

## 2023-04-05 RX ADMIN — Medication 5 MILLIGRAM(S): at 22:15

## 2023-04-05 RX ADMIN — Medication 500 MILLIGRAM(S): at 22:14

## 2023-04-05 RX ADMIN — Medication 975 MILLIGRAM(S): at 18:00

## 2023-04-05 RX ADMIN — Medication 125 MICROGRAM(S): at 05:17

## 2023-04-05 RX ADMIN — ENOXAPARIN SODIUM 30 MILLIGRAM(S): 100 INJECTION SUBCUTANEOUS at 17:41

## 2023-04-05 RX ADMIN — Medication 200 MILLIGRAM(S): at 17:40

## 2023-04-05 RX ADMIN — BUPROPION HYDROCHLORIDE 300 MILLIGRAM(S): 150 TABLET, EXTENDED RELEASE ORAL at 11:47

## 2023-04-05 RX ADMIN — Medication 81 MILLIGRAM(S): at 11:47

## 2023-04-05 RX ADMIN — Medication 1 MILLIGRAM(S): at 22:15

## 2023-04-05 RX ADMIN — Medication 25 GRAM(S): at 08:08

## 2023-04-05 RX ADMIN — CHLORHEXIDINE GLUCONATE 1 APPLICATION(S): 213 SOLUTION TOPICAL at 17:56

## 2023-04-05 RX ADMIN — LOSARTAN POTASSIUM 25 MILLIGRAM(S): 100 TABLET, FILM COATED ORAL at 05:17

## 2023-04-05 RX ADMIN — Medication 975 MILLIGRAM(S): at 19:09

## 2023-04-05 RX ADMIN — Medication 100 MILLIGRAM(S): at 05:18

## 2023-04-05 RX ADMIN — TICAGRELOR 90 MILLIGRAM(S): 90 TABLET ORAL at 05:17

## 2023-04-05 RX ADMIN — Medication 500 MILLIGRAM(S): at 05:17

## 2023-04-05 RX ADMIN — PANTOPRAZOLE SODIUM 40 MILLIGRAM(S): 20 TABLET, DELAYED RELEASE ORAL at 05:20

## 2023-04-05 RX ADMIN — Medication 100 GRAM(S): at 06:59

## 2023-04-05 RX ADMIN — Medication 200 MILLIGRAM(S): at 05:29

## 2023-04-05 RX ADMIN — Medication 500 MILLIGRAM(S): at 13:14

## 2023-04-05 RX ADMIN — ATORVASTATIN CALCIUM 80 MILLIGRAM(S): 80 TABLET, FILM COATED ORAL at 22:14

## 2023-04-05 RX ADMIN — MONTELUKAST 10 MILLIGRAM(S): 4 TABLET, CHEWABLE ORAL at 11:49

## 2023-04-05 RX ADMIN — Medication 5 MILLIGRAM(S): at 22:14

## 2023-04-05 RX ADMIN — PANTOPRAZOLE SODIUM 40 MILLIGRAM(S): 20 TABLET, DELAYED RELEASE ORAL at 17:40

## 2023-04-05 RX ADMIN — TICAGRELOR 90 MILLIGRAM(S): 90 TABLET ORAL at 17:40

## 2023-04-05 RX ADMIN — ESCITALOPRAM OXALATE 5 MILLIGRAM(S): 10 TABLET, FILM COATED ORAL at 11:48

## 2023-04-05 RX ADMIN — Medication 5 MILLIGRAM(S): at 00:38

## 2023-04-05 NOTE — PROGRESS NOTE ADULT - ASSESSMENT
ASSESSMENT:  57 year old morbidly obese female has medical history of 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  POD #0: s/p right carotid stent and angioplasty,     PLAN:   NEURO:  - Neuro checks/NIHSS as per post-NI protocol   - On Brilinta/ASA  - Continue lexapro, clonazepam  - Continue baclofen  Activity: [X] Increase as tolerated [] Bedrest [X] PT [X] OT [] PMNR    PULM:  - HOB > 45 degrees  - Aspiration precautions  - Keep SaO2 > 95%  - DAVID, on BIPAP at home  - Continue singulair, spiriva, symbicort     CV:  - Keep  - < 140  - Telemetry monitoring  - 12-lead ECG  - TTE/2D echo  - LDL, 149  - Daily statin  - Continue losartan, metoprolol     RENAL:  - Strict I/Os, daily weights  - Keep euvolemic  - Keep normonatremic   - Keep Magnesium level > 2; Potassium > 4  - Monitor lytes, replete as needed  - IVF/IVL when tolerating PO intake    GI: diverticulitis  Diet: Dysphagia screen and then advance diet as tolerated  GI prophylaxis: Protonix  Bowel regimen [X] Miralax [X] senna [] other:  - On Flagyl and Vantin for active diverticulitis; GI recommend outpatient follow-up 6-8 weeks with ABTx until 4/10    ENDO:   - Goal serum glucose 140-180  - HgA1c, 3/30: 6.9  - TSH, 3/30: 2.81  - Continue synthroid 125mcg     HEME/ONC:  VTE prophylaxis: [X] SCDs  - on ASA/Brilinta  - Lovenox for DVT PPX     ID:  - Keep normothermic, avoid fevers  - On Flagyl and Vantin until 4/10 for diverticulitis     MISC:  PT/OT/SLP    CODE STATUS:  [X] Full Code [] DNR [] DNI [] Palliative/Comfort Care    DISPOSITION:  [X] NCCU [] Stroke Unit [] Floor [] EMU [] RCU [] PCU                 ASSESSMENT:  57 year old morbidly obese female has medical history of 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  POD #1: s/p right carotid stent and angioplasty,     PLAN:   NEURO:  - Neuro checks 4 hrs  - On Brilinta/ASA  - Continue lexapro, clonazepam  - Continue baclofen  Activity: [X] Increase as tolerated [] Bedrest [X] PT [X] OT [] PMNR    PULM: Sleep apnea  - HOB > 45 degrees  - Aspiration precautions  - Keep SaO2 > 95%  - DAVID, on BIPAP at home  - Continue singulair, spiriva, symbicort     CV: HTN/ HLD   - Keep  - < 140  - Telemetry monitoring  - 12-lead ECG  - TTE/2D echo  - LDL, 149  - Daily statin  - Continue losartan, metoprolol     RENAL:  - Keep normonatremic   - Keep Magnesium level > 2; Potassium > 4  - Monitor lytes, replete as needed  IVL    GI: diverticulitis  Diet: Dysphagia screen and then advance diet as tolerated  GI prophylaxis: Protonix  Bowel regimen [X] Miralax [X] senna [] other:  -  Flagyl and Vantin for active diverticulitis; GI recommend outpatient follow-up 6-8 weeks with ABTx until 4/10    ENDO:   - Goal serum glucose 140-180  - HgA1c, 3/30: 6.9  - TSH, 3/30: 2.81  - Continue synthroid 125mcg     HEME/ONC:  VTE prophylaxis: [X] SCDs  - on ASA/Brilinta  - Lovenox for DVT PPX     ID:  - Keep normothermic, avoid fevers  - On Flagyl and Vantin until 4/10 for diverticulitis     MISC:  PT/OT/SLP    CODE STATUS:  [X] Full Code [] DNR [] DNI [] Palliative/Comfort Care    DISPOSITION:  [X] NCCU [] Stroke Unit [] Floor [] EMU [] RCU [] PCU                 ASSESSMENT:  57 year old morbidly obese female has medical history of 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  POD #1: s/p right carotid stent and angioplasty,     PLAN:   NEURO:  - Neuro checks 4 hrs  - On Brilinta/ASA  - Continue lexapro, clonazepam  - Continue baclofen  Activity: [X] Increase as tolerated [] Bedrest [X] PT [X] OT [] PMNR    PULM: Sleep apnea  - HOB > 45 degrees  - Aspiration precautions  - Keep SaO2 > 95%  - DAVID, on BIPAP at home  - Continue singulair, spiriva, symbicort     CV: HTN/ HLD   - Keep  - < 140  - Telemetry monitoring  - 12-lead ECG  - TTE/2D echo  - LDL, 149  - Daily statin  - Continue losartan, metoprolol     RENAL:  - Keep normonatremic   - Keep Magnesium level > 2; Potassium > 4  - Monitor lytes, replete as needed  IVL    GI: diverticulitis  Diet: Dysphagia screen and then advance diet as tolerated  GI prophylaxis: Protonix  Bowel regimen [X] Miralax [X] senna [] other:  -  Flagyl and Vantin for active diverticulitis; GI recommend outpatient follow-up 6-8 weeks with ABTx until 4/10  - Probiotic     ENDO:   - Goal serum glucose 140-180  - HgA1c, 3/30: 6.9  - TSH, 3/30: 2.81  - Continue synthroid 125mcg     HEME/ONC: Anemia  - Fe , Ferritin , TIBC , % sat   VTE prophylaxis: [X] SCDs  - on ASA/Brilinta  - Lovenox 30mg q 12    ID:  - Keep normothermic, avoid fevers  - On Flagyl and Vantin until 4/10 for diverticulitis     MISC:  PT/OT/SLP    CODE STATUS:  [X] Full Code    DISPOSITION:  Floor - 3E/ 3C  medicine

## 2023-04-05 NOTE — OCCUPATIONAL THERAPY INITIAL EVALUATION ADULT - PLANNED THERAPY INTERVENTIONS, OT EVAL
ADL retraining/balance training/bed mobility training/motor coordination training/neuromuscular re-education/parent/caregiver training.../ROM/strengthening/stretching/transfer training

## 2023-04-05 NOTE — OCCUPATIONAL THERAPY INITIAL EVALUATION ADULT - LIVES WITH, PROFILE
pvt home, +EVY, +stairs to bedroom, spouse has been hospitalized for approximately 1 year, + walk in shower/children

## 2023-04-05 NOTE — OCCUPATIONAL THERAPY INITIAL EVALUATION ADULT - NSOTDISCHREC_GEN_A_CORE
Pt requires supervision/CGA with ADLs and functional transfers, impacted by lines/leads. Will likely be ind. with BADLs but d/c, with no skilled OT needs

## 2023-04-05 NOTE — OCCUPATIONAL THERAPY INITIAL EVALUATION ADULT - GENERAL OBSERVATIONS, REHAB EVAL
Pt received semi turner in bed in NAD, +tele, +BP cuff, +pulse oxi, agreeable to OT evaluation, left seated in b/s chair I NAD, all lines intact, vitals stable

## 2023-04-05 NOTE — OCCUPATIONAL THERAPY INITIAL EVALUATION ADULT - PERTINENT HX OF CURRENT PROBLEM, REHAB EVAL
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  POD #1: s/p right carotid stent and angioplasty,

## 2023-04-05 NOTE — CHART NOTE - NSCHARTNOTEFT_GEN_A_CORE
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 Episode happened while patient was driving and she continued to drive normally. She reports associated pain behind the eyes. Resolved and did not happen again. In the ER patient also complaining of LLQ pain worse when attempting to pass gas. Denies any fever, chills, headaches, SOB, CP, NVD, Dysuria, or MSK pain.     In the ER:  Vitals: /58, HR 82, RR 20, SpO2 100 RA, T 98.4 oral  EKst deg av block  Sig Labs: WBC 8.15, Hgb 10.7 at baseline, Cr 1.3  eGFR 47, Mg 1.7, , Trop -ve, UA -ve, COVID -ve   Imaging:  CT head: No acute intracranial pathology.  CTA NECK:  1.  Severe high-grade stenosis of the proximal right internal carotid artery caused by noncalcified plaque.  2.  Moderate stenosis of the proximal left internal carotid artery caused by calcified plaque.  3.  No evidence of vertebral artery stenosis.  CTA HEAD:  1.  Moderate stenosis of the cavernous segments of the bilateral internal carotid arteries  2.  Severe narrowing of the P3 segment of the right posterior cerebral artery.  3.  Otherwise, no evidence of flow-limiting stenosis, occlusion or aneurysm.  Other: Right upper lobe 1.2 cm pulmonary nodule, as well as several groundglass opacities. Consider follow-up CT of the chest in 3-6 months.  CT AP: Acute diverticulitis of a short segment of the sigmoid colon. No abscess or perforation. Further evaluation with nonemergent colonoscopy may be of benefit when patient is clinically able.    In the ER: Given Dilaudid 0.5, LR 1L bolus, Zofran 4mg IV, Magnesium 2g, and Zosyn 3.375 IV    Hospital (med/tele 3C) course:   Patient was transitioned to vantin/flagyl po with improvement in symptoms of diverticulitis.   Neurology recommended plavix and neuroendovascular eval for R ICA stenosis.   Switched from plavix to brillinta 90mg po bid on 4/3.   Patient went for cerebral angiogram on  with upgrade to neuro ICU after.     Neuro ICU course:   Patient s/p cerebral angiogram with angioplasty and stent placed in R proximal ICA.   Briefly placed on O2 overnight due to desaturation while sleeping.     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 Episode happened while patient was driving and she continued to drive normally. She reports associated pain behind the eyes. Resolved and did not happen again. In the ER patient also complaining of LLQ pain worse when attempting to pass gas.     Can alternatively refer to assessment/plan of last NSICU note.     #Transient Blurry vision  #b/l carotid artery stenosis R > L   Patient presented with transient blurry vision lasted for 15 minutes, no other weakness.   Neuro exam is non-focal.   Severe high-grade stenosis of the proximal right internal carotid artery,  Moderate left ICA stenosis, right P3, and b/l cavernous ICA stenosis.  Brain MRI 3/28 was Unremarkable, no acute infarct.   CTA Neck 3/27 Severe high-grade stenosis of the proximal right ICA. Moderate stenosis of the proximal left ICA.   CTA HEAD:  Moderate stenosis of the cavernous segments of the bilateral internal . carotid arteries. Severe narrowing of the P3 segment of the right posterior cerebral artery.  Diagnostic cerebral angiogram : angioplasty/stent placed in proximal R ICA.   Continue ASA, brillinta 90mg bid, and Lipitor  PT/OT eval   Can f/u neuroendovascular regarding duration of antiplatelets   Needs o/p f/u with neurology, neuroendovascular    #Acute sigmoid diverticulitis   CT abd: Acute diverticulitis of a short segment of the sigmoid colon. No abscess or perforation. Further evaluation with nonemergent colonoscopy may be of benefit when patient is clinically able.  Continue Vantin 200mg mg q12h and Flafyl 500 mg q8h till 4/10  GI recommended colonoscopy outpatient after 6-8 weeks.     #DAVID compliant with home CPAP  - pt not certain with what pressure setting she's on but knows it starts as low as 4  - can do CPAP peep 5 at bedtime   - f/u o/p pulm     #normocytic anemia   - hgb baseline ~10.5; today 9.5   - get iron studies/B12/folate     #Hypertension: toprol increased from 50 to 100mg qd; consider alternative med on discharge, no hx of CAD?  #Hypothyroidism: synthroid  #COPD- stable, Continue Budesonide, Spiriva and Montelukast.      #Depression: Continue Lexapro and Clonazepam.     #Right upper lobe 1.2 cm pulmonary nodule, as well as several groundglass opacities.   Follow-up CT of the chest in 3-6 months.    #Morbid obesity  BMI: 50.6    DVT prophylaxis: Lovenox SC    Full Code.   Pending (specify): diagnostic cerebral angiogram on . From: Neuro ICU   To: Medicine (3C/3E tele)    Signed out to: Dr. 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 Episode happened while patient was driving and she continued to drive normally. She reports associated pain behind the eyes. Resolved and did not happen again. In the ER patient also complaining of LLQ pain worse when attempting to pass gas. Denies any fever, chills, headaches, SOB, CP, NVD, Dysuria, or MSK pain.     In the ER:  Vitals: /58, HR 82, RR 20, SpO2 100 RA, T 98.4 oral  EKst deg av block  Sig Labs: WBC 8.15, Hgb 10.7 at baseline, Cr 1.3  eGFR 47, Mg 1.7, , Trop -ve, UA -ve, COVID -ve   Imaging:  CT head: No acute intracranial pathology.  CTA NECK:  1.  Severe high-grade stenosis of the proximal right internal carotid artery caused by noncalcified plaque.  2.  Moderate stenosis of the proximal left internal carotid artery caused by calcified plaque.  3.  No evidence of vertebral artery stenosis.  CTA HEAD:  1.  Moderate stenosis of the cavernous segments of the bilateral internal carotid arteries  2.  Severe narrowing of the P3 segment of the right posterior cerebral artery.  3.  Otherwise, no evidence of flow-limiting stenosis, occlusion or aneurysm.  Other: Right upper lobe 1.2 cm pulmonary nodule, as well as several groundglass opacities. Consider follow-up CT of the chest in 3-6 months.  CT AP: Acute diverticulitis of a short segment of the sigmoid colon. No abscess or perforation. Further evaluation with nonemergent colonoscopy may be of benefit when patient is clinically able.    In the ER: Given Dilaudid 0.5, LR 1L bolus, Zofran 4mg IV, Magnesium 2g, and Zosyn 3.375 IV    Hospital (med/tele 3C) course:   Patient was transitioned to vantin/flagyl po with improvement in symptoms of diverticulitis.   Neurology recommended plavix and neuroendovascular eval for R ICA stenosis.   Switched from plavix to brillinta 90mg po bid on 4/3.   Patient went for cerebral angiogram on  with upgrade to neuro ICU after.     Neuro ICU course:   Patient s/p cerebral angiogram with angioplasty and stent placed in R proximal ICA.   Briefly placed on O2 overnight due to desaturation while sleeping.     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 Episode happened while patient was driving and she continued to drive normally. She reports associated pain behind the eyes. Resolved and did not happen again. In the ER patient also complaining of LLQ pain worse when attempting to pass gas.     Can alternatively refer to assessment/plan of last NSICU note.     #Transient Blurry vision  #b/l carotid artery stenosis R > L   Patient presented with transient blurry vision lasted for 15 minutes, no other weakness.   Neuro exam is non-focal.   Severe high-grade stenosis of the proximal right internal carotid artery,  Moderate left ICA stenosis, right P3, and b/l cavernous ICA stenosis.  Brain MRI 3/28 was Unremarkable, no acute infarct.   CTA Neck 3/27 Severe high-grade stenosis of the proximal right ICA. Moderate stenosis of the proximal left ICA.   CTA HEAD:  Moderate stenosis of the cavernous segments of the bilateral internal . carotid arteries. Severe narrowing of the P3 segment of the right posterior cerebral artery.  Diagnostic cerebral angiogram : angioplasty/stent placed in proximal R ICA.   Continue ASA, brillinta 90mg bid, and Lipitor  PT/OT eval   Can f/u neuroendovascular regarding duration of antiplatelets   Needs o/p f/u with neurology, neuroendovascular    #Acute sigmoid diverticulitis   CT abd: Acute diverticulitis of a short segment of the sigmoid colon. No abscess or perforation. Further evaluation with nonemergent colonoscopy may be of benefit when patient is clinically able.  Continue Vantin 200mg mg q12h and Flafyl 500 mg q8h till 4/10  GI recommended colonoscopy outpatient after 6-8 weeks.     #DAVID compliant with home CPAP  - pt not certain with what pressure setting she's on but knows it starts as low as 4  - can do CPAP peep 5 at bedtime   - f/u o/p pulm     #normocytic anemia   - hgb baseline ~10.5; today 9.5   - get iron studies/B12/folate     #Hypertension: toprol increased from 50 to 100mg qd; consider alternative med on discharge, no hx of CAD?  #Hypothyroidism: synthroid  #COPD- stable, Continue Budesonide, Spiriva and Montelukast.      #Depression: Continue Lexapro and Clonazepam.     #Right upper lobe 1.2 cm pulmonary nodule, as well as several groundglass opacities.   Follow-up CT of the chest in 3-6 months.    #Morbid obesity  BMI: 50.6    DVT prophylaxis: Lovenox SC    Full Code.   Pending (specify): diagnostic cerebral angiogram on . From: Neuro ICU   To: Medicine (3C/3E tele)    Signed out to: Dr. Edilson Cueva  Accepting physician: Dr Ornelas    HPI:   57 year old morbidly obese female has medical history of 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 , hypothyroidism, Diverticulitis, DAVID on home CPAP, COPD (not on oxygen), HTN, Depression, Insomnia, and Anxiety presents to ED c/o of sudden onset B/L blurry vision that lasted 15 mins earlier in the morning. Episode happened while patient was driving and she continued to drive normally. She reports associated pain behind the eyes. Resolved and did not happen again. In the ER patient also complaining of LLQ pain worse when attempting to pass gas. Denies any fever, chills, headaches, SOB, CP, NVD, Dysuria, or MSK pain.     In the ER:  Vitals: /58, HR 82, RR 20, SpO2 100 RA, T 98.4 oral  EKst deg av block  Sig Labs: WBC 8.15, Hgb 10.7 at baseline, Cr 1.3  eGFR 47, Mg 1.7, , Trop -ve, UA -ve, COVID -ve   Imaging:  CT head: No acute intracranial pathology.  CTA NECK:  1.  Severe high-grade stenosis of the proximal right internal carotid artery caused by noncalcified plaque.  2.  Moderate stenosis of the proximal left internal carotid artery caused by calcified plaque.  3.  No evidence of vertebral artery stenosis.  CTA HEAD:  1.  Moderate stenosis of the cavernous segments of the bilateral internal carotid arteries  2.  Severe narrowing of the P3 segment of the right posterior cerebral artery.  3.  Otherwise, no evidence of flow-limiting stenosis, occlusion or aneurysm.  Other: Right upper lobe 1.2 cm pulmonary nodule, as well as several groundglass opacities. Consider follow-up CT of the chest in 3-6 months.  CT AP: Acute diverticulitis of a short segment of the sigmoid colon. No abscess or perforation. Further evaluation with nonemergent colonoscopy may be of benefit when patient is clinically able.    In the ER: Given Dilaudid 0.5, LR 1L bolus, Zofran 4mg IV, Magnesium 2g, and Zosyn 3.375 IV    Hospital (med/tele 3C) course:   Patient was transitioned to vantin/flagyl po with improvement in symptoms of diverticulitis.   Neurology recommended plavix and neuroendovascular eval for R ICA stenosis.   Switched from plavix to brillinta 90mg po bid on 4/3.   Patient went for cerebral angiogram on  with upgrade to neuro ICU after.     Neuro ICU course:   Patient s/p cerebral angiogram with angioplasty and stent placed in R proximal ICA.   Briefly placed on O2 overnight due to desaturation while sleeping.     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 Episode happened while patient was driving and she continued to drive normally. She reports associated pain behind the eyes. Resolved and did not happen again. In the ER patient also complaining of LLQ pain worse when attempting to pass gas.     Can alternatively refer to assessment/plan of last NSICU note.     #Transient Blurry vision  #b/l carotid artery stenosis R > L   Patient presented with transient blurry vision lasted for 15 minutes, no other weakness.   Neuro exam is non-focal.   Severe high-grade stenosis of the proximal right internal carotid artery,  Moderate left ICA stenosis, right P3, and b/l cavernous ICA stenosis.  Brain MRI 3/28 was Unremarkable, no acute infarct.   CTA Neck 3/27 Severe high-grade stenosis of the proximal right ICA. Moderate stenosis of the proximal left ICA.   CTA HEAD:  Moderate stenosis of the cavernous segments of the bilateral internal . carotid arteries. Severe narrowing of the P3 segment of the right posterior cerebral artery.  Diagnostic cerebral angiogram : angioplasty/stent placed in proximal R ICA.   Continue ASA, brillinta 90mg bid, and Lipitor  PT/OT eval   Can f/u neuroendovascular regarding duration of antiplatelets   Needs o/p f/u with neurology, neuroendovascular    #Acute sigmoid diverticulitis   CT abd: Acute diverticulitis of a short segment of the sigmoid colon. No abscess or perforation. Further evaluation with nonemergent colonoscopy may be of benefit when patient is clinically able.  Continue Vantin 200mg mg q12h and Flafyl 500 mg q8h till 4/10  GI recommended colonoscopy outpatient after 6-8 weeks.     #DAVID compliant with home CPAP  - pt not certain with what pressure setting she's on but knows it starts as low as 4  - can do CPAP peep 5 at bedtime   - f/u o/p pulm     #normocytic anemia   - hgb baseline ~10.5; today 9.5   - get iron studies/B12/folate     #Hypertension: toprol increased from 50 to 100mg qd; consider alternative med on discharge, no hx of CAD?  #Hypothyroidism: synthroid  #COPD- stable, Continue Budesonide, Spiriva and Montelukast.      #Depression: Continue Lexapro and Clonazepam.     #Right upper lobe 1.2 cm pulmonary nodule, as well as several groundglass opacities.   Follow-up CT of the chest in 3-6 months.    #Morbid obesity  BMI: 50.6    DVT prophylaxis: Lovenox SC    Full Code.   Pending (specify): diagnostic cerebral angiogram on .

## 2023-04-05 NOTE — PROGRESS NOTE ADULT - SUBJECTIVE AND OBJECTIVE BOX
Neuroendovascular Progress Note:     HPI:  The patient is a 57-year-old female with a PMHx of HTN, hearing loss, COPD, hypothyroidism, CKD, diverticulitis, nasopharyngeal CA s/p chenmo/RT 2013, MIN, and anxiety who presented for sudden onset blurry vision, lasting 15 min, and eye pain. A neuroendovascular consult was placed for severe high-grade stenosis of the proximal right internal carotid artery, which was identified on CTA. The patient also has moderate left ICA stenosis, right P3, and b/l cavernous ICA stenosis. On exam the patient reports vision is back to baseline. Patient is now POD 1 s/p diagnostic cerebral angiogram and right carotid angioplasty and stenting. Patient continues on DAPT.      Past medical history:   COPD (chronic obstructive pulmonary disease)  HTN (hypertension)  Diverticulitis  Pulmonary nodules/lesions, multiple  Nasopharyngeal cancer  Hypothyroid  Cervical disc disease  Left ear hearing loss  Hemorrhoids  Anxiety    Medications:   aspirin  chewable 81 milliGRAM(s) Oral daily  atorvastatin 80 milliGRAM(s) Oral at bedtime  budesonide  80 MICROgram(s)/formoterol 4.5 MICROgram(s) Inhaler 2 Puff(s) Inhalation two times a day  buPROPion XL (24-Hour) . 300 milliGRAM(s) Oral daily  cefpodoxime 200 milliGRAM(s) Oral every 12 hours  chlorhexidine 2% Cloths 1 Application(s) Topical <User Schedule>  dexAMETHasone  Injectable 10 milliGRAM(s) IV Push once  enoxaparin Injectable 30 milliGRAM(s) SubCutaneous every 12 hours  escitalopram 5 milliGRAM(s) Oral daily  influenza   Vaccine 0.5 milliLiter(s) IntraMuscular once  lactobacillus acidophilus 1 Tablet(s) Oral daily  levothyroxine 125 MICROGram(s) Oral daily  losartan 25 milliGRAM(s) Oral daily  melatonin 5 milliGRAM(s) Oral at bedtime  metoprolol succinate  milliGRAM(s) Oral daily  metroNIDAZOLE    Tablet 500 milliGRAM(s) Oral every 8 hours  montelukast 10 milliGRAM(s) Oral daily  pantoprazole    Tablet 40 milliGRAM(s) Oral two times a day  racepinephrine  2.25% Inhalation 0.5 milliLiter(s) Inhalation once  racepinephrine  2.25% Inhalation 0.5 milliLiter(s) Inhalation once  ticagrelor 90 milliGRAM(s) Oral every 12 hours  tiotropium 2.5 MICROgram(s) Inhaler 2 Puff(s) Inhalation daily    Vitals:   T(F): 97.2 (04-05-23 @ 12:00), Max: 98.7 (04-04-23 @ 23:10)  HR: 64 (04-05-23 @ 13:00) (60 - 74)  BP: 141/67 (04-05-23 @ 13:00) (110/66 - 141/67)  RR: 22 (04-05-23 @ 13:00) (7 - 40)  SpO2: 94% (04-05-23 @ 13:00) (91% - 98%)    Labs:                         9.2    8.92  )-----------( 334      ( 05 Apr 2023 10:40 )             29.9   04-05  139  |  108  |  17  ----------------------------<  116<H>  4.2   |  22  |  1.0  Ca    8.5      05 Apr 2023 05:00  Phos  3.9     04-05  Mg     1.8     04-05  TPro  5.9<L>  /  Alb  3.5  /  TBili  <0.2  /  DBili  x   /  AST  23  /  ALT  22  /  AlkPhos  77  04-05  LIVER FUNCTIONS - ( 05 Apr 2023 05:00 )  Alb: 3.5 g/dL / Pro: 5.9 g/dL / ALK PHOS: 77 U/L / ALT: 22 U/L / AST: 23 U/L / GGT: x             Exam:   General - NAD   Neuro Exam: AAO3, hypophonic, follows commands, no aphasia, no dysarthria, EOMi, face symmetric, lifts bilateral UE/LE to antigravity, sensation intact, no dysmetria on finger to nose  Extremity - right UE arteriotomy site CDI, dressing changed this AM, no evidence of hematoma or infection, mild swelling on proximal forearm mild tenderness, soft to palpation, no ecchymosis. Pulses intact. Temperature and color consistent bilaterally.     Radiology:   Imaging reviewed per neuroendovascular ACP/attending.   Official IR neuro report to follow.     Assessment:   The patient is a 57-year-old female with a PMHx of HTN, hearing loss, COPD, hypothyroidism, CKD, diverticulitis, nasopharyngeal CA s/p chenmo/RT 2013, MIN, and anxiety who presented for sudden onset blurry vision, lasting 15 min, and eye pain. A neuroendovascular consult was placed for severe high-grade stenosis of the proximal right internal carotid artery, which was identified on CTA. The patient also has moderate left ICA stenosis, right P3, and b/l cavernous ICA stenosis. On exam the patient reports vision is back to baseline. Patient is now POD 1 s/p diagnostic cerebral angiogram and right carotid angioplasty and stenting. Patient continues on DAPT.      Suggestions:  Continue to monitor right UE for signs of hematoma or infection, intact distal pulses.   Patient must continue on DAPT given recent stent.   Stat CTH with any acute change in neuro exam.   Management per neuro ICU   Discussed with Dr. Blank     [id number] neuroendovascular

## 2023-04-05 NOTE — PROGRESS NOTE ADULT - SUBJECTIVE AND OBJECTIVE BOX
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 Episode happened while patient was driving and she continued to drive normally. She reports associated pain behind the eyes. Resolved and did not happen again. In the ER patient also complaining of LLQ pain worse when attempting to pass gas. Denies any fever, chills, headaches, SOB, CP, NVD, Dysuria, or MSK pain.   (28 Mar 2023 01:30)    INTERVAL EVENTS: s/p right carotid stent and angioplasty, 4/4, no events o/n    ADMISSION SCORES: GCS: HH: MF: NIHSS: RASS: CAM-ICU: ICP:    Allergies: ciprofloxacin (Urticaria; Other)      REVIEW OF SYSTEMS: [ ] Unable to Assess due to neurologic exam   [ ] All ROS addressed below are non-contributory, except:  Neuro: [ ] Headache [ ] Back pain [ ] Numbness [ ] Weakness [ ] Ataxia [ ] Dizziness [ ] Aphasia [ ] Dysarthria [ ] Visual disturbance  Resp: [ ] Shortness of breath/dyspnea, [ ] Orthopnea [ ] Cough  CV: [ ] Chest pain [ ] Palpitation [ ] Lightheadedness [ ] Syncope  Renal: [ ] Thirst [ ] Edema  GI: [ ] Nausea [ ] Emesis [ ] Abdominal pain [ ] Constipation [ ] Diarrhea  Hem: [ ] Hematemesis [ ] bright red blood per rectum  ID: [ ] Fever [ ] Chills [ ] Dysuria  ENT: [ ] Rhinorrhea    DEVICES:   [ ] Restraints [ ] ET tube [ ] central line [ ] arterial line [ ] isaacs [ ] NGT/OGT [ ] EVD [ ] LD [ ] JANIS/HMV [ ] Trach [ ] PEG [ ] Chest Tube     VITALS: [X] Reviewed  Vital Signs Last 24 Hrs  T(C): 36.3 (04 Apr 2023 16:00), Max: 36.6 (04 Apr 2023 15:10)  T(F): 97.4 (04 Apr 2023 16:00), Max: 97.8 (04 Apr 2023 15:10)  HR: 66 (04 Apr 2023 16:00) (62 - 84)  BP: 145/61 (04 Apr 2023 13:20) (118/62 - 214/90)  BP(mean): 76 (04 Apr 2023 11:38) (76 - 76)  RR: 18 (04 Apr 2023 16:00) (16 - 18)  SpO2: 97% (04 Apr 2023 16:00) (95% - 100%)    Parameters below as of 04 Apr 2023 15:30  Patient On (Oxygen Delivery Method): nasal cannula  O2 Flow (L/min): 2    CAPILLARY BLOOD GLUCOSE      LABS:    04-03    142  |  106  |  14  ----------------------------<  100<H>  4.3   |  21  |  1.1    Ca    9.0      03 Apr 2023 05:43  Mg     1.9     04-03                            10.6   9.02  )-----------( 343      ( 03 Apr 2023 05:43 )             33.9         IMAGING/DATA: [X] Reviewed    IVF FLUIDS/MEDICATIONS: [X] Reviewed  MEDICATIONS  (STANDING):  aspirin  chewable 81 milliGRAM(s) Oral daily  atorvastatin 80 milliGRAM(s) Oral at bedtime  budesonide  80 MICROgram(s)/formoterol 4.5 MICROgram(s) Inhaler 2 Puff(s) Inhalation two times a day  buPROPion XL (24-Hour) . 300 milliGRAM(s) Oral daily  cefpodoxime 200 milliGRAM(s) Oral every 12 hours  dexAMETHasone  Injectable 10 milliGRAM(s) IV Push once  enoxaparin Injectable 40 milliGRAM(s) SubCutaneous every 24 hours  escitalopram 5 milliGRAM(s) Oral daily  influenza   Vaccine 0.5 milliLiter(s) IntraMuscular once  levothyroxine 125 MICROGram(s) Oral daily  losartan 25 milliGRAM(s) Oral daily  metoprolol succinate  milliGRAM(s) Oral daily  metroNIDAZOLE    Tablet 500 milliGRAM(s) Oral every 8 hours  montelukast 10 milliGRAM(s) Oral daily  pantoprazole    Tablet 40 milliGRAM(s) Oral two times a day  racepinephrine  2.25% Inhalation 0.5 milliLiter(s) Inhalation once  racepinephrine  2.25% Inhalation 0.5 milliLiter(s) Inhalation once  sodium chloride 0.9%. 1000 milliLiter(s) (60 mL/Hr) IV Continuous <Continuous>  ticagrelor 90 milliGRAM(s) Oral every 12 hours  tiotropium 2.5 MICROgram(s) Inhaler 2 Puff(s) Inhalation daily    MEDICATIONS  (PRN):  acetaminophen     Tablet .. 975 milliGRAM(s) Oral every 6 hours PRN Temp greater or equal to 38C (100.4F), Mild Pain (1 - 3), Moderate Pain (4 - 6)  albuterol    90 MICROgram(s) HFA Inhaler 2 Puff(s) Inhalation every 6 hours PRN Shortness of Breath and/or Wheezing  baclofen 5 milliGRAM(s) Oral every 12 hours PRN Musculoskeletal Pain  clonazePAM  Tablet 1 milliGRAM(s) Oral two times a day PRN anxiety  hydrOXYzine hydrochloride 25 milliGRAM(s) Oral three times a day PRN Anxiety  zolpidem 5 milliGRAM(s) Oral at bedtime PRN Insomnia    I&O's Summary    03 Apr 2023 07:01  -  04 Apr 2023 07:00  --------------------------------------------------------  IN: 457 mL / OUT: 0 mL / NET: 457 mL     CT Angio Head w/ IV Cont (03.27.23 @ 21:24) >    IMPRESSION:    CTA NECK:  1.  Severe high-grade stenosis of the proximal right internal carotid   artery caused by noncalcified plaque.  2.  Moderate stenosis of the proximal left internal carotid artery caused   by calcified plaque.  3.  No evidence of vertebral artery stenosis.    CTA HEAD:  1.  Moderate stenosis of the cavernous segments of the bilateral internal   carotid arteries  2.  Severe narrowing of the P3 segment of the right posterior cerebral   artery.  3.  Otherwise, no evidence of flow-limiting stenosis, occlusion or   aneurysm.    < end of copied text >      EXAMINATION:  General: No acute distress  HEENT: Anicteric sclerae  Cardiac: [id number]  Lungs: Clear  Abdomen: Soft, non-tender, +BS  Extremities: No c/c/e  Skin/Incision Site: Clean, dry and intact  Neurologic: Awake, alert, fully oriented, hypophonic, no dysarthria or aphasia, follows commands, PERRL, VFFtc, EOMI, face symmetric, tongue midline, able to lift B/L UE and LE AG, no drift, full strength 5/5, sensation intact, no neglect            SUMMARY:HPI:  HPI: 57 year old morbidly obese female has medical history of 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 Episode happened while patient was driving and she continued to drive normally. She reports associated pain behind the eyes. Resolved and did not happen again. In the ER patient also complaining of LLQ pain worse when attempting to pass gas. Denies any fever, chills, headaches, SOB, CP, NVD, Dysuria, or MSK pain.   (28 Mar 2023 01:30)    INTERVAL EVENTS: s/p right carotid stent and angioplasty, 4/4, no events o/n    Allergies: ciprofloxacin (Urticaria; Other)      REVIEW OF SYSTEMS: [ X] All ROS addressed below are non-contributory, except:  Neuro: [ ] Headache [ ] Back pain [ ] Numbness [ ] Weakness [ ] Ataxia [ ] Dizziness [ ] Aphasia [ ] Dysarthria [ X] Visual disturbance  Resp: [ ] Shortness of breath/dyspnea, [ ] Orthopnea [ ] Cough  CV: [ ] Chest pain [ ] Palpitation [ ] Lightheadedness [ ] Syncope  Renal: [ ] Thirst [ ] Edema  GI: [ ] Nausea [ ] Emesis [ ] Abdominal pain [ ] Constipation [ ] Diarrhea  Hem: [ ] Hematemesis [ ] bright red blood per rectum  ID: [ ] Fever [ ] Chills [ ] Dysuria  ENT: [ ] Rhinorrhea      VITALS: [X] Reviewed  Vital Signs Last 24 Hrs  T(C): 36.3 (04 Apr 2023 16:00), Max: 36.6 (04 Apr 2023 15:10)  T(F): 97.4 (04 Apr 2023 16:00), Max: 97.8 (04 Apr 2023 15:10)  HR: 66 (04 Apr 2023 16:00) (62 - 84)  BP: 145/61 (04 Apr 2023 13:20) (118/62 - 214/90)  BP(mean): 76 (04 Apr 2023 11:38) (76 - 76)  RR: 18 (04 Apr 2023 16:00) (16 - 18)  SpO2: 97% (04 Apr 2023 16:00) (95% - 100%)    Parameters below as of 04 Apr 2023 15:30  Patient On (Oxygen Delivery Method): nasal cannula  O2 Flow (L/min): 2      04 Apr 2023 07:01  -  05 Apr 2023 07:00  --------------------------------------------------------  IN:    sodium chloride 0.9%: 120 mL  Total IN: 120 mL    OUT:    Voided (mL): 550 mL  Total OUT: 550 mL    Total NET: -430 mL        MEDICATIONS  (STANDING):  aspirin  chewable 81 milliGRAM(s) Oral daily  atorvastatin 80 milliGRAM(s) Oral at bedtime  budesonide  80 MICROgram(s)/formoterol 4.5 MICROgram(s) Inhaler 2 Puff(s) Inhalation two times a day  buPROPion XL (24-Hour) . 300 milliGRAM(s) Oral daily  cefpodoxime 200 milliGRAM(s) Oral every 12 hours  chlorhexidine 2% Cloths 1 Application(s) Topical <User Schedule>  dexAMETHasone  Injectable 10 milliGRAM(s) IV Push once  enoxaparin Injectable 40 milliGRAM(s) SubCutaneous every 24 hours  escitalopram 5 milliGRAM(s) Oral daily  influenza   Vaccine 0.5 milliLiter(s) IntraMuscular once  levothyroxine 125 MICROGram(s) Oral daily  losartan 25 milliGRAM(s) Oral daily  melatonin 5 milliGRAM(s) Oral at bedtime  metoprolol succinate  milliGRAM(s) Oral daily  metroNIDAZOLE    Tablet 500 milliGRAM(s) Oral every 8 hours  montelukast 10 milliGRAM(s) Oral daily  pantoprazole    Tablet 40 milliGRAM(s) Oral two times a day  racepinephrine  2.25% Inhalation 0.5 milliLiter(s) Inhalation once  racepinephrine  2.25% Inhalation 0.5 milliLiter(s) Inhalation once  sodium chloride 0.9%. 1000 milliLiter(s) (60 mL/Hr) IV Continuous <Continuous>  ticagrelor 90 milliGRAM(s) Oral every 12 hours  tiotropium 2.5 MICROgram(s) Inhaler 2 Puff(s) Inhalation daily    MEDICATIONS  (PRN):  acetaminophen     Tablet .. 975 milliGRAM(s) Oral every 6 hours PRN Temp greater or equal to 38C (100.4F), Mild Pain (1 - 3), Moderate Pain (4 - 6)  albuterol    90 MICROgram(s) HFA Inhaler 2 Puff(s) Inhalation every 6 hours PRN Shortness of Breath and/or Wheezing  baclofen 5 milliGRAM(s) Oral every 12 hours PRN Musculoskeletal Pain  hydrOXYzine hydrochloride 25 milliGRAM(s) Oral three times a day PRN Anxiety      04-05    139  |  108  |  17  ----------------------------<  116<H>  4.2   |  22  |  1.0    Ca    8.5      05 Apr 2023 05:00  Phos  3.9     04-05  Mg     1.8     04-05    TPro  5.9<L>  /  Alb  3.5  /  TBili  <0.2  /  DBili  x   /  AST  23  /  ALT  22  /  AlkPhos  77  04-05                            9.5    9.54  )-----------( 331      ( 05 Apr 2023 05:00 )             30.2                               10.6  ( 10.7)  9.02  )-----------( 343      ( 03 Apr 2023 05:43 )             33.9             IMPRESSION:    CTA NECK:  1.  Severe high-grade stenosis of the proximal right internal carotid   artery caused by noncalcified plaque.  2.  Moderate stenosis of the proximal left internal carotid artery caused   by calcified plaque.  3.  No evidence of vertebral artery stenosis.    CTA HEAD:  1.  Moderate stenosis of the cavernous segments of the bilateral internal   carotid arteries  2.  Severe narrowing of the P3 segment of the right posterior cerebral   artery.  3.  Otherwise, no evidence of flow-limiting stenosis, occlusion or   aneurysm.    < end of copied text >      EXAMINATION:  General: No acute distress  HEENT: Anicteric sclerae  Cardiac: [id number]  Lungs: Clear  Abdomen: Soft, non-tender, +BS  Extremities: No c/c/e  Skin/Incision Site: Clean, dry and intact  Neurologic: Awake, alert, fully oriented, hypophonic, no dysarthria or aphasia, follows commands, PERRL, VFFtc, EOMI, face symmetric, tongue midline, able to lift B/L UE and LE AG, no drift, full strength 5/5, sensation intact, no neglect            SUMMARY:HPI:  HPI: 57 year old morbidly obese female has medical history of 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 Episode happened while patient was driving and she continued to drive normally. She reports associated pain behind the eyes. Resolved and did not happen again. In the ER patient also complaining of LLQ pain worse when attempting to pass gas. Denies any fever, chills, headaches, SOB, CP, NVD, Dysuria, or MSK pain.   (28 Mar 2023 01:30)    INTERVAL EVENTS: s/p right carotid stent and angioplasty, 4/4, no events o/n    Allergies: ciprofloxacin (Urticaria; Other)      REVIEW OF SYSTEMS: [ X] All ROS addressed below are non-contributory, except:  Neuro: [ ] Headache [ ] Back pain [ ] Numbness [ ] Weakness [ ] Ataxia [ ] Dizziness [ ] Aphasia [ ] Dysarthria [ X] Visual disturbance  Resp: [ ] Shortness of breath/dyspnea, [ ] Orthopnea [ ] Cough  CV: [ ] Chest pain [ ] Palpitation [ ] Lightheadedness [ ] Syncope  Renal: [ ] Thirst [ ] Edema  GI: [ ] Nausea [ ] Emesis [ ] Abdominal pain [ ] Constipation [ ] Diarrhea  Hem: [ ] Hematemesis [ ] bright red blood per rectum  ID: [ ] Fever [ ] Chills [ ] Dysuria  ENT: [ ] Rhinorrhea      VITALS: [X] Reviewed  Vital Signs Last 24 Hrs  T(C): 36.3 (04 Apr 2023 16:00), Max: 36.6 (04 Apr 2023 15:10)  T(F): 97.4 (04 Apr 2023 16:00), Max: 97.8 (04 Apr 2023 15:10)  HR: 66 (04 Apr 2023 16:00) (62 - 84)  BP: 145/61 (04 Apr 2023 13:20) (118/62 - 214/90)  BP(mean): 76 (04 Apr 2023 11:38) (76 - 76)  RR: 18 (04 Apr 2023 16:00) (16 - 18)  SpO2: 97% (04 Apr 2023 16:00) (95% - 100%)    Parameters below as of 04 Apr 2023 15:30  Patient On (Oxygen Delivery Method): nasal cannula  O2 Flow (L/min): 2      04 Apr 2023 07:01  -  05 Apr 2023 07:00  --------------------------------------------------------  IN:    sodium chloride 0.9%: 120 mL  Total IN: 120 mL    OUT:    Voided (mL): 550 mL  Total OUT: 550 mL    Total NET: -430 mL        MEDICATIONS  (STANDING):  aspirin  chewable 81 milliGRAM(s) Oral daily  atorvastatin 80 milliGRAM(s) Oral at bedtime  budesonide  80 MICROgram(s)/formoterol 4.5 MICROgram(s) Inhaler 2 Puff(s) Inhalation two times a day  buPROPion XL (24-Hour) . 300 milliGRAM(s) Oral daily  cefpodoxime 200 milliGRAM(s) Oral every 12 hours  chlorhexidine 2% Cloths 1 Application(s) Topical <User Schedule>  dexAMETHasone  Injectable 10 milliGRAM(s) IV Push once  enoxaparin Injectable 40 milliGRAM(s) SubCutaneous every 24 hours  escitalopram 5 milliGRAM(s) Oral daily  influenza   Vaccine 0.5 milliLiter(s) IntraMuscular once  levothyroxine 125 MICROGram(s) Oral daily  losartan 25 milliGRAM(s) Oral daily  melatonin 5 milliGRAM(s) Oral at bedtime  metoprolol succinate  milliGRAM(s) Oral daily  metroNIDAZOLE    Tablet 500 milliGRAM(s) Oral every 8 hours  montelukast 10 milliGRAM(s) Oral daily  pantoprazole    Tablet 40 milliGRAM(s) Oral two times a day  racepinephrine  2.25% Inhalation 0.5 milliLiter(s) Inhalation once  racepinephrine  2.25% Inhalation 0.5 milliLiter(s) Inhalation once  sodium chloride 0.9%. 1000 milliLiter(s) (60 mL/Hr) IV Continuous <Continuous>  ticagrelor 90 milliGRAM(s) Oral every 12 hours  tiotropium 2.5 MICROgram(s) Inhaler 2 Puff(s) Inhalation daily    MEDICATIONS  (PRN):  acetaminophen     Tablet .. 975 milliGRAM(s) Oral every 6 hours PRN Temp greater or equal to 38C (100.4F), Mild Pain (1 - 3), Moderate Pain (4 - 6)  albuterol    90 MICROgram(s) HFA Inhaler 2 Puff(s) Inhalation every 6 hours PRN Shortness of Breath and/or Wheezing  baclofen 5 milliGRAM(s) Oral every 12 hours PRN Musculoskeletal Pain  hydrOXYzine hydrochloride 25 milliGRAM(s) Oral three times a day PRN Anxiety      04-05    139  |  108  |  17  ----------------------------<  116<H>  4.2   |  22  |  1.0    Ca    8.5      05 Apr 2023 05:00  Phos  3.9     04-05  Mg     1.8     04-05    TPro  5.9<L>  /  Alb  3.5  /  TBili  <0.2  /  DBili  x   /  AST  23  /  ALT  22  /  AlkPhos  77  04-05                            9.5    9.54  )-----------( 331      ( 05 Apr 2023 05:00 )             30.2                               10.6  ( 10.7)  9.02  )-----------( 343      ( 03 Apr 2023 05:43 )             33.9             IMPRESSION:    CTA NECK:  1.  Severe high-grade stenosis of the proximal right internal carotid   artery caused by noncalcified plaque.  2.  Moderate stenosis of the proximal left internal carotid artery caused   by calcified plaque.  3.  No evidence of vertebral artery stenosis.    CTA HEAD:  1.  Moderate stenosis of the cavernous segments of the bilateral internal   carotid arteries  2.  Severe narrowing of the P3 segment of the right posterior cerebral   artery.  3.  Otherwise, no evidence of flow-limiting stenosis, occlusion or   aneurysm.        EXAMINATION:  General: No acute distress  HEENT: Anicteric sclerae  Cardiac: [id number]  Lungs: Clear  Abdomen: Soft, non-tender, +BS  Extremities: No c/c/e  Skin/Incision Site: Clean, dry and intact  Neurologic: Awake, alert, fully oriented, hypophonic, no dysarthria or aphasia, follows commands, PERRL, VFFtc, EOMI, face symmetric, tongue midline, able to lift B/L UE and LE AG, no drift, full strength 5/5, sensation intact, no neglect

## 2023-04-06 LAB
ALBUMIN SERPL ELPH-MCNC: 3.6 G/DL — SIGNIFICANT CHANGE UP (ref 3.5–5.2)
ALP SERPL-CCNC: 83 U/L — SIGNIFICANT CHANGE UP (ref 30–115)
ALT FLD-CCNC: 25 U/L — SIGNIFICANT CHANGE UP (ref 0–41)
ANION GAP SERPL CALC-SCNC: 12 MMOL/L — SIGNIFICANT CHANGE UP (ref 7–14)
AST SERPL-CCNC: 23 U/L — SIGNIFICANT CHANGE UP (ref 0–41)
BASOPHILS # BLD AUTO: 0.04 K/UL — SIGNIFICANT CHANGE UP (ref 0–0.2)
BASOPHILS NFR BLD AUTO: 0.5 % — SIGNIFICANT CHANGE UP (ref 0–1)
BILIRUB SERPL-MCNC: <0.2 MG/DL — SIGNIFICANT CHANGE UP (ref 0.2–1.2)
BUN SERPL-MCNC: 20 MG/DL — SIGNIFICANT CHANGE UP (ref 10–20)
C DIFF BY PCR RESULT: SIGNIFICANT CHANGE UP
CALCIUM SERPL-MCNC: 8.7 MG/DL — SIGNIFICANT CHANGE UP (ref 8.4–10.4)
CHLORIDE SERPL-SCNC: 109 MMOL/L — SIGNIFICANT CHANGE UP (ref 98–110)
CO2 SERPL-SCNC: 21 MMOL/L — SIGNIFICANT CHANGE UP (ref 17–32)
CREAT SERPL-MCNC: 1 MG/DL — SIGNIFICANT CHANGE UP (ref 0.7–1.5)
EGFR: 65 ML/MIN/1.73M2 — SIGNIFICANT CHANGE UP
EOSINOPHIL # BLD AUTO: 0.17 K/UL — SIGNIFICANT CHANGE UP (ref 0–0.7)
EOSINOPHIL NFR BLD AUTO: 2 % — SIGNIFICANT CHANGE UP (ref 0–8)
FERRITIN SERPL-MCNC: 24 NG/ML — SIGNIFICANT CHANGE UP (ref 15–150)
FOLATE SERPL-MCNC: 19.5 NG/ML — SIGNIFICANT CHANGE UP
GLUCOSE SERPL-MCNC: 101 MG/DL — HIGH (ref 70–99)
HCT VFR BLD CALC: 32.4 % — LOW (ref 37–47)
HGB BLD-MCNC: 10.1 G/DL — LOW (ref 12–16)
IMM GRANULOCYTES NFR BLD AUTO: 0.4 % — HIGH (ref 0.1–0.3)
LYMPHOCYTES # BLD AUTO: 1.6 K/UL — SIGNIFICANT CHANGE UP (ref 1.2–3.4)
LYMPHOCYTES # BLD AUTO: 19.2 % — LOW (ref 20.5–51.1)
MAGNESIUM SERPL-MCNC: 1.9 MG/DL — SIGNIFICANT CHANGE UP (ref 1.8–2.4)
MCHC RBC-ENTMCNC: 25.9 PG — LOW (ref 27–31)
MCHC RBC-ENTMCNC: 31.2 G/DL — LOW (ref 32–37)
MCV RBC AUTO: 83.1 FL — SIGNIFICANT CHANGE UP (ref 81–99)
MONOCYTES # BLD AUTO: 0.58 K/UL — SIGNIFICANT CHANGE UP (ref 0.1–0.6)
MONOCYTES NFR BLD AUTO: 6.9 % — SIGNIFICANT CHANGE UP (ref 1.7–9.3)
NEUTROPHILS # BLD AUTO: 5.93 K/UL — SIGNIFICANT CHANGE UP (ref 1.4–6.5)
NEUTROPHILS NFR BLD AUTO: 71 % — SIGNIFICANT CHANGE UP (ref 42.2–75.2)
NRBC # BLD: 0 /100 WBCS — SIGNIFICANT CHANGE UP (ref 0–0)
PHOSPHATE SERPL-MCNC: 2.8 MG/DL — SIGNIFICANT CHANGE UP (ref 2.1–4.9)
PLATELET # BLD AUTO: 343 K/UL — SIGNIFICANT CHANGE UP (ref 130–400)
POTASSIUM SERPL-MCNC: 4.3 MMOL/L — SIGNIFICANT CHANGE UP (ref 3.5–5)
POTASSIUM SERPL-SCNC: 4.3 MMOL/L — SIGNIFICANT CHANGE UP (ref 3.5–5)
PROT SERPL-MCNC: 6.2 G/DL — SIGNIFICANT CHANGE UP (ref 6–8)
RBC # BLD: 3.9 M/UL — LOW (ref 4.2–5.4)
RBC # FLD: 17.2 % — HIGH (ref 11.5–14.5)
SODIUM SERPL-SCNC: 142 MMOL/L — SIGNIFICANT CHANGE UP (ref 135–146)
VIT B12 SERPL-MCNC: 524 PG/ML — SIGNIFICANT CHANGE UP (ref 232–1245)
WBC # BLD: 8.35 K/UL — SIGNIFICANT CHANGE UP (ref 4.8–10.8)
WBC # FLD AUTO: 8.35 K/UL — SIGNIFICANT CHANGE UP (ref 4.8–10.8)

## 2023-04-06 PROCEDURE — 70450 CT HEAD/BRAIN W/O DYE: CPT | Mod: 26

## 2023-04-06 PROCEDURE — 99232 SBSQ HOSP IP/OBS MODERATE 35: CPT

## 2023-04-06 RX ORDER — HYDROMORPHONE HYDROCHLORIDE 2 MG/ML
0.25 INJECTION INTRAMUSCULAR; INTRAVENOUS; SUBCUTANEOUS ONCE
Refills: 0 | Status: DISCONTINUED | OUTPATIENT
Start: 2023-04-06 | End: 2023-04-06

## 2023-04-06 RX ORDER — HYDROMORPHONE HYDROCHLORIDE 2 MG/ML
1 INJECTION INTRAMUSCULAR; INTRAVENOUS; SUBCUTANEOUS EVERY 8 HOURS
Refills: 0 | Status: DISCONTINUED | OUTPATIENT
Start: 2023-04-06 | End: 2023-04-10

## 2023-04-06 RX ORDER — SODIUM CHLORIDE 0.65 %
1 AEROSOL, SPRAY (ML) NASAL THREE TIMES A DAY
Refills: 0 | Status: DISCONTINUED | OUTPATIENT
Start: 2023-04-06 | End: 2023-04-10

## 2023-04-06 RX ORDER — ONDANSETRON 8 MG/1
4 TABLET, FILM COATED ORAL ONCE
Refills: 0 | Status: COMPLETED | OUTPATIENT
Start: 2023-04-06 | End: 2023-04-06

## 2023-04-06 RX ORDER — MORPHINE SULFATE 50 MG/1
2 CAPSULE, EXTENDED RELEASE ORAL ONCE
Refills: 0 | Status: DISCONTINUED | OUTPATIENT
Start: 2023-04-06 | End: 2023-04-06

## 2023-04-06 RX ORDER — SACCHAROMYCES BOULARDII 250 MG
250 POWDER IN PACKET (EA) ORAL
Refills: 0 | Status: DISCONTINUED | OUTPATIENT
Start: 2023-04-06 | End: 2023-04-10

## 2023-04-06 RX ADMIN — HYDROMORPHONE HYDROCHLORIDE 1 MILLIGRAM(S): 2 INJECTION INTRAMUSCULAR; INTRAVENOUS; SUBCUTANEOUS at 15:00

## 2023-04-06 RX ADMIN — MORPHINE SULFATE 2 MILLIGRAM(S): 50 CAPSULE, EXTENDED RELEASE ORAL at 06:49

## 2023-04-06 RX ADMIN — Medication 200 MILLIGRAM(S): at 17:05

## 2023-04-06 RX ADMIN — Medication 1 SPRAY(S): at 21:51

## 2023-04-06 RX ADMIN — Medication 100 MILLIGRAM(S): at 05:52

## 2023-04-06 RX ADMIN — HYDROMORPHONE HYDROCHLORIDE 1 MILLIGRAM(S): 2 INJECTION INTRAMUSCULAR; INTRAVENOUS; SUBCUTANEOUS at 23:16

## 2023-04-06 RX ADMIN — ESCITALOPRAM OXALATE 5 MILLIGRAM(S): 10 TABLET, FILM COATED ORAL at 11:30

## 2023-04-06 RX ADMIN — Medication 1 TABLET(S): at 11:30

## 2023-04-06 RX ADMIN — BUDESONIDE AND FORMOTEROL FUMARATE DIHYDRATE 2 PUFF(S): 160; 4.5 AEROSOL RESPIRATORY (INHALATION) at 09:59

## 2023-04-06 RX ADMIN — HYDROMORPHONE HYDROCHLORIDE 0.25 MILLIGRAM(S): 2 INJECTION INTRAMUSCULAR; INTRAVENOUS; SUBCUTANEOUS at 09:33

## 2023-04-06 RX ADMIN — TICAGRELOR 90 MILLIGRAM(S): 90 TABLET ORAL at 17:05

## 2023-04-06 RX ADMIN — ENOXAPARIN SODIUM 30 MILLIGRAM(S): 100 INJECTION SUBCUTANEOUS at 05:49

## 2023-04-06 RX ADMIN — PANTOPRAZOLE SODIUM 40 MILLIGRAM(S): 20 TABLET, DELAYED RELEASE ORAL at 05:51

## 2023-04-06 RX ADMIN — ENOXAPARIN SODIUM 30 MILLIGRAM(S): 100 INJECTION SUBCUTANEOUS at 17:04

## 2023-04-06 RX ADMIN — HYDROMORPHONE HYDROCHLORIDE 0.25 MILLIGRAM(S): 2 INJECTION INTRAMUSCULAR; INTRAVENOUS; SUBCUTANEOUS at 09:45

## 2023-04-06 RX ADMIN — CHLORHEXIDINE GLUCONATE 1 APPLICATION(S): 213 SOLUTION TOPICAL at 05:49

## 2023-04-06 RX ADMIN — BUPROPION HYDROCHLORIDE 300 MILLIGRAM(S): 150 TABLET, EXTENDED RELEASE ORAL at 11:31

## 2023-04-06 RX ADMIN — HYDROMORPHONE HYDROCHLORIDE 1 MILLIGRAM(S): 2 INJECTION INTRAMUSCULAR; INTRAVENOUS; SUBCUTANEOUS at 23:15

## 2023-04-06 RX ADMIN — Medication 500 MILLIGRAM(S): at 05:52

## 2023-04-06 RX ADMIN — Medication 500 MILLIGRAM(S): at 13:03

## 2023-04-06 RX ADMIN — Medication 200 MILLIGRAM(S): at 05:49

## 2023-04-06 RX ADMIN — PANTOPRAZOLE SODIUM 40 MILLIGRAM(S): 20 TABLET, DELAYED RELEASE ORAL at 17:05

## 2023-04-06 RX ADMIN — Medication 1 MILLIGRAM(S): at 11:30

## 2023-04-06 RX ADMIN — HYDROMORPHONE HYDROCHLORIDE 1 MILLIGRAM(S): 2 INJECTION INTRAMUSCULAR; INTRAVENOUS; SUBCUTANEOUS at 16:00

## 2023-04-06 RX ADMIN — ONDANSETRON 4 MILLIGRAM(S): 8 TABLET, FILM COATED ORAL at 11:59

## 2023-04-06 RX ADMIN — TICAGRELOR 90 MILLIGRAM(S): 90 TABLET ORAL at 05:52

## 2023-04-06 RX ADMIN — MORPHINE SULFATE 2 MILLIGRAM(S): 50 CAPSULE, EXTENDED RELEASE ORAL at 06:14

## 2023-04-06 RX ADMIN — LOSARTAN POTASSIUM 25 MILLIGRAM(S): 100 TABLET, FILM COATED ORAL at 05:52

## 2023-04-06 RX ADMIN — Medication 81 MILLIGRAM(S): at 11:30

## 2023-04-06 RX ADMIN — MONTELUKAST 10 MILLIGRAM(S): 4 TABLET, CHEWABLE ORAL at 11:31

## 2023-04-06 RX ADMIN — Medication 125 MICROGRAM(S): at 05:51

## 2023-04-06 RX ADMIN — Medication 250 MILLIGRAM(S): at 17:05

## 2023-04-06 NOTE — PROGRESS NOTE ADULT - SUBJECTIVE AND OBJECTIVE BOX
INTERVAL HPI/ OVERNIGHT EVENTS:    57 year old morbidly obese female has medical history of 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 Episode happened while patient was driving and she continued to drive normally. She reports associated pain behind the eyes.     REVIEW OF SYSTEMS:  CONSTITUTIONAL: No fever, weight loss, or fatigue  EYES: No eye pain, visual disturbances, or discharge  ENMT:  No difficulty hearing, tinnitus, vertigo; No sinus or throat pain  NECK: No pain or stiffness  BREASTS: No pain, masses, or nipple discharge  RESPIRATORY: No cough, wheezing, chills or hemoptysis; No shortness of breath  CARDIOVASCULAR: No chest pain, palpitations, dizziness, or leg swelling  GASTROINTESTINAL: No abdominal or epigastric pain. No nausea, vomiting, or hematemesis; No diarrhea or constipation. No melena or hematochezia.  GENITOURINARY: No dysuria, frequency, hematuria, or incontinence  NEUROLOGICAL: No headaches, memory loss, loss of strength, numbness, or tremors  SKIN: No itching, burning, rashes, or lesions   LYMPH NODES: No enlarged glands  ENDOCRINE: No heat or cold intolerance; No hair loss  MUSCULOSKELETAL: No joint pain or swelling; No muscle, back, or extremity pain  PSYCHIATRIC: No depression, anxiety, mood swings, or difficulty sleeping  HEME/LYMPH: No easy bruising, or bleeding gums  ALLERY AND IMMUNOLOGIC: No hives or eczema    VITALS:  T(F): 97.9, Max: 98.2 (04-05-23 @ 20:52)  HR: 64  BP: 142/72  RR: 18  SpO2: 96%    PHYSICAL EXAM:  GENERAL: NAD, well-groomed, well-developed  HEAD:  Atraumatic, Normocephalic  EYES: EOMI, PERRLA, conjunctiva and sclera clear  ENMT: No tonsillar erythema, exudates, or enlargement; Moist mucous membranes, Good dentition, No lesions  NECK: Supple, No JVD, Normal thyroid  NERVOUS SYSTEM:  Alert & Oriented X3, Good concentration; Motor Strength 5/5 B/L upper and lower extremities; DTRs 2+ intact and symmetric  CHEST/LUNG: Clear to percussion bilaterally; No rales, rhonchi, wheezing, or rubs  HEART: Regular rate and rhythm; No murmurs, rubs, or gallops  ABDOMEN: Soft, Nontender, Nondistended; Bowel sounds present  EXTREMITIES:  2+ Peripheral Pulses, No clubbing, cyanosis, or edema  LYMPH: No lymphadenopathy noted  SKIN: No rashes or lesions      LABS:    04-06    142  |  109  |  20  ----------------------------<  101<H>  4.3   |  21  |  1.0    Ca    8.7      06 Apr 2023 05:24  Phos  2.8     04-06  Mg     1.9     04-06    TPro  6.2  /  Alb  3.6  /  TBili  <0.2  /  DBili  x   /  AST  23  /  ALT  25  /  AlkPhos  83  04-06                          10.1   8.35  )-----------( 343      ( 06 Apr 2023 05:24 )             32.4           RADIOLOGY & ADDITIONAL TESTS:    Imaging Personally Reviewed:  [ ] YES  [ ] NO    MEDICATIONS:     MEDICATIONS  (STANDING):  aspirin  chewable 81 milliGRAM(s) Oral daily  atorvastatin 80 milliGRAM(s) Oral at bedtime  budesonide  80 MICROgram(s)/formoterol 4.5 MICROgram(s) Inhaler 2 Puff(s) Inhalation two times a day  buPROPion XL (24-Hour) . 300 milliGRAM(s) Oral daily  cefpodoxime 200 milliGRAM(s) Oral every 12 hours  chlorhexidine 2% Cloths 1 Application(s) Topical <User Schedule>  clonazePAM  Tablet 1 milliGRAM(s) Oral <User Schedule>  enoxaparin Injectable 30 milliGRAM(s) SubCutaneous every 12 hours  escitalopram 5 milliGRAM(s) Oral daily  influenza   Vaccine 0.5 milliLiter(s) IntraMuscular once  lactobacillus acidophilus 1 Tablet(s) Oral daily  levothyroxine 125 MICROGram(s) Oral daily  losartan 25 milliGRAM(s) Oral daily  melatonin 5 milliGRAM(s) Oral at bedtime  metoprolol succinate  milliGRAM(s) Oral daily  metroNIDAZOLE    Tablet 500 milliGRAM(s) Oral every 8 hours  montelukast 10 milliGRAM(s) Oral daily  pantoprazole    Tablet 40 milliGRAM(s) Oral two times a day  saccharomyces boulardii 250 milliGRAM(s) Oral two times a day  ticagrelor 90 milliGRAM(s) Oral every 12 hours  tiotropium 2.5 MICROgram(s) Inhaler 2 Puff(s) Inhalation daily    MEDICATIONS  (PRN):  acetaminophen     Tablet .. 975 milliGRAM(s) Oral every 6 hours PRN Temp greater or equal to 38C (100.4F), Mild Pain (1 - 3), Moderate Pain (4 - 6)  albuterol    90 MICROgram(s) HFA Inhaler 2 Puff(s) Inhalation every 6 hours PRN Shortness of Breath and/or Wheezing  baclofen 5 milliGRAM(s) Oral every 12 hours PRN Musculoskeletal Pain  HYDROmorphone   Tablet 1 milliGRAM(s) Oral every 8 hours PRN Severe Pain (7 - 10)  hydrOXYzine hydrochloride 25 milliGRAM(s) Oral three times a day PRN Anxiety

## 2023-04-06 NOTE — CHART NOTE - NSCHARTNOTEFT_GEN_A_CORE
Patient complaining of neck pain 10/10 and right sided weakness- on exam weakness seems to be related to pain    Talked to neuroendovascular: recs to get CT head for now- suspicion for new CVA or procedure related complication low

## 2023-04-06 NOTE — PROGRESS NOTE ADULT - SUBJECTIVE AND OBJECTIVE BOX
Neuroendovascular Progress Note:    HPI: The patient is a 57-year-old female with a PMHx of HTN, hearing loss, COPD, hypothyroidism, CKD, diverticulitis, nasopharyngeal CA s/p chenmo/RT 2013, MIN, and anxiety who presented for sudden onset blurry vision, lasting 15 min, and eye pain. A neuroendovascular consult was placed for severe high-grade stenosis of the proximal right internal carotid artery, which was identified on CTA. The patient also has moderate left ICA stenosis, right P3, and b/l cavernous ICA stenosis. On exam the patient reports vision is back to baseline. Patient is now POD 2 s/p diagnostic cerebral angiogram and right carotid angioplasty and stenting. The patient reports this morning to have shooting pain up her inner thigh with elevation of the right leg, as well as a feeling of right leg weakness. She also reports to have right posterior neck and shoulder pain, nausea/ vomiting/ diarrhea overnight, and a feeling of throat soreness.     Past Medical History:   COPD (chronic obstructive pulmonary disease)  HTN (hypertension)  Diverticulitis  Pulmonary nodules/lesions, multiple  Nasopharyngeal cancer  Hypothyroid  Cervical disc disease  Left ear hearing loss  Hemorrhoids  Anxiety    24-Hour Events: None    Medication:  aspirin  chewable 81 milliGRAM(s) Oral daily  atorvastatin 80 milliGRAM(s) Oral at bedtime  budesonide  80 MICROgram(s)/formoterol 4.5 MICROgram(s) Inhaler 2 Puff(s) Inhalation two times a day  buPROPion XL (24-Hour) . 300 milliGRAM(s) Oral daily  cefpodoxime 200 milliGRAM(s) Oral every 12 hours  chlorhexidine 2% Cloths 1 Application(s) Topical <User Schedule>  clonazePAM  Tablet 1 milliGRAM(s) Oral <User Schedule>  enoxaparin Injectable 30 milliGRAM(s) SubCutaneous every 12 hours  escitalopram 5 milliGRAM(s) Oral daily  influenza   Vaccine 0.5 milliLiter(s) IntraMuscular once  lactobacillus acidophilus 1 Tablet(s) Oral daily  levothyroxine 125 MICROGram(s) Oral daily  losartan 25 milliGRAM(s) Oral daily  melatonin 5 milliGRAM(s) Oral at bedtime  metoprolol succinate  milliGRAM(s) Oral daily  metroNIDAZOLE    Tablet 500 milliGRAM(s) Oral every 8 hours  montelukast 10 milliGRAM(s) Oral daily  pantoprazole    Tablet 40 milliGRAM(s) Oral two times a day  saccharomyces boulardii 250 milliGRAM(s) Oral two times a day  ticagrelor 90 milliGRAM(s) Oral every 12 hours  tiotropium 2.5 MICROgram(s) Inhaler 2 Puff(s) Inhalation daily    Allergies:  ciprofloxacin (Urticaria; Other)    Recent Vitals:  T(F): 97.9 (04-06-23 @ 04:55), Max: 98.2 (04-05-23 @ 20:52)  HR: 64 (04-06-23 @ 04:55) (62 - 74)  BP: 142/72 (04-06-23 @ 04:55) (107/58 - 142/72)  RR: 18 (04-06-23 @ 04:55) (15 - 28)  SpO2: 96% (04-06-23 @ 04:55) (92% - 97%)    COVID-19 PCR: NotDetec (03 Apr 2023 15:30)  COVID-19 PCR: NotDetec (27 Mar 2023 20:24)    Recent Labs:                        10.1   8.35  )-----------( 343      ( 06 Apr 2023 05:24 )             32.4     04-06    142  |  109  |  20  ----------------------------<  101<H>  4.3   |  21  |  1.0    Ca    8.7      06 Apr 2023 05:24  Phos  2.8     04-06  Mg     1.9     04-06    TPro  6.2  /  Alb  3.6  /  TBili  <0.2  /  DBili  x   /  AST  23  /  ALT  25  /  AlkPhos  83  04-06    LIVER FUNCTIONS - ( 06 Apr 2023 05:24 )  Alb: 3.6 g/dL / Pro: 6.2 g/dL / ALK PHOS: 83 U/L / ALT: 25 U/L / AST: 23 U/L / GGT: x           Exam:  General: NAD  HEENT: + Right oropharynx ulceration (post intubation)/ tenderness while swallowing; +bilateral facial erythema, increased from yesterday  Abdominal: No tenderness to abdominal palpation x 4 quadrants; no rebound tenderness.  Neuro:   Mental Status: AAO x3, recent and remote memory intact. Naming, repetition, and comprehension  intact. Attention/concentration intact. No dysarthria or aphasia. Appropriate fund of knowledge.  CN: PERRL, full visual fields, no nystagmus, EOMI, V1-V3 intact b/l and symmetric, face symmetric, hearing intact to finger rub, tongue midline, palate elevation symmetric.   Motor: Maintains b/l UE antigravity, 5/5, RLE +pain up inner thigh/ groin with elevation, test of strength limited by patient discomfort; LLE antigravity without drift.   Sensation: Intact to light touch throughout.  Coordination: No dysmetria on finger-to-nose and heel-to-shin.   Gait: Deferred.  NIHSS: 1 for RLE drift to bed (assessment limited by pain)    Radiology:   IR Neuro report to follow.   Pending repeat CTH 4/4    Assessment:   The patient is a 57-year-old female with a PMHx of HTN, hearing loss, COPD, hypothyroidism, CKD, diverticulitis, nasopharyngeal CA s/p chenmo/RT 2013, MIN, and anxiety who presented for sudden onset blurry vision, lasting 15 min, and eye pain. A neuroendovascular consult was placed for severe high-grade stenosis of the proximal right internal carotid artery, which was identified on CTA. The patient also has moderate left ICA stenosis, right P3, and b/l cavernous ICA stenosis. On exam the patient reports vision is back to baseline. Patient is now POD 2  s/p diagnostic cerebral angiogram and right carotid angioplasty and stenting. Patient continues on DAPT.  The patient reports this morning to have shooting pain up her inner thigh with elevation of the right leg, as well as a feeling of right leg weakness. She also reports to have right posterior neck and shoulder pain, nausea/ vomiting/ diarrhea overnight, and a feeling of throat soreness. Of note, the patient has a history of lumbar spondylolisthesis and cervical spine spine C5-7 moderate spinal stenosis.    Suggestions:  - Continue to monitor right UE for signs of hematoma or infection, intact distal pulses.   - Patient must continue on DAPT given recent stent.   - Repeat CTH  - Pending Cdiff/ infectious workup  - Management per primary team  [id number] with any acute changes in exam or with post-procedural questions/ concerns   Neuroendovascular Progress Note:    HPI: The patient is a 57-year-old female with a PMHx of HTN, hearing loss, COPD, hypothyroidism, CKD, diverticulitis, nasopharyngeal CA s/p chenmo/RT 2013, MIN, and anxiety who presented for sudden onset blurry vision, lasting 15 min, and eye pain. A neuroendovascular consult was placed for severe high-grade stenosis of the proximal right internal carotid artery, which was identified on CTA. The patient also has moderate left ICA stenosis, right P3, and b/l cavernous ICA stenosis. On exam the patient reports vision is back to baseline. Patient is now POD 2 s/p diagnostic cerebral angiogram and right carotid angioplasty and stenting. The patient reports this morning to have shooting pain up her inner thigh with elevation of the right leg, as well as a feeling of right leg weakness. She also reports to have right posterior neck and shoulder pain, nausea/ vomiting/ diarrhea overnight, and a feeling of throat soreness.     Past Medical History:   COPD (chronic obstructive pulmonary disease)  HTN (hypertension)  Diverticulitis  Pulmonary nodules/lesions, multiple  Nasopharyngeal cancer  Hypothyroid  Cervical disc disease  Left ear hearing loss  Hemorrhoids  Anxiety    24-Hour Events: None    Medication:  aspirin  chewable 81 milliGRAM(s) Oral daily  atorvastatin 80 milliGRAM(s) Oral at bedtime  budesonide  80 MICROgram(s)/formoterol 4.5 MICROgram(s) Inhaler 2 Puff(s) Inhalation two times a day  buPROPion XL (24-Hour) . 300 milliGRAM(s) Oral daily  cefpodoxime 200 milliGRAM(s) Oral every 12 hours  chlorhexidine 2% Cloths 1 Application(s) Topical <User Schedule>  clonazePAM  Tablet 1 milliGRAM(s) Oral <User Schedule>  enoxaparin Injectable 30 milliGRAM(s) SubCutaneous every 12 hours  escitalopram 5 milliGRAM(s) Oral daily  influenza   Vaccine 0.5 milliLiter(s) IntraMuscular once  lactobacillus acidophilus 1 Tablet(s) Oral daily  levothyroxine 125 MICROGram(s) Oral daily  losartan 25 milliGRAM(s) Oral daily  melatonin 5 milliGRAM(s) Oral at bedtime  metoprolol succinate  milliGRAM(s) Oral daily  metroNIDAZOLE    Tablet 500 milliGRAM(s) Oral every 8 hours  montelukast 10 milliGRAM(s) Oral daily  pantoprazole    Tablet 40 milliGRAM(s) Oral two times a day  saccharomyces boulardii 250 milliGRAM(s) Oral two times a day  ticagrelor 90 milliGRAM(s) Oral every 12 hours  tiotropium 2.5 MICROgram(s) Inhaler 2 Puff(s) Inhalation daily    Allergies:  ciprofloxacin (Urticaria; Other)    Recent Vitals:  T(F): 97.9 (04-06-23 @ 04:55), Max: 98.2 (04-05-23 @ 20:52)  HR: 64 (04-06-23 @ 04:55) (62 - 74)  BP: 142/72 (04-06-23 @ 04:55) (107/58 - 142/72)  RR: 18 (04-06-23 @ 04:55) (15 - 28)  SpO2: 96% (04-06-23 @ 04:55) (92% - 97%)    COVID-19 PCR: NotDetec (03 Apr 2023 15:30)  COVID-19 PCR: NotDetec (27 Mar 2023 20:24)    Recent Labs:                        10.1   8.35  )-----------( 343      ( 06 Apr 2023 05:24 )             32.4     04-06    142  |  109  |  20  ----------------------------<  101<H>  4.3   |  21  |  1.0    Ca    8.7      06 Apr 2023 05:24  Phos  2.8     04-06  Mg     1.9     04-06    TPro  6.2  /  Alb  3.6  /  TBili  <0.2  /  DBili  x   /  AST  23  /  ALT  25  /  AlkPhos  83  04-06    LIVER FUNCTIONS - ( 06 Apr 2023 05:24 )  Alb: 3.6 g/dL / Pro: 6.2 g/dL / ALK PHOS: 83 U/L / ALT: 25 U/L / AST: 23 U/L / GGT: x           Exam:  General: NAD  HEENT: + Right oropharynx ulceration (post intubation)/ tenderness while swallowing; +bilateral facial erythema, increased from yesterday  Abdominal: No tenderness to abdominal palpation x 4 quadrants; no rebound tenderness.  Neuro:   Mental Status: AAO x3, recent and remote memory intact. Naming, repetition, and comprehension  intact. Attention/concentration intact. No dysarthria or aphasia. Appropriate fund of knowledge.  CN: PERRL, full visual fields, no nystagmus, EOMI, V1-V3 intact b/l and symmetric, face symmetric, hearing intact to finger rub, tongue midline, palate elevation symmetric.   Motor: Maintains b/l UE antigravity, 5/5, RLE +pain up inner thigh/ groin with elevation, test of strength limited by patient discomfort; LLE antigravity without drift.   Sensation: Intact to light touch throughout.  Coordination: No dysmetria on finger-to-nose and heel-to-shin.   Gait: Deferred.  NIHSS: 1 for RLE drift to bed (assessment limited by pain)    Radiology:   IR Neuro report to follow.   Pending repeat CTH 4/4    Assessment:   The patient is a 57-year-old female with a PMHx of HTN, hearing loss, COPD, hypothyroidism, CKD, diverticulitis, nasopharyngeal CA s/p chenmo/RT 2013, MIN, and anxiety who presented for sudden onset blurry vision, lasting 15 min, and eye pain. A neuroendovascular consult was placed for severe high-grade stenosis of the proximal right internal carotid artery, which was identified on CTA. The patient also has moderate left ICA stenosis, right P3, and b/l cavernous ICA stenosis. On exam the patient reports vision is back to baseline. Patient is now POD 2  s/p diagnostic cerebral angiogram and right carotid angioplasty and stenting. Patient continues on DAPT.  The patient reports this morning to have shooting pain up her inner thigh with elevation of the right leg, as well as a feeling of right leg weakness. She also reports to have right posterior neck and shoulder pain, nausea/ vomiting/ diarrhea overnight, and a feeling of throat soreness. Of note, the patient has a history of lumbar spondylolisthesis and cervical spine spine C5-7 moderate spinal stenosis.    Suggestions:  - Continue to monitor right UE for signs of hematoma or infection, intact distal pulses.   - Patient must continue on DAPT given recent stent.   - Repeat CTH  - Recommending continued OT/PT   - Pending Cdiff/ infectious workup  - Management per primary team  [id number] with any acute changes in exam or with post-procedural questions/ concerns

## 2023-04-06 NOTE — PROVIDER CONTACT NOTE (OTHER) - BACKGROUND
Patient underwent R carotid stent placement on 4/4. Previously under neuro ICU and downgraded to 3E. PMH: diverticulitis, HTN, COPD, hypothyroidism, Nasopharyngeal cancer. Family

## 2023-04-06 NOTE — PROGRESS NOTE ADULT - ASSESSMENT
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 Episode happened while patient was driving and she continued to drive normally. She reports associated pain behind the eyes. Resolved and did not happen again. In the ER patient also complaining of LLQ pain worse when attempting to pass gas.       Transient Blurry vision: Suspect TIA.   Carotid Artery Stenosis:   Patient presented with transient blurry vision lasted for 15 minutes, no other weakness.   Neuro exam is non-focal.   Severe high-grade stenosis of the proximal right internal carotid artery,  Moderate left ICA stenosis, right P3, and b/l cavernous ICA stenosis.  Brain MRI 3/28 was Unremarkable, no acute infarct.   CTA Neck 3/27 Severe high-grade stenosis of the proximal right ICA. Moderate stenosis of the proximal left ICA.   CTA HEAD:  Moderate stenosis of the cavernous segments of the bilateral internal . carotid arteries. Severe narrowing of the P3 segment of the right posterior cerebral artery.  s/p Rt carotid stent and Angioplasty  Continue ASA, ticagrilor and Lipitor.     Acute sigmoid diverticulitis   CT abd: Acute diverticulitis of a short segment of the sigmoid colon. No abscess or perforation. Further evaluation with nonemergent colonoscopy may be of benefit when patient is clinically able.  Continue Vantin 200mg mg q12h and Flafyl 500 mg q8h till 4/10  GI recommended colonoscopy outpatient after 6-8 weeks.  Add Probiotics     Hypertension: metoprolol  Hypothyroidism: synthroid  COPD- stable, Continue Budesonide, Spiriva and Montelukast.      Depression: Continue Lexapro and Clonazepam.     DVT prophylaxis: Lovenox SC.      c/o neck pain and some weakness on Rt side  get CT head and Neuro f/u

## 2023-04-07 ENCOUNTER — TRANSCRIPTION ENCOUNTER (OUTPATIENT)
Age: 58
End: 2023-04-07

## 2023-04-07 LAB
ANION GAP SERPL CALC-SCNC: 9 MMOL/L — SIGNIFICANT CHANGE UP (ref 7–14)
BLD GP AB SCN SERPL QL: SIGNIFICANT CHANGE UP
BUN SERPL-MCNC: 13 MG/DL — SIGNIFICANT CHANGE UP (ref 10–20)
CALCIUM SERPL-MCNC: 8.5 MG/DL — SIGNIFICANT CHANGE UP (ref 8.4–10.5)
CHLORIDE SERPL-SCNC: 108 MMOL/L — SIGNIFICANT CHANGE UP (ref 98–110)
CO2 SERPL-SCNC: 23 MMOL/L — SIGNIFICANT CHANGE UP (ref 17–32)
CREAT SERPL-MCNC: 0.9 MG/DL — SIGNIFICANT CHANGE UP (ref 0.7–1.5)
EGFR: 74 ML/MIN/1.73M2 — SIGNIFICANT CHANGE UP
GI PCR PANEL: SIGNIFICANT CHANGE UP
GLUCOSE SERPL-MCNC: 105 MG/DL — HIGH (ref 70–99)
HCT VFR BLD CALC: 32 % — LOW (ref 37–47)
HGB BLD-MCNC: 9.9 G/DL — LOW (ref 12–16)
MAGNESIUM SERPL-MCNC: 1.9 MG/DL — SIGNIFICANT CHANGE UP (ref 1.8–2.4)
MCHC RBC-ENTMCNC: 25.5 PG — LOW (ref 27–31)
MCHC RBC-ENTMCNC: 30.9 G/DL — LOW (ref 32–37)
MCV RBC AUTO: 82.5 FL — SIGNIFICANT CHANGE UP (ref 81–99)
NRBC # BLD: 0 /100 WBCS — SIGNIFICANT CHANGE UP (ref 0–0)
PLATELET # BLD AUTO: 278 K/UL — SIGNIFICANT CHANGE UP (ref 130–400)
POTASSIUM SERPL-MCNC: 4.1 MMOL/L — SIGNIFICANT CHANGE UP (ref 3.5–5)
POTASSIUM SERPL-SCNC: 4.1 MMOL/L — SIGNIFICANT CHANGE UP (ref 3.5–5)
RBC # BLD: 3.88 M/UL — LOW (ref 4.2–5.4)
RBC # FLD: 17 % — HIGH (ref 11.5–14.5)
SODIUM SERPL-SCNC: 140 MMOL/L — SIGNIFICANT CHANGE UP (ref 135–146)
WBC # BLD: 7.16 K/UL — SIGNIFICANT CHANGE UP (ref 4.8–10.8)
WBC # FLD AUTO: 7.16 K/UL — SIGNIFICANT CHANGE UP (ref 4.8–10.8)

## 2023-04-07 PROCEDURE — 99222 1ST HOSP IP/OBS MODERATE 55: CPT | Mod: GC

## 2023-04-07 PROCEDURE — 99232 SBSQ HOSP IP/OBS MODERATE 35: CPT

## 2023-04-07 RX ORDER — METRONIDAZOLE 500 MG
1 TABLET ORAL
Qty: 18 | Refills: 0
Start: 2023-04-07 | End: 2023-04-12

## 2023-04-07 RX ORDER — ASPIRIN/CALCIUM CARB/MAGNESIUM 324 MG
1 TABLET ORAL
Qty: 30 | Refills: 0
Start: 2023-04-07 | End: 2023-05-06

## 2023-04-07 RX ORDER — TICAGRELOR 90 MG/1
1 TABLET ORAL
Qty: 60 | Refills: 0
Start: 2023-04-07 | End: 2023-05-06

## 2023-04-07 RX ORDER — CEFPODOXIME PROXETIL 100 MG
1 TABLET ORAL
Qty: 12 | Refills: 0
Start: 2023-04-07 | End: 2023-04-12

## 2023-04-07 RX ORDER — ATORVASTATIN CALCIUM 80 MG/1
1 TABLET, FILM COATED ORAL
Qty: 30 | Refills: 0
Start: 2023-04-07 | End: 2023-05-06

## 2023-04-07 RX ADMIN — Medication 250 MILLIGRAM(S): at 17:03

## 2023-04-07 RX ADMIN — HYDROMORPHONE HYDROCHLORIDE 1 MILLIGRAM(S): 2 INJECTION INTRAMUSCULAR; INTRAVENOUS; SUBCUTANEOUS at 10:00

## 2023-04-07 RX ADMIN — TICAGRELOR 90 MILLIGRAM(S): 90 TABLET ORAL at 17:03

## 2023-04-07 RX ADMIN — Medication 250 MILLIGRAM(S): at 06:12

## 2023-04-07 RX ADMIN — Medication 1 SPRAY(S): at 06:12

## 2023-04-07 RX ADMIN — CHLORHEXIDINE GLUCONATE 1 APPLICATION(S): 213 SOLUTION TOPICAL at 06:10

## 2023-04-07 RX ADMIN — PANTOPRAZOLE SODIUM 40 MILLIGRAM(S): 20 TABLET, DELAYED RELEASE ORAL at 06:12

## 2023-04-07 RX ADMIN — MONTELUKAST 10 MILLIGRAM(S): 4 TABLET, CHEWABLE ORAL at 11:17

## 2023-04-07 RX ADMIN — Medication 125 MICROGRAM(S): at 06:11

## 2023-04-07 RX ADMIN — HYDROMORPHONE HYDROCHLORIDE 1 MILLIGRAM(S): 2 INJECTION INTRAMUSCULAR; INTRAVENOUS; SUBCUTANEOUS at 09:31

## 2023-04-07 RX ADMIN — ATORVASTATIN CALCIUM 80 MILLIGRAM(S): 80 TABLET, FILM COATED ORAL at 21:49

## 2023-04-07 RX ADMIN — Medication 500 MILLIGRAM(S): at 06:13

## 2023-04-07 RX ADMIN — HYDROMORPHONE HYDROCHLORIDE 1 MILLIGRAM(S): 2 INJECTION INTRAMUSCULAR; INTRAVENOUS; SUBCUTANEOUS at 23:10

## 2023-04-07 RX ADMIN — BUPROPION HYDROCHLORIDE 300 MILLIGRAM(S): 150 TABLET, EXTENDED RELEASE ORAL at 11:17

## 2023-04-07 RX ADMIN — Medication 500 MILLIGRAM(S): at 13:04

## 2023-04-07 RX ADMIN — Medication 200 MILLIGRAM(S): at 06:10

## 2023-04-07 RX ADMIN — ESCITALOPRAM OXALATE 5 MILLIGRAM(S): 10 TABLET, FILM COATED ORAL at 11:17

## 2023-04-07 RX ADMIN — Medication 1 SPRAY(S): at 21:50

## 2023-04-07 RX ADMIN — PANTOPRAZOLE SODIUM 40 MILLIGRAM(S): 20 TABLET, DELAYED RELEASE ORAL at 17:02

## 2023-04-07 RX ADMIN — Medication 5 MILLIGRAM(S): at 21:49

## 2023-04-07 RX ADMIN — Medication 1 TABLET(S): at 11:16

## 2023-04-07 RX ADMIN — LOSARTAN POTASSIUM 25 MILLIGRAM(S): 100 TABLET, FILM COATED ORAL at 06:11

## 2023-04-07 RX ADMIN — Medication 500 MILLIGRAM(S): at 21:49

## 2023-04-07 RX ADMIN — Medication 1 MILLIGRAM(S): at 21:49

## 2023-04-07 RX ADMIN — Medication 81 MILLIGRAM(S): at 11:16

## 2023-04-07 RX ADMIN — Medication 1 MILLIGRAM(S): at 09:43

## 2023-04-07 RX ADMIN — Medication 200 MILLIGRAM(S): at 17:03

## 2023-04-07 RX ADMIN — Medication 1 SPRAY(S): at 13:04

## 2023-04-07 RX ADMIN — TICAGRELOR 90 MILLIGRAM(S): 90 TABLET ORAL at 06:13

## 2023-04-07 RX ADMIN — BUDESONIDE AND FORMOTEROL FUMARATE DIHYDRATE 2 PUFF(S): 160; 4.5 AEROSOL RESPIRATORY (INHALATION) at 08:01

## 2023-04-07 NOTE — PHYSICAL THERAPY INITIAL EVALUATION ADULT - GENERAL OBSERVATIONS, REHAB EVAL
Attempted to see Pt for b/s PT IE; however, Pt declined for PT at this time 2* fatigue, unable to sleep last night due to Diarrhea, back pain/neck pain. PT will f/u once pt agreeable for b/s PT.
Pt seen from [id number]. Pt encountered in the bed, NAD, agreeable for b/s PT. Pt is independent with functional mobility at this time no further PT interventions needed at this time. Please reconsult PT in future if any changes in functional mobility.

## 2023-04-07 NOTE — CONSULT NOTE ADULT - SUBJECTIVE AND OBJECTIVE BOX
Patient is a 57y old  Female who presents with a chief complaint of B/L Blurry Vision (2023 12:19)      HPI:  HPI: 57 year old morbidly obese female has medical history of 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 , hypothyroidism, Diverticulitis, COPD (not on oxygen), HTN, Depression, Insomnia, and Anxiety presents to ED c/o of sudden onset B/L blurry vision that lasted 15 mins earlier in the morning. Episode happened while patient was driving and she continued to drive normally. She reports associated pain behind the eyes. Resolved and did not happen again. In the ER patient also complaining of LLQ pain worse when attempting to pass gas. Denies any fever, chills, headaches, SOB, CP, NVD, Dysuria, or MSK pain.     In the ER:  Vitals: /58, HR 82, RR 20, SpO2 100 RA, T 98.4 oral  EKst deg av block  Sig Labs: WBC 8.15, Hgb 10.7 at baseline, Cr 1.3  eGFR 47, Mg 1.7, , Trop -ve, UA -ve, COVID -ve   Imaging:  CT head: No acute intracranial pathology.  CTA NECK:  1.  Severe high-grade stenosis of the proximal right internal carotid artery caused by noncalcified plaque.  2.  Moderate stenosis of the proximal left internal carotid artery caused by calcified plaque.  3.  No evidence of vertebral artery stenosis.  CTA HEAD:  1.  Moderate stenosis of the cavernous segments of the bilateral internal carotid arteries  2.  Severe narrowing of the P3 segment of the right posterior cerebral artery.  3.  Otherwise, no evidence of flow-limiting stenosis, occlusion or aneurysm.  Other: Right upper lobe 1.2 cm pulmonary nodule, as well as several groundglass opacities. Consider follow-up CT of the chest in 3-6 months.  CT AP: Acute diverticulitis of a short segment of the sigmoid colon. No abscess or perforation. Further evaluation with nonemergent colonoscopy may be of benefit when patient is clinically able.    In the ER: Given Dilaudid 0.5, LR 1L bolus, Zofran 4mg IV, Magnesium 2g, and Zosyn 3.375 IV (28 Mar 2023 01:30)      PAST MEDICAL & SURGICAL HISTORY:  COPD (chronic obstructive pulmonary disease)      HTN (hypertension)      Diverticulitis      Pulmonary nodules/lesions, multiple      Nasopharyngeal cancer      Hypothyroid      Cervical disc disease  sequelae of radtion for nasopharyngeal cancer      Left ear hearing loss      Hemorrhoids      Anxiety      History of cholecystectomy          SOCIAL HX:   Smoking                         ETOH                            Other    FAMILY HISTORY:  Family history of lung cancer (Father, Mother)    .  No cardiovascular or pulmonary family history     REVIEW OF SYSTEMS:    All ROS are negative exept per HPI       Allergies    ciprofloxacin (Urticaria; Other)    Intolerances          PHYSICAL EXAM  Vital Signs Last 24 Hrs  T(C): 35.1 (2023 13:05), Max: 36.8 (2023 20:14)  T(F): 95.2 (2023 13:05), Max: 98.3 (2023 20:14)  HR: 74 (2023 13:05) (63 - 74)  BP: 113/63 (2023 13:05) (104/59 - 149/71)  BP(mean): 83 (2023 13:05) (83 - 83)  RR: 18 (2023 13:05) (18 - 19)  SpO2: --        CONSTITUTIONAL:  Well nourished.  NAD    ENT:   Airway patent,   No thrush    EYES:   Clear bilaterally,   pupils equal,   round and reactive to light.    CARDIAC:   Normal rate,   regular rhythm.    no edema      RESPIRATORY:   No wheezing   Normal chest expansion  Not tachypneic,  No use of accessory muscles    GASTROINTESTINAL:  Abdomen soft, non-tender,   No guarding,   Positive BS    MUSCULOSKELETAL:   Range of motion is not limited,  No clubbing, cyanosis         LABS:                          9.9    7.16  )-----------( 278      ( 2023 06:00 )             32.0                                               04-07    140  |  108  |  13  ----------------------------<  105<H>  4.1   |  23  |  0.9    Ca    8.5      2023 06:00  Phos  2.8     04-06  Mg     1.9     04-07    TPro  6.2  /  Alb  3.6  /  TBili  <0.2  /  DBili  x   /  AST  23  /  ALT  25  /  AlkPhos  83  04-06                                                                                           LIVER FUNCTIONS - ( 2023 05:24 )  Alb: 3.6 g/dL / Pro: 6.2 g/dL / ALK PHOS: 83 U/L / ALT: 25 U/L / AST: 23 U/L / GGT: x                                                                                                MEDICATIONS  (STANDING):  aspirin  chewable 81 milliGRAM(s) Oral daily  atorvastatin 80 milliGRAM(s) Oral at bedtime  budesonide  80 MICROgram(s)/formoterol 4.5 MICROgram(s) Inhaler 2 Puff(s) Inhalation two times a day  buPROPion XL (24-Hour) . 300 milliGRAM(s) Oral daily  cefpodoxime 200 milliGRAM(s) Oral every 12 hours  chlorhexidine 2% Cloths 1 Application(s) Topical <User Schedule>  clonazePAM  Tablet 1 milliGRAM(s) Oral <User Schedule>  enoxaparin Injectable 30 milliGRAM(s) SubCutaneous every 12 hours  escitalopram 5 milliGRAM(s) Oral daily  influenza   Vaccine 0.5 milliLiter(s) IntraMuscular once  lactobacillus acidophilus 1 Tablet(s) Oral daily  levothyroxine 125 MICROGram(s) Oral daily  losartan 25 milliGRAM(s) Oral daily  melatonin 5 milliGRAM(s) Oral at bedtime  metoprolol succinate  milliGRAM(s) Oral daily  metroNIDAZOLE    Tablet 500 milliGRAM(s) Oral every 8 hours  montelukast 10 milliGRAM(s) Oral daily  pantoprazole    Tablet 40 milliGRAM(s) Oral two times a day  saccharomyces boulardii 250 milliGRAM(s) Oral two times a day  sodium chloride 0.65% Nasal 1 Spray(s) Nasal three times a day  ticagrelor 90 milliGRAM(s) Oral every 12 hours  tiotropium 2.5 MICROgram(s) Inhaler 2 Puff(s) Inhalation daily    MEDICATIONS  (PRN):  acetaminophen     Tablet .. 975 milliGRAM(s) Oral every 6 hours PRN Temp greater or equal to 38C (100.4F), Mild Pain (1 - 3), Moderate Pain (4 - 6)  albuterol    90 MICROgram(s) HFA Inhaler 2 Puff(s) Inhalation every 6 hours PRN Shortness of Breath and/or Wheezing  baclofen 5 milliGRAM(s) Oral every 12 hours PRN Musculoskeletal Pain  HYDROmorphone   Tablet 1 milliGRAM(s) Oral every 8 hours PRN Severe Pain (7 - 10)  hydrOXYzine hydrochloride 25 milliGRAM(s) Oral three times a day PRN Anxiety      X-Rays reviewed:    CXR interpreted by me:

## 2023-04-07 NOTE — PROGRESS NOTE ADULT - ASSESSMENT
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 Episode happened while patient was driving and she continued to drive normally. She reports associated pain behind the eyes. Resolved and did not happen again. In the ER patient also complaining of LLQ pain worse when attempting to pass gas.       Transient Blurry vision 2/2 suspect TIA   Carotid Artery Stenosis  -Patient presented with transient blurry vision lasted for 15 minutes, no other weakness.   -Neuro exam is non-focal.   -Severe high-grade stenosis of the proximal right internal carotid artery,  -Moderate left ICA stenosis, right P3, and b/l cavernous ICA stenosis.  -Brain MRI 3/28 was Unremarkable, no acute infarct.   -CTA Neck 3/27 Severe high-grade stenosis of the proximal right ICA. Moderate stenosis of the proximal left ICA.   -CTA HEAD:  Moderate stenosis of the cavernous segments of the bilateral internal . carotid arteries. Severe narrowing of the P3 segment of the right posterior cerebral artery.  -s/p Rt carotid stent and Angioplasty  -Continue ASA, ticagrilor and Lipitor  - pt experienced RUE weakness yesterday, repeat CTH negative    Acute sigmoid diverticulitis   -CT abd: Acute diverticulitis of a short segment of the sigmoid colon. No abscess or perforation. Further evaluation with nonemergent colonoscopy may be of benefit when patient is clinically able.  -Continue Vantin 200mg mg q12h and Flagyl 500 mg q8h till 4/10  -GI recommended colonoscopy outpatient after 6-8 weeks.  - c/w probiotics    Hypertension  - continue with toprol    Hypothyroidism  -continue synthroid    COPD  -stable, Continue Budesonide, Spiriva and Montelukast.      Depression  -Continue Lexapro and Clonazepam.     DVT prophylaxis: Lovenox SC  GI ppx: protonix     Full code

## 2023-04-07 NOTE — DISCHARGE NOTE PROVIDER - PROVIDER TOKENS
PROVIDER:[TOKEN:[[id number]],FOLLOWUP:[1 week]],PROVIDER:[TOKEN:[[id number]],FOLLOWUP:[2 weeks]],PROVIDER:[TOKEN:[[id number]],FOLLOWUP:[1 week]] PROVIDER:[TOKEN:[[id number]],FOLLOWUP:[1 week]],PROVIDER:[TOKEN:[[id number]],FOLLOWUP:[2 weeks]],PROVIDER:[TOKEN:[[id number]],FOLLOWUP:[1 week]],PROVIDER:[TOKEN:[[id number]],FOLLOWUP:[2 weeks]],PROVIDER:[TOKEN:[[id number]],FOLLOWUP:[2 weeks]]

## 2023-04-07 NOTE — CONSULT NOTE ADULT - CONSULT REQUESTED DATE/TIME
04-Apr-2023 16:18
07-Apr-2023 14:29
28-Mar-2023 12:10
28-Mar-2023 07:14
28-Mar-2023 14:38
29-Mar-2023 17:19
29-Mar-2023 06:25

## 2023-04-07 NOTE — CONSULT NOTE ADULT - ATTENDING COMMENTS
Ms. Servin was seen and examined at bedside today, pulmonary was consulted this afternoon for 7 mm pulmonary nodule first seen on CT of the abdomen performed 3/27.  She is a former smoker, with prior extrathoracic cancer, as well as with a strong family history of cancer.  These all increase the risk of malignancy in this nodule.  However, at this time, there is no role for inpatient intervention for further work-up of this nodule.  She should follow with her outpatient pulmonologist or oncologist for further monitoring.  Carlo and [place] SPN calculators were utilized, demonstrating a malignancy risk between 2 - 18%.  I agree with the fellow note, with the exceptions listed in my attestation above.  The remainder of impression and plan per fellow note.
carotid stenosis right ica   will defer to neurovinterventionalist
56yo female presenting with blurred vision and LLQ pain. CT imaging revealing acute uncomplicated sigmoid diverticulitis. Pt reports similar prior episodes and reports last colonoscopy approximately 1 year ago. Recommend IV antibiotics and continue supportive measures. Recommend serial abd exams. Pt can follow up with outpt GI Dr. Pelayo to discuss colonoscopy in 6-8 weeks. Further mgmt per primary team and vascular for carotid disease.

## 2023-04-07 NOTE — DISCHARGE NOTE PROVIDER - CARE PROVIDER_API CALL
Bhanu Blank; [facility])  Surgery  Neurosurgery  [address] [place] [address]  Phone: [phone]  Fax: [phone]  Follow Up Time: 1 week    MARCIAL GREGORY  Specialist  N  Azael GRESHAM,    Phone: ()-  Fax: ()-  Follow Up Time: 2 weeks    Kody Noonan (DO)  Internal Medicine  [address] [place] [address]  Phone: [phone]  Fax: [phone]  Follow Up Time: 1 week   Bhanu Blank; [facility])  Surgery  Neurosurgery  [address] [place] [address]  Phone: [phone]  Fax: [phone]  Follow Up Time: 1 week    MARCIAL GREGORY  Specialist  N  COLT CEJA, Azael,    Phone: ()-  Fax: ()-  Follow Up Time: 2 weeks    Kody Noonan (DO)  Internal Medicine  [address] [place] [address]  Phone: [phone]  Fax: [phone]  Follow Up Time: 1 week    Gabe Christian)  EndocrinologyMetabDiabetes; Internal Medicine  [address] [place] [address]  Phone: [phone]  Fax: [phone]  Follow Up Time: 2 weeks    Misbah Valladares)  Critical Care Medicine; Internal Medicine; Pulmonary Disease; Sleep Medicine  [address] [place] [address]  Phone: [phone]  Fax: [phone]  Follow Up Time: 2 weeks

## 2023-04-07 NOTE — PROGRESS NOTE ADULT - SUBJECTIVE AND OBJECTIVE BOX
Neuroendovascular Progress Note:     HPI:  Patient is a 57 year old female history HTN hearing loss COPD hypothyroidism, CKD, diverticulitis, nasopharyngeal CA s/p chemo / rt 2013, anxiety presenting for sudden onset blurry vision and eye pain, neuroendovascular consult placed for severe high grade stenosis of the proximal right internal carotid artery/ moderate left ICA stenosis, bilateral cavernous ICA stenosis. Patient is now POD 3 s/p right carotid angioplasty and stenting with the neuroendovascular team. This AM complaining still of right sided neck pain which has improved and right groin pain on lifting RLE. Denies weakness, states limited ROM of RLE 2.2 pain, effort dependent without drift or weakness.     Past medical history:   COPD (chronic obstructive pulmonary disease)  HTN (hypertension)  Diverticulitis  Pulmonary nodules/lesions, multiple  Nasopharyngeal cancer  Hypothyroid  Cervical disc disease  Left ear hearing loss  Hemorrhoids  Anxiety    Medications:   aspirin  chewable 81 milliGRAM(s) Oral daily  atorvastatin 80 milliGRAM(s) Oral at bedtime  budesonide  80 MICROgram(s)/formoterol 4.5 MICROgram(s) Inhaler 2 Puff(s) Inhalation two times a day  buPROPion XL (24-Hour) . 300 milliGRAM(s) Oral daily  cefpodoxime 200 milliGRAM(s) Oral every 12 hours  chlorhexidine 2% Cloths 1 Application(s) Topical <User Schedule>  clonazePAM  Tablet 1 milliGRAM(s) Oral <User Schedule>  enoxaparin Injectable 30 milliGRAM(s) SubCutaneous every 12 hours  escitalopram 5 milliGRAM(s) Oral daily  influenza   Vaccine 0.5 milliLiter(s) IntraMuscular once  lactobacillus acidophilus 1 Tablet(s) Oral daily  levothyroxine 125 MICROGram(s) Oral daily  losartan 25 milliGRAM(s) Oral daily  melatonin 5 milliGRAM(s) Oral at bedtime  metoprolol succinate  milliGRAM(s) Oral daily  metroNIDAZOLE    Tablet 500 milliGRAM(s) Oral every 8 hours  montelukast 10 milliGRAM(s) Oral daily  pantoprazole    Tablet 40 milliGRAM(s) Oral two times a day  saccharomyces boulardii 250 milliGRAM(s) Oral two times a day  sodium chloride 0.65% Nasal 1 Spray(s) Nasal three times a day  ticagrelor 90 milliGRAM(s) Oral every 12 hours  tiotropium 2.5 MICROgram(s) Inhaler 2 Puff(s) Inhalation daily    Vitals:   T(F): 96.3 (04-07-23 @ 04:48), Max: 98.3 (04-06-23 @ 20:14)  HR: 63 (04-07-23 @ 04:48) (63 - 73)  BP: 104/59 (04-07-23 @ 04:48) (104/59 - 149/71)  RR: 19 (04-07-23 @ 04:48) (18 - 19)  SpO2: --    Labs:                         9.9    7.16  )-----------( 278      ( 07 Apr 2023 06:00 )             32.0   04-07  140  |  108  |  13  ----------------------------<  105<H>  4.1   |  23  |  0.9  Ca    8.5      07 Apr 2023 06:00  Phos  2.8     04-06  Mg     1.9     04-07  TPro  6.2  /  Alb  3.6  /  TBili  <0.2  /  DBili  x   /  AST  23  /  ALT  25  /  AlkPhos  83  04-06  LIVER FUNCTIONS - ( 06 Apr 2023 05:24 )  Alb: 3.6 g/dL / Pro: 6.2 g/dL / ALK PHOS: 83 U/L / ALT: 25 U/L / AST: 23 U/L / GGT: x           Exam:   General - NAD  Neuro - AAO3, follows commands, EOMI, visual fields intact, face symmetric, moves all extremities RLE lifts with effort 2/2 pain without drift to antigravity, sensation intact and equal to light touch bilaterally, no dysmetria, no aphasia, no dysarthria, improved hypophonia.   Extremity - right radial arteriotomy site CDI without evidence of bleeding, hematoma formation, or infection. Moderate ecchymosis proximal to arteriotomy site, nontender to palpation. Temperature and color consistent bilaterally. Brachial / radial pulses intact.     Radiology:   Imaging reviewed per neuroendovascular ACP/attending.     < from: CT Head No Cont (04.06.23 @ 11:21) >  IMPRESSION:  No acute intracranial pathology. No evidence of midline shift, mass   effect or intracranial hemorrhage.  < end of copied text >  Official IR neuro report pending.     Assessment:   Patient is a 57 year old female history HTN hearing loss COPD hypothyroidism, CKD, diverticulitis, nasopharyngeal CA s/p chemo / rt 2013, anxiety presenting for sudden onset blurry vision and eye pain, neuroendovascular consult placed for severe high grade stenosis of the proximal right internal carotid artery/ moderate left ICA stenosis, bilateral cavernous ICA stenosis. Patient is now POD 3 s/p right carotid angioplasty and stenting with the neuroendovascular team. CTH yesterday negative.     Suggestions:  Continue to monitor right UE ecchymosis / intact distal pulses.   Must continue on DAPT given recent stenting - ASA 81 mg QD and Brilinta 90 mg BID.   Continue statin.   Continue inpt PT/OT   Management per primary.   Will follow with Dr. Blank in outpatient NI clinic once discharged in a few weeks.     [id number] neuroendovascular

## 2023-04-07 NOTE — PHYSICAL THERAPY INITIAL EVALUATION ADULT - PERTINENT HX OF CURRENT PROBLEM, REHAB EVAL
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 Episode happened while patient was driving and she continued to drive normally. She reports associated pain behind the eyes. Resolved and did not happen again. In the ER patient also complaining of LLQ pain worse when attempting to pass gas.  Transient Blurry vision: Suspect TIA.

## 2023-04-07 NOTE — DISCHARGE NOTE PROVIDER - NSDCCPCAREPLAN_GEN_ALL_CORE_FT
PRINCIPAL DISCHARGE DIAGNOSIS  Diagnosis: Transient ischemic attack  Assessment and Plan of Treatment: For Transient Blurry vision 2/2 suspect TIA   For Carotid Artery Stenosis  - Patient presented with transient blurry vision lasted for 15 minutes, no other weakness.   - Brain MRI 3/28 was Unremarkable, no acute infarct.   - CTA Neck 3/27 Severe high-grade stenosis of the proximal right ICA. Moderate stenosis of the proximal left ICA.   - CTA HEAD:  Moderate stenosis of the cavernous segments of the bilateral internal . carotid arteries. Severe narrowing of the P3 segment of the right posterior cerebral artery.  - Patient is s/p Rt carotid stent and Angioplasty on 04/04  - Patiet experienced RUE weakness on 04/06, repeat CTH negative for acute process  - On discharge  --> Will ask patient to continue Aspirin 81mg QD, Brilinta, and Lipitor 80mg QD      SECONDARY DISCHARGE DIAGNOSES  Diagnosis: Acute diverticulitis  Assessment and Plan of Treatment: For Acute sigmoid diverticulitis   - A CT abdomen was performed and revealed acute diverticulitis of a short segment of the sigmoid colon. No abscess or perforation. Further evaluation with nonemergent colonoscopy may be of benefit when patient is clinically able.  - On discharge  --> Will ask patient to continue Vantin 200mg mg q12h and Flagyl 500 mg q8h till 4/10  --> Will ask patient to follow up with GI for colonoscopy outpatient after 6-8 weeks.    Diagnosis: Hypertension  Assessment and Plan of Treatment: For Hypertension  - On discharge  --> Will ask patient to monitor BP  --> Will ask patient to resume anti hypertensives    Diagnosis: Hypothyroid  Assessment and Plan of Treatment: For Hypothyroidism  - On discharge  --> Will ask patient to resume levothyroxine    Diagnosis: Major depression  Assessment and Plan of Treatment: For Depression and Anxiety  - On discharge  --> Will ask patient to resume Lexapro and Clonazepam      Diagnosis: Adrenal nodule  Assessment and Plan of Treatment: For Adrenal Nodule  - Noted on CT abdomen  - On discharge  --> Will ask patient to follow up with PCP in 1 week to order salivary cortisol, urine metanephrine, and renin/AT/aldosterone    Diagnosis: Lung nodule  Assessment and Plan of Treatment: For Lung Nodule  --> Will ask patient to follow up with PCP in 1 week to monitor lung nodule with repeat CT chest imaging     PRINCIPAL DISCHARGE DIAGNOSIS  Diagnosis: Transient ischemic attack  Assessment and Plan of Treatment: For Transient Blurry vision 2/2 suspect TIA   For Carotid Artery Stenosis  - Patient presented with transient blurry vision lasted for 15 minutes, no other weakness.   - Brain MRI 3/28 was Unremarkable, no acute infarct.   - CTA Neck 3/27 Severe high-grade stenosis of the proximal right ICA. Moderate stenosis of the proximal left ICA.   - CTA HEAD:  Moderate stenosis of the cavernous segments of the bilateral internal . carotid arteries. Severe narrowing of the P3 segment of the right posterior cerebral artery.  - Patient is s/p Rt carotid stent and Angioplasty on 04/04  - Patiet experienced RUE weakness on 04/06, repeat CTH negative for acute process  - On discharge  --> Will ask patient to continue Aspirin 81mg QD, Brilinta, and Lipitor 80mg QD      SECONDARY DISCHARGE DIAGNOSES  Diagnosis: Acute diverticulitis  Assessment and Plan of Treatment: For Acute sigmoid diverticulitis   - A CT abdomen was performed and revealed acute diverticulitis of a short segment of the sigmoid colon. No abscess or perforation. Further evaluation with nonemergent colonoscopy may be of benefit when patient is clinically able.  - On discharge  --> completed course of antibiotics (vantin/flagyl) until 4/10  --> Please followup [facility] GI for colonoscopy outpatient after 6-8 weeks.    Diagnosis: Hypertension  Assessment and Plan of Treatment: For Hypertension  - On discharge  --> Will ask patient to monitor BP  --> Will ask patient to resume anti hypertensives    Diagnosis: Hypothyroid  Assessment and Plan of Treatment: For Hypothyroidism  - On discharge  --> Will ask patient to resume levothyroxine    Diagnosis: Major depression  Assessment and Plan of Treatment: For Depression and Anxiety  - On discharge  --> Will ask patient to resume Lexapro and Clonazepam      Diagnosis: Adrenal nodule  Assessment and Plan of Treatment: For Adrenal Nodule  - Noted on CT abdomen  - On discharge  --> Please followup with the endocrinology and your primary care doctor for further care and management and a repeat CT scan in 3 months    Diagnosis: Lung nodule  Assessment and Plan of Treatment: For Lung Nodule  --> your repeat CT scan did not show the pulmonary nodule however please followup with pulmonologist for further care and management

## 2023-04-07 NOTE — CONSULT NOTE ADULT - REASON FOR ADMISSION
B/L Blurry Vision

## 2023-04-07 NOTE — PHYSICAL THERAPY INITIAL EVALUATION ADULT - LEVEL OF INDEPENDENCE: STAIR NEGOTIATION, REHAB EVAL
as Pt declined to go out from the room. Based on functional mobility pt will not have issue with stair negotiation./unable to perform

## 2023-04-07 NOTE — PROGRESS NOTE ADULT - ASSESSMENT
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 Episode happened while patient was driving and she continued to drive normally. She reports associated pain behind the eyes. Resolved and did not happen again. In the ER patient also complaining of LLQ pain worse when attempting to pass gas.       Transient Blurry vision: Suspect TIA.   Carotid Artery Stenosis:   Patient presented with transient blurry vision lasted for 15 minutes, no other weakness.   Neuro exam is non-focal.   Severe high-grade stenosis of the proximal right internal carotid artery,  Moderate left ICA stenosis, right P3, and b/l cavernous ICA stenosis.  Brain MRI 3/28 was Unremarkable, no acute infarct.   CTA Neck 3/27 Severe high-grade stenosis of the proximal right ICA. Moderate stenosis of the proximal left ICA.   CTA HEAD:  Moderate stenosis of the cavernous segments of the bilateral internal . carotid arteries. Severe narrowing of the P3 segment of the right posterior cerebral artery.  s/p Rt carotid stent and Angioplasty  Continue ASA, ticagrilor and Lipitor.   Neck pain better  walking  Repeat CT head negative for acute abnormality    Acute sigmoid diverticulitis   CT abd: Acute diverticulitis of a short segment of the sigmoid colon. No abscess or perforation. Further evaluation with nonemergent colonoscopy may be of benefit when patient is clinically able.  Continue Vantin 200mg mg q12h and Flafyl 500 mg q8h till 4/10  GI recommended colonoscopy outpatient after 6-8 weeks.  Diarrhea better  tolerating diet    Hypertension: metoprolol  Hypothyroidism: synthroid  COPD- stable, Continue Budesonide, Spiriva and Montelukast.      Depression: Continue Lexapro and Clonazepam.     Pulmonary nodule on CT  Pulmonary eval    Adrenal nodule 1.3 cm stable  out pt f/u with endo    DVT prophylaxis: Lovenox SC.      Discussed with Pt her CT scan findings

## 2023-04-07 NOTE — DISCHARGE NOTE PROVIDER - NPI NUMBER (FOR SYSADMIN USE ONLY) :
[[id number]],[[id number]] [[id number]],[[id number]],[[id number]],[[id number]],[[id number]]

## 2023-04-07 NOTE — PROGRESS NOTE ADULT - SUBJECTIVE AND OBJECTIVE BOX
INTERVAL HPI/ OVERNIGHT EVENTS:    57 year old morbidly obese female has medical history of 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 Episode happened while patient was driving and she continued to drive normally. She reports associated pain behind the eyes. Resolved and did not happen again. In the ER patient also complaining of LLQ pain worse when attempting to pass gas.   diarrhea improved  neck pain and headache better  walking today  no new weakness      REVIEW OF SYSTEMS:  CONSTITUTIONAL: No fever, weight loss, or fatigue  EYES: No eye pain, visual disturbances, or discharge  ENMT:  No difficulty hearing, tinnitus, vertigo; No sinus or throat pain  NECK: No pain or stiffness  BREASTS: No pain, masses, or nipple discharge  RESPIRATORY: No cough, wheezing, chills or hemoptysis; No shortness of breath  CARDIOVASCULAR: No chest pain, palpitations, dizziness, or leg swelling  GASTROINTESTINAL: diarrhea  GENITOURINARY: No dysuria, frequency, hematuria, or incontinence  NEUROLOGICAL: No headaches, memory loss, loss of strength, numbness, or tremors  SKIN: No itching, burning, rashes, or lesions   LYMPH NODES: No enlarged glands  ENDOCRINE: No heat or cold intolerance; No hair loss  MUSCULOSKELETAL: mild neck pain  PSYCHIATRIC: No depression, anxiety, mood swings, or difficulty sleeping  HEME/LYMPH: No easy bruising, or bleeding gums  ALLERY AND IMMUNOLOGIC: No hives or eczema    VITALS:  T(F): 96.3, Max: 98.3 (04-06-23 @ 20:14)  HR: 63  BP: 104/59  RR: 19  SpO2: --    PHYSICAL EXAM:  GENERAL: NAD  HEAD:  Atraumatic, Normocephalic  EYES: EOMI, PERRLA, conjunctiva and sclera clear  ENMT: No tonsillar erythema, exudates, or enlargement; Moist mucous membranes, Good dentition, No lesions  NECK: Supple, No JVD, Normal thyroid  NERVOUS SYSTEM:  Alert & Oriented X3,   CHEST/LUNG: Clear to percussion bilaterally; No rales, rhonchi, wheezing, or rubs  HEART: Regular rate and rhythm; No murmurs, rubs, or gallops  ABDOMEN: Soft, Nontender, Nondistended; Bowel sounds present  EXTREMITIES:  trace edema  LYMPH: No lymphadenopathy noted  SKIN: No rashes or lesions      LABS:    04-07    140  |  108  |  13  ----------------------------<  105<H>  4.1   |  23  |  0.9    Ca    8.5      07 Apr 2023 06:00  Phos  2.8     04-06  Mg     1.9     04-07    TPro  6.2  /  Alb  3.6  /  TBili  <0.2  /  DBili  x   /  AST  23  /  ALT  25  /  AlkPhos  83  04-06                          9.9    7.16  )-----------( 278      ( 07 Apr 2023 06:00 )             32.0           RADIOLOGY & ADDITIONAL TESTS:    Imaging Personally Reviewed:  [ ] YES  [ ] NO    MEDICATIONS:     MEDICATIONS  (STANDING):  aspirin  chewable 81 milliGRAM(s) Oral daily  atorvastatin 80 milliGRAM(s) Oral at bedtime  budesonide  80 MICROgram(s)/formoterol 4.5 MICROgram(s) Inhaler 2 Puff(s) Inhalation two times a day  buPROPion XL (24-Hour) . 300 milliGRAM(s) Oral daily  cefpodoxime 200 milliGRAM(s) Oral every 12 hours  chlorhexidine 2% Cloths 1 Application(s) Topical <User Schedule>  clonazePAM  Tablet 1 milliGRAM(s) Oral <User Schedule>  enoxaparin Injectable 30 milliGRAM(s) SubCutaneous every 12 hours  escitalopram 5 milliGRAM(s) Oral daily  influenza   Vaccine 0.5 milliLiter(s) IntraMuscular once  lactobacillus acidophilus 1 Tablet(s) Oral daily  levothyroxine 125 MICROGram(s) Oral daily  losartan 25 milliGRAM(s) Oral daily  melatonin 5 milliGRAM(s) Oral at bedtime  metoprolol succinate  milliGRAM(s) Oral daily  metroNIDAZOLE    Tablet 500 milliGRAM(s) Oral every 8 hours  montelukast 10 milliGRAM(s) Oral daily  pantoprazole    Tablet 40 milliGRAM(s) Oral two times a day  saccharomyces boulardii 250 milliGRAM(s) Oral two times a day  sodium chloride 0.65% Nasal 1 Spray(s) Nasal three times a day  ticagrelor 90 milliGRAM(s) Oral every 12 hours  tiotropium 2.5 MICROgram(s) Inhaler 2 Puff(s) Inhalation daily    MEDICATIONS  (PRN):  acetaminophen     Tablet .. 975 milliGRAM(s) Oral every 6 hours PRN Temp greater or equal to 38C (100.4F), Mild Pain (1 - 3), Moderate Pain (4 - 6)  albuterol    90 MICROgram(s) HFA Inhaler 2 Puff(s) Inhalation every 6 hours PRN Shortness of Breath and/or Wheezing  baclofen 5 milliGRAM(s) Oral every 12 hours PRN Musculoskeletal Pain  HYDROmorphone   Tablet 1 milliGRAM(s) Oral every 8 hours PRN Severe Pain (7 - 10)  hydrOXYzine hydrochloride 25 milliGRAM(s) Oral three times a day PRN Anxiety

## 2023-04-07 NOTE — DISCHARGE NOTE PROVIDER - NSDCFUADDAPPT_GEN_ALL_CORE_FT
--> Will ask patient to follow up with PCP (Kody Short) in 1 week   --> Will ask patient to follow up with Kady Stevens in 2 weeks.  --> Will ask patient to follow up with GI for colonoscopy outpatient after 6-8 weeks.   --> Will ask patient to follow up with Dr Blank in 1 week  --> Will ask patient to follow up with PCP (Kody Short) in 1 week   --> Will ask patient to follow up with Kady Stevens in 2 weeks.  --> Will ask patient to follow up with GI for colonoscopy outpatient after 6-8 weeks.

## 2023-04-07 NOTE — DISCHARGE NOTE PROVIDER - CARE PROVIDERS DIRECT ADDRESSES
,DirectAddress_Unknown,DirectAddress_Unknown,DirectAddress_Unknown ,DirectAddress_Unknown,DirectAddress_Unknown,DirectAddress_Unknown,[email or web address],DirectAddress_Unknown

## 2023-04-07 NOTE — CONSULT NOTE ADULT - CONSULT REASON
diverticulitis
High grade right ICA Stenosis
Pulmonary nodule
s/p carotid stent and angioplasty
diverticulitis
high grade right ICA stenosis
bilateral blurry vision

## 2023-04-07 NOTE — DISCHARGE NOTE PROVIDER - HOSPITAL COURSE
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 Please refer to below for more details of hospital course:    For Transient Blurry vision 2/2 suspect TIA   For Carotid Artery Stenosis  - Patient presented with transient blurry vision lasted for 15 minutes, no other weakness.   - Brain MRI 3/28 was Unremarkable, no acute infarct.   - CTA Neck 3/27 Severe high-grade stenosis of the proximal right ICA. Moderate stenosis of the proximal left ICA.   - CTA HEAD:  Moderate stenosis of the cavernous segments of the bilateral internal . carotid arteries. Severe narrowing of the P3 segment of the right posterior cerebral artery.  - Patient is s/p Rt carotid stent and Angioplasty on 04/04  - Patiet experienced RUE weakness on 04/06, repeat CTH negative for acute process  - On discharge  --> Will ask patient to continue Aspirin 81mg QD, Brilinta, and Lipitor 80mg QD    For Acute sigmoid diverticulitis   - A CT abdomen was performed and revealed acute diverticulitis of a short segment of the sigmoid colon. No abscess or perforation. Further evaluation with nonemergent colonoscopy may be of benefit when patient is clinically able.  - On discharge  --> Will ask patient to continue Vantin 200mg mg q12h and Flagyl 500 mg q8h till 4/10  --> Will ask patient to follow up with GI for colonoscopy outpatient after 6-8 weeks.    For Hypertension  - On discharge  --> Will ask patient to monitor BP  --> Will ask patient to resume anti hypertensives     For Hypothyroidism  - On discharge  --> Will ask patient to resume levothyroxine     For Depression and Anxiety  - On discharge  --> Will ask patient to resume Lexapro and Clonazepam    For Adrenal Nodule  - Noted on CT abdomen  - On discharge  --> Will ask patient to follow up with PCP (Kody Short) in 1 week to order check salivary cortisol, urine metanephrine, and renin/AT/aldosterone     For COPD, DAVID, and Lung Nodule  --> Will ask patient to follow up with PCP (Kody Short) in 1 week to monitor lung nodule with repeat CT chest imaging   --> Resume CPAP use at home    For nasopharyngeal cancer  - s/p chemo RT in 2013  - On discharge  --> Will ask patient to follow up with Kady Stevens in 2 weeks. 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 Please refer to below for more details of hospital course:    For Transient Blurry vision 2/2 suspect TIA   For Carotid Artery Stenosis  - Patient presented with transient blurry vision lasted for 15 minutes, no other weakness.   - Brain MRI 3/28 was Unremarkable, no acute infarct.   - CTA Neck 3/27 Severe high-grade stenosis of the proximal right ICA. Moderate stenosis of the proximal left ICA.   - CTA HEAD:  Moderate stenosis of the cavernous segments of the bilateral internal . carotid arteries. Severe narrowing of the P3 segment of the right posterior cerebral artery.  - Patient is s/p Rt carotid stent and Angioplasty on 04/04  - Patiet experienced RUE weakness on 04/06, repeat CTH negative for acute process  - On discharge  --> Will ask patient to follow up with Dr Blank in 1 week  --> Will ask patient to continue Aspirin 81mg QD, Brilinta, and Lipitor 80mg QD    For Acute sigmoid diverticulitis   - A CT abdomen was performed and revealed acute diverticulitis of a short segment of the sigmoid colon. No abscess or perforation. Further evaluation with nonemergent colonoscopy may be of benefit when patient is clinically able.  - On discharge  --> Will ask patient to continue Vantin 200mg mg q12h and Flagyl 500 mg q8h till 4/10  --> Will ask patient to follow up with GI for colonoscopy outpatient after 6-8 weeks.    For Hypertension  - On discharge  --> Will ask patient to monitor BP  --> Will ask patient to resume anti hypertensives     For Hypothyroidism  - On discharge  --> Will ask patient to resume levothyroxine     For Depression and Anxiety  - On discharge  --> Will ask patient to resume Lexapro and Clonazepam    For Adrenal Nodule  - Noted on CT abdomen  - On discharge  --> Will ask patient to follow up with PCP (Kody Short) in 1 week to order check salivary cortisol, urine metanephrine, and renin/AT/aldosterone     For COPD, DAVID, and Lung Nodule  --> Will ask patient to follow up with PCP (Kody Short) in 1 week to monitor lung nodule with repeat CT chest imaging   --> Resume CPAP use at home    For nasopharyngeal cancer  - s/p chemo RT in 2013  - On discharge  --> Will ask patient to follow up with Kady Stevens in 2 weeks. 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 Please refer to below for more details of hospital course:    For Transient Blurry vision 2/2 suspect TIA   For Carotid Artery Stenosis  - Patient presented with transient blurry vision lasted for 15 minutes, no other weakness.   - Brain MRI 3/28 was Unremarkable, no acute infarct.   - CTA Neck 3/27 Severe high-grade stenosis of the proximal right ICA. Moderate stenosis of the proximal left ICA.   - CTA HEAD:  Moderate stenosis of the cavernous segments of the bilateral internal . carotid arteries. Severe narrowing of the P3 segment of the right posterior cerebral artery.  - Patient is s/p Rt carotid stent and Angioplasty on 04/04  - Patiet experienced RUE weakness on 04/06, repeat CTH negative for acute process  - On discharge  --> Will ask patient to follow up with Dr Blank in 1 week  --> Will ask patient to continue Aspirin 81mg QD, Brilinta, and Lipitor 80mg QD    For Acute sigmoid diverticulitis   - A CT abdomen was performed and revealed acute diverticulitis of a short segment of the sigmoid colon. No abscess or perforation. Further evaluation with nonemergent colonoscopy may be of benefit when patient is clinically able.  - On discharge  --> Will ask patient to continue Vantin 200mg mg q12h and Flagyl 500 mg q8h till 4/10  --> Will ask patient to follow up with GI for colonoscopy outpatient after 6-8 weeks.    For Hypertension  - On discharge  --> Will ask patient to monitor BP  --> Will ask patient to resume anti hypertensives     For Hypothyroidism  - On discharge  --> Will ask patient to resume levothyroxine     For Depression and Anxiety  - On discharge  --> Will ask patient to resume Lexapro and Clonazepam    For Adrenal Nodule  - Noted on CT abdomen  - On discharge  --> Will ask patient to follow up with PCP (Kody Short) in 1 week to order check salivary cortisol, urine metanephrine, and renin/AT/aldosterone     For RUL 1.2cm and LLL 7mm Lung Nodule  For COPD and DAVID  - We consulted pulmonary team on 04/07 as patient follows with Dr Gutierres on [place]  - On discharge  --> Will ask patient to follow up with PCP (Kody Short) in 1 week to monitor lung nodule with repeat CT chest imaging   --> Resume CPAP use at home    For nasopharyngeal cancer  - s/p chemo RT in 2013  - On discharge  --> Will ask patient to follow up with Kady Stevens in 2 weeks. 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 Please refer to below for more details of hospital course:    For Transient Blurry vision 2/2 suspect TIA   For Carotid Artery Stenosis  - Patient presented with transient blurry vision lasted for 15 minutes, no other weakness.   - Brain MRI 3/28 was Unremarkable, no acute infarct.   - CTA Neck 3/27 Severe high-grade stenosis of the proximal right ICA. Moderate stenosis of the proximal left ICA.   - CTA HEAD:  Moderate stenosis of the cavernous segments of the bilateral internal . carotid arteries. Severe narrowing of the P3 segment of the right posterior cerebral artery.  - Patient is s/p Rt carotid stent and Angioplasty on 04/04  - Patiet experienced RUE weakness on 04/06, repeat CTH negative for acute process  - On discharge  --> Will ask patient to follow up with Dr Blank in 1 week  --> Will ask patient to continue Aspirin 81mg QD, Brilinta, and Lipitor 80mg QD    For Acute sigmoid diverticulitis   - A CT abdomen was performed and revealed acute diverticulitis of a short segment of the sigmoid colon. No abscess or perforation. Further evaluation with nonemergent colonoscopy may be of benefit when patient is clinically able.  - s/p vantin and flagyl unti 4/10  --> Will ask patient to follow up with GI for colonoscopy outpatient after 6-8 weeks.    For Hypertension  - On discharge  --> Will ask patient to monitor BP  --> Will ask patient to resume anti hypertensives     For Hypothyroidism  - On discharge  --> Will ask patient to resume levothyroxine     For Depression and Anxiety  - On discharge  --> Will ask patient to resume Lexapro and Clonazepam    For Adrenal Nodule  - Noted on CT abdomen  - On discharge  --> Will ask patient to follow up with PCP (Kody Short) in 1 week to order check salivary cortisol, urine metanephrine, and renin/AT/aldosterone   ---> f/u CTAP in 3 months for adrenal nodule    For RUL 1.2cm and LLL 7mm Lung Nodule  For COPD and DAVID  - We consulted pulmonary team on 04/07 as patient follows with Dr Gutierres on [place]  - On discharge  - repeat CT chest didn't show solid pulmonary nodule  --> Resume CPAP use at home    For nasopharyngeal cancer  - s/p chemo RT in 2013  - On discharge  --> Will ask patient to follow up with Kady Stevens in 2 weeks.

## 2023-04-07 NOTE — DISCHARGE NOTE PROVIDER - NSDCMRMEDTOKEN_GEN_ALL_CORE_FT
albuterol 90 mcg/inh inhalation aerosol: 2 puff(s) inhaled every 6 hours, As needed, Shortness of Breath and/or Wheezing  aspirin 81 mg oral tablet, chewable: 1 tab(s) orally once a day  atorvastatin 80 mg oral tablet: 1 tab(s) orally once a day (at bedtime)  buPROPion 300 mg/24 hours (XL) oral tablet, extended release: 1 tab(s) orally every 24 hours  cefpodoxime 200 mg oral tablet: 1 tab(s) orally every 12 hours  clonazePAM 1 mg oral tablet: 1 tab(s) orally 2 times a day  escitalopram 5 mg oral tablet: 1 tab(s) orally once a day  levothyroxine 125 mcg (0.125 mg) oral tablet: 1 tab(s) orally once a day  metoprolol succinate 50 mg oral tablet, extended release: 1 tab(s) orally once a day  metroNIDAZOLE 500 mg oral tablet: 1 tab(s) orally every 8 hours  MONTELUKAST SODIUM 10 MG TABS: 1 dose(s) orally once a day  ticagrelor 90 mg oral tablet: 1 tab(s) orally every 12 hours  Trelegy Ellipta inhalation powder: 1 puff(s) inhaled once a day  ZOLPIDEM TARTRATE 10 MG TABS: orally once a day (at bedtime)

## 2023-04-07 NOTE — CONSULT NOTE ADULT - ASSESSMENT
Impression  7 mm Pulmonary nodule  1.3 cm adrenal nodule  Ex smoker    Recommendations  Dedicated CT chest  Out pt pulmonary f/u  DVT PPX

## 2023-04-08 LAB
ALBUMIN SERPL ELPH-MCNC: 3.5 G/DL — SIGNIFICANT CHANGE UP (ref 3.5–5.2)
ALP SERPL-CCNC: 82 U/L — SIGNIFICANT CHANGE UP (ref 30–115)
ALT FLD-CCNC: 17 U/L — SIGNIFICANT CHANGE UP (ref 0–41)
ANION GAP SERPL CALC-SCNC: 10 MMOL/L — SIGNIFICANT CHANGE UP (ref 7–14)
AST SERPL-CCNC: 18 U/L — SIGNIFICANT CHANGE UP (ref 0–41)
BASOPHILS # BLD AUTO: 0.04 K/UL — SIGNIFICANT CHANGE UP (ref 0–0.2)
BASOPHILS NFR BLD AUTO: 0.5 % — SIGNIFICANT CHANGE UP (ref 0–1)
BILIRUB SERPL-MCNC: <0.2 MG/DL — SIGNIFICANT CHANGE UP (ref 0.2–1.2)
BUN SERPL-MCNC: 11 MG/DL — SIGNIFICANT CHANGE UP (ref 10–20)
CALCIUM SERPL-MCNC: 9.1 MG/DL — SIGNIFICANT CHANGE UP (ref 8.4–10.5)
CHLORIDE SERPL-SCNC: 105 MMOL/L — SIGNIFICANT CHANGE UP (ref 98–110)
CO2 SERPL-SCNC: 26 MMOL/L — SIGNIFICANT CHANGE UP (ref 17–32)
CREAT SERPL-MCNC: 1 MG/DL — SIGNIFICANT CHANGE UP (ref 0.7–1.5)
EGFR: 65 ML/MIN/1.73M2 — SIGNIFICANT CHANGE UP
EOSINOPHIL # BLD AUTO: 0.34 K/UL — SIGNIFICANT CHANGE UP (ref 0–0.7)
EOSINOPHIL NFR BLD AUTO: 4.6 % — SIGNIFICANT CHANGE UP (ref 0–8)
GLUCOSE SERPL-MCNC: 117 MG/DL — HIGH (ref 70–99)
HCT VFR BLD CALC: 30.7 % — LOW (ref 37–47)
HGB BLD-MCNC: 9.6 G/DL — LOW (ref 12–16)
IMM GRANULOCYTES NFR BLD AUTO: 0.4 % — HIGH (ref 0.1–0.3)
LYMPHOCYTES # BLD AUTO: 1.71 K/UL — SIGNIFICANT CHANGE UP (ref 1.2–3.4)
LYMPHOCYTES # BLD AUTO: 23.1 % — SIGNIFICANT CHANGE UP (ref 20.5–51.1)
MAGNESIUM SERPL-MCNC: 1.9 MG/DL — SIGNIFICANT CHANGE UP (ref 1.8–2.4)
MCHC RBC-ENTMCNC: 25.7 PG — LOW (ref 27–31)
MCHC RBC-ENTMCNC: 31.3 G/DL — LOW (ref 32–37)
MCV RBC AUTO: 82.3 FL — SIGNIFICANT CHANGE UP (ref 81–99)
MONOCYTES # BLD AUTO: 0.61 K/UL — HIGH (ref 0.1–0.6)
MONOCYTES NFR BLD AUTO: 8.2 % — SIGNIFICANT CHANGE UP (ref 1.7–9.3)
NEUTROPHILS # BLD AUTO: 4.67 K/UL — SIGNIFICANT CHANGE UP (ref 1.4–6.5)
NEUTROPHILS NFR BLD AUTO: 63.2 % — SIGNIFICANT CHANGE UP (ref 42.2–75.2)
NRBC # BLD: 0 /100 WBCS — SIGNIFICANT CHANGE UP (ref 0–0)
PLATELET # BLD AUTO: 344 K/UL — SIGNIFICANT CHANGE UP (ref 130–400)
POTASSIUM SERPL-MCNC: 4.4 MMOL/L — SIGNIFICANT CHANGE UP (ref 3.5–5)
POTASSIUM SERPL-SCNC: 4.4 MMOL/L — SIGNIFICANT CHANGE UP (ref 3.5–5)
PROT SERPL-MCNC: 5.9 G/DL — LOW (ref 6–8)
RBC # BLD: 3.73 M/UL — LOW (ref 4.2–5.4)
RBC # FLD: 16.7 % — HIGH (ref 11.5–14.5)
SODIUM SERPL-SCNC: 141 MMOL/L — SIGNIFICANT CHANGE UP (ref 135–146)
WBC # BLD: 7.4 K/UL — SIGNIFICANT CHANGE UP (ref 4.8–10.8)
WBC # FLD AUTO: 7.4 K/UL — SIGNIFICANT CHANGE UP (ref 4.8–10.8)

## 2023-04-08 PROCEDURE — 99232 SBSQ HOSP IP/OBS MODERATE 35: CPT

## 2023-04-08 PROCEDURE — 71250 CT THORAX DX C-: CPT | Mod: 26

## 2023-04-08 RX ADMIN — HYDROMORPHONE HYDROCHLORIDE 1 MILLIGRAM(S): 2 INJECTION INTRAMUSCULAR; INTRAVENOUS; SUBCUTANEOUS at 17:31

## 2023-04-08 RX ADMIN — Medication 100 MILLIGRAM(S): at 05:57

## 2023-04-08 RX ADMIN — Medication 1 SPRAY(S): at 05:56

## 2023-04-08 RX ADMIN — Medication 250 MILLIGRAM(S): at 05:58

## 2023-04-08 RX ADMIN — ATORVASTATIN CALCIUM 80 MILLIGRAM(S): 80 TABLET, FILM COATED ORAL at 21:22

## 2023-04-08 RX ADMIN — CHLORHEXIDINE GLUCONATE 1 APPLICATION(S): 213 SOLUTION TOPICAL at 05:59

## 2023-04-08 RX ADMIN — Medication 125 MICROGRAM(S): at 05:57

## 2023-04-08 RX ADMIN — Medication 500 MILLIGRAM(S): at 05:58

## 2023-04-08 RX ADMIN — Medication 200 MILLIGRAM(S): at 05:57

## 2023-04-08 RX ADMIN — Medication 81 MILLIGRAM(S): at 11:09

## 2023-04-08 RX ADMIN — TICAGRELOR 90 MILLIGRAM(S): 90 TABLET ORAL at 17:12

## 2023-04-08 RX ADMIN — HYDROMORPHONE HYDROCHLORIDE 1 MILLIGRAM(S): 2 INJECTION INTRAMUSCULAR; INTRAVENOUS; SUBCUTANEOUS at 16:12

## 2023-04-08 RX ADMIN — Medication 1 MILLIGRAM(S): at 21:22

## 2023-04-08 RX ADMIN — Medication 1 SPRAY(S): at 21:27

## 2023-04-08 RX ADMIN — Medication 1 TABLET(S): at 11:09

## 2023-04-08 RX ADMIN — ESCITALOPRAM OXALATE 5 MILLIGRAM(S): 10 TABLET, FILM COATED ORAL at 11:09

## 2023-04-08 RX ADMIN — HYDROMORPHONE HYDROCHLORIDE 1 MILLIGRAM(S): 2 INJECTION INTRAMUSCULAR; INTRAVENOUS; SUBCUTANEOUS at 01:37

## 2023-04-08 RX ADMIN — TICAGRELOR 90 MILLIGRAM(S): 90 TABLET ORAL at 05:58

## 2023-04-08 RX ADMIN — Medication 1 MILLIGRAM(S): at 09:17

## 2023-04-08 RX ADMIN — PANTOPRAZOLE SODIUM 40 MILLIGRAM(S): 20 TABLET, DELAYED RELEASE ORAL at 17:13

## 2023-04-08 RX ADMIN — Medication 1 SPRAY(S): at 13:14

## 2023-04-08 RX ADMIN — BUPROPION HYDROCHLORIDE 300 MILLIGRAM(S): 150 TABLET, EXTENDED RELEASE ORAL at 11:09

## 2023-04-08 RX ADMIN — ENOXAPARIN SODIUM 30 MILLIGRAM(S): 100 INJECTION SUBCUTANEOUS at 17:13

## 2023-04-08 RX ADMIN — PANTOPRAZOLE SODIUM 40 MILLIGRAM(S): 20 TABLET, DELAYED RELEASE ORAL at 05:57

## 2023-04-08 RX ADMIN — Medication 200 MILLIGRAM(S): at 17:12

## 2023-04-08 RX ADMIN — MONTELUKAST 10 MILLIGRAM(S): 4 TABLET, CHEWABLE ORAL at 11:09

## 2023-04-08 RX ADMIN — ENOXAPARIN SODIUM 30 MILLIGRAM(S): 100 INJECTION SUBCUTANEOUS at 05:58

## 2023-04-08 RX ADMIN — LOSARTAN POTASSIUM 25 MILLIGRAM(S): 100 TABLET, FILM COATED ORAL at 05:57

## 2023-04-08 RX ADMIN — Medication 500 MILLIGRAM(S): at 13:14

## 2023-04-08 RX ADMIN — Medication 5 MILLIGRAM(S): at 17:18

## 2023-04-08 RX ADMIN — Medication 500 MILLIGRAM(S): at 21:25

## 2023-04-08 RX ADMIN — HYDROMORPHONE HYDROCHLORIDE 1 MILLIGRAM(S): 2 INJECTION INTRAMUSCULAR; INTRAVENOUS; SUBCUTANEOUS at 09:17

## 2023-04-08 RX ADMIN — HYDROMORPHONE HYDROCHLORIDE 1 MILLIGRAM(S): 2 INJECTION INTRAMUSCULAR; INTRAVENOUS; SUBCUTANEOUS at 07:35

## 2023-04-08 RX ADMIN — Medication 250 MILLIGRAM(S): at 17:13

## 2023-04-08 NOTE — PROGRESS NOTE ADULT - SUBJECTIVE AND OBJECTIVE BOX
----------Daily Progress Note----------    HISTORY OF PRESENT ILLNESS:  Patient is a 57y old Female who presents with a chief complaint of B/L Blurry Vision (07 Apr 2023 14:28)    Currently admitted to medicine with the primary diagnosis of Transient ischemic attack       Today is hospital day 12d.     INTERVAL HOSPITAL COURSE / OVERNIGHT EVENTS:    No overnight events    Review of Systems: Otherwise unremarkable     <<<<<PAST MEDICAL & SURGICAL HISTORY>>>>>  COPD (chronic obstructive pulmonary disease)    HTN (hypertension)    Diverticulitis    Pulmonary nodules/lesions, multiple    Nasopharyngeal cancer    Hypothyroid    Cervical disc disease  sequelae of radtion for nasopharyngeal cancer    Left ear hearing loss    Hemorrhoids    Anxiety    History of cholecystectomy      ALLERGIES  ciprofloxacin (Urticaria; Other)      Home Medications:  albuterol 90 mcg/inh inhalation aerosol: 2 puff(s) inhaled every 6 hours, As needed, Shortness of Breath and/or Wheezing (28 Mar 2023 13:59)  buPROPion 300 mg/24 hours (XL) oral tablet, extended release: 1 tab(s) orally every 24 hours (28 Mar 2023 13:59)  clonazePAM 1 mg oral tablet: 1 tab(s) orally 2 times a day (28 Mar 2023 13:59)  escitalopram 5 mg oral tablet: 1 tab(s) orally once a day (28 Mar 2023 13:59)  metoprolol succinate 50 mg oral tablet, extended release: 1 tab(s) orally once a day (28 Mar 2023 13:59)  MONTELUKAST SODIUM 10 MG TABS: 1 dose(s) orally once a day (28 Mar 2023 13:59)  Trelegy Ellipta inhalation powder: 1 puff(s) inhaled once a day (28 Mar 2023 13:59)  ZOLPIDEM TARTRATE 10 MG TABS: orally once a day (at bedtime) (28 Mar 2023 13:59)        MEDICATIONS  STANDING MEDICATIONS  aspirin  chewable 81 milliGRAM(s) Oral daily  atorvastatin 80 milliGRAM(s) Oral at bedtime  budesonide  80 MICROgram(s)/formoterol 4.5 MICROgram(s) Inhaler 2 Puff(s) Inhalation two times a day  buPROPion XL (24-Hour) . 300 milliGRAM(s) Oral daily  cefpodoxime 200 milliGRAM(s) Oral every 12 hours  chlorhexidine 2% Cloths 1 Application(s) Topical <User Schedule>  clonazePAM  Tablet 1 milliGRAM(s) Oral <User Schedule>  enoxaparin Injectable 30 milliGRAM(s) SubCutaneous every 12 hours  escitalopram 5 milliGRAM(s) Oral daily  influenza   Vaccine 0.5 milliLiter(s) IntraMuscular once  lactobacillus acidophilus 1 Tablet(s) Oral daily  levothyroxine 125 MICROGram(s) Oral daily  losartan 25 milliGRAM(s) Oral daily  melatonin 5 milliGRAM(s) Oral at bedtime  metoprolol succinate  milliGRAM(s) Oral daily  metroNIDAZOLE    Tablet 500 milliGRAM(s) Oral every 8 hours  montelukast 10 milliGRAM(s) Oral daily  pantoprazole    Tablet 40 milliGRAM(s) Oral two times a day  saccharomyces boulardii 250 milliGRAM(s) Oral two times a day  sodium chloride 0.65% Nasal 1 Spray(s) Nasal three times a day  ticagrelor 90 milliGRAM(s) Oral every 12 hours  tiotropium 2.5 MICROgram(s) Inhaler 2 Puff(s) Inhalation daily    PRN MEDICATIONS  acetaminophen     Tablet .. 975 milliGRAM(s) Oral every 6 hours PRN  albuterol    90 MICROgram(s) HFA Inhaler 2 Puff(s) Inhalation every 6 hours PRN  baclofen 5 milliGRAM(s) Oral every 12 hours PRN  HYDROmorphone   Tablet 1 milliGRAM(s) Oral every 8 hours PRN  hydrOXYzine hydrochloride 25 milliGRAM(s) Oral three times a day PRN    VITALS:  T(F): 97  HR: 66  BP: 122/72  RR: 18  SpO2: --    <<<<<LABS>>>>>                        9.6    7.40  )-----------( 344      ( 08 Apr 2023 07:11 )             30.7     04-08    141  |  105  |  11  ----------------------------<  117<H>  4.4   |  26  |  1.0    Ca    9.1      08 Apr 2023 07:11  Mg     1.9     04-08    TPro  5.9<L>  /  Alb  3.5  /  TBili  <0.2  /  DBili  x   /  AST  18  /  ALT  17  /  AlkPhos  82  04-08            [id number]        <<<<<RADIOLOGY>>>>>    < from: CT Head No Cont (04.06.23 @ 11:21) >  PROCEDURE DATE:  04/06/2023          INTERPRETATION:  CLINICAL INDICATION: Rule out CVA.    Technique: CT of the head was performed without contrast.    Multiple contiguous axial images were acquired from the skullbase to the   vertex without the administration of intravenous contrast.  Coronal and   sagittal reformations were made.    COMPARISON:  prior head CT dated 3/27/2022.    FINDINGS:    The ventricles and sulci are unremarkable in appearance.     There is no intraparenchymal hematoma, mass effect or midline shift. No   abnormal extra-axial fluid collections are present.    The calvarium is intact. The visualized intraorbital compartments,   paranasal sinuses and mastoid complexes appear free of acute disease.    IMPRESSION:  No acute intracranial pathology. No evidence of midline shift, mass   effect or intracranial hemorrhage.    < end of copied text >      <<<<<PHYSICAL EXAM>>>>>  GENERAL: NAD, well-groomed, well-developed  NERVOUS SYSTEM:  AAOX3, Good concentration; Motor Strength 5/5 B/L upper and lower extremities; DTRs 2+ intact and symmetric  CHEST/LUNG: Clear to auscultation bilaterally; No rales, rhonchi, wheezing, or rubs  HEART: Regular rate and rhythm; No murmurs, rubs, or gallops  ABDOMEN: Soft, Nontender, Nondistended    -----------------------------------------------------------------------------------------------------------------------------------------------------------------------------------------------

## 2023-04-08 NOTE — PROGRESS NOTE ADULT - ASSESSMENT
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 Episode happened while patient was driving and she continued to drive normally. She reports associated pain behind the eyes. Resolved and did not happen again. In the ER patient also complaining of LLQ pain worse when attempting to pass gas.       Transient Blurry vision 2/2 suspect TIA   Carotid Artery Stenosis  -Patient presented with transient blurry vision lasted for 15 minutes, no other weakness.   -Neuro exam is non-focal.   -Severe high-grade stenosis of the proximal right internal carotid artery,  -Moderate left ICA stenosis, right P3, and b/l cavernous ICA stenosis.  -Brain MRI 3/28 was Unremarkable, no acute infarct.   -CTA Neck 3/27 Severe high-grade stenosis of the proximal right ICA. Moderate stenosis of the proximal left ICA.   -CTA HEAD:  Moderate stenosis of the cavernous segments of the bilateral internal . carotid arteries. Severe narrowing of the P3 segment of the right posterior cerebral artery.  -s/p Rt carotid stent and Angioplasty  -Continue ASA, ticagrilor and Lipitor  - CTH negative    Pulmonary nodule  - 1.2cm pulmonary nodule found on  CT angio neck  - 1.3cm adrenal nodule found on CTAP  - as per pulm fellow, dedicated CT noncont ordered    Acute sigmoid diverticulitis   -CT abd: Acute diverticulitis of a short segment of the sigmoid colon. No abscess or perforation. Further evaluation with nonemergent colonoscopy may be of benefit when patient is clinically able.  -Continue Vantin 200mg mg q12h and Flagyl 500 mg q8h till 4/10  -GI recommended colonoscopy outpatient after 6-8 weeks.  - c/w probiotics    Hypertension  - continue with toprol    Hypothyroidism  -continue synthroid    COPD  -stable, Continue Budesonide, Spiriva and Montelukast.      Depression  -Continue Lexapro and Clonazepam.     DVT prophylaxis: Lovenox SC  GI ppx: protonix     Full code

## 2023-04-08 NOTE — PROGRESS NOTE ADULT - ASSESSMENT
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 Episode happened while patient was driving and she continued to drive normally. She reports associated pain behind the eyes. Resolved and did not happen again. In the ER patient also complaining of LLQ pain worse when attempting to pass gas.       Transient Blurry vision: Suspect TIA.   Carotid Artery Stenosis:   Patient presented with transient blurry vision lasted for 15 minutes, no other weakness.   Neuro exam is non-focal.   Severe high-grade stenosis of the proximal right internal carotid artery,  Moderate left ICA stenosis, right P3, and b/l cavernous ICA stenosis.  Brain MRI 3/28 was Unremarkable, no acute infarct.   CTA Neck 3/27 Severe high-grade stenosis of the proximal right ICA. Moderate stenosis of the proximal left ICA.   CTA HEAD:  Moderate stenosis of the cavernous segments of the bilateral internal . carotid arteries. Severe narrowing of the P3 segment of the right posterior cerebral artery.  s/p Rt carotid stent and Angioplasty  Continue ASA, ticagrilor and Lipitor.   Neck pain better  walking  Repeat CT head negative for acute abnormality    Acute sigmoid diverticulitis   CT abd: Acute diverticulitis of a short segment of the sigmoid colon. No abscess or perforation. Further evaluation with nonemergent colonoscopy may be of benefit when patient is clinically able.  Continue Vantin 200mg mg q12h and Flafyl 500 mg q8h till 4/10  GI recommended colonoscopy outpatient after 6-8 weeks.  Diarrhea better  tolerating diet    Hypertension: metoprolol  Hypothyroidism: synthroid  COPD- stable, Continue Budesonide, Spiriva and Montelukast.      Depression: Continue Lexapro and Clonazepam.     Pulmonary nodule on CT  Pulmonary eval done recommended CT chest for further eval    Adrenal nodule 1.3 cm stable  out pt f/u with endo    DVT prophylaxis: Lovenox SC.      Discussed with Pt her CT scan findings

## 2023-04-08 NOTE — PROGRESS NOTE ADULT - SUBJECTIVE AND OBJECTIVE BOX
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 Episode happened while patient was driving and she continued to drive normally. She reports associated pain behind the eyes. Resolved and did not happen again. In the ER patient also complaining of LLQ pain worse when attempting to pass gas.   feels better  diarrhea better  no abd pain    REVIEW OF SYSTEMS:  CONSTITUTIONAL: No fever  EYES: No eye pain, visual disturbances, or discharge  ENMT:  No difficulty hearing, tinnitus, vertigo; No sinus or throat pain  NECK: No pain or stiffness  BREASTS: No pain, masses, or nipple discharge  RESPIRATORY: No cough, wheezing, chills or hemoptysis; No shortness of breath  CARDIOVASCULAR: No chest pain, palpitations, dizziness, or leg swelling  GASTROINTESTINAL: diarrhea  GENITOURINARY: No dysuria, frequency, hematuria, or incontinence  NEUROLOGICAL: No headaches, memory loss, loss of strength, numbness, or tremors  SKIN: No itching, burning, rashes, or lesions   LYMPH NODES: No enlarged glands  ENDOCRINE: No heat or cold intolerance; No hair loss  MUSCULOSKELETAL: No joint pain or swelling; No muscle, back, or extremity pain  PSYCHIATRIC: No depression, anxiety, mood swings, or difficulty sleeping  HEME/LYMPH: No easy bruising, or bleeding gums  ALLERY AND IMMUNOLOGIC: No hives or eczema    VITALS:  T(F): 97, Max: 97 (04-08-23 @ 04:33)  HR: 66  BP: 122/72  RR: 18  SpO2: --    PHYSICAL EXAM:  GENERAL: NAD  HEAD:  Atraumatic, Normocephalic  EYES: EOMI, PERRLA, conjunctiva and sclera clear  ENMT: No tonsillar erythema, exudates, or enlargement; Moist mucous membranes, Good dentition, No lesions  NECK: Supple, No JVD, Normal thyroid  NERVOUS SYSTEM:  Alert & Oriented X3  CHEST/LUNG: decrease breath sounds at bases  HEART: Regular rate and rhythm; No murmurs, rubs, or gallops  ABDOMEN: Soft, Nontender, Nondistended; Bowel sounds present  EXTREMITIES:  no edema  LYMPH: No lymphadenopathy noted  SKIN: No rashes or lesions      LABS:    04-08    141  |  105  |  11  ----------------------------<  117<H>  4.4   |  26  |  1.0    Ca    9.1      08 Apr 2023 07:11  Mg     1.9     04-08    TPro  5.9<L>  /  Alb  3.5  /  TBili  <0.2  /  DBili  x   /  AST  18  /  ALT  17  /  AlkPhos  82  04-08                          9.6    7.40  )-----------( 344      ( 08 Apr 2023 07:11 )             30.7           RADIOLOGY & ADDITIONAL TESTS:    Imaging Personally Reviewed:  [ ] YES  [ ] NO    MEDICATIONS:     MEDICATIONS  (STANDING):  aspirin  chewable 81 milliGRAM(s) Oral daily  atorvastatin 80 milliGRAM(s) Oral at bedtime  budesonide  80 MICROgram(s)/formoterol 4.5 MICROgram(s) Inhaler 2 Puff(s) Inhalation two times a day  buPROPion XL (24-Hour) . 300 milliGRAM(s) Oral daily  cefpodoxime 200 milliGRAM(s) Oral every 12 hours  chlorhexidine 2% Cloths 1 Application(s) Topical <User Schedule>  clonazePAM  Tablet 1 milliGRAM(s) Oral <User Schedule>  enoxaparin Injectable 30 milliGRAM(s) SubCutaneous every 12 hours  escitalopram 5 milliGRAM(s) Oral daily  influenza   Vaccine 0.5 milliLiter(s) IntraMuscular once  lactobacillus acidophilus 1 Tablet(s) Oral daily  levothyroxine 125 MICROGram(s) Oral daily  losartan 25 milliGRAM(s) Oral daily  melatonin 5 milliGRAM(s) Oral at bedtime  metoprolol succinate  milliGRAM(s) Oral daily  metroNIDAZOLE    Tablet 500 milliGRAM(s) Oral every 8 hours  montelukast 10 milliGRAM(s) Oral daily  pantoprazole    Tablet 40 milliGRAM(s) Oral two times a day  saccharomyces boulardii 250 milliGRAM(s) Oral two times a day  sodium chloride 0.65% Nasal 1 Spray(s) Nasal three times a day  ticagrelor 90 milliGRAM(s) Oral every 12 hours  tiotropium 2.5 MICROgram(s) Inhaler 2 Puff(s) Inhalation daily    MEDICATIONS  (PRN):  acetaminophen     Tablet .. 975 milliGRAM(s) Oral every 6 hours PRN Temp greater or equal to 38C (100.4F), Mild Pain (1 - 3), Moderate Pain (4 - 6)  albuterol    90 MICROgram(s) HFA Inhaler 2 Puff(s) Inhalation every 6 hours PRN Shortness of Breath and/or Wheezing  baclofen 5 milliGRAM(s) Oral every 12 hours PRN Musculoskeletal Pain  HYDROmorphone   Tablet 1 milliGRAM(s) Oral every 8 hours PRN Severe Pain (7 - 10)  hydrOXYzine hydrochloride 25 milliGRAM(s) Oral three times a day PRN Anxiety

## 2023-04-09 LAB
ALBUMIN SERPL ELPH-MCNC: 3.7 G/DL — SIGNIFICANT CHANGE UP (ref 3.5–5.2)
ALP SERPL-CCNC: 89 U/L — SIGNIFICANT CHANGE UP (ref 30–115)
ALT FLD-CCNC: 19 U/L — SIGNIFICANT CHANGE UP (ref 0–41)
ANION GAP SERPL CALC-SCNC: 15 MMOL/L — HIGH (ref 7–14)
AST SERPL-CCNC: 16 U/L — SIGNIFICANT CHANGE UP (ref 0–41)
BASOPHILS # BLD AUTO: 0.04 K/UL — SIGNIFICANT CHANGE UP (ref 0–0.2)
BASOPHILS NFR BLD AUTO: 0.4 % — SIGNIFICANT CHANGE UP (ref 0–1)
BILIRUB SERPL-MCNC: <0.2 MG/DL — SIGNIFICANT CHANGE UP (ref 0.2–1.2)
BLD GP AB SCN SERPL QL: SIGNIFICANT CHANGE UP
BUN SERPL-MCNC: 16 MG/DL — SIGNIFICANT CHANGE UP (ref 10–20)
CALCIUM SERPL-MCNC: 9.3 MG/DL — SIGNIFICANT CHANGE UP (ref 8.4–10.5)
CHLORIDE SERPL-SCNC: 100 MMOL/L — SIGNIFICANT CHANGE UP (ref 98–110)
CO2 SERPL-SCNC: 22 MMOL/L — SIGNIFICANT CHANGE UP (ref 17–32)
CREAT SERPL-MCNC: 1.1 MG/DL — SIGNIFICANT CHANGE UP (ref 0.7–1.5)
EGFR: 58 ML/MIN/1.73M2 — LOW
EOSINOPHIL # BLD AUTO: 0.44 K/UL — SIGNIFICANT CHANGE UP (ref 0–0.7)
EOSINOPHIL NFR BLD AUTO: 4.5 % — SIGNIFICANT CHANGE UP (ref 0–8)
GLUCOSE SERPL-MCNC: 102 MG/DL — HIGH (ref 70–99)
HCT VFR BLD CALC: 33.5 % — LOW (ref 37–47)
HGB BLD-MCNC: 10.5 G/DL — LOW (ref 12–16)
IMM GRANULOCYTES NFR BLD AUTO: 0.4 % — HIGH (ref 0.1–0.3)
LYMPHOCYTES # BLD AUTO: 1.96 K/UL — SIGNIFICANT CHANGE UP (ref 1.2–3.4)
LYMPHOCYTES # BLD AUTO: 20 % — LOW (ref 20.5–51.1)
MAGNESIUM SERPL-MCNC: 1.7 MG/DL — LOW (ref 1.8–2.4)
MCHC RBC-ENTMCNC: 25.7 PG — LOW (ref 27–31)
MCHC RBC-ENTMCNC: 31.3 G/DL — LOW (ref 32–37)
MCV RBC AUTO: 82.1 FL — SIGNIFICANT CHANGE UP (ref 81–99)
MONOCYTES # BLD AUTO: 0.8 K/UL — HIGH (ref 0.1–0.6)
MONOCYTES NFR BLD AUTO: 8.2 % — SIGNIFICANT CHANGE UP (ref 1.7–9.3)
NEUTROPHILS # BLD AUTO: 6.5 K/UL — SIGNIFICANT CHANGE UP (ref 1.4–6.5)
NEUTROPHILS NFR BLD AUTO: 66.5 % — SIGNIFICANT CHANGE UP (ref 42.2–75.2)
NRBC # BLD: 0 /100 WBCS — SIGNIFICANT CHANGE UP (ref 0–0)
PLATELET # BLD AUTO: 395 K/UL — SIGNIFICANT CHANGE UP (ref 130–400)
POTASSIUM SERPL-MCNC: 4.3 MMOL/L — SIGNIFICANT CHANGE UP (ref 3.5–5)
POTASSIUM SERPL-SCNC: 4.3 MMOL/L — SIGNIFICANT CHANGE UP (ref 3.5–5)
PROT SERPL-MCNC: 6.5 G/DL — SIGNIFICANT CHANGE UP (ref 6–8)
RBC # BLD: 4.08 M/UL — LOW (ref 4.2–5.4)
RBC # FLD: 16.6 % — HIGH (ref 11.5–14.5)
SODIUM SERPL-SCNC: 137 MMOL/L — SIGNIFICANT CHANGE UP (ref 135–146)
WBC # BLD: 9.78 K/UL — SIGNIFICANT CHANGE UP (ref 4.8–10.8)
WBC # FLD AUTO: 9.78 K/UL — SIGNIFICANT CHANGE UP (ref 4.8–10.8)

## 2023-04-09 PROCEDURE — 99232 SBSQ HOSP IP/OBS MODERATE 35: CPT

## 2023-04-09 RX ADMIN — ENOXAPARIN SODIUM 30 MILLIGRAM(S): 100 INJECTION SUBCUTANEOUS at 17:18

## 2023-04-09 RX ADMIN — TICAGRELOR 90 MILLIGRAM(S): 90 TABLET ORAL at 05:58

## 2023-04-09 RX ADMIN — MONTELUKAST 10 MILLIGRAM(S): 4 TABLET, CHEWABLE ORAL at 11:34

## 2023-04-09 RX ADMIN — Medication 500 MILLIGRAM(S): at 22:12

## 2023-04-09 RX ADMIN — Medication 1 TABLET(S): at 11:33

## 2023-04-09 RX ADMIN — HYDROMORPHONE HYDROCHLORIDE 1 MILLIGRAM(S): 2 INJECTION INTRAMUSCULAR; INTRAVENOUS; SUBCUTANEOUS at 10:30

## 2023-04-09 RX ADMIN — TICAGRELOR 90 MILLIGRAM(S): 90 TABLET ORAL at 17:17

## 2023-04-09 RX ADMIN — Medication 1 MILLIGRAM(S): at 11:32

## 2023-04-09 RX ADMIN — Medication 500 MILLIGRAM(S): at 13:15

## 2023-04-09 RX ADMIN — HYDROMORPHONE HYDROCHLORIDE 1 MILLIGRAM(S): 2 INJECTION INTRAMUSCULAR; INTRAVENOUS; SUBCUTANEOUS at 22:12

## 2023-04-09 RX ADMIN — Medication 1 SPRAY(S): at 13:16

## 2023-04-09 RX ADMIN — CHLORHEXIDINE GLUCONATE 1 APPLICATION(S): 213 SOLUTION TOPICAL at 05:54

## 2023-04-09 RX ADMIN — Medication 5 MILLIGRAM(S): at 22:12

## 2023-04-09 RX ADMIN — Medication 125 MICROGRAM(S): at 05:55

## 2023-04-09 RX ADMIN — Medication 975 MILLIGRAM(S): at 20:06

## 2023-04-09 RX ADMIN — Medication 100 MILLIGRAM(S): at 05:56

## 2023-04-09 RX ADMIN — Medication 1 SPRAY(S): at 22:15

## 2023-04-09 RX ADMIN — Medication 500 MILLIGRAM(S): at 05:57

## 2023-04-09 RX ADMIN — HYDROMORPHONE HYDROCHLORIDE 1 MILLIGRAM(S): 2 INJECTION INTRAMUSCULAR; INTRAVENOUS; SUBCUTANEOUS at 09:27

## 2023-04-09 RX ADMIN — Medication 5 MILLIGRAM(S): at 17:22

## 2023-04-09 RX ADMIN — LOSARTAN POTASSIUM 25 MILLIGRAM(S): 100 TABLET, FILM COATED ORAL at 05:56

## 2023-04-09 RX ADMIN — PANTOPRAZOLE SODIUM 40 MILLIGRAM(S): 20 TABLET, DELAYED RELEASE ORAL at 17:17

## 2023-04-09 RX ADMIN — Medication 1 SPRAY(S): at 05:58

## 2023-04-09 RX ADMIN — Medication 1 MILLIGRAM(S): at 22:12

## 2023-04-09 RX ADMIN — Medication 250 MILLIGRAM(S): at 17:17

## 2023-04-09 RX ADMIN — Medication 81 MILLIGRAM(S): at 11:33

## 2023-04-09 RX ADMIN — Medication 200 MILLIGRAM(S): at 17:17

## 2023-04-09 RX ADMIN — HYDROMORPHONE HYDROCHLORIDE 1 MILLIGRAM(S): 2 INJECTION INTRAMUSCULAR; INTRAVENOUS; SUBCUTANEOUS at 00:03

## 2023-04-09 RX ADMIN — Medication 250 MILLIGRAM(S): at 05:58

## 2023-04-09 RX ADMIN — HYDROMORPHONE HYDROCHLORIDE 1 MILLIGRAM(S): 2 INJECTION INTRAMUSCULAR; INTRAVENOUS; SUBCUTANEOUS at 23:00

## 2023-04-09 RX ADMIN — Medication 200 MILLIGRAM(S): at 05:54

## 2023-04-09 RX ADMIN — Medication 650 MILLIGRAM(S): at 21:00

## 2023-04-09 RX ADMIN — Medication 975 MILLIGRAM(S): at 22:05

## 2023-04-09 RX ADMIN — PANTOPRAZOLE SODIUM 40 MILLIGRAM(S): 20 TABLET, DELAYED RELEASE ORAL at 05:57

## 2023-04-09 RX ADMIN — BUPROPION HYDROCHLORIDE 300 MILLIGRAM(S): 150 TABLET, EXTENDED RELEASE ORAL at 11:33

## 2023-04-09 RX ADMIN — ATORVASTATIN CALCIUM 80 MILLIGRAM(S): 80 TABLET, FILM COATED ORAL at 22:12

## 2023-04-09 RX ADMIN — HYDROMORPHONE HYDROCHLORIDE 1 MILLIGRAM(S): 2 INJECTION INTRAMUSCULAR; INTRAVENOUS; SUBCUTANEOUS at 00:04

## 2023-04-09 RX ADMIN — ESCITALOPRAM OXALATE 5 MILLIGRAM(S): 10 TABLET, FILM COATED ORAL at 11:33

## 2023-04-09 NOTE — PROGRESS NOTE ADULT - SUBJECTIVE AND OBJECTIVE BOX
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 Episode happened while patient was driving and she continued to drive normally. She reports associated pain behind the eyes. Resolved and did not happen again. In the ER patient also complaining of LLQ pain worse when attempting to pass gas.   diarrhea improved  tolaerting diet        REVIEW OF SYSTEMS:  CONSTITUTIONAL: No fever, weight loss, or fatigue  EYES: No eye pain, visual disturbances, or discharge  ENMT:  No difficulty hearing, tinnitus, vertigo; No sinus or throat pain  NECK: No pain or stiffness  BREASTS: No pain, masses, or nipple discharge  RESPIRATORY: No cough, wheezing, chills or hemoptysis; No shortness of breath  CARDIOVASCULAR: No chest pain, palpitations, dizziness, or leg swelling  GASTROINTESTINAL: No abdominal or epigastric pain.  GENITOURINARY: No dysuria, frequency, hematuria, or incontinence  NEUROLOGICAL: No headaches, memory loss, loss of strength, numbness, or tremors  SKIN: No itching, burning, rashes, or lesions   LYMPH NODES: No enlarged glands  ENDOCRINE: No heat or cold intolerance; No hair loss  MUSCULOSKELETAL: No joint pain or swelling; No muscle, back, or extremity pain  PSYCHIATRIC: No depression, anxiety, mood swings, or difficulty sleeping  HEME/LYMPH: No easy bruising, or bleeding gums  ALLERY AND IMMUNOLOGIC: No hives or eczema    VITALS:  T(F): 98.5, Max: 98.5 (04-09-23 @ 05:50)  HR: 79  BP: 134/60  RR: 18  SpO2: --    PHYSICAL EXAM:  GENERAL: NAD  HEAD:  Atraumatic, Normocephalic  EYES: EOMI, PERRLA, conjunctiva and sclera clear  ENMT: No tonsillar erythema, exudates, or enlargement; Moist mucous membranes, Good dentition, No lesions  NECK: Supple, No JVD, Normal thyroid  NERVOUS SYSTEM:  Alert & Oriented X3,   CHEST/LUNG: decrease breath sounds at bases  HEART: Regular rate and rhythm; No murmurs, rubs, or gallops  ABDOMEN: Soft, Nontender, Nondistended; Bowel sounds present  EXTREMITIES:  trace edma  LYMPH: No lymphadenopathy noted  SKIN: No rashes or lesions      LABS:    04-09    137  |  100  |  16  ----------------------------<  102<H>  4.3   |  22  |  1.1    Ca    9.3      09 Apr 2023 06:17  Mg     1.7     04-09    TPro  6.5  /  Alb  3.7  /  TBili  <0.2  /  DBili  x   /  AST  16  /  ALT  19  /  AlkPhos  89  04-09                          10.5   9.78  )-----------( 395      ( 09 Apr 2023 06:17 )             33.5           RADIOLOGY & ADDITIONAL TESTS:    Imaging Personally Reviewed:  [ ] YES  [ ] NO    MEDICATIONS:     MEDICATIONS  (STANDING):  aspirin  chewable 81 milliGRAM(s) Oral daily  atorvastatin 80 milliGRAM(s) Oral at bedtime  budesonide  80 MICROgram(s)/formoterol 4.5 MICROgram(s) Inhaler 2 Puff(s) Inhalation two times a day  buPROPion XL (24-Hour) . 300 milliGRAM(s) Oral daily  cefpodoxime 200 milliGRAM(s) Oral every 12 hours  chlorhexidine 2% Cloths 1 Application(s) Topical <User Schedule>  clonazePAM  Tablet 1 milliGRAM(s) Oral <User Schedule>  enoxaparin Injectable 30 milliGRAM(s) SubCutaneous every 12 hours  escitalopram 5 milliGRAM(s) Oral daily  influenza   Vaccine 0.5 milliLiter(s) IntraMuscular once  lactobacillus acidophilus 1 Tablet(s) Oral daily  levothyroxine 125 MICROGram(s) Oral daily  losartan 25 milliGRAM(s) Oral daily  melatonin 5 milliGRAM(s) Oral at bedtime  metoprolol succinate  milliGRAM(s) Oral daily  metroNIDAZOLE    Tablet 500 milliGRAM(s) Oral every 8 hours  montelukast 10 milliGRAM(s) Oral daily  pantoprazole    Tablet 40 milliGRAM(s) Oral two times a day  saccharomyces boulardii 250 milliGRAM(s) Oral two times a day  sodium chloride 0.65% Nasal 1 Spray(s) Nasal three times a day  ticagrelor 90 milliGRAM(s) Oral every 12 hours  tiotropium 2.5 MICROgram(s) Inhaler 2 Puff(s) Inhalation daily    MEDICATIONS  (PRN):  acetaminophen     Tablet .. 975 milliGRAM(s) Oral every 6 hours PRN Temp greater or equal to 38C (100.4F), Mild Pain (1 - 3), Moderate Pain (4 - 6)  albuterol    90 MICROgram(s) HFA Inhaler 2 Puff(s) Inhalation every 6 hours PRN Shortness of Breath and/or Wheezing  baclofen 5 milliGRAM(s) Oral every 12 hours PRN Musculoskeletal Pain  HYDROmorphone   Tablet 1 milliGRAM(s) Oral every 8 hours PRN Severe Pain (7 - 10)  hydrOXYzine hydrochloride 25 milliGRAM(s) Oral three times a day PRN Anxiety

## 2023-04-09 NOTE — PROGRESS NOTE ADULT - ASSESSMENT
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 Episode happened while patient was driving and she continued to drive normally. She reports associated pain behind the eyes. Resolved and did not happen again. In the ER patient also complaining of LLQ pain worse when attempting to pass gas.       Transient Blurry vision: Suspect TIA.   Carotid Artery Stenosis:   Patient presented with transient blurry vision lasted for 15 minutes, no other weakness.   Neuro exam is non-focal.   Severe high-grade stenosis of the proximal right internal carotid artery,  Moderate left ICA stenosis, right P3, and b/l cavernous ICA stenosis.  Brain MRI 3/28 was Unremarkable, no acute infarct.   CTA Neck 3/27 Severe high-grade stenosis of the proximal right ICA. Moderate stenosis of the proximal left ICA.   CTA HEAD:  Moderate stenosis of the cavernous segments of the bilateral internal . carotid arteries. Severe narrowing of the P3 segment of the right posterior cerebral artery.  s/p Rt carotid stent and Angioplasty  Continue ASA, ticagrilor and Lipitor.   Neck pain better  walking  Repeat CT head negative for acute abnormality    Acute sigmoid diverticulitis   CT abd: Acute diverticulitis of a short segment of the sigmoid colon. No abscess or perforation. Further evaluation with nonemergent colonoscopy may be of benefit when patient is clinically able.  Continue Vantin 200mg mg q12h and Flafyl 500 mg q8h till 4/10  GI recommended colonoscopy outpatient after 6-8 weeks.  Diarrhea better  tolerating diet    Hypertension: metoprolol  Hypothyroidism: synthroid  COPD- stable, Continue Budesonide, Spiriva and Montelukast.      Depression: Continue Lexapro and Clonazepam.     Pulmonary nodule on CT  Pulmonary eval done recommended CT chest for further eval  Ct done result pending    Adrenal nodule 1.3 cm stable  out pt f/u with endo    DVT prophylaxis: Lovenox SC.      Discussed with Pt her CT scan findings

## 2023-04-10 ENCOUNTER — TRANSCRIPTION ENCOUNTER (OUTPATIENT)
Age: 58
End: 2023-04-10

## 2023-04-10 VITALS — HEART RATE: 71 BPM | SYSTOLIC BLOOD PRESSURE: 124 MMHG | DIASTOLIC BLOOD PRESSURE: 58 MMHG

## 2023-04-10 LAB
ALBUMIN SERPL ELPH-MCNC: 3.5 G/DL — SIGNIFICANT CHANGE UP (ref 3.5–5.2)
ALP SERPL-CCNC: 81 U/L — SIGNIFICANT CHANGE UP (ref 30–115)
ALT FLD-CCNC: 13 U/L — SIGNIFICANT CHANGE UP (ref 0–41)
ANION GAP SERPL CALC-SCNC: 12 MMOL/L — SIGNIFICANT CHANGE UP (ref 7–14)
AST SERPL-CCNC: 11 U/L — SIGNIFICANT CHANGE UP (ref 0–41)
BASOPHILS # BLD AUTO: 0.05 K/UL — SIGNIFICANT CHANGE UP (ref 0–0.2)
BASOPHILS NFR BLD AUTO: 0.7 % — SIGNIFICANT CHANGE UP (ref 0–1)
BILIRUB SERPL-MCNC: <0.2 MG/DL — SIGNIFICANT CHANGE UP (ref 0.2–1.2)
BUN SERPL-MCNC: 18 MG/DL — SIGNIFICANT CHANGE UP (ref 10–20)
CALCIUM SERPL-MCNC: 9.2 MG/DL — SIGNIFICANT CHANGE UP (ref 8.4–10.5)
CHLORIDE SERPL-SCNC: 104 MMOL/L — SIGNIFICANT CHANGE UP (ref 98–110)
CO2 SERPL-SCNC: 24 MMOL/L — SIGNIFICANT CHANGE UP (ref 17–32)
CREAT SERPL-MCNC: 1 MG/DL — SIGNIFICANT CHANGE UP (ref 0.7–1.5)
EGFR: 65 ML/MIN/1.73M2 — SIGNIFICANT CHANGE UP
EOSINOPHIL # BLD AUTO: 0.33 K/UL — SIGNIFICANT CHANGE UP (ref 0–0.7)
EOSINOPHIL NFR BLD AUTO: 4.5 % — SIGNIFICANT CHANGE UP (ref 0–8)
GLUCOSE SERPL-MCNC: 136 MG/DL — HIGH (ref 70–99)
HCT VFR BLD CALC: 31.6 % — LOW (ref 37–47)
HGB BLD-MCNC: 9.7 G/DL — LOW (ref 12–16)
IMM GRANULOCYTES NFR BLD AUTO: 0.4 % — HIGH (ref 0.1–0.3)
LYMPHOCYTES # BLD AUTO: 1.64 K/UL — SIGNIFICANT CHANGE UP (ref 1.2–3.4)
LYMPHOCYTES # BLD AUTO: 22.3 % — SIGNIFICANT CHANGE UP (ref 20.5–51.1)
MAGNESIUM SERPL-MCNC: 1.8 MG/DL — SIGNIFICANT CHANGE UP (ref 1.8–2.4)
MCHC RBC-ENTMCNC: 25.3 PG — LOW (ref 27–31)
MCHC RBC-ENTMCNC: 30.7 G/DL — LOW (ref 32–37)
MCV RBC AUTO: 82.3 FL — SIGNIFICANT CHANGE UP (ref 81–99)
MONOCYTES # BLD AUTO: 0.7 K/UL — HIGH (ref 0.1–0.6)
MONOCYTES NFR BLD AUTO: 9.5 % — HIGH (ref 1.7–9.3)
NEUTROPHILS # BLD AUTO: 4.59 K/UL — SIGNIFICANT CHANGE UP (ref 1.4–6.5)
NEUTROPHILS NFR BLD AUTO: 62.6 % — SIGNIFICANT CHANGE UP (ref 42.2–75.2)
NRBC # BLD: 0 /100 WBCS — SIGNIFICANT CHANGE UP (ref 0–0)
PLATELET # BLD AUTO: 373 K/UL — SIGNIFICANT CHANGE UP (ref 130–400)
POTASSIUM SERPL-MCNC: 4.5 MMOL/L — SIGNIFICANT CHANGE UP (ref 3.5–5)
POTASSIUM SERPL-SCNC: 4.5 MMOL/L — SIGNIFICANT CHANGE UP (ref 3.5–5)
PROT SERPL-MCNC: 6 G/DL — SIGNIFICANT CHANGE UP (ref 6–8)
RBC # BLD: 3.84 M/UL — LOW (ref 4.2–5.4)
RBC # FLD: 16.8 % — HIGH (ref 11.5–14.5)
SODIUM SERPL-SCNC: 140 MMOL/L — SIGNIFICANT CHANGE UP (ref 135–146)
WBC # BLD: 7.34 K/UL — SIGNIFICANT CHANGE UP (ref 4.8–10.8)
WBC # FLD AUTO: 7.34 K/UL — SIGNIFICANT CHANGE UP (ref 4.8–10.8)

## 2023-04-10 PROCEDURE — 99231 SBSQ HOSP IP/OBS SF/LOW 25: CPT

## 2023-04-10 RX ADMIN — Medication 500 MILLIGRAM(S): at 15:44

## 2023-04-10 RX ADMIN — TICAGRELOR 90 MILLIGRAM(S): 90 TABLET ORAL at 05:30

## 2023-04-10 RX ADMIN — Medication 125 MICROGRAM(S): at 05:30

## 2023-04-10 RX ADMIN — Medication 1 SPRAY(S): at 05:51

## 2023-04-10 RX ADMIN — HYDROMORPHONE HYDROCHLORIDE 1 MILLIGRAM(S): 2 INJECTION INTRAMUSCULAR; INTRAVENOUS; SUBCUTANEOUS at 18:36

## 2023-04-10 RX ADMIN — TIOTROPIUM BROMIDE 2 PUFF(S): 18 CAPSULE ORAL; RESPIRATORY (INHALATION) at 14:39

## 2023-04-10 RX ADMIN — ESCITALOPRAM OXALATE 5 MILLIGRAM(S): 10 TABLET, FILM COATED ORAL at 12:22

## 2023-04-10 RX ADMIN — Medication 5 MILLIGRAM(S): at 22:46

## 2023-04-10 RX ADMIN — Medication 81 MILLIGRAM(S): at 12:22

## 2023-04-10 RX ADMIN — CHLORHEXIDINE GLUCONATE 1 APPLICATION(S): 213 SOLUTION TOPICAL at 05:32

## 2023-04-10 RX ADMIN — Medication 1 SPRAY(S): at 22:48

## 2023-04-10 RX ADMIN — ENOXAPARIN SODIUM 30 MILLIGRAM(S): 100 INJECTION SUBCUTANEOUS at 05:31

## 2023-04-10 RX ADMIN — MONTELUKAST 10 MILLIGRAM(S): 4 TABLET, CHEWABLE ORAL at 12:22

## 2023-04-10 RX ADMIN — TICAGRELOR 90 MILLIGRAM(S): 90 TABLET ORAL at 18:47

## 2023-04-10 RX ADMIN — HYDROMORPHONE HYDROCHLORIDE 1 MILLIGRAM(S): 2 INJECTION INTRAMUSCULAR; INTRAVENOUS; SUBCUTANEOUS at 22:47

## 2023-04-10 RX ADMIN — BUDESONIDE AND FORMOTEROL FUMARATE DIHYDRATE 2 PUFF(S): 160; 4.5 AEROSOL RESPIRATORY (INHALATION) at 08:19

## 2023-04-10 RX ADMIN — Medication 500 MILLIGRAM(S): at 22:46

## 2023-04-10 RX ADMIN — ATORVASTATIN CALCIUM 80 MILLIGRAM(S): 80 TABLET, FILM COATED ORAL at 22:46

## 2023-04-10 RX ADMIN — PANTOPRAZOLE SODIUM 40 MILLIGRAM(S): 20 TABLET, DELAYED RELEASE ORAL at 05:30

## 2023-04-10 RX ADMIN — Medication 200 MILLIGRAM(S): at 18:47

## 2023-04-10 RX ADMIN — Medication 100 MILLIGRAM(S): at 05:50

## 2023-04-10 RX ADMIN — Medication 1 TABLET(S): at 12:22

## 2023-04-10 RX ADMIN — PANTOPRAZOLE SODIUM 40 MILLIGRAM(S): 20 TABLET, DELAYED RELEASE ORAL at 18:38

## 2023-04-10 RX ADMIN — Medication 1 MILLIGRAM(S): at 12:21

## 2023-04-10 RX ADMIN — Medication 500 MILLIGRAM(S): at 05:30

## 2023-04-10 RX ADMIN — Medication 200 MILLIGRAM(S): at 05:30

## 2023-04-10 RX ADMIN — LOSARTAN POTASSIUM 25 MILLIGRAM(S): 100 TABLET, FILM COATED ORAL at 05:50

## 2023-04-10 RX ADMIN — Medication 1 MILLIGRAM(S): at 22:46

## 2023-04-10 RX ADMIN — BUPROPION HYDROCHLORIDE 300 MILLIGRAM(S): 150 TABLET, EXTENDED RELEASE ORAL at 12:22

## 2023-04-10 RX ADMIN — Medication 250 MILLIGRAM(S): at 18:37

## 2023-04-10 RX ADMIN — Medication 250 MILLIGRAM(S): at 05:30

## 2023-04-10 RX ADMIN — ENOXAPARIN SODIUM 30 MILLIGRAM(S): 100 INJECTION SUBCUTANEOUS at 18:47

## 2023-04-10 NOTE — PROGRESS NOTE ADULT - SUBJECTIVE AND OBJECTIVE BOX
Patient is a 57y old  Female who presents with a chief complaint of B/L Blurry Vision (2023 10:16)      HPI:  HPI: 57 year old morbidly obese female has medical history of 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 , hypothyroidism, Diverticulitis, COPD (not on oxygen), HTN, Depression, Insomnia, and Anxiety presents to ED c/o of sudden onset B/L blurry vision that lasted 15 mins earlier in the morning. Episode happened while patient was driving and she continued to drive normally. She reports associated pain behind the eyes. Resolved and did not happen again. In the ER patient also complaining of LLQ pain worse when attempting to pass gas. Denies any fever, chills, headaches, SOB, CP, NVD, Dysuria, or MSK pain.     In the ER:  Vitals: /58, HR 82, RR 20, SpO2 100 RA, T 98.4 oral  EKst deg av block  Sig Labs: WBC 8.15, Hgb 10.7 at baseline, Cr 1.3  eGFR 47, Mg 1.7, , Trop -ve, UA -ve, COVID -ve   Imaging:  CT head: No acute intracranial pathology.  CTA NECK:  1.  Severe high-grade stenosis of the proximal right internal carotid artery caused by noncalcified plaque.  2.  Moderate stenosis of the proximal left internal carotid artery caused by calcified plaque.  3.  No evidence of vertebral artery stenosis.  CTA HEAD:  1.  Moderate stenosis of the cavernous segments of the bilateral internal carotid arteries  2.  Severe narrowing of the P3 segment of the right posterior cerebral artery.  3.  Otherwise, no evidence of flow-limiting stenosis, occlusion or aneurysm.  Other: Right upper lobe 1.2 cm pulmonary nodule, as well as several groundglass opacities. Consider follow-up CT of the chest in 3-6 months.  CT AP: Acute diverticulitis of a short segment of the sigmoid colon. No abscess or perforation. Further evaluation with nonemergent colonoscopy may be of benefit when patient is clinically able.    In the ER: Given Dilaudid 0.5, LR 1L bolus, Zofran 4mg IV, Magnesium 2g, and Zosyn 3.375 IV (28 Mar 2023 01:30)      PAST MEDICAL & SURGICAL HISTORY:  COPD (chronic obstructive pulmonary disease)      HTN (hypertension)      Diverticulitis      Pulmonary nodules/lesions, multiple      Nasopharyngeal cancer      Hypothyroid      Cervical disc disease  sequelae of radtion for nasopharyngeal cancer      Left ear hearing loss      Hemorrhoids      Anxiety      History of cholecystectomy          SOCIAL HX:   Smoking  EX zenia                            FAMILY HISTORY:  Family history of lung cancer (Father, Mother)    .  No cardiovascular or pulmonary family history     REVIEW OF SYSTEMS:    All ROS are negative exept per HPI       Allergies    ciprofloxacin (Urticaria; Other)    Intolerances          PHYSICAL EXAM  Vital Signs Last 24 Hrs  T(C): 35.6 (10 Apr 2023 05:09), Max: 36.4 (2023 12:20)  T(F): 96 (10 Apr 2023 05:09), Max: 97.6 (2023 12:20)  HR: 66 (10 Apr 2023 05:09) (66 - 71)  BP: 110/61 (10 Apr 2023 05:09) (110/61 - 121/61)  BP(mean): 78 (10 Apr 2023 05:09) (78 - 78)  RR: 18 (10 Apr 2023 05:09) (18 - 18)  SpO2: --        CONSTITUTIONAL:  Well nourished.  NAD    ENT:   Airway patent,   No thrush    EYES:   Clear bilaterally,   pupils equal,   round and reactive to light.    CARDIAC:   Normal rate,   regular rhythm.    no edema      RESPIRATORY:   No wheezing   Normal chest expansion  Not tachypneic,  No use of accessory muscles    GASTROINTESTINAL:  Abdomen soft, non-tender,   No guarding,   Positive BS    MUSCULOSKELETAL:   Range of motion is not limited,  No clubbing, cyanosis    NEUROLOGICAL:   Alert and oriented   No motor deficits.           LABS:                          10.5   9.78  )-----------( 395      ( 2023 06:17 )             33.5                                                   137  |  100  |  16  ----------------------------<  102<H>  4.3   |  22  |  1.1    Ca    9.3      2023 06:17  Mg     1.7         TPro  6.5  /  Alb  3.7  /  TBili  <0.2  /  DBili  x   /  AST  16  /  ALT  19  /  AlkPhos  89                                                                                             LIVER FUNCTIONS - ( 2023 06:17 )  Alb: 3.7 g/dL / Pro: 6.5 g/dL / ALK PHOS: 89 U/L / ALT: 19 U/L / AST: 16 U/L / GGT: x                                                                                                MEDICATIONS  (STANDING):  aspirin  chewable 81 milliGRAM(s) Oral daily  atorvastatin 80 milliGRAM(s) Oral at bedtime  budesonide  80 MICROgram(s)/formoterol 4.5 MICROgram(s) Inhaler 2 Puff(s) Inhalation two times a day  buPROPion XL (24-Hour) . 300 milliGRAM(s) Oral daily  cefpodoxime 200 milliGRAM(s) Oral every 12 hours  chlorhexidine 2% Cloths 1 Application(s) Topical <User Schedule>  clonazePAM  Tablet 1 milliGRAM(s) Oral <User Schedule>  enoxaparin Injectable 30 milliGRAM(s) SubCutaneous every 12 hours  escitalopram 5 milliGRAM(s) Oral daily  influenza   Vaccine 0.5 milliLiter(s) IntraMuscular once  lactobacillus acidophilus 1 Tablet(s) Oral daily  levothyroxine 125 MICROGram(s) Oral daily  losartan 25 milliGRAM(s) Oral daily  melatonin 5 milliGRAM(s) Oral at bedtime  metoprolol succinate  milliGRAM(s) Oral daily  metroNIDAZOLE    Tablet 500 milliGRAM(s) Oral every 8 hours  montelukast 10 milliGRAM(s) Oral daily  pantoprazole    Tablet 40 milliGRAM(s) Oral two times a day  saccharomyces boulardii 250 milliGRAM(s) Oral two times a day  sodium chloride 0.65% Nasal 1 Spray(s) Nasal three times a day  ticagrelor 90 milliGRAM(s) Oral every 12 hours  tiotropium 2.5 MICROgram(s) Inhaler 2 Puff(s) Inhalation daily    MEDICATIONS  (PRN):  acetaminophen     Tablet .. 975 milliGRAM(s) Oral every 6 hours PRN Temp greater or equal to 38C (100.4F), Mild Pain (1 - 3), Moderate Pain (4 - 6)  albuterol    90 MICROgram(s) HFA Inhaler 2 Puff(s) Inhalation every 6 hours PRN Shortness of Breath and/or Wheezing  baclofen 5 milliGRAM(s) Oral every 12 hours PRN Musculoskeletal Pain  HYDROmorphone   Tablet 1 milliGRAM(s) Oral every 8 hours PRN Severe Pain (7 - 10)  hydrOXYzine hydrochloride 25 milliGRAM(s) Oral three times a day PRN Anxiety      X-Rays reviewed:    CXR interpreted by me:

## 2023-04-10 NOTE — PROGRESS NOTE ADULT - ASSESSMENT
Impression  7 mm Pulmonary nodule on Abdominal & 1.2 cm on CTA RUL   1.3 cm adrenal nodule  Ex smoker    Recommendations  Dedicated CT chest reviewed. No SPN  Out pt pulmonary & Hem/onc  f/u  DVT PPX      Recall As needed

## 2023-04-10 NOTE — DISCHARGE NOTE NURSING/CASE MANAGEMENT/SOCIAL WORK - NSDCPEFALRISK_GEN_ALL_CORE
For information on Fall & Injury Prevention, visit: [email or web address] OR  [email or web address] OR  https://www.cdc.gov/steadi/patient.html

## 2023-04-10 NOTE — PROGRESS NOTE ADULT - REASON FOR ADMISSION
B/L Blurry Vision

## 2023-04-10 NOTE — DISCHARGE NOTE NURSING/CASE MANAGEMENT/SOCIAL WORK - PATIENT PORTAL LINK FT
You can access the FollowMyHealth Patient Portal offered by [facility] by registering at the following website: [email or web address]. By joining [employer]’s FollowMyHealth portal, you will also be able to view your health information using other applications (apps) compatible with our system.

## 2023-04-10 NOTE — PROGRESS NOTE ADULT - PROVIDER SPECIALTY LIST ADULT
Hospitalist
Neurology
Neurology
Gastroenterology
Hospitalist
Hospitalist
Internal Medicine
NSICU
Neurology
Gastroenterology
Hospitalist
Internal Medicine
Pulmonology
Gastroenterology
Gastroenterology
Hospitalist
Internal Medicine

## 2023-04-10 NOTE — PROGRESS NOTE ADULT - SUBJECTIVE AND OBJECTIVE BOX
INTERVAL HPI/ OVERNIGHT EVENTS:    57 year old morbidly obese female has medical history of 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 Episode happened while patient was driving and she continued to drive normally. She reports associated pain behind the eyes. Resolved and did not happen again. In the ER patient also complaining of LLQ pain worse when attempting to pass gas.   diarrhea improved  tolerating diet  no neck pain    REVIEW OF SYSTEMS:  CONSTITUTIONAL: No fever  EYES: No eye pain, visual disturbances, or discharge  ENMT:  No difficulty hearing, tinnitus, vertigo; No sinus or throat pain  NECK: No pain or stiffness  BREASTS: No pain, masses, or nipple discharge  RESPIRATORY: No cough, wheezing, chills or hemoptysis; No shortness of breath  CARDIOVASCULAR: No chest pain, palpitations, dizziness, or leg swelling  GASTROINTESTINAL: No abdominal or epigastric pain.   GENITOURINARY: No dysuria, frequency, hematuria, or incontinence  NEUROLOGICAL: No headaches, memory loss, loss of strength, numbness, or tremors  SKIN: No itching, burning, rashes, or lesions   LYMPH NODES: No enlarged glands  ENDOCRINE: No heat or cold intolerance; No hair loss  MUSCULOSKELETAL: No joint pain or swelling; No muscle, back, or extremity pain  PSYCHIATRIC: No depression, anxiety, mood swings, or difficulty sleeping  HEME/LYMPH: No easy bruising, or bleeding gums  ALLERY AND IMMUNOLOGIC: No hives or eczema    VITALS:  T(F): 96, Max: 97.6 (04-09-23 @ 12:20)  HR: 66  BP: 110/61  RR: 18  SpO2: --    PHYSICAL EXAM:  GENERAL: NAD  HEAD:  Atraumatic, Normocephalic  EYES: EOMI, PERRLA, conjunctiva and sclera clear  ENMT: No tonsillar erythema, exudates,  NECK: Supple, No JVD, Normal thyroid  NERVOUS SYSTEM:  Alert & Oriented X3,   CHEST/LUNG: Clear to percussion bilaterally; No rales, rhonchi, wheezing, or rubs  HEART: Regular rate and rhythm; No murmurs, rubs, or gallops  ABDOMEN: Soft, Nontender, Nondistended; Bowel sounds present  EXTREMITIES:  no edema  LYMPH: No lymphadenopathy noted  SKIN: No rashes or lesions      LABS:    04-10    140  |  104  |  18  ----------------------------<  136<H>  4.5   |  24  |  1.0    Ca    9.2      10 Apr 2023 06:36  Mg     1.8     04-10    TPro  6.0  /  Alb  3.5  /  TBili  <0.2  /  DBili  x   /  AST  11  /  ALT  13  /  AlkPhos  81  04-10                          9.7    7.34  )-----------( 373      ( 10 Apr 2023 06:36 )             31.6           RADIOLOGY & ADDITIONAL TESTS:    Imaging Personally Reviewed:  [ ] YES  [ ] NO    MEDICATIONS:     MEDICATIONS  (STANDING):  aspirin  chewable 81 milliGRAM(s) Oral daily  atorvastatin 80 milliGRAM(s) Oral at bedtime  budesonide  80 MICROgram(s)/formoterol 4.5 MICROgram(s) Inhaler 2 Puff(s) Inhalation two times a day  bupropion XL (24-Hour) . 300 milliGRAM(s) Oral daily  cefpodoxime 200 milliGRAM(s) Oral every 12 hours  chlorhexidine 2% Cloths 1 Application(s) Topical <User Schedule>  clonazepam  Tablet 1 milliGRAM(s) Oral <User Schedule>  enoxaparin Injectable 30 milliGRAM(s) SubCutaneous every 12 hours  escitalopram 5 milliGRAM(s) Oral daily  influenza   Vaccine 0.5 milliLiter(s) IntraMuscular once  lactobacillus acidophilus 1 Tablet(s) Oral daily  levothyroxine 125 MICROGram(s) Oral daily  losartan 25 milliGRAM(s) Oral daily  melatonin 5 milliGRAM(s) Oral at bedtime  metoprolol succinate  milliGRAM(s) Oral daily  metronidazole    Tablet 500 milliGRAM(s) Oral every 8 hours  montelukast 10 milliGRAM(s) Oral daily  pantoprazole    Tablet 40 milliGRAM(s) Oral two times a day  saccharomyces boulardii 250 milliGRAM(s) Oral two times a day  sodium chloride 0.65% Nasal 1 Spray(s) Nasal three times a day  ticagrelor 90 milliGRAM(s) Oral every 12 hours  tiotropium 2.5 MICROgram(s) Inhaler 2 Puff(s) Inhalation daily    MEDICATIONS  (PRN):  acetaminophen     Tablet .. 975 milliGRAM(s) Oral every 6 hours PRN Temp greater or equal to 38C (100.4F), Mild Pain (1 - 3), Moderate Pain (4 - 6)  albuterol    90 MICROgram(s) HFA Inhaler 2 Puff(s) Inhalation every 6 hours PRN Shortness of Breath and/or Wheezing  baclofen 5 milliGRAM(s) Oral every 12 hours PRN Musculoskeletal Pain  HYDROmorphone   Tablet 1 milliGRAM(s) Oral every 8 hours PRN Severe Pain (7 - 10)  hydrOXYzine hydrochloride 25 milliGRAM(s) Oral three times a day PRN Anxiety

## 2023-04-10 NOTE — PROGRESS NOTE ADULT - ASSESSMENT
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 Episode happened while patient was driving and she continued to drive normally. She reports associated pain behind the eyes. Resolved and did not happen again. In the ER patient also complaining of LLQ pain worse when attempting to pass gas.       Transient Blurry vision: Suspect TIA.   Carotid Artery Stenosis:   Patient presented with transient blurry vision lasted for 15 minutes, no other weakness.   Neuro exam is non-focal.   Severe high-grade stenosis of the proximal right internal carotid artery,  Moderate left ICA stenosis, right P3, and b/l cavernous ICA stenosis.  Brain MRI 3/28 was Unremarkable, no acute infarct.   CTA Neck 3/27 Severe high-grade stenosis of the proximal right ICA. Moderate stenosis of the proximal left ICA.   CTA HEAD:  Moderate stenosis of the cavernous segments of the bilateral internal . carotid arteries. Severe narrowing of the P3 segment of the right posterior cerebral artery.  s/p Rt carotid stent and Angioplasty  Continue ASA, ticagrilor and Lipitor.   Neck pain better  walking  Repeat CT head negative for acute abnormality    Acute sigmoid diverticulitis   CT abd: Acute diverticulitis of a short segment of the sigmoid colon. No abscess or perforation. Further evaluation with nonemergent colonoscopy may be of benefit when patient is clinically able.  Continue Vantin 200mg mg q12h and Flafyl 500 mg q8h till 4/10  GI recommended colonoscopy outpatient after 6-8 weeks.  Diarrhea better  tolerating diet    Hypertension: metoprolol  Hypothyroidism: synthroid  COPD- stable, Continue Budesonide, Spiriva and Montelukast.      Depression: Continue Lexapro and Clonazepam.     Pulmonary nodule on CT Abdomen  Pulmonary eval done recommended CT chest for further eval  Dedicated CT chest showed no pulm nodule    Adrenal nodule 1.3 cm stable  out pt f/u with endo    DVT prophylaxis: Lovenox SC.      discharge home today  Pt updated

## 2023-04-15 ENCOUNTER — EMERGENCY (EMERGENCY)
Facility: HOSPITAL | Age: 58
LOS: 0 days | Discharge: HOME | End: 2023-04-16
Attending: EMERGENCY MEDICINE
Payer: MEDICAID

## 2023-04-15 VITALS
SYSTOLIC BLOOD PRESSURE: 112 MMHG | RESPIRATION RATE: 1 BRPM | DIASTOLIC BLOOD PRESSURE: 70 MMHG | TEMPERATURE: 97 F | HEART RATE: 80 BPM | OXYGEN SATURATION: 97 %

## 2023-04-15 VITALS
OXYGEN SATURATION: 96 % | TEMPERATURE: 98 F | HEIGHT: 64 IN | DIASTOLIC BLOOD PRESSURE: 65 MMHG | HEART RATE: 75 BPM | WEIGHT: 291.89 LBS | SYSTOLIC BLOOD PRESSURE: 98 MMHG | RESPIRATION RATE: 18 BRPM

## 2023-04-15 DIAGNOSIS — M25.561 PAIN IN RIGHT KNEE: ICD-10-CM

## 2023-04-15 DIAGNOSIS — Z87.19 PERSONAL HISTORY OF OTHER DISEASES OF THE DIGESTIVE SYSTEM: ICD-10-CM

## 2023-04-15 DIAGNOSIS — M79.651 PAIN IN RIGHT THIGH: ICD-10-CM

## 2023-04-15 DIAGNOSIS — M79.601 PAIN IN RIGHT ARM: ICD-10-CM

## 2023-04-15 DIAGNOSIS — F41.9 ANXIETY DISORDER, UNSPECIFIED: ICD-10-CM
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
## 2023-04-15 DIAGNOSIS — J44.9 CHRONIC OBSTRUCTIVE PULMONARY DISEASE, UNSPECIFIED: ICD-10-CM

## 2023-04-15 DIAGNOSIS — Z90.49 ACQUIRED ABSENCE OF OTHER SPECIFIED PARTS OF DIGESTIVE TRACT: ICD-10-CM

## 2023-04-15 DIAGNOSIS — I10 ESSENTIAL (PRIMARY) HYPERTENSION: ICD-10-CM

## 2023-04-15 DIAGNOSIS — Z79.82 LONG TERM (CURRENT) USE OF ASPIRIN: ICD-10-CM

## 2023-04-15 DIAGNOSIS — Z88.1 ALLERGY STATUS TO OTHER ANTIBIOTIC AGENTS STATUS: ICD-10-CM
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
## 2023-04-15 DIAGNOSIS — Z85.22 PERSONAL HISTORY OF MALIGNANT NEOPLASM OF NASAL CAVITIES, MIDDLE EAR, AND ACCESSORY SINUSES: ICD-10-CM

## 2023-04-15 DIAGNOSIS — Z90.49 ACQUIRED ABSENCE OF OTHER SPECIFIED PARTS OF DIGESTIVE TRACT: Chronic | ICD-10-CM

## 2023-04-15 DIAGNOSIS — Z87.891 PERSONAL HISTORY OF NICOTINE DEPENDENCE: ICD-10-CM

## 2023-04-15 DIAGNOSIS — F32.A DEPRESSION, UNSPECIFIED: ICD-10-CM

## 2023-04-15 DIAGNOSIS — E03.9 HYPOTHYROIDISM, UNSPECIFIED: ICD-10-CM

## 2023-04-15 PROCEDURE — 73502 X-RAY EXAM HIP UNI 2-3 VIEWS: CPT | Mod: 26,RT

## 2023-04-15 PROCEDURE — 99284 EMERGENCY DEPT VISIT MOD MDM: CPT

## 2023-04-15 PROCEDURE — 71045 X-RAY EXAM CHEST 1 VIEW: CPT | Mod: 26

## 2023-04-15 PROCEDURE — 73562 X-RAY EXAM OF KNEE 3: CPT | Mod: 26,RT

## 2023-04-15 PROCEDURE — 71045 X-RAY EXAM CHEST 1 VIEW: CPT

## 2023-04-15 PROCEDURE — 99284 EMERGENCY DEPT VISIT MOD MDM: CPT | Mod: 25

## 2023-04-15 PROCEDURE — 73502 X-RAY EXAM HIP UNI 2-3 VIEWS: CPT | Mod: RT

## 2023-04-15 PROCEDURE — 73562 X-RAY EXAM OF KNEE 3: CPT | Mod: RT

## 2023-04-15 RX ORDER — ONDANSETRON 8 MG/1
4 TABLET, FILM COATED ORAL ONCE
Refills: 0 | Status: DISCONTINUED | OUTPATIENT
Start: 2023-04-15 | End: 2023-04-16

## 2023-04-15 NOTE — ED PROVIDER NOTE - PATIENT PORTAL LINK FT
You can access the FollowMyHealth Patient Portal offered by [facility] by registering at the following website: [email or web address]. By joining [facility]’s FollowMyHealth portal, you will also be able to view your health information using other applications (apps) compatible with our system.

## 2023-04-15 NOTE — ED PROVIDER NOTE - OBJECTIVE STATEMENT
57  yold female to Ed Pmhx obesity, Nasal cancer treated, hypothyroidism, Copd, Htn, Depression, Diverticulitis; pt c/o right knee pain s/p fall today states knee gave out - has had similar knee issues in past - had intra articular injections; pt also c/o pain to right thigh, right arm; pt s/p recent admission to hospital for tia s/p right carotid stent - currently on brillinta;   pt deneis headache,n/v, neck or back pain;

## 2023-04-15 NOTE — ED PROVIDER NOTE - NSFOLLOWUPCLINICS_GEN_ALL_ED_FT
[facility]  Medicine  [address], [place]   Phone: [phone]  Fax:   Follow Up Time: 4-6 Days    [facility]  Orthpedic  [address], [place]   Phone: [phone]  Fax:   Follow Up Time: 4-6 Days

## 2023-04-15 NOTE — ED PROVIDER NOTE - NS ED ATTENDING STATEMENT MOD
This was a shared visit with the BRANDT. I reviewed and verified the documentation and independently performed the documented:

## 2023-04-15 NOTE — ED ADULT TRIAGE NOTE - CHIEF COMPLAINT QUOTE
"my right knee has been weak since my surgery 2 weeks ago , doni been having frequent falls" last fall was midday yesterday pt on Brilinta

## 2023-04-15 NOTE — ED PROVIDER NOTE - NSFOLLOWUPINSTRUCTIONS_ED_ALL_ED_FT
Knee Pain    Knee pain is a very common symptom and can have many causes. Knee pain often goes away when you follow your health care provider's instructions for relieving pain and discomfort at home. However, knee pain can develop into a condition that needs treatment. Some conditions may include:     Arthritis caused by wear and tear (osteoarthritis).  Arthritis caused by swelling and irritation (rheumatoid arthritis or gout).  A cyst or growth in your knee.  An infection in your knee joint.  An injury that will not heal.  Damage, swelling, or irritation of the tissues that support your knee (torn ligaments or tendinitis).    If your knee pain continues, additional tests may be ordered to diagnose your condition. Tests may include X-rays or other imaging studies of your knee. You may also need to have fluid removed from your knee. Treatment for ongoing knee pain depends on the cause, but treatment may include:    Medicines to relieve pain or swelling.  Steroid injections in your knee.  Physical therapy.  Surgery.    HOME CARE INSTRUCTIONS  Take medicines only as directed by your health care provider.   Rest your knee and keep it raised (elevated) while you are resting.  Do not do things that cause or worsen pain.  Avoid high-impact activities or exercises, such as running, jumping rope, or doing jumping jacks.  Apply ice to the knee area:  Put ice in a plastic bag.  Place a towel between your skin and the bag.  Leave the ice on for 20 minutes, 2–3 times a day.  Ask your health care provider if you should wear an elastic knee support.  Keep a pillow under your knee when you sleep.  Lose weight if you are overweight. Extra weight can put pressure on your knee.  Do not use any tobacco products, including cigarettes, chewing tobacco, or electronic cigarettes. If you need help quitting, ask your health care provider. Smoking may slow the healing of any bone and joint problems that you may have.    SEEK MEDICAL CARE IF:  Your knee pain continues, changes, or gets worse.  You have a fever along with knee pain.  Your knee debora or locks up.  Your knee becomes more swollen.    SEEK IMMEDIATE MEDICAL CARE IF:  Your knee joint feels hot to the touch.  You have chest pain or trouble breathing.

## 2023-04-15 NOTE — ED PROVIDER NOTE - CLINICAL SUMMARY MEDICAL DECISION MAKING FREE TEXT BOX
XRays negative for acute findings, though consistent with osteoarthritis.  DC home to f/u with Ortho.  Strict return instructions discussed.

## 2023-04-15 NOTE — ED PROVIDER NOTE - PHYSICAL EXAMINATION
Constitutional: Well developed, well nourished. NAD  Head: Normocephalic, atraumatic.  Eyes: PERRL, EOMI.  ENT: No nasal discharge. Mucous membranes dry.  Neck: Supple. Painless ROM.  Cardiovascular: Regular rate and rhythm.    Pulmonary:   Lungs clear to auscultation bilaterally.    Abdominal: Soft. Nondistended. No rebound, guarding, rigidity.  Extremities. Pelvis stable. + mild tenderness to right knee resolving ecchymosis; no joint effusion;   Skin: No rashes, cyanosis.  Neuro: AAOx3. No focal neurological deficits.  Psych: Normal mood. Normal affect.

## 2023-04-16 NOTE — ED ADULT NURSE NOTE - OBJECTIVE STATEMENT
"my right knee has been weak since my surgery 2 weeks ago , doni been having frequent falls" last fall was midday yesterday pt on Brilinta.

## 2023-04-24 ENCOUNTER — APPOINTMENT (OUTPATIENT)
Dept: ORTHOPEDIC SURGERY | Facility: CLINIC | Age: 58
End: 2023-04-24
Payer: MEDICAID

## 2023-04-24 DIAGNOSIS — M17.12 UNILATERAL PRIMARY OSTEOARTHRITIS, LEFT KNEE: ICD-10-CM

## 2023-04-24 DIAGNOSIS — S89.91XA UNSPECIFIED INJURY OF RIGHT LOWER LEG, INITIAL ENCOUNTER: ICD-10-CM

## 2023-04-24 DIAGNOSIS — S89.92XA UNSPECIFIED INJURY OF LEFT LOWER LEG, INITIAL ENCOUNTER: ICD-10-CM

## 2023-04-24 DIAGNOSIS — M17.11 UNILATERAL PRIMARY OSTEOARTHRITIS, RIGHT KNEE: ICD-10-CM

## 2023-04-24 PROCEDURE — 99203 OFFICE O/P NEW LOW 30 MIN: CPT | Mod: 25

## 2023-04-24 PROCEDURE — 20610 DRAIN/INJ JOINT/BURSA W/O US: CPT | Mod: LT

## 2023-05-03 ENCOUNTER — INPATIENT (INPATIENT)
Facility: HOSPITAL | Age: 58
LOS: 3 days | Discharge: HOME | DRG: 82 | End: 2023-05-07
Attending: INTERNAL MEDICINE | Admitting: INTERNAL MEDICINE
Payer: MEDICAID

## 2023-05-03 VITALS
OXYGEN SATURATION: 96 % | HEIGHT: 64 IN | DIASTOLIC BLOOD PRESSURE: 57 MMHG | TEMPERATURE: 98 F | RESPIRATION RATE: 20 BRPM | SYSTOLIC BLOOD PRESSURE: 118 MMHG | HEART RATE: 69 BPM

## 2023-05-03 DIAGNOSIS — Z90.49 ACQUIRED ABSENCE OF OTHER SPECIFIED PARTS OF DIGESTIVE TRACT: Chronic | ICD-10-CM

## 2023-05-03 DIAGNOSIS — H53.8 OTHER VISUAL DISTURBANCES: ICD-10-CM

## 2023-05-03 PROCEDURE — 99285 EMERGENCY DEPT VISIT HI MDM: CPT

## 2023-05-03 NOTE — ED ADULT TRIAGE NOTE - HEART RATE (BEATS/MIN)
Comprehensive Nutrition Assessment    Type and Reason for Visit:  Reassess    Nutrition Recommendations/Plan: TPN increased to 75 ml/hr with following changes in additives: NaCl- 25 mEq, K acetate- 50 to 35 mEq, K Phos- 18 to 12 mmol, KCl- 40 to 55 mEq, insulin- 10 units, add MVI & trace elements    Nutrition Assessment:  Pt still with nausea and pain but wanting to start liquid diet. GI reviewed CT abdomen showing pathcy/moderate necrosis of pancreas head & body, lesser degree in tail; wants transfer to [facility]. Pt will remain NPO; TPN increased from 55 ml to 75 ml/hr. continuing with 100 ml lipids. Additives adjusted based on lab values. Malnutrition Assessment:  Malnutrition Status:  No malnutrition    Context:  Acute Illness     Findings of the 6 clinical characteristics of malnutrition:  Energy Intake:  1 - 75% or less of estimated energy requirements for 7 or more days  Weight Loss:  No significant weight loss     Body Fat Loss:  Unable to assess     Muscle Mass Loss:  Unable to assess    Fluid Accumulation:  1 - Mild Extremities   Strength:  Not Performed    Estimated Daily Nutrient Needs:  Energy (kcal):  2080 kcal based on [place]- [place] Jeor using 1.1 factor; Weight Used for Energy Requirements:  Current     Protein (g):  102-113 gm based on 1.8-2.0 gm/kg of ideal; Weight Used for Protein Requirements:  Ideal          Nutrition Related Findings:  Edema: trace RLE, LLE, +1 RUE, LUE.  GI: Bowel sounds active      Wounds:  None       Current Nutrition Therapies:    Current Parenteral Nutrition Orders:  · Type and Formula: 2-in-1 Custom   · Lipids: 100ml  · Duration: (75 ml)  · Rate/Volume: 75 ml/ 1800 ml  · Current PN Order Provides: 1784 kcal, 90 gm protein (includes lipids)  · Goal PN Orders Provides: 2080 kcal, 102-113 gm protein      Anthropometric Measures:  · Height: 5' 5\" (165.1 cm)  · Current Body Weight: 273 lb 2.4 oz (123.9 kg)   · Admission Body Weight: 275 lb 9.2 oz (125 kg) · Usual Body Weight: 275 lb (124.7 kg)     · Ideal Body Weight: 125 lbs; % Ideal Body Weight 220.5 %   · BMI: 45.5  · BMI Categories: Obese Class 3 (BMI 40.0 or greater)       Nutrition Diagnosis:   · Altered GI function related to altered GI function as evidenced by NPO or clear liquid status due to medical condition, GI abnormality, nausea, vomiting, constipation      Nutrition Interventions:   Food and/or Nutrient Delivery:  Continue NPO, Modify Parenteral Nutrition  Nutrition Education/Counseling:  Education not indicated   Coordination of Nutrition Care:  Continued Inpatient Monitoring    Goals:  TPN to meet greater than 75% of estimated nutrition needs       Nutrition Monitoring and Evaluation:   Food/Nutrient Intake Outcomes:  Diet Advancement/Tolerance, Parenteral Nutrition Intake/Tolerance  Physical Signs/Symptoms Outcomes:  Biochemical Data, Weight, Nausea or Vomiting, Constipation, GI Status     Discharge Planning:    Parenteral Nutrition     Some areas of assessment may be incomplete due to COVID-19 precautions. Dell Parker R.D., L.D.   Clinical Dietitian  Office: [phone]

## 2023-05-04 ENCOUNTER — TRANSCRIPTION ENCOUNTER (OUTPATIENT)
Age: 58
End: 2023-05-04

## 2023-05-04 DIAGNOSIS — H53.8 OTHER VISUAL DISTURBANCES: ICD-10-CM

## 2023-05-04 LAB
ALBUMIN SERPL ELPH-MCNC: 3.8 G/DL — SIGNIFICANT CHANGE UP (ref 3.5–5.2)
ALP SERPL-CCNC: 104 U/L — SIGNIFICANT CHANGE UP (ref 30–115)
ALT FLD-CCNC: 19 U/L — SIGNIFICANT CHANGE UP (ref 0–41)
ANION GAP SERPL CALC-SCNC: 12 MMOL/L — SIGNIFICANT CHANGE UP (ref 7–14)
ANION GAP SERPL CALC-SCNC: 12 MMOL/L — SIGNIFICANT CHANGE UP (ref 7–14)
APPEARANCE UR: CLEAR — SIGNIFICANT CHANGE UP
AST SERPL-CCNC: 31 U/L — SIGNIFICANT CHANGE UP (ref 0–41)
BACTERIA # UR AUTO: ABNORMAL
BASOPHILS # BLD AUTO: 0.03 K/UL — SIGNIFICANT CHANGE UP (ref 0–0.2)
BASOPHILS NFR BLD AUTO: 0.4 % — SIGNIFICANT CHANGE UP (ref 0–1)
BILIRUB SERPL-MCNC: 0.3 MG/DL — SIGNIFICANT CHANGE UP (ref 0.2–1.2)
BILIRUB UR-MCNC: NEGATIVE — SIGNIFICANT CHANGE UP
BUN SERPL-MCNC: 23 MG/DL — HIGH (ref 10–20)
BUN SERPL-MCNC: 27 MG/DL — HIGH (ref 10–20)
CALCIUM SERPL-MCNC: 8.7 MG/DL — SIGNIFICANT CHANGE UP (ref 8.4–10.5)
CALCIUM SERPL-MCNC: 9.4 MG/DL — SIGNIFICANT CHANGE UP (ref 8.4–10.5)
CHLORIDE SERPL-SCNC: 105 MMOL/L — SIGNIFICANT CHANGE UP (ref 98–110)
CHLORIDE SERPL-SCNC: 110 MMOL/L — SIGNIFICANT CHANGE UP (ref 98–110)
CO2 SERPL-SCNC: 17 MMOL/L — SIGNIFICANT CHANGE UP (ref 17–32)
CO2 SERPL-SCNC: 20 MMOL/L — SIGNIFICANT CHANGE UP (ref 17–32)
COLOR SPEC: YELLOW — SIGNIFICANT CHANGE UP
CREAT SERPL-MCNC: 1.2 MG/DL — SIGNIFICANT CHANGE UP (ref 0.7–1.5)
CREAT SERPL-MCNC: 1.6 MG/DL — HIGH (ref 0.7–1.5)
D DIMER BLD IA.RAPID-MCNC: 451 NG/ML DDU — HIGH
DIFF PNL FLD: NEGATIVE — SIGNIFICANT CHANGE UP
EGFR: 37 ML/MIN/1.73M2 — LOW
EGFR: 52 ML/MIN/1.73M2 — LOW
EOSINOPHIL # BLD AUTO: 0.29 K/UL — SIGNIFICANT CHANGE UP (ref 0–0.7)
EOSINOPHIL NFR BLD AUTO: 3.4 % — SIGNIFICANT CHANGE UP (ref 0–8)
EPI CELLS # UR: >27 /HPF — HIGH (ref 0–5)
FLUAV AG NPH QL: SIGNIFICANT CHANGE UP
FLUBV AG NPH QL: SIGNIFICANT CHANGE UP
GAS PNL BLDV: SIGNIFICANT CHANGE UP
GLUCOSE SERPL-MCNC: 109 MG/DL — HIGH (ref 70–99)
GLUCOSE SERPL-MCNC: 118 MG/DL — HIGH (ref 70–99)
GLUCOSE UR QL: NEGATIVE — SIGNIFICANT CHANGE UP
HCG SERPL QL: NEGATIVE — SIGNIFICANT CHANGE UP
HCT VFR BLD CALC: 34.8 % — LOW (ref 37–47)
HGB BLD-MCNC: 11 G/DL — LOW (ref 12–16)
HYALINE CASTS # UR AUTO: 7 /LPF — SIGNIFICANT CHANGE UP (ref 0–7)
IMM GRANULOCYTES NFR BLD AUTO: 0.5 % — HIGH (ref 0.1–0.3)
KETONES UR-MCNC: NEGATIVE — SIGNIFICANT CHANGE UP
LEUKOCYTE ESTERASE UR-ACNC: ABNORMAL
LYMPHOCYTES # BLD AUTO: 1.95 K/UL — SIGNIFICANT CHANGE UP (ref 1.2–3.4)
LYMPHOCYTES # BLD AUTO: 22.9 % — SIGNIFICANT CHANGE UP (ref 20.5–51.1)
MAGNESIUM SERPL-MCNC: 1.8 MG/DL — SIGNIFICANT CHANGE UP (ref 1.8–2.4)
MCHC RBC-ENTMCNC: 25.6 PG — LOW (ref 27–31)
MCHC RBC-ENTMCNC: 31.6 G/DL — LOW (ref 32–37)
MCV RBC AUTO: 80.9 FL — LOW (ref 81–99)
MONOCYTES # BLD AUTO: 0.61 K/UL — HIGH (ref 0.1–0.6)
MONOCYTES NFR BLD AUTO: 7.2 % — SIGNIFICANT CHANGE UP (ref 1.7–9.3)
NEUTROPHILS # BLD AUTO: 5.59 K/UL — SIGNIFICANT CHANGE UP (ref 1.4–6.5)
NEUTROPHILS NFR BLD AUTO: 65.6 % — SIGNIFICANT CHANGE UP (ref 42.2–75.2)
NITRITE UR-MCNC: NEGATIVE — SIGNIFICANT CHANGE UP
NRBC # BLD: 0 /100 WBCS — SIGNIFICANT CHANGE UP (ref 0–0)
NT-PROBNP SERPL-SCNC: 192 PG/ML — SIGNIFICANT CHANGE UP (ref 0–300)
PH UR: 6 — SIGNIFICANT CHANGE UP (ref 5–8)
PLATELET # BLD AUTO: 378 K/UL — SIGNIFICANT CHANGE UP (ref 130–400)
PMV BLD: 10.5 FL — HIGH (ref 7.4–10.4)
POTASSIUM SERPL-MCNC: 4.4 MMOL/L — SIGNIFICANT CHANGE UP (ref 3.5–5)
POTASSIUM SERPL-MCNC: 5.4 MMOL/L — HIGH (ref 3.5–5)
POTASSIUM SERPL-SCNC: 4.4 MMOL/L — SIGNIFICANT CHANGE UP (ref 3.5–5)
POTASSIUM SERPL-SCNC: 5.4 MMOL/L — HIGH (ref 3.5–5)
PROT SERPL-MCNC: 6.8 G/DL — SIGNIFICANT CHANGE UP (ref 6–8)
PROT UR-MCNC: ABNORMAL
RBC # BLD: 4.3 M/UL — SIGNIFICANT CHANGE UP (ref 4.2–5.4)
RBC # FLD: 17.1 % — HIGH (ref 11.5–14.5)
RBC CASTS # UR COMP ASSIST: 6 /HPF — HIGH (ref 0–4)
RSV RNA NPH QL NAA+NON-PROBE: SIGNIFICANT CHANGE UP
SARS-COV-2 RNA SPEC QL NAA+PROBE: SIGNIFICANT CHANGE UP
SODIUM SERPL-SCNC: 137 MMOL/L — SIGNIFICANT CHANGE UP (ref 135–146)
SODIUM SERPL-SCNC: 139 MMOL/L — SIGNIFICANT CHANGE UP (ref 135–146)
SP GR SPEC: >1.05 (ref 1.01–1.03)
TROPONIN T SERPL-MCNC: <0.01 NG/ML — SIGNIFICANT CHANGE UP
UROBILINOGEN FLD QL: SIGNIFICANT CHANGE UP
WBC # BLD: 8.51 K/UL — SIGNIFICANT CHANGE UP (ref 4.8–10.8)
WBC # FLD AUTO: 8.51 K/UL — SIGNIFICANT CHANGE UP (ref 4.8–10.8)
WBC UR QL: 9 /HPF — HIGH (ref 0–5)

## 2023-05-04 PROCEDURE — 84295 ASSAY OF SERUM SODIUM: CPT

## 2023-05-04 PROCEDURE — 70450 CT HEAD/BRAIN W/O DYE: CPT | Mod: 26,MA

## 2023-05-04 PROCEDURE — 70551 MRI BRAIN STEM W/O DYE: CPT

## 2023-05-04 PROCEDURE — 99222 1ST HOSP IP/OBS MODERATE 55: CPT

## 2023-05-04 PROCEDURE — 70496 CT ANGIOGRAPHY HEAD: CPT | Mod: 26,59

## 2023-05-04 PROCEDURE — 82330 ASSAY OF CALCIUM: CPT

## 2023-05-04 PROCEDURE — 80048 BASIC METABOLIC PNL TOTAL CA: CPT

## 2023-05-04 PROCEDURE — 82803 BLOOD GASES ANY COMBINATION: CPT

## 2023-05-04 PROCEDURE — 85027 COMPLETE CBC AUTOMATED: CPT

## 2023-05-04 PROCEDURE — 87086 URINE CULTURE/COLONY COUNT: CPT

## 2023-05-04 PROCEDURE — 70498 CT ANGIOGRAPHY NECK: CPT

## 2023-05-04 PROCEDURE — 97116 GAIT TRAINING THERAPY: CPT | Mod: GP

## 2023-05-04 PROCEDURE — 85018 HEMOGLOBIN: CPT

## 2023-05-04 PROCEDURE — 36415 COLL VENOUS BLD VENIPUNCTURE: CPT

## 2023-05-04 PROCEDURE — 93010 ELECTROCARDIOGRAM REPORT: CPT

## 2023-05-04 PROCEDURE — 85014 HEMATOCRIT: CPT

## 2023-05-04 PROCEDURE — 99223 1ST HOSP IP/OBS HIGH 75: CPT

## 2023-05-04 PROCEDURE — 71045 X-RAY EXAM CHEST 1 VIEW: CPT | Mod: 26

## 2023-05-04 PROCEDURE — 70498 CT ANGIOGRAPHY NECK: CPT | Mod: 26

## 2023-05-04 PROCEDURE — 70496 CT ANGIOGRAPHY HEAD: CPT

## 2023-05-04 PROCEDURE — 97162 PT EVAL MOD COMPLEX 30 MIN: CPT | Mod: GP

## 2023-05-04 PROCEDURE — 80053 COMPREHEN METABOLIC PANEL: CPT

## 2023-05-04 PROCEDURE — 85025 COMPLETE CBC W/AUTO DIFF WBC: CPT

## 2023-05-04 PROCEDURE — 97110 THERAPEUTIC EXERCISES: CPT | Mod: GP

## 2023-05-04 PROCEDURE — 81001 URINALYSIS AUTO W/SCOPE: CPT

## 2023-05-04 PROCEDURE — 84132 ASSAY OF SERUM POTASSIUM: CPT

## 2023-05-04 PROCEDURE — 83605 ASSAY OF LACTIC ACID: CPT

## 2023-05-04 PROCEDURE — 71275 CT ANGIOGRAPHY CHEST: CPT | Mod: 26,MA

## 2023-05-04 PROCEDURE — 87077 CULTURE AEROBIC IDENTIFY: CPT

## 2023-05-04 PROCEDURE — 87186 SC STD MICRODIL/AGAR DIL: CPT

## 2023-05-04 PROCEDURE — 83735 ASSAY OF MAGNESIUM: CPT

## 2023-05-04 PROCEDURE — G0378: CPT

## 2023-05-04 PROCEDURE — 85652 RBC SED RATE AUTOMATED: CPT

## 2023-05-04 RX ORDER — PANTOPRAZOLE SODIUM 20 MG/1
40 TABLET, DELAYED RELEASE ORAL
Refills: 0 | Status: DISCONTINUED | OUTPATIENT
Start: 2023-05-04 | End: 2023-05-07

## 2023-05-04 RX ORDER — ONDANSETRON 8 MG/1
4 TABLET, FILM COATED ORAL ONCE
Refills: 0 | Status: COMPLETED | OUTPATIENT
Start: 2023-05-04 | End: 2023-05-04

## 2023-05-04 RX ORDER — CLONAZEPAM 1 MG
1 TABLET ORAL
Refills: 0 | Status: DISCONTINUED | OUTPATIENT
Start: 2023-05-04 | End: 2023-05-07

## 2023-05-04 RX ORDER — ZOLPIDEM TARTRATE 10 MG/1
5 TABLET ORAL AT BEDTIME
Refills: 0 | Status: DISCONTINUED | OUTPATIENT
Start: 2023-05-04 | End: 2023-05-07

## 2023-05-04 RX ORDER — SODIUM CHLORIDE 9 MG/ML
1000 INJECTION INTRAMUSCULAR; INTRAVENOUS; SUBCUTANEOUS
Refills: 0 | Status: DISCONTINUED | OUTPATIENT
Start: 2023-05-04 | End: 2023-05-05

## 2023-05-04 RX ORDER — BUPROPION HYDROCHLORIDE 150 MG/1
300 TABLET, EXTENDED RELEASE ORAL DAILY
Refills: 0 | Status: DISCONTINUED | OUTPATIENT
Start: 2023-05-04 | End: 2023-05-07

## 2023-05-04 RX ORDER — FAMOTIDINE 10 MG/ML
40 INJECTION INTRAVENOUS DAILY
Refills: 0 | Status: DISCONTINUED | OUTPATIENT
Start: 2023-05-04 | End: 2023-05-07

## 2023-05-04 RX ORDER — BACLOFEN 100 %
10 POWDER (GRAM) MISCELLANEOUS EVERY 6 HOURS
Refills: 0 | Status: DISCONTINUED | OUTPATIENT
Start: 2023-05-04 | End: 2023-05-07

## 2023-05-04 RX ORDER — LEVOTHYROXINE SODIUM 125 MCG
125 TABLET ORAL DAILY
Refills: 0 | Status: DISCONTINUED | OUTPATIENT
Start: 2023-05-04 | End: 2023-05-07

## 2023-05-04 RX ORDER — ESCITALOPRAM OXALATE 10 MG/1
1 TABLET, FILM COATED ORAL
Qty: 0 | Refills: 0 | DISCHARGE

## 2023-05-04 RX ORDER — ACETAMINOPHEN 500 MG
975 TABLET ORAL ONCE
Refills: 0 | Status: COMPLETED | OUTPATIENT
Start: 2023-05-04 | End: 2023-05-04

## 2023-05-04 RX ORDER — DIPHENHYDRAMINE HCL 50 MG
50 CAPSULE ORAL
Refills: 0 | Status: COMPLETED | OUTPATIENT
Start: 2023-05-04 | End: 2023-05-05

## 2023-05-04 RX ORDER — HEPARIN SODIUM 5000 [USP'U]/ML
5000 INJECTION INTRAVENOUS; SUBCUTANEOUS EVERY 8 HOURS
Refills: 0 | Status: DISCONTINUED | OUTPATIENT
Start: 2023-05-04 | End: 2023-05-07

## 2023-05-04 RX ORDER — ATORVASTATIN CALCIUM 80 MG/1
80 TABLET, FILM COATED ORAL AT BEDTIME
Refills: 0 | Status: DISCONTINUED | OUTPATIENT
Start: 2023-05-04 | End: 2023-05-07

## 2023-05-04 RX ORDER — SODIUM ZIRCONIUM CYCLOSILICATE 10 G/10G
5 POWDER, FOR SUSPENSION ORAL ONCE
Refills: 0 | Status: COMPLETED | OUTPATIENT
Start: 2023-05-04 | End: 2023-05-04

## 2023-05-04 RX ORDER — HYDROXYZINE HCL 10 MG
10 TABLET ORAL THREE TIMES A DAY
Refills: 0 | Status: DISCONTINUED | OUTPATIENT
Start: 2023-05-04 | End: 2023-05-07

## 2023-05-04 RX ORDER — METOPROLOL TARTRATE 50 MG
100 TABLET ORAL DAILY
Refills: 0 | Status: DISCONTINUED | OUTPATIENT
Start: 2023-05-04 | End: 2023-05-07

## 2023-05-04 RX ORDER — TICAGRELOR 90 MG/1
90 TABLET ORAL EVERY 12 HOURS
Refills: 0 | Status: DISCONTINUED | OUTPATIENT
Start: 2023-05-04 | End: 2023-05-07

## 2023-05-04 RX ORDER — MONTELUKAST 4 MG/1
10 TABLET, CHEWABLE ORAL DAILY
Refills: 0 | Status: DISCONTINUED | OUTPATIENT
Start: 2023-05-04 | End: 2023-05-07

## 2023-05-04 RX ORDER — ALBUTEROL 90 UG/1
2 AEROSOL, METERED ORAL EVERY 6 HOURS
Refills: 0 | Status: DISCONTINUED | OUTPATIENT
Start: 2023-05-04 | End: 2023-05-07

## 2023-05-04 RX ORDER — ASPIRIN/CALCIUM CARB/MAGNESIUM 324 MG
81 TABLET ORAL DAILY
Refills: 0 | Status: DISCONTINUED | OUTPATIENT
Start: 2023-05-04 | End: 2023-05-07

## 2023-05-04 RX ADMIN — HEPARIN SODIUM 5000 UNIT(S): 5000 INJECTION INTRAVENOUS; SUBCUTANEOUS at 21:43

## 2023-05-04 RX ADMIN — Medication 10 MILLIGRAM(S): at 11:10

## 2023-05-04 RX ADMIN — Medication 1 MILLIGRAM(S): at 17:51

## 2023-05-04 RX ADMIN — FAMOTIDINE 40 MILLIGRAM(S): 10 INJECTION INTRAVENOUS at 11:10

## 2023-05-04 RX ADMIN — Medication 50 MILLIGRAM(S): at 15:09

## 2023-05-04 RX ADMIN — MONTELUKAST 10 MILLIGRAM(S): 4 TABLET, CHEWABLE ORAL at 11:10

## 2023-05-04 RX ADMIN — SODIUM ZIRCONIUM CYCLOSILICATE 5 GRAM(S): 10 POWDER, FOR SUSPENSION ORAL at 17:51

## 2023-05-04 RX ADMIN — BUPROPION HYDROCHLORIDE 300 MILLIGRAM(S): 150 TABLET, EXTENDED RELEASE ORAL at 11:10

## 2023-05-04 RX ADMIN — Medication 1 TABLET(S): at 17:50

## 2023-05-04 RX ADMIN — HEPARIN SODIUM 5000 UNIT(S): 5000 INJECTION INTRAVENOUS; SUBCUTANEOUS at 15:09

## 2023-05-04 RX ADMIN — Medication 10 MILLIGRAM(S): at 23:07

## 2023-05-04 RX ADMIN — ATORVASTATIN CALCIUM 80 MILLIGRAM(S): 80 TABLET, FILM COATED ORAL at 21:43

## 2023-05-04 RX ADMIN — Medication 1 TABLET(S): at 11:58

## 2023-05-04 RX ADMIN — Medication 81 MILLIGRAM(S): at 11:10

## 2023-05-04 RX ADMIN — TICAGRELOR 90 MILLIGRAM(S): 90 TABLET ORAL at 17:50

## 2023-05-04 RX ADMIN — ZOLPIDEM TARTRATE 5 MILLIGRAM(S): 10 TABLET ORAL at 22:53

## 2023-05-04 RX ADMIN — Medication 10 MILLIGRAM(S): at 17:51

## 2023-05-04 RX ADMIN — SODIUM CHLORIDE 75 MILLILITER(S): 9 INJECTION INTRAMUSCULAR; INTRAVENOUS; SUBCUTANEOUS at 10:06

## 2023-05-04 RX ADMIN — Medication 975 MILLIGRAM(S): at 01:29

## 2023-05-04 RX ADMIN — ONDANSETRON 4 MILLIGRAM(S): 8 TABLET, FILM COATED ORAL at 01:29

## 2023-05-04 RX ADMIN — Medication 50 MILLIGRAM(S): at 21:43

## 2023-05-04 RX ADMIN — Medication 975 MILLIGRAM(S): at 00:55

## 2023-05-04 RX ADMIN — SODIUM CHLORIDE 100 MILLILITER(S): 9 INJECTION INTRAMUSCULAR; INTRAVENOUS; SUBCUTANEOUS at 15:28

## 2023-05-04 NOTE — PATIENT PROFILE ADULT - FALL HARM RISK - HARM RISK INTERVENTIONS

## 2023-05-04 NOTE — ED PROVIDER NOTE - NS_BEDUNITTYPES_ED_ALL_ED
MED/SURG Clofazimine Counseling:  I discussed with the patient the risks of clofazimine including but not limited to skin and eye pigmentation, liver damage, nausea/vomiting, gastrointestinal bleeding and allergy.

## 2023-05-04 NOTE — H&P ADULT - NSHPPHYSICALEXAM_GEN_ALL_CORE
GENERAL: NAD, resting comfortably in bed  HEENT: Normocephalic, atraumatic  Neck: Supple, non-tender, no lymphadenopathy.   PULMONARY: Clear to auscultation bilaterally. No rales, ronchi, or wheezing.   CARDIOVASCULAR: Regular rate and rhythm, S1-S2, no murmurs   GASTROINTESTINAL: Soft, non-tender, non-distended, no guarding.   SKIN/EXTREMITIES: No LE edema b/l  NEUROLOGIC: AAOX3

## 2023-05-04 NOTE — CONSULT NOTE ADULT - SUBJECTIVE AND OBJECTIVE BOX
CC: Intermittent blurry vision      HPI:  58 yo RHF with history of HTN, hearing loss COPD hypothyroidism, CKD, diverticulitis, nasopharyngeal CA s/p chemo / rtx 2013, anxiety persistent intermittent blurry vision since past 4 days. Patient was seen 4/7/23 for same complaint and was noted to have high grade stenosis of the proximal LULI and moderate left ICA stenosis,  right P3, and bilateral cavernous ICA stenosis and is s/p right carotid angioplasty and stenting by Dr greene 4/10 and was discharged on DAPT (ASA 81 mg q 24+Brillinta 90mg bid). Patient states that the blurry vision seemed to have subsided postprocedurally for about a week but then returned and now since past 4 days she has experienced the symptoms with increased frequency. She also reports that 2 days ago she fell because her right knee gaveway. Denies focal sensory deficits, n/v, headache, problems with swallowing, syncope or other complaints. She reports compliance with her medication regimen.           Home Medications:  Albuterol 90 mcg/inh inhalation aerosol: 2 puff(s) inhaled every 6 hours, As needed, Shortness of Breath and/or Wheezing (28 Mar 2023 13:59)  BuPROPion 300 mg/24 hours (XL) oral tablet, extended release: 1 tab(s) orally every 24 hours (28 Mar 2023 13:59)  Clonazepam 1 mg oral tablet: 1 tab(s) orally 2 times a day (28 Mar 2023 13:59)  Escitalopram 5 mg oral tablet: 1 tab(s) orally once a day (28 Mar 2023 13:59)  Metoprolol succinate 50 mg oral tablet, extended release: 1 tab(s) orally once a day (28 Mar 2023 13:59)  MONTELUKAST SODIUM 10 MG TABS: 1 dose(s) orally once a day (28 Mar 2023 13:59)  Trelegy Ellipta inhalation powder: 1 puff(s) inhaled once a day (28 Mar 2023 13:59)  ZOLPIDEM TARTRATE 10 MG TABS: orally once a day (at bedtime) (28 Mar 2023 13:59)      Social History  EX heavy smoker (smoked 3ppd for 25+ years)  No h/o ETOH or drug abuse      Neuro Exam:  Patient is alert oriented x4. Following commands, able to give detailed history of her complaints.  CN: EOMI (No diplopia, no nystagmus),  VFF, PERRLA, Face symmetric, facial sensation intact, hearing normal, speech and language is intact, tongue midline.  Power: 5/5 throughout/ no drift  FTN: No dysmetria  HKS: No limb ataxia  Sensory intact  Gait: deferred  No neglect      NIHSS:   Loc 0  commands 0  questions 0  VF 0  Gaze 0  Face 0  RUE 0  RLE 0  LUE 0   LLE 0  Sensory 0  Speech 0  language 0  Ataxia 0  Extinction 0    mRs  0 No symptoms at all  1 No significant disability despite symptoms; able to carry out all usual duties and activities without assistance  2 Slight disability; unable to carry out all previous activities, but able to look after own affairs  3 Moderate disability; requiring some help, but able to walk without assistance  4 Moderately severe disability; unable to walk without assistance and unable to attend to own bodily needs without assistance  5 Severe disability; bedridden, incontinent and requiring constant nursing care and attention  6 Dead          Allergies    ciprofloxacin (Urticaria; Other)          MEDICATIONS  (STANDING):      MEDICATIONS  (PRN):      LABS:                        11.0   8.51  )-----------( 378      ( 04 May 2023 01:26 )             34.8     05-04    137  |  105  |  27<H>  ----------------------------<  109<H>  5.4<H>   |  20  |  1.6<H>    Ca    9.4      04 May 2023 01:26  Mg     1.8     05-04    TPro  6.8  /  Alb  3.8  /  TBili  0.3  /  DBili  x   /  AST  31  /  ALT  19  /  AlkPhos  104  05-04    D-Dimer Assay, Quantitative (05.04.23 @ 01:26)   D-Dimer Assay, Quantitative: 451: Manufacturers recommended Cut off for VTE is 230 ng/ml D-DU ng/mL DDU    Lipid Profile in AM (03.30.23 @ 06:33)   Cholesterol, Serum: 235 mg/dL  Triglycerides, Serum: 175 mg/dL  HDL Cholesterol, Serum: 51 mg/dL  Non HDL Cholesterol: 184: Patients Atherosclerotic Cardiovascular Disease (ASCVD) Risk   LDL Cholesterol Calculated: 149 mg/dL    Neuro Imaging:  < from: CT Head No Cont (05.04.23 @ 01:12) >  Findings:    The ventricular system, basal cisterns, and sulcal pattern are   appropriate for patients age.    There is no acute extra-axial collection.  No mass effect, midline shift   or acute hemorrhage is seen.    Gray-white differentiation appears preserved.    There is no depressed skull fracture.    The paranasal sinuses and mastoid air cells are well-aerated.    Impression:    No acute intracranial abnormality.    If the patient continues to be symptomatic, follow-up MRI of the brain   with and without contrast may be helpful for further evaluation.    --- End of Report ---      FRANCISCA SEGURA MD; Attending Radiologist  This document has been electronically signed. May  4 2023  1:56AM    < end of copied text >        EEG:     Echo:   Carotid Doppler:

## 2023-05-04 NOTE — ED PROVIDER NOTE - OBJECTIVE STATEMENT
57-year-old female with history of hypothyroidism, nasopharyngeal cancer status post chemoradiation in remission since 2013, HTN, COPD, diverticulitis, depression, insomnia, anxiety presents for evaluation of multiple complaints.  Patient reports that she has been having blurry vision, headache, nausea without vomiting.  Patient was admitted to the hospital about 1 month prior for similar symptoms and was found to have that her right carotid has 80% stenosis and left has 70% stenosis.  Patient required right carotid endarterectomy and was discharged pending the left with outpatient follow-up.  States that her symptoms are occurring due to the blockage in her left carotid.  Denies any chest pain however reports shortness of breath. Denies fever, coughing, LE pain or swelling, recent travel.

## 2023-05-04 NOTE — DISCHARGE NOTE NURSING/CASE MANAGEMENT/SOCIAL WORK - NSDCPEFALRISK_GEN_ALL_CORE
For information on Fall & Injury Prevention, visit: [email or web address] OR  [email or web address] OR  https://www.cdc.gov/steadi/patient.html

## 2023-05-04 NOTE — CONSULT NOTE ADULT - NS ATTEND AMEND GEN_ALL_CORE FT
Pt is a 58 yo F with PMhx of HTN, CKD, hearing loss, nasopharyngeal Ca, carotid disease s/p R TASIA 04/4/2023, who presents with SOB, dizziness, and blurred vision. States that she was having severe dyspnea, unable to ambulate, then noted headache and lightheadedness and haziness of bilateral diffuse vision. Denies vision loss, diplopia, or other focal deficit. Has been compliant with DAPT/statin. NIHSS 0. CT head without acute process. Reasonable to obtain f/u CTA head/neck to assess for carotid disease stability, however unrelated to presenting symptoms. Consider LAURIE consult pending CTA results. please call with any further questions.

## 2023-05-04 NOTE — H&P ADULT - NSHPLABSRESULTS_GEN_ALL_CORE
05-04    137  |  105  |  27<H>  ----------------------------<  109<H>  5.4<H>   |  20  |  1.6<H>    Ca    9.4      04 May 2023 01:26  Mg     1.8     05-04    TPro  6.8  /  Alb  3.8  /  TBili  0.3  /  DBili  x   /  AST  31  /  ALT  19  /  AlkPhos  104  05-04                        11.0   8.51  )-----------( 378      ( 04 May 2023 01:26 )             34.8

## 2023-05-04 NOTE — ED PROVIDER NOTE - CLINICAL SUMMARY MEDICAL DECISION MAKING FREE TEXT BOX
Patient presented for evaluation of blurry vision, headache, shortness of breath.  Required EKG, labs, imaging, meds.  Neurology consulted to evaluate neurologic symptoms.  Patient to be admitted for further management.

## 2023-05-04 NOTE — H&P ADULT - ASSESSMENT
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 Blank) presents for blurry vision.    #Recent TIA/transient blurry vision  # S/p proximal LULI angioplasty/stenting  # Moderate left ICA stenosis  - recent intervention as mentioned above  - says vision improved postprocedure for about a week but has since worsened   - continue ASA 81 daily, brilinta 90 q12   - continue lipitor  - neuro following---> f/u CTA H/N, MR head NC    #LAKISHA, likely YAMEL  - Cr 1.6 (baseline 1.0)  - pt reports good PO intake  - like contrast induced given CTA PE in ED  - continue IVF for now    #COPD (not on home O2)  - not in exacerbation  - placed on 4L NC (unsure what O2 sat was); wean as tolerated  - continue inhalers PRN (on Trelegy ellipta at home, can do duonebs here)  - continue home montelukast 10mg daily    #HTN  - continue metoprolol succinate 100mg daily    #Depression/anxiety  #Insomnia  - continue home bupropion, klonopin, hydroxyzine PRN  - continue home zolpidem    #Hypothyroidism  - TSH 03/2023 2.81  - continue synthroid 125mcg daily    #GERD  - continue home famotidine 40mg daily   - on omeprazole 40mg at home (can give protonix 40mg as therapeutic interchange)      DVT ppx: heparin subq  GI ppx: protonix/pepcid  Diet: DASH/TLC  Full code         57 year old morbidly obese female with PMH of 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 Blank) presents for blurry vision.    #Recent TIA/transient blurry vision  # S/p proximal LULI angioplasty/stenting  # Moderate left ICA stenosis  - recent intervention as mentioned above  - says vision improved postprocedure for about a week but has since worsened   - continue ASA 81 daily, brilinta 90 q12   - continue lipitor  - neuro following---> f/u CTA H/N, MR head NC    #LAKISHA, likely YAMEL  - Cr 1.6 (baseline 1.0)  - pt reports good PO intake  - like contrast induced given CTA PE in ED  - continue IVF for now    #UTI  - saw PCP 3-4 days ago for UTI, gave her 5 day course of bactrim  - mildly symptomatic  -will give for 2 more days to complete course    #COPD (not on home O2)  - not in exacerbation  - placed on 4L NC (unsure what O2 sat was); wean as tolerated  - continue inhalers PRN (on Trelegy ellipta at home, can do duonebs here)  - continue home montelukast 10mg daily    #HTN  - continue metoprolol succinate 100mg daily    #Depression/anxiety  #Insomnia  - continue home bupropion, klonopin, hydroxyzine PRN  - continue home zolpidem    #Hypothyroidism  - TSH 03/2023 2.81  - continue synthroid 125mcg daily    #GERD  - continue home famotidine 40mg daily   - on omeprazole 40mg at home (can give protonix 40mg as therapeutic interchange)      DVT ppx: heparin subq  GI ppx: protonix/pepcid  Diet: DASH/TLC  Full code         57 year old morbidly obese female with PMH of 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 Blank) presents for blurry vision.    #Recent TIA/transient blurry vision  # S/p proximal LULI angioplasty/stenting  # Moderate left ICA stenosis  - recent intervention as mentioned above  - says vision improved postprocedure for about a week but has since worsened   - continue ASA 81 daily, brilinta 90 q12   - continue lipitor  - neuro following---> f/u CTA H/N, MR head NC    #LAKISHA, likely YAMEL  - Cr 1.6 (baseline 1.0)  - pt reports good PO intake  - like contrast induced given CTA PE in ED  - continue IVF for now    #UTI  - saw PCP 3-4 days ago for UTI, gave her 5 day course of bactrim DS 1 tab BID  - UA positive  - mildly symptomatic  -will give for 2 more days to complete course    #COPD (not on home O2)  - not in exacerbation  - placed on 4L NC (unsure what O2 sat was); wean as tolerated  - continue inhalers PRN (on Trelegy ellipta at home, can do duonebs here)  - continue home montelukast 10mg daily    #HTN  - continue metoprolol succinate 100mg daily    #Depression/anxiety  #Insomnia  - continue home bupropion, klonopin, hydroxyzine PRN  - continue home zolpidem    #Hypothyroidism  - TSH 03/2023 2.81  - continue synthroid 125mcg daily    #GERD  - continue home famotidine 40mg daily   - on omeprazole 40mg at home (can give protonix 40mg as therapeutic interchange)      DVT ppx: heparin subq  GI ppx: protonix/pepcid  Diet: DASH/TLC  Full code         57 year old morbidly obese female with PMH of 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 Blank) presents for blurry vision.    #Recent TIA/transient blurry vision  # S/p proximal LULI angioplasty/stenting  # Moderate left ICA stenosis  - recent intervention as mentioned above  - says vision improved postprocedure for about a week but has since worsened   - continue ASA 81 daily, brilinta 90 q12   - continue lipitor  - neuro following---> f/u CTA H/N, MR head NC    #LAKISHA, likely YAMEL  - Cr 1.6 (baseline 1.0)  - pt reports good PO intake  - like contrast induced given CTA PE in ED  - continue IVF for now    #UTI  - saw PCP 3-4 days ago for UTI, gave her 5 day course of bactrim DS 1 tab BID  - UA contaminated (>27 epithelial cells)  - mildly symptomatic  -will give for 2 more days to complete course    #COPD (not on home O2)  - not in exacerbation  - placed on 4L NC (unsure what O2 sat was); wean as tolerated  - continue inhalers PRN (on Trelegy ellipta at home, can do duonebs here)  - continue home montelukast 10mg daily    #HTN  - continue metoprolol succinate 100mg daily    #Depression/anxiety  #Insomnia  - continue home bupropion, klonopin, hydroxyzine PRN  - continue home zolpidem    #Hypothyroidism  - TSH 03/2023 2.81  - continue synthroid 125mcg daily    #GERD  - continue home famotidine 40mg daily   - on omeprazole 40mg at home (can give protonix 40mg as therapeutic interchange)      DVT ppx: heparin subq  GI ppx: protonix/pepcid  Diet: DASH/TLC  Full code

## 2023-05-04 NOTE — PROCEDURE NOTE - CATHETER LENGTH
10cm [] : The components of the vaccine(s) to be administered today are listed in the plan of care. The disease(s) for which the vaccine(s) are intended to prevent and the risks have been discussed with the caretaker.  The risks are also included in the appropriate vaccination information statements which have been provided to the patient's caregiver.  The caregiver has given consent to vaccinate.

## 2023-05-04 NOTE — H&P ADULT - ATTENDING COMMENTS
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 Blank) presents for blurry vision.    #Recent TIA/transient blurry vision  # S/p proximal LULI angioplasty/stenting  # Moderate left ICA stenosis  - recent intervention as mentioned above  - says vision improved postprocedure for about a week but has since worsened   - continue ASA 81 daily, brilinta 90 q12   - continue lipitor  - neuro following--->   CTA H/N: A right carotid stent is in place extending from the right common carotid   artery to the right internal carotid artery. There is mild intrastent   narrowing compatible with intimal hyperplasia within the distal portion   of the stent.  There is calcification at the left internal carotid bulb with moderate stenosis.  No evidence of major vascular occlusion or aneurysm.    MR head NC PENDING.    f/u Neuro on 5/5 & maybe consult Neuroendovascular if Neurology wants.    #LAKISHA, likely YAMEL  - Cr 1.6 (baseline 1.0)  - pt reports good PO intake  - like contrast induced given CTA PE in ED  - continue IVF for 24 more hours (improved to 1.2 on 4/4).    #UTI  - saw PCP 3-4 days ago for UTI, gave her 5 day course of bactrim DS 1 tab BID  - UA contaminated (>27 epithelial cells)  - mildly symptomatic  -will give for 2 more days to complete course    #COPD (not on home O2)  - not in exacerbation  - placed on 4L NC (unsure what O2 sat was); wean as tolerated  - continue inhalers PRN (on Trelegy ellipta at home, can do duonebs here)  - continue home montelukast 10mg daily    #HTN  - continue metoprolol succinate 100mg daily    #Depression/anxiety  #Insomnia  - continue home bupropion, klonopin, hydroxyzine PRN  - continue home zolpidem    #Hypothyroidism  - TSH 03/2023 2.81  - continue synthroid 125mcg daily    #GERD  - continue home famotidine 40mg daily   - on omeprazole 40mg at home (can give protonix 40mg as therapeutic interchange)      DVT ppx: heparin subq  GI ppx: protonix/pepcid  Diet: DASH/TLC  Full code

## 2023-05-04 NOTE — H&P ADULT - HISTORY OF PRESENT ILLNESS
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 Blank) presents for blurry vision. At that time, pt also found to have moderate left ICA stenosis but no intervention  and told to f/u on 5/31/23.  For the past 4 days, pt has had persistent intermittent blurry vision . Patient states that the blurry vision seemed to have subsided postprocedurally for about a week but then returned and now since past 4 days she has experienced the symptoms with increased frequency. She also reports that 2 days ago she fell because her right knee gaveway. Denies focal sensory deficits, n/v, headache, problems with swallowing, syncope or other complaints. She reports compliance with her medication regimen.     In the ED, pt placed on 4L NC due to hypoxia (not mentioned what O2 sat was at that time.) Dimer 451, K 5.4 (hemolyzed), Cr 1.6 (baseline 1.0), trops x1 negative, , UA positive. CTA PE negative, CTH negative.     [facility] confirmed with pt

## 2023-05-04 NOTE — PHYSICAL THERAPY INITIAL EVALUATION ADULT - LEVEL OF INDEPENDENCE: STAIR NEGOTIATION, REHAB EVAL
did not attempt today 2* to pt declined, feels weak due to not eating and allergies bothersome. Will attempt next PT tx session

## 2023-05-04 NOTE — PHYSICAL THERAPY INITIAL EVALUATION ADULT - ADDITIONAL COMMENTS
Pt lives in , 4 EVY, 1 flight to bedroom. Pt has a tub shower with shower seat, was indep with amb and ADL's PTA until recenlty  ~ 2 weeks ago needed to her 's RW due to R knee pain with R knee giving out. Pt reports she had seen ortho and had injection with minimal relief. Pt reports she had DME at home as her  in bedbound, has a Rollator, RW, w/c.

## 2023-05-04 NOTE — PATIENT PROFILE ADULT - TOBACCO USE
"9/21/2020      RE: Danisha Dobbins  [address] [id number]  [place] [address]       Dear Colleague,    Thank you for the opportunity to participate in the care of your patient, Danisha Dobbins, at the [facility] at [facility]. Please see a copy of my visit note below.    Danisha Dobbins is a 18 year old female who is being evaluated via a billable telephone visit.      The patient has been notified of following:     \"This telephone visit will be conducted via a call between you and your physician/provider. We have found that certain health care needs can be provided without the need for a physical exam.  This service lets us provide the care you need with a short phone conversation.  If a prescription is necessary we can send it directly to your pharmacy.  If lab work is needed we can place an order for that and you can then stop by our lab to have the test done at a later time.    Telephone visits are billed at different rates depending on your insurance coverage. During this emergency period, for some insurers they may be billed the same as an in-person visit.  Please reach out to your insurance provider with any questions.    If during the course of the call the physician/provider feels a telephone visit is not appropriate, you will not be charged for this service.\"    Patient has given verbal consent for Telephone visit?  Yes    What phone number would you like to be contacted at? [phone]    How would you like to obtain your AVS? MyChart      History:  18 year old female with    Myasthenia gravis and on anticoagulation for echogenic mass in the RA concerning for thrombus.      She presents to ED and admitted to [facility] 10/8-10/15/19 with sepsis from MSSA. JIMENA showed echogenic mass of lateral RA wall at IVC junction; vegetation vs thrombosis. No valvular involvement. Source of infection was felt to be related to tunneled catheter which was removed on 10/11, " catheter tip culture was positive. Patient was discharged on antibiotics and anticoagulation with follow up scheduled with ID and cardiology/JIMENA a month later. Repeat JIMENA 11/5/19 shows RA/IVC mass smaller in size. She continued cefazolin for 6 weeks and has remained on DOAC. She underwent a cardiac MR in preparation for today's visit.     Patient is without chest pain, fever, chills, orthopnea or PND, syncope. She has been exercising more. No recent hospitalization for HF/MI/stroke. She is now on subcutaneous injections for MG.       Current Outpatient Medications   Medication Sig Dispense Refill     calcium carbonate-vitamin D (OSCAL W/D) 500-200 MG-UNIT tablet Take 1 tablet by mouth daily 30 tablet 3     cyanocobalamin (VITAMIN B-12) 500 MCG SUBL sublingual tablet Place 1 tablet (500 mcg) under the tongue daily 30 tablet 3     diphenhydrAMINE (BENADRYL) 50 MG capsule Take 1 capsule (50 mg) by mouth every 6 hours as needed for itching 30 capsule 1     EPINEPHrine (EPIPEN/ADRENACLICK/OR ANY BX GENERIC EQUIV) 0.3 MG/0.3ML injection 2-pack Inject 0.3 mLs (0.3 mg) into the muscle as needed for anaphylaxis 0.6 mL 1     ferrous gluconate (FERGON) 324 (38 Fe) MG tablet Take 1 tablet (324 mg) by mouth daily (with breakfast) (Patient not taking: Reported on 1/28/2020) 30 tablet 3     fluticasone (FLONASE) 50 MCG/ACT nasal spray Spray 1 spray into both nostrils daily 9.9 mL 0     gabapentin (NEURONTIN) 300 MG capsule Take 300 mg by mouth 3 times daily        hydrOXYzine (ATARAX) 10 MG tablet        hydrOXYzine (ATARAX) 25 MG tablet Take 1 tablet (25 mg) by mouth every 4 hours as needed for anxiety 30 tablet 0     hydrOXYzine (VISTARIL) 25 MG capsule        immune globulin (HIZENTRA) 10 GM/50ML SOLN Inject 125 mLs (25 g) Subcutaneous once a week 150 mL 5     lamoTRIgine (LAMICTAL) 200 MG tablet Take 2 tablets (400 mg) by mouth At Bedtime 60 tablet 3     loratadine (CLARITIN) 10 MG tablet Take 10 mg by mouth daily         lurasidone (LATUDA) 60 MG TABS tablet Take 60 mg by mouth daily with food       mirtazapine (REMERON) 15 MG tablet        mirtazapine (REMERON) 7.5 MG tablet Take 22.5 mg by mouth At Bedtime        ondansetron (ZOFRAN) 4 MG tablet Take 1 tablet (4 mg) by mouth every 8 hours as needed for nausea 8 tablet 3     prazosin (MINIPRESS) 1 MG capsule Take 1 mg by mouth At Bedtime       prazosin (MINIPRESS) 2 MG capsule        predniSONE (DELTASONE) 50 MG tablet Emergency set: if severe allergic reaction take immediately 100mg Prednisone (2x50mg) and 2 Tabl Cetirizine 10mg and then write precise 12h retrospective diary.  If less severe reaction take only the 2 Tabl Cetirizine  Please provide patient with key chain box for these emergency medications 2 tablet 1     pyridostigmine (MESTINON) 60 MG tablet Take 1 tablet (60 mg) by mouth 3 times daily 90 tablet 3     QUEtiapine (SEROQUEL) 50 MG tablet Take 1-2 tablets ( mg) by mouth nightly as needed (anxiety and insomnia) 15 tablet 0     rivaroxaban ANTICOAGULANT (XARELTO ANTICOAGULANT) 20 MG TABS tablet Take 1 tablet (20 mg) by mouth daily (with dinner) 90 tablet 0     rivaroxaban ANTICOAGULANT (XARELTO ANTICOAGULANT) 20 MG TABS tablet Take 1 tablet (20 mg) by mouth daily (with dinner) 30 tablet 1     topiramate (TOPAMAX) 100 MG tablet Take 1 tablet (100 mg) by mouth At Bedtime (Patient taking differently: Take 50 mg by mouth At Bedtime ) 30 tablet 1       Allergies - reviewed     Allergies   Allergen Reactions     Azathioprine Other (See Comments)     Hepatotoxicity (high AST, ALT)     Blood Transfusion Related (Informational Only) Shortness Of Breath and Hives     Reaction to plasma     Peanuts [Nuts] Anaphylaxis and Nausea and Vomiting     Chlorhexidine Rash     Food Nausea and Vomiting and Nausea     Peanut  Other reaction(s): Vomiting     Tacrolimus Rash     Hives, pruritus     Aminoglycosides      can exacerbate myasthenia     Beta Adrenergic Blockers      Patient  is on allergy injections, Beta Blockers contraindicated. If medically necessary, it is OK to prescribe a Beta Blocker, but the allergy injections will need to be discontinued.      Macrolides      can exacerbate myasthenia     Quinolones Other (See Comments)     can exacerbate myasthenia     Shrimp Hives     Amoxicillin Rash     Had rash as a young child     Penicillins Rash     Had rash as a young child     Tape [Adhesive Tape] Rash     Tegaderm     Past history -reviewed  Active Ambulatory Problems     Diagnosis Date Noted     Dysphagia 10/22/2018     Allergic rhinitis due to cats 08/04/2008     Allergic rhinitis due to pollen 08/04/2008     Allergy to other foods 08/09/2011     Allergy to peanuts 08/09/2011     Eustachian tube dysfunction 08/09/2011     HSP (Henoch Schonlein purpura) (H) 07/02/2008     Shellfish allergy 08/09/2011     Myasthenia gravis without exacerbation (H) 11/19/2018     Myasthenia exacerbation (H) 11/23/2018     Suicidal ideation 12/03/2018     Myasthenia gravis (H) 01/28/2019     Myasthenia gravis with (acute) exacerbation (H) 02/05/2019     Anaphylactic reaction 02/15/2019     Sepsis (H) 10/09/2019     Iron deficiency anemia due to chronic blood loss 01/07/2020     Resolved Ambulatory Problems     Diagnosis Date Noted     No Resolved Ambulatory Problems     Past Medical History:   Diagnosis Date     Iron deficiency anemia      TMJ (dislocation of temporomandibular joint)       Social history - reviewed  Social History     Socioeconomic History     Marital status: Single     Spouse name: Not on file     Number of children: Not on file     Years of education: Not on file     Highest education level: Not on file   Occupational History     Not on file   Social Needs     Financial resource strain: Not on file     Food insecurity     Worry: Not on file     Inability: Not on file     Transportation needs     Medical: Not on file     Non-medical: Not on file   Tobacco Use     Smoking status: Never  Smoker     Smokeless tobacco: Never Used   Substance and Sexual Activity     Alcohol use: No     Drug use: No     Sexual activity: Not Currently   Lifestyle     Physical activity     Days per week: Not on file     Minutes per session: Not on file     Stress: Not on file   Relationships     Social connections     Talks on phone: Not on file     Gets together: Not on file     Attends Judaism service: Not on file     Active member of club or organization: Not on file     Attends meetings of clubs or organizations: Not on file     Relationship status: Not on file     Intimate partner violence     Fear of current or ex partner: Not on file     Emotionally abused: Not on file     Physically abused: Not on file     Forced sexual activity: Not on file   Other Topics Concern     Parent/sibling w/ CABG, MI or angioplasty before 65F 55M? Not Asked   Social History Narrative     Not on file     Family history -reviewed  Family History   Problem Relation Age of Onset     Bipolar Disorder Father      Bipolar Disorder Paternal Aunt         2 aunts     Celiac Disease Cousin      Lactose Intolerance Cousin      Melanoma Maternal Uncle      ROS: non contributory on the 10-point review of system    Exam:   In general, the patient is in no apparent distress.    There were no vitals taken for this visit.  HEENT: NC/AT.  PERRLA.  EOMI.  Sclerae white, not injected.  Pharynx without erythema or exudate.  Dentition intact.    Neck: No jugular venous distension.    Extremities: No edema.   Neurologic: Alert and oriented to person/place/time, normal speech and affect  Skin: No rash.      Data:  Recent cardiac investigations - reviewed  Labs - reviewed    Chemistry panel:   Recent Labs   Lab Test 11/22/19  1558 11/22/19 11/19/19  1231  10/12/19  0728  06/21/19  1255     --  140   < > 143   < >  --    POTASSIUM 3.6 4.4 3.7   < > 3.2*   < >  --    CHLORIDE 109  --  112*   < > 116*   < >  --    CO2 25  --  22   < > 18*   < >  --     ANIONGAP 4  --  7   < > 8   < >  --    GLC 96 80 114*   < > 97   < >  --    BUN 10  --  10   < > 9   < >  --    CR 0.82 0.9 0.83   < > 0.80   < >  --    STEVO 8.8  --  8.5   < > 8.1*   < >  --    MAG  --   --   --   --  1.9  --  2.3   GFRESTIMATED >90 >60 >90   < > >90   < >  --    AST  --  20 15  --   --   --   --    ALT  --  16 14  --   --   --   --     < > = values in this interval not displayed.       CBC:   Recent Labs   Lab Test 01/07/20  0909 11/22/19  1558   WBC 3.8* 3.4*   RBC 4.44 4.08   HGB 12.5 11.8   HCT 38.7 37.1   MCV 87 91   MCH 28.2 28.9   MCHC 32.3 31.8   RDW 12.3 13.6    186       Lipid Panel:  Recent Labs   Lab Test 09/30/19  0945   CHOL 102   HDL 24*   LDL 40   TRIG 192*       Thyroid:   TSH   Date Value Ref Range Status   04/04/2019 1.04 0.40 - 4.00 mU/L Final     No results found for: T4  Hemoglobin A1C   Date Value Ref Range Status   05/23/2019 4.6 0 - 5.6 % Final     Comment:     Normal <5.7% Prediabetes 5.7-6.4%  Diabetes 6.5% or higher - adopted from ADA   consensus guidelines.       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 I have reviewed patient's recent cardiac MR from 9/14/20 and discussed with the patient. There is a well circumscribed structure in the right atrium at the junction of the IVC that likely represents the Eustachian valve. There is no mobile component to this, but can be missed on MR due to reduced temporal resolution compared to JIMENA. Since this is also in the same location of the mobile echodensity noted on the JIMENA a follow study would be recommended. Biventricular function is normal and there is no significant valvular disease or pulmonary hypertension.   Recommendations:   Stop rivaroxaban  Repeat cMR  with contrast to ensure stability  Will give premedication with lorazepam 1 mg for cardiac MR study for claustrophibia    Phone call duration: 10 minutes    Violet Higginbotham MD, MS  Professor of Medicine  Cardiovascular division      Please do not hesitate to contact me if you have any questions/concerns.     Sincerely,     Violet Higginbotham MD     Former smoker

## 2023-05-04 NOTE — PHYSICAL THERAPY INITIAL EVALUATION ADULT - PERTINENT HX OF CURRENT PROBLEM, REHAB EVAL
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 Blank) presents for blurry vision. At that time, pt also found to have moderate left ICA stenosis but no intervention  and told to f/u on 5/31/23.  For the past 4 days, pt has had persistent intermittent blurry vision . Patient states that the blurry vision seemed to have subsided postprocedurally for about a week but then returned and now since past 4 days she has experienced the symptoms with increased frequency. She also reports that 2 days ago she fell because her right knee gaveway. Denies focal sensory deficits, n/v, headache, problems with swallowing, syncope or other complaints. She reports compliance with her medication regimen.

## 2023-05-04 NOTE — CONSULT NOTE ADULT - ASSESSMENT
58 yo RHF with h/o HTN, COPD hypothyroidism, CKD, diverticulitis, nasopharyngeal CA s/p chemo / rtx 2013, anxiety persistent intermittent blurry vision since past 4 days. Patient was seen 3/27/23 for same complaint and was noted to have high grade stenosis of the proximal LULI and moderate left ICA stenosis,  right P3, and bilateral cavernous ICA stenosis and is s/p right carotid angioplasty and stenting by Dr greene 4/4 and was discharged on DAPT (ASA 81 mg q 24+Brillinta 90mg bid). Mri brain 3/27 was negative for acute findings. Patient states that the blurry vision seemed to have subsided postprocedurally for about a week but then returned and now since past 4 days she has experienced the symptoms with increased frequency. She also reports that 2 days ago she fell because her right knee gaveway. Denies focal sensory deficits, n/v, headache, problems with swallowing, syncope or other complaints. She reports compliance with her medication regimen.     #Nonfocal neuro exam     Plan  Obtain CTA H/N now  N/C MRI BRAIN  Continue DAPT  Start Lipitor 80 mg q 24.  Neuro attending note will follow   58 yo RHF with h/o HTN, COPD hypothyroidism, CKD, diverticulitis, nasopharyngeal CA s/p chemo / rtx 2013, anxiety persistent intermittent blurry vision since past 4 days. Patient was seen 3/27/23 for same complaint and was noted to have high grade stenosis of the proximal LULI and moderate left ICA stenosis,  right P3, and bilateral cavernous ICA stenosis and is s/p right carotid angioplasty and stenting by Dr greene 4/4 and was discharged on DAPT (ASA 81 mg q 24+Brillinta 90mg bid). Mri brain 3/27 was negative for acute findings. Patient states that the blurry vision seemed to have subsided postprocedurally for about a week but then returned and now since past 4 days she has experienced the symptoms with increased frequency. She also reports that 2 days ago she fell because her right knee gaveway. Denies focal sensory deficits, n/v, headache, problems with swallowing, syncope or other complaints. She reports compliance with her medication regimen.     #Nonfocal neuro exam     Plan  Obtain CTA H/N now  Keep -180  Avoid hypotension and hypovolemia or extreme bp fluctuation  N/C MRI BRAIN  Continue DAPT  Start Lipitor 80 mg q 24.  Neuro attending note will follow

## 2023-05-04 NOTE — ED PROVIDER NOTE - NS ED ROS FT
Review of Systems    Constitutional: (-) fever  Cardiovascular: (-) chest pain, (-) syncope  Respiratory: (-) cough, (+) shortness of breath  Gastrointestinal: (-) vomiting, (-) diarrhea, (-) abdominal pain  Musculoskeletal: (-) neck pain, (-) back pain, (-) joint pain  Integumentary: (-) rash, (-) edema  Neurological: As per HPI    Except as documented in the HPI, all other systems are negative. 1-2 drinks

## 2023-05-04 NOTE — ED ADULT NURSE NOTE - NSIMPLEMENTINTERV_GEN_ALL_ED
Implemented All Fall with Harm Risk Interventions:  [place] to call system. Call bell, personal items and telephone within reach. Instruct patient to call for assistance. Room bathroom lighting operational. Non-slip footwear when patient is off stretcher. Physically safe environment: no spills, clutter or unnecessary equipment. Stretcher in lowest position, wheels locked, appropriate side rails in place. Provide visual cue, wrist band, yellow gown, etc. Monitor gait and stability. Monitor for mental status changes and reorient to person, place, and time. Review medications for side effects contributing to fall risk. Reinforce activity limits and safety measures with patient and family. Provide visual clues: red socks.

## 2023-05-05 LAB
ALBUMIN SERPL ELPH-MCNC: 3.5 G/DL — SIGNIFICANT CHANGE UP (ref 3.5–5.2)
ALP SERPL-CCNC: 94 U/L — SIGNIFICANT CHANGE UP (ref 30–115)
ALT FLD-CCNC: 15 U/L — SIGNIFICANT CHANGE UP (ref 0–41)
ANION GAP SERPL CALC-SCNC: 12 MMOL/L — SIGNIFICANT CHANGE UP (ref 7–14)
AST SERPL-CCNC: 13 U/L — SIGNIFICANT CHANGE UP (ref 0–41)
BILIRUB SERPL-MCNC: <0.2 MG/DL — SIGNIFICANT CHANGE UP (ref 0.2–1.2)
BUN SERPL-MCNC: 19 MG/DL — SIGNIFICANT CHANGE UP (ref 10–20)
CALCIUM SERPL-MCNC: 9.1 MG/DL — SIGNIFICANT CHANGE UP (ref 8.4–10.4)
CHLORIDE SERPL-SCNC: 111 MMOL/L — HIGH (ref 98–110)
CO2 SERPL-SCNC: 21 MMOL/L — SIGNIFICANT CHANGE UP (ref 17–32)
CREAT SERPL-MCNC: 1.1 MG/DL — SIGNIFICANT CHANGE UP (ref 0.7–1.5)
EGFR: 58 ML/MIN/1.73M2 — LOW
ERYTHROCYTE [SEDIMENTATION RATE] IN BLOOD: 37 MM/HR — HIGH (ref 0–20)
GLUCOSE SERPL-MCNC: 94 MG/DL — SIGNIFICANT CHANGE UP (ref 70–99)
HCT VFR BLD CALC: 31.4 % — LOW (ref 37–47)
HGB BLD-MCNC: 9.9 G/DL — LOW (ref 12–16)
MAGNESIUM SERPL-MCNC: 1.7 MG/DL — LOW (ref 1.8–2.4)
MCHC RBC-ENTMCNC: 25.6 PG — LOW (ref 27–31)
MCHC RBC-ENTMCNC: 31.5 G/DL — LOW (ref 32–37)
MCV RBC AUTO: 81.3 FL — SIGNIFICANT CHANGE UP (ref 81–99)
NRBC # BLD: 0 /100 WBCS — SIGNIFICANT CHANGE UP (ref 0–0)
PLATELET # BLD AUTO: 297 K/UL — SIGNIFICANT CHANGE UP (ref 130–400)
PMV BLD: 10.9 FL — HIGH (ref 7.4–10.4)
POTASSIUM SERPL-MCNC: 4.1 MMOL/L — SIGNIFICANT CHANGE UP (ref 3.5–5)
POTASSIUM SERPL-SCNC: 4.1 MMOL/L — SIGNIFICANT CHANGE UP (ref 3.5–5)
PROT SERPL-MCNC: 5.9 G/DL — LOW (ref 6–8)
RBC # BLD: 3.86 M/UL — LOW (ref 4.2–5.4)
RBC # FLD: 17.4 % — HIGH (ref 11.5–14.5)
SODIUM SERPL-SCNC: 144 MMOL/L — SIGNIFICANT CHANGE UP (ref 135–146)
WBC # BLD: 7.15 K/UL — SIGNIFICANT CHANGE UP (ref 4.8–10.8)
WBC # FLD AUTO: 7.15 K/UL — SIGNIFICANT CHANGE UP (ref 4.8–10.8)

## 2023-05-05 PROCEDURE — 99233 SBSQ HOSP IP/OBS HIGH 50: CPT

## 2023-05-05 PROCEDURE — 70551 MRI BRAIN STEM W/O DYE: CPT | Mod: 26

## 2023-05-05 RX ORDER — MAGNESIUM SULFATE 500 MG/ML
2 VIAL (ML) INJECTION ONCE
Refills: 0 | Status: COMPLETED | OUTPATIENT
Start: 2023-05-05 | End: 2023-05-05

## 2023-05-05 RX ORDER — ACETAMINOPHEN 500 MG
650 TABLET ORAL EVERY 6 HOURS
Refills: 0 | Status: DISCONTINUED | OUTPATIENT
Start: 2023-05-05 | End: 2023-05-07

## 2023-05-05 RX ORDER — LORATADINE 10 MG/1
10 TABLET ORAL DAILY
Refills: 0 | Status: DISCONTINUED | OUTPATIENT
Start: 2023-05-05 | End: 2023-05-07

## 2023-05-05 RX ADMIN — TICAGRELOR 90 MILLIGRAM(S): 90 TABLET ORAL at 21:02

## 2023-05-05 RX ADMIN — Medication 10 MILLIGRAM(S): at 12:39

## 2023-05-05 RX ADMIN — Medication 50 MILLIGRAM(S): at 05:28

## 2023-05-05 RX ADMIN — Medication 1 TABLET(S): at 21:02

## 2023-05-05 RX ADMIN — MONTELUKAST 10 MILLIGRAM(S): 4 TABLET, CHEWABLE ORAL at 12:54

## 2023-05-05 RX ADMIN — Medication 25 GRAM(S): at 10:02

## 2023-05-05 RX ADMIN — Medication 50 MILLIGRAM(S): at 22:39

## 2023-05-05 RX ADMIN — Medication 2 MILLIGRAM(S): at 18:29

## 2023-05-05 RX ADMIN — Medication 1 MILLIGRAM(S): at 21:02

## 2023-05-05 RX ADMIN — ATORVASTATIN CALCIUM 80 MILLIGRAM(S): 80 TABLET, FILM COATED ORAL at 21:02

## 2023-05-05 RX ADMIN — HEPARIN SODIUM 5000 UNIT(S): 5000 INJECTION INTRAVENOUS; SUBCUTANEOUS at 14:42

## 2023-05-05 RX ADMIN — Medication 125 MICROGRAM(S): at 05:15

## 2023-05-05 RX ADMIN — Medication 100 MILLIGRAM(S): at 05:15

## 2023-05-05 RX ADMIN — ZOLPIDEM TARTRATE 5 MILLIGRAM(S): 10 TABLET ORAL at 22:39

## 2023-05-05 RX ADMIN — HEPARIN SODIUM 5000 UNIT(S): 5000 INJECTION INTRAVENOUS; SUBCUTANEOUS at 05:16

## 2023-05-05 RX ADMIN — LORATADINE 10 MILLIGRAM(S): 10 TABLET ORAL at 13:05

## 2023-05-05 RX ADMIN — Medication 10 MILLIGRAM(S): at 05:15

## 2023-05-05 RX ADMIN — Medication 650 MILLIGRAM(S): at 14:40

## 2023-05-05 RX ADMIN — Medication 1 TABLET(S): at 05:16

## 2023-05-05 RX ADMIN — PANTOPRAZOLE SODIUM 40 MILLIGRAM(S): 20 TABLET, DELAYED RELEASE ORAL at 05:16

## 2023-05-05 RX ADMIN — BUPROPION HYDROCHLORIDE 300 MILLIGRAM(S): 150 TABLET, EXTENDED RELEASE ORAL at 12:40

## 2023-05-05 RX ADMIN — Medication 10 MILLIGRAM(S): at 21:02

## 2023-05-05 RX ADMIN — Medication 81 MILLIGRAM(S): at 12:38

## 2023-05-05 RX ADMIN — TICAGRELOR 90 MILLIGRAM(S): 90 TABLET ORAL at 05:16

## 2023-05-05 RX ADMIN — Medication 1 MILLIGRAM(S): at 05:18

## 2023-05-05 RX ADMIN — Medication 650 MILLIGRAM(S): at 15:10

## 2023-05-05 RX ADMIN — FAMOTIDINE 40 MILLIGRAM(S): 10 INJECTION INTRAVENOUS at 12:39

## 2023-05-05 RX ADMIN — HEPARIN SODIUM 5000 UNIT(S): 5000 INJECTION INTRAVENOUS; SUBCUTANEOUS at 21:01

## 2023-05-05 NOTE — PROGRESS NOTE ADULT - ATTENDING COMMENTS
blurry vision is only when she stands up, she feels dizzy and has blurry vision. 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 Blank) presents for blurry vision. At that time, pt also found to have moderate left ICA stenosis but no intervention  and told to f/u on 5/31/23.  For the past 4 days, pt has had persistent intermittent blurry vision . Patient states that the blurry vision seemed to have subsided postprocedurally for about a week but then returned and now since past 4 days she has experienced the symptoms with increased frequency. She also reports that 2 days ago she fell because her right knee gave way. Denies focal sensory deficits, n/v, headache, problems with swallowing, syncope or other complaints. She reports compliance with her medication regimen.     a/p  # blurry vision, hx of ICA stenosis, hx of LULI stenosis( sp angioplasty )   MRI pending,   followup with neuro endovascular team    < from: CT Angio Neck w/ IV Cont (05.04.23 @ 17:13) >    A right carotid stent is in place extending from the right common carotid   artery to the right internal carotid artery. There is mild intrastent   narrowing compatible with intimal hyperplasia within the distal portion   of the stent.    There is calcification at the left internal carotid bulb with moderate   stenosis.    No evidence of major vascular occlusion or aneurysm.    < end of copied text >    # hypothyroidsim, anxiety , copd, gerd depression, stable cont meds    #Progress Note Handoff  Pending :  mri  Family discussion: cristofer pt   Disposition: home

## 2023-05-05 NOTE — PROGRESS NOTE ADULT - SUBJECTIVE AND OBJECTIVE BOX
GEOVANNA PUTNAM 57y Female  MRN#: [id number]   CODE STATUS: full    Hospital Day: 1d    Pt is currently admitted with the primary diagnosis of blurry vision    SUBJECTIVE    Patient seen and examined at bedside. Resting comfortably in NAD. Reports lightheadedness and blurry vision on ambulation.                                            ----------------------------------------------------------  OBJECTIVE  PAST MEDICAL & SURGICAL HISTORY  COPD (chronic obstructive pulmonary disease)    HTN (hypertension)    Diverticulitis    Pulmonary nodules/lesions, multiple    Nasopharyngeal cancer    Hypothyroid    Cervical disc disease  sequelae of radtion for nasopharyngeal cancer    Left ear hearing loss    Hemorrhoids    Anxiety    History of cholecystectomy                                              -----------------------------------------------------------  ALLERGIES:  ciprofloxacin (Urticaria; Other)                                            ------------------------------------------------------------    HOME MEDICATIONS  Home Medications:  albuterol 90 mcg/inh inhalation aerosol: 2 puff(s) inhaled every 6 hours, As needed, Shortness of Breath and/or Wheezing (28 Mar 2023 13:59)  baclofen 10 mg oral tablet: 1 tab(s) orally 4 times a day (04 May 2023 09:30)  buPROPion 300 mg/24 hours (XL) oral tablet, extended release: 1 tab(s) orally every 24 hours (28 Mar 2023 13:59)  clonazePAM 1 mg oral tablet: 1 tab(s) orally 2 times a day (28 Mar 2023 13:59)  docusate sodium 100 mg oral tablet: 1 tab(s) orally 2 times a day (04 May 2023 09:30)  famotidine 40 mg oral tablet: 1 tab(s) orally (04 May 2023 09:30)  hydrOXYzine hydrochloride 10 mg oral tablet: 1 tab(s) orally 3 times a day as needed for  anxiety (04 May 2023 09:30)  metoprolol succinate 50 mg oral tablet, extended release: 1 tab(s) orally once a day (28 Mar 2023 13:59)  MONTELUKAST SODIUM 10 MG TABS: 1 dose(s) orally once a day (28 Mar 2023 13:59)  omeprazole 40 mg oral delayed release capsule: 1 tab(s) orally once a day (04 May 2023 09:30)  Trelegy Ellipta inhalation powder: 1 puff(s) inhaled once a day (28 Mar 2023 13:59)  ZOLPIDEM TARTRATE 10 MG TABS: orally once a day (at bedtime) (28 Mar 2023 13:59)                           MEDICATIONS:  STANDING MEDICATIONS  aspirin  chewable 81 milliGRAM(s) Oral daily  atorvastatin 80 milliGRAM(s) Oral at bedtime  baclofen 10 milliGRAM(s) Oral every 6 hours  buPROPion XL (24-Hour) . 300 milliGRAM(s) Oral daily  clonazePAM  Tablet 1 milliGRAM(s) Oral two times a day  famotidine    Tablet 40 milliGRAM(s) Oral daily  heparin   Injectable 5000 Unit(s) SubCutaneous every 8 hours  levothyroxine 125 MICROGram(s) Oral daily  loratadine 10 milliGRAM(s) Oral daily  LORazepam   Injectable 2 milliGRAM(s) IV Push once  magnesium sulfate  IVPB 2 Gram(s) IV Intermittent once  metoprolol succinate  milliGRAM(s) Oral daily  montelukast 10 milliGRAM(s) Oral daily  pantoprazole    Tablet 40 milliGRAM(s) Oral before breakfast  ticagrelor 90 milliGRAM(s) Oral every 12 hours  trimethoprim  160 mG/sulfamethoxazole 800 mG 1 Tablet(s) Oral two times a day    PRN MEDICATIONS  albuterol    90 MICROgram(s) HFA Inhaler 2 Puff(s) Inhalation every 6 hours PRN  diphenhydrAMINE 50 milliGRAM(s) Oral two times a day PRN  hydrOXYzine hydrochloride 10 milliGRAM(s) Oral three times a day PRN  zolpidem 5 milliGRAM(s) Oral at bedtime PRN  zolpidem 5 milliGRAM(s) Oral at bedtime PRN                                            ------------------------------------------------------------  VITAL SIGNS: Last 24 Hours  T(C): 36.5 (05 May 2023 05:00), Max: 36.5 (05 May 2023 05:00)  T(F): 97.7 (05 May 2023 05:00), Max: 97.7 (05 May 2023 05:00)  HR: 66 (05 May 2023 05:00) (66 - 70)  BP: 111/57 (05 May 2023 05:00) (111/57 - 126/76)  BP(mean): --  RR: 18 (05 May 2023 05:00) (18 - 18)  SpO2: 98% (05 May 2023 05:00) (97% - 98%)      05-04-23 @ 07:01  -  05-05-23 @ 07:00  --------------------------------------------------------  IN: 1300 mL / OUT: 0 mL / NET: 1300 mL                                             --------------------------------------------------------------  LABS:                        9.9    7.15  )-----------( 297      ( 05 May 2023 07:10 )             31.4     05-05    144  |  111<H>  |  19  ----------------------------<  94  4.1   |  21  |  1.1    Ca    9.1      05 May 2023 07:10  Mg     1.7     05-05    TPro  5.9<L>  /  Alb  3.5  /  TBili  <0.2  /  DBili  x   /  AST  13  /  ALT  15  /  AlkPhos  94  05-05      Urinalysis Basic - ( 04 May 2023 07:04 )    Color: Yellow / Appearance: Clear / SG: >1.050 / pH: x  Gluc: x / Ketone: Negative  / Bili: Negative / Urobili: <2 mg/dL   Blood: x / Protein: 30 mg/dL / Nitrite: Negative   Leuk Esterase: Small / RBC: 6 /HPF / WBC 9 /HPF   Sq Epi: x / Non Sq Epi: x / Bacteria: Few                CARDIAC MARKERS ( 04 May 2023 01:26 )  x     / <0.01 ng/mL / x     / x     / x                                                  -------------------------------------------------------------  RADIOLOGY:    < from: CT Angio Neck w/ IV Cont (05.04.23 @ 17:13) >  IMPRESSION:    A right carotid stent is in place extending from the right common carotid   artery to the right internal carotid artery. There is mild intrastent   narrowing compatible with intimal hyperplasia within the distal portion   of the stent.    There is calcification at the left internal carotid bulb with moderate   stenosis.    No evidence of major vascular occlusion or aneurysm.    --- End of Report ---    < end of copied text >  < from: CT Angio Head w/ IV Cont (05.04.23 @ 17:13) >  IMPRESSION:    A right carotid stent is in place extending from the right common carotid   artery to the right internal carotid artery. There is mild intrastent   narrowing compatible with intimal hyperplasia within the distal portion   of the stent.    There is calcification at the left internal carotid bulb with moderate   stenosis.    No evidence of major vascular occlusion or aneurysm.    --- End of Report ---    < end of copied text >                                              --------------------------------------------------------------    PHYSICAL EXAM:  GENERAL: NAD, resting comfortably in bed  HEENT: Normocephalic, atraumatic  Neck: Supple, non-tender, no lymphadenopathy.   PULMONARY: Clear to auscultation bilaterally. No rales, ronchi, or wheezing.   CARDIOVASCULAR: Regular rate and rhythm, S1-S2, no murmurs   GASTROINTESTINAL: Soft, non-tender, non-distended, no guarding.   SKIN/EXTREMITIES: No LE edema b/l  NEUROLOGIC: AAOX3                                           --------------------------------------------------------------    ASSESSMENT & PLAN    57 year old morbidly obese female with PMH of 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 Blank) presents for blurry vision.    # Recent TIA/transient blurry vision  # Moderate left ICA stenosis  - s/p proximal LULI angioplasty/stenting; vision improved s/p procedure but has recently worsened  - c/w ASA 81 daily, Brilinta 90 q12, Atorvastatin 80mg qd  - CT angio head/neck: mild intrastent narrowing compatible with intimal hyperplasia within the distal portion of the LULI stent. No major occlusion/aneurysm  - MRI pending  - f/u neurology recs    # LAKISHA, resolved  - Cr 1.6 > 1.1  - like contrast induced given CTA PE in ED    # UTI  - being treated for a UTI out-patient   - 2 more days of Bactrim to complete course    # COPD (not on home O2)  - not in exacerbation  - placed on 4L NC (unsure what O2 sat was); wean as tolerated  - continue inhalers PRN (on Trelegy ellipta at home, can do duonebs here)  - continue home montelukast 10mg daily    # HTN  - c/w Metoprolol succinate 100mg qd    # Depression/anxiety  # Insomnia  - continue home bupropion, klonopin, hydroxyzine PRN  - continue home zolpidem    # Hypothyroidism  - TSH 03/2023 2.81  - c/w Synthroid 125mcg daily    # GERD  - c/w Famotidine 40mg daily   - on omeprazole 40mg at home (can give protonix 40mg as therapeutic interchange)    #DVT ppx: heparin subq  #GI ppx: protonix/pepcid  #Diet: DASH/TLC  #Code: Full   #Dispo: acute, pending MRI  PT --> home PT

## 2023-05-06 ENCOUNTER — TRANSCRIPTION ENCOUNTER (OUTPATIENT)
Age: 58
End: 2023-05-06

## 2023-05-06 LAB
ALBUMIN SERPL ELPH-MCNC: 3.4 G/DL — LOW (ref 3.5–5.2)
ALP SERPL-CCNC: 98 U/L — SIGNIFICANT CHANGE UP (ref 30–115)
ALT FLD-CCNC: 12 U/L — SIGNIFICANT CHANGE UP (ref 0–41)
ANION GAP SERPL CALC-SCNC: 12 MMOL/L — SIGNIFICANT CHANGE UP (ref 7–14)
AST SERPL-CCNC: 12 U/L — SIGNIFICANT CHANGE UP (ref 0–41)
BILIRUB SERPL-MCNC: <0.2 MG/DL — SIGNIFICANT CHANGE UP (ref 0.2–1.2)
BUN SERPL-MCNC: 18 MG/DL — SIGNIFICANT CHANGE UP (ref 10–20)
CALCIUM SERPL-MCNC: 8.8 MG/DL — SIGNIFICANT CHANGE UP (ref 8.4–10.5)
CHLORIDE SERPL-SCNC: 110 MMOL/L — SIGNIFICANT CHANGE UP (ref 98–110)
CO2 SERPL-SCNC: 19 MMOL/L — SIGNIFICANT CHANGE UP (ref 17–32)
CREAT SERPL-MCNC: 1 MG/DL — SIGNIFICANT CHANGE UP (ref 0.7–1.5)
EGFR: 65 ML/MIN/1.73M2 — SIGNIFICANT CHANGE UP
GLUCOSE SERPL-MCNC: 93 MG/DL — SIGNIFICANT CHANGE UP (ref 70–99)
MAGNESIUM SERPL-MCNC: 2.1 MG/DL — SIGNIFICANT CHANGE UP (ref 1.8–2.4)
POTASSIUM SERPL-MCNC: 4.2 MMOL/L — SIGNIFICANT CHANGE UP (ref 3.5–5)
POTASSIUM SERPL-SCNC: 4.2 MMOL/L — SIGNIFICANT CHANGE UP (ref 3.5–5)
PROT SERPL-MCNC: 5.8 G/DL — LOW (ref 6–8)
SODIUM SERPL-SCNC: 141 MMOL/L — SIGNIFICANT CHANGE UP (ref 135–146)

## 2023-05-06 PROCEDURE — 99239 HOSP IP/OBS DSCHRG MGMT >30: CPT

## 2023-05-06 RX ORDER — DIPHENHYDRAMINE HCL 50 MG
25 CAPSULE ORAL ONCE
Refills: 0 | Status: COMPLETED | OUTPATIENT
Start: 2023-05-06 | End: 2023-05-06

## 2023-05-06 RX ADMIN — HEPARIN SODIUM 5000 UNIT(S): 5000 INJECTION INTRAVENOUS; SUBCUTANEOUS at 23:12

## 2023-05-06 RX ADMIN — Medication 1 TABLET(S): at 05:04

## 2023-05-06 RX ADMIN — Medication 10 MILLIGRAM(S): at 17:32

## 2023-05-06 RX ADMIN — Medication 10 MILLIGRAM(S): at 00:12

## 2023-05-06 RX ADMIN — Medication 10 MILLIGRAM(S): at 12:02

## 2023-05-06 RX ADMIN — Medication 10 MILLIGRAM(S): at 23:13

## 2023-05-06 RX ADMIN — Medication 81 MILLIGRAM(S): at 12:02

## 2023-05-06 RX ADMIN — HEPARIN SODIUM 5000 UNIT(S): 5000 INJECTION INTRAVENOUS; SUBCUTANEOUS at 05:07

## 2023-05-06 RX ADMIN — MONTELUKAST 10 MILLIGRAM(S): 4 TABLET, CHEWABLE ORAL at 12:02

## 2023-05-06 RX ADMIN — Medication 1 MILLIGRAM(S): at 17:32

## 2023-05-06 RX ADMIN — ZOLPIDEM TARTRATE 5 MILLIGRAM(S): 10 TABLET ORAL at 23:13

## 2023-05-06 RX ADMIN — PANTOPRAZOLE SODIUM 40 MILLIGRAM(S): 20 TABLET, DELAYED RELEASE ORAL at 06:11

## 2023-05-06 RX ADMIN — HEPARIN SODIUM 5000 UNIT(S): 5000 INJECTION INTRAVENOUS; SUBCUTANEOUS at 13:44

## 2023-05-06 RX ADMIN — Medication 25 MILLIGRAM(S): at 23:13

## 2023-05-06 RX ADMIN — FAMOTIDINE 40 MILLIGRAM(S): 10 INJECTION INTRAVENOUS at 12:02

## 2023-05-06 RX ADMIN — Medication 1 MILLIGRAM(S): at 05:04

## 2023-05-06 RX ADMIN — TICAGRELOR 90 MILLIGRAM(S): 90 TABLET ORAL at 17:32

## 2023-05-06 RX ADMIN — Medication 10 MILLIGRAM(S): at 05:04

## 2023-05-06 RX ADMIN — TICAGRELOR 90 MILLIGRAM(S): 90 TABLET ORAL at 05:04

## 2023-05-06 RX ADMIN — LORATADINE 10 MILLIGRAM(S): 10 TABLET ORAL at 12:03

## 2023-05-06 RX ADMIN — BUPROPION HYDROCHLORIDE 300 MILLIGRAM(S): 150 TABLET, EXTENDED RELEASE ORAL at 12:03

## 2023-05-06 RX ADMIN — Medication 100 MILLIGRAM(S): at 05:04

## 2023-05-06 RX ADMIN — Medication 125 MICROGRAM(S): at 05:05

## 2023-05-06 NOTE — DISCHARGE NOTE PROVIDER - ATTENDING DISCHARGE PHYSICAL EXAMINATION:
T(F): 97.1 (05-06-23 @ 05:00), Max: 97.1 (05-06-23 @ 05:00)  HR: 63 (05-06-23 @ 06:22) (63 - 75)  BP: 121/57 (05-06-23 @ 06:22) (97/60 - 195/71)  RR: 18 (05-06-23 @ 05:00) (18 - 18)  SpO2: 96% (05-06-23 @ 05:00) (94% - 96%)  Physical exam:   constitutional NAD, AAOX3, Respiratory  lungs CTA, CVS heart RRR, GI: abdomen Soft NT, ND, BS+, skin: intact  neuro exam Motor, sensory and CN normal, no deficit

## 2023-05-06 NOTE — DISCHARGE NOTE PROVIDER - NSDCCPCAREPLAN_GEN_ALL_CORE_FT
PRINCIPAL DISCHARGE DIAGNOSIS  Diagnosis: Blurry vision  Assessment and Plan of Treatment: You came into the hospital with headache and blurry vission/dizziness when standing. CT of the head and neck showed mild narrowing of the LULI stent, but no major blockages. MRI of the brain was normal. Your blood pressure was normal when standing up. You are stable for discharge and should follow-up with Dr. Blank when you leave the hospital.

## 2023-05-06 NOTE — DISCHARGE NOTE PROVIDER - CARE PROVIDERS DIRECT ADDRESSES
,DirectAddress_Unknown ,DirectAddress_Unknown,DirectAddress_Unknown,[email or web address]

## 2023-05-06 NOTE — DIETITIAN INITIAL EVALUATION ADULT - ENERGY INTAKE
has good appetite and PO intake, having preferred food from home  needs to have softer/ minced foods secondary to swallowing difficulty  >75% estimated energy needs during admission

## 2023-05-06 NOTE — DIETITIAN INITIAL EVALUATION ADULT - NSFNSNUTRCHEWSWALLOWFT_GEN_A_CORE
reports since radiation therapy 10 yrs ago has had trouble swallowing secondary to narrowing of esophagus, typically has liquified/ soft foods

## 2023-05-06 NOTE — DISCHARGE NOTE PROVIDER - NSDCFUSCHEDAPPT_GEN_ALL_CORE_FT
Dawood Herrera  [facility]  ONCPAINMGT [address]  Scheduled Appointment: 05/16/2023    Bhanu Blank  [facility]  NEUROSURG [address]  Scheduled Appointment: 05/31/2023    [facility]  ONCORTHO [address]  Scheduled Appointment: 06/06/2023

## 2023-05-06 NOTE — DISCHARGE NOTE PROVIDER - NSDCFUADDAPPT_GEN_ALL_CORE_FT
APPTS ARE READY TO BE MADE: [ ] YES    Best Family or Patient Contact (if needed):    Additional Information about above appointments (if needed):    1: PMD  2: pulm  3: cardio    Other comments or requests:

## 2023-05-06 NOTE — DIETITIAN INITIAL EVALUATION ADULT - NSFNSGIIOFT_GEN_A_CORE
^  weight 5/4/23  .8kg, 6.3% weight loss within 1 month, though partially intentional with changes in eating habits  IBW 54.5kg

## 2023-05-06 NOTE — DIETITIAN INITIAL EVALUATION ADULT - PERTINENT LABORATORY DATA
05-06    141  |  110  |  18  ----------------------------<  93  4.2   |  19  |  1.0    Ca    8.8      06 May 2023 06:54  Mg     2.1     05-06    TPro  5.8<L>  /  Alb  3.4<L>  /  TBili  <0.2  /  DBili  x   /  AST  12  /  ALT  12  /  AlkPhos  98  05-06  A1C with Estimated Average Glucose Result: 6.9 % (03-30-23 @ 06:33)

## 2023-05-06 NOTE — DIETITIAN INITIAL EVALUATION ADULT - PERTINENT MEDS FT
MEDICATIONS  (STANDING):  aspirin  chewable 81 milliGRAM(s) Oral daily  atorvastatin 80 milliGRAM(s) Oral at bedtime  baclofen 10 milliGRAM(s) Oral every 6 hours  buPROPion XL (24-Hour) . 300 milliGRAM(s) Oral daily  clonazePAM  Tablet 1 milliGRAM(s) Oral two times a day  famotidine    Tablet 40 milliGRAM(s) Oral daily  heparin   Injectable 5000 Unit(s) SubCutaneous every 8 hours  levothyroxine 125 MICROGram(s) Oral daily  loratadine 10 milliGRAM(s) Oral daily  metoprolol succinate  milliGRAM(s) Oral daily  montelukast 10 milliGRAM(s) Oral daily  pantoprazole    Tablet 40 milliGRAM(s) Oral before breakfast  ticagrelor 90 milliGRAM(s) Oral every 12 hours    MEDICATIONS  (PRN):  acetaminophen     Tablet .. 650 milliGRAM(s) Oral every 6 hours PRN Temp greater or equal to 38C (100.4F), Mild Pain (1 - 3)  albuterol    90 MICROgram(s) HFA Inhaler 2 Puff(s) Inhalation every 6 hours PRN Shortness of Breath and/or Wheezing  hydrOXYzine hydrochloride 10 milliGRAM(s) Oral three times a day PRN for anxiety  zolpidem 5 milliGRAM(s) Oral at bedtime PRN Insomnia  zolpidem 5 milliGRAM(s) Oral at bedtime PRN Insomnia

## 2023-05-06 NOTE — DISCHARGE NOTE PROVIDER - NSDCMRMEDTOKEN_GEN_ALL_CORE_FT
albuterol 90 mcg/inh inhalation aerosol: 2 puff(s) inhaled every 6 hours, As needed, Shortness of Breath and/or Wheezing  aspirin 81 mg oral tablet, chewable: 1 tab(s) orally once a day  atorvastatin 80 mg oral tablet: 1 tab(s) orally once a day (at bedtime)  baclofen 10 mg oral tablet: 1 tab(s) orally 4 times a day  buPROPion 300 mg/24 hours (XL) oral tablet, extended release: 1 tab(s) orally every 24 hours  clonazePAM 1 mg oral tablet: 1 tab(s) orally 2 times a day  docusate sodium 100 mg oral tablet: 1 tab(s) orally 2 times a day  famotidine 40 mg oral tablet: 1 tab(s) orally once a day  hydrOXYzine hydrochloride 10 mg oral tablet: 1 tab(s) orally 3 times a day as needed for  anxiety  levothyroxine 125 mcg (0.125 mg) oral tablet: 1 tab(s) orally once a day  metoprolol succinate 50 mg oral tablet, extended release: 1 tab(s) orally once a day  MONTELUKAST SODIUM 10 MG TABS: 1 dose(s) orally once a day  omeprazole 40 mg oral delayed release capsule: 1 tab(s) orally once a day  ticagrelor 90 mg oral tablet: 1 tab(s) orally every 12 hours  Trelegy Ellipta inhalation powder: 1 puff(s) inhaled once a day  ZOLPIDEM TARTRATE 10 MG TABS: orally once a day (at bedtime)

## 2023-05-06 NOTE — DIETITIAN INITIAL EVALUATION ADULT - ORAL INTAKE PTA/DIET HISTORY
patient endorses decreased appetite over the last month though also intentionally decreased intake of soda, cakes, high kcal foods and subsequently lost weight.  prior to this over the last year had been eating adequately and had weight gain secondary to increased eating to cope with stress.

## 2023-05-06 NOTE — DISCHARGE NOTE PROVIDER - HOSPITAL COURSE
Hospital Course:  57 year old morbidly obese female with PMH of 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 Blank) presents for blurry vision. CT angio head/neck: mild intrastent narrowing compatible with intimal hyperplasia within the distal portion of the LULI stent. No major occlusion/aneurysm. MRI brain normal. Orthostatics were negative. Patient is ready for discharge with neuro-endovascular follow-up.       # COPD (not on home O2)- continue inhalers PRN and montelukast 10mg daily  # HTN- c/w Metoprolol succinate 100mg qd  # Depression/anxiety- continue home bupropion, klonopin, hydroxyzine PRN  # Insomnia- continue home zolpidem  # Hypothyroidism- c/w Synthroid 125mcg daily  # GERD- c/w Famotidine 40mg daily and omeprazole 40mg    Patient is stable for discharge home.     Hospital Course:  57 year old morbidly obese female with PMH of 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 Blank) presents for blurry vision. CT angio head/neck: mild intrastent narrowing compatible with intimal hyperplasia within the distal portion of the LULI stent. No major occlusion/aneurysm. MRI brain normal. Orthostatics were negative. Patient is ready for discharge with neuro-endovascular follow-up as outpt.     today all symptoms are gone. no dizziness, no blurry vision.   # blurry vision and dizziness are resolved   # COPD (not on home O2)- continue inhalers PRN and montelukast 10mg daily  # HTN- c/w Metoprolol succinate 100mg qd  # Depression/anxiety- continue home bupropion, klonopin, hydroxyzine PRN  # Insomnia- continue home zolpidem  # Hypothyroidism- c/w Synthroid 125mcg daily  # GERD- c/w Famotidine 40mg daily and omeprazole 40mg    Patient is stable for discharge home.     Medical attending -    Patient seen and examined this morning  sitting in bed  in nad  no complaints    Vital Signs Last 24 Hrs  T(C): 36.1 (07 May 2023 04:59), Max: 36.1 (07 May 2023 04:59)  T(F): 97 (07 May 2023 04:59), Max: 97 (07 May 2023 04:59)  HR: 92 (07 May 2023 04:59) (75 - 92)  BP: 125/76 (07 May 2023 04:59) (116/68 - 144/69)  BP(mean): --  RR: 18 (07 May 2023 04:59) (16 - 18)  SpO2: 94% (06 May 2023 12:53) (94% - 94%)    Parameters below as of 06 May 2023 12:53  Patient On (Oxygen Delivery Method): room air    Hospital Course:  57 year old morbidly obese female with PMH of 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 Blank) presents for blurry vision. CT angio head/neck: mild intrastent narrowing compatible with intimal hyperplasia within the distal portion of the LULI stent. No major occlusion/aneurysm. MRI brain normal. Orthostatics were negative. Patient is ready for discharge with neuro-endovascular follow-up as outpt.     today all symptoms are gone. no dizziness, no blurry vision.   # blurry vision and dizziness are resolved   # COPD (not on home O2)- continue inhalers PRN and montelukast 10mg daily  # HTN- c/w Metoprolol succinate 100mg qd  # Depression/anxiety- continue home bupropion, klonopin, hydroxyzine PRN  # Insomnia- continue home zolpidem  # Hypothyroidism- c/w Synthroid 125mcg daily  # GERD- c/w Famotidine 40mg daily and omeprazole 40mg  #obesity - diet and lifestyle modification     Patient is stable for discharge home.  D/C today; d/c planning took over 75 min  d/c papers done by me  discussed d/c plan and all instructions in detail

## 2023-05-06 NOTE — DIETITIAN INITIAL EVALUATION ADULT - CONTINUE CURRENT NUTRITION CARE PLAN
Consult SLP services to determine safest/least restrictive diet patient c/o trouble swallowing    + DASH/TLC/yes

## 2023-05-06 NOTE — DISCHARGE NOTE PROVIDER - PROVIDER TOKENS
PROVIDER:[TOKEN:[[id number]],FOLLOWUP:[1 week]] PROVIDER:[TOKEN:[[id number]],FOLLOWUP:[1 week]],PROVIDER:[TOKEN:[[id number]],FOLLOWUP:[2 weeks]],PROVIDER:[TOKEN:[[id number]],FOLLOWUP:[2 weeks]]

## 2023-05-06 NOTE — DISCHARGE NOTE PROVIDER - NSDCHHNEEDSERVICE_GEN_ALL_CORE
Medication teaching and assessment/Teaching and training Medication teaching and assessment/Rehabilitation services

## 2023-05-07 VITALS
RESPIRATION RATE: 18 BRPM | DIASTOLIC BLOOD PRESSURE: 76 MMHG | HEART RATE: 92 BPM | TEMPERATURE: 97 F | SYSTOLIC BLOOD PRESSURE: 125 MMHG

## 2023-05-07 LAB
-  AMIKACIN: SIGNIFICANT CHANGE UP
-  AMOXICILLIN/CLAVULANIC ACID: SIGNIFICANT CHANGE UP
-  AMPICILLIN/SULBACTAM: SIGNIFICANT CHANGE UP
-  AMPICILLIN: SIGNIFICANT CHANGE UP
-  AMPICILLIN: SIGNIFICANT CHANGE UP
-  AZTREONAM: SIGNIFICANT CHANGE UP
-  CEFAZOLIN: SIGNIFICANT CHANGE UP
-  CEFEPIME: SIGNIFICANT CHANGE UP
-  CEFOXITIN: SIGNIFICANT CHANGE UP
-  CEFTRIAXONE: SIGNIFICANT CHANGE UP
-  CEFUROXIME: SIGNIFICANT CHANGE UP
-  CIPROFLOXACIN: SIGNIFICANT CHANGE UP
-  CIPROFLOXACIN: SIGNIFICANT CHANGE UP
-  ERTAPENEM: SIGNIFICANT CHANGE UP
-  GENTAMICIN: SIGNIFICANT CHANGE UP
-  IMIPENEM: SIGNIFICANT CHANGE UP
-  LEVOFLOXACIN: SIGNIFICANT CHANGE UP
-  LEVOFLOXACIN: SIGNIFICANT CHANGE UP
-  MEROPENEM: SIGNIFICANT CHANGE UP
-  NITROFURANTOIN: SIGNIFICANT CHANGE UP
-  NITROFURANTOIN: SIGNIFICANT CHANGE UP
-  PIPERACILLIN/TAZOBACTAM: SIGNIFICANT CHANGE UP
-  TETRACYCLINE: SIGNIFICANT CHANGE UP
-  TOBRAMYCIN: SIGNIFICANT CHANGE UP
-  TRIMETHOPRIM/SULFAMETHOXAZOLE: SIGNIFICANT CHANGE UP
-  VANCOMYCIN: SIGNIFICANT CHANGE UP
ALBUMIN SERPL ELPH-MCNC: 3.5 G/DL — SIGNIFICANT CHANGE UP (ref 3.5–5.2)
ALP SERPL-CCNC: 99 U/L — SIGNIFICANT CHANGE UP (ref 30–115)
ALT FLD-CCNC: 14 U/L — SIGNIFICANT CHANGE UP (ref 0–41)
ANION GAP SERPL CALC-SCNC: 11 MMOL/L — SIGNIFICANT CHANGE UP (ref 7–14)
AST SERPL-CCNC: 12 U/L — SIGNIFICANT CHANGE UP (ref 0–41)
BASOPHILS # BLD AUTO: 0.03 K/UL — SIGNIFICANT CHANGE UP (ref 0–0.2)
BASOPHILS NFR BLD AUTO: 0.4 % — SIGNIFICANT CHANGE UP (ref 0–1)
BILIRUB SERPL-MCNC: 0.3 MG/DL — SIGNIFICANT CHANGE UP (ref 0.2–1.2)
BUN SERPL-MCNC: 15 MG/DL — SIGNIFICANT CHANGE UP (ref 10–20)
CALCIUM SERPL-MCNC: 9 MG/DL — SIGNIFICANT CHANGE UP (ref 8.4–10.5)
CHLORIDE SERPL-SCNC: 108 MMOL/L — SIGNIFICANT CHANGE UP (ref 98–110)
CO2 SERPL-SCNC: 21 MMOL/L — SIGNIFICANT CHANGE UP (ref 17–32)
CREAT SERPL-MCNC: 1 MG/DL — SIGNIFICANT CHANGE UP (ref 0.7–1.5)
CULTURE RESULTS: SIGNIFICANT CHANGE UP
EGFR: 65 ML/MIN/1.73M2 — SIGNIFICANT CHANGE UP
EOSINOPHIL # BLD AUTO: 0.3 K/UL — SIGNIFICANT CHANGE UP (ref 0–0.7)
EOSINOPHIL NFR BLD AUTO: 4 % — SIGNIFICANT CHANGE UP (ref 0–8)
GLUCOSE SERPL-MCNC: 99 MG/DL — SIGNIFICANT CHANGE UP (ref 70–99)
HCT VFR BLD CALC: 31.8 % — LOW (ref 37–47)
HGB BLD-MCNC: 9.9 G/DL — LOW (ref 12–16)
IMM GRANULOCYTES NFR BLD AUTO: 0.4 % — HIGH (ref 0.1–0.3)
LYMPHOCYTES # BLD AUTO: 1.8 K/UL — SIGNIFICANT CHANGE UP (ref 1.2–3.4)
LYMPHOCYTES # BLD AUTO: 23.9 % — SIGNIFICANT CHANGE UP (ref 20.5–51.1)
MAGNESIUM SERPL-MCNC: 1.9 MG/DL — SIGNIFICANT CHANGE UP (ref 1.8–2.4)
MCHC RBC-ENTMCNC: 25.1 PG — LOW (ref 27–31)
MCHC RBC-ENTMCNC: 31.1 G/DL — LOW (ref 32–37)
MCV RBC AUTO: 80.5 FL — LOW (ref 81–99)
METHOD TYPE: SIGNIFICANT CHANGE UP
METHOD TYPE: SIGNIFICANT CHANGE UP
MONOCYTES # BLD AUTO: 0.6 K/UL — SIGNIFICANT CHANGE UP (ref 0.1–0.6)
MONOCYTES NFR BLD AUTO: 8 % — SIGNIFICANT CHANGE UP (ref 1.7–9.3)
NEUTROPHILS # BLD AUTO: 4.77 K/UL — SIGNIFICANT CHANGE UP (ref 1.4–6.5)
NEUTROPHILS NFR BLD AUTO: 63.3 % — SIGNIFICANT CHANGE UP (ref 42.2–75.2)
NRBC # BLD: 0 /100 WBCS — SIGNIFICANT CHANGE UP (ref 0–0)
ORGANISM # SPEC MICROSCOPIC CNT: SIGNIFICANT CHANGE UP
PLATELET # BLD AUTO: 291 K/UL — SIGNIFICANT CHANGE UP (ref 130–400)
PMV BLD: 11.2 FL — HIGH (ref 7.4–10.4)
POTASSIUM SERPL-MCNC: 4.1 MMOL/L — SIGNIFICANT CHANGE UP (ref 3.5–5)
POTASSIUM SERPL-SCNC: 4.1 MMOL/L — SIGNIFICANT CHANGE UP (ref 3.5–5)
PROT SERPL-MCNC: 6 G/DL — SIGNIFICANT CHANGE UP (ref 6–8)
RBC # BLD: 3.95 M/UL — LOW (ref 4.2–5.4)
RBC # FLD: 17.5 % — HIGH (ref 11.5–14.5)
SODIUM SERPL-SCNC: 140 MMOL/L — SIGNIFICANT CHANGE UP (ref 135–146)
SPECIMEN SOURCE: SIGNIFICANT CHANGE UP
WBC # BLD: 7.53 K/UL — SIGNIFICANT CHANGE UP (ref 4.8–10.8)
WBC # FLD AUTO: 7.53 K/UL — SIGNIFICANT CHANGE UP (ref 4.8–10.8)

## 2023-05-07 PROCEDURE — 99233 SBSQ HOSP IP/OBS HIGH 50: CPT

## 2023-05-07 RX ADMIN — Medication 10 MILLIGRAM(S): at 11:25

## 2023-05-07 RX ADMIN — TICAGRELOR 90 MILLIGRAM(S): 90 TABLET ORAL at 05:19

## 2023-05-07 RX ADMIN — Medication 1 MILLIGRAM(S): at 05:25

## 2023-05-07 RX ADMIN — MONTELUKAST 10 MILLIGRAM(S): 4 TABLET, CHEWABLE ORAL at 11:29

## 2023-05-07 RX ADMIN — LORATADINE 10 MILLIGRAM(S): 10 TABLET ORAL at 11:29

## 2023-05-07 RX ADMIN — HEPARIN SODIUM 5000 UNIT(S): 5000 INJECTION INTRAVENOUS; SUBCUTANEOUS at 13:46

## 2023-05-07 RX ADMIN — HEPARIN SODIUM 5000 UNIT(S): 5000 INJECTION INTRAVENOUS; SUBCUTANEOUS at 05:19

## 2023-05-07 RX ADMIN — FAMOTIDINE 40 MILLIGRAM(S): 10 INJECTION INTRAVENOUS at 11:29

## 2023-05-07 RX ADMIN — Medication 100 MILLIGRAM(S): at 05:20

## 2023-05-07 RX ADMIN — Medication 81 MILLIGRAM(S): at 11:24

## 2023-05-07 RX ADMIN — PANTOPRAZOLE SODIUM 40 MILLIGRAM(S): 20 TABLET, DELAYED RELEASE ORAL at 05:21

## 2023-05-07 RX ADMIN — BUPROPION HYDROCHLORIDE 300 MILLIGRAM(S): 150 TABLET, EXTENDED RELEASE ORAL at 11:28

## 2023-05-07 RX ADMIN — ATORVASTATIN CALCIUM 80 MILLIGRAM(S): 80 TABLET, FILM COATED ORAL at 00:26

## 2023-05-07 RX ADMIN — Medication 10 MILLIGRAM(S): at 05:20

## 2023-05-07 RX ADMIN — Medication 125 MICROGRAM(S): at 05:20

## 2023-05-10 DIAGNOSIS — Z86.73 PERSONAL HISTORY OF TRANSIENT ISCHEMIC ATTACK (TIA), AND CEREBRAL INFARCTION WITHOUT RESIDUAL DEFICITS: ICD-10-CM

## 2023-05-10 DIAGNOSIS — F32.A DEPRESSION, UNSPECIFIED: ICD-10-CM

## 2023-05-10 DIAGNOSIS — Z88.1 ALLERGY STATUS TO OTHER ANTIBIOTIC AGENTS STATUS: ICD-10-CM

## 2023-05-10 DIAGNOSIS — Z85.818 PERSONAL HISTORY OF MALIGNANT NEOPLASM OF OTHER SITES OF LIP, ORAL CAVITY, AND PHARYNX: ICD-10-CM

## 2023-05-10 DIAGNOSIS — N17.9 ACUTE KIDNEY FAILURE, UNSPECIFIED: ICD-10-CM
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
## 2023-05-10 DIAGNOSIS — I10 ESSENTIAL (PRIMARY) HYPERTENSION: ICD-10-CM

## 2023-05-10 DIAGNOSIS — H53.8 OTHER VISUAL DISTURBANCES: ICD-10-CM

## 2023-05-10 DIAGNOSIS — Z79.82 LONG TERM (CURRENT) USE OF ASPIRIN: ICD-10-CM

## 2023-05-10 DIAGNOSIS — R42 DIZZINESS AND GIDDINESS: ICD-10-CM

## 2023-05-10 DIAGNOSIS — Z79.02 LONG TERM (CURRENT) USE OF ANTITHROMBOTICS/ANTIPLATELETS: ICD-10-CM

## 2023-05-10 DIAGNOSIS — N39.0 URINARY TRACT INFECTION, SITE NOT SPECIFIED: ICD-10-CM

## 2023-05-10 DIAGNOSIS — Z92.21 PERSONAL HISTORY OF ANTINEOPLASTIC CHEMOTHERAPY: ICD-10-CM

## 2023-05-10 DIAGNOSIS — I65.22 OCCLUSION AND STENOSIS OF LEFT CAROTID ARTERY: ICD-10-CM

## 2023-05-10 DIAGNOSIS — Z92.3 PERSONAL HISTORY OF IRRADIATION: ICD-10-CM

## 2023-05-10 DIAGNOSIS — K21.9 GASTRO-ESOPHAGEAL REFLUX DISEASE WITHOUT ESOPHAGITIS: ICD-10-CM

## 2023-05-10 DIAGNOSIS — F41.9 ANXIETY DISORDER, UNSPECIFIED: ICD-10-CM

## 2023-05-10 DIAGNOSIS — Z20.822 CONTACT WITH AND (SUSPECTED) EXPOSURE TO COVID-19: ICD-10-CM

## 2023-05-10 DIAGNOSIS — Z87.891 PERSONAL HISTORY OF NICOTINE DEPENDENCE: ICD-10-CM

## 2023-05-10 DIAGNOSIS — E03.9 HYPOTHYROIDISM, UNSPECIFIED: ICD-10-CM

## 2023-05-10 DIAGNOSIS — T50.8X5A ADVERSE EFFECT OF DIAGNOSTIC AGENTS, INITIAL ENCOUNTER: ICD-10-CM

## 2023-05-10 DIAGNOSIS — G47.00 INSOMNIA, UNSPECIFIED: ICD-10-CM

## 2023-05-10 DIAGNOSIS — E66.01 MORBID (SEVERE) OBESITY DUE TO EXCESS CALORIES: ICD-10-CM

## 2023-05-10 DIAGNOSIS — J44.9 CHRONIC OBSTRUCTIVE PULMONARY DISEASE, UNSPECIFIED: ICD-10-CM

## 2023-05-10 DIAGNOSIS — Z79.890 HORMONE REPLACEMENT THERAPY: ICD-10-CM

## 2023-05-10 DIAGNOSIS — Z90.49 ACQUIRED ABSENCE OF OTHER SPECIFIED PARTS OF DIGESTIVE TRACT: ICD-10-CM

## 2023-05-16 ENCOUNTER — APPOINTMENT (OUTPATIENT)
Dept: PAIN MANAGEMENT | Facility: CLINIC | Age: 58
End: 2023-05-16
Payer: MEDICAID

## 2023-05-16 VITALS — HEIGHT: 64 IN | BODY MASS INDEX: 50.02 KG/M2 | WEIGHT: 293 LBS

## 2023-05-16 DIAGNOSIS — M25.561 PAIN IN RIGHT KNEE: ICD-10-CM

## 2023-05-16 PROCEDURE — 99214 OFFICE O/P EST MOD 30 MIN: CPT

## 2023-05-16 NOTE — HISTORY OF PRESENT ILLNESS
[FreeTextEntry1] : ORIGINAL PRESENTATION: She is a 56-year old female patient with severe chronic neck pain with pain traveling down her right arm. She experiences weakness in her upper extremities bilaterally. In the past, cervical epidural injections have helped. Her last RADHAMES was done in August 2019, however she was unable to finish the series of injections as her  lost his insurance coverage at that time.\par \par TODAY: Revisit encounter. \par \par She is presenting after long delay.  To recap, she presented for an office visit a few months ago.  Prior to her being seen by me, she developed nonspecific neurological symptoms and an ambulance was called.  The patient was taken to the hospital where she subsequently was found to have carotid artery severe blockages.  Since her last visit, she has undergone carotid artery stent placement which was complicated by hoarseness in her voice.  She has also developed palpitations and shortness of breath for which she has seen cardiology.\par \par As for her right knee and neck pain, she states the symptoms range anywhere from a 6 to a 9/10 on the pain scale.\par

## 2023-05-16 NOTE — ASSESSMENT
[FreeTextEntry1] : 57-year-old female presenting with ongoing right knee and neck pain in the setting of extensive comorbidities.  I advised her that I will not be able to prescribe any opioids or perform any sort of interventions into the underlying conditions are well controlled.  She will follow-up with me in 6 months for reassessment.\par I have explained the findings to the patient and all questions have been answered.\par \par Entered by Thea Chris, acting as scribe for Dr. Herrera.\par  \par The documentation recorded by the scribe, in my presence, accurately reflects the service I personally performed, and the decisions made by me with my edits as appropriate.\par  \par Best Regards, \par Dawood Herrera MD \par Board Certified, Anesthesiology \par Board Certified, Pain Medicine\par

## 2023-05-16 NOTE — PHYSICAL EXAM
[de-identified] : Right knee - tenderness over the right knee joint. ROM restricted. \par \par NECK - tenderness over the cervical paraspinals. ROM restricted. Pain with flexion. Positive spurling bilaterally.

## 2023-05-30 NOTE — PROGRESS NOTE ADULT - ASSESSMENT
Assessment and Plan:   · Assessment		  Acute recurrent diverticulitis  -Currently on clear liquid diet   -GI: advance diet as tolerated, DC after PO intake, finish abx course   -Fluids: LR @ 125 cc/hr   -Per surgery, no intervention until outpatient colonoscopy at 6 weeks, ambulation, pain control  -Rocephin and Flagyl IV   -IV Zofran PRN nausea     #Anemia  -Hb 10.4, continue to monitor     #COPD not on home O2  -symbicort + spiriva (pt on breo + incruse at home)  - ventolin prn    #GERD  -protonix qd    #anxiety  clonazepam prn + bupropion    #insomnia  zolpidem    #htn English

## 2023-05-31 ENCOUNTER — APPOINTMENT (OUTPATIENT)
Dept: NEUROSURGERY | Facility: CLINIC | Age: 58
End: 2023-05-31
Payer: MEDICAID

## 2023-05-31 VITALS — WEIGHT: 293 LBS | BODY MASS INDEX: 50.02 KG/M2 | HEIGHT: 64 IN

## 2023-05-31 PROCEDURE — 99213 OFFICE O/P EST LOW 20 MIN: CPT

## 2023-06-01 NOTE — HISTORY OF PRESENT ILLNESS
[FreeTextEntry1] : 57-year-old female presents to the neurosurgery office today as a follow-up s/p diagnostic cerebral angiogram and right carotid artery angioplasty and stent placement on 4/4/2023. 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 Peacock \par \par Ms. PUTNAM initially presented to the emergency department on 3/28/2023 for a period of transient blurry vision at home for approximately 15 minutes. MRI of the brain with and without IVC 3/28/2023 was unremarkable however CTA neck 3/27/2023 portrayed severe high-grade stenosis of the proximal right ICA and moderate stenosis of the proximal left ICA. Patient underwent right carotid artery angioplasty and stent placement on 4/4/2023. \par \par She presents today complaining of hoarseness to her voice and mild shortness of breath at times. She underwent a recent cardiology work-up as a result and verbalized that there were no concerns. It was determined today that Brilinta could be the reasoning of the shortness of breath as it has been a known reported side effect. As a result, the patient will be switched to dual antiplatelet therapy of ASA 325mg PO daily and Plavix 75mg PO daily from ASA 81mg PO daily and Brilinta 90mg PO twice daily. She agreed to do so. She is also going to follow up with her pulmonologist. \par \par Ms. PUTNAM presented recently back to the emergency department on 5/4/2023 complaints of dizziness and underwent a CTA head and neck which revealed right stent patency and was negative for stenosis of the carotid arteries bilaterally.  Due to recent diagnostic imaging the patient is going to undergo ultrasound of the bilateral carotid arteries in 6 months after which she will return to the office to review results, unless needed sooner.

## 2023-06-01 NOTE — ASSESSMENT
[FreeTextEntry1] : 57-year-old female presents to the neurosurgery office today as a postoperative visit for right carotid artery stent placement on 4/4/2023.  Initially presented to the emergency department for complaints of blurred vision for 15 minutes and was found to have severe ICA stenosis on CTA imaging.  Patient is overall doing well today however complains of mild shortness of breath, underwent a negative cardiac work-up recently with her primary cardiologist, therefore she will be instructed to change her antiplatelet therapy to Plavix instead of Brilinta for it has a known side effect of shortness of breath.  She agreed.  She recently underwent another CTA scan on 5/4/2023 during another emergency room visit for dizziness which revealed patent carotid artery stent and no concerns to the carotid arteries bilaterally therefore she will have repeat carotid artery ultrasound in 6 months.

## 2023-06-05 NOTE — CONSULT NOTE ADULT - SUBJECTIVE AND OBJECTIVE BOX
RESCHEDULED WITH PATIENT   Patient is a 54y old  Female who presents with a chief complaint of Abdominal pain (15 Oct 2020 02:22)      HPI:  cardiac consult requested for the risk assessment prior to upcoming colonoscopy and colon resection.  first time visit, no prior cardiac Hx, no cardiac complaint, however hx of obesity, COPD, DAVID, hypothyroidism nasopharyngeal cancer, post CHT and RT, multiple diverticulitis, and now persistent infection & inflammation needs surgery.    55 yo female  PMHx: HTN, COPD not on home O2, nasopharyngeal carcinoma s/p radiation in 2013, recurrent diverticulitis more than 6 episodes in the last 10 years  CC: Abdominal pain  History dates back to 1 day when pt started having abdominal pian, located in the left lower quadrant, non radiating, worse with movement and better with pain medications and lying still. Pain was associated with nausea, no vomiting, No bloody diarrhea this admission. Pt denied any fever, chill. Last BM was on Saturday.  Pt was recently admitted to the hospital with similar complaints, treated for acute uncomplicated diverticulitis.  ER Course: Vitals on admission were /83, , afebrile, on RA. CT showed mild/acute uncomplicated diverticulitis at proximal sigmoid colon. Pt received IV Cefepime and Flagyl with Dilaudid and Zofran. Pt also received 1L NS bolus.  (12 Oct 2020 08:31)      PAST MEDICAL & SURGICAL HISTORY:  Anxiety    Hemorrhoids    Left ear hearing loss    Cervical disc disease  sequelae of radtion for nasopharyngeal cancer    Hypothyroid    Nasopharyngeal cancer    Pulmonary nodules/lesions, multiple    Diverticulitis    HTN (hypertension)    COPD (chronic obstructive pulmonary disease)    History of cholecystectomy        PREVIOUS DIAGNOSTIC TESTING:      ECHO  FINDINGS: pending      MEDICATIONS  (STANDING):  buPROPion XL . 300 milliGRAM(s) Oral daily  chlorhexidine 4% Liquid 1 Application(s) Topical <User Schedule>  clonazePAM  Tablet 1 milliGRAM(s) Oral two times a day  enalapril 10 milliGRAM(s) Oral daily  ertapenem  IVPB      ertapenem  IVPB 1000 milliGRAM(s) IV Intermittent every 24 hours  famotidine    Tablet 20 milliGRAM(s) Oral daily  heparin   Injectable 5000 Unit(s) SubCutaneous every 12 hours  levothyroxine 125 MICROGram(s) Oral daily  montelukast 10 milliGRAM(s) Oral daily  saccharomyces boulardii 250 milliGRAM(s) Oral two times a day  simethicone 80 milliGRAM(s) Chew every 6 hours  tiotropium 18 MICROgram(s) Capsule 1 Capsule(s) Inhalation daily    MEDICATIONS  (PRN):  acetaminophen   Tablet .. 650 milliGRAM(s) Oral every 6 hours PRN Mild Pain (1 - 3)  ALBUTerol    90 MICROgram(s) HFA Inhaler 2 Puff(s) Inhalation every 6 hours PRN Shortness of Breath and/or Wheezing  HYDROmorphone  Injectable 1 milliGRAM(s) IV Push every 4 hours PRN Severe Pain (7 - 10)  methocarbamol 750 milliGRAM(s) Oral every 8 hours PRN muescel spasm  ondansetron Injectable 4 milliGRAM(s) IV Push every 6 hours PRN Nausea and/or Vomiting  oxycodone    5 mG/acetaminophen 325 mG 1 Tablet(s) Oral every 6 hours PRN Moderate Pain (4 - 6)  sodium chloride 0.65% Nasal 1 Spray(s) Both Nostrils every 6 hours PRN Nasal Congestion  zolpidem 5 milliGRAM(s) Oral at bedtime PRN Insomnia      FAMILY HISTORY:  Family history of lung cancer (Father, Mother)        SOCIAL HISTORY:  CIGARETTES: no  ALCOHOL: no  DRUGS: no                      REVIEW OF SYSTEMS:  CONSTITUTIONAL: No distress, Looks stable, supine  NECK: No pain   RESPIRATORY: No cough, wheezing, shortness of breath  CARDIOVASCULAR: No chest pain, SOB, palpitations, leg swelling  GASTROINTESTINAL: Abdominal pain. No nausea, vomiting, or hematemesis;  No melena.  NEUROLOGICAL: No dizziness, headaches, memory loss, loss of strength  SKIN: No itching, burning, rashes, or lesions   ENDOCRINE: No heat or cold intolerance  ALLERGY: No hives, itching, rash          Vital Signs Last 24 Hrs  T(C): 36.9 (14 Oct 2020 20:38), Max: 36.9 (14 Oct 2020 20:38)  T(F): 98.5 (14 Oct 2020 20:38), Max: 98.5 (14 Oct 2020 20:38)  HR: 78 (15 Oct 2020 08:45) (78 - 83)  BP: 138/72 (14 Oct 2020 20:38) (138/72 - 158/73)  BP(mean): --  RR: 18 (14 Oct 2020 20:38) (18 - 20)  SpO2: 95% (15 Oct 2020 08:45) (95% - 96%)                      PHYSICAL EXAM:  GENERAL: No distress, overweight  HEAD:  Atraumatic, Normocephalic  NECK: Supple, No JVD, No Bruit, short fatty neck  NERVOUS SYSTEM:  Alert, Awake, Oriented to time, place, person; Normal memory and speech; Normal motor Strength 5/5 B/L upper and lower extremities  CHEST/LUNG: Normal air entry to lung base bilaterally; No wheeze, crackle, rales, rhonchi  HEART: Regular heart beat, S1, A2, P2, No S3, No S4, No gallop, No murmur  ABDOMEN: Soft, mildly tender , Non distended; Bowel sounds present  EXTREMITIES:  2+ Peripheral Pulses, No clubbing, No edema  SKIN: No rashes or lesions    ECG: < from: 12 Lead ECG (10.01.20 @ 19:36) >  Normal sinus rhythm  Incomplete right bundle branch block  T wave abnormality, consider lateral ischemia    < end of copied text >  MY READING: NSR, AXIS -20 DEGREE, T INVERSION IN I AND aVL, REST WAS NORMAL     I&O's Detail    14 Oct 2020 07:01  -  15 Oct 2020 07:00  --------------------------------------------------------  IN:    Lactated Ringers: 600 mL  Total IN: 600 mL    OUT:  Total OUT: 0 mL    Total NET: 600 mL          LABS:                        10.3   6.61  )-----------( 318      ( 15 Oct 2020 06:12 )             33.7     10-15    140  |  103  |  11  ----------------------------<  119<H>  3.8   |  27  |  0.9    Ca    8.8      15 Oct 2020 06:12    TPro  5.9<L>  /  Alb  3.5  /  TBili  <0.2  /  DBili  x   /  AST  26  /  ALT  20  /  AlkPhos  64  10-15            I&O's Summary    14 Oct 2020 07:01  -  15 Oct 2020 07:00  --------------------------------------------------------  IN: 600 mL / OUT: 0 mL / NET: 600 mL        RADIOLOGY & ADDITIONAL STUDIES: < from: CT Abdomen and Pelvis w/ IV Cont (10.12.20 @ 01:06) >  Findings compatible with a mild/uncomplicated acute diverticulitis at the proximal sigmoid colon    < end of copied text >  < from: CT Abdomen and Pelvis w/ IV Cont (10.12.20 @ 01:06) >  VASCULATURE: Normal caliber abdominal aorta with atherosclerotic changes.    < end of copied text >

## 2023-06-06 ENCOUNTER — APPOINTMENT (OUTPATIENT)
Dept: ORTHOPEDIC SURGERY | Facility: CLINIC | Age: 58
End: 2023-06-06
Payer: MEDICAID

## 2023-06-06 PROBLEM — S89.91XA INJURY OF RIGHT KNEE, INITIAL ENCOUNTER: Status: ACTIVE | Noted: 2023-06-06

## 2023-06-06 PROBLEM — M17.12 PRIMARY OSTEOARTHRITIS OF LEFT KNEE: Status: ACTIVE | Noted: 2023-04-24

## 2023-06-06 PROBLEM — M17.11 PRIMARY OSTEOARTHRITIS OF RIGHT KNEE: Status: ACTIVE | Noted: 2023-06-06

## 2023-06-06 PROBLEM — S89.92XA INJURY OF LEFT KNEE, INITIAL ENCOUNTER: Status: ACTIVE | Noted: 2023-04-24

## 2023-06-06 PROCEDURE — 99213 OFFICE O/P EST LOW 20 MIN: CPT

## 2023-06-06 NOTE — HISTORY OF PRESENT ILLNESS
[de-identified] : Patient is a 57-year-old female here for evaluation of right knee.  Patient states on 4/14/2023 her right knee gave out/buckled.  Patient states she fell on the area of the right knee.  Patient was immediate pain and went to emergency room.  X-rays were taken and patient was told she has osteoarthritis.  Patient was told to follow with orthopedics for further evaluation.  Patient has been walking with a walker for support and stability.  Patient states that most of her pain is to the medial aspect of the knee.  Patient states in the past she has been treated with cortisone injections that have helped.  Denies numbness/tingling.\par \par \par \par \par

## 2023-06-06 NOTE — DISCUSSION/SUMMARY
[de-identified] : Discussed x-rays detail patient showing advanced osteoarthritis.  Discussed with patient that she also has bone contusion of the knee.  Bone contusions generally last for 4 to 6 weeks, first 2 weeks usually the worst.  Discussed treatment for bone contusion/osteoarthritis in detail with patient including rest, ice/heat, range of motion exercise, anti-inflammatory medication, weight loss.  Prescription for hinged knee brace given for support.  Advised continuation of walker/cane until more stable.  Discussed possibility of future total knee replacement if pain continues.  Patient agreed to cortisone injection left knee.\par \par Dexamethasone and Lidocaine  \par \par Anesthesia: ethyl chloride sprayed topically. Dexamethasone 10mg/1mL 2 cc.  \par \par Xylocaine 1%: 2 cc.  \par \par   \par \par Medication was injected in the RT knee after verbal consent using sterile preparation and technique. The risks, benefits, and alternatives to cortisone injection were explained in full to the patient. Risks outlined include but are not limited to infection, sepsis, bleeding, scarring, skin discoloration, temporary increase in pain, syncopal episode, failure to resolve symptoms, allergic reaction, symptom recurrence, and elevation of blood sugar in diabetics. Patient understood the risks. All questions were answered. After discussion of options, patient requested an injection. Oral informed consent was obtained and sterile prep was done of the injection site. Sterile technique was utilized for the procedure including the preparation of the solutions used for the injection. Patient tolerated the procedure well. Advised to ice the injection site this evening. \par \par Call office if symptoms worsen or change.  Patient understands agrees with plan.  Follow-up in 4 to 6 weeks for reevaluation.  Seen under supervision of Dr. Lugo.

## 2023-06-06 NOTE — DISCUSSION/SUMMARY
[de-identified] : Discussed x-rays detail patient showing advanced osteoarthritis.  Discussed with patient that she also has bone contusion of the knee.  Bone contusions generally last for 4 to 6 weeks, first 2 weeks usually the worst.  Discussed treatment for bone contusion/osteoarthritis in detail with patient including rest, ice/heat, range of motion exercise, anti-inflammatory medication, weight loss.  Prescription for hinged knee brace given for support.  Advised continuation of walker/cane until more stable.  Discussed possibility of future total knee replacement if pain continues.  Patient agreed to cortisone injection left knee.\par \par Dexamethasone and Lidocaine  \par \par Anesthesia: ethyl chloride sprayed topically. Dexamethasone 10mg/1mL 2 cc.  \par \par Xylocaine 1%: 2 cc.  \par \par   \par \par Medication was injected in the RT knee after verbal consent using sterile preparation and technique. The risks, benefits, and alternatives to cortisone injection were explained in full to the patient. Risks outlined include but are not limited to infection, sepsis, bleeding, scarring, skin discoloration, temporary increase in pain, syncopal episode, failure to resolve symptoms, allergic reaction, symptom recurrence, and elevation of blood sugar in diabetics. Patient understood the risks. All questions were answered. After discussion of options, patient requested an injection. Oral informed consent was obtained and sterile prep was done of the injection site. Sterile technique was utilized for the procedure including the preparation of the solutions used for the injection. Patient tolerated the procedure well. Advised to ice the injection site this evening. \par \par Call office if symptoms worsen or change.  Patient understands agrees with plan.  Follow-up in 4 to 6 weeks for reevaluation.  Seen under supervision of Dr. Lugo.

## 2023-06-06 NOTE — PHYSICAL EXAM
[Left] : left knee [4___] : hamstring 4[unfilled]/5 [Equivocal] : equivocal Tevin [Right] : right knee [] : no extensor lag [de-identified] : Valgus knee [[id number]] : flexion 130 degrees [de-identified] : extension 0 degrees

## 2023-06-06 NOTE — PHYSICAL EXAM
[Right] : right knee [4___] : hamstring 4[unfilled]/5 [Equivocal] : equivocal Tevin [] : uses walker [de-identified] : Valgus knee pain with varus and valgus pressure. [[id number]] : flexion 130 degrees [de-identified] : extension 0 degrees

## 2023-06-06 NOTE — DISCUSSION/SUMMARY
[de-identified] : Patient's instability is worsening.  MRI ordered for further evaluation of instability.  Call 2 to 3 days after MRI to discuss results in detail.  We will discuss further treatment plan after MRI results.  Call with any questions or concerns.  Patient understands agrees with plan.\par \par

## 2023-06-06 NOTE — HISTORY OF PRESENT ILLNESS
[de-identified] : Patient is a 57-year-old female here for evaluation of right knee.  Patient states on 4/14/2023 her right knee gave out/buckled.  Patient states she fell on the area of the right knee.  Patient was immediate pain and went to emergency room.  X-rays were taken and patient was told she has osteoarthritis.  Patient was told to follow with orthopedics for further evaluation.  Patient has been walking with a walker for support and stability.  Patient states that most of her pain is to the medial aspect of the knee.  Patient states in the past she has been treated with cortisone injections that have helped.  Denies numbness/tingling.\par \par \par \par \par

## 2023-06-06 NOTE — PHYSICAL EXAM
[Left] : left knee [4___] : hamstring 4[unfilled]/5 [Equivocal] : equivocal Tevin [Right] : right knee [] : no extensor lag [de-identified] : Valgus knee [[id number]] : flexion 130 degrees [de-identified] : extension 0 degrees

## 2023-06-06 NOTE — DATA REVIEWED
[FreeTextEntry1] : X-rays RT knee: No acute fractures, subluxations, dislocations.  Advanced osteoarthritis with severe medial joint line narrowing.  Severe patellofemoral osteoarthritis.

## 2023-06-06 NOTE — HISTORY OF PRESENT ILLNESS
[de-identified] : Patient is a 57-year-old female following up for right knee injury.  Patient states that her pain has slightly improved but she still has pain and a lot of instability of the right knee.  Patient has been using knee brace for stability.  Patient states that the knee gives out on her multiple times a day and feels very unstable.  Denies numbness/tingling, denies further injury or trauma.

## 2023-07-12 NOTE — ED ADULT NURSE NOTE - NS PRO PASSIVE SMOKE EXP
No pt with traumatic crush injury to the R hand region s/p work injury tonight. Noted mild abrasion to the region. NV Intact and palpable symmetric pulses, compartment soft, Xray with possible non displaced fx of the 3rd distal MCP region, wound irrigated, bacitracin and telfa applied, clam shell splint applied, NV Intact, wound care and splint instructions provided, f/u with hand, pt verbalized understanding pt with traumatic crush injury to the R hand region s/p work injury tonight. Noted mild abrasion to the region. NV Intact and palpable symmetric pulses, compartment soft, Xray with possible non displaced fx of the 3rd distal MCP region, wound irrigated, bacitracin and telfa applied, course of duricef, NSAID/APAP for pain control, clam shell splint applied, NV Intact, wound care and splint instructions provided, f/u with hand, pt verbalized understanding

## 2023-07-14 ENCOUNTER — APPOINTMENT (OUTPATIENT)
Dept: ORTHOPEDIC SURGERY | Facility: CLINIC | Age: 58
End: 2023-07-14

## 2023-07-25 ENCOUNTER — APPOINTMENT (OUTPATIENT)
Dept: ORTHOPEDIC SURGERY | Facility: CLINIC | Age: 58
End: 2023-07-25

## 2023-08-14 NOTE — ED ADULT NURSE NOTE - PAIN RATING/NUMBER SCALE (0-10): ACTIVITY
FAMILY HISTORY:  Family history of diabetes mellitus (DM)    
0 (no pain/absence of nonverbal indicators of pain)

## 2023-09-14 ENCOUNTER — INPATIENT (INPATIENT)
Facility: HOSPITAL | Age: 58
LOS: 5 days | Discharge: HOME | DRG: 199 | End: 2023-09-20
Attending: INTERNAL MEDICINE | Admitting: STUDENT IN AN ORGANIZED HEALTH CARE EDUCATION/TRAINING PROGRAM
Payer: MEDICAID

## 2023-09-14 VITALS
SYSTOLIC BLOOD PRESSURE: 130 MMHG | WEIGHT: 293 LBS | DIASTOLIC BLOOD PRESSURE: 78 MMHG | HEIGHT: 64 IN | HEART RATE: 78 BPM | TEMPERATURE: 98 F | RESPIRATION RATE: 18 BRPM

## 2023-09-14 DIAGNOSIS — Z85.818 PERSONAL HISTORY OF MALIGNANT NEOPLASM OF OTHER SITES OF LIP, ORAL CAVITY, AND PHARYNX: ICD-10-CM

## 2023-09-14 DIAGNOSIS — E11.9 TYPE 2 DIABETES MELLITUS WITHOUT COMPLICATIONS: ICD-10-CM

## 2023-09-14 DIAGNOSIS — Z88.1 ALLERGY STATUS TO OTHER ANTIBIOTIC AGENTS STATUS: ICD-10-CM

## 2023-09-14 DIAGNOSIS — K64.9 UNSPECIFIED HEMORRHOIDS: ICD-10-CM

## 2023-09-14 DIAGNOSIS — I16.1 HYPERTENSIVE EMERGENCY: ICD-10-CM

## 2023-09-14 DIAGNOSIS — D50.0 IRON DEFICIENCY ANEMIA SECONDARY TO BLOOD LOSS (CHRONIC): ICD-10-CM

## 2023-09-14 DIAGNOSIS — F32.A DEPRESSION, UNSPECIFIED: ICD-10-CM

## 2023-09-14 DIAGNOSIS — K21.9 GASTRO-ESOPHAGEAL REFLUX DISEASE WITHOUT ESOPHAGITIS: ICD-10-CM

## 2023-09-14 DIAGNOSIS — F41.9 ANXIETY DISORDER, UNSPECIFIED: ICD-10-CM

## 2023-09-14 DIAGNOSIS — Z92.21 PERSONAL HISTORY OF ANTINEOPLASTIC CHEMOTHERAPY: ICD-10-CM

## 2023-09-14 DIAGNOSIS — N17.9 ACUTE KIDNEY FAILURE, UNSPECIFIED: ICD-10-CM

## 2023-09-14 DIAGNOSIS — G47.00 INSOMNIA, UNSPECIFIED: ICD-10-CM

## 2023-09-14 DIAGNOSIS — H91.92 UNSPECIFIED HEARING LOSS, LEFT EAR: ICD-10-CM

## 2023-09-14 DIAGNOSIS — Z95.820 PERIPHERAL VASCULAR ANGIOPLASTY STATUS WITH IMPLANTS AND GRAFTS: ICD-10-CM

## 2023-09-14 DIAGNOSIS — Z90.49 ACQUIRED ABSENCE OF OTHER SPECIFIED PARTS OF DIGESTIVE TRACT: ICD-10-CM

## 2023-09-14 DIAGNOSIS — R91.1 SOLITARY PULMONARY NODULE: ICD-10-CM

## 2023-09-14 DIAGNOSIS — Z92.3 PERSONAL HISTORY OF IRRADIATION: ICD-10-CM

## 2023-09-14 DIAGNOSIS — E03.9 HYPOTHYROIDISM, UNSPECIFIED: ICD-10-CM

## 2023-09-14 DIAGNOSIS — J44.9 CHRONIC OBSTRUCTIVE PULMONARY DISEASE, UNSPECIFIED: ICD-10-CM

## 2023-09-14 DIAGNOSIS — Z90.49 ACQUIRED ABSENCE OF OTHER SPECIFIED PARTS OF DIGESTIVE TRACT: Chronic | ICD-10-CM

## 2023-09-14 DIAGNOSIS — I65.22 OCCLUSION AND STENOSIS OF LEFT CAROTID ARTERY: ICD-10-CM

## 2023-09-14 DIAGNOSIS — Z87.891 PERSONAL HISTORY OF NICOTINE DEPENDENCE: ICD-10-CM

## 2023-09-14 DIAGNOSIS — Z79.82 LONG TERM (CURRENT) USE OF ASPIRIN: ICD-10-CM

## 2023-09-14 DIAGNOSIS — E66.01 MORBID (SEVERE) OBESITY DUE TO EXCESS CALORIES: ICD-10-CM

## 2023-09-14 DIAGNOSIS — I10 ESSENTIAL (PRIMARY) HYPERTENSION: ICD-10-CM

## 2023-09-14 LAB
ALBUMIN SERPL ELPH-MCNC: 4 G/DL — SIGNIFICANT CHANGE UP (ref 3.5–5.2)
ALP SERPL-CCNC: 104 U/L — SIGNIFICANT CHANGE UP (ref 30–115)
ALT FLD-CCNC: 14 U/L — SIGNIFICANT CHANGE UP (ref 0–41)
ANION GAP SERPL CALC-SCNC: 12 MMOL/L — SIGNIFICANT CHANGE UP (ref 7–14)
AST SERPL-CCNC: 17 U/L — SIGNIFICANT CHANGE UP (ref 0–41)
BASOPHILS # BLD AUTO: 0.04 K/UL — SIGNIFICANT CHANGE UP (ref 0–0.2)
BASOPHILS NFR BLD AUTO: 0.6 % — SIGNIFICANT CHANGE UP (ref 0–1)
BILIRUB SERPL-MCNC: <0.2 MG/DL — SIGNIFICANT CHANGE UP (ref 0.2–1.2)
BUN SERPL-MCNC: 23 MG/DL — HIGH (ref 10–20)
CALCIUM SERPL-MCNC: 9.1 MG/DL — SIGNIFICANT CHANGE UP (ref 8.4–10.5)
CHLORIDE SERPL-SCNC: 105 MMOL/L — SIGNIFICANT CHANGE UP (ref 98–110)
CO2 SERPL-SCNC: 24 MMOL/L — SIGNIFICANT CHANGE UP (ref 17–32)
CREAT SERPL-MCNC: 1.4 MG/DL — SIGNIFICANT CHANGE UP (ref 0.7–1.5)
EGFR: 43 ML/MIN/1.73M2 — LOW
EOSINOPHIL # BLD AUTO: 0.27 K/UL — SIGNIFICANT CHANGE UP (ref 0–0.7)
EOSINOPHIL NFR BLD AUTO: 3.8 % — SIGNIFICANT CHANGE UP (ref 0–8)
GLUCOSE SERPL-MCNC: 100 MG/DL — HIGH (ref 70–99)
HCT VFR BLD CALC: 33.6 % — LOW (ref 37–47)
HGB BLD-MCNC: 10.2 G/DL — LOW (ref 12–16)
IMM GRANULOCYTES NFR BLD AUTO: 0.4 % — HIGH (ref 0.1–0.3)
LYMPHOCYTES # BLD AUTO: 2.02 K/UL — SIGNIFICANT CHANGE UP (ref 1.2–3.4)
LYMPHOCYTES # BLD AUTO: 28.4 % — SIGNIFICANT CHANGE UP (ref 20.5–51.1)
MCHC RBC-ENTMCNC: 23.5 PG — LOW (ref 27–31)
MCHC RBC-ENTMCNC: 30.4 G/DL — LOW (ref 32–37)
MCV RBC AUTO: 77.4 FL — LOW (ref 81–99)
MONOCYTES # BLD AUTO: 0.64 K/UL — HIGH (ref 0.1–0.6)
MONOCYTES NFR BLD AUTO: 9 % — SIGNIFICANT CHANGE UP (ref 1.7–9.3)
NEUTROPHILS # BLD AUTO: 4.12 K/UL — SIGNIFICANT CHANGE UP (ref 1.4–6.5)
NEUTROPHILS NFR BLD AUTO: 57.8 % — SIGNIFICANT CHANGE UP (ref 42.2–75.2)
NRBC # BLD: 0 /100 WBCS — SIGNIFICANT CHANGE UP (ref 0–0)
PLATELET # BLD AUTO: 360 K/UL — SIGNIFICANT CHANGE UP (ref 130–400)
PMV BLD: 10.8 FL — HIGH (ref 7.4–10.4)
POTASSIUM SERPL-MCNC: 4.5 MMOL/L — SIGNIFICANT CHANGE UP (ref 3.5–5)
POTASSIUM SERPL-SCNC: 4.5 MMOL/L — SIGNIFICANT CHANGE UP (ref 3.5–5)
PROT SERPL-MCNC: 6.6 G/DL — SIGNIFICANT CHANGE UP (ref 6–8)
RBC # BLD: 4.34 M/UL — SIGNIFICANT CHANGE UP (ref 4.2–5.4)
RBC # FLD: 18.4 % — HIGH (ref 11.5–14.5)
SODIUM SERPL-SCNC: 141 MMOL/L — SIGNIFICANT CHANGE UP (ref 135–146)
WBC # BLD: 7.12 K/UL — SIGNIFICANT CHANGE UP (ref 4.8–10.8)
WBC # FLD AUTO: 7.12 K/UL — SIGNIFICANT CHANGE UP (ref 4.8–10.8)

## 2023-09-14 PROCEDURE — 70450 CT HEAD/BRAIN W/O DYE: CPT | Mod: 26,MA

## 2023-09-14 PROCEDURE — 99285 EMERGENCY DEPT VISIT HI MDM: CPT

## 2023-09-14 RX ORDER — DIPHENHYDRAMINE HCL 50 MG
25 CAPSULE ORAL ONCE
Refills: 0 | Status: COMPLETED | OUTPATIENT
Start: 2023-09-14 | End: 2023-09-14

## 2023-09-14 RX ORDER — METOCLOPRAMIDE HCL 10 MG
10 TABLET ORAL ONCE
Refills: 0 | Status: COMPLETED | OUTPATIENT
Start: 2023-09-14 | End: 2023-09-14

## 2023-09-14 RX ORDER — SODIUM CHLORIDE 9 MG/ML
500 INJECTION INTRAMUSCULAR; INTRAVENOUS; SUBCUTANEOUS ONCE
Refills: 0 | Status: COMPLETED | OUTPATIENT
Start: 2023-09-14 | End: 2023-09-14

## 2023-09-14 RX ADMIN — Medication 104 MILLIGRAM(S): at 22:10

## 2023-09-14 RX ADMIN — Medication 25 MILLIGRAM(S): at 22:10

## 2023-09-14 RX ADMIN — SODIUM CHLORIDE 500 MILLILITER(S): 9 INJECTION INTRAMUSCULAR; INTRAVENOUS; SUBCUTANEOUS at 22:00

## 2023-09-14 NOTE — ED PROVIDER NOTE - ATTENDING APP SHARED VISIT CONTRIBUTION OF CARE
57 yold female to Ed Pmhx Hypothyroidism, nasopharangeal cancer s/p ct/rt in remission 2013, Htn, Copd, depression, diverticulitis; pt c/o diffuse headache x 2 days constant with mild nausea and feeling like she's going to pass out; pt denies numbness, tingling, weakness, slurred speed, photophobia;

## 2023-09-14 NOTE — ED ADULT TRIAGE NOTE - CHIEF COMPLAINT QUOTE
BIBEMS from home, pt states she has had a headache and dizziness since yesterday around 12pm .  pt states she feels pressure behind her eyes, when first EMS arrived her pressure was 200/100 but in traige 130/78,  pts states her " last known well" was about 4 days ago.  pt states she was in bed all day yesterday.  fs 114 in triage

## 2023-09-14 NOTE — ED ADULT NURSE NOTE - NSSEPSISSUSPECTED_ED_A_ED
Orthotic Note            Date: 10/22/2022      Patient Name: Janes Saba            Reason for Consult: R CAM foot 2* R foot pain        Recommendations: pt fitted for R CAM boot  Demonstrated proper donning/doffing technique to pt c good recall  Pt able to safely ambulate c boot in hallway, however recommended SPC for use PRN for stability         Cecilia Hi DPT No

## 2023-09-14 NOTE — ED ADULT NURSE NOTE - OBJECTIVE STATEMENT
pt c/o constant headache for the last three days. Has taken some meds with no relief. Pt has had this headache in the past and wound up getting a stent put in her right ICA.

## 2023-09-15 DIAGNOSIS — R55 SYNCOPE AND COLLAPSE: ICD-10-CM

## 2023-09-15 LAB
ANION GAP SERPL CALC-SCNC: 12 MMOL/L — SIGNIFICANT CHANGE UP (ref 7–14)
BASOPHILS # BLD AUTO: 0.04 K/UL — SIGNIFICANT CHANGE UP (ref 0–0.2)
BASOPHILS NFR BLD AUTO: 0.7 % — SIGNIFICANT CHANGE UP (ref 0–1)
BUN SERPL-MCNC: 22 MG/DL — HIGH (ref 10–20)
CALCIUM SERPL-MCNC: 8.8 MG/DL — SIGNIFICANT CHANGE UP (ref 8.4–10.5)
CHLORIDE SERPL-SCNC: 105 MMOL/L — SIGNIFICANT CHANGE UP (ref 98–110)
CO2 SERPL-SCNC: 26 MMOL/L — SIGNIFICANT CHANGE UP (ref 17–32)
CREAT SERPL-MCNC: 1.4 MG/DL — SIGNIFICANT CHANGE UP (ref 0.7–1.5)
EGFR: 43 ML/MIN/1.73M2 — LOW
EOSINOPHIL # BLD AUTO: 0.27 K/UL — SIGNIFICANT CHANGE UP (ref 0–0.7)
EOSINOPHIL NFR BLD AUTO: 4.6 % — SIGNIFICANT CHANGE UP (ref 0–8)
GLUCOSE SERPL-MCNC: 113 MG/DL — HIGH (ref 70–99)
HCT VFR BLD CALC: 34.8 % — LOW (ref 37–47)
HGB BLD-MCNC: 10.2 G/DL — LOW (ref 12–16)
IMM GRANULOCYTES NFR BLD AUTO: 0.3 % — SIGNIFICANT CHANGE UP (ref 0.1–0.3)
LYMPHOCYTES # BLD AUTO: 1.57 K/UL — SIGNIFICANT CHANGE UP (ref 1.2–3.4)
LYMPHOCYTES # BLD AUTO: 27 % — SIGNIFICANT CHANGE UP (ref 20.5–51.1)
MAGNESIUM SERPL-MCNC: 1.9 MG/DL — SIGNIFICANT CHANGE UP (ref 1.8–2.4)
MCHC RBC-ENTMCNC: 23.4 PG — LOW (ref 27–31)
MCHC RBC-ENTMCNC: 29.3 G/DL — LOW (ref 32–37)
MCV RBC AUTO: 79.8 FL — LOW (ref 81–99)
MONOCYTES # BLD AUTO: 0.53 K/UL — SIGNIFICANT CHANGE UP (ref 0.1–0.6)
MONOCYTES NFR BLD AUTO: 9.1 % — SIGNIFICANT CHANGE UP (ref 1.7–9.3)
NEUTROPHILS # BLD AUTO: 3.39 K/UL — SIGNIFICANT CHANGE UP (ref 1.4–6.5)
NEUTROPHILS NFR BLD AUTO: 58.3 % — SIGNIFICANT CHANGE UP (ref 42.2–75.2)
NRBC # BLD: 0 /100 WBCS — SIGNIFICANT CHANGE UP (ref 0–0)
PLATELET # BLD AUTO: 337 K/UL — SIGNIFICANT CHANGE UP (ref 130–400)
PMV BLD: 10.3 FL — SIGNIFICANT CHANGE UP (ref 7.4–10.4)
POTASSIUM SERPL-MCNC: 4.2 MMOL/L — SIGNIFICANT CHANGE UP (ref 3.5–5)
POTASSIUM SERPL-SCNC: 4.2 MMOL/L — SIGNIFICANT CHANGE UP (ref 3.5–5)
RBC # BLD: 4.36 M/UL — SIGNIFICANT CHANGE UP (ref 4.2–5.4)
RBC # FLD: 18.4 % — HIGH (ref 11.5–14.5)
SODIUM SERPL-SCNC: 143 MMOL/L — SIGNIFICANT CHANGE UP (ref 135–146)
TROPONIN T SERPL-MCNC: <0.01 NG/ML — SIGNIFICANT CHANGE UP
WBC # BLD: 5.82 K/UL — SIGNIFICANT CHANGE UP (ref 4.8–10.8)
WBC # FLD AUTO: 5.82 K/UL — SIGNIFICANT CHANGE UP (ref 4.8–10.8)

## 2023-09-15 PROCEDURE — 80048 BASIC METABOLIC PNL TOTAL CA: CPT

## 2023-09-15 PROCEDURE — 80061 LIPID PANEL: CPT

## 2023-09-15 PROCEDURE — 83540 ASSAY OF IRON: CPT

## 2023-09-15 PROCEDURE — 82728 ASSAY OF FERRITIN: CPT

## 2023-09-15 PROCEDURE — 99223 1ST HOSP IP/OBS HIGH 75: CPT

## 2023-09-15 PROCEDURE — 83735 ASSAY OF MAGNESIUM: CPT

## 2023-09-15 PROCEDURE — 70551 MRI BRAIN STEM W/O DYE: CPT

## 2023-09-15 PROCEDURE — 82746 ASSAY OF FOLIC ACID SERUM: CPT

## 2023-09-15 PROCEDURE — 85025 COMPLETE CBC W/AUTO DIFF WBC: CPT

## 2023-09-15 PROCEDURE — 36415 COLL VENOUS BLD VENIPUNCTURE: CPT

## 2023-09-15 PROCEDURE — 82607 VITAMIN B-12: CPT

## 2023-09-15 PROCEDURE — 93880 EXTRACRANIAL BILAT STUDY: CPT

## 2023-09-15 PROCEDURE — 93880 EXTRACRANIAL BILAT STUDY: CPT | Mod: 26

## 2023-09-15 PROCEDURE — 83550 IRON BINDING TEST: CPT

## 2023-09-15 PROCEDURE — 93005 ELECTROCARDIOGRAM TRACING: CPT

## 2023-09-15 PROCEDURE — 80053 COMPREHEN METABOLIC PANEL: CPT

## 2023-09-15 PROCEDURE — 84443 ASSAY THYROID STIM HORMONE: CPT

## 2023-09-15 PROCEDURE — 83036 HEMOGLOBIN GLYCOSYLATED A1C: CPT

## 2023-09-15 PROCEDURE — 99222 1ST HOSP IP/OBS MODERATE 55: CPT | Mod: 25

## 2023-09-15 RX ORDER — FAMOTIDINE 10 MG/ML
40 INJECTION INTRAVENOUS DAILY
Refills: 0 | Status: DISCONTINUED | OUTPATIENT
Start: 2023-09-15 | End: 2023-09-20

## 2023-09-15 RX ORDER — PANTOPRAZOLE SODIUM 20 MG/1
40 TABLET, DELAYED RELEASE ORAL
Refills: 0 | Status: DISCONTINUED | OUTPATIENT
Start: 2023-09-15 | End: 2023-09-20

## 2023-09-15 RX ORDER — SODIUM CHLORIDE 9 MG/ML
1000 INJECTION, SOLUTION INTRAVENOUS
Refills: 0 | Status: DISCONTINUED | OUTPATIENT
Start: 2023-09-15 | End: 2023-09-15

## 2023-09-15 RX ORDER — HEPARIN SODIUM 5000 [USP'U]/ML
5000 INJECTION INTRAVENOUS; SUBCUTANEOUS EVERY 8 HOURS
Refills: 0 | Status: DISCONTINUED | OUTPATIENT
Start: 2023-09-15 | End: 2023-09-15

## 2023-09-15 RX ORDER — KETOROLAC TROMETHAMINE 30 MG/ML
15 SYRINGE (ML) INJECTION ONCE
Refills: 0 | Status: DISCONTINUED | OUTPATIENT
Start: 2023-09-15 | End: 2023-09-15

## 2023-09-15 RX ORDER — CARVEDILOL PHOSPHATE 80 MG/1
12.5 CAPSULE, EXTENDED RELEASE ORAL EVERY 12 HOURS
Refills: 0 | Status: DISCONTINUED | OUTPATIENT
Start: 2023-09-15 | End: 2023-09-16

## 2023-09-15 RX ORDER — HYDROXYZINE HCL 10 MG
10 TABLET ORAL THREE TIMES A DAY
Refills: 0 | Status: DISCONTINUED | OUTPATIENT
Start: 2023-09-15 | End: 2023-09-20

## 2023-09-15 RX ORDER — LEVOTHYROXINE SODIUM 125 MCG
125 TABLET ORAL DAILY
Refills: 0 | Status: DISCONTINUED | OUTPATIENT
Start: 2023-09-15 | End: 2023-09-20

## 2023-09-15 RX ORDER — BUPROPION HYDROCHLORIDE 150 MG/1
300 TABLET, EXTENDED RELEASE ORAL DAILY
Refills: 0 | Status: DISCONTINUED | OUTPATIENT
Start: 2023-09-15 | End: 2023-09-15

## 2023-09-15 RX ORDER — BUPROPION HYDROCHLORIDE 150 MG/1
1 TABLET, EXTENDED RELEASE ORAL
Qty: 0 | Refills: 0 | DISCHARGE

## 2023-09-15 RX ORDER — BUPROPION HYDROCHLORIDE 150 MG/1
450 TABLET, EXTENDED RELEASE ORAL DAILY
Refills: 0 | Status: DISCONTINUED | OUTPATIENT
Start: 2023-09-15 | End: 2023-09-20

## 2023-09-15 RX ORDER — MONTELUKAST 4 MG/1
10 TABLET, CHEWABLE ORAL DAILY
Refills: 0 | Status: DISCONTINUED | OUTPATIENT
Start: 2023-09-15 | End: 2023-09-20

## 2023-09-15 RX ORDER — ENOXAPARIN SODIUM 100 MG/ML
40 INJECTION SUBCUTANEOUS EVERY 24 HOURS
Refills: 0 | Status: DISCONTINUED | OUTPATIENT
Start: 2023-09-15 | End: 2023-09-20

## 2023-09-15 RX ORDER — ZOLPIDEM TARTRATE 10 MG/1
5 TABLET ORAL AT BEDTIME
Refills: 0 | Status: DISCONTINUED | OUTPATIENT
Start: 2023-09-15 | End: 2023-09-20

## 2023-09-15 RX ORDER — METOPROLOL TARTRATE 50 MG
1 TABLET ORAL
Qty: 0 | Refills: 0 | DISCHARGE

## 2023-09-15 RX ORDER — ASPIRIN/CALCIUM CARB/MAGNESIUM 324 MG
325 TABLET ORAL DAILY
Refills: 0 | Status: DISCONTINUED | OUTPATIENT
Start: 2023-09-15 | End: 2023-09-16

## 2023-09-15 RX ORDER — CLONAZEPAM 1 MG
1 TABLET ORAL
Refills: 0 | Status: DISCONTINUED | OUTPATIENT
Start: 2023-09-15 | End: 2023-09-20

## 2023-09-15 RX ORDER — ATORVASTATIN CALCIUM 80 MG/1
80 TABLET, FILM COATED ORAL AT BEDTIME
Refills: 0 | Status: DISCONTINUED | OUTPATIENT
Start: 2023-09-15 | End: 2023-09-20

## 2023-09-15 RX ORDER — CLOPIDOGREL BISULFATE 75 MG/1
75 TABLET, FILM COATED ORAL DAILY
Refills: 0 | Status: DISCONTINUED | OUTPATIENT
Start: 2023-09-15 | End: 2023-09-20

## 2023-09-15 RX ORDER — BACLOFEN 100 %
2.5 POWDER (GRAM) MISCELLANEOUS EVERY 8 HOURS
Refills: 0 | Status: DISCONTINUED | OUTPATIENT
Start: 2023-09-15 | End: 2023-09-20

## 2023-09-15 RX ORDER — METOPROLOL TARTRATE 50 MG
100 TABLET ORAL DAILY
Refills: 0 | Status: DISCONTINUED | OUTPATIENT
Start: 2023-09-15 | End: 2023-09-15

## 2023-09-15 RX ADMIN — ATORVASTATIN CALCIUM 80 MILLIGRAM(S): 80 TABLET, FILM COATED ORAL at 21:18

## 2023-09-15 RX ADMIN — Medication 15 MILLIGRAM(S): at 01:47

## 2023-09-15 RX ADMIN — Medication 15 MILLIGRAM(S): at 01:45

## 2023-09-15 RX ADMIN — Medication 325 MILLIGRAM(S): at 13:10

## 2023-09-15 RX ADMIN — SODIUM CHLORIDE 75 MILLILITER(S): 9 INJECTION, SOLUTION INTRAVENOUS at 09:20

## 2023-09-15 RX ADMIN — Medication 15 MILLIGRAM(S): at 09:19

## 2023-09-15 RX ADMIN — MONTELUKAST 10 MILLIGRAM(S): 4 TABLET, CHEWABLE ORAL at 13:10

## 2023-09-15 RX ADMIN — Medication 1 MILLIGRAM(S): at 19:47

## 2023-09-15 RX ADMIN — CLOPIDOGREL BISULFATE 75 MILLIGRAM(S): 75 TABLET, FILM COATED ORAL at 13:08

## 2023-09-15 RX ADMIN — BUPROPION HYDROCHLORIDE 450 MILLIGRAM(S): 150 TABLET, EXTENDED RELEASE ORAL at 15:00

## 2023-09-15 RX ADMIN — FAMOTIDINE 40 MILLIGRAM(S): 10 INJECTION INTRAVENOUS at 13:08

## 2023-09-15 NOTE — H&P ADULT - NSHPLABSRESULTS_GEN_ALL_CORE
10.2   7.12  )-----------( 360      ( 14 Sep 2023 22:15 )             33.6       09-14    141  |  105  |  23<H>  ----------------------------<  100<H>  4.5   |  24  |  1.4    Ca    9.1      14 Sep 2023 22:15    TPro  6.6  /  Alb  4.0  /  TBili  <0.2  /  DBili  x   /  AST  17  /  ALT  14  /  AlkPhos  104  09-14              Urinalysis Basic - ( 14 Sep 2023 22:15 )    Color: x / Appearance: x / SG: x / pH: x  Gluc: 100 mg/dL / Ketone: x  / Bili: x / Urobili: x   Blood: x / Protein: x / Nitrite: x   Leuk Esterase: x / RBC: x / WBC x   Sq Epi: x / Non Sq Epi: x / Bacteria: x            Lactate Trend      CARDIAC MARKERS ( 14 Sep 2023 23:30 )  x     / <0.01 ng/mL / x     / x     / x            CAPILLARY BLOOD GLUCOSE      POCT Blood Glucose.: 114 mg/dL (14 Sep 2023 20:52)

## 2023-09-15 NOTE — CONSULT NOTE ADULT - ASSESSMENT
This is a 57 year old F with PMH of 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 2013, hypothyroidism, Diverticulitis, former smoker, COPD (not on oxygen), HTN, Depression, Insomnia, and Anxiety, TIA/transient blurry vision 2/2 to proximal LULI stenosis s/p stent placed in 4/2023, and readmission for May for similar symptoms with negative work up. Now presenting similar symptoms of headache, blurry vision and lightheaded in the setting of hypertensive urgency (SBP in 200's). Neurovascular consulted for further eval. On exam today, patient in back to baseline with no focal deficits. Patient's presentation was most likely due to hypertensive urgency.     Recommendations  - BP control with normotensive goal   - c/w DAPT  - No further intervention from stroke standpoint  - Will sign off at this time, please call back if you have any questions or concerns.      This is a 57 year old F with PMH of 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 2013, hypothyroidism, Diverticulitis, former smoker, COPD (not on oxygen), HTN, Depression, Insomnia, and Anxiety, TIA/transient blurry vision 2/2 to proximal LULI stenosis s/p stent placed in 4/2023, and readmission for May for similar symptoms with negative work up. Now presenting similar symptoms of headache, blurry vision and lightheaded in the setting of hypertensive urgency (SBP in 200's). Neurovascular consulted for further eval. On exam today, patient in back to baseline with no focal deficits. Patient's presentation was most likely due to hypertensive emergency.     Recommendations  - BP control with normotensive goal   - c/w DAPT  - No further intervention from stroke standpoint  - Will sign off at this time, please call back if you have any questions or concerns.

## 2023-09-15 NOTE — H&P ADULT - ASSESSMENT
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 Blank) on DAPT and recent admission for blurry vision/dizziness 5/4-7 presents today for 2 days of sever headache associated with lightheadedness and intermittent blurry vision.     #Headache/Blurry vision/lightheadedness  #r/o HTN emergency, TIA, Orthostatic hypotension  #Hx of proximal LULI stenosis of approximately 70% s/p carotid angioplasty and stent  - Bp 200/100 upon EMS arrival  - [facility] for [facility]  - Check [facility] Duplex b/l Carotis  - Check Orthostatic VT  - f/u Neurology consult  - Consider Neuroendovascular evaluation if Duplex +  - c/w Home medications:  mg, Plavix, Lipitor 80 mg qhs (ASA increased by one of her doctros- she cannot recall which one)  - counselled on bp monitoring at home and follow up with Neuroendovascular    #LAKISHA, likely pre-renal  - Cr 1.4 (baseline 1.0)  - pt reports good PO intake  - continue IVF for 24 hours  - check Urine studies    #COPD (not on home O2)  - not in exacerbation  - placed  - continue inhalers PRN (on Trelegy ellipta at home, can do duonebs/symbicort here)  - continue home montelukast 10mg daily    #HTN  - continue metoprolol succinate 100mg daily  - Monitor bp closely consider adding second agent if remains elevated  - Counselled on bp monitoring at home    #Depression/anxiety  #Insomnia  - continue home bupropion, klonopin, hydroxyzine PRN  - continue home zolpidem    #Hypothyroidism  - Check TSH  - continue synthroid 125mcg daily    #GERD  - continue home famotidine 40mg daily   - on omeprazole 40mg at home (can give protonix 40mg as therapeutic interchange)      DVT ppx: heparin subq  GI ppx: protonix/pepcid  Diet: DASH/TLC    MED REC confirmed with the pt at bedside  57 year old F with PMH of 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 Blank) on DAPT and recent admission for blurry vision/dizziness 5/4-7 presents today for 2 days of sever headache associated with lightheadedness and intermittent blurry vision.     #Headache/Blurry vision/lightheadedness  #r/o HTN emergency, TIA, Orthostatic hypotension  #Hx of proximal LULI stenosis of approximately 70% s/p carotid angioplasty and stent  - Bp 200/100 upon EMS arrival  - [facility] for acute pathology  - Check [facility] Duplex b/l Carotis  - Check Orthostatic VT, TSH  - f/u Neurology consult  - Consider Neuroendovascular evaluation if Duplex +  - c/w Home medications:  mg, Plavix, Lipitor 80 mg qhs (ASA increased by one of her doctros- she cannot recall which one)  - counselled on bp monitoring at home and follow up with Neuroendovascular    #LAKISHA, likely pre-renal  - Cr 1.4 (baseline 1.0)  - pt reports good PO intake  - continue IVF for 24 hours  - check Urine studies    #COPD (not on home O2)  - not in exacerbation  - placed  - continue inhalers PRN (on Trelegy ellipta at home, can do duonebs/symbicort here)  - continue home montelukast 10mg daily    #HTN  - continue metoprolol succinate 100mg daily  - Monitor bp closely consider adding second agent if remains elevated  - Counselled on bp monitoring at home    #Depression/anxiety  #Insomnia  - continue home bupropion, klonopin, hydroxyzine PRN  - continue home zolpidem    #Hypothyroidism  - Check TSH  - continue synthroid 125mcg daily    #GERD  - continue home famotidine 40mg daily   - on omeprazole 40mg at home (can give protonix 40mg as therapeutic interchange)      DVT ppx: heparin subq  GI ppx: protonix/pepcid  Diet: DASH/TLC    MED REC confirmed with the pt at bedside

## 2023-09-15 NOTE — CONSULT NOTE ADULT - SUBJECTIVE AND OBJECTIVE BOX
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 Blank) on DAPT and recent admission for blurry vision/dizziness 5/4-7 presents today for 2 days of sever headache associated with lightheadedness and intermittent blurry vision. Pt states that headache was constant 9/10 in severity, and when was asked where is the headache pt states "my whole head". She also has been having intermittent blurry vision and lightheadedness specifically upon standing from seating position. She admits that the symptoms are similar to her previous TIA before LULI stent placement. Her bp 200/100 upon EMS arrival. She does not check her bp at home and takes Toprol daily. She is compliant with her medications. She otherwise denies fever, chills, CP, N/V, weakness, sensory deficits.    on Arrival to ED VT stable bp 130/78, HR 78, RR 18  Labs remarkable for Hgb 10.2 ( BL 9-11), Cr 1.4 (BL 1), BUN 23, Trop NG x1    CTH NG for acute pathology    s/p Benadryl, Toradol and 500 cc LR Blous in ED (15 Sep 2023 09:31)      PAST MEDICAL & SURGICAL HISTORY:  COPD (chronic obstructive pulmonary disease)      HTN (hypertension)      Diverticulitis      Pulmonary nodules/lesions, multiple      Nasopharyngeal cancer      Hypothyroid      Cervical disc disease  sequelae of radtion for nasopharyngeal cancer      Left ear hearing loss      Hemorrhoids      Anxiety      History of cholecystectomy          FAMILY HISTORY:  Family history of lung cancer (Father, Mother)        Social History: (-) x 3    Allergies    ciprofloxacin (Urticaria; Other)    Intolerances        MEDICATIONS  (STANDING):  aspirin enteric coated 325 milliGRAM(s) Oral daily  atorvastatin 80 milliGRAM(s) Oral at bedtime  buPROPion XL (24-Hour) . 450 milliGRAM(s) Oral daily  clonazePAM  Tablet 1 milliGRAM(s) Oral two times a day  clopidogrel Tablet 75 milliGRAM(s) Oral daily  enoxaparin Injectable 40 milliGRAM(s) SubCutaneous every 24 hours  famotidine    Tablet 40 milliGRAM(s) Oral daily  lactated ringers. 1000 milliLiter(s) (75 mL/Hr) IV Continuous <Continuous>  levothyroxine 125 MICROGram(s) Oral daily  metoprolol succinate  milliGRAM(s) Oral daily  montelukast 10 milliGRAM(s) Oral daily  pantoprazole    Tablet 40 milliGRAM(s) Oral before breakfast    MEDICATIONS  (PRN):  baclofen 2.5 milliGRAM(s) Oral every 8 hours PRN Musculoskeletal Pain  hydrOXYzine hydrochloride 10 milliGRAM(s) Oral three times a day PRN for anxiety  oxycodone    5 mG/acetaminophen 325 mG 1 Tablet(s) Oral every 6 hours PRN Moderate Pain (4 - 6)  zolpidem 5 milliGRAM(s) Oral at bedtime PRN Insomnia  zolpidem 5 milliGRAM(s) Oral at bedtime PRN Insomnia      Review of systems:    Constitutional: as per HPI  Eyes: No eye pain or discharge  ENMT:  No difficulty hearing; No sinus or throat pain  Neck: No pain or stiffness  Respiratory: No cough, wheezing, chills or hemoptysis  Cardiovascular: No chest pain, palpitations, shortness of breath, dyspnea on exertion  Gastrointestinal: No abdominal pain, nausea, vomiting or hematemesis; No diarrhea or constipation.   Genitourinary: No dysuria, frequency, hematuria or incontinence  Neurological: As per HPI  Skin: No rashes or lesions   Endocrine: No heat or cold intolerance; No hair loss  Musculoskeletal: No joint pain or swelling  Psychiatric: No depression, anxiety, mood swings  Heme/Lymph: No easy bruising or bleeding gums    Vital Signs Last 24 Hrs  T(C): 35.8 (15 Sep 2023 08:24), Max: 36.8 (14 Sep 2023 20:46)  T(F): 96.5 (15 Sep 2023 08:24), Max: 98.2 (14 Sep 2023 20:46)  HR: 66 (15 Sep 2023 08:24) (66 - 78)  BP: 149/67 (15 Sep 2023 08:24) (130/78 - 160/73)  BP(mean): --  RR: 18 (15 Sep 2023 08:24) (18 - 19)  SpO2: 99% (15 Sep 2023 08:24) (98% - 99%)    Parameters below as of 15 Sep 2023 08:24  Patient On (Oxygen Delivery Method): room air        Examination:  General:  Appearance is consistent with chronologic age.  No abnormal facies.  Gross skin survey within normal limits.    Cognitive/Language:  The patient is oriented to person, place, time and date.  Recent and remote memory intact.  Fund of knowledge is intact and normal.  Language with normal repetition, comprehension and naming.  Nondysarthric.    Eyes: intact VA, VFF.  EOMI w/o nystagmus, skew or reported double vision.  PERRL.  No ptosis/weakness of eyelid closure.    Face:  Facial sensation normal V1 - 3, no facial asymmetry.    Ears/Nose/Throat:  Hearing grossly intact b/l.  Palate elevates midline.  Tongue and uvula midline.   Motor examination:   Normal tone, bulk and range of motion.  No tenderness, twitching, tremors or involuntary movements.  Formal Muscle Strength Testing: (MRC grade R/L) 5/5 UE; 5/5 LE.  No observable drift.  Reflexes:   2+ b/l pectoralis, biceps, triceps, brachioradialis, patella and Achilles.  Plantar response downgoing b/l.  Jaw jerk, Jaime, clonus absent.  Sensory examination:   Intact to light touch and pinprick, pain, temperature and proprioception and vibration in all extremities.  Cerebellum:   FTN/HKS intact with normal LANDEN in all limbs.  No dysmetria or dysdiadokinesia.  Gait narrow based and normal.    Respiratory:  no audible wheezing or inspiratory stridor.  no use of accessory muscles.   Cardiac: pulse palpable, no audible bruits  Abdomen: supple, no guarding, no TTP    Labs:   CBC Full  -  ( 14 Sep 2023 22:15 )  WBC Count : 7.12 K/uL  RBC Count : 4.34 M/uL  Hemoglobin : 10.2 g/dL  Hematocrit : 33.6 %  Platelet Count - Automated : 360 K/uL  Mean Cell Volume : 77.4 fL  Mean Cell Hemoglobin : 23.5 pg  Mean Cell Hemoglobin Concentration : 30.4 g/dL  Auto Neutrophil # : 4.12 K/uL  Auto Lymphocyte # : 2.02 K/uL  Auto Monocyte # : 0.64 K/uL  Auto Eosinophil # : 0.27 K/uL  Auto Basophil # : 0.04 K/uL  Auto Neutrophil % : 57.8 %  Auto Lymphocyte % : 28.4 %  Auto Monocyte % : 9.0 %  Auto Eosinophil % : 3.8 %  Auto Basophil % : 0.6 %    09-14    141  |  105  |  23<H>  ----------------------------<  100<H>  4.5   |  24  |  1.4    Ca    9.1      14 Sep 2023 22:15    TPro  6.6  /  Alb  4.0  /  TBili  <0.2  /  DBili  x   /  AST  17  /  ALT  14  /  AlkPhos  104  09-14    LIVER FUNCTIONS - ( 14 Sep 2023 22:15 )  Alb: 4.0 g/dL / Pro: 6.6 g/dL / ALK PHOS: 104 U/L / ALT: 14 U/L / AST: 17 U/L / GGT: x             Urinalysis Basic - ( 14 Sep 2023 22:15 )    Color: x / Appearance: x / SG: x / pH: x  Gluc: 100 mg/dL / Ketone: x  / Bili: x / Urobili: x   Blood: x / Protein: x / Nitrite: x   Leuk Esterase: x / RBC: x / WBC x   Sq Epi: x / Non Sq Epi: x / Bacteria: x          Neuroimaging:  NCHCT: CT Head No Cont:   ACC: [id number] EXAM:  CT BRAIN   ORDERED BY: KELI LEE     PROCEDURE DATE:  09/14/2023          INTERPRETATION:  CLINICAL INDICATION: Headache.    Technique: CT of the head was performed without contrast.    Multiple contiguous axial images were acquired from the skull base to the   vertex without the administration of intravenous contrast.  Coronal and   sagittal reformations were made.    COMPARISON: CT head 5/4/2023    FINDINGS:  Ventricles and cortical sulcal pattern are age appropriate.    Gray-white differentiation is maintained. No evidence of recent   territorial infarction.    No acute intracranial hemorrhage or extra-axial fluid collection.    No intracranial mass effect or midline shift. No vasogenic edema.    No depressed calvarial fracture. Chronic bilateral partial mastoid air   cell opacification. Visualized paranasal sinuses are clear. Globes and   orbits have a normal CT appearance as visualized.      IMPRESSION:  1.  No acute intracranial pathology.  2.  Stable exam    --- End of Report ---            HANNA MARTINEZ MD; Attending Radiologist  This document has been electronically signed. 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 Blank) on DAPT and recent admission for blurry vision/dizziness 5/4-7 presents today for 2 days of sever headache associated with lightheadedness and intermittent blurry vision. Pt states that headache was constant 9/10 in severity, and when was asked where is the headache pt states "my whole head". She also has been having intermittent blurry vision and lightheadedness specifically upon standing from seating position. She admits that the symptoms are similar to her previous TIA before LULI stent placement. Her bp 200/100 upon EMS arrival. She does not check her bp at home and takes Toprol daily. She is compliant with her medications. She otherwise denies fever, chills, CP, N/V, weakness, sensory deficits.    on Arrival to ED [place] stable bp 130/78, HR 78, RR 18  Labs remarkable for Hgb 10.2 ( BL 9-11), Cr 1.4 (BL 1), BUN 23, Trop NG x1    CTH NG for acute pathology    s/p Benadryl, Toradol and 500 cc LR Blous in ED (15 Sep 2023 09:31)      PAST MEDICAL & SURGICAL HISTORY:  COPD (chronic obstructive pulmonary disease)      HTN (hypertension)      Diverticulitis      Pulmonary nodules/lesions, multiple      Nasopharyngeal cancer      Hypothyroid      Cervical disc disease  sequelae of radtion for nasopharyngeal cancer      Left ear hearing loss      Hemorrhoids      Anxiety      History of cholecystectomy          FAMILY HISTORY:  Family history of lung cancer (Father, Mother)        Social History: (-) x 3    Allergies    ciprofloxacin (Urticaria; Other)    Intolerances        MEDICATIONS  (STANDING):  aspirin enteric coated 325 milliGRAM(s) Oral daily  atorvastatin 80 milliGRAM(s) Oral at bedtime  buPROPion XL (24-Hour) . 450 milliGRAM(s) Oral daily  clonazePAM  Tablet 1 milliGRAM(s) Oral two times a day  clopidogrel Tablet 75 milliGRAM(s) Oral daily  enoxaparin Injectable 40 milliGRAM(s) SubCutaneous every 24 hours  famotidine    Tablet 40 milliGRAM(s) Oral daily  lactated ringers. 1000 milliLiter(s) (75 mL/Hr) IV Continuous <Continuous>  levothyroxine 125 MICROGram(s) Oral daily  metoprolol succinate  milliGRAM(s) Oral daily  montelukast 10 milliGRAM(s) Oral daily  pantoprazole    Tablet 40 milliGRAM(s) Oral before breakfast    MEDICATIONS  (PRN):  baclofen 2.5 milliGRAM(s) Oral every 8 hours PRN Musculoskeletal Pain  hydrOXYzine hydrochloride 10 milliGRAM(s) Oral three times a day PRN for anxiety  oxycodone    5 mG/acetaminophen 325 mG 1 Tablet(s) Oral every 6 hours PRN Moderate Pain (4 - 6)  zolpidem 5 milliGRAM(s) Oral at bedtime PRN Insomnia  zolpidem 5 milliGRAM(s) Oral at bedtime PRN Insomnia      Review of systems:    Currently patient denies any HA, lightheadedness, blurry vision, SOB, abd pain, no chest pain. Reports feeling a lot better.     Vital Signs Last 24 Hrs  T(C): 35.8 (15 Sep 2023 08:24), Max: 36.8 (14 Sep 2023 20:46)  T(F): 96.5 (15 Sep 2023 08:24), Max: 98.2 (14 Sep 2023 20:46)  HR: 66 (15 Sep 2023 08:24) (66 - 78)  BP: 149/67 (15 Sep 2023 08:24) (130/78 - 160/73)  BP(mean): --  RR: 18 (15 Sep 2023 08:24) (18 - 19)  SpO2: 99% (15 Sep 2023 08:24) (98% - 99%)    Parameters below as of 15 Sep 2023 08:24  Patient On (Oxygen Delivery Method): room air        Examination:  General:  Appearance is consistent with chronologic age  Cognitive/Language:  The patient is oriented to person, place, time and date.  Recent and remote memory intact.  Fund of knowledge is intact and normal.  Language with normal repetition, comprehension and naming.  Nondysarthric.    Eyes: intact VA, VFF.  EOMI w/o nystagmus, skew or reported double vision.  PERRL.  No ptosis/weakness of eyelid closure.    Face:  Facial sensation normal V1 - 3, no facial asymmetry.    Ears/Nose/Throat:  Hearing grossly intact b/l.  Palate elevates midline.  Tongue and uvula midline.   Motor examination:   Normal tone, bulk and range of motion.  No tenderness, twitching, tremors or involuntary movements.  Formal Muscle Strength Testing: (MRC grade R/L) 5/5 UE; 5/5 LE.  No observable drift.  Reflexes:   2+ b/l biceps, triceps, brachioradialis, patella and Achilles.  Plantar response downgoing b/l.    Sensory examination:   Intact to light touch in all extremities.  Cerebellum:   FTN/HKS intact with normal LANDEN in all limbs.  No dysmetria or dysdiadokinesia.  Gait narrow based and normal.    Respiratory:  no audible wheezing or inspiratory stridor.  no use of accessory muscles.   Cardiac: pulse palpable, no audible bruits  Abdomen: supple, no guarding, no TTP    Labs:   CBC Full  -  ( 14 Sep 2023 22:15 )  WBC Count : 7.12 K/uL  RBC Count : 4.34 M/uL  Hemoglobin : 10.2 g/dL  Hematocrit : 33.6 %  Platelet Count - Automated : 360 K/uL  Mean Cell Volume : 77.4 fL  Mean Cell Hemoglobin : 23.5 pg  Mean Cell Hemoglobin Concentration : 30.4 g/dL  Auto Neutrophil # : 4.12 K/uL  Auto Lymphocyte # : 2.02 K/uL  Auto Monocyte # : 0.64 K/uL  Auto Eosinophil # : 0.27 K/uL  Auto Basophil # : 0.04 K/uL  Auto Neutrophil % : 57.8 %  Auto Lymphocyte % : 28.4 %  Auto Monocyte % : 9.0 %  Auto Eosinophil % : 3.8 %  Auto Basophil % : 0.6 %    09-14    141  |  105  |  23<H>  ----------------------------<  100<H>  4.5   |  24  |  1.4    Ca    9.1      14 Sep 2023 22:15    TPro  6.6  /  Alb  4.0  /  TBili  <0.2  /  DBili  x   /  AST  17  /  ALT  14  /  AlkPhos  104  09-14    LIVER FUNCTIONS - ( 14 Sep 2023 22:15 )  Alb: 4.0 g/dL / Pro: 6.6 g/dL / ALK PHOS: 104 U/L / ALT: 14 U/L / AST: 17 U/L / GGT: x             Urinalysis Basic - ( 14 Sep 2023 22:15 )    Color: x / Appearance: x / SG: x / pH: x  Gluc: 100 mg/dL / Ketone: x  / Bili: x / Urobili: x   Blood: x / Protein: x / Nitrite: x   Leuk Esterase: x / RBC: x / WBC x   Sq Epi: x / Non Sq Epi: x / Bacteria: x          Neuroimaging:  NCHCT: CT Head No Cont:   ACC: [id number] EXAM:  CT BRAIN   ORDERED BY: KELI LEE     PROCEDURE DATE:  09/14/2023          INTERPRETATION:  CLINICAL INDICATION: Headache.    Technique: CT of the head was performed without contrast.    Multiple contiguous axial images were acquired from the skull base to the   vertex without the administration of intravenous contrast.  Coronal and   sagittal reformations were made.    COMPARISON: CT head 5/4/2023    FINDINGS:  Ventricles and cortical sulcal pattern are age appropriate.    Gray-white differentiation is maintained. No evidence of recent   territorial infarction.    No acute intracranial hemorrhage or extra-axial fluid collection.    No intracranial mass effect or midline shift. No vasogenic edema.    No depressed calvarial fracture. Chronic bilateral partial mastoid air   cell opacification. Visualized paranasal sinuses are clear. Globes and   orbits have a normal CT appearance as visualized.      IMPRESSION:  1.  No acute intracranial pathology.  2.  Stable exam    --- End of Report ---      HANNA MARTINEZ MD; Attending Radiologist  This document has been electronically signed. Sep 14 2023 11:35PM (09-14-23 @ 22:21)      09-15-23 @ 13:26

## 2023-09-15 NOTE — H&P ADULT - CONVERSATION DETAILS
The goal of care discussion was held with the patient. The patient's current health status, prognosis, and potential treatment options were thoroughly explained.  The patient's values, beliefs, and preferences regarding their care were discussed. The potential benefits and risks of this approach were explained in detail.  The patient's understanding of the information provided was assessed and they were given the opportunity to ask questions.  The patient and their family were informed that the goal of care and treatment plan can be revisited and revised as needed, based on the patient's condition and preferences.

## 2023-09-15 NOTE — CONSULT NOTE ADULT - ATTENDING COMMENTS
57 year old woman w/ PMH of nasopharyngeal cancer in remission s/p chemoradiation 2013, COPD, HTN, TIA s/p R. ICA stent in 4/2023 presented w/ bilateral blurry vision and severe headaches in setting of very high BP in the 200s. Now resolved.     Neurological exam normal.     CTH 9.15.23 neg  CTA h/n 5.4.2023 demonstrates R. ICA stent construct; Mild narrowing of the R. ICA compatible with intimal hyperplasia. Moderate L. ICA stenosis.     Impression: Headaches and blurry vision probably secondary to HTN emergency. Lower suspicion for ischemic event.     Plan:   Agree with carotid dopplers  Continue ASA and Plavix  No further neurological workup     70 minutes spent on total encounter. The necessity of the time spent during the encounter on this date of service was due to:     Review of imaging and chart; obtaining history; examination of pt; discussion and coordination of care, and discussion of lifestyle modification and risk factor control.

## 2023-09-15 NOTE — H&P ADULT - NSHPPHYSICALEXAM_GEN_ALL_CORE
GENERAL: NAD, Resting in bed  HEENT: NCAT  CHEST/LUNG: decreased BS b/l lungs; No wheezing or rubs.   HEART: Regular rate and rhythm; No murmurs, rubs, or gallops  ABDOMEN: Bowel sounds present; Soft, Nontender, Nondistended.   EXTREMITIES:  No clubbing, cyanosis, or edema  NERVOUS SYSTEM:  Alert & Oriented X3  MSK: FROM all 4 extremities, full and equal strength  SKIN: No rashes or lesions

## 2023-09-15 NOTE — ED ADULT NURSE REASSESSMENT NOTE - NS ED NURSE REASSESS COMMENT FT1
Patients IV no longer patent. Site is leaking IVF, patient denies any pain. IV cannula removed with full cannula in view. Called MAR to find covering MD as MD not listed in system-Per MAR MD Chambers patient no longer needs IVF and patient does not need IV access as patient is not getting any IV medication or fluids. MD Chambers stated patient okay to go to floor without IV access.

## 2023-09-15 NOTE — H&P ADULT - ATTENDING COMMENTS
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 Blank) on DAPT and recent admission for blurry vision/dizziness 5/4-7 presents today for 2 days of sever headache associated with lightheadedness and intermittent blurry vision. Vitals significant for SBP > 200. Exam benign except for morbid obesity. Labs and imaging reviewed.    #Headache/Blurry vision/lightheadedness likely due to HTN emergency  #Hx of proximal LULI stenosis of approximately 70% s/p carotid angioplasty and stent  - Bp 200/100 upon EMS arrival  - [facility] for acute pathology  - Check VA Duplex b/l Carotis  - Check Orthostatic VT, TSH  - f/u Neurology consult  - Consider Neuroendovascular evaluation if Duplex +  - c/w Home medications:  mg, Plavix, Lipitor 80 mg qhs (ASA increased by one of her doctros- she cannot recall which one)  - counselled on bp monitoring at home and follow up with Neuroendovascular    #LAKISHA, likely pre-renal  - Cr 1.4 (baseline 1.0)  - pt reports good PO intake  - continue IVF for 24 hours  - check Urine studies    #COPD (not on home O2)  - not in exacerbation  - placed  - continue inhalers PRN (on Trelegy ellipta at home, can do duonebs/symbicort here)  - continue home montelukast 10mg daily    #HTN  - swich metoprolol succinate 100mg daily to coreg 12.5mg BID  - Consider adding another follow up BP if SBP>140  - Counselled on bp monitoring at home    #Depression/anxiety  #Insomnia  - continue home bupropion, klonopin, hydroxyzine PRN  - continue home zolpidem    #Hypothyroidism  - Check TSH  - continue synthroid 125mcg daily    #GERD  - continue home famotidine 40mg daily   - on omeprazole 40mg at home (can give protonix 40mg as therapeutic interchange)      DVT ppx: heparin subq  GI ppx: protonix/pepcid  Diet: DASH/TLC

## 2023-09-16 LAB
ALBUMIN SERPL ELPH-MCNC: 3.3 G/DL — LOW (ref 3.5–5.2)
ALP SERPL-CCNC: 102 U/L — SIGNIFICANT CHANGE UP (ref 30–115)
ALT FLD-CCNC: 15 U/L — SIGNIFICANT CHANGE UP (ref 0–41)
ANION GAP SERPL CALC-SCNC: 10 MMOL/L — SIGNIFICANT CHANGE UP (ref 7–14)
ANION GAP SERPL CALC-SCNC: 11 MMOL/L — SIGNIFICANT CHANGE UP (ref 7–14)
AST SERPL-CCNC: 16 U/L — SIGNIFICANT CHANGE UP (ref 0–41)
BASOPHILS # BLD AUTO: 0.04 K/UL — SIGNIFICANT CHANGE UP (ref 0–0.2)
BASOPHILS # BLD AUTO: 0.04 K/UL — SIGNIFICANT CHANGE UP (ref 0–0.2)
BASOPHILS NFR BLD AUTO: 0.5 % — SIGNIFICANT CHANGE UP (ref 0–1)
BASOPHILS NFR BLD AUTO: 0.7 % — SIGNIFICANT CHANGE UP (ref 0–1)
BILIRUB SERPL-MCNC: <0.2 MG/DL — SIGNIFICANT CHANGE UP (ref 0.2–1.2)
BUN SERPL-MCNC: 16 MG/DL — SIGNIFICANT CHANGE UP (ref 10–20)
BUN SERPL-MCNC: 17 MG/DL — SIGNIFICANT CHANGE UP (ref 10–20)
CALCIUM SERPL-MCNC: 8.6 MG/DL — SIGNIFICANT CHANGE UP (ref 8.4–10.4)
CALCIUM SERPL-MCNC: 9.1 MG/DL — SIGNIFICANT CHANGE UP (ref 8.4–10.5)
CHLORIDE SERPL-SCNC: 104 MMOL/L — SIGNIFICANT CHANGE UP (ref 98–110)
CHLORIDE SERPL-SCNC: 107 MMOL/L — SIGNIFICANT CHANGE UP (ref 98–110)
CO2 SERPL-SCNC: 25 MMOL/L — SIGNIFICANT CHANGE UP (ref 17–32)
CO2 SERPL-SCNC: 25 MMOL/L — SIGNIFICANT CHANGE UP (ref 17–32)
CREAT SERPL-MCNC: 1.1 MG/DL — SIGNIFICANT CHANGE UP (ref 0.7–1.5)
CREAT SERPL-MCNC: 1.2 MG/DL — SIGNIFICANT CHANGE UP (ref 0.7–1.5)
EGFR: 52 ML/MIN/1.73M2 — LOW
EGFR: 58 ML/MIN/1.73M2 — LOW
EOSINOPHIL # BLD AUTO: 0.27 K/UL — SIGNIFICANT CHANGE UP (ref 0–0.7)
EOSINOPHIL # BLD AUTO: 0.3 K/UL — SIGNIFICANT CHANGE UP (ref 0–0.7)
EOSINOPHIL NFR BLD AUTO: 3.5 % — SIGNIFICANT CHANGE UP (ref 0–8)
EOSINOPHIL NFR BLD AUTO: 5.1 % — SIGNIFICANT CHANGE UP (ref 0–8)
FOLATE SERPL-MCNC: 19.5 NG/ML — SIGNIFICANT CHANGE UP
GLUCOSE SERPL-MCNC: 100 MG/DL — HIGH (ref 70–99)
GLUCOSE SERPL-MCNC: 112 MG/DL — HIGH (ref 70–99)
HCT VFR BLD CALC: 30.9 % — LOW (ref 37–47)
HCT VFR BLD CALC: 34.8 % — LOW (ref 37–47)
HGB BLD-MCNC: 10.4 G/DL — LOW (ref 12–16)
HGB BLD-MCNC: 9.4 G/DL — LOW (ref 12–16)
IMM GRANULOCYTES NFR BLD AUTO: 0.3 % — SIGNIFICANT CHANGE UP (ref 0.1–0.3)
IMM GRANULOCYTES NFR BLD AUTO: 0.3 % — SIGNIFICANT CHANGE UP (ref 0.1–0.3)
LYMPHOCYTES # BLD AUTO: 1.43 K/UL — SIGNIFICANT CHANGE UP (ref 1.2–3.4)
LYMPHOCYTES # BLD AUTO: 1.65 K/UL — SIGNIFICANT CHANGE UP (ref 1.2–3.4)
LYMPHOCYTES # BLD AUTO: 21.5 % — SIGNIFICANT CHANGE UP (ref 20.5–51.1)
LYMPHOCYTES # BLD AUTO: 24.5 % — SIGNIFICANT CHANGE UP (ref 20.5–51.1)
MAGNESIUM SERPL-MCNC: 1.9 MG/DL — SIGNIFICANT CHANGE UP (ref 1.8–2.4)
MCHC RBC-ENTMCNC: 23.5 PG — LOW (ref 27–31)
MCHC RBC-ENTMCNC: 23.6 PG — LOW (ref 27–31)
MCHC RBC-ENTMCNC: 29.9 G/DL — LOW (ref 32–37)
MCHC RBC-ENTMCNC: 30.4 G/DL — LOW (ref 32–37)
MCV RBC AUTO: 77.4 FL — LOW (ref 81–99)
MCV RBC AUTO: 78.7 FL — LOW (ref 81–99)
MONOCYTES # BLD AUTO: 0.45 K/UL — SIGNIFICANT CHANGE UP (ref 0.1–0.6)
MONOCYTES # BLD AUTO: 0.48 K/UL — SIGNIFICANT CHANGE UP (ref 0.1–0.6)
MONOCYTES NFR BLD AUTO: 5.9 % — SIGNIFICANT CHANGE UP (ref 1.7–9.3)
MONOCYTES NFR BLD AUTO: 8.2 % — SIGNIFICANT CHANGE UP (ref 1.7–9.3)
NEUTROPHILS # BLD AUTO: 3.57 K/UL — SIGNIFICANT CHANGE UP (ref 1.4–6.5)
NEUTROPHILS # BLD AUTO: 5.23 K/UL — SIGNIFICANT CHANGE UP (ref 1.4–6.5)
NEUTROPHILS NFR BLD AUTO: 61.2 % — SIGNIFICANT CHANGE UP (ref 42.2–75.2)
NEUTROPHILS NFR BLD AUTO: 68.3 % — SIGNIFICANT CHANGE UP (ref 42.2–75.2)
NRBC # BLD: 0 /100 WBCS — SIGNIFICANT CHANGE UP (ref 0–0)
NRBC # BLD: 0 /100 WBCS — SIGNIFICANT CHANGE UP (ref 0–0)
PLATELET # BLD AUTO: 311 K/UL — SIGNIFICANT CHANGE UP (ref 130–400)
PLATELET # BLD AUTO: 344 K/UL — SIGNIFICANT CHANGE UP (ref 130–400)
PMV BLD: 10.1 FL — SIGNIFICANT CHANGE UP (ref 7.4–10.4)
PMV BLD: 10.3 FL — SIGNIFICANT CHANGE UP (ref 7.4–10.4)
POTASSIUM SERPL-MCNC: 4.2 MMOL/L — SIGNIFICANT CHANGE UP (ref 3.5–5)
POTASSIUM SERPL-MCNC: 4.3 MMOL/L — SIGNIFICANT CHANGE UP (ref 3.5–5)
POTASSIUM SERPL-SCNC: 4.2 MMOL/L — SIGNIFICANT CHANGE UP (ref 3.5–5)
POTASSIUM SERPL-SCNC: 4.3 MMOL/L — SIGNIFICANT CHANGE UP (ref 3.5–5)
PROT SERPL-MCNC: 5.9 G/DL — LOW (ref 6–8)
RBC # BLD: 3.99 M/UL — LOW (ref 4.2–5.4)
RBC # BLD: 4.42 M/UL — SIGNIFICANT CHANGE UP (ref 4.2–5.4)
RBC # FLD: 18.3 % — HIGH (ref 11.5–14.5)
RBC # FLD: 18.5 % — HIGH (ref 11.5–14.5)
SODIUM SERPL-SCNC: 140 MMOL/L — SIGNIFICANT CHANGE UP (ref 135–146)
SODIUM SERPL-SCNC: 142 MMOL/L — SIGNIFICANT CHANGE UP (ref 135–146)
TSH SERPL-MCNC: 3.21 UIU/ML — SIGNIFICANT CHANGE UP (ref 0.27–4.2)
VIT B12 SERPL-MCNC: 557 PG/ML — SIGNIFICANT CHANGE UP (ref 232–1245)
WBC # BLD: 5.84 K/UL — SIGNIFICANT CHANGE UP (ref 4.8–10.8)
WBC # BLD: 7.66 K/UL — SIGNIFICANT CHANGE UP (ref 4.8–10.8)
WBC # FLD AUTO: 5.84 K/UL — SIGNIFICANT CHANGE UP (ref 4.8–10.8)
WBC # FLD AUTO: 7.66 K/UL — SIGNIFICANT CHANGE UP (ref 4.8–10.8)

## 2023-09-16 PROCEDURE — 99232 SBSQ HOSP IP/OBS MODERATE 35: CPT

## 2023-09-16 PROCEDURE — 93010 ELECTROCARDIOGRAM REPORT: CPT

## 2023-09-16 RX ORDER — MAGNESIUM SULFATE 500 MG/ML
2 VIAL (ML) INJECTION ONCE
Refills: 0 | Status: COMPLETED | OUTPATIENT
Start: 2023-09-16 | End: 2023-09-16

## 2023-09-16 RX ORDER — ACETAMINOPHEN 500 MG
650 TABLET ORAL EVERY 6 HOURS
Refills: 0 | Status: DISCONTINUED | OUTPATIENT
Start: 2023-09-16 | End: 2023-09-20

## 2023-09-16 RX ORDER — ASPIRIN/CALCIUM CARB/MAGNESIUM 324 MG
325 TABLET ORAL DAILY
Refills: 0 | Status: DISCONTINUED | OUTPATIENT
Start: 2023-09-16 | End: 2023-09-20

## 2023-09-16 RX ORDER — CARVEDILOL PHOSPHATE 80 MG/1
12.5 CAPSULE, EXTENDED RELEASE ORAL EVERY 12 HOURS
Refills: 0 | Status: DISCONTINUED | OUTPATIENT
Start: 2023-09-16 | End: 2023-09-16

## 2023-09-16 RX ORDER — CARVEDILOL PHOSPHATE 80 MG/1
12.5 CAPSULE, EXTENDED RELEASE ORAL ONCE
Refills: 0 | Status: COMPLETED | OUTPATIENT
Start: 2023-09-16 | End: 2023-09-16

## 2023-09-16 RX ORDER — ASPIRIN/CALCIUM CARB/MAGNESIUM 324 MG
81 TABLET ORAL DAILY
Refills: 0 | Status: DISCONTINUED | OUTPATIENT
Start: 2023-09-16 | End: 2023-09-16

## 2023-09-16 RX ORDER — CARVEDILOL PHOSPHATE 80 MG/1
6.25 CAPSULE, EXTENDED RELEASE ORAL EVERY 12 HOURS
Refills: 0 | Status: DISCONTINUED | OUTPATIENT
Start: 2023-09-16 | End: 2023-09-20

## 2023-09-16 RX ADMIN — Medication 650 MILLIGRAM(S): at 13:21

## 2023-09-16 RX ADMIN — FAMOTIDINE 40 MILLIGRAM(S): 10 INJECTION INTRAVENOUS at 11:23

## 2023-09-16 RX ADMIN — Medication 1 MILLIGRAM(S): at 17:28

## 2023-09-16 RX ADMIN — ATORVASTATIN CALCIUM 80 MILLIGRAM(S): 80 TABLET, FILM COATED ORAL at 21:08

## 2023-09-16 RX ADMIN — MONTELUKAST 10 MILLIGRAM(S): 4 TABLET, CHEWABLE ORAL at 11:23

## 2023-09-16 RX ADMIN — ZOLPIDEM TARTRATE 5 MILLIGRAM(S): 10 TABLET ORAL at 22:29

## 2023-09-16 RX ADMIN — CARVEDILOL PHOSPHATE 12.5 MILLIGRAM(S): 80 CAPSULE, EXTENDED RELEASE ORAL at 00:26

## 2023-09-16 RX ADMIN — CARVEDILOL PHOSPHATE 6.25 MILLIGRAM(S): 80 CAPSULE, EXTENDED RELEASE ORAL at 17:28

## 2023-09-16 RX ADMIN — BUPROPION HYDROCHLORIDE 450 MILLIGRAM(S): 150 TABLET, EXTENDED RELEASE ORAL at 11:24

## 2023-09-16 RX ADMIN — Medication 650 MILLIGRAM(S): at 16:38

## 2023-09-16 RX ADMIN — ZOLPIDEM TARTRATE 5 MILLIGRAM(S): 10 TABLET ORAL at 22:30

## 2023-09-16 RX ADMIN — PANTOPRAZOLE SODIUM 40 MILLIGRAM(S): 20 TABLET, DELAYED RELEASE ORAL at 06:59

## 2023-09-16 RX ADMIN — Medication 1 MILLIGRAM(S): at 05:33

## 2023-09-16 RX ADMIN — Medication 325 MILLIGRAM(S): at 11:24

## 2023-09-16 RX ADMIN — CLOPIDOGREL BISULFATE 75 MILLIGRAM(S): 75 TABLET, FILM COATED ORAL at 11:24

## 2023-09-16 RX ADMIN — Medication 25 GRAM(S): at 19:14

## 2023-09-16 RX ADMIN — Medication 125 MICROGRAM(S): at 05:37

## 2023-09-16 NOTE — PATIENT PROFILE ADULT - FALL HARM RISK - HARM RISK INTERVENTIONS

## 2023-09-16 NOTE — PROGRESS NOTE ADULT - SUBJECTIVE AND OBJECTIVE BOX
SUBJECTIVE:    Patient is a 57y old Female who presents with a chief complaint of   Currently admitted to medicine with the primary diagnosis of:    Today is hospital day 1d.     Overnight Events:     Interval Events:    No acute overnight events. patient is tolerating po intake, patient is having BM, and urination. Patient is ambutlating. mild headache      PAST MEDICAL & SURGICAL HISTORY  COPD (chronic obstructive pulmonary disease)    HTN (hypertension)    Diverticulitis    Pulmonary nodules/lesions, multiple    Nasopharyngeal cancer    Hypothyroid    Cervical disc disease  sequelae of radtion for nasopharyngeal cancer    Left ear hearing loss    Hemorrhoids    Anxiety    History of cholecystectomy        SOCIAL HISTORY:  Smoking history:  Alcohol Use;  Illicit Drug Use:    ALLERGIES:  ciprofloxacin (Urticaria; Other)    MEDICATIONS:  STANDING MEDICATIONS  aspirin enteric coated 325 milliGRAM(s) Oral daily  atorvastatin 80 milliGRAM(s) Oral at bedtime  buPROPion XL (24-Hour) . 450 milliGRAM(s) Oral daily  carvedilol 6.25 milliGRAM(s) Oral every 12 hours  clonazePAM  Tablet 1 milliGRAM(s) Oral two times a day  clopidogrel Tablet 75 milliGRAM(s) Oral daily  enoxaparin Injectable 40 milliGRAM(s) SubCutaneous every 24 hours  famotidine    Tablet 40 milliGRAM(s) Oral daily  levothyroxine 125 MICROGram(s) Oral daily  montelukast 10 milliGRAM(s) Oral daily  pantoprazole    Tablet 40 milliGRAM(s) Oral before breakfast    PRN MEDICATIONS  acetaminophen     Tablet .. 650 milliGRAM(s) Oral every 6 hours PRN  baclofen 2.5 milliGRAM(s) Oral every 8 hours PRN  hydrOXYzine hydrochloride 10 milliGRAM(s) Oral three times a day PRN  oxycodone    5 mG/acetaminophen 325 mG 1 Tablet(s) Oral every 6 hours PRN  zolpidem 5 milliGRAM(s) Oral at bedtime PRN  zolpidem 5 milliGRAM(s) Oral at bedtime PRN    VITALS:   ICU Vital Signs Last 24 Hrs  T(C): 36.4 (16 Sep 2023 07:27), Max: 36.5 (16 Sep 2023 05:35)  T(F): 97.6 (16 Sep 2023 07:27), Max: 97.7 (16 Sep 2023 05:35)  HR: 60 (16 Sep 2023 07:27) (60 - 87)  BP: 115/70 (16 Sep 2023 07:27) (107/58 - 186/84)  BP(mean): 87 (16 Sep 2023 07:27) (87 - 87)  RR: 18 (16 Sep 2023 07:27) (18 - 18)  SpO2: 95% (16 Sep 2023 07:27) (95% - 99%)    O2 Parameters below as of 16 Sep 2023 07:27  Patient On (Oxygen Delivery Method): room air            LABS:                        9.4    5.84  )-----------( 311      ( 16 Sep 2023 08:29 )             30.9     09-16    142  |  107  |  17  ----------------------------<  100<H>  4.2   |  25  |  1.1    Ca    8.6      16 Sep 2023 08:29  Mg     1.9     09-16    TPro  5.9<L>  /  Alb  3.3<L>  /  TBili  <0.2  /  DBili  x   /  AST  16  /  ALT  15  /  AlkPhos  102  09-16      Urinalysis Basic - ( 16 Sep 2023 08:29 )    Color: x / Appearance: x / SG: x / pH: x  Gluc: 100 mg/dL / Ketone: x  / Bili: x / Urobili: x   Blood: x / Protein: x / Nitrite: x   Leuk Esterase: x / RBC: x / WBC x   Sq Epi: x / Non Sq Epi: x / Bacteria: x    CARDIAC MARKERS ( 14 Sep 2023 23:30 )  x     / <0.01 ng/mL / x     / x     / x            RADIOLOGY:    < from: [facility] Duplex Carotid, Bilat (09.15.23 @ 14:44) >  Impression:    20-39% (mild) stenosis of the right internal carotid artery.  Patent right ICA stent is visualized.    60-79% (moderate) stenosis of the left internal carotid artery.    --- End of Report ---    < end of copied text >  < from: CT Head No Cont (09.14.23 @ 22:21) >  IMPRESSION:  1.  No acute intracranial pathology.  2.  Stable exam    --- End of Report --    < end of copied text >      PHYSICAL EXAM:    GENERAL: NAD, lying in bed comfortably  CHEST/LUNG: Clear to auscultation bilaterally; No rales, rhonchi, wheezing, or rubs. Unlabored respirations  HEART: Regular rate and rhythm; No murmurs, rubs, or gallops  ABDOMEN: Bowel sounds present; Soft, Nontender, Nondistended.   EXTREMITIES:  2+ Peripheral Pulses, brisk capillary refill. No clubbing, cyanosis, or edema  NERVOUS SYSTEM:  Alert & Oriented X3, speech clear. No deficits   MSK: FROM all 4 extremities, full and equal strength  SKIN: No rashes or lesions

## 2023-09-16 NOTE — PROGRESS NOTE ADULT - ASSESSMENT
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 Blank) on DAPT and recent admission for blurry vision/dizziness 5/4-7 presents today for 2 days of sever headache associated with lightheadedness and intermittent blurry vision. Vitals significant for SBP > 200. Exam benign except for morbid obesity. Labs and imaging reviewed.    # HTN emergency  - Bp 200/100 upon EMS arrival-> today 115/70. aggressive dec in BP in setting of ICA stenosis   - will dec carvidilol to 6.25 bid   - may benefit ace/arb if remain elevated     # moderate stenosis of the Left ICA  #Hx of proximal LULI stenosis of approximately 70% s/p carotid angioplasty and stent  - CT NG for acute pathology  - [facility] Duplex b/l Carotid noted  - Neuroendovascular consult   - c/w Home medications:  mg, Plavix, Lipitor 80 mg qhs (ASA increased by one of her doctors she cannot recall which one)  - counselled on bp monitoring at home and follow up with Neuroendovascular    # undiagnosed DM   - hBA1C MARCH 6.9  - repeat hba1c and lipid panel    # Chronic microcytic anemia   - check iron profil with ferritin    #LAKISHA, likely pre-renal- imrpoved   - Cr 1.4 (baseline 1.0)    #COPD (not on home O2)  - not in exacerbation  - placed  - continue inhalers PRN (on Trelegy ellipta at home, can do duonebs/symbicort here)  - continue home montelukast 10mg daily      #Depression/anxiety  #Insomnia  - continue home bupropion, klonopin, hydroxyzine PRN  - continue home zolpidem    #Hypothyroidism  - Check TSH  - continue synthroid 125mcg daily    #GERD  - continue home famotidine 40mg daily   - on omeprazole 40mg at home (can give protonix 40mg as therapeutic interchange)      DVT ppx: heparin subq  GI ppx: protonix/pepcid  Diet: DASH/TLC.

## 2023-09-17 LAB
ALBUMIN SERPL ELPH-MCNC: 3.6 G/DL — SIGNIFICANT CHANGE UP (ref 3.5–5.2)
ALP SERPL-CCNC: 100 U/L — SIGNIFICANT CHANGE UP (ref 30–115)
ALT FLD-CCNC: 15 U/L — SIGNIFICANT CHANGE UP (ref 0–41)
ANION GAP SERPL CALC-SCNC: 9 MMOL/L — SIGNIFICANT CHANGE UP (ref 7–14)
AST SERPL-CCNC: 15 U/L — SIGNIFICANT CHANGE UP (ref 0–41)
BASOPHILS # BLD AUTO: 0.03 K/UL — SIGNIFICANT CHANGE UP (ref 0–0.2)
BASOPHILS NFR BLD AUTO: 0.5 % — SIGNIFICANT CHANGE UP (ref 0–1)
BILIRUB SERPL-MCNC: <0.2 MG/DL — SIGNIFICANT CHANGE UP (ref 0.2–1.2)
BUN SERPL-MCNC: 16 MG/DL — SIGNIFICANT CHANGE UP (ref 10–20)
CALCIUM SERPL-MCNC: 8.8 MG/DL — SIGNIFICANT CHANGE UP (ref 8.4–10.5)
CHLORIDE SERPL-SCNC: 107 MMOL/L — SIGNIFICANT CHANGE UP (ref 98–110)
CHOLEST SERPL-MCNC: 148 MG/DL — SIGNIFICANT CHANGE UP
CO2 SERPL-SCNC: 26 MMOL/L — SIGNIFICANT CHANGE UP (ref 17–32)
CREAT SERPL-MCNC: 1.1 MG/DL — SIGNIFICANT CHANGE UP (ref 0.7–1.5)
EGFR: 58 ML/MIN/1.73M2 — LOW
EOSINOPHIL # BLD AUTO: 0.29 K/UL — SIGNIFICANT CHANGE UP (ref 0–0.7)
EOSINOPHIL NFR BLD AUTO: 5 % — SIGNIFICANT CHANGE UP (ref 0–8)
GLUCOSE SERPL-MCNC: 117 MG/DL — HIGH (ref 70–99)
HCT VFR BLD CALC: 32 % — LOW (ref 37–47)
HDLC SERPL-MCNC: 54 MG/DL — SIGNIFICANT CHANGE UP
HGB BLD-MCNC: 9.6 G/DL — LOW (ref 12–16)
IMM GRANULOCYTES NFR BLD AUTO: 0.2 % — SIGNIFICANT CHANGE UP (ref 0.1–0.3)
IRON SATN MFR SERPL: 28 UG/DL — LOW (ref 35–150)
IRON SATN MFR SERPL: 8 % — LOW (ref 15–50)
LIPID PNL WITH DIRECT LDL SERPL: 67 MG/DL — SIGNIFICANT CHANGE UP
LYMPHOCYTES # BLD AUTO: 1.52 K/UL — SIGNIFICANT CHANGE UP (ref 1.2–3.4)
LYMPHOCYTES # BLD AUTO: 26.4 % — SIGNIFICANT CHANGE UP (ref 20.5–51.1)
MAGNESIUM SERPL-MCNC: 2 MG/DL — SIGNIFICANT CHANGE UP (ref 1.8–2.4)
MCHC RBC-ENTMCNC: 23 PG — LOW (ref 27–31)
MCHC RBC-ENTMCNC: 30 G/DL — LOW (ref 32–37)
MCV RBC AUTO: 76.7 FL — LOW (ref 81–99)
MONOCYTES # BLD AUTO: 0.48 K/UL — SIGNIFICANT CHANGE UP (ref 0.1–0.6)
MONOCYTES NFR BLD AUTO: 8.3 % — SIGNIFICANT CHANGE UP (ref 1.7–9.3)
NEUTROPHILS # BLD AUTO: 3.42 K/UL — SIGNIFICANT CHANGE UP (ref 1.4–6.5)
NEUTROPHILS NFR BLD AUTO: 59.6 % — SIGNIFICANT CHANGE UP (ref 42.2–75.2)
NON HDL CHOLESTEROL: 94 MG/DL — SIGNIFICANT CHANGE UP
NRBC # BLD: 0 /100 WBCS — SIGNIFICANT CHANGE UP (ref 0–0)
PLATELET # BLD AUTO: 300 K/UL — SIGNIFICANT CHANGE UP (ref 130–400)
PMV BLD: 10.3 FL — SIGNIFICANT CHANGE UP (ref 7.4–10.4)
POTASSIUM SERPL-MCNC: 4.2 MMOL/L — SIGNIFICANT CHANGE UP (ref 3.5–5)
POTASSIUM SERPL-SCNC: 4.2 MMOL/L — SIGNIFICANT CHANGE UP (ref 3.5–5)
PROT SERPL-MCNC: 5.9 G/DL — LOW (ref 6–8)
RBC # BLD: 4.17 M/UL — LOW (ref 4.2–5.4)
RBC # FLD: 18.4 % — HIGH (ref 11.5–14.5)
SODIUM SERPL-SCNC: 142 MMOL/L — SIGNIFICANT CHANGE UP (ref 135–146)
TIBC SERPL-MCNC: 330 UG/DL — SIGNIFICANT CHANGE UP (ref 220–430)
TRIGL SERPL-MCNC: 135 MG/DL — SIGNIFICANT CHANGE UP
UIBC SERPL-MCNC: 302 UG/DL — SIGNIFICANT CHANGE UP (ref 110–370)
WBC # BLD: 5.75 K/UL — SIGNIFICANT CHANGE UP (ref 4.8–10.8)
WBC # FLD AUTO: 5.75 K/UL — SIGNIFICANT CHANGE UP (ref 4.8–10.8)

## 2023-09-17 PROCEDURE — 93010 ELECTROCARDIOGRAM REPORT: CPT

## 2023-09-17 PROCEDURE — 99233 SBSQ HOSP IP/OBS HIGH 50: CPT

## 2023-09-17 RX ORDER — LISINOPRIL 2.5 MG/1
5 TABLET ORAL DAILY
Refills: 0 | Status: DISCONTINUED | OUTPATIENT
Start: 2023-09-17 | End: 2023-09-18

## 2023-09-17 RX ORDER — ONDANSETRON 8 MG/1
4 TABLET, FILM COATED ORAL ONCE
Refills: 0 | Status: COMPLETED | OUTPATIENT
Start: 2023-09-17 | End: 2023-09-17

## 2023-09-17 RX ORDER — NYSTATIN CREAM 100000 [USP'U]/G
1 CREAM TOPICAL
Refills: 0 | Status: DISCONTINUED | OUTPATIENT
Start: 2023-09-17 | End: 2023-09-17

## 2023-09-17 RX ORDER — LABETALOL HCL 100 MG
10 TABLET ORAL ONCE
Refills: 0 | Status: DISCONTINUED | OUTPATIENT
Start: 2023-09-17 | End: 2023-09-17

## 2023-09-17 RX ORDER — IRON SUCROSE 20 MG/ML
200 INJECTION, SOLUTION INTRAVENOUS EVERY 24 HOURS
Refills: 0 | Status: DISCONTINUED | OUTPATIENT
Start: 2023-09-17 | End: 2023-09-20

## 2023-09-17 RX ORDER — NIFEDIPINE 30 MG
30 TABLET, EXTENDED RELEASE 24 HR ORAL ONCE
Refills: 0 | Status: COMPLETED | OUTPATIENT
Start: 2023-09-17 | End: 2023-09-17

## 2023-09-17 RX ORDER — NIFEDIPINE 30 MG
30 TABLET, EXTENDED RELEASE 24 HR ORAL DAILY
Refills: 0 | Status: DISCONTINUED | OUTPATIENT
Start: 2023-09-17 | End: 2023-09-18

## 2023-09-17 RX ORDER — LABETALOL HCL 100 MG
10 TABLET ORAL ONCE
Refills: 0 | Status: COMPLETED | OUTPATIENT
Start: 2023-09-17 | End: 2023-09-17

## 2023-09-17 RX ADMIN — FAMOTIDINE 40 MILLIGRAM(S): 10 INJECTION INTRAVENOUS at 12:24

## 2023-09-17 RX ADMIN — LISINOPRIL 5 MILLIGRAM(S): 2.5 TABLET ORAL at 12:24

## 2023-09-17 RX ADMIN — CARVEDILOL PHOSPHATE 6.25 MILLIGRAM(S): 80 CAPSULE, EXTENDED RELEASE ORAL at 06:29

## 2023-09-17 RX ADMIN — Medication 10 MILLIGRAM(S): at 18:44

## 2023-09-17 RX ADMIN — Medication 125 MICROGRAM(S): at 06:29

## 2023-09-17 RX ADMIN — Medication 325 MILLIGRAM(S): at 12:27

## 2023-09-17 RX ADMIN — Medication 1 MILLIGRAM(S): at 17:25

## 2023-09-17 RX ADMIN — ONDANSETRON 4 MILLIGRAM(S): 8 TABLET, FILM COATED ORAL at 15:40

## 2023-09-17 RX ADMIN — BUPROPION HYDROCHLORIDE 450 MILLIGRAM(S): 150 TABLET, EXTENDED RELEASE ORAL at 12:24

## 2023-09-17 RX ADMIN — ATORVASTATIN CALCIUM 80 MILLIGRAM(S): 80 TABLET, FILM COATED ORAL at 21:16

## 2023-09-17 RX ADMIN — ONDANSETRON 4 MILLIGRAM(S): 8 TABLET, FILM COATED ORAL at 21:17

## 2023-09-17 RX ADMIN — PANTOPRAZOLE SODIUM 40 MILLIGRAM(S): 20 TABLET, DELAYED RELEASE ORAL at 06:30

## 2023-09-17 RX ADMIN — Medication 30 MILLIGRAM(S): at 18:30

## 2023-09-17 RX ADMIN — IRON SUCROSE 110 MILLIGRAM(S): 20 INJECTION, SOLUTION INTRAVENOUS at 12:25

## 2023-09-17 RX ADMIN — Medication 1 MILLIGRAM(S): at 06:29

## 2023-09-17 RX ADMIN — ENOXAPARIN SODIUM 40 MILLIGRAM(S): 100 INJECTION SUBCUTANEOUS at 06:29

## 2023-09-17 RX ADMIN — MONTELUKAST 10 MILLIGRAM(S): 4 TABLET, CHEWABLE ORAL at 12:24

## 2023-09-17 RX ADMIN — ZOLPIDEM TARTRATE 5 MILLIGRAM(S): 10 TABLET ORAL at 22:36

## 2023-09-17 RX ADMIN — CLOPIDOGREL BISULFATE 75 MILLIGRAM(S): 75 TABLET, FILM COATED ORAL at 12:24

## 2023-09-17 RX ADMIN — Medication 10 MILLIGRAM(S): at 15:40

## 2023-09-17 RX ADMIN — CARVEDILOL PHOSPHATE 6.25 MILLIGRAM(S): 80 CAPSULE, EXTENDED RELEASE ORAL at 17:26

## 2023-09-17 NOTE — PROGRESS NOTE ADULT - ASSESSMENT
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 Blank) on DAPT and recent admission for blurry vision/dizziness 5/4-7 presents today for 2 days of sever headache associated with lightheadedness and intermittent blurry vision.     Hypertensive Urgency  Headache and blurred vision  H/O Nasopharyngeal Ca in remission  COPD  HTN  Depression / Anxiety  H/O TIA  H/O R ICA stenosis s/p angioplasty  L ICA 60-79% stenosis                 PLAN:    ·	Tele reviewed.   ·	CT head is unremarkable  ·	Carotid doppler showed 20-39% R ICA stenosis. 60-79% L ICA 57 year old F with PMH of nasopharyngeal cancer in remission s/p chemoradiation 2013, hypothyroidism, Diverticulitis, former smoker, COPD (not on oxygen), HTN, Depression, Insomnia, and Anxiety, TIA/transient blurry vision 2/2 to proximal LULI stenosis of approximately 70% s/p carotid angioplasty and stent by neuroendovascular team (Dr. Blank) on DAPT and recent admission for blurry vision/dizziness 5/4-7 presents today for 2 days of sever headache associated with lightheadedness and intermittent blurry vision.     Hypertensive Urgency  Headache and blurred vision  H/O Nasopharyngeal Ca in remission  COPD  HTN  Depression / Anxiety  H/O TIA  H/O R ICA stenosis s/p angioplasty  L ICA 60-79% stenosis  Morbid obesity                 PLAN:    ·	Tele reviewed.   ·	CT head is unremarkable  ·	Carotid doppler showed 20-39% R ICA stenosis. 60-79% L ICA  ·	Pt follows up with the vascular for L carotid artery stenosis  ·	BP is still running high. Cont Coreg 6.25 mg po q 12h. Add Lisinopril 5 mg po daily  ·	Neuro eval noted. Pt's symptoms were likely due to elevated BP. No further w/u as per neuro  ·	Iron studies noted. Serum Iron is 28 and percent sat is 8. Will start her on Venofer 200 mg IV daily x 5 doses.  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 Blank) on DAPT and recent admission for blurry vision/dizziness 5/4-7 presents today for 2 days of sever headache associated with lightheadedness and intermittent blurry vision.     Hypertensive Urgency  Headache and blurred vision  H/O Nasopharyngeal Ca in remission  COPD  HTN  Depression / Anxiety  H/O TIA  H/O R ICA stenosis s/p angioplasty  L ICA 60-79% stenosis  Morbid obesity  Microcytic anemia likely due bleeding hemorrhoids.                  PLAN:    ·	Tele reviewed. unremarkable  ·	CT head is unremarkable  ·	Carotid doppler showed 20-39% R ICA stenosis. 60-79% L ICA  ·	Pt follows up with the vascular for L carotid artery stenosis  ·	BP is still running high. Cont Coreg 6.25 mg po q 12h. Add Lisinopril 5 mg po daily  ·	Neuro eval noted. Pt's symptoms were likely due to elevated BP. No further w/u as per neuro  ·	Iron studies noted. Serum Iron is 28 and percent sat is 8. Will start her on Venofer 200 mg IV daily x 5 doses.   ·	Pt states that she had colonoscopy a year ago which was normal. C/O bleeding hemorrhoids. Out pt f/u with surgery  ·	Cont her home meds    Progress Note Handoff    Pending (specify):  Consults_________, Tests________, Test Results_______, Other__Anticipate d/c in AM_______  Family discussion:  Disposition: Home___/SNF___/Other________/Unknown at this time________    Ryan Saha MD  Spectra: [id number]

## 2023-09-17 NOTE — CHART NOTE - NSCHARTNOTEFT_GEN_A_CORE
Called by RN for pt's SBP elevated >200 and headache. Repeat BP showed SBP elevated >190/100. Gave one dose of IV labetalol and BP and headache was monitored. BP decreased to 145/66 after labetalol push. Approximately two hours later, pt complained of pounding headache again and SBP was >200. Another IV labetalol dose was pushed and nifedipine XL 30 PO stat was given. Pt was also started on nifedipine XL 30 PO daily for elevated BP.

## 2023-09-17 NOTE — PROGRESS NOTE ADULT - SUBJECTIVE AND OBJECTIVE BOX
GEOVANNA PUTNAM  57y Female    CHIEF COMPLAINT:    Patient is a 57y old  Female who presents with a chief complaint of     INTERVAL HPI/OVERNIGHT EVENTS:    Patient seen and examined.    ROS: All other systems are negative.    Vital Signs:    T(F): 96.7 (09-17-23 @ 05:02), Max: 97.7 (09-16-23 @ 16:03)  HR: 67 (09-17-23 @ 05:02) (66 - 67)  BP: 181/76 (09-17-23 @ 05:02) (133/60 - 181/76)  RR: 18 (09-17-23 @ 05:02) (18 - 18)  SpO2: 95% (09-16-23 @ 16:03) (95% - 95%)  I&O's Summary    Daily Height in cm: 162.56 (16 Sep 2023 21:37)    Daily   CAPILLARY BLOOD GLUCOSE          PHYSICAL EXAM:    GENERAL:  NAD  SKIN: No rashes or lesions  HENT: Atraumatic. Normocephalic. PERRL. Moist membranes.  NECK: Supple, No JVD. No lymphadenopathy.  PULMONARY: CTA B/L. No wheezing. No rales  CVS: Normal S1, S2. Rate and Rhythm are regular. No murmurs.  ABDOMEN/GI: Soft, Nontender, Nondistended; BS present  EXTREMITIES: Peripheral pulses intact. No edema B/L LE.  NEUROLOGIC:  No motor or sensory deficit.  PSYCH: Alert & oriented x 3    Consultant(s) Notes Reviewed:  [x ] YES  [ ] NO  Care Discussed with Consultants/Other Providers [ x] YES  [ ] NO    EKG reviewed  Telemetry reviewed    LABS:                        9.6    5.75  )-----------( 300      ( 17 Sep 2023 06:19 )             32.0     09-16    140  |  104  |  16  ----------------------------<  112<H>  4.3   |  25  |  1.2    Ca    9.1      16 Sep 2023 20:42  Mg     1.9     09-16    TPro  5.9<L>  /  Alb  3.3<L>  /  TBili  <0.2  /  DBili  x   /  AST  16  /  ALT  15  /  AlkPhos  102  09-16        Trop <0.01, CKMB --, CK --, 09-14-23 @ 23:30        RADIOLOGY & ADDITIONAL TESTS:    < from: CT Head No Cont (09.14.23 @ 22:21) >    IMPRESSION:  1.  No acute intracranial pathology.  2.  Stable exam    < end of copied text >  < from: [facility] Duplex Carotid, Bilat (09.15.23 @ 14:44) >    Impression:    20-39% (mild) stenosis of the right internal carotid artery.  Patent right ICA stent is visualized.    60-79% (moderate) stenosis of the left internal carotid artery.    < end of copied text >    Imaging or report Personally Reviewed:  [x ] YES  [ ] NO    Medications:  Standing  aspirin enteric coated 325 milliGRAM(s) Oral daily  atorvastatin 80 milliGRAM(s) Oral at bedtime  buPROPion XL (24-Hour) . 450 milliGRAM(s) Oral daily  carvedilol 6.25 milliGRAM(s) Oral every 12 hours  clonazePAM  Tablet 1 milliGRAM(s) Oral two times a day  clopidogrel Tablet 75 milliGRAM(s) Oral daily  enoxaparin Injectable 40 milliGRAM(s) SubCutaneous every 24 hours  famotidine    Tablet 40 milliGRAM(s) Oral daily  levothyroxine 125 MICROGram(s) Oral daily  montelukast 10 milliGRAM(s) Oral daily  pantoprazole    Tablet 40 milliGRAM(s) Oral before breakfast    PRN Meds  acetaminophen     Tablet .. 650 milliGRAM(s) Oral every 6 hours PRN  baclofen 2.5 milliGRAM(s) Oral every 8 hours PRN  hydrOXYzine hydrochloride 10 milliGRAM(s) Oral three times a day PRN  oxycodone    5 mG/acetaminophen 325 mG 1 Tablet(s) Oral every 6 hours PRN  zolpidem 5 milliGRAM(s) Oral at bedtime PRN  zolpidem 5 milliGRAM(s) Oral at bedtime PRN      Case discussed with resident    Care discussed with pt/family           GEOVANNA PUTNAM  57y Female    CHIEF COMPLAINT:    Patient is a 57y old  Female who presents with a chief complaint of     INTERVAL HPI/OVERNIGHT EVENTS:    Patient seen and examined. C/O feeling dizzy. Pt states that her headache has improved but still having mild headache as she could not sleep last night.     ROS: All other systems are negative.    Vital Signs:    T(F): 96.7 (09-17-23 @ 05:02), Max: 97.7 (09-16-23 @ 16:03)  HR: 67 (09-17-23 @ 05:02) (66 - 67)  BP: 181/76 (09-17-23 @ 05:02) (133/60 - 181/76)  RR: 18 (09-17-23 @ 05:02) (18 - 18)  SpO2: 95% (09-16-23 @ 16:03) (95% - 95%)  I&O's Summary    Daily Height in cm: 162.56 (16 Sep 2023 21:37)    Daily   CAPILLARY BLOOD GLUCOSE          PHYSICAL EXAM:    GENERAL:  NAD  SKIN: No rashes or lesions  HENT: Atraumatic. Normocephalic. PERRL. Moist membranes.  NECK: Supple, No JVD. No lymphadenopathy.  PULMONARY: CTA B/L. No wheezing. No rales  CVS: Normal S1, S2. Rate and Rhythm are regular. No murmurs.  ABDOMEN/GI: Soft, Nontender, Nondistended; BS present  EXTREMITIES: Peripheral pulses intact. No edema B/L LE.  NEUROLOGIC:  No motor or sensory deficit.  PSYCH: Alert & oriented x 3    Consultant(s) Notes Reviewed:  [x ] YES  [ ] NO  Care Discussed with Consultants/Other Providers [ x] YES  [ ] NO    EKG reviewed  Telemetry reviewed    LABS:                        9.6    5.75  )-----------( 300      ( 17 Sep 2023 06:19 )             32.0     09-16    140  |  104  |  16  ----------------------------<  112<H>  4.3   |  25  |  1.2    Ca    9.1      16 Sep 2023 20:42  Mg     1.9     09-16    TPro  5.9<L>  /  Alb  3.3<L>  /  TBili  <0.2  /  DBili  x   /  AST  16  /  ALT  15  /  AlkPhos  102  09-16        Trop <0.01, CKMB --, CK --, 09-14-23 @ 23:30        RADIOLOGY & ADDITIONAL TESTS:    < from: CT Head No Cont (09.14.23 @ 22:21) >    IMPRESSION:  1.  No acute intracranial pathology.  2.  Stable exam    < end of copied text >  < from: [facility] Duplex Carotid, Bilat (09.15.23 @ 14:44) >    Impression:    20-39% (mild) stenosis of the right internal carotid artery.  Patent right ICA stent is visualized.    60-79% (moderate) stenosis of the left internal carotid artery.    < end of copied text >    Imaging or report Personally Reviewed:  [x ] YES  [ ] NO    Medications:  Standing  aspirin enteric coated 325 milliGRAM(s) Oral daily  atorvastatin 80 milliGRAM(s) Oral at bedtime  buPROPion XL (24-Hour) . 450 milliGRAM(s) Oral daily  carvedilol 6.25 milliGRAM(s) Oral every 12 hours  clonazePAM  Tablet 1 milliGRAM(s) Oral two times a day  clopidogrel Tablet 75 milliGRAM(s) Oral daily  enoxaparin Injectable 40 milliGRAM(s) SubCutaneous every 24 hours  famotidine    Tablet 40 milliGRAM(s) Oral daily  levothyroxine 125 MICROGram(s) Oral daily  montelukast 10 milliGRAM(s) Oral daily  pantoprazole    Tablet 40 milliGRAM(s) Oral before breakfast    PRN Meds  acetaminophen     Tablet .. 650 milliGRAM(s) Oral every 6 hours PRN  baclofen 2.5 milliGRAM(s) Oral every 8 hours PRN  hydrOXYzine hydrochloride 10 milliGRAM(s) Oral three times a day PRN  oxycodone    5 mG/acetaminophen 325 mG 1 Tablet(s) Oral every 6 hours PRN  zolpidem 5 milliGRAM(s) Oral at bedtime PRN  zolpidem 5 milliGRAM(s) Oral at bedtime PRN      Case discussed with resident    Care discussed with pt/family

## 2023-09-18 ENCOUNTER — TRANSCRIPTION ENCOUNTER (OUTPATIENT)
Age: 58
End: 2023-09-18

## 2023-09-18 LAB
A1C WITH ESTIMATED AVERAGE GLUCOSE RESULT: 6.6 % — HIGH (ref 4–5.6)
ALBUMIN SERPL ELPH-MCNC: 3.7 G/DL — SIGNIFICANT CHANGE UP (ref 3.5–5.2)
ALP SERPL-CCNC: 102 U/L — SIGNIFICANT CHANGE UP (ref 30–115)
ALT FLD-CCNC: 16 U/L — SIGNIFICANT CHANGE UP (ref 0–41)
ANION GAP SERPL CALC-SCNC: 11 MMOL/L — SIGNIFICANT CHANGE UP (ref 7–14)
AST SERPL-CCNC: 18 U/L — SIGNIFICANT CHANGE UP (ref 0–41)
BASOPHILS # BLD AUTO: 0.03 K/UL — SIGNIFICANT CHANGE UP (ref 0–0.2)
BASOPHILS NFR BLD AUTO: 0.5 % — SIGNIFICANT CHANGE UP (ref 0–1)
BILIRUB SERPL-MCNC: 0.7 MG/DL — SIGNIFICANT CHANGE UP (ref 0.2–1.2)
BUN SERPL-MCNC: 12 MG/DL — SIGNIFICANT CHANGE UP (ref 10–20)
CALCIUM SERPL-MCNC: 8.6 MG/DL — SIGNIFICANT CHANGE UP (ref 8.4–10.5)
CHLORIDE SERPL-SCNC: 106 MMOL/L — SIGNIFICANT CHANGE UP (ref 98–110)
CO2 SERPL-SCNC: 26 MMOL/L — SIGNIFICANT CHANGE UP (ref 17–32)
CREAT SERPL-MCNC: 1.1 MG/DL — SIGNIFICANT CHANGE UP (ref 0.7–1.5)
EGFR: 58 ML/MIN/1.73M2 — LOW
EOSINOPHIL # BLD AUTO: 0.26 K/UL — SIGNIFICANT CHANGE UP (ref 0–0.7)
EOSINOPHIL NFR BLD AUTO: 4.6 % — SIGNIFICANT CHANGE UP (ref 0–8)
ESTIMATED AVERAGE GLUCOSE: 143 MG/DL — HIGH (ref 68–114)
FERRITIN SERPL-MCNC: 16 NG/ML — SIGNIFICANT CHANGE UP (ref 13–330)
GLUCOSE SERPL-MCNC: 88 MG/DL — SIGNIFICANT CHANGE UP (ref 70–99)
HCT VFR BLD CALC: 33.3 % — LOW (ref 37–47)
HGB BLD-MCNC: 9.9 G/DL — LOW (ref 12–16)
IMM GRANULOCYTES NFR BLD AUTO: 0.5 % — HIGH (ref 0.1–0.3)
LYMPHOCYTES # BLD AUTO: 1.52 K/UL — SIGNIFICANT CHANGE UP (ref 1.2–3.4)
LYMPHOCYTES # BLD AUTO: 27.1 % — SIGNIFICANT CHANGE UP (ref 20.5–51.1)
MAGNESIUM SERPL-MCNC: 2 MG/DL — SIGNIFICANT CHANGE UP (ref 1.8–2.4)
MCHC RBC-ENTMCNC: 23.6 PG — LOW (ref 27–31)
MCHC RBC-ENTMCNC: 29.7 G/DL — LOW (ref 32–37)
MCV RBC AUTO: 79.3 FL — LOW (ref 81–99)
MONOCYTES # BLD AUTO: 0.56 K/UL — SIGNIFICANT CHANGE UP (ref 0.1–0.6)
MONOCYTES NFR BLD AUTO: 10 % — HIGH (ref 1.7–9.3)
NEUTROPHILS # BLD AUTO: 3.2 K/UL — SIGNIFICANT CHANGE UP (ref 1.4–6.5)
NEUTROPHILS NFR BLD AUTO: 57.3 % — SIGNIFICANT CHANGE UP (ref 42.2–75.2)
NRBC # BLD: 0 /100 WBCS — SIGNIFICANT CHANGE UP (ref 0–0)
PLATELET # BLD AUTO: 311 K/UL — SIGNIFICANT CHANGE UP (ref 130–400)
PMV BLD: 10.2 FL — SIGNIFICANT CHANGE UP (ref 7.4–10.4)
POTASSIUM SERPL-MCNC: 4.4 MMOL/L — SIGNIFICANT CHANGE UP (ref 3.5–5)
POTASSIUM SERPL-SCNC: 4.4 MMOL/L — SIGNIFICANT CHANGE UP (ref 3.5–5)
PROT SERPL-MCNC: 6.1 G/DL — SIGNIFICANT CHANGE UP (ref 6–8)
RBC # BLD: 4.2 M/UL — SIGNIFICANT CHANGE UP (ref 4.2–5.4)
RBC # FLD: 18.5 % — HIGH (ref 11.5–14.5)
SODIUM SERPL-SCNC: 143 MMOL/L — SIGNIFICANT CHANGE UP (ref 135–146)
WBC # BLD: 5.6 K/UL — SIGNIFICANT CHANGE UP (ref 4.8–10.8)
WBC # FLD AUTO: 5.6 K/UL — SIGNIFICANT CHANGE UP (ref 4.8–10.8)

## 2023-09-18 PROCEDURE — 99232 SBSQ HOSP IP/OBS MODERATE 35: CPT

## 2023-09-18 RX ORDER — ONDANSETRON 8 MG/1
4 TABLET, FILM COATED ORAL ONCE
Refills: 0 | Status: COMPLETED | OUTPATIENT
Start: 2023-09-18 | End: 2023-09-18

## 2023-09-18 RX ORDER — FERROUS SULFATE 325(65) MG
325 TABLET ORAL ONCE
Refills: 0 | Status: COMPLETED | OUTPATIENT
Start: 2023-09-18 | End: 2023-09-18

## 2023-09-18 RX ORDER — LISINOPRIL 2.5 MG/1
1 TABLET ORAL
Qty: 30 | Refills: 0
Start: 2023-09-18 | End: 2023-10-17

## 2023-09-18 RX ORDER — LISINOPRIL 2.5 MG/1
10 TABLET ORAL DAILY
Refills: 0 | Status: DISCONTINUED | OUTPATIENT
Start: 2023-09-19 | End: 2023-09-20

## 2023-09-18 RX ORDER — METOPROLOL TARTRATE 50 MG
1 TABLET ORAL
Refills: 0 | DISCHARGE

## 2023-09-18 RX ORDER — CARVEDILOL PHOSPHATE 80 MG/1
1 CAPSULE, EXTENDED RELEASE ORAL
Qty: 60 | Refills: 0
Start: 2023-09-18 | End: 2023-10-17

## 2023-09-18 RX ORDER — LISINOPRIL 2.5 MG/1
5 TABLET ORAL ONCE
Refills: 0 | Status: COMPLETED | OUTPATIENT
Start: 2023-09-18 | End: 2023-09-18

## 2023-09-18 RX ADMIN — MONTELUKAST 10 MILLIGRAM(S): 4 TABLET, CHEWABLE ORAL at 11:27

## 2023-09-18 RX ADMIN — ENOXAPARIN SODIUM 40 MILLIGRAM(S): 100 INJECTION SUBCUTANEOUS at 06:54

## 2023-09-18 RX ADMIN — Medication 325 MILLIGRAM(S): at 18:19

## 2023-09-18 RX ADMIN — ZOLPIDEM TARTRATE 5 MILLIGRAM(S): 10 TABLET ORAL at 22:20

## 2023-09-18 RX ADMIN — Medication 30 MILLIGRAM(S): at 05:57

## 2023-09-18 RX ADMIN — BUPROPION HYDROCHLORIDE 450 MILLIGRAM(S): 150 TABLET, EXTENDED RELEASE ORAL at 11:26

## 2023-09-18 RX ADMIN — LISINOPRIL 5 MILLIGRAM(S): 2.5 TABLET ORAL at 05:58

## 2023-09-18 RX ADMIN — CARVEDILOL PHOSPHATE 6.25 MILLIGRAM(S): 80 CAPSULE, EXTENDED RELEASE ORAL at 05:58

## 2023-09-18 RX ADMIN — Medication 325 MILLIGRAM(S): at 11:26

## 2023-09-18 RX ADMIN — ONDANSETRON 4 MILLIGRAM(S): 8 TABLET, FILM COATED ORAL at 20:54

## 2023-09-18 RX ADMIN — CLOPIDOGREL BISULFATE 75 MILLIGRAM(S): 75 TABLET, FILM COATED ORAL at 11:26

## 2023-09-18 RX ADMIN — ONDANSETRON 4 MILLIGRAM(S): 8 TABLET, FILM COATED ORAL at 16:58

## 2023-09-18 RX ADMIN — ATORVASTATIN CALCIUM 80 MILLIGRAM(S): 80 TABLET, FILM COATED ORAL at 22:20

## 2023-09-18 RX ADMIN — FAMOTIDINE 40 MILLIGRAM(S): 10 INJECTION INTRAVENOUS at 11:26

## 2023-09-18 RX ADMIN — PANTOPRAZOLE SODIUM 40 MILLIGRAM(S): 20 TABLET, DELAYED RELEASE ORAL at 05:57

## 2023-09-18 RX ADMIN — Medication 1 MILLIGRAM(S): at 18:18

## 2023-09-18 RX ADMIN — Medication 1 MILLIGRAM(S): at 05:57

## 2023-09-18 RX ADMIN — Medication 125 MICROGRAM(S): at 05:58

## 2023-09-18 NOTE — DISCHARGE NOTE PROVIDER - NSDCMRMEDTOKEN_GEN_ALL_CORE_FT
aspirin 325 mg oral delayed release tablet: 1 orally once a day  atorvastatin 80 mg oral tablet: 1 tab(s) orally once a day (at bedtime)  baclofen 10 mg oral tablet: 1 tab(s) orally 4 times a day  buPROPion 450 mg/24 hours (XL) oral tablet, extended release: 1 orally once a day  clonazePAM 1 mg oral tablet: 1 tab(s) orally 2 times a day  clopidogrel 75 mg oral tablet: 1 orally once a day  docusate sodium 100 mg oral tablet: 1 tab(s) orally 2 times a day  famotidine 40 mg oral tablet: 1 tab(s) orally once a day  hydrOXYzine hydrochloride 10 mg oral tablet: 1 tab(s) orally 3 times a day as needed for  anxiety  levothyroxine 125 mcg (0.125 mg) oral tablet: 1 tab(s) orally once a day  MONTELUKAST SODIUM 10 MG TABS: 1 dose(s) orally once a day  omeprazole 40 mg oral delayed release capsule: 1 tab(s) orally once a day  Toprol- mg oral tablet, extended release: 1 orally once a day  Trelegy Ellipta inhalation powder: 1 puff(s) inhaled once a day  ZOLPIDEM TARTRATE 10 MG TABS: orally once a day (at bedtime)   aspirin 325 mg oral delayed release tablet: 1 orally once a day  atorvastatin 80 mg oral tablet: 1 tab(s) orally once a day (at bedtime)  baclofen 10 mg oral tablet: 1 tab(s) orally 4 times a day  buPROPion 450 mg/24 hours (XL) oral tablet, extended release: 1 orally once a day  carvedilol 6.25 mg oral tablet: 1 tab(s) orally every 12 hours  clonazePAM 1 mg oral tablet: 1 tab(s) orally 2 times a day  clopidogrel 75 mg oral tablet: 1 orally once a day  docusate sodium 100 mg oral tablet: 1 tab(s) orally 2 times a day  famotidine 40 mg oral tablet: 1 tab(s) orally once a day  hydrOXYzine hydrochloride 10 mg oral tablet: 1 tab(s) orally 3 times a day as needed for  anxiety  levothyroxine 125 mcg (0.125 mg) oral tablet: 1 tab(s) orally once a day  lisinopril 10 mg oral tablet: 1 tab(s) orally once a day  MONTELUKAST SODIUM 10 MG TABS: 1 dose(s) orally once a day  omeprazole 40 mg oral delayed release capsule: 1 tab(s) orally once a day  Trelegy Ellipta inhalation powder: 1 puff(s) inhaled once a day  ZOLPIDEM TARTRATE 10 MG TABS: orally once a day (at bedtime)

## 2023-09-18 NOTE — CONSULT NOTE ADULT - SUBJECTIVE AND OBJECTIVE BOX
Neuroendovascular Consult note:   Consulted for: ICA stenosis     HPI:  Patient is a 57 year old female, history of nasopharyngeal cancer in remission s/p chemoradiation 2013, COPD, HTN, TIA s/p right ICA stenting 4/2023 who presented with bilateral blurry vision and severe headaches in the setting of uncontrolled HTN in 200s. CTH negative. CTA reporting right ICA stent with mild narrowing, moderate left ICA stenosis.     Past medical history:   COPD (chronic obstructive pulmonary disease)  HTN (hypertension)  Diverticulitis  Pulmonary nodules/lesions, multiple  Nasopharyngeal cancer  Hypothyroid  Cervical disc disease  Left ear hearing loss  Hemorrhoids      Medications:  aspirin enteric coated 325 milliGRAM(s) Oral daily  atorvastatin 80 milliGRAM(s) Oral at bedtime  buPROPion XL (24-Hour) . 450 milliGRAM(s) Oral daily  carvedilol 6.25 milliGRAM(s) Oral every 12 hours  clonazePAM  Tablet 1 milliGRAM(s) Oral two times a day  clopidogrel Tablet 75 milliGRAM(s) Oral daily  enoxaparin Injectable 40 milliGRAM(s) SubCutaneous every 24 hours  famotidine    Tablet 40 milliGRAM(s) Oral daily  ferrous    sulfate 325 milliGRAM(s) Oral once  iron sucrose IVPB 200 milliGRAM(s) IV Intermittent every 24 hours  levothyroxine 125 MICROGram(s) Oral daily  lisinopril 10 milliGRAM(s) Oral daily  montelukast 10 milliGRAM(s) Oral daily  pantoprazole    Tablet 40 milliGRAM(s) Oral before breakfast      Vitals:   T(F): 97.1 (09-18-23 @ 12:23), Max: 97.8 (09-17-23 @ 20:02)  HR: 72 (09-18-23 @ 12:23) (65 - 91)  BP: 98/48 (09-18-23 @ 12:23) (79/42 - 219/104)  RR: 16 (09-18-23 @ 12:23) (16 - 18)  SpO2: --    Labs:                         9.9    5.60  )-----------( 311      ( 18 Sep 2023 06:15 )             33.3     09-18    143  |  106  |  12  ----------------------------<  88  4.4   |  26  |  1.1    Ca    8.6      18 Sep 2023 06:15  Mg     2.0     09-18    TPro  6.1  /  Alb  3.7  /  TBili  0.7  /  DBili  x   /  AST  18  /  ALT  16  /  AlkPhos  102  09-18      LIVER FUNCTIONS - ( 18 Sep 2023 06:15 )  Alb: 3.7 g/dL / Pro: 6.1 g/dL / ALK PHOS: 102 U/L / ALT: 16 U/L / AST: 18 U/L / GGT: x             Exam:   General - NAD   Neuro - AAO3, follows commands, EOMi, visual fields full, face symmetric, moving all extremities to antigravity, sensation intact, no neglect, no aphasia, no dysarthria, no dysmetria.     Radiology:   < from: IR Neuro (04.04.23 @ 16:00) >  IMPRESSION:  Successful stenting of high-grade, symptomatic, right-sided internal   carotid artery stenosis.  < end of copied text >  < from: CT Head No Cont (09.14.23 @ 22:21) >  IMPRESSION:  1.  No acute intracranial pathology.  2.  Stable exam  < end of copied text >    Assessment:   Patient is a 57 year old female, history of nasopharyngeal cancer in remission s/p chemoradiation 2013, COPD, HTN, TIA s/p right ICA stenting 4/2023 who presented with bilateral blurry vision and severe headaches in the setting of uncontrolled HTN in 200s. CTH negative. CTA reporting right ICA stent with mild narrowing, moderate left ICA stenosis.     Suggestions:  No acute neuroendovascular intervention is warranted at this time.   MRI brain while inpt   Continue ASA and Plavix   HTN control   Continue management per medicine   Disucssed w Dr Blank and medicine team   [id number]

## 2023-09-18 NOTE — PROGRESS NOTE ADULT - SUBJECTIVE AND OBJECTIVE BOX
GEOVANNA PUTNAM  57y Female    CHIEF COMPLAINT:    Patient is a 57y old  Female who presents with a chief complaint of     INTERVAL HPI/OVERNIGHT EVENTS:    Patient seen and examined. Events noted. Was running high BP overnight and was given Labetalol and Nifedipine xl. BP is now running low. C/O dizziness.     ROS: All other systems are negative.    Vital Signs:    T(F): 97.8 (09-17-23 @ 20:02), Max: 97.8 (09-17-23 @ 20:02)  HR: 70 (09-18-23 @ 10:56) (65 - 91)  BP: 91/45 (09-18-23 @ 10:56) (79/42 - 219/104)  RR: 16 (09-18-23 @ 05:00) (16 - 18)  SpO2: --  I&O's Summary    Daily     Daily   CAPILLARY BLOOD GLUCOSE          PHYSICAL EXAM:    GENERAL:  NAD  SKIN: No rashes or lesions  HENT: Atraumatic. Normocephalic. PERRL. Moist membranes.  NECK: Supple, No JVD. No lymphadenopathy.  PULMONARY: CTA B/L. No wheezing. No rales  CVS: Normal S1, S2. Rate and Rhythm are regular. No murmurs.  ABDOMEN/GI: Soft, Nontender, Nondistended; BS present  EXTREMITIES: Peripheral pulses intact. No edema B/L LE.  NEUROLOGIC:  No motor or sensory deficit.  PSYCH: Alert & oriented x 3    Consultant(s) Notes Reviewed:  [x ] YES  [ ] NO  Care Discussed with Consultants/Other Providers [ x] YES  [ ] NO    EKG reviewed  Telemetry reviewed    LABS:                        9.9    5.60  )-----------( 311      ( 18 Sep 2023 06:15 )             33.3     09-18    143  |  106  |  12  ----------------------------<  88  4.4   |  26  |  1.1    Ca    8.6      18 Sep 2023 06:15  Mg     2.0     09-18    TPro  6.1  /  Alb  3.7  /  TBili  0.7  /  DBili  x   /  AST  18  /  ALT  16  /  AlkPhos  102  09-18              RADIOLOGY & ADDITIONAL TESTS:      Imaging or report Personally Reviewed:  [ ] YES  [ ] NO    Medications:  Standing  aspirin enteric coated 325 milliGRAM(s) Oral daily  atorvastatin 80 milliGRAM(s) Oral at bedtime  buPROPion XL (24-Hour) . 450 milliGRAM(s) Oral daily  carvedilol 6.25 milliGRAM(s) Oral every 12 hours  clonazePAM  Tablet 1 milliGRAM(s) Oral two times a day  clopidogrel Tablet 75 milliGRAM(s) Oral daily  enoxaparin Injectable 40 milliGRAM(s) SubCutaneous every 24 hours  famotidine    Tablet 40 milliGRAM(s) Oral daily  iron sucrose IVPB 200 milliGRAM(s) IV Intermittent every 24 hours  levothyroxine 125 MICROGram(s) Oral daily  lisinopril 10 milliGRAM(s) Oral daily  montelukast 10 milliGRAM(s) Oral daily  pantoprazole    Tablet 40 milliGRAM(s) Oral before breakfast    PRN Meds  acetaminophen     Tablet .. 650 milliGRAM(s) Oral every 6 hours PRN  baclofen 2.5 milliGRAM(s) Oral every 8 hours PRN  hydrOXYzine hydrochloride 10 milliGRAM(s) Oral three times a day PRN  oxycodone    5 mG/acetaminophen 325 mG 1 Tablet(s) Oral every 6 hours PRN  zolpidem 5 milliGRAM(s) Oral at bedtime PRN  zolpidem 5 milliGRAM(s) Oral at bedtime PRN      Case discussed with resident    Care discussed with pt/family

## 2023-09-18 NOTE — DISCHARGE NOTE PROVIDER - NSDCFUSCHEDAPPT_GEN_ALL_CORE_FT
Unknown, Doctor  [facility] PreAdmits  Scheduled Appointment: 09/20/2023    [facility]  CATSCAN  [address]  Scheduled Appointment: 09/20/2023    Dawood Herrera  [facility]  ONCPAINMGT [address]  Scheduled Appointment: 11/16/2023    Bhanu Blank  [facility]  NEUROSURG [address]  Scheduled Appointment: 11/22/2023

## 2023-09-18 NOTE — DISCHARGE NOTE PROVIDER - NSDCCPCAREPLAN_GEN_ALL_CORE_FT
PRINCIPAL DISCHARGE DIAGNOSIS  Diagnosis: Hypertensive emergency  Assessment and Plan of Treatment: You were evaluated in the hospital for your headache and lightheadedness, possibly caused by elevated blood pressure.  Hypertension, commonly called high blood pressure, is when the force of blood pumping through your arteries is too strong. Hypertension forces your heart to work harder to pump blood. Your arteries may become narrow or stiff. Having untreated or uncontrolled hypertension for a long period of time can cause heart attack, stroke, kidney disease, and other problems. If started on a medication, take exactly as prescribed by your health care professional. Maintain a healthy lifestyle and follow up with your primary care physician.  After discharge, you will need to:   - Follow up with your primary care doctor within 1-2 weeks  - Take all the medications you were discharged with, unless otherwise instructed by your healthcare provider(s).   Please follow up with your providers by calling them to make an appointment so that you can see them in 1-2weeks; bring your paperwork from this hospital stay to that visit. You can access your visit information by signing up for an account for the patient portal at [email or web address]   Seek immediate medical attention if you develop fevers, chills, chest pain, shortness of breath, nausea and vomiting, abdominal pain, passing out, weakness or numbness or tingling on one side of your body, or any other concerning signs or symptoms.

## 2023-09-18 NOTE — PROGRESS NOTE ADULT - ASSESSMENT
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 Blank) on DAPT and recent admission for blurry vision/dizziness 5/4-7 presents today for 2 days of sever headache associated with lightheadedness and intermittent blurry vision.     Hypertensive Urgency  Headache and blurred vision  H/O Nasopharyngeal Ca in remission  COPD  HTN  Depression / Anxiety  H/O TIA  H/O R ICA stenosis s/p angioplasty  L ICA 60-79% stenosis  Morbid obesity  Microcytic anemia likely due bleeding hemorrhoids.                  PLAN:    ·	Tele reviewed. No events  ·	CT head is unremarkable  ·	Carotid doppler showed 20-39% R ICA stenosis. 60-79% L ICA  ·	Pt follows up with the vascular for L carotid artery stenosis  ·	BP is running low now. D/C Nifedipine xl. Cont to monitor  ·	Cont Coreg. Increase Lisinopril to 10 mg po daily. If the BP is elevated can give her extra dose of Lisinopril   ·	Neuro eval noted. Pt's symptoms were likely due to elevated BP. No further w/u as per neuro  ·	Iron studies noted. Serum Iron is 28 and percent sat is 8. Con Venofer 200 mg IV daily x 5 doses.   ·	Pt states that she had colonoscopy a year ago which was normal. C/O bleeding hemorrhoids. Out pt f/u with surgery  ·	Cont her home meds    Progress Note Handoff    Pending (specify):  Consults_________, Tests________, Test Results_______, Other__Anticipate d/c in AM_______  Family discussion:  Disposition: Home___/SNF___/Other________/Unknown at this time________    Ryan Shaa MD  Spectra: [phone]

## 2023-09-18 NOTE — DISCHARGE NOTE PROVIDER - HOSPITAL COURSE
57 year old F with PMH of 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 2013, hypothyroidism, Diverticulitis, former smoker, COPD (not on oxygen), HTN, Depression, Insomnia, and Anxiety, TIA/transient blurry vision 2/2 to proximal LULI stenosis of approximately 70% s/p carotid angioplasty and stent by neuroendovascular team (Dr. Blank) on DAPT and recent admission for blurry vision/dizziness 5/4-7 presents today for 2 days of sever headache associated with lightheadedness and intermittent blurry vision. Pt states that headache was constant 9/10 in severity, and when was asked where is the headache pt states "my whole head". She also has been having intermittent blurry vision and lightheadedness specifically upon standing from seating position. She admits that the symptoms are similar to her previous TIA before LULI stent placement. Her bp 200/100 upon EMS arrival. She does not check her bp at home and takes Toprol daily. She is compliant with her medications. She otherwise denies fever, chills, CP, N/V, weakness, sensory deficits.    on Arrival to ED VT stable bp 130/78, HR 78, RR 18  Labs remarkable for Hgb 10.2 ( BL 9-11), Cr 1.4 (BL 1), BUN 23, Trop NG x1    CTH NG for acute pathology    s/p Benadryl, Toradol and 500 cc LR Blous in ED    Patient was admitted to medicine floor for further monitoring. CTH was negative for acute intracranial pathology. Duplex of the carotids showed mild stenosis of the R ICA (20-39%) with patent R ICA stent, moderate stenosis of L ICA (60-79%). Patient's blood pressure remained elevated and patient was started on lisinopril 5mg, which was then increased to 10mg. Since being admitted, patient's blood pressure has stabilized and symptoms of headache and blurry vision have improved drastically. Patient is medically stable for dischrage.    # HTN emergency  - Bp 200/100 upon EMS arrival-> today 115/70. aggressive dec in BP in setting of ICA stenosis   - will dec carvidilol to 6.25 bid   - may benefit ace/arb if remain elevated     # moderate stenosis of the Left ICA  #Hx of proximal LULI stenosis of approximately 70% s/p carotid angioplasty and stent  - CTH NG for acute pathology  - VA Duplex b/l Carotid noted  - Neuroendovascular consult   - c/w Home medications:  mg, Plavix, Lipitor 80 mg qhs (ASA increased by one of her doctors she cannot recall which one)  - counselled on bp monitoring at home and follow up with Neuroendovascular    # undiagnosed DM   - hBA1C MARCH 6.9  - repeat hba1c and lipid panel    # Chronic microcytic anemia   - check iron profil with ferritin    #LAKISHA, likely pre-renal- imrpoved   - Cr 1.4 (baseline 1.0)    #COPD (not on home O2)  - not in exacerbation  - placed  - continue inhalers PRN (on Trelegy ellipta at home, can do duonebs/symbicort here)  - continue home montelukast 10mg daily      #Depression/anxiety  #Insomnia  - continue home bupropion, klonopin, hydroxyzine PRN  - continue home zolpidem    #Hypothyroidism  - continue synthroid 125mcg daily    #GERD  - continue home famotidine 40mg daily   - on omeprazole 40mg at home (can give protonix 40mg as therapeutic interchange)   57 year old F with PMH of 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 2013, hypothyroidism, Diverticulitis, former smoker, COPD (not on oxygen), HTN, Depression, Insomnia, and Anxiety, TIA/transient blurry vision 2/2 to proximal LULI stenosis of approximately 70% s/p carotid angioplasty and stent by neuroendovascular team (Dr. Blank) on DAPT and recent admission for blurry vision/dizziness 5/4-7 presents today for 2 days of sever headache associated with lightheadedness and intermittent blurry vision. Pt states that headache was constant 9/10 in severity, and when was asked where is the headache pt states "my whole head". She also has been having intermittent blurry vision and lightheadedness specifically upon standing from seating position. She admits that the symptoms are similar to her previous TIA before LULI stent placement. Her bp 200/100 upon EMS arrival. She does not check her bp at home and takes Toprol daily. She is compliant with her medications. She otherwise denies fever, chills, CP, N/V, weakness, sensory deficits.    on Arrival to ED VT stable bp 130/78, HR 78, RR 18  Labs remarkable for Hgb 10.2 ( BL 9-11), Cr 1.4 (BL 1), BUN 23, Trop NG x1    CTH NG for acute pathology    s/p Benadryl, Toradol and 500 cc LR Blous in ED    Patient was admitted to medicine floor for further monitoring. CTH was negative for acute intracranial pathology. Duplex of the carotids showed mild stenosis of the R ICA (20-39%) with patent R ICA stent, moderate stenosis of L ICA (60-79%). Patient's blood pressure remained elevated and patient was started on lisinopril 5mg, which was then increased to 10mg. Since being admitted, patient's blood pressure has stabilized and symptoms of headache and blurry vision have improved drastically. MRI head showed no significant findings. Patient is medically stable for discharge.    # HTN emergency  - Bp 200/100 upon EMS arrival-> today 115/70. aggressive dec in BP in setting of ICA stenosis   - will dec carvidilol to 6.25 bid   - may benefit ace/arb if remain elevated     # moderate stenosis of the Left ICA  #Hx of proximal LULI stenosis of approximately 70% s/p carotid angioplasty and stent  - CTH NG for acute pathology  - VA Duplex b/l Carotid noted  - Neuroendovascular consult   - c/w Home medications:  mg, Plavix, Lipitor 80 mg qhs (ASA increased by one of her doctors she cannot recall which one)  - counselled on bp monitoring at home and follow up with Neuroendovascular    # undiagnosed DM   - hBA1C MARCH 6.9  - repeat hba1c and lipid panel    # Chronic microcytic anemia   - check iron profil with ferritin    #LAKISHA, likely pre-renal- imrpoved   - Cr 1.4 (baseline 1.0)    #COPD (not on home O2)  - not in exacerbation  - placed  - continue inhalers PRN (on Trelegy ellipta at home, can do duonebs/symbicort here)  - continue home montelukast 10mg daily      #Depression/anxiety  #Insomnia  - continue home bupropion, klonopin, hydroxyzine PRN  - continue home zolpidem    #Hypothyroidism  - continue synthroid 125mcg daily    #GERD  - continue home famotidine 40mg daily   - on omeprazole 40mg at home (can give protonix 40mg as therapeutic interchange)

## 2023-09-18 NOTE — DISCHARGE NOTE PROVIDER - CARE PROVIDER_API CALL
Saran Kody  Internal Medicine  [address] [place] [id number]  Phone: [phone]  Fax: [phone]  Established Patient  Follow Up Time: 2 weeks

## 2023-09-19 LAB
ALBUMIN SERPL ELPH-MCNC: 3.6 G/DL — SIGNIFICANT CHANGE UP (ref 3.5–5.2)
ALP SERPL-CCNC: 100 U/L — SIGNIFICANT CHANGE UP (ref 30–115)
ALT FLD-CCNC: 16 U/L — SIGNIFICANT CHANGE UP (ref 0–41)
ANION GAP SERPL CALC-SCNC: 10 MMOL/L — SIGNIFICANT CHANGE UP (ref 7–14)
AST SERPL-CCNC: 18 U/L — SIGNIFICANT CHANGE UP (ref 0–41)
BASOPHILS # BLD AUTO: 0.04 K/UL — SIGNIFICANT CHANGE UP (ref 0–0.2)
BASOPHILS NFR BLD AUTO: 0.6 % — SIGNIFICANT CHANGE UP (ref 0–1)
BILIRUB SERPL-MCNC: <0.2 MG/DL — SIGNIFICANT CHANGE UP (ref 0.2–1.2)
BUN SERPL-MCNC: 14 MG/DL — SIGNIFICANT CHANGE UP (ref 10–20)
CALCIUM SERPL-MCNC: 8.9 MG/DL — SIGNIFICANT CHANGE UP (ref 8.4–10.4)
CHLORIDE SERPL-SCNC: 107 MMOL/L — SIGNIFICANT CHANGE UP (ref 98–110)
CO2 SERPL-SCNC: 26 MMOL/L — SIGNIFICANT CHANGE UP (ref 17–32)
CREAT SERPL-MCNC: 1.1 MG/DL — SIGNIFICANT CHANGE UP (ref 0.7–1.5)
EGFR: 58 ML/MIN/1.73M2 — LOW
EOSINOPHIL # BLD AUTO: 0.34 K/UL — SIGNIFICANT CHANGE UP (ref 0–0.7)
EOSINOPHIL NFR BLD AUTO: 5.3 % — SIGNIFICANT CHANGE UP (ref 0–8)
GLUCOSE SERPL-MCNC: 101 MG/DL — HIGH (ref 70–99)
HCT VFR BLD CALC: 31.4 % — LOW (ref 37–47)
HGB BLD-MCNC: 9.5 G/DL — LOW (ref 12–16)
IMM GRANULOCYTES NFR BLD AUTO: 0.3 % — SIGNIFICANT CHANGE UP (ref 0.1–0.3)
LYMPHOCYTES # BLD AUTO: 1.95 K/UL — SIGNIFICANT CHANGE UP (ref 1.2–3.4)
LYMPHOCYTES # BLD AUTO: 30.3 % — SIGNIFICANT CHANGE UP (ref 20.5–51.1)
MAGNESIUM SERPL-MCNC: 2 MG/DL — SIGNIFICANT CHANGE UP (ref 1.8–2.4)
MCHC RBC-ENTMCNC: 23.3 PG — LOW (ref 27–31)
MCHC RBC-ENTMCNC: 30.3 G/DL — LOW (ref 32–37)
MCV RBC AUTO: 77.1 FL — LOW (ref 81–99)
MONOCYTES # BLD AUTO: 0.61 K/UL — HIGH (ref 0.1–0.6)
MONOCYTES NFR BLD AUTO: 9.5 % — HIGH (ref 1.7–9.3)
NEUTROPHILS # BLD AUTO: 3.48 K/UL — SIGNIFICANT CHANGE UP (ref 1.4–6.5)
NEUTROPHILS NFR BLD AUTO: 54 % — SIGNIFICANT CHANGE UP (ref 42.2–75.2)
NRBC # BLD: 0 /100 WBCS — SIGNIFICANT CHANGE UP (ref 0–0)
PLATELET # BLD AUTO: 309 K/UL — SIGNIFICANT CHANGE UP (ref 130–400)
PMV BLD: 10.6 FL — HIGH (ref 7.4–10.4)
POTASSIUM SERPL-MCNC: 4.1 MMOL/L — SIGNIFICANT CHANGE UP (ref 3.5–5)
POTASSIUM SERPL-SCNC: 4.1 MMOL/L — SIGNIFICANT CHANGE UP (ref 3.5–5)
PROT SERPL-MCNC: 6 G/DL — SIGNIFICANT CHANGE UP (ref 6–8)
RBC # BLD: 4.07 M/UL — LOW (ref 4.2–5.4)
RBC # FLD: 18.5 % — HIGH (ref 11.5–14.5)
SODIUM SERPL-SCNC: 143 MMOL/L — SIGNIFICANT CHANGE UP (ref 135–146)
WBC # BLD: 6.44 K/UL — SIGNIFICANT CHANGE UP (ref 4.8–10.8)
WBC # FLD AUTO: 6.44 K/UL — SIGNIFICANT CHANGE UP (ref 4.8–10.8)

## 2023-09-19 PROCEDURE — 99232 SBSQ HOSP IP/OBS MODERATE 35: CPT

## 2023-09-19 PROCEDURE — 70551 MRI BRAIN STEM W/O DYE: CPT | Mod: 26

## 2023-09-19 RX ORDER — ALPRAZOLAM 0.25 MG
2 TABLET ORAL ONCE
Refills: 0 | Status: DISCONTINUED | OUTPATIENT
Start: 2023-09-19 | End: 2023-09-19

## 2023-09-19 RX ADMIN — BUPROPION HYDROCHLORIDE 450 MILLIGRAM(S): 150 TABLET, EXTENDED RELEASE ORAL at 12:35

## 2023-09-19 RX ADMIN — ZOLPIDEM TARTRATE 5 MILLIGRAM(S): 10 TABLET ORAL at 22:11

## 2023-09-19 RX ADMIN — Medication 1 MILLIGRAM(S): at 06:36

## 2023-09-19 RX ADMIN — Medication 325 MILLIGRAM(S): at 12:26

## 2023-09-19 RX ADMIN — Medication 125 MICROGRAM(S): at 06:13

## 2023-09-19 RX ADMIN — ATORVASTATIN CALCIUM 80 MILLIGRAM(S): 80 TABLET, FILM COATED ORAL at 22:10

## 2023-09-19 RX ADMIN — CARVEDILOL PHOSPHATE 6.25 MILLIGRAM(S): 80 CAPSULE, EXTENDED RELEASE ORAL at 18:06

## 2023-09-19 RX ADMIN — ZOLPIDEM TARTRATE 5 MILLIGRAM(S): 10 TABLET ORAL at 22:10

## 2023-09-19 RX ADMIN — MONTELUKAST 10 MILLIGRAM(S): 4 TABLET, CHEWABLE ORAL at 12:26

## 2023-09-19 RX ADMIN — CLOPIDOGREL BISULFATE 75 MILLIGRAM(S): 75 TABLET, FILM COATED ORAL at 12:31

## 2023-09-19 RX ADMIN — Medication 1 MILLIGRAM(S): at 18:07

## 2023-09-19 RX ADMIN — CARVEDILOL PHOSPHATE 6.25 MILLIGRAM(S): 80 CAPSULE, EXTENDED RELEASE ORAL at 06:13

## 2023-09-19 RX ADMIN — Medication 2 MILLIGRAM(S): at 10:50

## 2023-09-19 RX ADMIN — LISINOPRIL 10 MILLIGRAM(S): 2.5 TABLET ORAL at 06:13

## 2023-09-19 RX ADMIN — ENOXAPARIN SODIUM 40 MILLIGRAM(S): 100 INJECTION SUBCUTANEOUS at 06:34

## 2023-09-19 RX ADMIN — PANTOPRAZOLE SODIUM 40 MILLIGRAM(S): 20 TABLET, DELAYED RELEASE ORAL at 06:36

## 2023-09-19 NOTE — PROGRESS NOTE ADULT - ASSESSMENT
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 Blank) on DAPT and recent admission for blurry vision/dizziness 5/4-7 presents today for 2 days of sever headache associated with lightheadedness and intermittent blurry vision. Vitals significant for SBP > 200. Exam benign except for morbid obesity. Labs and imaging reviewed.    # HTN emergency - resolved  - Bp 200/100 upon EMS arrival-> today 115/70. aggressive dec in BP in setting of ICA stenosis   - c/w carvedilol 6.25mg BID, lisinopril 10mg PO qD (was increased from 5mg to 10mg on 9/18)    # moderate stenosis of the Left ICA  #Hx of proximal LULI stenosis of approximately 70% s/p carotid angioplasty and stent  - CT NG for acute pathology  - VA Duplex b/l Carotid noted  1. 20-39% (mild) stenosis of the right internal carotid artery.  Patent right ICA stent is visualized.    60-79% (moderate) stenosis of the left internal carotid artery.  - Neuroendovascular consult   - c/w Home medications:  mg, Plavix, Lipitor 80 mg qhs (ASA increased by one of her doctors she cannot recall which one)  - counselled on bp monitoring at home and follow up with Neuroendovascular    # undiagnosed DM   - hBA1C MARCH 6.9  - repeat hba1c and lipid panel    #Chronic microcytic anemia   - check iron profil with ferritin    #LAKISHA, likely pre-renal- imrpoved   - Cr 1.4 (baseline 1.0)    #COPD (not on home O2)  - not in exacerbation  - continue inhalers PRN (on Trelegy ellipta at home, can do duonebs/symbicort here)  - continue home montelukast 10mg daily    #Depression/anxiety  #Insomnia  - continue home bupropion, klonopin, hydroxyzine PRN  - continue home zolpidem    #Hypothyroidism  - Check TSH  - continue synthroid 125mcg daily    #GERD  - continue home famotidine 40mg daily   - on omeprazole 40mg at home (can give protonix 40mg as therapeutic interchange)      DVT ppx: heparin subq  GI ppx: protonix/pepcid  Diet: DASH/TLC. 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 Blank) on DAPT and recent admission for blurry vision/dizziness 5/4-7 presents today for 2 days of sever headache associated with lightheadedness and intermittent blurry vision. Vitals significant for SBP > 200. Exam benign except for morbid obesity. Labs and imaging reviewed.    # HTN emergency - resolved  - Bp 200/100 upon EMS arrival-> today 115/70. aggressive dec in BP in setting of ICA stenosis   - c/w carvedilol 6.25mg BID, lisinopril 10mg PO qD (was increased from 5mg to 10mg on 9/18)    # moderate stenosis of the Left ICA  #Hx of proximal LULI stenosis of approximately 70% s/p carotid angioplasty and stent  - CT NG for acute pathology  - VA Duplex b/l Carotid noted  1. 20-39% (mild) stenosis of the right internal carotid artery. Patent right ICA stent is visualized.  2. 60-79% (moderate) stenosis of the left internal carotid artery.  - Neuroendovascular consulted: no acute intervention at this time, MRI brain as inpatient  - c/w Home medications:  mg, Plavix, Lipitor 80 mg qhs (ASA increased by one of her doctors she cannot recall which one)  - counselled on bp monitoring at home    # undiagnosed DM   - hBA1C MARCH 6.9, repeat a1c 9/17: 6.6    #Chronic microcytic anemia   - f/u iron profile with ferritin  - total iron: 28, %saturation: 8%  - c/w IV iron sucrose 200mg for 5 days    #LAKISHA, likely pre-renal- improved   - Cr 1.4 (baseline 1.0)    #COPD (not on home O2)  - not in exacerbation  - continue inhalers PRN (on Trelegy ellipta at home, can do duonebs/symbicort here)  - continue home montelukast 10mg daily    #Depression  #Anxiety  #Insomnia  - continue home bupropion, klonopin, hydroxyzine PRN  - continue home zolpidem    #Hypothyroidism  - TSH 3.21  - c/w synthroid 125mcg daily    #GERD  - continue home famotidine 40mg daily   - on omeprazole 40mg at home (can give protonix 40mg as therapeutic interchange)                                                                                ----------------------------------------------------  # DVT prophylaxis: Lovenox  # GI prophylaxis: Protonix   # Diet: DASH/TLC  # Activity: IAT  # Code status: FULL CODE  # Disposition: home pending MRI                                                                           --------------------------------------------------------    # Handoff:   - f/u MRI brain  - poss dc s/p MRI

## 2023-09-19 NOTE — PROGRESS NOTE ADULT - SUBJECTIVE AND OBJECTIVE BOX
Patient is a 57y old  Female who presents with a chief complaint of   HPI:  57 year old F with PMH of 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 2013, hypothyroidism, Diverticulitis, former smoker, COPD (not on oxygen), HTN, Depression, Insomnia, and Anxiety, TIA/transient blurry vision 2/2 to proximal LULI stenosis of approximately 70% s/p carotid angioplasty and stent by neuroendovascular team (Dr. Blank) on DAPT and recent admission for blurry vision/dizziness 5/4-7 presents today for 2 days of sever headache associated with lightheadedness and intermittent blurry vision. Pt states that headache was constant 9/10 in severity, and when was asked where is the headache pt states "my whole head". She also has been having intermittent blurry vision and lightheadedness specifically upon standing from seating position. She admits that the symptoms are similar to her previous TIA before LULI stent placement. Her bp 200/100 upon EMS arrival. She does not check her bp at home and takes Toprol daily. She is compliant with her medications. She otherwise denies fever, chills, CP, N/V, weakness, sensory deficits.   (15 Sep 2023 09:31)    PAST MEDICAL & SURGICAL HISTORY:  COPD (chronic obstructive pulmonary disease)  HTN (hypertension)  Diverticulitis  Pulmonary nodules/lesions, multiple  Nasopharyngeal cancer  Hypothyroid  Cervical disc disease  sequelae of radtion for nasopharyngeal cancer  Left ear hearing loss  Hemorrhoids  Anxiety  History of cholecystectomy    patient seen and examined independently on morning rounds for the first time today, chart reviewed and discussed with the medicine resident and on interdisciplinary rounds:    will attempt MRI today after pre-medication with ativan (for severe claustrophobia)- bp improving and no current headache    pcp- Dr. Noonan    Vital Signs Last 24 Hrs  T(C): 35.6 (18 Sep 2023 20:10), Max: 36.2 (18 Sep 2023 12:23)  T(F): 96 (18 Sep 2023 20:10), Max: 97.1 (18 Sep 2023 12:23)  HR: 88 (19 Sep 2023 04:39) (72 - 88)  BP: 136/63 (19 Sep 2023 04:39) (98/48 - 136/63)  BP(mean): --  RR: 16 (19 Sep 2023 04:39) (16 - 16)  SpO2: --    PE:  GEN-NAD, AAOx3, obese  PULM- fair air entry  CVS- +s1/s2 RRR no murmurs  GI- soft NT obese, ND +bs  EXT- no edema  SKIN- no rashes/no lesions                          9.5    6.44  )-----------( 309      ( 19 Sep 2023 05:30 )             31.4     09-19    143  |  107  |  14  ----------------------------<  101<H>  4.1   |  26  |  1.1    Ca    8.9      19 Sep 2023 05:30  Mg     2.0     09-19    TPro  6.0  /  Alb  3.6  /  TBili  <0.2  /  DBili  x   /  AST  18  /  ALT  16  /  AlkPhos  100  09-19        Urinalysis Basic - ( 19 Sep 2023 05:30 )    Color: x / Appearance: x / SG: x / pH: x  Gluc: 101 mg/dL / Ketone: x  / Bili: x / Urobili: x   Blood: x / Protein: x / Nitrite: x   Leuk Esterase: x / RBC: x / WBC x   Sq Epi: x / Non Sq Epi: x / Bacteria: x          MEDICATIONS  (STANDING):  aspirin enteric coated 325 milliGRAM(s) Oral daily  atorvastatin 80 milliGRAM(s) Oral at bedtime  buPROPion XL (24-Hour) . 450 milliGRAM(s) Oral daily  carvedilol 6.25 milliGRAM(s) Oral every 12 hours  clonazePAM  Tablet 1 milliGRAM(s) Oral two times a day  clopidogrel Tablet 75 milliGRAM(s) Oral daily  enoxaparin Injectable 40 milliGRAM(s) SubCutaneous every 24 hours  famotidine    Tablet 40 milliGRAM(s) Oral daily  iron sucrose IVPB 200 milliGRAM(s) IV Intermittent every 24 hours  levothyroxine 125 MICROGram(s) Oral daily  lisinopril 10 milliGRAM(s) Oral daily  montelukast 10 milliGRAM(s) Oral daily  pantoprazole    Tablet 40 milliGRAM(s) Oral before breakfast

## 2023-09-19 NOTE — PROGRESS NOTE ADULT - SUBJECTIVE AND OBJECTIVE BOX
24H events:    Patient is a 57y old Female who presents with a chief complaint of   Primary diagnosis of Hypertensive emergency    Today is hospital day 4d. This morning patient was seen and examined at bedside, resting comfortably in bed.    No acute or major events overnight. Hemodynamically stable, tolerating oral diet, voiding appropriately with appropriate bowel movements.     Patient stated was feeling a little dizzy yesterday night, but that has since resolved. States has not had headaches or changes in vision and believes her blood pressure has been well controlled.       PAST MEDICAL & SURGICAL HISTORY  COPD (chronic obstructive pulmonary disease)    HTN (hypertension)    Diverticulitis    Pulmonary nodules/lesions, multiple    Nasopharyngeal cancer    Hypothyroid    Cervical disc disease  sequelae of radtion for nasopharyngeal cancer    Left ear hearing loss    Hemorrhoids    Anxiety    History of cholecystectomy      ALLERGIES:  ciprofloxacin (Urticaria; Other)    MEDICATIONS:  STANDING MEDICATIONS  aspirin enteric coated 325 milliGRAM(s) Oral daily  atorvastatin 80 milliGRAM(s) Oral at bedtime  buPROPion XL (24-Hour) . 450 milliGRAM(s) Oral daily  carvedilol 6.25 milliGRAM(s) Oral every 12 hours  clonazePAM  Tablet 1 milliGRAM(s) Oral two times a day  clopidogrel Tablet 75 milliGRAM(s) Oral daily  enoxaparin Injectable 40 milliGRAM(s) SubCutaneous every 24 hours  famotidine    Tablet 40 milliGRAM(s) Oral daily  iron sucrose IVPB 200 milliGRAM(s) IV Intermittent every 24 hours  levothyroxine 125 MICROGram(s) Oral daily  lisinopril 10 milliGRAM(s) Oral daily  montelukast 10 milliGRAM(s) Oral daily  pantoprazole    Tablet 40 milliGRAM(s) Oral before breakfast    PRN MEDICATIONS  acetaminophen     Tablet .. 650 milliGRAM(s) Oral every 6 hours PRN  baclofen 2.5 milliGRAM(s) Oral every 8 hours PRN  hydrOXYzine hydrochloride 10 milliGRAM(s) Oral three times a day PRN  oxycodone    5 mG/acetaminophen 325 mG 1 Tablet(s) Oral every 6 hours PRN  zolpidem 5 milliGRAM(s) Oral at bedtime PRN  zolpidem 5 milliGRAM(s) Oral at bedtime PRN    VITALS:   T(F): 96  HR: 88  BP: 136/63  RR: 16  SpO2: --    PHYSICAL EXAM:  GENERAL: NAD, lying in bed comfortably.  HEAD: NCAD  NECK: Supple, no neck stiffness/nuchal rigidity, no JVD    HEART: Regular rate and rhythm, normal S1/S2  LUNGS: No acute respiratory distress, clear b/l breath sounds  ABDOMEN:  soft, nontender, nondistended  EXTREMITIES: no rashes, extremities warm/dry, no cyanosis, no edema, ulcerations or ecchymosis  NERVOUS SYSTEM:  A&Ox3    LABS:                        9.5    6.44  )-----------( 309      ( 19 Sep 2023 05:30 )             31.4     09-19    143  |  107  |  14  ----------------------------<  101<H>  x    |  26  |  1.1    Ca    8.9      19 Sep 2023 05:30  Mg     2.0     09-19    TPro  6.0  /  Alb  3.6  /  TBili  <0.2  /  DBili  x   /  AST  18  /  ALT  16  /  AlkPhos  100  09-19      Urinalysis Basic - ( 19 Sep 2023 05:30 )    Color: x / Appearance: x / SG: x / pH: x  Gluc: 101 mg/dL / Ketone: x  / Bili: x / Urobili: x   Blood: x / Protein: x / Nitrite: x   Leuk Esterase: x / RBC: x / WBC x   Sq Epi: x / Non Sq Epi: x / Bacteria: x                RADIOLOGY:    < from: VA Duplex Carotid, Bilat (09.15.23 @ 14:44) >    ACC: [id number] EXAM:  CAROTID DUPLEX COMPLETE BILAT   ORDERED BY: ASHLEIGH RODRIGUEZ     PROCEDURE DATE:  09/15/2023          INTERPRETATION:  CLINICAL INFORMATION The patient is 57 years old female   for evaluation of bilateral carotid stenosis.  The study was performed to   evaluate the patient for carotid artery stenosis.    Duplex evaluation of the carotid arteries was performed bilaterally.  In the right carotid system, the internal carotid artery is noted to be   patent with a peak systolic velocity of 35.8 cm/sec over end diastolic   velocity of 11.7 cm/sec.    In the left carotid system, the internal carotid artery is noted to be   patent with a peak systolic velocity of 225.3 cm/sec over end diastolic   velocity of 71 cm/sec.    Theright vertebral arterial flow was antegrade.  The left vertebral arterial flow was antegrade.    Impression:    20-39% (mild) stenosis of the right internal carotid artery.  Patent right ICA stent is visualized.    60-79% (moderate) stenosis of the left internal carotid artery.    --- End of Report ---            PAUL OLIVEIRA MD; Attending Vascular Surgeon  This document has been electronically signed. Sep 15 2023  7:39PM    < end of copied text >    < from: CT Head No Cont (09.14.23 @ 22:21) >    ACC: [id number] EXAM:  CT BRAIN   ORDERED BY: KELI LEE     PROCEDURE DATE:  09/14/2023          INTERPRETATION:  CLINICAL INDICATION: Headache.    Technique: CT of the head was performed without contrast.    Multiple contiguous axial images were acquired from the skull base to the   vertex without the administration of intravenous contrast.  Coronal and   sagittal reformations were made.    COMPARISON: CT head 5/4/2023    FINDINGS:  Ventricles and cortical sulcal pattern are age appropriate.    Gray-white differentiation is maintained. No evidence of recent   territorial infarction.    No acute intracranial hemorrhage or extra-axial fluid collection.    No intracranial mass effect or midline shift. No vasogenic edema.    No depressed calvarial fracture. Chronic bilateral partial mastoid air   cell opacification. Visualized paranasal sinuses are clear. Globes and   orbits have a normal CT appearance as visualized.      IMPRESSION:  1.  No acute intracranial pathology.  2.  Stable exam    --- End of Report ---            HANNA MARTINEZ MD; Attending Radiologist  This document has been electronically signed. Sep 14 2023 11:35PM    < end of copied text >

## 2023-09-19 NOTE — PROGRESS NOTE ADULT - ASSESSMENT
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 Blank) on DAPT and recent admission for blurry vision/dizziness 5/4-7 presents today for 2 days of severe headache associated with lightheadedness and intermittent blurry vision and hypertensive urgency    #Headache/dizziness  likely 2/2 hypertensive urgency  #h/o TIA  #h/o R ICA stenosis s/p angioplasty  #severe anxiety  #hypothyroidism  -tele monitoring reviewed--no events  -bp better controlled today- was low >24 hrs ago but had received multiple IV and oral antihypertensive agents on presentation  -CTH negative  -cont coreg 6.25 mg bid and lisinopril 10 mg daily  -encourage compliance with post-dc f/u and medications  -awaiting MRI brain today-----pre-medicate with ativan 2 mg iv x 1 prior to transport--patient with severe claustrophobia- if unable to tolerate will either need anaesthesia or outpatient open MRI   -f/u with neurology  -continue DAPT (ASA/Plavix) and statin  -fasting lipid profile checked (total chol 148, TG- 135,      LDL- 94, HDL-54)  -carotid US with L moderate ICA stenosis---f/u with vascular team   -TSH normal (3.2)- cont synthroid 125 mcg daily and monitor thyroid function as outpatient    #Microcytic anemia likely due bleeding hemorrhoids with Iron def anemia   -Fe serum 28 and percent sat 8  -continue Venofer 200 mg iv daily---today day #3 of 5   -f/u with outpatient GI (as per patient had recent colonoscopy >1 year ago that was normal)    DVT/GI ppx  guarded prognosis    FULL CODE    Progress Note Handoff    Pending (specify): awaiting MRI brain and neuro f/u and adequate bp control- remains on iv venofer x 5 days (today day #3)    Family discussion: d/w patient herself at length bedside     Disposition: Home_x (anticipate dc in 24-48 hrs after MRI completed)             57 year old F with PMH of nasopharyngeal cancer in remission s/p chemoradiation 2013, hypothyroidism, Diverticulitis, former smoker, COPD (not on oxygen), HTN, Depression, Insomnia, and Anxiety, TIA/transient blurry vision 2/2 to proximal LULI stenosis of approximately 70% s/p carotid angioplasty and stent by neuroendovascular team (Dr. Blank) on DAPT and recent admission for blurry vision/dizziness 5/4-7 presents today for 2 days of severe headache associated with lightheadedness and intermittent blurry vision and hypertensive urgency    #Headache/dizziness  likely 2/2 hypertensive urgency  #h/o TIA  #h/o R ICA stenosis s/p angioplasty  #severe anxiety  #hypothyroidism  #morbid obesity  -tele monitoring reviewed--no events  -bp better controlled today- was low >24 hrs ago but had received multiple IV and oral antihypertensive agents on presentation  -CTH negative  -cont coreg 6.25 mg bid and lisinopril 10 mg daily  -encourage compliance with post-dc f/u and medications  -awaiting MRI brain today-----pre-medicate with ativan 2 mg iv x 1 prior to transport--patient with severe anxiety/ claustrophobia- if unable to tolerate will either need anaesthesia or outpatient open MRI   -f/u with neurology  -continue DAPT (ASA/Plavix) and statin  -fasting lipid profile checked (total chol 148, TG- 135,      LDL- 94, HDL-54)  -carotid US with L moderate ICA stenosis---f/u with vascular team   -TSH normal (3.2)- cont synthroid 125 mcg daily and monitor thyroid function as outpatient    #Microcytic anemia likely due bleeding hemorrhoids with Iron def anemia   -Fe serum 28 and percent sat 8  -continue Venofer 200 mg iv daily---today day #3 of 5   -f/u with outpatient GI (as per patient had recent colonoscopy >1 year ago that was normal)    DVT/GI ppx  guarded prognosis    FULL CODE    Progress Note Handoff    Pending (specify): awaiting MRI brain and neuro f/u and adequate bp control- remains on iv venofer x 5 days (today day #3)    Family discussion: d/w patient herself at length bedside     Disposition: Home_x (anticipate dc in 24-48 hrs after MRI completed)    Total time spent to complete patient's bedside assessment, review medical chart, discuss medical plan of care with covering medical team was more than 35 minutes  with >50% of time spendt face to face with patient, discussion with patient/family and/or coordination of care

## 2023-09-20 ENCOUNTER — TRANSCRIPTION ENCOUNTER (OUTPATIENT)
Age: 58
End: 2023-09-20

## 2023-09-20 VITALS
TEMPERATURE: 97 F | HEART RATE: 75 BPM | RESPIRATION RATE: 17 BRPM | DIASTOLIC BLOOD PRESSURE: 70 MMHG | SYSTOLIC BLOOD PRESSURE: 157 MMHG

## 2023-09-20 LAB
ALBUMIN SERPL ELPH-MCNC: 3.5 G/DL — SIGNIFICANT CHANGE UP (ref 3.5–5.2)
ALP SERPL-CCNC: 98 U/L — SIGNIFICANT CHANGE UP (ref 30–115)
ALT FLD-CCNC: 23 U/L — SIGNIFICANT CHANGE UP (ref 0–41)
ANION GAP SERPL CALC-SCNC: 10 MMOL/L — SIGNIFICANT CHANGE UP (ref 7–14)
AST SERPL-CCNC: 26 U/L — SIGNIFICANT CHANGE UP (ref 0–41)
BASOPHILS # BLD AUTO: 0.04 K/UL — SIGNIFICANT CHANGE UP (ref 0–0.2)
BASOPHILS NFR BLD AUTO: 0.6 % — SIGNIFICANT CHANGE UP (ref 0–1)
BILIRUB SERPL-MCNC: <0.2 MG/DL — SIGNIFICANT CHANGE UP (ref 0.2–1.2)
BUN SERPL-MCNC: 12 MG/DL — SIGNIFICANT CHANGE UP (ref 10–20)
CALCIUM SERPL-MCNC: 9 MG/DL — SIGNIFICANT CHANGE UP (ref 8.4–10.4)
CHLORIDE SERPL-SCNC: 104 MMOL/L — SIGNIFICANT CHANGE UP (ref 98–110)
CO2 SERPL-SCNC: 25 MMOL/L — SIGNIFICANT CHANGE UP (ref 17–32)
CREAT SERPL-MCNC: 1.1 MG/DL — SIGNIFICANT CHANGE UP (ref 0.7–1.5)
EGFR: 58 ML/MIN/1.73M2 — LOW
EOSINOPHIL # BLD AUTO: 0.3 K/UL — SIGNIFICANT CHANGE UP (ref 0–0.7)
EOSINOPHIL NFR BLD AUTO: 4.6 % — SIGNIFICANT CHANGE UP (ref 0–8)
GLUCOSE SERPL-MCNC: 85 MG/DL — SIGNIFICANT CHANGE UP (ref 70–99)
HCT VFR BLD CALC: 31.9 % — LOW (ref 37–47)
HGB BLD-MCNC: 9.6 G/DL — LOW (ref 12–16)
IMM GRANULOCYTES NFR BLD AUTO: 0.5 % — HIGH (ref 0.1–0.3)
LYMPHOCYTES # BLD AUTO: 1.94 K/UL — SIGNIFICANT CHANGE UP (ref 1.2–3.4)
LYMPHOCYTES # BLD AUTO: 30 % — SIGNIFICANT CHANGE UP (ref 20.5–51.1)
MAGNESIUM SERPL-MCNC: 1.8 MG/DL — SIGNIFICANT CHANGE UP (ref 1.8–2.4)
MCHC RBC-ENTMCNC: 23.7 PG — LOW (ref 27–31)
MCHC RBC-ENTMCNC: 30.1 G/DL — LOW (ref 32–37)
MCV RBC AUTO: 78.8 FL — LOW (ref 81–99)
MONOCYTES # BLD AUTO: 0.65 K/UL — HIGH (ref 0.1–0.6)
MONOCYTES NFR BLD AUTO: 10.1 % — HIGH (ref 1.7–9.3)
NEUTROPHILS # BLD AUTO: 3.5 K/UL — SIGNIFICANT CHANGE UP (ref 1.4–6.5)
NEUTROPHILS NFR BLD AUTO: 54.2 % — SIGNIFICANT CHANGE UP (ref 42.2–75.2)
NRBC # BLD: 0 /100 WBCS — SIGNIFICANT CHANGE UP (ref 0–0)
PLATELET # BLD AUTO: 304 K/UL — SIGNIFICANT CHANGE UP (ref 130–400)
PMV BLD: 10.7 FL — HIGH (ref 7.4–10.4)
POTASSIUM SERPL-MCNC: 4 MMOL/L — SIGNIFICANT CHANGE UP (ref 3.5–5)
POTASSIUM SERPL-SCNC: 4 MMOL/L — SIGNIFICANT CHANGE UP (ref 3.5–5)
PROT SERPL-MCNC: 6 G/DL — SIGNIFICANT CHANGE UP (ref 6–8)
RBC # BLD: 4.05 M/UL — LOW (ref 4.2–5.4)
RBC # FLD: 18.9 % — HIGH (ref 11.5–14.5)
SODIUM SERPL-SCNC: 139 MMOL/L — SIGNIFICANT CHANGE UP (ref 135–146)
WBC # BLD: 6.46 K/UL — SIGNIFICANT CHANGE UP (ref 4.8–10.8)
WBC # FLD AUTO: 6.46 K/UL — SIGNIFICANT CHANGE UP (ref 4.8–10.8)

## 2023-09-20 PROCEDURE — 99239 HOSP IP/OBS DSCHRG MGMT >30: CPT

## 2023-09-20 RX ORDER — MAGNESIUM SULFATE 500 MG/ML
2 VIAL (ML) INJECTION ONCE
Refills: 0 | Status: COMPLETED | OUTPATIENT
Start: 2023-09-20 | End: 2023-09-20

## 2023-09-20 RX ORDER — MAGNESIUM OXIDE 400 MG ORAL TABLET 241.3 MG
400 TABLET ORAL ONCE
Refills: 0 | Status: COMPLETED | OUTPATIENT
Start: 2023-09-20 | End: 2023-09-20

## 2023-09-20 RX ADMIN — FAMOTIDINE 40 MILLIGRAM(S): 10 INJECTION INTRAVENOUS at 11:16

## 2023-09-20 RX ADMIN — Medication 325 MILLIGRAM(S): at 11:16

## 2023-09-20 RX ADMIN — MAGNESIUM OXIDE 400 MG ORAL TABLET 400 MILLIGRAM(S): 241.3 TABLET ORAL at 12:39

## 2023-09-20 RX ADMIN — PANTOPRAZOLE SODIUM 40 MILLIGRAM(S): 20 TABLET, DELAYED RELEASE ORAL at 06:23

## 2023-09-20 RX ADMIN — CLOPIDOGREL BISULFATE 75 MILLIGRAM(S): 75 TABLET, FILM COATED ORAL at 11:16

## 2023-09-20 RX ADMIN — LISINOPRIL 10 MILLIGRAM(S): 2.5 TABLET ORAL at 06:23

## 2023-09-20 RX ADMIN — CARVEDILOL PHOSPHATE 6.25 MILLIGRAM(S): 80 CAPSULE, EXTENDED RELEASE ORAL at 06:22

## 2023-09-20 RX ADMIN — MONTELUKAST 10 MILLIGRAM(S): 4 TABLET, CHEWABLE ORAL at 11:16

## 2023-09-20 RX ADMIN — Medication 1 MILLIGRAM(S): at 06:23

## 2023-09-20 RX ADMIN — BUPROPION HYDROCHLORIDE 450 MILLIGRAM(S): 150 TABLET, EXTENDED RELEASE ORAL at 11:16

## 2023-09-20 RX ADMIN — Medication 125 MICROGRAM(S): at 06:22

## 2023-09-20 NOTE — PROGRESS NOTE ADULT - SUBJECTIVE AND OBJECTIVE BOX
24H events:    Patient is a 57y old Female who presents with a chief complaint of headache/hypertensive urgency (19 Sep 2023 12:01)    Primary diagnosis of Hypertensive emergency    Today is hospital day 5d. This morning patient was seen and examined at bedside, resting comfortably in bed.    No acute or major events overnight. Hemodynamically stable, tolerating oral diet, voiding appropriately with appropriate bowel movements.       PAST MEDICAL & SURGICAL HISTORY  COPD (chronic obstructive pulmonary disease)    HTN (hypertension)    Diverticulitis    Pulmonary nodules/lesions, multiple    Nasopharyngeal cancer    Hypothyroid    Cervical disc disease  sequelae of radtion for nasopharyngeal cancer    Left ear hearing loss    Hemorrhoids    Anxiety    History of cholecystectomy      ALLERGIES:  ciprofloxacin (Urticaria; Other)    MEDICATIONS:  STANDING MEDICATIONS  aspirin enteric coated 325 milliGRAM(s) Oral daily  atorvastatin 80 milliGRAM(s) Oral at bedtime  buPROPion XL (24-Hour) . 450 milliGRAM(s) Oral daily  carvedilol 6.25 milliGRAM(s) Oral every 12 hours  clonazePAM  Tablet 1 milliGRAM(s) Oral two times a day  clopidogrel Tablet 75 milliGRAM(s) Oral daily  enoxaparin Injectable 40 milliGRAM(s) SubCutaneous every 24 hours  famotidine    Tablet 40 milliGRAM(s) Oral daily  iron sucrose IVPB 200 milliGRAM(s) IV Intermittent every 24 hours  levothyroxine 125 MICROGram(s) Oral daily  lisinopril 10 milliGRAM(s) Oral daily  montelukast 10 milliGRAM(s) Oral daily  pantoprazole    Tablet 40 milliGRAM(s) Oral before breakfast    PRN MEDICATIONS  acetaminophen     Tablet .. 650 milliGRAM(s) Oral every 6 hours PRN  baclofen 2.5 milliGRAM(s) Oral every 8 hours PRN  hydrOXYzine hydrochloride 10 milliGRAM(s) Oral three times a day PRN  oxycodone    5 mG/acetaminophen 325 mG 1 Tablet(s) Oral every 6 hours PRN  zolpidem 5 milliGRAM(s) Oral at bedtime PRN  zolpidem 5 milliGRAM(s) Oral at bedtime PRN    VITALS:   T(F): 97.3  HR: 81  BP: 127/59  RR: 18  SpO2: 96%    PHYSICAL EXAM:  GENERAL: NAD, lying in bed comfortably.  HEAD: NCAD  NECK: Supple, no neck stiffness/nuchal rigidity, no JVD    HEART: Regular rate and rhythm, normal S1/S2  LUNGS: No acute respiratory distress, clear b/l breath sounds  ABDOMEN:  soft, nontender, nondistended  EXTREMITIES: no rashes, extremities warm/dry, no cyanosis, no edema, ulcerations or ecchymosis  NERVOUS SYSTEM:  A&Ox3    LABS:                        9.6    6.46  )-----------( 304      ( 20 Sep 2023 05:52 )             31.9     09-20    139  |  104  |  12  ----------------------------<  85  4.0   |  25  |  1.1    Ca    9.0      20 Sep 2023 05:52  Mg     1.8     09-20    TPro  6.0  /  Alb  3.5  /  TBili  <0.2  /  DBili  x   /  AST  26  /  ALT  23  /  AlkPhos  98  09-20      Urinalysis Basic - ( 20 Sep 2023 05:52 )    Color: x / Appearance: x / SG: x / pH: x  Gluc: 85 mg/dL / Ketone: x  / Bili: x / Urobili: x   Blood: x / Protein: x / Nitrite: x   Leuk Esterase: x / RBC: x / WBC x   Sq Epi: x / Non Sq Epi: x / Bacteria: x                RADIOLOGY:    < from: MR Head No Cont (09.19.23 @ 11:45) >    ACC: [id number] EXAM:  MR BRAIN   ORDERED BY: GISELLE VIDES     PROCEDURE DATE:  09/19/2023          INTERPRETATION:  INDICATIONS:  Headache, history of right ICA stenosis,   assess for stroke    TECHNIQUE:  Multiplanar imaging was performed using T1 weighted, T2   weighted and FLAIR sequences.  Diffusion weighted and GRE images were   also obtained.    COMPARISON EXAMINATION:  CT head 9/14/2023, MR brain 5/5/2023    FINDINGS:  There is no evidence of acute infarct, intracranial hemorrhage, mass   effect or midline shift.    Ventricles and sulci are age-appropriate in size.    There are stable scattered patchy T2/flair hyperintensities in the   periventricular white matter consistent with mild microvascular ischemic   changes.    There is no extra-axial fluid collection.    Major intracranial flow-voids are preserved.    The visualized orbits are unremarkable.    The sellar and suprasellar structures, and craniocervical junction are   unremarkable.    The calvarium signal intensity is within normal limits.    Redemonstrated bilateral mastoid effusions, nonspecific. Visualized   paranasal sinuses are clear.    IMPRESSION:    Exam overall without significant change since 5/5/2023.    No evidence of acute intracranial pathology. No evidence of acute   infarct, intracranial hemorrhage or mass effect.    Stable bilateral mastoid effusions.    Sphenoid sinus effusion, stable.    --- End of Report ---          LASHON BRADSHAW MD; Resident Radiologist  This document has been electronically signed.  ENRIQUETA FUCHS MD; Attending Interventional Radiologist  This document has been electronically signed. Sep 19 2023  2:39PM    < end of copied text >

## 2023-09-20 NOTE — PROGRESS NOTE ADULT - PROVIDER SPECIALTY LIST ADULT
Hospitalist
Internal Medicine
Internal Medicine
Hospitalist
Internal Medicine

## 2023-09-20 NOTE — PROGRESS NOTE ADULT - ASSESSMENT
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 Blank) on DAPT and recent admission for blurry vision/dizziness 5/4-7 presents today for 2 days of sever headache associated with lightheadedness and intermittent blurry vision.     Hypertensive Urgency  Headache and blurred vision due to #1  H/O Nasopharyngeal Ca in remission  COPD  HTN  Depression / Anxiety  H/O TIA  H/O R ICA stenosis s/p angioplasty  L ICA 60-79% stenosis  Morbid obesity  Microcytic anemia likely due bleeding hemorrhoids.                  PLAN:    ·	Tele reviewed. No events  ·	CT head is unremarkable  ·	Brain MRI noted. No change since 5/5/23  ·	Carotid doppler showed 20-39% R ICA stenosis. 60-79% L ICA  ·	Pt follows up with the vascular for L carotid artery stenosis  ·	BP is now stable. Cont Coreg and Lisinopril to 10 mg po daily.    ·	Neuro eval noted. Pt's symptoms were likely due to elevated BP. No further w/u as per neuro  ·	Iron studies noted. Serum Iron is 28 and percent sat is 8. Received Venofer 200 mg IV daily x 5 doses.   ·	Pt states that she had colonoscopy a year ago which was normal. C/O bleeding hemorrhoids. Out pt f/u with surgery  ·	Cont her home meds    * Med rec reviewed. Plan of care d/w the pt. Time spent 37 minutes.

## 2023-09-20 NOTE — PROGRESS NOTE ADULT - SUBJECTIVE AND OBJECTIVE BOX
GEOVANNA PUTNAM  57y Female    CHIEF COMPLAINT:    Patient is a 57y old  Female who presents with a chief complaint of headache/hypertensive urgency (19 Sep 2023 12:01)      INTERVAL HPI/OVERNIGHT EVENTS:    Patient seen and examined. Feels good. No more headache. No dizziness.     ROS: All other systems are negative.    Vital Signs:    T(F): 97.3 (09-20-23 @ 05:24), Max: 97.3 (09-20-23 @ 05:24)  HR: 81 (09-20-23 @ 05:24) (77 - 88)  BP: 127/59 (09-20-23 @ 05:24) (120/59 - 139/70)  RR: 18 (09-20-23 @ 05:24) (16 - 18)  SpO2: 96% (09-19-23 @ 15:07) (96% - 96%)  I&O's Summary    20 Sep 2023 07:01  -  20 Sep 2023 11:29  --------------------------------------------------------  IN: 325 mL / OUT: 250 mL / NET: 75 mL      Daily     Daily   CAPILLARY BLOOD GLUCOSE          PHYSICAL EXAM:    GENERAL:  NAD  SKIN: No rashes or lesions  HENT: Atraumatic. Normocephalic. PERRL. Moist membranes.  NECK: Supple, No JVD. No lymphadenopathy.  PULMONARY: CTA B/L. No wheezing. No rales  CVS: Normal S1, S2. Rate and Rhythm are regular. No murmurs.  ABDOMEN/GI: Soft, Nontender, Nondistended; BS present  EXTREMITIES: Peripheral pulses intact. No edema B/L LE.  NEUROLOGIC:  No motor or sensory deficit.  PSYCH: Alert & oriented x 3    Consultant(s) Notes Reviewed:  [x ] YES  [ ] NO  Care Discussed with Consultants/Other Providers [ x] YES  [ ] NO    EKG reviewed  Telemetry reviewed    LABS:                        9.6    6.46  )-----------( 304      ( 20 Sep 2023 05:52 )             31.9     09-20    139  |  104  |  12  ----------------------------<  85  4.0   |  25  |  1.1    Ca    9.0      20 Sep 2023 05:52  Mg     1.8     09-20    TPro  6.0  /  Alb  3.5  /  TBili  <0.2  /  DBili  x   /  AST  26  /  ALT  23  /  AlkPhos  98  09-20              RADIOLOGY & ADDITIONAL TESTS:    < from: MR Head No Cont (09.19.23 @ 11:45) >    IMPRESSION:    Exam overall without significant change since 5/5/2023.    No evidence of acute intracranial pathology. No evidence of acute   infarct, intracranial hemorrhage or mass effect.    Stable bilateral mastoid effusions.    Sphenoid sinus effusion, stable.    < end of copied text >    Imaging or report Personally Reviewed:  [x ] YES  [ ] NO    Medications:  Standing  aspirin enteric coated 325 milliGRAM(s) Oral daily  atorvastatin 80 milliGRAM(s) Oral at bedtime  buPROPion XL (24-Hour) . 450 milliGRAM(s) Oral daily  carvedilol 6.25 milliGRAM(s) Oral every 12 hours  clonazePAM  Tablet 1 milliGRAM(s) Oral two times a day  clopidogrel Tablet 75 milliGRAM(s) Oral daily  enoxaparin Injectable 40 milliGRAM(s) SubCutaneous every 24 hours  famotidine    Tablet 40 milliGRAM(s) Oral daily  iron sucrose IVPB 200 milliGRAM(s) IV Intermittent every 24 hours  levothyroxine 125 MICROGram(s) Oral daily  lisinopril 10 milliGRAM(s) Oral daily  montelukast 10 milliGRAM(s) Oral daily  pantoprazole    Tablet 40 milliGRAM(s) Oral before breakfast    PRN Meds  acetaminophen     Tablet .. 650 milliGRAM(s) Oral every 6 hours PRN  baclofen 2.5 milliGRAM(s) Oral every 8 hours PRN  hydrOXYzine hydrochloride 10 milliGRAM(s) Oral three times a day PRN  oxycodone    5 mG/acetaminophen 325 mG 1 Tablet(s) Oral every 6 hours PRN  zolpidem 5 milliGRAM(s) Oral at bedtime PRN  zolpidem 5 milliGRAM(s) Oral at bedtime PRN      Case discussed with resident    Care discussed with pt/family

## 2023-09-20 NOTE — PROGRESS NOTE ADULT - ASSESSMENT
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 Blank) on DAPT and recent admission for blurry vision/dizziness 5/4-7 presents today for 2 days of sever headache associated with lightheadedness and intermittent blurry vision. Vitals significant for SBP > 200. Exam benign except for morbid obesity. Labs and imaging reviewed.    # HTN emergency - resolved  - Bp 200/100 upon EMS arrival-> today 115/70. aggressive dec in BP in setting of ICA stenosis   - c/w carvedilol 6.25mg BID, lisinopril 10mg PO qD (was increased from 5mg to 10mg on 9/18)    # moderate stenosis of the Left ICA  #Hx of proximal LULI stenosis of approximately 70% s/p carotid angioplasty and stent  - CT NG for acute pathology  - [facility] Duplex b/l Carotid noted  1. 20-39% (mild) stenosis of the right internal carotid artery. Patent right ICA stent is visualized.  2. 60-79% (moderate) stenosis of the left internal carotid artery.  - Neuroendovascular consulted: no acute intervention at this time, MRI brain as inpatient  - c/w Home medications:  mg, Plavix, Lipitor 80 mg qhs (ASA increased by one of her doctors she cannot recall which one)  - counselled on bp monitoring at home    # undiagnosed DM   - hBA1C MARCH 6.9, repeat a1c 9/17: 6.6    #Chronic microcytic anemia   - f/u iron profile with ferritin  - total iron: 28, %saturation: 8%  - c/w IV iron sucrose 200mg for 5 days    #LAKISHA, likely pre-renal- improved   - Cr 1.4 (baseline 1.0)    #COPD (not on home O2)  - not in exacerbation  - continue inhalers PRN (on Trelegy ellipta at home, can do duonebs/symbicort here)  - continue home montelukast 10mg daily    #Depression  #Anxiety  #Insomnia  - continue home bupropion, klonopin, hydroxyzine PRN  - continue home zolpidem    #Hypothyroidism  - TSH 3.21  - c/w synthroid 125mcg daily    #GERD  - continue home famotidine 40mg daily   - on omeprazole 40mg at home (can give protonix 40mg as therapeutic interchange)                                                                                ----------------------------------------------------  # DVT prophylaxis: Lovenox  # GI prophylaxis: Protonix   # Diet: DASH/TLC  # Activity: IAT  # Code status: FULL CODE  # Disposition: home pending MRI                                                                           --------------------------------------------------------    # Handoff:   - ramakrishna today 57 year old F with PMH of 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 Blank) on DAPT and recent admission for blurry vision/dizziness 5/4-7 presents today for 2 days of sever headache associated with lightheadedness and intermittent blurry vision. Vitals significant for SBP > 200. Exam benign except for morbid obesity. Labs and imaging reviewed.    # HTN emergency - resolved  - Bp 200/100 upon EMS arrival-> today 115/70. aggressive dec in BP in setting of ICA stenosis   - c/w carvedilol 6.25mg BID, lisinopril 10mg PO qD (was increased from 5mg to 10mg on 9/18)    # moderate stenosis of the Left ICA  #Hx of proximal LULI stenosis of approximately 70% s/p carotid angioplasty and stent  - CT NG for acute pathology  - VA Duplex b/l Carotid noted  1. 20-39% (mild) stenosis of the right internal carotid artery. Patent right ICA stent is visualized.  2. 60-79% (moderate) stenosis of the left internal carotid artery.  - Neuroendovascular consulted: no acute intervention at this time, MRI brain as inpatient --> unremarkable MRI  - c/w Home medications:  mg, Plavix, Lipitor 80 mg qhs (ASA increased by one of her doctors she cannot recall which one)  - counselled on bp monitoring at home    # undiagnosed DM   - hBA1C MARCH 6.9, repeat a1c 9/17: 6.6    #Chronic microcytic anemia   - f/u iron profile with ferritin  - total iron: 28, %saturation: 8%  - c/w IV iron sucrose 200mg for 5 days    #LAKISHA, likely pre-renal- improved   - Cr 1.4 (baseline 1.0)    #COPD (not on home O2)  - not in exacerbation  - continue inhalers PRN (on Trelegy ellipta at home, can do duonebs/symbicort here)  - continue home montelukast 10mg daily    #Depression  #Anxiety  #Insomnia  - continue home bupropion, klonopin, hydroxyzine PRN  - continue home zolpidem    #Hypothyroidism  - TSH 3.21  - c/w synthroid 125mcg daily    #GERD  - continue home famotidine 40mg daily   - on omeprazole 40mg at home (can give protonix 40mg as therapeutic interchange)                                                                                ----------------------------------------------------  # DVT prophylaxis: Lovenox  # GI prophylaxis: Protonix   # Diet: DASH/TLC  # Activity: IAT  # Code status: FULL CODE  # Disposition: Home                                                                           --------------------------------------------------------    # Handoff:   - dc today

## 2023-09-20 NOTE — DISCHARGE NOTE NURSING/CASE MANAGEMENT/SOCIAL WORK - PATIENT PORTAL LINK FT
You can access the FollowMyHealth Patient Portal offered by [facility] by registering at the following website: [email or web address]. By joining [employer]’s FollowMyHealth portal, you will also be able to view your health information using other applications (apps) compatible with our system.

## 2023-10-04 ENCOUNTER — EMERGENCY (EMERGENCY)
Facility: HOSPITAL | Age: 58
LOS: 0 days | Discharge: HOME | End: 2023-10-04
Attending: STUDENT IN AN ORGANIZED HEALTH CARE EDUCATION/TRAINING PROGRAM
Payer: MEDICAID

## 2023-10-04 VITALS
DIASTOLIC BLOOD PRESSURE: 73 MMHG | HEIGHT: 64 IN | HEART RATE: 80 BPM | TEMPERATURE: 98 F | RESPIRATION RATE: 18 BRPM | WEIGHT: 293 LBS | OXYGEN SATURATION: 100 % | SYSTOLIC BLOOD PRESSURE: 100 MMHG

## 2023-10-04 DIAGNOSIS — R06.02 SHORTNESS OF BREATH: ICD-10-CM

## 2023-10-04 DIAGNOSIS — R53.81 OTHER MALAISE: ICD-10-CM

## 2023-10-04 DIAGNOSIS — Z79.82 LONG TERM (CURRENT) USE OF ASPIRIN: ICD-10-CM

## 2023-10-04 DIAGNOSIS — Z88.1 ALLERGY STATUS TO OTHER ANTIBIOTIC AGENTS STATUS: ICD-10-CM

## 2023-10-04 DIAGNOSIS — Z86.16 PERSONAL HISTORY OF COVID-19: ICD-10-CM

## 2023-10-04 DIAGNOSIS — Z79.02 LONG TERM (CURRENT) USE OF ANTITHROMBOTICS/ANTIPLATELETS: ICD-10-CM

## 2023-10-04 DIAGNOSIS — R79.89 OTHER SPECIFIED ABNORMAL FINDINGS OF BLOOD CHEMISTRY: ICD-10-CM

## 2023-10-04 DIAGNOSIS — Z90.49 ACQUIRED ABSENCE OF OTHER SPECIFIED PARTS OF DIGESTIVE TRACT: Chronic | ICD-10-CM

## 2023-10-04 LAB
ACANTHOCYTES BLD QL SMEAR: SLIGHT — SIGNIFICANT CHANGE UP
ALBUMIN SERPL ELPH-MCNC: 3.9 G/DL — SIGNIFICANT CHANGE UP (ref 3.5–5.2)
ALP SERPL-CCNC: 98 U/L — SIGNIFICANT CHANGE UP (ref 30–115)
ALT FLD-CCNC: 19 U/L — SIGNIFICANT CHANGE UP (ref 0–41)
ANION GAP SERPL CALC-SCNC: 15 MMOL/L — HIGH (ref 7–14)
ANISOCYTOSIS BLD QL: SLIGHT — SIGNIFICANT CHANGE UP
AST SERPL-CCNC: 30 U/L — SIGNIFICANT CHANGE UP (ref 0–41)
BASOPHILS # BLD AUTO: 0.08 K/UL — SIGNIFICANT CHANGE UP (ref 0–0.2)
BASOPHILS NFR BLD AUTO: 0.9 % — SIGNIFICANT CHANGE UP (ref 0–1)
BILIRUB SERPL-MCNC: <0.2 MG/DL — SIGNIFICANT CHANGE UP (ref 0.2–1.2)
BUN SERPL-MCNC: 31 MG/DL — HIGH (ref 10–20)
BURR CELLS BLD QL SMEAR: PRESENT — SIGNIFICANT CHANGE UP
CALCIUM SERPL-MCNC: 9.6 MG/DL — SIGNIFICANT CHANGE UP (ref 8.4–10.5)
CHLORIDE SERPL-SCNC: 104 MMOL/L — SIGNIFICANT CHANGE UP (ref 98–110)
CO2 SERPL-SCNC: 20 MMOL/L — SIGNIFICANT CHANGE UP (ref 17–32)
CREAT SERPL-MCNC: 1.7 MG/DL — HIGH (ref 0.7–1.5)
EGFR: 34 ML/MIN/1.73M2 — LOW
EOSINOPHIL # BLD AUTO: 0.45 K/UL — SIGNIFICANT CHANGE UP (ref 0–0.7)
EOSINOPHIL NFR BLD AUTO: 5.3 % — SIGNIFICANT CHANGE UP (ref 0–8)
GLUCOSE SERPL-MCNC: 105 MG/DL — HIGH (ref 70–99)
HCT VFR BLD CALC: 36.8 % — LOW (ref 37–47)
HGB BLD-MCNC: 11.1 G/DL — LOW (ref 12–16)
HYPOCHROMIA BLD QL: SLIGHT — SIGNIFICANT CHANGE UP
LYMPHOCYTES # BLD AUTO: 0.88 K/UL — LOW (ref 1.2–3.4)
LYMPHOCYTES # BLD AUTO: 10.5 % — LOW (ref 20.5–51.1)
MANUAL SMEAR VERIFICATION: SIGNIFICANT CHANGE UP
MCHC RBC-ENTMCNC: 23.8 PG — LOW (ref 27–31)
MCHC RBC-ENTMCNC: 30.2 G/DL — LOW (ref 32–37)
MCV RBC AUTO: 78.8 FL — LOW (ref 81–99)
MICROCYTES BLD QL: SLIGHT — SIGNIFICANT CHANGE UP
MONOCYTES # BLD AUTO: 0.88 K/UL — HIGH (ref 0.1–0.6)
MONOCYTES NFR BLD AUTO: 10.5 % — HIGH (ref 1.7–9.3)
NEUTROPHILS # BLD AUTO: 5.24 K/UL — SIGNIFICANT CHANGE UP (ref 1.4–6.5)
NEUTROPHILS NFR BLD AUTO: 62.3 % — SIGNIFICANT CHANGE UP (ref 42.2–75.2)
NT-PROBNP SERPL-SCNC: 93 PG/ML — SIGNIFICANT CHANGE UP (ref 0–300)
OVALOCYTES BLD QL SMEAR: SLIGHT — SIGNIFICANT CHANGE UP
PLAT MORPH BLD: NORMAL — SIGNIFICANT CHANGE UP
PLATELET # BLD AUTO: 433 K/UL — HIGH (ref 130–400)
PMV BLD: 10.1 FL — SIGNIFICANT CHANGE UP (ref 7.4–10.4)
POIKILOCYTOSIS BLD QL AUTO: SLIGHT — SIGNIFICANT CHANGE UP
POLYCHROMASIA BLD QL SMEAR: SLIGHT — SIGNIFICANT CHANGE UP
POTASSIUM SERPL-MCNC: 5.1 MMOL/L — HIGH (ref 3.5–5)
POTASSIUM SERPL-SCNC: 5.1 MMOL/L — HIGH (ref 3.5–5)
PROT SERPL-MCNC: 7 G/DL — SIGNIFICANT CHANGE UP (ref 6–8)
RBC # BLD: 4.67 M/UL — SIGNIFICANT CHANGE UP (ref 4.2–5.4)
RBC # FLD: 20.6 % — HIGH (ref 11.5–14.5)
RBC BLD AUTO: ABNORMAL
SODIUM SERPL-SCNC: 139 MMOL/L — SIGNIFICANT CHANGE UP (ref 135–146)
TROPONIN T SERPL-MCNC: <0.01 NG/ML — SIGNIFICANT CHANGE UP
VARIANT LYMPHS # BLD: 10.5 % — HIGH (ref 0–5)
WBC # BLD: 8.41 K/UL — SIGNIFICANT CHANGE UP (ref 4.8–10.8)
WBC # FLD AUTO: 8.41 K/UL — SIGNIFICANT CHANGE UP (ref 4.8–10.8)

## 2023-10-04 PROCEDURE — 85025 COMPLETE CBC W/AUTO DIFF WBC: CPT

## 2023-10-04 PROCEDURE — 93005 ELECTROCARDIOGRAM TRACING: CPT

## 2023-10-04 PROCEDURE — 93010 ELECTROCARDIOGRAM REPORT: CPT

## 2023-10-04 PROCEDURE — 99285 EMERGENCY DEPT VISIT HI MDM: CPT | Mod: 25

## 2023-10-04 PROCEDURE — 80053 COMPREHEN METABOLIC PANEL: CPT

## 2023-10-04 PROCEDURE — 99285 EMERGENCY DEPT VISIT HI MDM: CPT

## 2023-10-04 PROCEDURE — 83880 ASSAY OF NATRIURETIC PEPTIDE: CPT

## 2023-10-04 PROCEDURE — 71045 X-RAY EXAM CHEST 1 VIEW: CPT

## 2023-10-04 PROCEDURE — 84484 ASSAY OF TROPONIN QUANT: CPT

## 2023-10-04 PROCEDURE — 96374 THER/PROPH/DIAG INJ IV PUSH: CPT

## 2023-10-04 PROCEDURE — 71045 X-RAY EXAM CHEST 1 VIEW: CPT | Mod: 26

## 2023-10-04 PROCEDURE — 36415 COLL VENOUS BLD VENIPUNCTURE: CPT

## 2023-10-04 RX ORDER — ACETAMINOPHEN 500 MG
650 TABLET ORAL ONCE
Refills: 0 | Status: COMPLETED | OUTPATIENT
Start: 2023-10-04 | End: 2023-10-04

## 2023-10-04 RX ORDER — ONDANSETRON 8 MG/1
4 TABLET, FILM COATED ORAL ONCE
Refills: 0 | Status: COMPLETED | OUTPATIENT
Start: 2023-10-04 | End: 2023-10-04

## 2023-10-04 RX ORDER — SODIUM CHLORIDE 9 MG/ML
1000 INJECTION INTRAMUSCULAR; INTRAVENOUS; SUBCUTANEOUS ONCE
Refills: 0 | Status: COMPLETED | OUTPATIENT
Start: 2023-10-04 | End: 2023-10-04

## 2023-10-04 RX ADMIN — Medication 650 MILLIGRAM(S): at 20:48

## 2023-10-04 RX ADMIN — SODIUM CHLORIDE 1000 MILLILITER(S): 9 INJECTION INTRAMUSCULAR; INTRAVENOUS; SUBCUTANEOUS at 17:57

## 2023-10-04 RX ADMIN — ONDANSETRON 4 MILLIGRAM(S): 8 TABLET, FILM COATED ORAL at 20:48

## 2023-10-04 NOTE — ED ADULT NURSE NOTE - CHIEF COMPLAINT QUOTE
LOV 9/21/22 with Dr. Scot Cantu for IUD check. Left a message for patient on mobile #. Please message Flavio Fregoso RN when patient returns call. Patient BIBEMS from home reports dyspnia on excretion x 1 week s/p COVID dx last month

## 2023-10-04 NOTE — ED PROVIDER NOTE - PATIENT PORTAL LINK FT
You can access the FollowMyHealth Patient Portal offered by [facility] by registering at the following website: [email or web address]. By joining [employer]’s FollowMyHealth portal, you will also be able to view your health information using other applications (apps) compatible with our system.

## 2023-10-04 NOTE — ED PROVIDER NOTE - CLINICAL SUMMARY MEDICAL DECISION MAKING FREE TEXT BOX
Pt presented with SOB in setting of recent covid. X-ray and labs grossly unremarkable except for elevation in Cr. Patient likely has mild dehydration. Patient given IV fluids and instructed to obtain close outpatient follow up for renal function. Patient had improvement in symptoms and DC'd to follow up. Return precautions explained to patient.

## 2023-10-04 NOTE — ED PROVIDER NOTE - NS_EDPROVIDERDISPOUSERTYPE_ED_A_ED
Patient tells me that he does not want held down for meds patient instructed as long as he is able to cooperate with us then that should not be neccessary he then talked about Dr. Jose Sierra states that the doctor doesn't know how to evaluate psychiatric patient's. Patient asked to smoke a cigarette offered nicotine patch patient refused. Patient doesn't want father to leave his side because he believes that we will drug him and have our way with him. I told patient the process as far as what I the nurse is responsible as far as meeting basic needs such as oral hydration and nutrition along with any comfort measures such as pillows and blankets and also the leason between him and the doctor.       Camilo Mccallum RN  01/21/18 9104 Report given to Matty Amador RN.       Abby Kaufman, [facility]  01/21/18 5068 benita, delusional thinking. He also told his 11yo son that they were going shooting to be with patient's friend, Hansel Jaramillo, who  2 weeks ago. Patient's mother threatened to take patient's 11yo son away from patient due to patient not being mentally stable. Patient's parents state that this is not how Adrian Preston is and that they know that something is not right. Patient did test positive for marijuana only. Level of Care Disposition:  LADONNA consulted with Dr. Wendy Ortiz, who is agreeable that patient meet criteria for inpatient hospitalization and agreed to admit patient to .    4:45PM - SW called [facility] and spoke with Lilly Cooper. She states that they have a male bed available. 5:45PM - SW consulted with Dr. Wendy Ortiz, who agreed to admit patient to [facility].     5:51PM - SW spoke with patient in regards to admission. He states that he is okay with being admitted. SW educated patient on visiting hours, and being able to call his family. He was appreciative of the information. 5:59PM - SW called [facility] and spoke with ANN Cooper. SW provided report. Lilly Cooper indicated that they are ready for him to be brought upstairs and that he will be in room 58A.    6:17PM - SW gave report to [employer], and notified him about admission. He agreed to complete a nurse-to-nurse report.      Insurance Precertification Authorization: Attending Attestation (For Attendings USE Only)...

## 2023-10-04 NOTE — ED ADULT NURSE NOTE - OBJECTIVE STATEMENT
Pt. BIBEMS from home c/o SOB upon exertion. Pt. states she has been having SOB x 1 week since having Covid. Pt. c/o headache and nausea. Pt. denies V/D. Pt. denies chest pain.

## 2023-10-04 NOTE — ED PROVIDER NOTE - OBJECTIVE STATEMENT
57-year-old female presenting today with generalized malaise.  Patient reports that she had COVID 2 weeks ago and since then has been having generalized shortness of breath, malaise with no chest pain, dizziness or other symptomatology.

## 2023-10-04 NOTE — ED PROVIDER NOTE - NSFOLLOWUPINSTRUCTIONS_ED_ALL_ED_FT
please follow up with your primary care doctor.   please return if you develop worsening of symptoms.

## 2023-10-25 ENCOUNTER — APPOINTMENT (OUTPATIENT)
Dept: NEUROSURGERY | Facility: CLINIC | Age: 58
End: 2023-10-25

## 2023-11-16 ENCOUNTER — APPOINTMENT (OUTPATIENT)
Dept: PAIN MANAGEMENT | Facility: CLINIC | Age: 58
End: 2023-11-16

## 2023-12-01 ENCOUNTER — APPOINTMENT (OUTPATIENT)
Dept: NEUROSURGERY | Facility: CLINIC | Age: 58
End: 2023-12-01
Payer: MEDICAID

## 2023-12-01 VITALS — HEIGHT: 64 IN | WEIGHT: 266 LBS | BODY MASS INDEX: 45.41 KG/M2

## 2023-12-01 DIAGNOSIS — Z80.9 FAMILY HISTORY OF MALIGNANT NEOPLASM, UNSPECIFIED: ICD-10-CM

## 2023-12-01 DIAGNOSIS — Z95.828 PRESENCE OF OTHER VASCULAR IMPLANTS AND GRAFTS: ICD-10-CM

## 2023-12-01 PROCEDURE — 99212 OFFICE O/P EST SF 10 MIN: CPT

## 2023-12-11 NOTE — DISCHARGE NOTE NURSING/CASE MANAGEMENT/SOCIAL WORK - NSPROMEDSBROUGHTTOHOSP_GEN_A_NUR
Date of Service: 12/11/2023    [facility] Neurology   MS Clinic Follow-up     Subjective: 56-year-old woman who presents in follow-up for multiple sclerosis.    There have been a number of events since her last visit with me     She had issues with insurance, thus follow up was delayed    She developed a severe pruritic rash.  Primary care prescribed a topical steroid, which has provided some relief.  Rash has improved considerably.      She is generally less active. She had a significant fall in may.  Hit her head. Presented to ER and found to have a subdural hematoma. Exact circumstances of the fall were not clear.  She was taking care of her granddaughter at the time and she states that there were toys on the ground.  She might have turned around and lost her balance.      She does not report any discrete new symptoms.     Falls remain her primary problem.  Most falls are quite minor.  Spoke with  who stated that biggest risk is when she goes to the bathroom (commode next to her chair).  Doesn't always use walker. At risk of legs giving out. Also likes to keep tv on. Notes that this is a distraction to her.       does not have any other major concerns.     No Known Allergies    Current Outpatient Medications   Medication     amphetamine-dextroamphetamine (ADDERALL) 15 MG tablet     DULoxetine (CYMBALTA) 60 MG capsule     gabapentin (NEURONTIN) 300 MG capsule     glatiramer acetate 40 MG/ML injection     levothyroxine (SYNTHROID, LEVOTHROID) 75 MCG tablet     LORazepam (ATIVAN) 0.5 MG tablet     SUMAtriptan (IMITREX) 50 MG tablet     tiZANidine (ZANAFLEX) 4 MG tablet     traZODone (DESYREL) 50 MG tablet     triamcinolone (KENALOG) 0.1 % external cream     cholecalciferol, vitamin D3, (VITAMIN D3) 5,000 unit Tab     predniSONE (DELTASONE) 50 MG tablet     triamcinolone (KENALOG) 0.1 % ointment     No current facility-administered medications for this visit.        Past medical, surgical, social and  "family history was personally reviewed. Pertinent details noted above.     Physical Examination:   /89 (BP Location: Right arm, Patient Position: Sitting, Cuff Size: Adult Regular)   Pulse 97     General: no acute distress    Tests/Imaging:   MRI brain 2010 - multiple CNS lesions c/w ms, no enhancing brain lesions  6/2011 - 2 new lesions, 1 melinda+  1/2017 - no new lesions, gd-, some progression of atrophy  7/2020 - no new lesions, gd-    MRI cervical spine 2010 - lesions at C2-3 and C3 w/o enhancement  1/2017 - no new lesions, gd-, some spondylosis  7/2020 - no new lesions, gd-    MRI thoracic spine 2010 - no thoracic cord lesions  7/2020 - no new lesions, gd-    DEXA 1/2019 - osteopenia    NP eval 4/15/19  Impaired problem-solving and complex visual attention  Subtle impairment in \"retrieval based naming\"  Relative impairment of executive functioning with variable auditory and visual attention    Assessment: 56-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    Time was spent educating on fall prevention.  We discussed why a simple thing like leaving the tv on can be a fall risk for her.  She was encouraged to use her walker at all times.  We discussed how one fall can result in inability to return to walking.     She should continue with her current medications.     I am concerned about the rash and have advised a work-up.     Plan:   -Continue with tizanidine for management of muscle spasticity  - Continue Adderall for MS related fatigue and cognitive changes  - Trazodone for chronic insomnia  - Continue glatiramer  - advised blood work to look for autoimmune or hematologic cause of rash  - Follow-up in 4 months    Note was completed with the assistance of Dragon Fluency software which can often result in accidental word substitutions.     A total of 40 minutes on the date of service were spent in the care of this patient.   Gale Montero MD on 12/11/2023 at 11:35 AM          " no

## 2024-01-03 ENCOUNTER — OUTPATIENT (OUTPATIENT)
Dept: OUTPATIENT SERVICES | Facility: HOSPITAL | Age: 59
LOS: 1 days | End: 2024-01-03
Payer: MEDICAID

## 2024-01-03 VITALS
HEART RATE: 84 BPM | OXYGEN SATURATION: 100 % | WEIGHT: 270.51 LBS | HEIGHT: 64 IN | TEMPERATURE: 98 F | RESPIRATION RATE: 18 BRPM | SYSTOLIC BLOOD PRESSURE: 134 MMHG | DIASTOLIC BLOOD PRESSURE: 74 MMHG

## 2024-01-03 DIAGNOSIS — Z01.818 ENCOUNTER FOR OTHER PREPROCEDURAL EXAMINATION: ICD-10-CM

## 2024-01-03 DIAGNOSIS — Z98.890 OTHER SPECIFIED POSTPROCEDURAL STATES: Chronic | ICD-10-CM

## 2024-01-03 DIAGNOSIS — I65.22 OCCLUSION AND STENOSIS OF LEFT CAROTID ARTERY: ICD-10-CM

## 2024-01-03 DIAGNOSIS — Z90.49 ACQUIRED ABSENCE OF OTHER SPECIFIED PARTS OF DIGESTIVE TRACT: Chronic | ICD-10-CM

## 2024-01-03 LAB
ALBUMIN SERPL ELPH-MCNC: 4 G/DL — SIGNIFICANT CHANGE UP (ref 3.5–5.2)
ALBUMIN SERPL ELPH-MCNC: 4 G/DL — SIGNIFICANT CHANGE UP (ref 3.5–5.2)
ALP SERPL-CCNC: 101 U/L — SIGNIFICANT CHANGE UP (ref 30–115)
ALP SERPL-CCNC: 101 U/L — SIGNIFICANT CHANGE UP (ref 30–115)
ALT FLD-CCNC: 13 U/L — SIGNIFICANT CHANGE UP (ref 0–41)
ALT FLD-CCNC: 13 U/L — SIGNIFICANT CHANGE UP (ref 0–41)
ANION GAP SERPL CALC-SCNC: 15 MMOL/L — HIGH (ref 7–14)
ANION GAP SERPL CALC-SCNC: 15 MMOL/L — HIGH (ref 7–14)
APTT BLD: 29.7 SEC — SIGNIFICANT CHANGE UP (ref 27–39.2)
APTT BLD: 29.7 SEC — SIGNIFICANT CHANGE UP (ref 27–39.2)
AST SERPL-CCNC: 14 U/L — SIGNIFICANT CHANGE UP (ref 0–41)
AST SERPL-CCNC: 14 U/L — SIGNIFICANT CHANGE UP (ref 0–41)
BASOPHILS # BLD AUTO: 0.04 K/UL — SIGNIFICANT CHANGE UP (ref 0–0.2)
BASOPHILS # BLD AUTO: 0.04 K/UL — SIGNIFICANT CHANGE UP (ref 0–0.2)
BASOPHILS NFR BLD AUTO: 0.5 % — SIGNIFICANT CHANGE UP (ref 0–1)
BASOPHILS NFR BLD AUTO: 0.5 % — SIGNIFICANT CHANGE UP (ref 0–1)
BILIRUB SERPL-MCNC: <0.2 MG/DL — SIGNIFICANT CHANGE UP (ref 0.2–1.2)
BILIRUB SERPL-MCNC: <0.2 MG/DL — SIGNIFICANT CHANGE UP (ref 0.2–1.2)
BUN SERPL-MCNC: 25 MG/DL — HIGH (ref 10–20)
BUN SERPL-MCNC: 25 MG/DL — HIGH (ref 10–20)
CALCIUM SERPL-MCNC: 9.8 MG/DL — SIGNIFICANT CHANGE UP (ref 8.4–10.5)
CALCIUM SERPL-MCNC: 9.8 MG/DL — SIGNIFICANT CHANGE UP (ref 8.4–10.5)
CHLORIDE SERPL-SCNC: 100 MMOL/L — SIGNIFICANT CHANGE UP (ref 98–110)
CHLORIDE SERPL-SCNC: 100 MMOL/L — SIGNIFICANT CHANGE UP (ref 98–110)
CO2 SERPL-SCNC: 25 MMOL/L — SIGNIFICANT CHANGE UP (ref 17–32)
CO2 SERPL-SCNC: 25 MMOL/L — SIGNIFICANT CHANGE UP (ref 17–32)
CREAT SERPL-MCNC: 1.2 MG/DL — SIGNIFICANT CHANGE UP (ref 0.7–1.5)
CREAT SERPL-MCNC: 1.2 MG/DL — SIGNIFICANT CHANGE UP (ref 0.7–1.5)
EGFR: 52 ML/MIN/1.73M2 — LOW
EGFR: 52 ML/MIN/1.73M2 — LOW
EOSINOPHIL # BLD AUTO: 0.32 K/UL — SIGNIFICANT CHANGE UP (ref 0–0.7)
EOSINOPHIL # BLD AUTO: 0.32 K/UL — SIGNIFICANT CHANGE UP (ref 0–0.7)
EOSINOPHIL NFR BLD AUTO: 4.3 % — SIGNIFICANT CHANGE UP (ref 0–8)
EOSINOPHIL NFR BLD AUTO: 4.3 % — SIGNIFICANT CHANGE UP (ref 0–8)
GLUCOSE SERPL-MCNC: 105 MG/DL — HIGH (ref 70–99)
GLUCOSE SERPL-MCNC: 105 MG/DL — HIGH (ref 70–99)
HCT VFR BLD CALC: 36.6 % — LOW (ref 37–47)
HCT VFR BLD CALC: 36.6 % — LOW (ref 37–47)
HGB BLD-MCNC: 11.2 G/DL — LOW (ref 12–16)
HGB BLD-MCNC: 11.2 G/DL — LOW (ref 12–16)
IMM GRANULOCYTES NFR BLD AUTO: 0.5 % — HIGH (ref 0.1–0.3)
IMM GRANULOCYTES NFR BLD AUTO: 0.5 % — HIGH (ref 0.1–0.3)
INR BLD: 0.94 RATIO — SIGNIFICANT CHANGE UP (ref 0.65–1.3)
INR BLD: 0.94 RATIO — SIGNIFICANT CHANGE UP (ref 0.65–1.3)
LYMPHOCYTES # BLD AUTO: 1.69 K/UL — SIGNIFICANT CHANGE UP (ref 1.2–3.4)
LYMPHOCYTES # BLD AUTO: 1.69 K/UL — SIGNIFICANT CHANGE UP (ref 1.2–3.4)
LYMPHOCYTES # BLD AUTO: 22.8 % — SIGNIFICANT CHANGE UP (ref 20.5–51.1)
LYMPHOCYTES # BLD AUTO: 22.8 % — SIGNIFICANT CHANGE UP (ref 20.5–51.1)
MCHC RBC-ENTMCNC: 24.7 PG — LOW (ref 27–31)
MCHC RBC-ENTMCNC: 24.7 PG — LOW (ref 27–31)
MCHC RBC-ENTMCNC: 30.6 G/DL — LOW (ref 32–37)
MCHC RBC-ENTMCNC: 30.6 G/DL — LOW (ref 32–37)
MCV RBC AUTO: 80.8 FL — LOW (ref 81–99)
MCV RBC AUTO: 80.8 FL — LOW (ref 81–99)
MONOCYTES # BLD AUTO: 0.58 K/UL — SIGNIFICANT CHANGE UP (ref 0.1–0.6)
MONOCYTES # BLD AUTO: 0.58 K/UL — SIGNIFICANT CHANGE UP (ref 0.1–0.6)
MONOCYTES NFR BLD AUTO: 7.8 % — SIGNIFICANT CHANGE UP (ref 1.7–9.3)
MONOCYTES NFR BLD AUTO: 7.8 % — SIGNIFICANT CHANGE UP (ref 1.7–9.3)
NEUTROPHILS # BLD AUTO: 4.74 K/UL — SIGNIFICANT CHANGE UP (ref 1.4–6.5)
NEUTROPHILS # BLD AUTO: 4.74 K/UL — SIGNIFICANT CHANGE UP (ref 1.4–6.5)
NEUTROPHILS NFR BLD AUTO: 64.1 % — SIGNIFICANT CHANGE UP (ref 42.2–75.2)
NEUTROPHILS NFR BLD AUTO: 64.1 % — SIGNIFICANT CHANGE UP (ref 42.2–75.2)
NRBC # BLD: 0 /100 WBCS — SIGNIFICANT CHANGE UP (ref 0–0)
NRBC # BLD: 0 /100 WBCS — SIGNIFICANT CHANGE UP (ref 0–0)
PLATELET # BLD AUTO: 387 K/UL — SIGNIFICANT CHANGE UP (ref 130–400)
PLATELET # BLD AUTO: 387 K/UL — SIGNIFICANT CHANGE UP (ref 130–400)
PMV BLD: 10.6 FL — HIGH (ref 7.4–10.4)
PMV BLD: 10.6 FL — HIGH (ref 7.4–10.4)
POTASSIUM SERPL-MCNC: 4.7 MMOL/L — SIGNIFICANT CHANGE UP (ref 3.5–5)
POTASSIUM SERPL-MCNC: 4.7 MMOL/L — SIGNIFICANT CHANGE UP (ref 3.5–5)
POTASSIUM SERPL-SCNC: 4.7 MMOL/L — SIGNIFICANT CHANGE UP (ref 3.5–5)
POTASSIUM SERPL-SCNC: 4.7 MMOL/L — SIGNIFICANT CHANGE UP (ref 3.5–5)
PROT SERPL-MCNC: 6.8 G/DL — SIGNIFICANT CHANGE UP (ref 6–8)
PROT SERPL-MCNC: 6.8 G/DL — SIGNIFICANT CHANGE UP (ref 6–8)
PROTHROM AB SERPL-ACNC: 10.7 SEC — SIGNIFICANT CHANGE UP (ref 9.95–12.87)
PROTHROM AB SERPL-ACNC: 10.7 SEC — SIGNIFICANT CHANGE UP (ref 9.95–12.87)
RBC # BLD: 4.53 M/UL — SIGNIFICANT CHANGE UP (ref 4.2–5.4)
RBC # BLD: 4.53 M/UL — SIGNIFICANT CHANGE UP (ref 4.2–5.4)
RBC # FLD: 18.1 % — HIGH (ref 11.5–14.5)
RBC # FLD: 18.1 % — HIGH (ref 11.5–14.5)
SODIUM SERPL-SCNC: 140 MMOL/L — SIGNIFICANT CHANGE UP (ref 135–146)
SODIUM SERPL-SCNC: 140 MMOL/L — SIGNIFICANT CHANGE UP (ref 135–146)
WBC # BLD: 7.41 K/UL — SIGNIFICANT CHANGE UP (ref 4.8–10.8)
WBC # BLD: 7.41 K/UL — SIGNIFICANT CHANGE UP (ref 4.8–10.8)
WBC # FLD AUTO: 7.41 K/UL — SIGNIFICANT CHANGE UP (ref 4.8–10.8)
WBC # FLD AUTO: 7.41 K/UL — SIGNIFICANT CHANGE UP (ref 4.8–10.8)

## 2024-01-03 PROCEDURE — 80053 COMPREHEN METABOLIC PANEL: CPT

## 2024-01-03 PROCEDURE — 85610 PROTHROMBIN TIME: CPT

## 2024-01-03 PROCEDURE — 85730 THROMBOPLASTIN TIME PARTIAL: CPT

## 2024-01-03 PROCEDURE — 93005 ELECTROCARDIOGRAM TRACING: CPT

## 2024-01-03 PROCEDURE — 93010 ELECTROCARDIOGRAM REPORT: CPT

## 2024-01-03 PROCEDURE — 99214 OFFICE O/P EST MOD 30 MIN: CPT | Mod: 25

## 2024-01-03 PROCEDURE — 36415 COLL VENOUS BLD VENIPUNCTURE: CPT

## 2024-01-03 PROCEDURE — 85025 COMPLETE CBC W/AUTO DIFF WBC: CPT

## 2024-01-03 NOTE — H&P PST ADULT - BMI (KG/M2)
Cardiology Progress Note               Author: Eleno Akhtar Date & Time created: 1/20/2017  9:10 PM     Interval History:  No issues overnight.  No further chest pain.    Review of Systems   Constitutional: Negative.  Negative for fever, chills and malaise/fatigue.   HENT: Negative.  Negative for sore throat.    Eyes: Negative.    Respiratory: Negative.  Negative for cough, hemoptysis, sputum production, shortness of breath, wheezing and stridor.    Cardiovascular: Negative.  Negative for chest pain, palpitations, orthopnea, claudication, leg swelling and PND.   Gastrointestinal: Negative.    Genitourinary: Negative.    Musculoskeletal: Negative.    Skin: Negative.    Neurological: Negative.  Negative for dizziness, loss of consciousness and weakness.   Endo/Heme/Allergies: Negative.  Does not bruise/bleed easily.   All other systems reviewed and are negative.      Physical Exam   Constitutional: She is oriented to person, place, and time. She appears well-developed and well-nourished. No distress.   HENT:   Head: Normocephalic.   Mouth/Throat: Oropharynx is clear and moist.   Eyes: EOM are normal. Pupils are equal, round, and reactive to light. Right eye exhibits no discharge. Left eye exhibits no discharge. No scleral icterus.   Neck: Normal range of motion. Neck supple. No JVD present. No tracheal deviation present.   Cardiovascular: Normal rate, regular rhythm, S1 normal, S2 normal, normal heart sounds, intact distal pulses and normal pulses.  Exam reveals no gallop, no S3, no S4 and no friction rub.    No murmur heard.   No systolic murmur is present    No diastolic murmur is present   Pulses:       Carotid pulses are 2+ on the right side, and 2+ on the left side.       Radial pulses are 2+ on the right side, and 2+ on the left side.        Dorsalis pedis pulses are 2+ on the right side, and 2+ on the left side.        Posterior tibial pulses are 2+ on the right side, and 2+ on the left side.    Pulmonary/Chest: Effort normal and breath sounds normal. No respiratory distress. She has no wheezes. She has no rales.   Abdominal: Soft. Bowel sounds are normal. She exhibits no distension and no mass. There is no tenderness. There is no rebound and no guarding.   Musculoskeletal: She exhibits no edema.   Neurological: She is alert and oriented to person, place, and time. No cranial nerve deficit.   Skin: Skin is warm and dry. She is not diaphoretic. No pallor.   Psychiatric: She has a normal mood and affect. Her behavior is normal. Judgment and thought content normal.   Nursing note and vitals reviewed.      Hemodynamics:  Temp (24hrs), Av.6 °C (97.9 °F), Min:36.2 °C (97.1 °F), Max:37.2 °C (99 °F)  Temperature: 36.2 °C (97.1 °F)  Pulse  Av.3  Min: 17  Max: 87   Blood Pressure : 133/77 mmHg     Respiratory:    Respiration: 17, Pulse Oximetry: 98 %           Fluids:  Date 17 0700 - 17 0659(Discharged)   Shift 7076-8515 6009-1000 0283-0588 24 Hour Total   I  N  T  A  K  E   P.O. 600   600    Shift Total 600   600   O  U  T  P  U  T   Shift Total       Weight (kg) 86.2 86.2 86.2 86.2          GI/Nutrition:  No orders of the defined types were placed in this encounter.     Lab Results:  Recent Labs      17   1342   WBC  11.5*   RBC  4.65   HEMOGLOBIN  15.0   HEMATOCRIT  44.7   MCV  96.1   MCH  32.3   MCHC  33.6   RDW  47.9   PLATELETCT  283   MPV  11.5     Recent Labs      17   1342  17   0240   SODIUM  139  138   POTASSIUM  3.8  3.6   CHLORIDE  103  104   CO2  25  25   GLUCOSE  109*  106*   BUN  15  16   CREATININE  0.82  0.82   CALCIUM  9.5  9.8     Recent Labs      17   INR  1.02     Recent Labs      17   1342   BNPBTYPENAT  95     Recent Labs      17   1342  17   TROPONINI  <0.01  0.01   BNPBTYPENAT  95   --      Recent Labs      17   0240   TRIGLYCERIDE  92   HDL  64   LDL  38         Medical Decision Making, by Problem:  There  are no hospital problems to display for this patient.      Plan:  72-year-old female with recent intervention to her RCA/OM with catheterization last night to evaluate chest pain. She has now been chest pain-free. She can safely be discharged with close follow-up in the cardiology clinic. The etiology of her chest pain is still unclear but is certainly not cardiac.    EKG reviewed, Medications reviewed, Radiology images reviewed and Labs reviewed  Rhodes catheter: No Rhodes        DVT prophylaxis - mechanical: SCDs  Ulcer prophylaxis: No         46.4

## 2024-01-03 NOTE — H&P PST ADULT - HISTORY OF PRESENT ILLNESS
Patient is a 58 year old female presenting to PAST in preparation for  LEFT SIDED CAROTID STENT PLACEMENT on 1/16   under gen anesthesia by Dr. Blank  PATIENT CURRENTLY DENIES CHEST PAIN  SHORTNESS OF BREATH  PALPITATIONS,  DYSURIA, OR UPPER RESPIRATORY INFECTION IN PAST 2 WEEKS    Anesthesia Alert  NO--Difficult Airway  NO--History of neck surgery or radiation  NO--Limited ROM of neck  NO--History of Malignant hyperthermia  NO--Personal or family history of Pseudocholinesterase deficiency  NO--Prior Anesthesia Complication  NO--Latex Allergy  NO--Loose teeth  NO--History of Rheumatoid Arthritis  NO--DAVID  NO-- BLEEDING RISK  NO--Other_____    As per patient, this is their complete medical and surgical history, including medications both prescribed or over the counter.  Patient verbalized understanding of instructions and was given the opportunity to ask questions and have them answered.   Patient is a 58 year old female presenting to PAST in preparation for  LEFT SIDED CAROTID STENT PLACEMENT on 1/16   under gen anesthesia by Dr. Blank  Pt with h/o carotid stenosis has been advised to have above  PATIENT CURRENTLY DENIES CHEST PAIN  SHORTNESS OF BREATH  PALPITATIONS,  DYSURIA, OR UPPER RESPIRATORY INFECTION IN PAST 2 WEEKS    Anesthesia Alert  NO--Difficult Airway  NO--History of neck surgery or radiation  NO--Limited ROM of neck  NO--History of Malignant hyperthermia  NO--Personal or family history of Pseudocholinesterase deficiency  NO--Prior Anesthesia Complication  NO--Latex Allergy  NO--Loose teeth  NO--History of Rheumatoid Arthritis  NO--DAVID  NO-- BLEEDING RISK  NO--Other_____    Duke Activity Status Index (DASI) from [employer]  on 1/3/2024      RESULT SUMMARY:  7.2 points  The higher the score (maximum 58.2), the higher the functional status.    3.63 METs        INPUTS:  Take care of self —> 2.75 = Yes  Walk indoors —> 1.75 = Yes  Walk 1&ndash;2 blocks on level ground —> 0 = No  Climb a flight of stairs or walk up a hill —> 0 = No  Run a short distance —> 0 = No  Do light work around the house —> 2.7 = Yes  Do moderate work around the house —> 0 = No  Do heavy work around the house —> 0 = No  Do yardwork —> 0 = No  Have sexual relations —> 0 = No  Participate in moderate recreational activities —> 0 = No  Participate in strenuous sports —> 0 = No    reports she is unable to do above activities secondary to copd      Revised Cardiac Risk Index for Pre-Operative Risk from [employer]  on 1/3/2024      RESULT SUMMARY:  0 points  Class I Risk    3.9 %  30-day risk of death, MI, or cardiac arrest    From [place] 2017. These numbers are higher than those from the original study (Red 1999). See Evidence for details.      INPUTS:  Elevated-risk surgery —> 0 = No  History of ischemic heart disease —> 0 = No  History of congestive heart failure —> 0 = No  History of cerebrovascular disease —> 0 = No  Pre-operative treatment with insulin —> 0 = No  Pre-operative creatinine >2 mg/dL / 176.8 µmol/L —> 0 = No        As per patient, this is their complete medical and surgical history, including medications both prescribed or over the counter.  Patient verbalized understanding of instructions and was given the opportunity to ask questions and have them answered.   Patient is a 58 year old female presenting to PAST in preparation for  LEFT SIDED CAROTID STENT PLACEMENT on 1/16   under gen anesthesia by Dr. Blank  Pt with h/o carotid stenosis has been advised to have above  PATIENT CURRENTLY DENIES CHEST PAIN  SHORTNESS OF BREATH  PALPITATIONS,  DYSURIA, OR UPPER RESPIRATORY INFECTION IN PAST 2 WEEKS    Anesthesia Alert  NO--Difficult Airway  NO--History of neck surgery or radiation  NO--Limited ROM of neck  NO--History of Malignant hyperthermia  NO--Personal or family history of Pseudocholinesterase deficiency  NO--Prior Anesthesia Complication  NO--Latex Allergy  NO--Loose teeth  NO--History of Rheumatoid Arthritis  NO--DAVID  NO-- BLEEDING RISK  NO--Other_____    Duke Activity Status Index (DASI) from [employer]  on 1/3/2024      RESULT SUMMARY:  7.2 points  The higher the score (maximum 58.2), the higher the functional status.    3.63 METs        INPUTS:  Take care of self —> 2.75 = Yes  Walk indoors —> 1.75 = Yes  Walk 1&ndash;2 blocks on level ground —> 0 = No  Climb a flight of stairs or walk up a hill —> 0 = No  Run a short distance —> 0 = No  Do light work around the house —> 2.7 = Yes  Do moderate work around the house —> 0 = No  Do heavy work around the house —> 0 = No  Do yardwork —> 0 = No  Have sexual relations —> 0 = No  Participate in moderate recreational activities —> 0 = No  Participate in strenuous sports —> 0 = No    reports she is unable to do above activities secondary to copd      Revised Cardiac Risk Index for Pre-Operative Risk from [employer]  on 1/3/2024      RESULT SUMMARY:  0 points  Class I Risk    3.9 %  30-day risk of death, MI, or cardiac arrest    From [place]ppe 2017. These numbers are higher than those from the original study (Red 1999). See Evidence for details.      INPUTS:  Elevated-risk surgery —> 0 = No  History of ischemic heart disease —> 0 = No  History of congestive heart failure —> 0 = No  History of cerebrovascular disease —> 0 = No  Pre-operative treatment with insulin —> 0 = No  Pre-operative creatinine >2 mg/dL / 176.8 µmol/L —> 0 = No        As per patient, this is their complete medical and surgical history, including medications both prescribed or over the counter.  Patient verbalized understanding of instructions and was given the opportunity to ask questions and have them answered.

## 2024-01-03 NOTE — H&P PST ADULT - NSICDXPASTMEDICALHX_GEN_ALL_CORE_FT
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

## 2024-01-03 NOTE — H&P PST ADULT - NSICDXPASTSURGICALHX_GEN_ALL_CORE_FT
PAST SURGICAL HISTORY:  History of cholecystectomy      PAST SURGICAL HISTORY:  History of cholecystectomy     History of common carotid artery stent placement

## 2024-01-04 DIAGNOSIS — I65.22 OCCLUSION AND STENOSIS OF LEFT CAROTID ARTERY: ICD-10-CM

## 2024-01-04 DIAGNOSIS — Z01.818 ENCOUNTER FOR OTHER PREPROCEDURAL EXAMINATION: ICD-10-CM

## 2024-01-09 PROBLEM — Z86.79 PERSONAL HISTORY OF OTHER DISEASES OF THE CIRCULATORY SYSTEM: Chronic | Status: ACTIVE | Noted: 2024-01-03

## 2024-01-09 PROBLEM — F41.9 ANXIETY DISORDER, UNSPECIFIED: Chronic | Status: ACTIVE | Noted: 2024-01-03

## 2024-01-16 ENCOUNTER — APPOINTMENT (OUTPATIENT)
Dept: NEUROSURGERY | Facility: HOSPITAL | Age: 59
End: 2024-01-16

## 2024-01-24 ENCOUNTER — APPOINTMENT (OUTPATIENT)
Dept: PAIN MANAGEMENT | Facility: CLINIC | Age: 59
End: 2024-01-24
Payer: MEDICAID

## 2024-01-24 DIAGNOSIS — G89.29 OTHER CHRONIC PAIN: ICD-10-CM

## 2024-01-24 DIAGNOSIS — M54.12 RADICULOPATHY, CERVICAL REGION: ICD-10-CM

## 2024-01-24 DIAGNOSIS — M47.22 OTHER SPONDYLOSIS WITH RADICULOPATHY, CERVICAL REGION: ICD-10-CM

## 2024-01-24 PROCEDURE — 99215 OFFICE O/P EST HI 40 MIN: CPT

## 2024-01-24 RX ORDER — TIZANIDINE 2 MG/1
2 TABLET ORAL 3 TIMES DAILY
Qty: 90 | Refills: 1 | Status: ACTIVE | COMMUNITY
Start: 2024-01-24 | End: 1900-01-01

## 2024-01-24 RX ORDER — BUPROPION HYDROCHLORIDE 450 MG/1
450 TABLET, FILM COATED, EXTENDED RELEASE ORAL
Refills: 0 | Status: ACTIVE | COMMUNITY

## 2024-01-24 RX ORDER — ALBUTEROL 90 MCG
90 AEROSOL (GRAM) INHALATION
Refills: 0 | Status: ACTIVE | COMMUNITY

## 2024-01-24 RX ORDER — DOCUSATE SODIUM 100 MG
TABLET ORAL
Refills: 0 | Status: ACTIVE | COMMUNITY

## 2024-01-26 ENCOUNTER — APPOINTMENT (OUTPATIENT)
Dept: NEUROSURGERY | Facility: CLINIC | Age: 59
End: 2024-01-26

## 2024-01-29 ENCOUNTER — OUTPATIENT (OUTPATIENT)
Dept: OUTPATIENT SERVICES | Facility: HOSPITAL | Age: 59
LOS: 1 days | End: 2024-01-29
Payer: MEDICAID

## 2024-01-29 VITALS
HEIGHT: 64 IN | OXYGEN SATURATION: 97 % | TEMPERATURE: 97 F | RESPIRATION RATE: 17 BRPM | WEIGHT: 268.08 LBS | DIASTOLIC BLOOD PRESSURE: 78 MMHG | SYSTOLIC BLOOD PRESSURE: 133 MMHG | HEART RATE: 68 BPM

## 2024-01-29 DIAGNOSIS — Z01.818 ENCOUNTER FOR OTHER PREPROCEDURAL EXAMINATION: ICD-10-CM

## 2024-01-29 DIAGNOSIS — I65.22 OCCLUSION AND STENOSIS OF LEFT CAROTID ARTERY: ICD-10-CM

## 2024-01-29 DIAGNOSIS — Z90.49 ACQUIRED ABSENCE OF OTHER SPECIFIED PARTS OF DIGESTIVE TRACT: Chronic | ICD-10-CM

## 2024-01-29 DIAGNOSIS — Z98.890 OTHER SPECIFIED POSTPROCEDURAL STATES: Chronic | ICD-10-CM

## 2024-01-29 LAB
BASOPHILS # BLD AUTO: 0.05 K/UL — SIGNIFICANT CHANGE UP (ref 0–0.2)
BASOPHILS NFR BLD AUTO: 0.6 % — SIGNIFICANT CHANGE UP (ref 0–1)
EOSINOPHIL # BLD AUTO: 0.37 K/UL — SIGNIFICANT CHANGE UP (ref 0–0.7)
EOSINOPHIL NFR BLD AUTO: 4.8 % — SIGNIFICANT CHANGE UP (ref 0–8)
HCT VFR BLD CALC: 38 % — SIGNIFICANT CHANGE UP (ref 37–47)
HGB BLD-MCNC: 11.3 G/DL — LOW (ref 12–16)
IMM GRANULOCYTES NFR BLD AUTO: 0.4 % — HIGH (ref 0.1–0.3)
LYMPHOCYTES # BLD AUTO: 1.77 K/UL — SIGNIFICANT CHANGE UP (ref 1.2–3.4)
LYMPHOCYTES # BLD AUTO: 23 % — SIGNIFICANT CHANGE UP (ref 20.5–51.1)
MCHC RBC-ENTMCNC: 24 PG — LOW (ref 27–31)
MCHC RBC-ENTMCNC: 29.7 G/DL — LOW (ref 32–37)
MCV RBC AUTO: 80.9 FL — LOW (ref 81–99)
MONOCYTES # BLD AUTO: 0.49 K/UL — SIGNIFICANT CHANGE UP (ref 0.1–0.6)
MONOCYTES NFR BLD AUTO: 6.4 % — SIGNIFICANT CHANGE UP (ref 1.7–9.3)
NEUTROPHILS # BLD AUTO: 5 K/UL — SIGNIFICANT CHANGE UP (ref 1.4–6.5)
NEUTROPHILS NFR BLD AUTO: 64.8 % — SIGNIFICANT CHANGE UP (ref 42.2–75.2)
NRBC # BLD: 0 /100 WBCS — SIGNIFICANT CHANGE UP (ref 0–0)
PLATELET # BLD AUTO: 408 K/UL — HIGH (ref 130–400)
PMV BLD: 10.2 FL — SIGNIFICANT CHANGE UP (ref 7.4–10.4)
RBC # BLD: 4.7 M/UL — SIGNIFICANT CHANGE UP (ref 4.2–5.4)
RBC # FLD: 16.2 % — HIGH (ref 11.5–14.5)
WBC # BLD: 7.71 K/UL — SIGNIFICANT CHANGE UP (ref 4.8–10.8)
WBC # FLD AUTO: 7.71 K/UL — SIGNIFICANT CHANGE UP (ref 4.8–10.8)

## 2024-01-29 PROCEDURE — 99214 OFFICE O/P EST MOD 30 MIN: CPT | Mod: 25

## 2024-01-29 PROCEDURE — 36415 COLL VENOUS BLD VENIPUNCTURE: CPT

## 2024-01-29 PROCEDURE — 85025 COMPLETE CBC W/AUTO DIFF WBC: CPT

## 2024-01-29 RX ORDER — BUPROPION HYDROCHLORIDE 150 MG/1
1 TABLET, EXTENDED RELEASE ORAL
Refills: 0 | DISCHARGE

## 2024-01-29 NOTE — H&P PST ADULT - NSHP PST DIAGEKG REVIEWED YN_GEN_A_CORE
Pt calling in, he still has this cough after seeing Dr. Kolton Rushing and going to the [facility]. He would like to refill benzonatate because the Claritin is not doing the trick.      Pharm Confirmed-[facility] on [address]    Please Advise  Pt can be reached at [phone]
No

## 2024-01-29 NOTE — HISTORY OF PRESENT ILLNESS
[FreeTextEntry1] : Ms. PUTNAM is a 58-year-old female presenting today for neurosurgery revisit status post diagnostic cerebral angiogram and right carotid artery angioplasty and stent placement on 4/4/2023.  She remains on ASA 325mg PO daily + Plavix 75mg PO daily   Patient endorses today that she has been experiencing pressure behind her eyes bilaterally and intermittent dizziness with headaches.  Denies one-sided weakness. Ultrasound was reviewed with the patient that was performed on bilateral carotid arteries on 9/15/2023 identifying a patent right ICA stent but 60-79% stenosis of the left ICA. Discussion for a left sided ICA stent took place today including the benefits and its risks. The patient is already on the necessary DAPT. Ms. PUTNAM agreed to proceed and will be scheduled accordingly.

## 2024-01-29 NOTE — END OF VISIT
[FreeTextEntry3] :  I, Dr Blank personally performed the evaluation and management (E/M) services for this established patient who presents today with (a) new problem(s)/exacerbation of (an) existing condition(s).  That E/M includes conducting the examination, assessing all new/exacerbated conditions, and establishing a new plan of care.  Today, my BRANDT was here to observe my evaluation and management services for this new problem/exacerbated condition to be followed going forward.

## 2024-01-29 NOTE — H&P PST ADULT - NSICDXPASTMEDICALHX_GEN_ALL_CORE_FT
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

## 2024-01-29 NOTE — H&P PST ADULT - NSICDXPASTSURGICALHX_GEN_ALL_CORE_FT
PAST SURGICAL HISTORY:  History of cholecystectomy     History of common carotid artery stent placement

## 2024-01-29 NOTE — PLAN
[TextEntry] : L carotid stent to take place  Remain on DAPT   I personally reviewed with the NP, this patient's history and physical exam findings, as documented above. I have discussed the relevant areas of concern, having direct implications to the presenting problems and illnesses, and I have personally examined all pertinent and positive and negative findings, which impact their neurosurgical treatment.

## 2024-01-29 NOTE — H&P PST ADULT - HISTORY OF PRESENT ILLNESS
Patient is a 58 year old female presenting to PAST in preparation for LEFT SIDED CAROTID STENT PLACEMENT on 2/13/24 under gen anesthesia by Dr. Blank  Pt with h/o carotid stenosis since 4/2023. She has been advised to have above. Surgery was postponed last month due to a dental abscess.  PATIENT CURRENTLY DENIES CHEST PAIN  SHORTNESS OF BREATH  PALPITATIONS,  DYSURIA, OR UPPER RESPIRATORY INFECTION IN PAST 2 WEEKS    Anesthesia Alert  NO--Difficult Airway  NO--History of neck surgery or radiation  NO--Limited ROM of neck  NO--History of Malignant hyperthermia  NO--Personal or family history of Pseudocholinesterase deficiency  NO--Prior Anesthesia Complication  NO--Latex Allergy  NO--Loose teeth  NO--History of Rheumatoid Arthritis  NO--DAVID  NO-- BLEEDING RISK      Dunn Activity Status Index (DASI)   RESULT SUMMARY:  7.2 points  The higher the score (maximum 58.2), the higher the functional status.  3.63 METs  INPUTS:  Take care of self —> 2.75 = Yes  Walk indoors —> 1.75 = Yes  Walk 1&ndash;2 blocks on level ground —> 0 = No  Climb a flight of stairs or walk up a hill —> 0 = No  Run a short distance —> 0 = No  Do light work around the house —> 2.7 = Yes  Do moderate work around the house —> 0 = No  Do heavy work around the house —> 0 = No  Do yardwork —> 0 = No  Have sexual relations —> 0 = No  Participate in moderate recreational activities —> 0 = No  Participate in strenuous sports —> 0 = No    reports she is unable to do above activities secondary to copd      Revised Cardiac Risk Index for Pre-Operative Risk   RESULT SUMMARY:  0 points  Class I Risk  3.9 %  30-day risk of death, MI, or cardiac arrest  INPUTS:  Elevated-risk surgery —> 0 = No  History of ischemic heart disease —> 0 = No  History of congestive heart failure —> 0 = No  History of cerebrovascular disease —> 0 = No  Pre-operative treatment with insulin —> 0 = No  Pre-operative creatinine >2 mg/dL / 176.8 µmol/L —> 0 = No        As per patient, this is their complete medical and surgical history, including medications both prescribed or over the counter.  Patient verbalized understanding of instructions and was given the opportunity to ask questions and have them answered.

## 2024-01-30 DIAGNOSIS — I65.22 OCCLUSION AND STENOSIS OF LEFT CAROTID ARTERY: ICD-10-CM

## 2024-01-30 DIAGNOSIS — Z01.818 ENCOUNTER FOR OTHER PREPROCEDURAL EXAMINATION: ICD-10-CM

## 2024-02-13 NOTE — PATIENT PROFILE ADULT - SURGICAL SITE INCISION
With the Hollenhorst plaque seen by his eye care clinician and the ipsilateral 50-60% stenosis seen on ultrasound, urgent consultation for intervention with Vascular was in process.  The CTA, however, shows widely patent carotids and refutes the ultrasound finding.  This changes the urgency of Vascular consultation.  I am conferring with colleagues on a management approach.  Needs to complete the echocardiogram.  Make sure the echocardiogram includes a bubble study with agitated saline.  Continue ASA at 325 mg daily, and continue the increased dose of statin. no

## 2024-02-21 ENCOUNTER — APPOINTMENT (OUTPATIENT)
Dept: PAIN MANAGEMENT | Facility: CLINIC | Age: 59
End: 2024-02-21

## 2024-03-25 ENCOUNTER — RX RENEWAL (OUTPATIENT)
Age: 59
End: 2024-03-25

## 2024-03-26 PROBLEM — Z87.19 PERSONAL HISTORY OF OTHER DISEASES OF THE DIGESTIVE SYSTEM: Chronic | Status: ACTIVE | Noted: 2024-01-29

## 2024-03-26 PROBLEM — E66.01 MORBID (SEVERE) OBESITY DUE TO EXCESS CALORIES: Chronic | Status: ACTIVE | Noted: 2024-01-29

## 2024-04-10 ENCOUNTER — APPOINTMENT (OUTPATIENT)
Dept: NEUROSURGERY | Facility: CLINIC | Age: 59
End: 2024-04-10
Payer: MEDICAID

## 2024-04-10 VITALS
WEIGHT: 266 LBS | SYSTOLIC BLOOD PRESSURE: 145 MMHG | HEIGHT: 64 IN | DIASTOLIC BLOOD PRESSURE: 90 MMHG | BODY MASS INDEX: 45.41 KG/M2 | HEART RATE: 80 BPM

## 2024-04-10 DIAGNOSIS — I65.22 OCCLUSION AND STENOSIS OF LEFT CAROTID ARTERY: ICD-10-CM

## 2024-04-10 PROCEDURE — 99213 OFFICE O/P EST LOW 20 MIN: CPT

## 2024-04-15 ENCOUNTER — OUTPATIENT (OUTPATIENT)
Dept: OUTPATIENT SERVICES | Facility: HOSPITAL | Age: 59
LOS: 1 days | End: 2024-04-15
Payer: MEDICAID

## 2024-04-15 ENCOUNTER — RESULT REVIEW (OUTPATIENT)
Age: 59
End: 2024-04-15

## 2024-04-15 VITALS
SYSTOLIC BLOOD PRESSURE: 133 MMHG | WEIGHT: 266.1 LBS | HEART RATE: 97 BPM | DIASTOLIC BLOOD PRESSURE: 72 MMHG | TEMPERATURE: 98 F | OXYGEN SATURATION: 96 % | HEIGHT: 64 IN | RESPIRATION RATE: 20 BRPM

## 2024-04-15 DIAGNOSIS — Z90.49 ACQUIRED ABSENCE OF OTHER SPECIFIED PARTS OF DIGESTIVE TRACT: Chronic | ICD-10-CM

## 2024-04-15 DIAGNOSIS — Z98.890 OTHER SPECIFIED POSTPROCEDURAL STATES: Chronic | ICD-10-CM

## 2024-04-15 DIAGNOSIS — Z01.818 ENCOUNTER FOR OTHER PREPROCEDURAL EXAMINATION: ICD-10-CM

## 2024-04-15 DIAGNOSIS — I65.22 OCCLUSION AND STENOSIS OF LEFT CAROTID ARTERY: ICD-10-CM

## 2024-04-15 LAB
ALBUMIN SERPL ELPH-MCNC: 4.1 G/DL — SIGNIFICANT CHANGE UP (ref 3.5–5.2)
ALP SERPL-CCNC: 111 U/L — SIGNIFICANT CHANGE UP (ref 30–115)
ALT FLD-CCNC: 17 U/L — SIGNIFICANT CHANGE UP (ref 0–41)
ANION GAP SERPL CALC-SCNC: 15 MMOL/L — HIGH (ref 7–14)
APTT BLD: 31 SEC — SIGNIFICANT CHANGE UP (ref 27–39.2)
AST SERPL-CCNC: 17 U/L — SIGNIFICANT CHANGE UP (ref 0–41)
BASOPHILS # BLD AUTO: 0.03 K/UL — SIGNIFICANT CHANGE UP (ref 0–0.2)
BASOPHILS NFR BLD AUTO: 0.4 % — SIGNIFICANT CHANGE UP (ref 0–1)
BILIRUB SERPL-MCNC: <0.2 MG/DL — SIGNIFICANT CHANGE UP (ref 0.2–1.2)
BLD GP AB SCN SERPL QL: SIGNIFICANT CHANGE UP
BUN SERPL-MCNC: 27 MG/DL — HIGH (ref 10–20)
CALCIUM SERPL-MCNC: 9.7 MG/DL — SIGNIFICANT CHANGE UP (ref 8.4–10.5)
CHLORIDE SERPL-SCNC: 106 MMOL/L — SIGNIFICANT CHANGE UP (ref 98–110)
CO2 SERPL-SCNC: 22 MMOL/L — SIGNIFICANT CHANGE UP (ref 17–32)
CREAT SERPL-MCNC: 1.5 MG/DL — SIGNIFICANT CHANGE UP (ref 0.7–1.5)
EGFR: 40 ML/MIN/1.73M2 — LOW
EOSINOPHIL # BLD AUTO: 0.38 K/UL — SIGNIFICANT CHANGE UP (ref 0–0.7)
EOSINOPHIL NFR BLD AUTO: 4.6 % — SIGNIFICANT CHANGE UP (ref 0–8)
GLUCOSE SERPL-MCNC: 79 MG/DL — SIGNIFICANT CHANGE UP (ref 70–99)
HCT VFR BLD CALC: 35.2 % — LOW (ref 37–47)
HGB BLD-MCNC: 11 G/DL — LOW (ref 12–16)
IMM GRANULOCYTES NFR BLD AUTO: 0.5 % — HIGH (ref 0.1–0.3)
INR BLD: 0.99 RATIO — SIGNIFICANT CHANGE UP (ref 0.65–1.3)
LYMPHOCYTES # BLD AUTO: 1.78 K/UL — SIGNIFICANT CHANGE UP (ref 1.2–3.4)
LYMPHOCYTES # BLD AUTO: 21.7 % — SIGNIFICANT CHANGE UP (ref 20.5–51.1)
MCHC RBC-ENTMCNC: 24.4 PG — LOW (ref 27–31)
MCHC RBC-ENTMCNC: 31.3 G/DL — LOW (ref 32–37)
MCV RBC AUTO: 78 FL — LOW (ref 81–99)
MONOCYTES # BLD AUTO: 0.68 K/UL — HIGH (ref 0.1–0.6)
MONOCYTES NFR BLD AUTO: 8.3 % — SIGNIFICANT CHANGE UP (ref 1.7–9.3)
NEUTROPHILS # BLD AUTO: 5.29 K/UL — SIGNIFICANT CHANGE UP (ref 1.4–6.5)
NEUTROPHILS NFR BLD AUTO: 64.5 % — SIGNIFICANT CHANGE UP (ref 42.2–75.2)
NRBC # BLD: 0 /100 WBCS — SIGNIFICANT CHANGE UP (ref 0–0)
PLATELET # BLD AUTO: 386 K/UL — SIGNIFICANT CHANGE UP (ref 130–400)
PMV BLD: 10.7 FL — HIGH (ref 7.4–10.4)
POTASSIUM SERPL-MCNC: 4.6 MMOL/L — SIGNIFICANT CHANGE UP (ref 3.5–5)
POTASSIUM SERPL-SCNC: 4.6 MMOL/L — SIGNIFICANT CHANGE UP (ref 3.5–5)
PROT SERPL-MCNC: 7.4 G/DL — SIGNIFICANT CHANGE UP (ref 6–8)
PROTHROM AB SERPL-ACNC: 11.3 SEC — SIGNIFICANT CHANGE UP (ref 9.95–12.87)
RBC # BLD: 4.51 M/UL — SIGNIFICANT CHANGE UP (ref 4.2–5.4)
RBC # FLD: 18.8 % — HIGH (ref 11.5–14.5)
SODIUM SERPL-SCNC: 143 MMOL/L — SIGNIFICANT CHANGE UP (ref 135–146)
T3 SERPL-MCNC: 81 NG/DL — SIGNIFICANT CHANGE UP (ref 80–200)
T4 AB SER-ACNC: 6.4 UG/DL — SIGNIFICANT CHANGE UP (ref 4.6–12)
TSH SERPL-MCNC: 1.81 UIU/ML — SIGNIFICANT CHANGE UP (ref 0.27–4.2)
WBC # BLD: 8.2 K/UL — SIGNIFICANT CHANGE UP (ref 4.8–10.8)
WBC # FLD AUTO: 8.2 K/UL — SIGNIFICANT CHANGE UP (ref 4.8–10.8)

## 2024-04-15 PROCEDURE — 85025 COMPLETE CBC W/AUTO DIFF WBC: CPT

## 2024-04-15 PROCEDURE — 93005 ELECTROCARDIOGRAM TRACING: CPT

## 2024-04-15 PROCEDURE — 71046 X-RAY EXAM CHEST 2 VIEWS: CPT

## 2024-04-15 PROCEDURE — 80053 COMPREHEN METABOLIC PANEL: CPT

## 2024-04-15 PROCEDURE — 99214 OFFICE O/P EST MOD 30 MIN: CPT | Mod: 25

## 2024-04-15 PROCEDURE — 85730 THROMBOPLASTIN TIME PARTIAL: CPT

## 2024-04-15 PROCEDURE — 84480 ASSAY TRIIODOTHYRONINE (T3): CPT

## 2024-04-15 PROCEDURE — 86901 BLOOD TYPING SEROLOGIC RH(D): CPT

## 2024-04-15 PROCEDURE — 86900 BLOOD TYPING SEROLOGIC ABO: CPT

## 2024-04-15 PROCEDURE — 36415 COLL VENOUS BLD VENIPUNCTURE: CPT

## 2024-04-15 PROCEDURE — 84436 ASSAY OF TOTAL THYROXINE: CPT

## 2024-04-15 PROCEDURE — 71046 X-RAY EXAM CHEST 2 VIEWS: CPT | Mod: 26,59

## 2024-04-15 PROCEDURE — 86850 RBC ANTIBODY SCREEN: CPT

## 2024-04-15 PROCEDURE — 84443 ASSAY THYROID STIM HORMONE: CPT

## 2024-04-15 PROCEDURE — 93010 ELECTROCARDIOGRAM REPORT: CPT

## 2024-04-15 PROCEDURE — 85610 PROTHROMBIN TIME: CPT

## 2024-04-15 NOTE — H&P PST ADULT - NSICDXPASTMEDICALHX_GEN_ALL_CORE_FT
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

## 2024-04-15 NOTE — H&P PST ADULT - MUSCULOSKELETAL
marie knee pain - chronic/normal/ROM intact/decreased ROM due to pain/normal gait/strength 5/5 bilateral upper extremities

## 2024-04-15 NOTE — H&P PST ADULT - HISTORY OF PRESENT ILLNESS
59 y/o female,  57 y/o female, h/o carotid disease; elected for above procedure.  +SOB/NGUYEN on minimal exertion - unable to walk 1/2 blk  / couple stairs.  Denied chest pain, palp, recent URI/UTI.     Anesthesia Alert  NO--Difficult Airway  YES--History of neck surgery or radiation: Oropharyngeal Cancer s/p radiation tx (2013)  NO--Limited ROM of neck  NO--History of Malignant hyperthermia  NO--Personal or family history of Pseudocholinesterase deficiency  NO--Prior Anesthesia Complication  NO--Latex Allergy  NO--Loose teeth  NO--History of Rheumatoid Arthritis  YES--DAVID with CPAP use   YES--Bleeding Risk: on Aspirin and Plavix  NO--Other_____      DASI  10.7 points  The higher the score (maximum 58.2), the higher the functional status.  4.06 METs      RCRI  0 points  Class I Risk  3.9 %

## 2024-04-16 ENCOUNTER — OUTPATIENT (OUTPATIENT)
Dept: OUTPATIENT SERVICES | Facility: HOSPITAL | Age: 59
LOS: 1 days | End: 2024-04-16
Payer: MEDICAID

## 2024-04-16 DIAGNOSIS — I65.22 OCCLUSION AND STENOSIS OF LEFT CAROTID ARTERY: ICD-10-CM

## 2024-04-16 DIAGNOSIS — I25.10 ATHEROSCLEROTIC HEART DISEASE OF NATIVE CORONARY ARTERY WITHOUT ANGINA PECTORIS: ICD-10-CM

## 2024-04-16 DIAGNOSIS — Z00.8 ENCOUNTER FOR OTHER GENERAL EXAMINATION: ICD-10-CM

## 2024-04-16 DIAGNOSIS — Z90.49 ACQUIRED ABSENCE OF OTHER SPECIFIED PARTS OF DIGESTIVE TRACT: Chronic | ICD-10-CM

## 2024-04-16 DIAGNOSIS — Z98.890 OTHER SPECIFIED POSTPROCEDURAL STATES: Chronic | ICD-10-CM

## 2024-04-16 DIAGNOSIS — Z01.818 ENCOUNTER FOR OTHER PREPROCEDURAL EXAMINATION: ICD-10-CM

## 2024-04-16 PROCEDURE — 75574 CT ANGIO HRT W/3D IMAGE: CPT | Mod: 26

## 2024-04-16 PROCEDURE — 75574 CT ANGIO HRT W/3D IMAGE: CPT

## 2024-04-17 DIAGNOSIS — I25.10 ATHEROSCLEROTIC HEART DISEASE OF NATIVE CORONARY ARTERY WITHOUT ANGINA PECTORIS: ICD-10-CM

## 2024-04-18 ENCOUNTER — OUTPATIENT (OUTPATIENT)
Dept: OUTPATIENT SERVICES | Facility: HOSPITAL | Age: 59
LOS: 1 days | Discharge: HOME | End: 2024-04-18
Payer: MEDICAID

## 2024-04-18 ENCOUNTER — APPOINTMENT (OUTPATIENT)
Dept: NEUROSURGERY | Facility: HOSPITAL | Age: 59
End: 2024-04-18

## 2024-04-18 ENCOUNTER — INPATIENT (INPATIENT)
Facility: HOSPITAL | Age: 59
LOS: 0 days | Discharge: HOME | DRG: 207 | End: 2024-04-19
Attending: HOSPITALIST | Admitting: STUDENT IN AN ORGANIZED HEALTH CARE EDUCATION/TRAINING PROGRAM
Payer: MEDICAID

## 2024-04-18 ENCOUNTER — OUTPATIENT (OUTPATIENT)
Dept: OUTPATIENT SERVICES | Facility: HOSPITAL | Age: 59
LOS: 1 days | End: 2024-04-18
Payer: MEDICAID

## 2024-04-18 VITALS
DIASTOLIC BLOOD PRESSURE: 47 MMHG | HEART RATE: 67 BPM | OXYGEN SATURATION: 96 % | RESPIRATION RATE: 20 BRPM | TEMPERATURE: 98 F | WEIGHT: 266.1 LBS | SYSTOLIC BLOOD PRESSURE: 85 MMHG | HEIGHT: 62 IN

## 2024-04-18 VITALS
HEIGHT: 64 IN | SYSTOLIC BLOOD PRESSURE: 76 MMHG | DIASTOLIC BLOOD PRESSURE: 45 MMHG | HEART RATE: 73 BPM | WEIGHT: 266.1 LBS | RESPIRATION RATE: 18 BRPM | OXYGEN SATURATION: 97 % | TEMPERATURE: 97 F

## 2024-04-18 VITALS
DIASTOLIC BLOOD PRESSURE: 43 MMHG | OXYGEN SATURATION: 93 % | SYSTOLIC BLOOD PRESSURE: 73 MMHG | HEART RATE: 66 BPM | RESPIRATION RATE: 18 BRPM

## 2024-04-18 DIAGNOSIS — Z90.49 ACQUIRED ABSENCE OF OTHER SPECIFIED PARTS OF DIGESTIVE TRACT: Chronic | ICD-10-CM

## 2024-04-18 DIAGNOSIS — I25.10 ATHEROSCLEROTIC HEART DISEASE OF NATIVE CORONARY ARTERY WITHOUT ANGINA PECTORIS: ICD-10-CM

## 2024-04-18 DIAGNOSIS — Z98.890 OTHER SPECIFIED POSTPROCEDURAL STATES: Chronic | ICD-10-CM

## 2024-04-18 DIAGNOSIS — I65.22 OCCLUSION AND STENOSIS OF LEFT CAROTID ARTERY: ICD-10-CM

## 2024-04-18 PROBLEM — I77.9 DISORDER OF ARTERIES AND ARTERIOLES, UNSPECIFIED: Chronic | Status: ACTIVE | Noted: 2024-04-15

## 2024-04-18 PROBLEM — M25.561 PAIN IN RIGHT KNEE: Chronic | Status: ACTIVE | Noted: 2024-04-15

## 2024-04-18 PROBLEM — E78.5 HYPERLIPIDEMIA, UNSPECIFIED: Chronic | Status: ACTIVE | Noted: 2024-04-15

## 2024-04-18 LAB
ALBUMIN SERPL ELPH-MCNC: 3.4 G/DL — LOW (ref 3.5–5.2)
ALBUMIN SERPL ELPH-MCNC: 3.8 G/DL — SIGNIFICANT CHANGE UP (ref 3.5–5.2)
ALP SERPL-CCNC: 101 U/L — SIGNIFICANT CHANGE UP (ref 30–115)
ALP SERPL-CCNC: 89 U/L — SIGNIFICANT CHANGE UP (ref 30–115)
ALT FLD-CCNC: 16 U/L — SIGNIFICANT CHANGE UP (ref 0–41)
ALT FLD-CCNC: 18 U/L — SIGNIFICANT CHANGE UP (ref 0–41)
ANION GAP SERPL CALC-SCNC: 13 MMOL/L — SIGNIFICANT CHANGE UP (ref 7–14)
ANION GAP SERPL CALC-SCNC: 14 MMOL/L — SIGNIFICANT CHANGE UP (ref 7–14)
APPEARANCE UR: CLEAR — SIGNIFICANT CHANGE UP
AST SERPL-CCNC: 39 U/L — SIGNIFICANT CHANGE UP (ref 0–41)
AST SERPL-CCNC: 39 U/L — SIGNIFICANT CHANGE UP (ref 0–41)
BACTERIA # UR AUTO: ABNORMAL /HPF
BASOPHILS # BLD AUTO: 0.05 K/UL — SIGNIFICANT CHANGE UP (ref 0–0.2)
BASOPHILS # BLD AUTO: 0.05 K/UL — SIGNIFICANT CHANGE UP (ref 0–0.2)
BASOPHILS NFR BLD AUTO: 0.7 % — SIGNIFICANT CHANGE UP (ref 0–1)
BASOPHILS NFR BLD AUTO: 0.7 % — SIGNIFICANT CHANGE UP (ref 0–1)
BILIRUB SERPL-MCNC: <0.2 MG/DL — SIGNIFICANT CHANGE UP (ref 0.2–1.2)
BILIRUB SERPL-MCNC: <0.2 MG/DL — SIGNIFICANT CHANGE UP (ref 0.2–1.2)
BILIRUB UR-MCNC: NEGATIVE — SIGNIFICANT CHANGE UP
BUN SERPL-MCNC: 32 MG/DL — HIGH (ref 10–20)
BUN SERPL-MCNC: 33 MG/DL — HIGH (ref 10–20)
CALCIUM SERPL-MCNC: 8.7 MG/DL — SIGNIFICANT CHANGE UP (ref 8.4–10.4)
CALCIUM SERPL-MCNC: 9.4 MG/DL — SIGNIFICANT CHANGE UP (ref 8.4–10.5)
CAST: 10 /LPF — HIGH (ref 0–4)
CHLORIDE SERPL-SCNC: 106 MMOL/L — SIGNIFICANT CHANGE UP (ref 98–110)
CHLORIDE SERPL-SCNC: 108 MMOL/L — SIGNIFICANT CHANGE UP (ref 98–110)
CO2 SERPL-SCNC: 18 MMOL/L — SIGNIFICANT CHANGE UP (ref 17–32)
CO2 SERPL-SCNC: 21 MMOL/L — SIGNIFICANT CHANGE UP (ref 17–32)
COLOR SPEC: YELLOW — SIGNIFICANT CHANGE UP
CREAT SERPL-MCNC: 1.5 MG/DL — SIGNIFICANT CHANGE UP (ref 0.7–1.5)
CREAT SERPL-MCNC: 1.7 MG/DL — HIGH (ref 0.7–1.5)
DIFF PNL FLD: NEGATIVE — SIGNIFICANT CHANGE UP
EGFR: 34 ML/MIN/1.73M2 — LOW
EGFR: 40 ML/MIN/1.73M2 — LOW
EOSINOPHIL # BLD AUTO: 0.31 K/UL — SIGNIFICANT CHANGE UP (ref 0–0.7)
EOSINOPHIL # BLD AUTO: 0.35 K/UL — SIGNIFICANT CHANGE UP (ref 0–0.7)
EOSINOPHIL NFR BLD AUTO: 4.5 % — SIGNIFICANT CHANGE UP (ref 0–8)
EOSINOPHIL NFR BLD AUTO: 5.1 % — SIGNIFICANT CHANGE UP (ref 0–8)
GLUCOSE SERPL-MCNC: 122 MG/DL — HIGH (ref 70–99)
GLUCOSE SERPL-MCNC: 123 MG/DL — HIGH (ref 70–99)
GLUCOSE UR QL: NEGATIVE MG/DL — SIGNIFICANT CHANGE UP
HCT VFR BLD CALC: 28.8 % — LOW (ref 37–47)
HCT VFR BLD CALC: 29.8 % — LOW (ref 37–47)
HGB BLD-MCNC: 9.2 G/DL — LOW (ref 12–16)
HGB BLD-MCNC: 9.5 G/DL — LOW (ref 12–16)
IMM GRANULOCYTES NFR BLD AUTO: 0.1 % — SIGNIFICANT CHANGE UP (ref 0.1–0.3)
IMM GRANULOCYTES NFR BLD AUTO: 0.4 % — HIGH (ref 0.1–0.3)
INR BLD: 0.99 RATIO — SIGNIFICANT CHANGE UP (ref 0.65–1.3)
KETONES UR-MCNC: NEGATIVE MG/DL — SIGNIFICANT CHANGE UP
LACTATE BLDV-MCNC: 1.5 MMOL/L — SIGNIFICANT CHANGE UP (ref 0.5–2)
LDH SERPL L TO P-CCNC: 493 — HIGH (ref 50–242)
LEUKOCYTE ESTERASE UR-ACNC: ABNORMAL
LYMPHOCYTES # BLD AUTO: 1.61 K/UL — SIGNIFICANT CHANGE UP (ref 1.2–3.4)
LYMPHOCYTES # BLD AUTO: 1.64 K/UL — SIGNIFICANT CHANGE UP (ref 1.2–3.4)
LYMPHOCYTES # BLD AUTO: 23.4 % — SIGNIFICANT CHANGE UP (ref 20.5–51.1)
LYMPHOCYTES # BLD AUTO: 23.9 % — SIGNIFICANT CHANGE UP (ref 20.5–51.1)
MAGNESIUM SERPL-MCNC: 1.7 MG/DL — LOW (ref 1.8–2.4)
MCHC RBC-ENTMCNC: 24.7 PG — LOW (ref 27–31)
MCHC RBC-ENTMCNC: 25.1 PG — LOW (ref 27–31)
MCHC RBC-ENTMCNC: 31.9 G/DL — LOW (ref 32–37)
MCHC RBC-ENTMCNC: 31.9 G/DL — LOW (ref 32–37)
MCV RBC AUTO: 77.6 FL — LOW (ref 81–99)
MCV RBC AUTO: 78.5 FL — LOW (ref 81–99)
MONOCYTES # BLD AUTO: 0.54 K/UL — SIGNIFICANT CHANGE UP (ref 0.1–0.6)
MONOCYTES # BLD AUTO: 0.59 K/UL — SIGNIFICANT CHANGE UP (ref 0.1–0.6)
MONOCYTES NFR BLD AUTO: 7.9 % — SIGNIFICANT CHANGE UP (ref 1.7–9.3)
MONOCYTES NFR BLD AUTO: 8.6 % — SIGNIFICANT CHANGE UP (ref 1.7–9.3)
NEUTROPHILS # BLD AUTO: 4.26 K/UL — SIGNIFICANT CHANGE UP (ref 1.4–6.5)
NEUTROPHILS # BLD AUTO: 4.3 K/UL — SIGNIFICANT CHANGE UP (ref 1.4–6.5)
NEUTROPHILS NFR BLD AUTO: 61.8 % — SIGNIFICANT CHANGE UP (ref 42.2–75.2)
NEUTROPHILS NFR BLD AUTO: 62.9 % — SIGNIFICANT CHANGE UP (ref 42.2–75.2)
NITRITE UR-MCNC: NEGATIVE — SIGNIFICANT CHANGE UP
NRBC # BLD: 0 /100 WBCS — SIGNIFICANT CHANGE UP (ref 0–0)
NRBC # BLD: 0 /100 WBCS — SIGNIFICANT CHANGE UP (ref 0–0)
PH UR: 6 — SIGNIFICANT CHANGE UP (ref 5–8)
PLATELET # BLD AUTO: 271 K/UL — SIGNIFICANT CHANGE UP (ref 130–400)
PLATELET # BLD AUTO: 328 K/UL — SIGNIFICANT CHANGE UP (ref 130–400)
PMV BLD: 10.3 FL — SIGNIFICANT CHANGE UP (ref 7.4–10.4)
PMV BLD: 10.5 FL — HIGH (ref 7.4–10.4)
POTASSIUM SERPL-MCNC: 5.5 MMOL/L — HIGH (ref 3.5–5)
POTASSIUM SERPL-MCNC: 5.5 MMOL/L — HIGH (ref 3.5–5)
POTASSIUM SERPL-SCNC: 5.5 MMOL/L — HIGH (ref 3.5–5)
POTASSIUM SERPL-SCNC: 5.5 MMOL/L — HIGH (ref 3.5–5)
PROT SERPL-MCNC: 6 G/DL — SIGNIFICANT CHANGE UP (ref 6–8)
PROT SERPL-MCNC: 6.7 G/DL — SIGNIFICANT CHANGE UP (ref 6–8)
PROT UR-MCNC: NEGATIVE MG/DL — SIGNIFICANT CHANGE UP
PROTHROM AB SERPL-ACNC: 11.3 SEC — SIGNIFICANT CHANGE UP (ref 9.95–12.87)
RBC # BLD: 3.67 M/UL — LOW (ref 4.2–5.4)
RBC # BLD: 3.84 M/UL — LOW (ref 4.2–5.4)
RBC # FLD: 18.6 % — HIGH (ref 11.5–14.5)
RBC # FLD: 19.2 % — HIGH (ref 11.5–14.5)
RBC CASTS # UR COMP ASSIST: 0 /HPF — SIGNIFICANT CHANGE UP (ref 0–4)
SODIUM SERPL-SCNC: 140 MMOL/L — SIGNIFICANT CHANGE UP (ref 135–146)
SODIUM SERPL-SCNC: 140 MMOL/L — SIGNIFICANT CHANGE UP (ref 135–146)
SP GR SPEC: 1.01 — SIGNIFICANT CHANGE UP (ref 1–1.03)
SQUAMOUS # UR AUTO: 6 /HPF — HIGH (ref 0–5)
UROBILINOGEN FLD QL: 0.2 MG/DL — SIGNIFICANT CHANGE UP (ref 0.2–1)
WBC # BLD: 6.85 K/UL — SIGNIFICANT CHANGE UP (ref 4.8–10.8)
WBC # BLD: 6.89 K/UL — SIGNIFICANT CHANGE UP (ref 4.8–10.8)
WBC # FLD AUTO: 6.85 K/UL — SIGNIFICANT CHANGE UP (ref 4.8–10.8)
WBC # FLD AUTO: 6.89 K/UL — SIGNIFICANT CHANGE UP (ref 4.8–10.8)
WBC UR QL: 4 /HPF — SIGNIFICANT CHANGE UP (ref 0–5)

## 2024-04-18 PROCEDURE — 82746 ASSAY OF FOLIC ACID SERUM: CPT

## 2024-04-18 PROCEDURE — 83540 ASSAY OF IRON: CPT

## 2024-04-18 PROCEDURE — 86850 RBC ANTIBODY SCREEN: CPT

## 2024-04-18 PROCEDURE — 83036 HEMOGLOBIN GLYCOSYLATED A1C: CPT

## 2024-04-18 PROCEDURE — 99285 EMERGENCY DEPT VISIT HI MDM: CPT

## 2024-04-18 PROCEDURE — 85730 THROMBOPLASTIN TIME PARTIAL: CPT

## 2024-04-18 PROCEDURE — 85025 COMPLETE CBC W/AUTO DIFF WBC: CPT

## 2024-04-18 PROCEDURE — 85610 PROTHROMBIN TIME: CPT

## 2024-04-18 PROCEDURE — 83615 LACTATE (LD) (LDH) ENZYME: CPT

## 2024-04-18 PROCEDURE — 99222 1ST HOSP IP/OBS MODERATE 55: CPT | Mod: GC

## 2024-04-18 PROCEDURE — 80048 BASIC METABOLIC PNL TOTAL CA: CPT

## 2024-04-18 PROCEDURE — 82607 VITAMIN B-12: CPT

## 2024-04-18 PROCEDURE — 86901 BLOOD TYPING SEROLOGIC RH(D): CPT

## 2024-04-18 PROCEDURE — 83550 IRON BINDING TEST: CPT

## 2024-04-18 PROCEDURE — 83605 ASSAY OF LACTIC ACID: CPT

## 2024-04-18 PROCEDURE — 80053 COMPREHEN METABOLIC PANEL: CPT

## 2024-04-18 PROCEDURE — 80061 LIPID PANEL: CPT

## 2024-04-18 PROCEDURE — 94640 AIRWAY INHALATION TREATMENT: CPT

## 2024-04-18 PROCEDURE — 82728 ASSAY OF FERRITIN: CPT

## 2024-04-18 PROCEDURE — 36415 COLL VENOUS BLD VENIPUNCTURE: CPT

## 2024-04-18 PROCEDURE — 93010 ELECTROCARDIOGRAM REPORT: CPT

## 2024-04-18 PROCEDURE — 71045 X-RAY EXAM CHEST 1 VIEW: CPT | Mod: 26

## 2024-04-18 PROCEDURE — 75580 N-INVAS EST C FFR SW ALY CTA: CPT | Mod: 26

## 2024-04-18 PROCEDURE — 86900 BLOOD TYPING SEROLOGIC ABO: CPT

## 2024-04-18 PROCEDURE — 85027 COMPLETE CBC AUTOMATED: CPT

## 2024-04-18 RX ORDER — MONTELUKAST 4 MG/1
10 TABLET, CHEWABLE ORAL DAILY
Refills: 0 | Status: DISCONTINUED | OUTPATIENT
Start: 2024-04-18 | End: 2024-04-19

## 2024-04-18 RX ORDER — CLONAZEPAM 1 MG
1 TABLET ORAL
Refills: 0 | Status: DISCONTINUED | OUTPATIENT
Start: 2024-04-18 | End: 2024-04-19

## 2024-04-18 RX ORDER — ASPIRIN/CALCIUM CARB/MAGNESIUM 324 MG
1 TABLET ORAL
Refills: 0 | DISCHARGE

## 2024-04-18 RX ORDER — LEVOTHYROXINE SODIUM 125 MCG
125 TABLET ORAL DAILY
Refills: 0 | Status: DISCONTINUED | OUTPATIENT
Start: 2024-04-18 | End: 2024-04-19

## 2024-04-18 RX ORDER — ALBUTEROL 90 UG/1
2 AEROSOL, METERED ORAL
Refills: 0 | DISCHARGE

## 2024-04-18 RX ORDER — TIZANIDINE 4 MG/1
2 TABLET ORAL
Refills: 0 | DISCHARGE

## 2024-04-18 RX ORDER — ACETAMINOPHEN 500 MG
650 TABLET ORAL EVERY 6 HOURS
Refills: 0 | Status: DISCONTINUED | OUTPATIENT
Start: 2024-04-18 | End: 2024-04-18

## 2024-04-18 RX ORDER — MONTELUKAST 4 MG/1
1 TABLET, CHEWABLE ORAL
Qty: 30 | Refills: 0 | DISCHARGE

## 2024-04-18 RX ORDER — BACLOFEN 100 %
1 POWDER (GRAM) MISCELLANEOUS
Refills: 0 | DISCHARGE

## 2024-04-18 RX ORDER — ZOLPIDEM TARTRATE 10 MG/1
5 TABLET ORAL AT BEDTIME
Refills: 0 | Status: DISCONTINUED | OUTPATIENT
Start: 2024-04-18 | End: 2024-04-19

## 2024-04-18 RX ORDER — MAGNESIUM SULFATE 500 MG/ML
2 VIAL (ML) INJECTION ONCE
Refills: 0 | Status: COMPLETED | OUTPATIENT
Start: 2024-04-18 | End: 2024-04-18

## 2024-04-18 RX ORDER — FLUTICASONE FUROATE, UMECLIDINIUM BROMIDE AND VILANTEROL TRIFENATATE 200; 62.5; 25 UG/1; UG/1; UG/1
1 POWDER RESPIRATORY (INHALATION)
Qty: 0 | Refills: 0 | DISCHARGE

## 2024-04-18 RX ORDER — ASPIRIN/CALCIUM CARB/MAGNESIUM 324 MG
325 TABLET ORAL DAILY
Refills: 0 | Status: DISCONTINUED | OUTPATIENT
Start: 2024-04-18 | End: 2024-04-19

## 2024-04-18 RX ORDER — LANOLIN ALCOHOL/MO/W.PET/CERES
3 CREAM (GRAM) TOPICAL AT BEDTIME
Refills: 0 | Status: DISCONTINUED | OUTPATIENT
Start: 2024-04-18 | End: 2024-04-18

## 2024-04-18 RX ORDER — PANTOPRAZOLE SODIUM 20 MG/1
40 TABLET, DELAYED RELEASE ORAL
Refills: 0 | Status: DISCONTINUED | OUTPATIENT
Start: 2024-04-18 | End: 2024-04-19

## 2024-04-18 RX ORDER — TIOTROPIUM BROMIDE 18 UG/1
2 CAPSULE ORAL; RESPIRATORY (INHALATION) DAILY
Refills: 0 | Status: DISCONTINUED | OUTPATIENT
Start: 2024-04-18 | End: 2024-04-19

## 2024-04-18 RX ORDER — SODIUM CHLORIDE 9 MG/ML
500 INJECTION INTRAMUSCULAR; INTRAVENOUS; SUBCUTANEOUS ONCE
Refills: 0 | Status: COMPLETED | OUTPATIENT
Start: 2024-04-18 | End: 2024-04-18

## 2024-04-18 RX ORDER — OMEPRAZOLE 10 MG/1
1 CAPSULE, DELAYED RELEASE ORAL
Refills: 0 | DISCHARGE

## 2024-04-18 RX ORDER — SODIUM CHLORIDE 9 MG/ML
1000 INJECTION, SOLUTION INTRAVENOUS ONCE
Refills: 0 | Status: COMPLETED | OUTPATIENT
Start: 2024-04-18 | End: 2024-04-18

## 2024-04-18 RX ORDER — ALBUTEROL 90 UG/1
2 AEROSOL, METERED ORAL EVERY 6 HOURS
Refills: 0 | Status: DISCONTINUED | OUTPATIENT
Start: 2024-04-18 | End: 2024-04-19

## 2024-04-18 RX ORDER — CLOPIDOGREL BISULFATE 75 MG/1
1 TABLET, FILM COATED ORAL
Refills: 0 | DISCHARGE

## 2024-04-18 RX ORDER — ENOXAPARIN SODIUM 100 MG/ML
40 INJECTION SUBCUTANEOUS EVERY 24 HOURS
Refills: 0 | Status: DISCONTINUED | OUTPATIENT
Start: 2024-04-18 | End: 2024-04-19

## 2024-04-18 RX ORDER — HYDROXYZINE HCL 10 MG
10 TABLET ORAL THREE TIMES A DAY
Refills: 0 | Status: DISCONTINUED | OUTPATIENT
Start: 2024-04-18 | End: 2024-04-19

## 2024-04-18 RX ORDER — BUPROPION HYDROCHLORIDE 150 MG/1
1 TABLET, EXTENDED RELEASE ORAL
Refills: 0 | DISCHARGE

## 2024-04-18 RX ORDER — DOCUSATE SODIUM 100 MG
1 CAPSULE ORAL
Refills: 0 | DISCHARGE

## 2024-04-18 RX ORDER — CLOPIDOGREL BISULFATE 75 MG/1
75 TABLET, FILM COATED ORAL DAILY
Refills: 0 | Status: DISCONTINUED | OUTPATIENT
Start: 2024-04-18 | End: 2024-04-19

## 2024-04-18 RX ORDER — ZOLPIDEM TARTRATE 10 MG/1
0 TABLET ORAL
Qty: 0 | Refills: 0 | DISCHARGE

## 2024-04-18 RX ORDER — ONDANSETRON 8 MG/1
4 TABLET, FILM COATED ORAL EVERY 8 HOURS
Refills: 0 | Status: DISCONTINUED | OUTPATIENT
Start: 2024-04-18 | End: 2024-04-18

## 2024-04-18 RX ORDER — FAMOTIDINE 10 MG/ML
40 INJECTION INTRAVENOUS DAILY
Refills: 0 | Status: DISCONTINUED | OUTPATIENT
Start: 2024-04-18 | End: 2024-04-19

## 2024-04-18 RX ORDER — HYDROXYZINE HCL 10 MG
1 TABLET ORAL
Refills: 0 | DISCHARGE

## 2024-04-18 RX ORDER — CLONAZEPAM 1 MG
1 TABLET ORAL
Qty: 0 | Refills: 0 | DISCHARGE

## 2024-04-18 RX ORDER — BUDESONIDE AND FORMOTEROL FUMARATE DIHYDRATE 160; 4.5 UG/1; UG/1
2 AEROSOL RESPIRATORY (INHALATION)
Refills: 0 | Status: DISCONTINUED | OUTPATIENT
Start: 2024-04-18 | End: 2024-04-19

## 2024-04-18 RX ORDER — SODIUM CHLORIDE 9 MG/ML
500 INJECTION, SOLUTION INTRAVENOUS ONCE
Refills: 0 | Status: COMPLETED | OUTPATIENT
Start: 2024-04-18 | End: 2024-04-18

## 2024-04-18 RX ORDER — ATORVASTATIN CALCIUM 80 MG/1
80 TABLET, FILM COATED ORAL AT BEDTIME
Refills: 0 | Status: DISCONTINUED | OUTPATIENT
Start: 2024-04-18 | End: 2024-04-19

## 2024-04-18 RX ORDER — BUPROPION HYDROCHLORIDE 150 MG/1
450 TABLET, EXTENDED RELEASE ORAL DAILY
Refills: 0 | Status: DISCONTINUED | OUTPATIENT
Start: 2024-04-18 | End: 2024-04-19

## 2024-04-18 RX ADMIN — Medication 1 MILLIGRAM(S): at 17:27

## 2024-04-18 RX ADMIN — SODIUM CHLORIDE 1000 MILLILITER(S): 9 INJECTION INTRAMUSCULAR; INTRAVENOUS; SUBCUTANEOUS at 07:30

## 2024-04-18 RX ADMIN — SODIUM CHLORIDE 1000 MILLILITER(S): 9 INJECTION, SOLUTION INTRAVENOUS at 09:27

## 2024-04-18 RX ADMIN — ENOXAPARIN SODIUM 40 MILLIGRAM(S): 100 INJECTION SUBCUTANEOUS at 21:24

## 2024-04-18 RX ADMIN — ZOLPIDEM TARTRATE 5 MILLIGRAM(S): 10 TABLET ORAL at 22:59

## 2024-04-18 RX ADMIN — ATORVASTATIN CALCIUM 80 MILLIGRAM(S): 80 TABLET, FILM COATED ORAL at 21:23

## 2024-04-18 RX ADMIN — BUDESONIDE AND FORMOTEROL FUMARATE DIHYDRATE 2 PUFF(S): 160; 4.5 AEROSOL RESPIRATORY (INHALATION) at 21:23

## 2024-04-18 RX ADMIN — Medication 25 GRAM(S): at 11:23

## 2024-04-18 RX ADMIN — SODIUM CHLORIDE 500 MILLILITER(S): 9 INJECTION, SOLUTION INTRAVENOUS at 08:43

## 2024-04-18 NOTE — ED PROVIDER NOTE - EKG/XRAY ADDITIONAL INFORMATION
Independent interpretation of the xray(s) performed by Dr. Helen Ramirez:  No infiltrate, effusion or acute abnormality.

## 2024-04-18 NOTE — ASU PATIENT PROFILE, ADULT - FALL HARM RISK - UNIVERSAL INTERVENTIONS
Bed in lowest position, wheels locked, appropriate side rails in place/Call bell, personal items and telephone in reach/Instruct patient to call for assistance before getting out of bed or chair/Non-slip footwear when patient is out of bed/[place] to call system/Physically safe environment - no spills, clutter or unnecessary equipment/Purposeful Proactive Rounding/Room/bathroom lighting operational, light cord in reach

## 2024-04-18 NOTE — PATIENT PROFILE ADULT - FUNCTIONAL ASSESSMENT - BASIC MOBILITY 4.
Met with the pt and her daughter, Nas Gimenez, at the bedside, to discuss transition of care. The pt has been living at [facility]. She has been declining in health. The daughter would like to speak to hospice. She would like [facility]. Referral made. Anya Mckeon -[phone]    The Plan for Transition of Care is related to the following treatment goals: comfort care    The Patient and/or patient representative, daughter Nas Gimenez, was provided with a choice of provider and agrees   with the discharge plan. [x] Yes [] No    Freedom of choice list was provided with basic dialogue that supports the patient's individualized plan of care/goals, treatment preferences and shares the quality data associated with the providers.  [x] Yes [] No 4 = No assist / stand by assistance

## 2024-04-18 NOTE — ED PROVIDER NOTE - OBJECTIVE STATEMENT
57 yo f ho COPD not on home o2, htn, hld, hypothyroid, nasopharyngeal ca in reemission, CAD presents from IR with hypotension. Pt came from IR today for plan for LT carotid endartectomy but found to be hypotensive and slightly dizzy. Admits was given blood pressure medication while in triage including carvedilol.   In the AM admits has been NPO since last night. Denies cp, sob, difficulty breathing, nausea, vomiting, diarrhea, abd pain

## 2024-04-18 NOTE — ED ADULT NURSE NOTE - OBJECTIVE STATEMENT
Was NPO for procedure to be done at IR today, felt dizzy upon arrival to IR, was found to be hypotensive. GCS 15. pt reports she took all of her medications this morning.

## 2024-04-18 NOTE — CHART NOTE - NSCHARTNOTEFT_GEN_A_CORE
The patient had presented as an elective admission for a planned carotid angioplasty and stenting, however, on arrival the patient was found to have systolic BP in the 60's. On exam, the patient denied dizziness, lightheadedness, fatigue, vision changes (did report to have had prior bilateral blurry vision). Due to elevated procedural risks, and after discussion between Dr. Blank and the anesthesiology team, the decision was made to postpone the patient's procedure and send the patient directly to the ED for further workup/ BP management.   [id number] [facility]

## 2024-04-18 NOTE — PRE PROCEDURE NOTE - PRE PROCEDURE EVALUATION
Interventional Neuro Radiology  Pre-Procedure Note PA-C    HPI: The patient is a 58-year-old female with a PMHx of asthma, COPD, nasopharyngeal cancer, left ear hearing loss, depression, osteoarthritis, tooth abscess, spondylosis, HTN, and hypothyroidism who is s/p right carotid angioplasty and stenting 4/4/2023. She has been on ASA 325mg daily and Plavix 75mg PO daily since prior stenting. September 2023 CTA showed ~60-79% left proximal ICA stenosis. The patient presents today for planned diagnostic cerebral angiogram + probable left ICA angioplasty and stenting with Dr. Blank. She has no acute complaints on exam. NIHSS 0. NPO except medication after midnight last night. IV normal saline at 75mL/hr and PRU test on arrival.    Allergies: ciprofloxacin (Urticaria; Other)    PAST MEDICAL & SURGICAL HISTORY:  COPD (chronic obstructive pulmonary disease)  HTN (hypertension)  Diverticulitis  Pulmonary nodules/lesions, multiple  Nasopharyngeal cancer  Hypothyroid  Cervical disc disease  sequelae of radtion for nasopharyngeal cancer  Left ear hearing loss  Hemorrhoids  H/O carotid stenosis  Anxiety  Morbid obesity with BMI of 45.0-49.9, adult  H/O dental abscess  HLD (hyperlipidemia)  Carotid artery disease  Obstructive sleep apnea on CPAP  OA (osteoarthritis)  Bilateral knee pain  Depression  History of cholecystectomy  History of common carotid artery stent placement    Family history of lung cancer (Father, Mother)  Blood Bank: 04-15-24  --  A POS  --    Assessment/Plan:   This is a 58-year-old female who presents today for a planned diagnostic cerebral angiogram + left carotid angioplasty and stenting.  Procedure, goals, risks, benefits and alternatives  were discussed with patient and patient's family.  All questions were answered to best understanding.   Risks discussed include but are not limited to stroke, vessel injury, hemorrhage, and/or hematoma.  Patient demonstrates understanding  of all risks involved with this procedure and wishes to continue.     Appropriate consent was obtained from patient and consent is in the patient's chart.

## 2024-04-18 NOTE — H&P ADULT - HISTORY OF PRESENT ILLNESS
The patient is a 58-year-old female with a PMHx of asthma, COPD, nasopharyngeal cancer, left ear hearing loss, depression, osteoarthritis, tooth abscess, spondylosis, HTN, and hypothyroidism who is s/p right carotid angioplasty and stenting 4/4/2023. She has been on ASA 325mg daily and Plavix 75mg PO daily since prior stenting. September 2023 CTA showed ~60-79% left proximal ICA stenosis. The patient presents today for planned diagnostic cerebral angiogram + probable left ICA angioplasty and stenting with Dr. Blank. She has no acute complaints on exam. NIHSS 0. NPO except medication after midnight last night. IV normal saline at 75mL/hr and PRU test on arrival.

## 2024-04-18 NOTE — ED PROVIDER NOTE - PROGRESS NOTE DETAILS
Dr. KARLOS Luciano at patient's bedside, awaiting CCTA results from 2 days ago, possible medication adjustment of her beta-blockers necessary. Patient signed out to Dr. Tan, follow-up labs, CCTA results, reassess and dispo. ss Patient fluid responsive. CCTA [place] 3/4a. Dr Magaña aware and rec further testing. CT with ground glass opacities - pt denies cough, congestion. Pending UA results - medicine team will follow  Patient to be admitted to an inpatient floor. Pt notified and agreeable to plan. Case discussed with and care endorsed to medical admitting resident.

## 2024-04-18 NOTE — ED PROVIDER NOTE - CLINICAL SUMMARY MEDICAL DECISION MAKING FREE TEXT BOX
Patient signed out from Dr. Fry and resting comfortably.  Awaiting's coronary CT report discussion with Dr. Magaña regarding follow-up and needs admission for further testing

## 2024-04-18 NOTE — ED PROVIDER NOTE - ATTENDING CONTRIBUTION TO CARE
58-year-old female with a PMHx of asthma, COPD, nasopharyngeal cancer, left ear hearing loss, depression, osteoarthritis, tooth abscess, spondylosis, HTN, and hypothyroidism, s/p right carotid angioplasty/stenting 4/2023, now for left ICA angioplasty and stenting with Dr. Blank today, sent to the ED from the IR suite with hypotension noted prior to the procedure. 58-year-old female with a PMHx of asthma, COPD, nasopharyngeal cancer, left ear hearing loss, depression, osteoarthritis, tooth abscess, spondylosis, HTN, and hypothyroidism, s/p right carotid angioplasty/stenting 4/2023, now for left ICA angioplasty and stenting with Dr. Blank today, sent to the ED from the IR suite with hypotension noted prior to the procedure. Patient took her usual home meds this morning including her carvedilol.  Patient reports she was feeling weak and had a near syncopal episodes at the time.  No syncope.  No chest pain, shortness of breath, palpitations.  Positive associated headache.  No abdominal pain, nausea, vomiting.  Patient has similar episode when she had a CAT scan in the early morning 2 days ago.  CCTA results from 2 days ago are pending.  Vitals noted.  CONSTITUTIONAL: Well-appearing; well-nourished; in no apparent distress.   HEAD: Normocephalic; atraumatic.   EYES: PERRL; EOM intact. Conjunctiva normal B/L.   ENT: Normal pharynx with no tonsillar hypertrophy. MMM.  NECK: Supple; non-tender; no cervical lymphadenopathy.   CHEST: Normal chest excursion with respiration.   CARDIOVASCULAR: Normal S1, S2; no murmurs, rubs, or gallops.   RESPIRATORY: Normal chest excursion with respiration; breath sounds clear and equal bilaterally; no wheezes, rhonchi, or rales.  GI/: Normal bowel sounds; non-distended; non-tender.  BACK: No evidence of trauma or deformity. Non-tender to palpation. No CVA tenderness.   EXT: Normal ROM in all four extremities; non-tender to palpation; distal pulses are normal. No leg edema B/L.   NEURO: A & O x 4; CN 2-12 intact. Grossly unremarkable. NIHSS-0.

## 2024-04-18 NOTE — ASU PATIENT PROFILE, ADULT - VISION (WITH CORRECTIVE LENSES IF THE PATIENT USUALLY WEARS THEM):
Addended by: JANELLE GROSS on: 2/16/2017 06:14 PM     Modules accepted: Orders     Normal vision: sees adequately in most situations; can see medication labels, newsprint

## 2024-04-18 NOTE — H&P ADULT - ATTENDING COMMENTS
HPI:  58 year old F with PMH of 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 Blank) on DAPT presents from IR with hypotension. Pt came to IR today with plan feft ICA angioplasty and stenting with Dr. Blank today, but  was found to be hypotensive and feeling slightly dizzy prior to the procedure and was brought to the ED.   Patient stated that she has been NPO since last night and took her carvedilol 6.25mg prior to presenting for the procedure. Currently she denies any dizziness, cp, sob, difficulty breathing, nausea, vomiting, diarrhea, abd pain  Of note, CCTA on 4/16 showed Short segment of noncalcified plaque within the mid LAD resulting in moderate narrowing. CAD-RADS 3/4A. per patient,  CCTA was done for pre-op clearance for the procedure. Her Cardiologist is Dr. Collins.    In the ED,   T(F): 97.5 (18 Apr 2024 14:48), Max: 98.1 (18 Apr 2024 08:23)  HR: 77 (18 Apr 2024 14:48) (64 - 77)  BP: 142/65 (18 Apr 2024 14:48) (67/38 - 142/65)  BP(mean): 83 (18 Apr 2024 09:20) (69 - 83)  RR: 18 (18 Apr 2024 14:48) (18 - 20)  SpO2: 94%  on room air    EKG showed NSR   CXR unremarkable    Labs were remarkable Hgb 9,2, MCV 78.5, K 5.5 (hemolyzed), Cr 1.5, Mag 1.7, ,     Patient received 1.5L LR bolus and is being admitted to medicine     (18 Apr 2024 15:50)    T(C): 36.4 (04-18-24 @ 14:48), Max: 36.7 (04-18-24 @ 08:23)  HR: 77 (04-18-24 @ 14:48) (64 - 77)  BP: 142/65 (04-18-24 @ 14:48) (67/38 - 142/65)  RR: 18 (04-18-24 @ 14:48) (18 - 20)  SpO2: 94% (04-18-24 @ 14:48) (93% - 100%)    REVIEW OF SYSTEMS: see cc/HPI   CONSTITUTIONAL: No weakness, fevers or chills  EYES/ENT: No visual changes;  No vertigo or throat pain   NECK: No pain or stiffness  RESPIRATORY: No cough, wheezing, hemoptysis; No shortness of breath  CARDIOVASCULAR: No chest pain or palpitations  GASTROINTESTINAL: No abdominal or epigastric pain. No nausea, vomiting, or hematemesis; No diarrhea or constipation. No melena or hematochezia.  GENITOURINARY: No dysuria, frequency or hematuria  NEUROLOGICAL: No numbness or weakness  SKIN: No itching, rashes  ENDO: No hyperglycemia, No thyroid disorder, No dyslipidemia   HEM: No bleeding, No easy bruising, No anemia   PSYCHE: No psychosis, No mood disorder No hallucinations No delusion   MSK: No deformity, No fracture, No Joint swelling    Physical Exam:  General: WN/WD NAD  Neurology: A&Ox3, nonfocal, follows commands  Eyes: PERRLA/ EOMI  ENT/Neck: Neck supple, trachea midline, No JVD  Respiratory: CTA B/L, No wheezing, rales, rhonchi  CV: Normal rate regular rhythm, S1S2, no murmurs, rubs or gallops  Abdominal: Soft, NT, ND +BS,   Extremities: No edema, + peripheral pulses  Skin: No Rashes, Hematoma, Ecchymosis    A/p HPI:  58 year old F with PMH of 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 Blank) on DAPT presents from IR with hypotension. Pt came to IR today with plan feft ICA angioplasty and stenting with Dr. Blank today, but  was found to be hypotensive and feeling slightly dizzy prior to the procedure and was brought to the ED.   Patient stated that she has been NPO since last night and took her carvedilol 6.25mg prior to presenting for the procedure. Currently she denies any dizziness, cp, sob, difficulty breathing, nausea, vomiting, diarrhea, abd pain  Of note, CCTA on 4/16 showed Short segment of noncalcified plaque within the mid LAD resulting in moderate narrowing. CAD-RADS 3/4A. per patient,  CCTA was done for pre-op clearance for the procedure. Her Cardiologist is Dr. Collins.    In the ED,   T(F): 97.5 (18 Apr 2024 14:48), Max: 98.1 (18 Apr 2024 08:23)  HR: 77 (18 Apr 2024 14:48) (64 - 77)  BP: 142/65 (18 Apr 2024 14:48) (67/38 - 142/65)  BP(mean): 83 (18 Apr 2024 09:20) (69 - 83)  RR: 18 (18 Apr 2024 14:48) (18 - 20)  SpO2: 94%  on room air    EKG showed NSR   CXR unremarkable    Labs were remarkable Hgb 9,2, MCV 78.5, K 5.5 (hemolyzed), Cr 1.5, Mag 1.7, ,     Patient received 1.5L LR bolus and is being admitted to medicine     (18 Apr 2024 15:50)    T(C): 36.4 (04-18-24 @ 14:48), Max: 36.7 (04-18-24 @ 08:23)  HR: 77 (04-18-24 @ 14:48) (64 - 77)  BP: 142/65 (04-18-24 @ 14:48) (67/38 - 142/65)  RR: 18 (04-18-24 @ 14:48) (18 - 20)  SpO2: 94% (04-18-24 @ 14:48) (93% - 100%)    REVIEW OF SYSTEMS: see cc/HPI   CONSTITUTIONAL: No weakness, fevers or chills  EYES/ENT: No visual changes;  No vertigo or throat pain   NECK: No pain or stiffness  RESPIRATORY: No cough, wheezing, hemoptysis; No shortness of breath  CARDIOVASCULAR: No chest pain or palpitations  GASTROINTESTINAL: No abdominal or epigastric pain. No nausea, vomiting, or hematemesis; No diarrhea or constipation. No melena or hematochezia.  GENITOURINARY: No dysuria, frequency or hematuria  NEUROLOGICAL: No numbness or weakness  SKIN: No itching, rashes  ENDO: No hyperglycemia, No thyroid disorder, No dyslipidemia   HEM: No bleeding, No easy bruising, No anemia   PSYCHE: No psychosis, No mood disorder No hallucinations No delusion   MSK: No deformity, No fracture, No Joint swelling    Physical Exam:  General: WN/WD NAD  Neurology: A&Ox3, nonfocal, follows commands  Eyes: PERRLA/ EOMI  ENT/Neck: Neck supple, trachea midline, No JVD  Respiratory: CTA B/L, No wheezing, rales, rhonchi  CV: Normal rate regular rhythm, S1S2, no murmurs, rubs or gallops  Abdominal: Soft, NT, ND +BS,   Extremities: No edema, + peripheral pulses  Skin: No Rashes, Hematoma, Ecchymosis    A/p  Hypotension - suspected to be Rx related ( on Bblocker and appears dehydrated )  Dehydration ( elevated BUM /Cr)    LAKISHA on CKD IIIb 2/2 dehydration   - IV fluids and repeat Scr in the AM, if back to base line no need for extensive w/u     COPD   -c/w OP Rx     Depression/ Anxiety / Insomnia ?? 2/2 Psychological etiology   -c/w OP Rx   - OP Psychiatry follow up     GERD   -IV PPI     PATIENT SEEN by ATTENDING 4/18/24

## 2024-04-18 NOTE — CHART NOTE - NSCHARTNOTESELECT_GEN_ALL_CORE
ANTICOAGULATION FOLLOW-UP CLINIC VISIT    Patient Name:  Bret Merino  Date:  8/9/2018  Contact Type:  Telephone/ Spoke to son and also called [facility] to schedule next INR    SUBJECTIVE:     Patient Findings     Positives No Problem Findings    Comments Other complaints - S  Spoke to son and explained to him that [facility] came out yesterday to do his dad's INR. We will try to schedule INR's around the 20th of every month. However, this may change depending on his dad's INR.            OBJECTIVE    INR   Date Value Ref Range Status   08/08/2018 2.1  Final       ASSESSMENT / PLAN  INR assessment THER    Recheck INR In: 6 WEEKS    INR Location Home INR      Anticoagulation Summary as of 8/9/2018     INR goal 2.0-3.0    Today's INR 2.1 (8/8/2018)    Warfarin maintenance plan 2.5 mg (2.5 mg x 1) every day    Full warfarin instructions 2.5 mg every day    Weekly warfarin total 17.5 mg    Plan last modified Shelley Ceron RN (3/20/2018)    Next INR check 9/20/2018    Target end date       Anticoagulation Episode Summary     INR check location     Preferred lab     Send INR reminders to [facility]    Comments             See the Encounter Report to view Anticoagulation Flowsheet and Dosing Calendar (Go to Encounters tab in chart review, and find the Anticoagulation Therapy Visit)        Kelby Miranda RN               
Neuroendovascular/Event Note

## 2024-04-18 NOTE — H&P ADULT - ASSESSMENT
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 Blank) on DAPT presents from IR with hypotension.     #Hypotension  #Hx hypertension  - resolved, s/p 1.5L LR bolus  - will hold off antihypertensive at this point and resume BP meds as tolerated       # moderate stenosis of the Left ICA  #Hx of proximal LULI stenosis of approximately 70% s/p carotid angioplasty and stent  - Neuroendovascular follow up for  left ICA angioplasty and stenting with Dr. Blank today,  - VA Duplex b/l Carotid noted  1. 20-39% (mild) stenosis of the right internal carotid artery. Patent right ICA stent is visualized.  2. 60-79% (moderate) stenosis of the left internal carotid artery.  - c/w Home medications:  mg, Plavix, Lipitor 80 mg qhs (ASA increased by one of her doctors she cannot recall which one)    #  - CCTA on 4/16 showed Short segment of noncalcified plaque within the mid LAD resulting in moderate narrowing. CAD-RADS 3/4A  - EKG showed NSR  - Cardiology consult (Dr. Matta)    #DM   - h    #Chronic microcytic anemia   - f/u iron profile with ferritin  - Monitor Hgb    #CKD 3B  - Cr 1.5  (baseline ~1.2)  - s/p fluid bolus  - Send urine studies  - Trend Cr    #COPD (not on home O2)  - not in exacerbation  - continue inhalers PRN (on Trelegy ellipta at home, can do duonebs/symbicort here)  - continue home montelukast 10mg daily    #Depression  #Anxiety  #Insomnia  - continue home bupropion, klonopin, hydroxyzine PRN  - continue home zolpidem    #Hypothyroidism  - c/w synthroid 125mcg daily    #GERD  - continue home famotidine 40mg daily   - on omeprazole 40mg at home (can give protonix 40mg as therapeutic interchange)                                                                              # DVT prophylaxis: Lovenox  # GI prophylaxis: Protonix   # Diet: DASH/TLC  # Activity: IAT  # Code status: FULL CODE  # Disposition: Home                                                                              58 year old F with PMH of 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 Blank) on DAPT presents from IR with hypotension.     #Hypotension - resolved  #Hx hypertension  - resolved, s/p 1.5L LR bolus  - will hold off resuming home carvedilol at this point      # moderate stenosis of the Left ICA  #Hx of proximal LULI stenosis of approximately 70% s/p carotid angioplasty and stent  - Neuroendovascular follow up for  left ICA angioplasty and stenting with Dr. Blank,  - c/w Home medications:  mg, Plavix, Lipitor 80 mg qhs (ASA increased by one of her doctors she cannot recall which one)    #CAD  *CCTA done for pre-op clearance  - CCTA on 4/16 showed Short segment of noncalcified plaque within the mid LAD resulting in moderate narrowing. CAD-RADS 3/4A  - EKG showed NSR  - Cardiology consult (Dr. Matta)    #DM   - not on any antidiabetic meds at home  - check A1C  - Monitor FS and start insulin if indicated    #Chronic microcytic anemia   - f/u iron profile with ferritin  - Monitor Hgb    #LAKISHA on CKD 3B  - Cr 1.5  (baseline ~1.2)  - s/p fluid bolus  - Send urine studies  - Trend Cr    #COPD (not on home O2)  - not in exacerbation  - continue inhalers PRN (on Trelegy ellipta at home, can do duonebs/symbicort here)  - continue home montelukast 10mg daily    #Depression  #Anxiety  #Insomnia  - continue home bupropion, klonopin, hydroxyzine PRN  - continue home zolpidem    #Hypothyroidism  - c/w synthroid 125mcg daily    #GERD  - continue home famotidine 40mg daily   - on omeprazole 40mg at home (can give protonix 40mg as therapeutic interchange)                                                                              # DVT prophylaxis: Lovenox  # GI prophylaxis: Protonix   # Diet: DASH/TLC  # Activity: IAT  # Code status: FULL CODE  # Disposition: Home

## 2024-04-18 NOTE — PATIENT PROFILE ADULT - FALL HARM RISK - HARM RISK INTERVENTIONS

## 2024-04-18 NOTE — ED ADULT TRIAGE NOTE - CHIEF COMPLAINT QUOTE
Contrast: Isovue. Contrast concentration: 370. Amount: 175 mL. Pt brought by staff from IR for hypotension, pt was scheduled for L carotid artery stent placement

## 2024-04-18 NOTE — H&P ADULT - NSHPLABSRESULTS_GEN_ALL_CORE
LABS:                        9.2    6.89  )-----------( 271      ( 2024 08:34 )             28.8     Hb Trend: 9.2<--, 9.5<--, 11.0<--  WBC Trend: 6.89<--, 6.85<--, 8.20<--  Plt Trend: 271<--, 328<--, 386<--          04-18    140  |  108  |  32<H>  ----------------------------<  122<H>  5.5<H>   |  18  |  1.5    Ca    8.7      2024 08:34  Mg     1.7     18    TPro  6.0  /  Alb  3.4<L>  /  TBili  <0.2  /  DBili  x   /  AST  39  /  ALT  16  /  AlkPhos  89  04-18      PT/INR - ( 2024 07:45 )   PT: 11.30 sec;   INR: 0.99 ratio           Urinalysis Basic - ( 2024 10:35 )    Color: Yellow / Appearance: Clear / S.010 / pH: x  Gluc: x / Ketone: Negative mg/dL  / Bili: Negative / Urobili: 0.2 mg/dL   Blood: x / Protein: Negative mg/dL / Nitrite: Negative   Leuk Esterase: Trace / RBC: 0 /HPF / WBC 4 /HPF   Sq Epi: x / Non Sq Epi: 6 /HPF / Bacteria: Few /HPF      CAPILLARY BLOOD GLUCOSE      POCT Blood Glucose.: 121 mg/dL (2024 08:30)          IMAGING:  reviewed

## 2024-04-18 NOTE — H&P ADULT - HISTORY OF PRESENT ILLNESS
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 Blank) on DAPT presents from IR with hypotension. Pt came from IR today for plan feft ICA angioplasty and stenting with Dr. Blank today, but  was found to be hypotensive and feeling slightly dizzy prior to the procedure and was brought to the ED. Patient was given blood pressure medication while in triage including carvedilol.   In the AM admits has been NPO since last night. Denies cp, sob, difficulty breathing, nausea, vomiting, diarrhea, abd pain    In the ED,   T(F): 97.5 (18 Apr 2024 14:48), Max: 98.1 (18 Apr 2024 08:23)  HR: 77 (18 Apr 2024 14:48) (64 - 77)  BP: 142/65 (18 Apr 2024 14:48) (67/38 - 142/65)  BP(mean): 83 (18 Apr 2024 09:20) (69 - 83)  RR: 18 (18 Apr 2024 14:48) (18 - 20)  SpO2: 94%  on room air    EKG showed NSR   CXR unremarkable  Of note, CCTA on 4/16 showed Short segment of noncalcified plaque within the mid LAD resulting in moderate narrowing. 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 Blank) on DAPT presents from IR with hypotension. Pt came to IR today with plan feft ICA angioplasty and stenting with Dr. Blank today, but  was found to be hypotensive and feeling slightly dizzy prior to the procedure and was brought to the ED.   Patient stated that she has been NPO since last night and took her carvedilol 6.25mg prior to presenting for the procedure. Currently she denies any dizziness, cp, sob, difficulty breathing, nausea, vomiting, diarrhea, abd pain  Of note, CCTA on 4/16 showed Short segment of noncalcified plaque within the mid LAD resulting in moderate narrowing. CAD-RADS 3/4A. per patient,  CCTA was done for pre-op clearance for the procedure. Her Cardiologist is Dr. Collins.    In the ED,   T(F): 97.5 (18 Apr 2024 14:48), Max: 98.1 (18 Apr 2024 08:23)  HR: 77 (18 Apr 2024 14:48) (64 - 77)  BP: 142/65 (18 Apr 2024 14:48) (67/38 - 142/65)  BP(mean): 83 (18 Apr 2024 09:20) (69 - 83)  RR: 18 (18 Apr 2024 14:48) (18 - 20)  SpO2: 94%  on room air    EKG showed NSR   CXR unremarkable    Labs were remarkable Hgb 9,2, MCV 78.5, K 5.5 (hemolyzed), Cr 1.5, Mag 1.7, ,     Patient received 1.5L LR bolus and is being admitted to medicine

## 2024-04-18 NOTE — H&P ADULT - NSHPPHYSICALEXAM_GEN_ALL_CORE
GENERAL: NAD, speaks in full sentences, no signs of respiratory distress  HEAD:  Atraumatic, Normocephalic  EYES: EOMI, PERRLA, anicteric sclera  NECK: Supple, No JVD  CHEST/LUNG: Clear to auscultation bilaterally; No wheeze; No crackles; No accessory muscles used  HEART: Regular rate and rhythm; No murmurs;   ABDOMEN: Soft, Nontender, Nondistended; Bowel sounds present; No guarding  EXTREMITIES:  2+ Peripheral Pulses, No cyanosis or edema  PSYCH: AAOx3  NEUROLOGY: non-focal  SKIN: No rashes or lesions GENERAL: NAD, speaks in full sentences, no signs of respiratory distress  HEAD:  Atraumatic, Normocephalic  EYES: EOMI,  anicteric sclera  NECK: Supple, No JVD  CHEST/LUNG: Clear to auscultation bilaterally; No accessory muscles used  HEART: Regular rate and rhythm; No murmurs;   ABDOMEN: Soft, Nontender, Nondistended Obese;  No guarding  EXTREMITIES: No cyanosis or edema  PSYCH: AAOx3  NEUROLOGY: non-focal  SKIN: No rashes or lesions

## 2024-04-18 NOTE — H&P ADULT - NSHPPHYSICALEXAM_GEN_ALL_CORE
GENERAL: In no acute distress  SKIN: Warm, dry, no rashes or lesions observed  HEAD: Normocephalic; atraumatic  EYES: PERRL, EOMI, sclera and conjunctiva normal  ENT: No nasal discharge; airway clear, MMM  NECK: Supple; non tender  CARD:  Regular rate and rhythm. Normal S1, S2. Radial pulses palpable  RESP: No increased WOB. CTA b/l without wheezes, crackles, rhonchi  ABD: Normoactive BS. Soft, nontender, nondistended  EXT: Normal ROM  NEURO: Alert and oriented to self, place, location, EOMI, visual fields full, sensation intact to light touch throughout, moving all extremities antigravity without drift, PERRLA, no dysmetria on FTN or HTS, no dysarthria or aphasia  PSYCH: Cooperative, demeanor appropriate

## 2024-04-19 ENCOUNTER — TRANSCRIPTION ENCOUNTER (OUTPATIENT)
Age: 59
End: 2024-04-19

## 2024-04-19 VITALS
OXYGEN SATURATION: 96 % | DIASTOLIC BLOOD PRESSURE: 65 MMHG | SYSTOLIC BLOOD PRESSURE: 137 MMHG | TEMPERATURE: 98 F | RESPIRATION RATE: 17 BRPM | HEART RATE: 84 BPM

## 2024-04-19 DIAGNOSIS — R93.1 ABNORMAL FINDINGS ON DIAGNOSTIC IMAGING OF HEART AND CORONARY CIRCULATION: ICD-10-CM

## 2024-04-19 DIAGNOSIS — I65.22 OCCLUSION AND STENOSIS OF LEFT CAROTID ARTERY: ICD-10-CM

## 2024-04-19 PROBLEM — G47.33 OBSTRUCTIVE SLEEP APNEA (ADULT) (PEDIATRIC): Chronic | Status: ACTIVE | Noted: 2024-04-15

## 2024-04-19 PROBLEM — F32.A DEPRESSION, UNSPECIFIED: Chronic | Status: ACTIVE | Noted: 2024-04-15

## 2024-04-19 PROBLEM — M19.90 UNSPECIFIED OSTEOARTHRITIS, UNSPECIFIED SITE: Chronic | Status: ACTIVE | Noted: 2024-04-15

## 2024-04-19 LAB
A1C WITH ESTIMATED AVERAGE GLUCOSE RESULT: 6.6 % — HIGH (ref 4–5.6)
ANION GAP SERPL CALC-SCNC: 13 MMOL/L — SIGNIFICANT CHANGE UP (ref 7–14)
APTT BLD: 30.5 SEC — SIGNIFICANT CHANGE UP (ref 27–39.2)
BUN SERPL-MCNC: 21 MG/DL — HIGH (ref 10–20)
CALCIUM SERPL-MCNC: 8.6 MG/DL — SIGNIFICANT CHANGE UP (ref 8.4–10.4)
CHLORIDE SERPL-SCNC: 108 MMOL/L — SIGNIFICANT CHANGE UP (ref 98–110)
CHOLEST SERPL-MCNC: 150 MG/DL — SIGNIFICANT CHANGE UP
CO2 SERPL-SCNC: 21 MMOL/L — SIGNIFICANT CHANGE UP (ref 17–32)
CREAT SERPL-MCNC: 1 MG/DL — SIGNIFICANT CHANGE UP (ref 0.7–1.5)
CULTURE RESULTS: SIGNIFICANT CHANGE UP
EGFR: 64 ML/MIN/1.73M2 — SIGNIFICANT CHANGE UP
ESTIMATED AVERAGE GLUCOSE: 143 MG/DL — HIGH (ref 68–114)
FERRITIN SERPL-MCNC: 17 NG/ML — SIGNIFICANT CHANGE UP (ref 13–330)
GLUCOSE SERPL-MCNC: 98 MG/DL — SIGNIFICANT CHANGE UP (ref 70–99)
HCT VFR BLD CALC: 31 % — LOW (ref 37–47)
HDLC SERPL-MCNC: 51 MG/DL — SIGNIFICANT CHANGE UP
HGB BLD-MCNC: 9.7 G/DL — LOW (ref 12–16)
INR BLD: 0.97 RATIO — SIGNIFICANT CHANGE UP (ref 0.65–1.3)
IRON SATN MFR SERPL: 29 UG/DL — LOW (ref 35–150)
IRON SATN MFR SERPL: 9 % — LOW (ref 15–50)
LIPID PNL WITH DIRECT LDL SERPL: 69 MG/DL — SIGNIFICANT CHANGE UP
MCHC RBC-ENTMCNC: 24.5 PG — LOW (ref 27–31)
MCHC RBC-ENTMCNC: 31.3 G/DL — LOW (ref 32–37)
MCV RBC AUTO: 78.3 FL — LOW (ref 81–99)
NON HDL CHOLESTEROL: 99 MG/DL — SIGNIFICANT CHANGE UP
NRBC # BLD: 0 /100 WBCS — SIGNIFICANT CHANGE UP (ref 0–0)
PLATELET # BLD AUTO: 282 K/UL — SIGNIFICANT CHANGE UP (ref 130–400)
PMV BLD: 10.7 FL — HIGH (ref 7.4–10.4)
POTASSIUM SERPL-MCNC: 4.4 MMOL/L — SIGNIFICANT CHANGE UP (ref 3.5–5)
POTASSIUM SERPL-SCNC: 4.4 MMOL/L — SIGNIFICANT CHANGE UP (ref 3.5–5)
PROTHROM AB SERPL-ACNC: 11 SEC — SIGNIFICANT CHANGE UP (ref 9.95–12.87)
RBC # BLD: 3.96 M/UL — LOW (ref 4.2–5.4)
RBC # FLD: 18.8 % — HIGH (ref 11.5–14.5)
SODIUM SERPL-SCNC: 142 MMOL/L — SIGNIFICANT CHANGE UP (ref 135–146)
SPECIMEN SOURCE: SIGNIFICANT CHANGE UP
TIBC SERPL-MCNC: 311 UG/DL — SIGNIFICANT CHANGE UP (ref 220–430)
TRIGL SERPL-MCNC: 151 MG/DL — HIGH
UIBC SERPL-MCNC: 282 UG/DL — SIGNIFICANT CHANGE UP (ref 110–370)
WBC # BLD: 5.49 K/UL — SIGNIFICANT CHANGE UP (ref 4.8–10.8)
WBC # FLD AUTO: 5.49 K/UL — SIGNIFICANT CHANGE UP (ref 4.8–10.8)

## 2024-04-19 PROCEDURE — 99239 HOSP IP/OBS DSCHRG MGMT >30: CPT

## 2024-04-19 RX ORDER — FAMOTIDINE 10 MG/ML
1 INJECTION INTRAVENOUS
Qty: 0 | Refills: 0 | DISCHARGE

## 2024-04-19 RX ADMIN — MONTELUKAST 10 MILLIGRAM(S): 4 TABLET, CHEWABLE ORAL at 11:40

## 2024-04-19 RX ADMIN — Medication 325 MILLIGRAM(S): at 11:41

## 2024-04-19 RX ADMIN — BUDESONIDE AND FORMOTEROL FUMARATE DIHYDRATE 2 PUFF(S): 160; 4.5 AEROSOL RESPIRATORY (INHALATION) at 08:59

## 2024-04-19 RX ADMIN — CLOPIDOGREL BISULFATE 75 MILLIGRAM(S): 75 TABLET, FILM COATED ORAL at 11:39

## 2024-04-19 RX ADMIN — PANTOPRAZOLE SODIUM 40 MILLIGRAM(S): 20 TABLET, DELAYED RELEASE ORAL at 05:14

## 2024-04-19 RX ADMIN — Medication 1 MILLIGRAM(S): at 05:12

## 2024-04-19 RX ADMIN — TIOTROPIUM BROMIDE 2 PUFF(S): 18 CAPSULE ORAL; RESPIRATORY (INHALATION) at 08:59

## 2024-04-19 RX ADMIN — BUPROPION HYDROCHLORIDE 450 MILLIGRAM(S): 150 TABLET, EXTENDED RELEASE ORAL at 11:39

## 2024-04-19 RX ADMIN — Medication 125 MICROGRAM(S): at 05:12

## 2024-04-19 RX ADMIN — FAMOTIDINE 40 MILLIGRAM(S): 10 INJECTION INTRAVENOUS at 11:41

## 2024-04-19 NOTE — DISCHARGE NOTE PROVIDER - ATTENDING DISCHARGE PHYSICAL EXAMINATION:
Attending attestation  Attending DC note  Pt seen and examined at bedside. No cp or sob.   vitals, labs, exam stable  Hospital course as above.  Plan dw pt and agreed to plan  Medically cleared for DC. Med recc completed.  [facility] resident. Spent 32 mins on case

## 2024-04-19 NOTE — DISCHARGE NOTE PROVIDER - CARE PROVIDERS DIRECT ADDRESSES
[email or web address]

## 2024-04-19 NOTE — DISCHARGE NOTE PROVIDER - NSDCFUSCHEDAPPT_GEN_ALL_CORE_FT
Bhanu Blank Physician [facility]  NEUROSURG [address] [place] [address]  Scheduled Appointment: 05/01/2024

## 2024-04-19 NOTE — CONSULT NOTE ADULT - ASSESSMENT
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 Blank) on DAPT presents from IR with hypotension.  pt was given iv hudration and bp improved and has remained stable.  pt with drop in bp likely related to dehydration and over medicated.  pt's hgb did drop by 2 point but ? related to hydration.  there does not appear to be any acute cardiac condition causing drop in bp.  pt's stable for 24 hours ad has no significant obstructive cad ad nl ef.  pt requires no further cardiac w/u in order to have carotid stent.  hold bp meds for now  cont statin  dual antiplt as per medical team  non cardiac findings on corcta as per medical team  f/u h/h  and ldh as per medical team.

## 2024-04-19 NOTE — DISCHARGE NOTE PROVIDER - NSDCMRMEDTOKEN_GEN_ALL_CORE_FT
aspirin 325 mg oral delayed release tablet: 1 orally once a day  atorvastatin 80 mg oral tablet: 1 tab(s) orally once a day (at bedtime)  baclofen 10 mg oral tablet: 1 tab(s) orally once a day  buPROPion 450 mg/24 hours (XL) oral tablet, extended release: 1 orally once a day  carvedilol 6.25 mg oral tablet: 1 tab(s) orally every 12 hours  clonazePAM 1 mg oral tablet: 1 tab(s) orally 2 times a day  clopidogrel 75 mg oral tablet: 1 orally once a day  docusate sodium 100 mg oral tablet: 1 tab(s) orally 2 times a day  famotidine 40 mg oral tablet: 1 tab(s) orally once a day  hydrOXYzine hydrochloride 10 mg oral tablet: 1 tab(s) orally 3 times a day as needed for  anxiety  levothyroxine 125 mcg (0.125 mg) oral tablet: 1 tab(s) orally once a day  MONTELUKAST SODIUM 10 MG TABS: 1 dose(s) orally once a day  omeprazole 40 mg oral delayed release capsule: 1 tab(s) orally once a day  tiZANidine 2 mg oral tablet: 2 tab(s) orally 3 times a day  Trelegy Ellipta inhalation powder: 1 puff(s) inhaled once a day  Ventolin HFA 90 mcg/inh inhalation aerosol: 2 puff(s) inhaled prn  ZOLPIDEM TARTRATE 10 MG TABS: orally once a day (at bedtime)   aspirin 325 mg oral delayed release tablet: 1 orally once a day  atorvastatin 80 mg oral tablet: 1 tab(s) orally once a day (at bedtime)  baclofen 10 mg oral tablet: 1 tab(s) orally once a day  buPROPion 450 mg/24 hours (XL) oral tablet, extended release: 1 orally once a day  clonazePAM 1 mg oral tablet: 1 tab(s) orally 2 times a day  clopidogrel 75 mg oral tablet: 1 orally once a day  docusate sodium 100 mg oral tablet: 1 tab(s) orally 2 times a day  hydrOXYzine hydrochloride 10 mg oral tablet: 1 tab(s) orally 3 times a day as needed for  anxiety  levothyroxine 125 mcg (0.125 mg) oral tablet: 1 tab(s) orally once a day  MONTELUKAST SODIUM 10 MG TABS: 1 dose(s) orally once a day  omeprazole 40 mg oral delayed release capsule: 1 tab(s) orally once a day  tiZANidine 2 mg oral tablet: 2 tab(s) orally 3 times a day  Trelegy Ellipta inhalation powder: 1 puff(s) inhaled once a day  Ventolin HFA 90 mcg/inh inhalation aerosol: 2 puff(s) inhaled prn  ZOLPIDEM TARTRATE 10 MG TABS: orally once a day (at bedtime)

## 2024-04-19 NOTE — DISCHARGE NOTE PROVIDER - HOSPITAL COURSE
58 year old F with PMH of nasopharyngeal cancer in remission s/p chemoradiation 2013, hypothyroidism, Diverticulitis, COPD (not on oxygen), HTN, Depression, Insomnia, Asthma  and Anxiety, TIA/transient blurry vision 2/2 to proximal LULI s/p right carotid angioplasty/stenting 4/2023 (Dr. Blank) on DAPT presents from IR with hypotension. Pt came to IR on day of admission with plan for left ICA angioplasty and stenting with Dr. Blank, but  was found to be hypotensive and feeling slightly dizzy prior to the procedure and was brought to the ED. Patient stated that she has been NPO since last night and took her carvedilol 6.25mg prior to presenting for the procedure. She received fluids and her bp and dizziness improved. She will be discharged with outpatient follow up with Neuro IR Dr. Blank to reschedule for left ICA angioplasty and stenting. In the meantime, The cardiologist Dr. Okeefe recommended to hold the carvedilol on dc and he will follow up with her outpatient to start a different HTN med.   Of note, CCTA on 4/16 showed Short segment of noncalcified plaque within the mid LAD resulting in moderate narrowing. CAD-RADS 3/4A. per patient,  CCTA was done for pre-op clearance for the procedure. Dr. Collins reviewed the CCTA and cleared the patient for the procedure.     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 Blank) on DAPT presents from IR with hypotension. Pt came to IR on day of admission with plan for left ICA angioplasty and stenting with Dr. Blank, but  was found to be hypotensive and feeling slightly dizzy prior to the procedure and was brought to the ED. Patient stated that she has been NPO since last night and took her carvedilol 6.25mg prior to presenting for the procedure. She received fluids and her bp and dizziness improved. She will be discharged with outpatient follow up with Neuro IR Dr. Blank to reschedule for left ICA angioplasty and stenting. In the meantime, The cardiologist Dr. Okeefe recommended to hold the carvedilol on dc and he will follow up with her outpatient to start a different HTN med.   Of note, CCTA on 4/16 showed Short segment of noncalcified plaque within the mid LAD resulting in moderate narrowing. CAD-RADS 3/4A. per patient,  CCTA was done for pre-op clearance for the procedure. Dr. Collins reviewed the CCTA and cleared the patient for the procedure. .    Patient fit and stable for discharge

## 2024-04-19 NOTE — DISCHARGE NOTE PROVIDER - CARE PROVIDER_API CALL
Bhanu Blank  Neurosurgery  [address], [address]  [place] [address]  Phone: [phone]  Fax: [phone]  Follow Up Time: 1 week    Rigoberto Magaña  Cardiology  [address], [address]  [place] [address]  Phone: [phone]  Fax: [phone]  Follow Up Time: 1 week    Kody Noonan  Internal Medicine  [address] [place] [id number]  Phone: [phone]  Fax: [phone]  Follow Up Time: 2 weeks

## 2024-04-19 NOTE — DISCHARGE NOTE NURSING/CASE MANAGEMENT/SOCIAL WORK - PATIENT PORTAL LINK FT
You can access the FollowMyHealth Patient Portal offered by [facility] by registering at the following website: [email or web address]. By joining [employer]’s FollowMyHealth portal, you will also be able to view your health information using other applications (apps) compatible with our system.

## 2024-04-19 NOTE — DISCHARGE NOTE PROVIDER - PROVIDER TOKENS
PROVIDER:[TOKEN:[[id number]],FOLLOWUP:[1 week]],PROVIDER:[TOKEN:[[id number]],FOLLOWUP:[1 week]],PROVIDER:[TOKEN:[[id number]],FOLLOWUP:[2 weeks]]

## 2024-04-19 NOTE — CONSULT NOTE ADULT - SUBJECTIVE AND OBJECTIVE BOX
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 Blank) on DAPT presents from IR with hypotension. Pt came to IRyesterday with plan feft ICA angioplasty and stenting with Dr. Blank today, but  was found to be hypotensive and feeling slightly dizzy prior to the procedure and was brought to the ED.   Patient stated that she has been NPO since last night and took her carvedilol 6.25mg prior to presenting for the procedure. Currently she denies any dizziness, cp, sob, difficulty breathing, nausea, vomiting, diarrhea, abd pain  Of note, CCTA on 4/16 showed Short segment of noncalcified plaque within the mid LAD resulting in moderate narrowing. pt had ffr with no flow limiting lesions.  pt was given ivf with improvement in bp and has remained stable for 24 hours.      In the ED,   T(F): 97.5 (18 Apr 2024 14:48), Max: 98.1 (18 Apr 2024 08:23)  HR: 77 (18 Apr 2024 14:48) (64 - 77)  BP: 142/65 (18 Apr 2024 14:48) (67/38 - 142/65)  BP(mean): 83 (18 Apr 2024 09:20) (69 - 83)  RR: 18 (18 Apr 2024 14:48) (18 - 20)  SpO2: 94%  on room air    EKG showed NSR  unchanged from prior  CXR unremarkable    Labs were remarkable Hgb 9,2, MCV 78.5, K 5.5 (hemolyzed), Cr 1.5, Mag 1.7, ,     Patient received 1.5L LR bolus and is being admitted to medicine                 (18 Apr 2024 15:50)      PAST MEDICAL & SURGICAL HISTORY:  COPD (chronic obstructive pulmonary disease)      HTN (hypertension)      Diverticulitis      Pulmonary nodules/lesions, multiple      Nasopharyngeal cancer      Hypothyroid      Cervical disc disease  sequelae of radtion for nasopharyngeal cancer      Left ear hearing loss      Hemorrhoids      H/O carotid stenosis      Anxiety      Morbid obesity with BMI of 45.0-49.9, adult      H/O dental abscess      HLD (hyperlipidemia)      Carotid artery disease      Obstructive sleep apnea on CPAP      OA (osteoarthritis)      Bilateral knee pain      Depression      History of cholecystectomy      History of common carotid artery stent placement          PREVIOUS DIAGNOSTIC TESTING:      ECHO  FINDINGS:    STRESS TEST  FINDINGS:    CATHETERIZATION  FINDINGS:    MEDICATIONS  (STANDING):  aspirin enteric coated 325 milliGRAM(s) Oral daily  atorvastatin 80 milliGRAM(s) Oral at bedtime  budesonide  80 MICROgram(s)/formoterol 4.5 MICROgram(s) Inhaler 2 Puff(s) Inhalation two times a day  buPROPion XL (24-Hour) . 450 milliGRAM(s) Oral daily  clonazePAM  Tablet 1 milliGRAM(s) Oral two times a day  clopidogrel Tablet 75 milliGRAM(s) Oral daily  enoxaparin Injectable 40 milliGRAM(s) SubCutaneous every 24 hours  famotidine    Tablet 40 milliGRAM(s) Oral daily  levothyroxine 125 MICROGram(s) Oral daily  montelukast 10 milliGRAM(s) Oral daily  pantoprazole    Tablet 40 milliGRAM(s) Oral before breakfast  tiotropium 2.5 MICROgram(s) Inhaler 2 Puff(s) Inhalation daily    MEDICATIONS  (PRN):  albuterol    90 MICROgram(s) HFA Inhaler 2 Puff(s) Inhalation every 6 hours PRN Bronchospasm  aluminum hydroxide/magnesium hydroxide/simethicone Suspension 30 milliLiter(s) Oral every 4 hours PRN Dyspepsia  hydrOXYzine hydrochloride 10 milliGRAM(s) Oral three times a day PRN for anxiety  zolpidem 5 milliGRAM(s) Oral at bedtime PRN Insomnia  zolpidem 5 milliGRAM(s) Oral at bedtime PRN Insomnia      FAMILY HISTORY:  Family history of lung cancer (Father, Mother)        SOCIAL HISTORY:  CIGARETTES: former  ALCOHOL:none  DRUGS:none                      REVIEW OF SYSTEMS:  CONSTITUTIONAL: No distress, Looks stable  NECK: No pain or stiffnes  RESPIRATORY: No cough, wheezing, shortness of breath  CARDIOVASCULAR: No chest pain, SOB, palpitations, leg swelling  GASTROINTESTINAL: No abdominal or epigastric pain. No nausea, vomiting, or hematemesis;  No melena.  NEUROLOGICAL: No dizziness, headaches, memory loss, loss of strength  SKIN: No itching, burning, rashes, or lesions   ENDOCRINE: No heat or cold intolerance  MUSCULOSKELETAL: No joint pain, No  swelling; No muscle pain  PSYCHIATRIC: No depression, anxiety, mood swings, or difficulty sleeping  ALLERGY: No hives, itching, rash          Vital Signs Last 24 Hrs  T(C): 36.7 (19 Apr 2024 07:46), Max: 36.7 (19 Apr 2024 06:45)  T(F): 98.1 (19 Apr 2024 07:46), Max: 98.1 (19 Apr 2024 07:46)  HR: 84 (19 Apr 2024 07:46) (77 - 86)  BP: 137/65 (19 Apr 2024 07:46) (125/72 - 182/81)  BP(mean): --  RR: 17 (19 Apr 2024 07:46) (17 - 18)  SpO2: 96% (19 Apr 2024 07:46) (94% - 96%)    Parameters below as of 19 Apr 2024 07:46  Patient On (Oxygen Delivery Method): room air                          PHYSICAL EXAM:  GENERAL: No distress, well developed  HEAD:  Atraumatic, Normocephalic  NECK: Supple, No JVD, No Bruit of either carotid arteries  NERVOUS SYSTEM:  Alert, Awake, Oriented to time, place, person; Normal memory and speech; Normal motor Strength 5/5 B/L upper and lower extremities  CHEST/LUNG: Normal air entry to lung base bilaterally; No wheeze, crackle, rales, rhonchi  HEART: Regular heart beat, S1, A2, P2, No S3, No S4, No gallop, No murmur  ABDOMEN: Soft, Non tender, Non distended; Bowel sounds present  EXTREMITIES:  2+ Peripheral Pulses, No clubbing, No edema  SKIN: No rashes or lesions    TELEMETRY:  nsr    ECG:Diagnosis Line Normal sinus rhythm  T wave abnormality, consider lateral ischemia  Abnormal ECG    CXRAYImpression:      No acute pulmonary process        I&O's Detail      LABS:                        9.7    5.49  )-----------( 282      ( 19 Apr 2024 04:30 )             31.0     04-19    142  |  108  |  21<H>  ----------------------------<  98  4.4   |  21  |  1.0    Ca    8.6      19 Apr 2024 04:30  Mg     1.7     04-18    TPro  6.0  /  Alb  3.4<L>  /  TBili  <0.2  /  DBili  x   /  AST  39  /  ALT  16  /  AlkPhos  89  04-18        PT/INR - ( 18 Apr 2024 07:45 )   PT: 11.30 sec;   INR: 0.99 ratio           Urinalysis Basic - ( 19 Apr 2024 04:30 )    Color: x / Appearance: x / SG: x / pH: x  Gluc: 98 mg/dL / Ketone: x  / Bili: x / Urobili: x   Blood: x / Protein: x / Nitrite: x   Leuk Esterase: x / RBC: x / WBC x   Sq Epi: x / Non Sq Epi: x / Bacteria: x      I&O's Summary      RADIOLOGY & ADDITIONAL STUDIES:
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 Blank) on DAPT presents from IR with hypotension. Pt came to IR today with plan feft ICA angioplasty and stenting with Dr. Blank today, but  was found to be hypotensive and feeling slightly dizzy prior to the procedure and was brought to the ED.   Patient stated that she has been NPO since last night and took her carvedilol 6.25mg prior to presenting for the procedure. Currently she denies any dizziness, cp, sob, difficulty breathing, nausea, vomiting, diarrhea, abd pain  Of note, CCTA on 4/16 showed Short segment of noncalcified plaque within the mid LAD resulting in moderate narrowing. CAD-RADS 3/4A. per patient,  CCTA was done for pre-op clearance for the procedure. Her Cardiologist is Dr. Collins.    In the ED,   T(F): 97.5 (18 Apr 2024 14:48), Max: 98.1 (18 Apr 2024 08:23)  HR: 77 (18 Apr 2024 14:48) (64 - 77)  BP: 142/65 (18 Apr 2024 14:48) (67/38 - 142/65)  BP(mean): 83 (18 Apr 2024 09:20) (69 - 83)  RR: 18 (18 Apr 2024 14:48) (18 - 20)  SpO2: 94%  on room air    EKG showed NSR   CXR unremarkable    Labs were remarkable Hgb 9,2, MCV 78.5, K 5.5 (hemolyzed), Cr 1.5, Mag 1.7, ,     Patient received 1.5L LR bolus and is being admitted to medicine   (18 Apr 2024 15:50)    Interval HPI: The patient had initially been scheduled as an outpatient for a diagnostic cerebral angiogram + carotid angioplasty and stenting. However, on admission to IR, it was noted that the patient had SBP in the 60's. The patient also reported to have had bilateral blurry vision earlier today, which was transient and not present at the time of exam. The patient reports to have had a recent dental abscess (treated w/ antibiotics 3 months ago), and to have had prior instances of hypotension recently. After evaluation by the IR/ anesthesia teams, the decision was made to transfer the patient to the ED for further management. Patient denies headache, dizziness, SOB, and numbness/tingling.    PAST MEDICAL & SURGICAL HISTORY:  COPD (chronic obstructive pulmonary disease)  HTN (hypertension)  Diverticulitis  Pulmonary nodules/lesions, multiple  Nasopharyngeal cancer  Hypothyroid  Cervical disc disease  sequelae of radtion for nasopharyngeal cancer  Left ear hearing loss  Hemorrhoids  H/O carotid stenosis  Anxiety  Morbid obesity with BMI of 45.0-49.9, adult  H/O dental abscess  HLD (hyperlipidemia)  Carotid artery disease  Obstructive sleep apnea on CPAP  OA (osteoarthritis)  Bilateral knee pain  Depression  History of cholecystectomy  History of common carotid artery stent placement    Pertinent PMHx     MEDICATIONS  (STANDING):  aspirin enteric coated 325 milliGRAM(s) Oral daily  atorvastatin 80 milliGRAM(s) Oral at bedtime  budesonide  80 MICROgram(s)/formoterol 4.5 MICROgram(s) Inhaler 2 Puff(s) Inhalation two times a day  buPROPion XL (24-Hour) . 450 milliGRAM(s) Oral daily  clonazePAM  Tablet 1 milliGRAM(s) Oral two times a day  clopidogrel Tablet 75 milliGRAM(s) Oral daily  enoxaparin Injectable 40 milliGRAM(s) SubCutaneous every 24 hours  famotidine    Tablet 40 milliGRAM(s) Oral daily  levothyroxine 125 MICROGram(s) Oral daily  montelukast 10 milliGRAM(s) Oral daily  pantoprazole    Tablet 40 milliGRAM(s) Oral before breakfast  tiotropium 2.5 MICROgram(s) Inhaler 2 Puff(s) Inhalation daily    MEDICATIONS  (PRN):  albuterol    90 MICROgram(s) HFA Inhaler 2 Puff(s) Inhalation every 6 hours PRN Bronchospasm  aluminum hydroxide/magnesium hydroxide/simethicone Suspension 30 milliLiter(s) Oral every 4 hours PRN Dyspepsia  hydrOXYzine hydrochloride 10 milliGRAM(s) Oral three times a day PRN for anxiety  zolpidem 5 milliGRAM(s) Oral at bedtime PRN Insomnia  zolpidem 5 milliGRAM(s) Oral at bedtime PRN Insomnia    Allergies  ciprofloxacin (Urticaria; Other)    FAMILY HISTORY:  Family history of lung cancer (Father, Mother)    Physical Exam:   Vital Signs Last 24 Hrs  T(C): 36.4 (18 Apr 2024 14:48), Max: 36.7 (18 Apr 2024 08:23)  T(F): 97.5 (18 Apr 2024 14:48), Max: 98.1 (18 Apr 2024 08:23)  HR: 77 (18 Apr 2024 14:48) (64 - 77)  BP: 142/65 (18 Apr 2024 14:48) (67/38 - 142/65)  BP(mean): 83 (18 Apr 2024 09:20) (69 - 83)  RR: 18 (18 Apr 2024 14:48) (18 - 20)  SpO2: 94% (18 Apr 2024 14:48) (93% - 100%)    Parameters below as of 18 Apr 2024 08:43  Patient On (Oxygen Delivery Method): room air    General: NAD  Neuro: AAOx3 to person, self, time; following commands, EOMI, PERRLA, visual fields full, face symmetrical, tongue midline, V1-3 intact to light touch, sensation intact to light touch throughout, moving all extremities AG without drift, no dysmetria on FTN or HTS, no dysarthria or aphasia.  NIHSS 0    Labs:                         9.2    6.89  )-----------( 271      ( 18 Apr 2024 08:34 )             28.8     04-18    140  |  108  |  32<H>  ----------------------------<  122<H>  5.5<H>   |  18  |  1.5    Ca    8.7      18 Apr 2024 08:34  Mg     1.7     04-18    TPro  6.0  /  Alb  3.4<L>  /  TBili  <0.2  /  DBili  x   /  AST  39  /  ALT  16  /  AlkPhos  89  04-18    PT/INR - ( 18 Apr 2024 07:45 )   PT: 11.30 sec;   INR: 0.99 ratio             Pertinent labs:                      9.2    6.89  )-----------( 271      ( 18 Apr 2024 08:34 )             28.8       04-18    140  |  108  |  32<H>  ----------------------------<  122<H>  5.5<H>   |  18  |  1.5    Ca    8.7      18 Apr 2024 08:34  Mg     1.7     04-18    TPro  6.0  /  Alb  3.4<L>  /  TBili  <0.2  /  DBili  x   /  AST  39  /  ALT  16  /  AlkPhos  89  04-18      PT/INR - ( 18 Apr 2024 07:45 )   PT: 11.30 sec;   INR: 0.99 ratio        Radiology & Additional Studies:   Radiology imaging reviewed.     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 The patient had initially been scheduled as an outpatient for a diagnostic cerebral angiogram + carotid angioplasty and stenting. However, on admission to IR, it was noted that the patient had SBP in the 60's. The patient also reported to have had bilateral blurry vision earlier today, which was transient and not present at the time of exam.    Suggestions:   - No acute neuroendovascular intervention is planned at this time. Will reevaluate tomorrow morning if the patient remains hospitalized.   - Continue DAPT Aspirin 81mg and Plavix 75mg daily PO.   - Hold home Carvedilol while hypotensive (consider changing home regimen).  - OP f/u in the neurosurgery clinic with Dr. Blank if discharged to arrange rescheduling of procedure once optimized.  [id number] Neuroendovascular with additional questions/ concerns on this admission

## 2024-04-19 NOTE — DISCHARGE NOTE PROVIDER - NSDCCPCAREPLAN_GEN_ALL_CORE_FT
PRINCIPAL DISCHARGE DIAGNOSIS  Diagnosis: Acute hypotension  Assessment and Plan of Treatment: You were admitted for low blood pressure secondary to taking carvedilol. Your blood pressure improved with fluids. You were scheduled for a carotid artery angiogram and stent but the procedure will be rescheduled according to the neuroendovascular team. They will contact you within the next 48 hours for a new appointment for the procedure. If they do not call, please call Dr. Blank's clinic at the number provided. Your cardiologist Dr. Okeefe cleared you for your procedure but he advises you to completely stop taking the carvedilol to avoid another episode of hypotension so please stop taking carvedilol for now and follow up at his clinic to start a different blood pressure medication. Take all your other medications as prescribed and return to the hospital for any concerns.

## 2024-04-20 DIAGNOSIS — R93.1 ABNORMAL FINDINGS ON DIAGNOSTIC IMAGING OF HEART AND CORONARY CIRCULATION: ICD-10-CM

## 2024-04-20 LAB
FERRITIN SERPL-MCNC: 12 NG/ML — LOW (ref 13–330)
FOLATE SERPL-MCNC: 18.6 NG/ML — SIGNIFICANT CHANGE UP
VIT B12 SERPL-MCNC: 536 PG/ML — SIGNIFICANT CHANGE UP (ref 232–1245)

## 2024-04-23 NOTE — DISCHARGE NOTE PROVIDER - DISCHARGE DIET
Time-based billing (NON-critical care) Time-based billing (NON-critical care) Regular Diet - No restrictions

## 2024-04-23 NOTE — ED ADULT TRIAGE NOTE - TEMPERATURE IN CELSIUS (DEGREES C)
Patient completed Session 36 of Phase II Monitored Cardiac Rehabilitation. Please see Media Tab for scanned in Exercise Session Report.     36.1

## 2024-04-25 DIAGNOSIS — F41.9 ANXIETY DISORDER, UNSPECIFIED: ICD-10-CM

## 2024-04-25 DIAGNOSIS — Z86.73 PERSONAL HISTORY OF TRANSIENT ISCHEMIC ATTACK (TIA), AND CEREBRAL INFARCTION WITHOUT RESIDUAL DEFICITS: ICD-10-CM

## 2024-04-25 DIAGNOSIS — E86.0 DEHYDRATION: ICD-10-CM

## 2024-04-25 DIAGNOSIS — K21.9 GASTRO-ESOPHAGEAL REFLUX DISEASE WITHOUT ESOPHAGITIS: ICD-10-CM

## 2024-04-25 DIAGNOSIS — Z99.89 DEPENDENCE ON OTHER ENABLING MACHINES AND DEVICES: ICD-10-CM

## 2024-04-25 DIAGNOSIS — N18.32 CHRONIC KIDNEY DISEASE, STAGE 3B: ICD-10-CM

## 2024-04-25 DIAGNOSIS — I25.10 ATHEROSCLEROTIC HEART DISEASE OF NATIVE CORONARY ARTERY WITHOUT ANGINA PECTORIS: ICD-10-CM

## 2024-04-25 DIAGNOSIS — D50.9 IRON DEFICIENCY ANEMIA, UNSPECIFIED: ICD-10-CM

## 2024-04-25 DIAGNOSIS — E03.9 HYPOTHYROIDISM, UNSPECIFIED: ICD-10-CM

## 2024-04-25 DIAGNOSIS — G47.00 INSOMNIA, UNSPECIFIED: ICD-10-CM

## 2024-04-25 DIAGNOSIS — H91.92 UNSPECIFIED HEARING LOSS, LEFT EAR: ICD-10-CM

## 2024-04-25 DIAGNOSIS — F32.A DEPRESSION, UNSPECIFIED: ICD-10-CM

## 2024-04-25 DIAGNOSIS — I95.9 HYPOTENSION, UNSPECIFIED: ICD-10-CM

## 2024-04-25 DIAGNOSIS — Z79.4 LONG TERM (CURRENT) USE OF INSULIN: ICD-10-CM

## 2024-04-25 DIAGNOSIS — I12.9 HYPERTENSIVE CHRONIC KIDNEY DISEASE WITH STAGE 1 THROUGH STAGE 4 CHRONIC KIDNEY DISEASE, OR UNSPECIFIED CHRONIC KIDNEY DISEASE: ICD-10-CM

## 2024-04-25 DIAGNOSIS — N17.9 ACUTE KIDNEY FAILURE, UNSPECIFIED: ICD-10-CM

## 2024-04-25 DIAGNOSIS — J44.9 CHRONIC OBSTRUCTIVE PULMONARY DISEASE, UNSPECIFIED: ICD-10-CM

## 2024-04-25 DIAGNOSIS — G47.33 OBSTRUCTIVE SLEEP APNEA (ADULT) (PEDIATRIC): ICD-10-CM

## 2024-04-25 DIAGNOSIS — E11.22 TYPE 2 DIABETES MELLITUS WITH DIABETIC CHRONIC KIDNEY DISEASE: ICD-10-CM

## 2024-05-01 ENCOUNTER — NON-APPOINTMENT (OUTPATIENT)
Age: 59
End: 2024-05-01

## 2024-05-01 ENCOUNTER — APPOINTMENT (OUTPATIENT)
Dept: NEUROSURGERY | Facility: CLINIC | Age: 59
End: 2024-05-01

## 2024-05-09 ENCOUNTER — APPOINTMENT (OUTPATIENT)
Dept: NEUROSURGERY | Facility: HOSPITAL | Age: 59
End: 2024-05-09

## 2024-05-27 NOTE — ED PROVIDER NOTE - CLINICAL SUMMARY MEDICAL DECISION MAKING FREE TEXT BOX
This note was copied from a baby's chart.  Lactation visit; Connie reports Camelia has been breastfeeding well- had given formula a couple of times and pumping d/t refusal to suck yesterday. Connie independent with latching Camelia to right breast in football hold- Camelia has wide mouth and flanged lips- rhythmic suck/swallow pattern and swallows pointed out to Connie. Multiple swallows heard-praise provided.  Discussed breast compressions when needed. Education reviewed on satiety cues as well as early feeding cues. Discussed expected feeding behaviors and goals. Since Connie had only supplemented a couple of times- discussed wouldn't necessarily need to continue pumping/supplement if active at breast with swallows heard and Camelia weight loss stable along with meeting goals- encouraged to discuss with MD. Reviewed assessing for deep latch and swallows. Can offer formula/pump if Caemlia not active at breast but Connie and primary RN also report good breastfeeds. Outpatient lactation resources provided as needed. All questions answered. Has spectra at home.   57 yold female to Ed Pmhx Hypothyroidism, nasopharangeal cancer s/p ct/rt in remission 2013, Htn, Copd, depression, diverticulitis; pt c/o diffuse headache x 2 days constant with mild nausea and feeling like she's going to pass out; Labs were ordered and reviewed.  Imaging was ordered and reviewed by me.  Appropriate medications for patient's presenting complaints were ordered and effects were reassessed.  Patient's records (prior hospital, ED visit, and/or nursing home notes if available) were reviewed.  Escalation to admission/observation was considered.   Patient requires inpatient hospitalization - monitored setting.

## 2024-06-10 NOTE — PATIENT PROFILE ADULT - NSTOBACCONEVERSMOKERY/N_GEN_A
The patient's goals for the shift include Patient will remain safe and free from injury    The clinical goals for the shift include Patient will tolerate peritoneal dialysis      Problem: Pain - Adult  Goal: Verbalizes/displays adequate comfort level or baseline comfort level  Outcome: Progressing     Problem: Safety - Adult  Goal: Free from fall injury  Outcome: Progressing     Problem: Discharge Planning  Goal: Discharge to home or other facility with appropriate resources  Outcome: Progressing     Problem: Chronic Conditions and Co-morbidities  Goal: Patient's chronic conditions and co-morbidity symptoms are monitored and maintained or improved  Outcome: Progressing     Problem: Skin  Goal: Decreased wound size/increased tissue granulation at next dressing change  Outcome: Progressing  Goal: Participates in plan/prevention/treatment measures  Outcome: Progressing  Goal: Prevent/manage excess moisture  Outcome: Progressing  Goal: Prevent/minimize sheer/friction injuries  Outcome: Progressing  Goal: Promote/optimize nutrition  Outcome: Progressing  Goal: Promote skin healing  Outcome: Progressing     Problem: Fall/Injury  Goal: Not fall by end of shift  Outcome: Progressing  Goal: Be free from injury by end of the shift  Outcome: Progressing  Goal: Verbalize understanding of personal risk factors for fall in the hospital  Outcome: Progressing  Goal: Verbalize understanding of risk factor reduction measures to prevent injury from fall in the home  Outcome: Progressing  Goal: Use assistive devices by end of the shift  Outcome: Progressing  Goal: Pace activities to prevent fatigue by end of the shift  Outcome: Progressing      No

## 2024-06-21 NOTE — HISTORY OF PRESENT ILLNESS
[FreeTextEntry1] : Ms. PUTNAM is a 58-year-old female presenting today for neurosurgery revisit to discuss the prior plan of a L ICA stent.   Patient is status post right carotid artery angioplasty and stent placement on 4/4/2023. She remains on ASA 325mg PO daily + Plavix 75mg PO daily. When last seen in December, patient consented to treating her L ICA however this was then complicated by a tooth infection, and she was unable to proceed at the time. September 2023 CTA revealed 60-79% (moderate) stenosis of the left internal carotid artery.  She continues to complain of headaches and pressure behind bilateral eyes. Ms. PUTNAM has finished her course of antibiotics and treatments for dental abscesses. While on the DAPT of ASA and Plavix, she has agreed to proceed with L ICA angioplasty/stenting and will be scheduled accordingly.

## 2024-06-21 NOTE — ASSESSMENT
[FreeTextEntry1] : 57yo F status post R ICA stenting on 4/4/2023 in need of L ICA stenting. She remains on DAPT of ASA and Plavix. She understands that she will continue on these medications post stenting as well until further directed. Ms. PUTNAM wishes to move forward and we will schedule at a mutually convenient time while working towards insurance authorization for the procedure.   No concerns were identified during the visit and we thank her for allowing us to be a part of her care team once again.   Marianna Malone MS, FNP-BC Bhanu Blank MD, CS

## 2024-07-05 NOTE — ED ADULT TRIAGE NOTE - WEIGHT IN LBS
Unable to reach patient for call back after patient's follow up appointment with PCP.  7/1/2024  M with call back number for patient to call if needed   If no voicemail available call attempts x 2 were made to contact the patient to assist with any questions or concerns patient may have.     
[phone]

## 2024-11-25 ENCOUNTER — APPOINTMENT (OUTPATIENT)
Dept: PULMONOLOGY | Facility: CLINIC | Age: 59
End: 2024-11-25

## 2024-11-29 ENCOUNTER — OUTPATIENT (OUTPATIENT)
Dept: OUTPATIENT SERVICES | Facility: HOSPITAL | Age: 59
LOS: 1 days | End: 2024-11-29
Payer: MEDICAID

## 2024-11-29 DIAGNOSIS — Z90.49 ACQUIRED ABSENCE OF OTHER SPECIFIED PARTS OF DIGESTIVE TRACT: Chronic | ICD-10-CM

## 2024-11-29 DIAGNOSIS — Z00.8 ENCOUNTER FOR OTHER GENERAL EXAMINATION: ICD-10-CM

## 2024-11-29 DIAGNOSIS — H74.03 TYMPANOSCLEROSIS, BILATERAL: ICD-10-CM

## 2024-11-29 DIAGNOSIS — Z98.890 OTHER SPECIFIED POSTPROCEDURAL STATES: Chronic | ICD-10-CM

## 2024-11-29 PROCEDURE — 76536 US EXAM OF HEAD AND NECK: CPT

## 2024-11-29 PROCEDURE — 76536 US EXAM OF HEAD AND NECK: CPT | Mod: 26

## 2024-11-30 DIAGNOSIS — H74.03 TYMPANOSCLEROSIS, BILATERAL: ICD-10-CM

## 2024-12-02 ENCOUNTER — EMERGENCY (EMERGENCY)
Facility: HOSPITAL | Age: 59
LOS: 0 days | Discharge: HOME | End: 2024-12-03
Attending: EMERGENCY MEDICINE
Payer: MEDICAID

## 2024-12-02 VITALS
HEART RATE: 71 BPM | DIASTOLIC BLOOD PRESSURE: 61 MMHG | SYSTOLIC BLOOD PRESSURE: 97 MMHG | WEIGHT: 276.02 LBS | HEIGHT: 64 IN | OXYGEN SATURATION: 95 % | RESPIRATION RATE: 18 BRPM | TEMPERATURE: 98 F

## 2024-12-02 DIAGNOSIS — Z90.49 ACQUIRED ABSENCE OF OTHER SPECIFIED PARTS OF DIGESTIVE TRACT: Chronic | ICD-10-CM

## 2024-12-02 DIAGNOSIS — Z98.890 OTHER SPECIFIED POSTPROCEDURAL STATES: Chronic | ICD-10-CM

## 2024-12-02 LAB
ALBUMIN SERPL ELPH-MCNC: 3.7 G/DL — SIGNIFICANT CHANGE UP (ref 3.5–5.2)
ALP SERPL-CCNC: 104 U/L — SIGNIFICANT CHANGE UP (ref 30–115)
ALT FLD-CCNC: 15 U/L — SIGNIFICANT CHANGE UP (ref 0–41)
ANION GAP SERPL CALC-SCNC: 11 MMOL/L — SIGNIFICANT CHANGE UP (ref 7–14)
AST SERPL-CCNC: 34 U/L — SIGNIFICANT CHANGE UP (ref 0–41)
BASOPHILS # BLD AUTO: 0.04 K/UL — SIGNIFICANT CHANGE UP (ref 0–0.2)
BASOPHILS NFR BLD AUTO: 0.5 % — SIGNIFICANT CHANGE UP (ref 0–1)
BILIRUB SERPL-MCNC: 0.2 MG/DL — SIGNIFICANT CHANGE UP (ref 0.2–1.2)
BUN SERPL-MCNC: 20 MG/DL — SIGNIFICANT CHANGE UP (ref 10–20)
CALCIUM SERPL-MCNC: 8.8 MG/DL — SIGNIFICANT CHANGE UP (ref 8.4–10.5)
CHLORIDE SERPL-SCNC: 103 MMOL/L — SIGNIFICANT CHANGE UP (ref 98–110)
CO2 SERPL-SCNC: 23 MMOL/L — SIGNIFICANT CHANGE UP (ref 17–32)
CREAT SERPL-MCNC: 1.5 MG/DL — SIGNIFICANT CHANGE UP (ref 0.7–1.5)
EGFR: 39 ML/MIN/1.73M2 — LOW
EOSINOPHIL # BLD AUTO: 0.29 K/UL — SIGNIFICANT CHANGE UP (ref 0–0.7)
EOSINOPHIL NFR BLD AUTO: 3.7 % — SIGNIFICANT CHANGE UP (ref 0–8)
GLUCOSE SERPL-MCNC: 116 MG/DL — HIGH (ref 70–99)
HCT VFR BLD CALC: 33.8 % — LOW (ref 37–47)
HGB BLD-MCNC: 10.4 G/DL — LOW (ref 12–16)
IMM GRANULOCYTES NFR BLD AUTO: 0.4 % — HIGH (ref 0.1–0.3)
LACTATE SERPL-SCNC: 1 MMOL/L — SIGNIFICANT CHANGE UP (ref 0.7–2)
LIDOCAIN IGE QN: 33 U/L — SIGNIFICANT CHANGE UP (ref 7–60)
LYMPHOCYTES # BLD AUTO: 1.52 K/UL — SIGNIFICANT CHANGE UP (ref 1.2–3.4)
LYMPHOCYTES # BLD AUTO: 19.6 % — LOW (ref 20.5–51.1)
MCHC RBC-ENTMCNC: 25.1 PG — LOW (ref 27–31)
MCHC RBC-ENTMCNC: 30.8 G/DL — LOW (ref 32–37)
MCV RBC AUTO: 81.6 FL — SIGNIFICANT CHANGE UP (ref 81–99)
MONOCYTES # BLD AUTO: 0.58 K/UL — SIGNIFICANT CHANGE UP (ref 0.1–0.6)
MONOCYTES NFR BLD AUTO: 7.5 % — SIGNIFICANT CHANGE UP (ref 1.7–9.3)
NEUTROPHILS # BLD AUTO: 5.31 K/UL — SIGNIFICANT CHANGE UP (ref 1.4–6.5)
NEUTROPHILS NFR BLD AUTO: 68.3 % — SIGNIFICANT CHANGE UP (ref 42.2–75.2)
NRBC # BLD: 0 /100 WBCS — SIGNIFICANT CHANGE UP (ref 0–0)
PLATELET # BLD AUTO: 378 K/UL — SIGNIFICANT CHANGE UP (ref 130–400)
PMV BLD: 10.8 FL — HIGH (ref 7.4–10.4)
POTASSIUM SERPL-MCNC: 5.2 MMOL/L — HIGH (ref 3.5–5)
POTASSIUM SERPL-SCNC: 5.2 MMOL/L — HIGH (ref 3.5–5)
PROT SERPL-MCNC: 6.4 G/DL — SIGNIFICANT CHANGE UP (ref 6–8)
RBC # BLD: 4.14 M/UL — LOW (ref 4.2–5.4)
RBC # FLD: 17.1 % — HIGH (ref 11.5–14.5)
SODIUM SERPL-SCNC: 137 MMOL/L — SIGNIFICANT CHANGE UP (ref 135–146)
WBC # BLD: 7.77 K/UL — SIGNIFICANT CHANGE UP (ref 4.8–10.8)
WBC # FLD AUTO: 7.77 K/UL — SIGNIFICANT CHANGE UP (ref 4.8–10.8)

## 2024-12-02 PROCEDURE — 85025 COMPLETE CBC W/AUTO DIFF WBC: CPT

## 2024-12-02 PROCEDURE — 83605 ASSAY OF LACTIC ACID: CPT

## 2024-12-02 PROCEDURE — 83690 ASSAY OF LIPASE: CPT

## 2024-12-02 PROCEDURE — 99285 EMERGENCY DEPT VISIT HI MDM: CPT

## 2024-12-02 PROCEDURE — 96374 THER/PROPH/DIAG INJ IV PUSH: CPT | Mod: XU

## 2024-12-02 PROCEDURE — 99284 EMERGENCY DEPT VISIT MOD MDM: CPT | Mod: 25

## 2024-12-02 PROCEDURE — 74177 CT ABD & PELVIS W/CONTRAST: CPT | Mod: MC

## 2024-12-02 PROCEDURE — 74177 CT ABD & PELVIS W/CONTRAST: CPT | Mod: 26,MC

## 2024-12-02 PROCEDURE — 36415 COLL VENOUS BLD VENIPUNCTURE: CPT

## 2024-12-02 PROCEDURE — 80053 COMPREHEN METABOLIC PANEL: CPT

## 2024-12-02 RX ORDER — 0.9 % SODIUM CHLORIDE 0.9 %
3900 INTRAVENOUS SOLUTION INTRAVENOUS ONCE
Refills: 0 | Status: DISCONTINUED | OUTPATIENT
Start: 2024-12-02 | End: 2024-12-02

## 2024-12-02 RX ORDER — HYDROMORPHONE HYDROCHLORIDE 2 MG/1
0.5 TABLET ORAL ONCE
Refills: 0 | Status: DISCONTINUED | OUTPATIENT
Start: 2024-12-02 | End: 2024-12-02

## 2024-12-02 RX ORDER — 0.9 % SODIUM CHLORIDE 0.9 %
2000 INTRAVENOUS SOLUTION INTRAVENOUS ONCE
Refills: 0 | Status: COMPLETED | OUTPATIENT
Start: 2024-12-02 | End: 2024-12-02

## 2024-12-02 RX ORDER — AMOXICILLIN/POTASSIUM CLAV 250-125 MG
1 TABLET ORAL
Qty: 20 | Refills: 0
Start: 2024-12-02 | End: 2024-12-11

## 2024-12-02 RX ADMIN — Medication 1000 MILLILITER(S): at 19:48

## 2024-12-02 RX ADMIN — HYDROMORPHONE HYDROCHLORIDE 0.5 MILLIGRAM(S): 2 TABLET ORAL at 19:48

## 2024-12-02 NOTE — ED PROVIDER NOTE - NSDCPRINTRESULTS_ED_ALL_ED
Topical Clindamycin Pregnancy And Lactation Text: This medication is Pregnancy Category B and is considered safe during pregnancy. It is unknown if it is excreted in breast milk. Patient requests all Lab, Cardiology, and Radiology Results on their Discharge Instructions

## 2024-12-02 NOTE — ED PROVIDER NOTE - DIFFERENTIAL DIAGNOSIS
Differential Diagnosis Differential includes but is not limited to :    colitis, pancreatitis, UTI, Diverticulitis, SBO, biliary colic, cholecystitis, cholangitis, pyelonephritis, aortic dissection

## 2024-12-02 NOTE — ED PROVIDER NOTE - CLINICAL SUMMARY MEDICAL DECISION MAKING FREE TEXT BOX
58-year-old coming here for left lower quadrant pain.  History of diverticulitis.  Labs CT done.  Positive for Mild nonruptured diverticulitis.  Will discharge on antibiotics.  Tolerating p.o.    all results d/w pt & copies given, strict return precautions discussed, rec outpt

## 2024-12-02 NOTE — ED PROVIDER NOTE - PHYSICAL EXAMINATION
CONSTITUTIONAL: Appears uncomfortable and fatigued   HEAD: Normocephalic; atraumatic.   EYES: PERRL; EOM intact. Conjunctiva normal B/L.   ENT: Dry mucous membranes   CHEST: Normal chest excursion with respiration.   CARDIOVASCULAR: Normal S1, S2; no murmurs, rubs, or gallops.   RESPIRATORY: Normal chest excursion with respiration; breath sounds clear and equal bilaterally; no wheezes, rhonchi, or rales.  GI/: Obese, soft, tender to palpation in LLQ without rebound or guarding. LIVIA without blood or stool.   BACK: No CVA tenderness.   EXT: Normal ROM in all four extremities distal pulses are normal. No leg edema B/L.   SKIN: Normal for age and race; warm; dry; good turgor.  NEURO: A & O x 4e.

## 2024-12-02 NOTE — ED ADULT NURSE NOTE - NSFALLRISKINTERV_ED_ALL_ED

## 2024-12-02 NOTE — ED ADULT NURSE NOTE - NS ED NURSE RECORD ANOTHER HT AND WT
Subjective   Patient ID: Linette Parker is a 78 y.o. female who presents for Med Management, Hypertension, Hyperlipidemia, and Vitamin D Deficiency.  Hypertension    Hyperlipidemia    Vitamin Deficiency      Routine follow up        Labs rev'd    skin growths on left side of nose were melanoma  derm following / Mohs      blood pressure better on rx no side effects     CRF stable on rx no side effects  renal following     CLL on rx no side effects  oncology following     anxiety stable     diet / exercise rev'd     Med phys 2-23    Review of Systems   All other systems reviewed and are negative.      Objective   /82   Pulse 72   Wt 61.7 kg (136 lb)   SpO2 98%   BMI 22.63 kg/m²   Lab Results   Component Value Date    WBC 15.4 (H) 08/25/2023    HGB 11.9 (L) 08/25/2023    HCT 38.2 08/25/2023     08/25/2023    CHOL 197 08/25/2023    TRIG 48 08/25/2023    .9 08/25/2023    ALT 14 08/25/2023    AST 20 08/25/2023     (L) 08/25/2023    K 4.6 08/25/2023     08/25/2023    CREATININE 1.23 (H) 08/25/2023    BUN 28 (H) 08/25/2023    CO2 25 08/25/2023    TSH 1.35 08/25/2023    HGBA1C 5.8 (A) 08/25/2023           Physical Exam  Vitals reviewed.   Constitutional:       Appearance: Normal appearance. She is normal weight.   HENT:      Head: Normocephalic and atraumatic.      Mouth/Throat:      Pharynx: No posterior oropharyngeal erythema.   Eyes:      General: No scleral icterus.     Conjunctiva/sclera: Conjunctivae normal.      Pupils: Pupils are equal, round, and reactive to light.   Cardiovascular:      Rate and Rhythm: Normal rate and regular rhythm.      Heart sounds: Normal heart sounds.   Pulmonary:      Effort: No respiratory distress.      Breath sounds: No wheezing.   Abdominal:      General: Abdomen is flat. Bowel sounds are normal. There is no distension.      Palpations: Abdomen is soft. There is no mass.      Tenderness: There is no abdominal tenderness. There is no rebound.    Musculoskeletal:         General: Normal range of motion.      Cervical back: Normal range of motion and neck supple.   Skin:     General: Skin is warm and dry.   Neurological:      General: No focal deficit present.      Mental Status: She is alert and oriented to person, place, and time. Mental status is at baseline.   Psychiatric:         Mood and Affect: Mood normal.         Behavior: Behavior normal.         Thought Content: Thought content normal.         Judgment: Judgment normal.       Problem List Items Addressed This Visit          Cardiac and Vasculature    Benign essential hypertension - Primary    Relevant Medications    valsartan (Diovan) 320 mg tablet       Genitourinary and Reproductive    Stage 3b chronic kidney disease (CMS/HCC)       Hematology and Neoplasia    Chronic lymphocytic leukemia (CMS/HCC)       Mental Health    Anxiety     Other Visit Diagnoses       BMI 22.0-22.9, adult              Assessment/Plan         Labs rev'd    skin growths on left side of nose were melanoma  derm following / Mohs pending     blood pressure better on rx no side effects     CRF stable on rx no side effects  renal following     CLL stable per pt  oncology following    Hyperglycemia on diet      anxiety stable     diet / exercise rev'd     Med phys 2-23    Mammogram pt declined  Dexa n/a  Colonoscopy n/a  CT chest lung cancer screening n/a  GYN n/a  immunizations rev'd  BMI 22.6    Follow up 3 months      Yes

## 2024-12-02 NOTE — ED PROVIDER NOTE - PATIENT PORTAL LINK FT
You can access the FollowMyHealth Patient Portal offered by [facility] by registering at the following website: [email or web address]. By joining [employer]’s FollowMyHealth portal, you will also be able to view your health information using other applications (apps) compatible with our system.

## 2024-12-02 NOTE — ED PROVIDER NOTE - OBJECTIVE STATEMENT
58-year-old F with PMH COPD not on home O2, CAD on Plavix, nasopharyngeal cancer in remission, hypothyroidism, HTN, HLD and prior episode of diverticulitis who presents to the ED with left lower quadrant abdominal pain that began 2 days ago with associated bloody diarrhea and severe fatigue. Patient also endorses that over the last couple months she has been having worsening fatigue with associated diaphoresis and feeling lightheaded every time she stands. She denies nausea or vomiting. She denies fever, chills, chest pain, SOB. Last BM this morning and was bright red bloody diarrhea. Last colonoscopy about a year ago in which she had 2 polyps removed.

## 2024-12-02 NOTE — ED ADULT TRIAGE NOTE - AS TEMP SITE
successful colon exam. It is recommended to consider certain changes to your diet three to four days prior to the procedure. Remember that your bowels need to be empty for the exam.    What foods are good to eat? Cut down on heavy solid foods three to four days before the procedure and start introducing lighter meals to your diet. The following food suggestions are a good part of your diet before a colonoscopy bowel preparation. Light meat that is easily digestible such as chicken (without the skin)   Potatoes without skin   Cheese   Eggs   A light meal of steamed white fish   Light clear soups    Foods and drinks to avoid  Avoid foods that contain too much fiber. Stay clear of dark colored beverages. They can stick to the walls of the digestive tract and make it difficult to differentiate from blood. Some of these foods are:  Red meat, rice, nuts and vegetables   Milk, other milk based fluids and cream   Most fruit and puddings   Whole grain pasta   Cereals, bran and seeds   Colored beverages, especially those that are red or purple in color   Red colored Jell-O   On the day before the colonoscopy, continue to drink plenty of clear fluids. It is important   to keep yourself hydrated before the exam.     Please follow all instructions as provided for cleansing the bowel. Failure to have an adequately prepped colon may cause you to have incomplete exam with further testing required. oral

## 2024-12-02 NOTE — ED PROVIDER NOTE - NSFOLLOWUPINSTRUCTIONS_ED_ALL_ED_FT
Follow-up with your gastroenterologist - Our Emergency Department Referral Coordinators will be reaching out to you in the next 24-48 hours from 9:00am to 5:00pm to schedule a follow up appointment. Please expect a phone call from the hospital in that time frame. If you do not receive a call or if you have any questions or concerns, you can reach them at   [phone].    Medications were sent to your pharmacy - take as prescribed.    Diverticulitis    Diverticulitis is inflammation or infection of small pouches in your colon that form when you HAVE a condition called diverticulosis. This condition can range from mild to severe potentially leading to perforation or obstructions of your colon. Symptoms include abdominal pain, fever/chills, nausea, vomiting, diarrhea, constipation, or blood in your stool. If you were prescribed an antibiotic medicine, take it as told by your health care provider. Do not stop taking the antibiotic even if you start to feel better.    SEEK IMMEDIATE MEDICAL CARE IF YOU HAVE ANY OF THE FOLLOWING SYMPTOMS: worsening abdominal pain, high fever, inability to hold down liquids or medication, black or bloody stools, inability to pass gas, lightheadedness/dizziness, or a change in mental status.

## 2024-12-02 NOTE — ED PROVIDER NOTE - ATTENDING CONTRIBUTION TO CARE
58-year-old history of COPD not on home O2 CAD on Plavix nasopharyngeal cancer coming in here for left lower quadrant abdominal pain and bloody diarrhea. LIVIA negative. Rest of exam otherwise stable.  Has been having worsening fatigue for the past couple weeks.  No surgical history.  Labs done.  Hemoglobin stable.  CT abdomen pelvis with IV contrast done.  Pending results.  Likely discharge.    Signed out to Dr. uDnbar 58-year-old history of COPD not on home O2 CAD on Plavix nasopharyngeal cancer coming in here for left lower quadrant abdominal pain and bloody diarrhea. LIVIA negative. Rest of exam otherwise stable.  Has been having worsening fatigue for the past couple weeks.  No surgical history.  Labs done.  Hemoglobin stable.  CT abdomen pelvis with IV contrast done.

## 2024-12-04 NOTE — CHART NOTE - NSCHARTNOTEFT_GEN_A_CORE
SPECIALTY: gastroenterology    [facility] MRN [id number] / Left message 12/3 & 12/4 - DEEPAK

## 2025-01-06 ENCOUNTER — EMERGENCY (EMERGENCY)
Facility: HOSPITAL | Age: 60
LOS: 0 days | Discharge: HOME | End: 2025-01-06
Attending: EMERGENCY MEDICINE
Payer: MEDICAID

## 2025-01-06 VITALS
TEMPERATURE: 98 F | HEART RATE: 77 BPM | RESPIRATION RATE: 18 BRPM | DIASTOLIC BLOOD PRESSURE: 58 MMHG | OXYGEN SATURATION: 99 % | SYSTOLIC BLOOD PRESSURE: 161 MMHG

## 2025-01-06 VITALS
OXYGEN SATURATION: 98 % | SYSTOLIC BLOOD PRESSURE: 178 MMHG | RESPIRATION RATE: 18 BRPM | TEMPERATURE: 98 F | HEIGHT: 64 IN | HEART RATE: 72 BPM | DIASTOLIC BLOOD PRESSURE: 91 MMHG

## 2025-01-06 DIAGNOSIS — E03.9 HYPOTHYROIDISM, UNSPECIFIED: ICD-10-CM

## 2025-01-06 DIAGNOSIS — R51.9 HEADACHE, UNSPECIFIED: ICD-10-CM

## 2025-01-06 DIAGNOSIS — Z86.69 PERSONAL HISTORY OF OTHER DISEASES OF THE NERVOUS SYSTEM AND SENSE ORGANS: ICD-10-CM

## 2025-01-06 DIAGNOSIS — I25.10 ATHEROSCLEROTIC HEART DISEASE OF NATIVE CORONARY ARTERY WITHOUT ANGINA PECTORIS: ICD-10-CM

## 2025-01-06 DIAGNOSIS — H53.149 VISUAL DISCOMFORT, UNSPECIFIED: ICD-10-CM

## 2025-01-06 DIAGNOSIS — E78.00 PURE HYPERCHOLESTEROLEMIA, UNSPECIFIED: ICD-10-CM

## 2025-01-06 DIAGNOSIS — Z88.1 ALLERGY STATUS TO OTHER ANTIBIOTIC AGENTS: ICD-10-CM

## 2025-01-06 DIAGNOSIS — J32.3 CHRONIC SPHENOIDAL SINUSITIS: ICD-10-CM

## 2025-01-06 DIAGNOSIS — J44.9 CHRONIC OBSTRUCTIVE PULMONARY DISEASE, UNSPECIFIED: ICD-10-CM

## 2025-01-06 DIAGNOSIS — R11.0 NAUSEA: ICD-10-CM

## 2025-01-06 DIAGNOSIS — Z85.818 PERSONAL HISTORY OF MALIGNANT NEOPLASM OF OTHER SITES OF LIP, ORAL CAVITY, AND PHARYNX: ICD-10-CM

## 2025-01-06 DIAGNOSIS — Z98.890 OTHER SPECIFIED POSTPROCEDURAL STATES: Chronic | ICD-10-CM

## 2025-01-06 DIAGNOSIS — Z90.49 ACQUIRED ABSENCE OF OTHER SPECIFIED PARTS OF DIGESTIVE TRACT: Chronic | ICD-10-CM

## 2025-01-06 DIAGNOSIS — I10 ESSENTIAL (PRIMARY) HYPERTENSION: ICD-10-CM

## 2025-01-06 PROCEDURE — 70450 CT HEAD/BRAIN W/O DYE: CPT | Mod: MC

## 2025-01-06 PROCEDURE — 96375 TX/PRO/DX INJ NEW DRUG ADDON: CPT

## 2025-01-06 PROCEDURE — 70450 CT HEAD/BRAIN W/O DYE: CPT | Mod: 26,MC

## 2025-01-06 PROCEDURE — 99284 EMERGENCY DEPT VISIT MOD MDM: CPT | Mod: 25

## 2025-01-06 PROCEDURE — 99284 EMERGENCY DEPT VISIT MOD MDM: CPT

## 2025-01-06 PROCEDURE — 96374 THER/PROPH/DIAG INJ IV PUSH: CPT

## 2025-01-06 RX ORDER — KETOROLAC TROMETHAMINE 30 MG/ML
15 INJECTION INTRAMUSCULAR; INTRAVENOUS ONCE
Refills: 0 | Status: DISCONTINUED | OUTPATIENT
Start: 2025-01-06 | End: 2025-01-06

## 2025-01-06 RX ORDER — METOCLOPRAMIDE 10 MG/1
10 TABLET ORAL ONCE
Refills: 0 | Status: COMPLETED | OUTPATIENT
Start: 2025-01-06 | End: 2025-01-06

## 2025-01-06 RX ORDER — SODIUM CHLORIDE 9 MG/ML
500 INJECTION, SOLUTION INTRAMUSCULAR; INTRAVENOUS; SUBCUTANEOUS ONCE
Refills: 0 | Status: COMPLETED | OUTPATIENT
Start: 2025-01-06 | End: 2025-01-06

## 2025-01-06 RX ORDER — ACETAMINOPHEN 80 MG/.8ML
975 SOLUTION/ DROPS ORAL ONCE
Refills: 0 | Status: COMPLETED | OUTPATIENT
Start: 2025-01-06 | End: 2025-01-06

## 2025-01-06 RX ADMIN — SODIUM CHLORIDE 500 MILLILITER(S): 9 INJECTION, SOLUTION INTRAMUSCULAR; INTRAVENOUS; SUBCUTANEOUS at 21:26

## 2025-01-06 RX ADMIN — KETOROLAC TROMETHAMINE 15 MILLIGRAM(S): 30 INJECTION INTRAMUSCULAR; INTRAVENOUS at 21:26

## 2025-01-06 RX ADMIN — METOCLOPRAMIDE 104 MILLIGRAM(S): 10 TABLET ORAL at 21:26

## 2025-01-06 RX ADMIN — ACETAMINOPHEN 975 MILLIGRAM(S): 80 SOLUTION/ DROPS ORAL at 21:25

## 2025-01-06 NOTE — ED PROVIDER NOTE - NSFOLLOWUPINSTRUCTIONS_ED_ALL_ED_FT
Headache    Our Emergency Department Referral Coordinators will be reaching out to you in the next 24-48 hours from 9:00am to 5:00pm to schedule a follow up appointment. Please expect a phone call from the hospital in that time frame. If you do not receive a call or if you have any questions or concerns, you can reach them at   [phone] for Neurology follow up     A headache is pain or discomfort felt around the head or neck area. The specific cause of a headache may not be found as there are many types including tension headaches, migraine headaches, and cluster headaches. Watch your condition for any changes. Things you can do to manage your pain include taking over the counter and prescription medications as instructed by your health care provider, lying down in a dark quiet room, limiting stress, getting regular sleep, and refraining from alcohol and tobacco products.    SEEK IMMEDIATE MEDICAL CARE IF YOU EXPERIENCE THE FOLLOWING SYMPTOMS: fever, vomiting, stiff neck, loss of vision, problems with speech, muscle weakness, loss of balance, trouble walking, pass out, or confusion.

## 2025-01-06 NOTE — ED PROVIDER NOTE - PHYSICAL EXAMINATION
CONSTITUTIONAL: Well-appearing; in no apparent distress.   EYES: PERRL; EOM intact.   CARDIOVASCULAR: Normal S1, S2; no murmurs, rubs, or gallops.   RESPIRATORY: Normal chest excursion with respiration; breath sounds clear and equal bilaterally; no wheezes, rhonchi, or rales.  GI/: Normal bowel sounds; non-distended; non-tender; no palpable organomegaly.   SKIN: Normal for age and race; warm; dry; good turgor; no apparent lesions or exudate.   NEURO/PSYCH: A&O X 4. CN II-XII grossly intact. no drifting. sensation and strength equal to b/l upper and lower extremities. speaking coherently. nml cerebellum test. ambulate with nml and steady gait

## 2025-01-06 NOTE — ED ADULT TRIAGE NOTE - CHIEF COMPLAINT QUOTE
BIBEMS from  for right sided headache/pressure & dizziness when she stands up. Pt states it is "worst headache of my life".

## 2025-01-06 NOTE — ED PROVIDER NOTE - CLINICAL SUMMARY MEDICAL DECISION MAKING FREE TEXT BOX
60 yo F with PMH of COPD (no home O2), migraines, CAD with stent in carotid artery (on Plavix), nasopharyngeal cancer s/p radiation and chemo (last doses more than 10 years ago), diverticulitis, hard of hearing (treatments for cancer disrupted her hearing), history of intestinal polyps (removed during colonoscopy approx 2 years ago),She presents to the ED for right-sided headache that has been occurring all day long.  Patient cannot recall if she woke up with it,  she just notes that it has been there all day today, and describes it as a hard pressure sensation to the right side of the head, behind the right eye, radiating down the right side of neck.  States she has an associated +nausea, +associated photophobia but denies any vomiting/fever/neck stiffness/rash/chest pain/abdominal pain/shortness of breath/vomiting/diarrhea/sore throat/leg swelling.  States she has postnasal drip chronically since her cancer treatments and chronic cough secondary to this.  States she tried some Tylenol at home with no relief.    Clinically pt has migraine like headache. On CT head, noted to have some fluid in right sphenoid sinus but given no fever, no purulent drainage does not appear to have infection of sinus. PT stopped her nasal spray, recommend she restart that and can take otc tylenol prn pain. Recommend she follow up with her private ENT doctor for further management. Return precautions provided.

## 2025-01-06 NOTE — ED ADULT NURSE NOTE - NSFALLRISKINTERV_ED_ALL_ED

## 2025-01-06 NOTE — ED PROVIDER NOTE - PATIENT PORTAL LINK FT
You can access the FollowMyHealth Patient Portal offered by [facility] by registering at the following website: [email or web address]. By joining [employer]’s FollowMyHealth portal, you will also be able to view your health information using other applications (apps) compatible with our system.

## 2025-01-06 NOTE — ED PROVIDER NOTE - PROGRESS NOTE DETAILS
headache resolved and f/u with neuro as outpatient. ct incidental finding discussed with patient and f/u with her private ENT and resume her prescribed nasal spray

## 2025-01-06 NOTE — ED ADULT TRIAGE NOTE - GLASGOW COMA SCALE: BEST MOTOR RESPONSE, MLM
(M6) obeys commands
Detail Level: Simple
Consent: The patient's consent was obtained including but not limited to risks of crusting, scabbing, blistering, scarring, darker or lighter pigmentary change, recurrence, incomplete removal and infection.
Price (Use Numbers Only, No Special Characters Or $): 75
Post-Care Instructions: I reviewed with the patient in detail post-care instructions. Patient is to wear sunprotection, and avoid picking at any of the treated lesions. Pt may apply Vaseline to crusted or scabbing areas.

## 2025-01-06 NOTE — ED PROVIDER NOTE - ATTENDING APP SHARED VISIT CONTRIBUTION OF CARE
60 yo F with PMH of COPD (no home O2), migraines, CAD with stent in carotid artery (on Plavix), nasopharyngeal cancer s/p radiation and chemo (last doses more than 10 years ago), diverticulitis, hard of hearing (treatments for cancer disrupted her hearing), history of intestinal polyps (removed during colonoscopy approx 2 years ago),She presents to the ED for right-sided headache that has been occurring all day long.  Patient cannot recall if she woke up with it,  she just notes that it has been there all day today, and describes it as a hard pressure sensation to the right side of the head, behind the right eye, radiating down the right side of neck.  States she has an associated +nausea, +associated photophobia but denies any vomiting/fever/neck stiffness/rash/chest pain/abdominal pain/shortness of breath/vomiting/diarrhea/sore throat/leg swelling.  States she has postnasal drip chronically since her cancer treatments and chronic cough secondary to this.  States she tried some Tylenol at home with no relief    Exam as per PA note.  Agree with management.  Check labs, pain meds, check CT head, and reassess.  Will reassess blood pressure once pain is better controlled as this may in itself drop the blood pressure        ALL: cipro-->eye swelling  PMH/PSurgHx: COPD (no home O2), migraines, CAD with stent in carotid artery (on Plavix), nasopharyngeal cancer s/p radiation and chemo (last doses more than 10 years ago), diverticulitis, hard of hearing (treatments for cancer disrupted her hearing), history of intestinal polyps (removed during colonoscopy approx 2 years ago),  Meds: Patient states she is on blood pressure medications but had several change she just cannot recall what was changed, Plavix, aspirin, tizanidine, baclofen, Ventolin, bupropion, atorvastatin, ticagrelor  SH: former smoker, no etoh  PMD Saran

## 2025-01-06 NOTE — ED PROVIDER NOTE - CARE PLAN
Principal Discharge DX:	Headache   1 Principal Discharge DX:	Headache  Secondary Diagnosis:	Sphenoid sinusitis, unspecified chronicity

## 2025-01-06 NOTE — ED PROVIDER NOTE - NSFOLLOWUPCLINICS_GEN_ALL_ED_FT
[facility]  Neurology  [address] [place] [address], [address]  [place] [address]  Phone: [phone]  Fax:

## 2025-01-06 NOTE — ED ADULT TRIAGE NOTE - BEFAST SCREENING
CO9VID SCREENING: AUGUST 14, 2020 AT 8:10AM  [facility] [address] [facility]      EGD Prep Instructions    [facility] [address] [place] [address]    You are scheduled for an EGD with Dr. Strong on August 17, 2020 at [facility] located at [address] [place] [address].  Check in at the admit desk, first floor of the hospital (which is the building on the left).   You will receive a call 2-3 days before your EGD to tell you the time to arrive.  If you have not received a call by the day before your procedure, call the Endoscopy Lab at [phone].    Nothing to eat or drink after midnight before the procedure.  You MAY brush your teeth.    You MAY take your blood pressure, heart, and seizure medication on the morning of the procedure, with a SIP of water.  Hold ALL other medications until after the procedure.    If you are on blood thinners THAT YOU HAVE BEEN INSTRUCTED TO HOLD BY YOUR DOCTOR FOR THIS PROCEDURE, then do NOT take this the morning of your EGD.  Do NOT stop these medications on your own, they must be approved to be held by your doctor.  Your EGD can NOT be done if you are on these medications.  Examples of blood thinners include: Coumadin, Aggrenox, Plavix, Pradaxa, Reapro, Pletal, Xarelto, Ticagrelor, Brilinta, Eliquis, and high dose aspirin (325 mg).  You do not have to stop baby aspirin 81 mg.    You will receive a call 2-3 days before your EGD to tell you the time to arrive.  If you have not received a call by the day before your procedure, call the Endoscopy department at [phone].    Negative

## 2025-01-06 NOTE — ED PROVIDER NOTE - OBJECTIVE STATEMENT
59 years old female history of COPD, hypertension, high cholesterol, right carotid stenosis status post carotid stent, nasopharyngeal cancer in remission (had chemo and radiation in 2014 complicated with constant postnasal drip), hypothyroid, migraine presenting complaint of right sided migraine headache since earlier today.  Was seen at outside urgent care and recommended to come to ED for evaluation.  Headache described as throbbing and sharp radiating from the front to the right side of neck.  Headache is stronger than her usual migraine headache.  Otherwise denies fever, chills, recent illness, coughing, chest pain, shortness breath, abdominal pain, vomiting and diarrhea.

## 2025-01-16 ENCOUNTER — APPOINTMENT (OUTPATIENT)
Dept: PULMONOLOGY | Facility: CLINIC | Age: 60
End: 2025-01-16

## 2025-02-04 ENCOUNTER — EMERGENCY (EMERGENCY)
Facility: HOSPITAL | Age: 60
LOS: 0 days | Discharge: HOME | End: 2025-02-05
Attending: EMERGENCY MEDICINE
Payer: MEDICAID

## 2025-02-04 VITALS
TEMPERATURE: 98 F | HEIGHT: 64 IN | WEIGHT: 279.11 LBS | SYSTOLIC BLOOD PRESSURE: 127 MMHG | RESPIRATION RATE: 18 BRPM | DIASTOLIC BLOOD PRESSURE: 76 MMHG | HEART RATE: 79 BPM | OXYGEN SATURATION: 97 %

## 2025-02-04 VITALS
HEART RATE: 71 BPM | SYSTOLIC BLOOD PRESSURE: 145 MMHG | DIASTOLIC BLOOD PRESSURE: 82 MMHG | OXYGEN SATURATION: 97 % | TEMPERATURE: 98 F | RESPIRATION RATE: 18 BRPM

## 2025-02-04 DIAGNOSIS — G47.00 INSOMNIA, UNSPECIFIED: ICD-10-CM

## 2025-02-04 DIAGNOSIS — R51.9 HEADACHE, UNSPECIFIED: ICD-10-CM

## 2025-02-04 DIAGNOSIS — J45.909 UNSPECIFIED ASTHMA, UNCOMPLICATED: ICD-10-CM

## 2025-02-04 DIAGNOSIS — Z85.818 PERSONAL HISTORY OF MALIGNANT NEOPLASM OF OTHER SITES OF LIP, ORAL CAVITY, AND PHARYNX: ICD-10-CM

## 2025-02-04 DIAGNOSIS — I10 ESSENTIAL (PRIMARY) HYPERTENSION: ICD-10-CM

## 2025-02-04 DIAGNOSIS — R53.1 WEAKNESS: ICD-10-CM

## 2025-02-04 DIAGNOSIS — Z98.890 OTHER SPECIFIED POSTPROCEDURAL STATES: ICD-10-CM

## 2025-02-04 DIAGNOSIS — Z79.02 LONG TERM (CURRENT) USE OF ANTITHROMBOTICS/ANTIPLATELETS: ICD-10-CM

## 2025-02-04 DIAGNOSIS — F32.A DEPRESSION, UNSPECIFIED: ICD-10-CM

## 2025-02-04 DIAGNOSIS — H53.149 VISUAL DISCOMFORT, UNSPECIFIED: ICD-10-CM

## 2025-02-04 DIAGNOSIS — I65.21 OCCLUSION AND STENOSIS OF RIGHT CAROTID ARTERY: ICD-10-CM

## 2025-02-04 DIAGNOSIS — Z90.49 ACQUIRED ABSENCE OF OTHER SPECIFIED PARTS OF DIGESTIVE TRACT: Chronic | ICD-10-CM

## 2025-02-04 DIAGNOSIS — E03.9 HYPOTHYROIDISM, UNSPECIFIED: ICD-10-CM

## 2025-02-04 DIAGNOSIS — E78.5 HYPERLIPIDEMIA, UNSPECIFIED: ICD-10-CM

## 2025-02-04 DIAGNOSIS — Z98.890 OTHER SPECIFIED POSTPROCEDURAL STATES: Chronic | ICD-10-CM

## 2025-02-04 DIAGNOSIS — R42 DIZZINESS AND GIDDINESS: ICD-10-CM

## 2025-02-04 DIAGNOSIS — F41.9 ANXIETY DISORDER, UNSPECIFIED: ICD-10-CM

## 2025-02-04 DIAGNOSIS — J44.9 CHRONIC OBSTRUCTIVE PULMONARY DISEASE, UNSPECIFIED: ICD-10-CM

## 2025-02-04 LAB
ALBUMIN SERPL ELPH-MCNC: 3.7 G/DL — SIGNIFICANT CHANGE UP (ref 3.5–5.2)
ALP SERPL-CCNC: 105 U/L — SIGNIFICANT CHANGE UP (ref 30–115)
ALT FLD-CCNC: 16 U/L — SIGNIFICANT CHANGE UP (ref 0–41)
ANION GAP SERPL CALC-SCNC: 13 MMOL/L — SIGNIFICANT CHANGE UP (ref 7–14)
APTT BLD: 28.3 SEC — SIGNIFICANT CHANGE UP (ref 27–39.2)
AST SERPL-CCNC: 23 U/L — SIGNIFICANT CHANGE UP (ref 0–41)
BASOPHILS # BLD AUTO: 0.05 K/UL — SIGNIFICANT CHANGE UP (ref 0–0.2)
BASOPHILS NFR BLD AUTO: 0.6 % — SIGNIFICANT CHANGE UP (ref 0–1)
BILIRUB SERPL-MCNC: <0.2 MG/DL — SIGNIFICANT CHANGE UP (ref 0.2–1.2)
BUN SERPL-MCNC: 27 MG/DL — HIGH (ref 10–20)
CALCIUM SERPL-MCNC: 9.2 MG/DL — SIGNIFICANT CHANGE UP (ref 8.4–10.4)
CHLORIDE SERPL-SCNC: 106 MMOL/L — SIGNIFICANT CHANGE UP (ref 98–110)
CO2 SERPL-SCNC: 23 MMOL/L — SIGNIFICANT CHANGE UP (ref 17–32)
CREAT SERPL-MCNC: 1.6 MG/DL — HIGH (ref 0.7–1.5)
EGFR: 36 ML/MIN/1.73M2 — LOW
EOSINOPHIL # BLD AUTO: 0.36 K/UL — SIGNIFICANT CHANGE UP (ref 0–0.7)
EOSINOPHIL NFR BLD AUTO: 4 % — SIGNIFICANT CHANGE UP (ref 0–8)
GLUCOSE SERPL-MCNC: 102 MG/DL — HIGH (ref 70–99)
HCT VFR BLD CALC: 32 % — LOW (ref 37–47)
HGB BLD-MCNC: 9.9 G/DL — LOW (ref 12–16)
IMM GRANULOCYTES NFR BLD AUTO: 0.3 % — SIGNIFICANT CHANGE UP (ref 0.1–0.3)
INR BLD: 0.9 RATIO — SIGNIFICANT CHANGE UP (ref 0.65–1.3)
LYMPHOCYTES # BLD AUTO: 1.95 K/UL — SIGNIFICANT CHANGE UP (ref 1.2–3.4)
LYMPHOCYTES # BLD AUTO: 21.5 % — SIGNIFICANT CHANGE UP (ref 20.5–51.1)
MCHC RBC-ENTMCNC: 25 PG — LOW (ref 27–31)
MCHC RBC-ENTMCNC: 30.9 G/DL — LOW (ref 32–37)
MCV RBC AUTO: 80.8 FL — LOW (ref 81–99)
MONOCYTES # BLD AUTO: 0.69 K/UL — HIGH (ref 0.1–0.6)
MONOCYTES NFR BLD AUTO: 7.6 % — SIGNIFICANT CHANGE UP (ref 1.7–9.3)
NEUTROPHILS # BLD AUTO: 5.99 K/UL — SIGNIFICANT CHANGE UP (ref 1.4–6.5)
NEUTROPHILS NFR BLD AUTO: 66 % — SIGNIFICANT CHANGE UP (ref 42.2–75.2)
NRBC # BLD: 0 /100 WBCS — SIGNIFICANT CHANGE UP (ref 0–0)
NRBC BLD-RTO: 0 /100 WBCS — SIGNIFICANT CHANGE UP (ref 0–0)
PLATELET # BLD AUTO: 323 K/UL — SIGNIFICANT CHANGE UP (ref 130–400)
PMV BLD: 10.6 FL — HIGH (ref 7.4–10.4)
POTASSIUM SERPL-MCNC: 4.8 MMOL/L — SIGNIFICANT CHANGE UP (ref 3.5–5)
POTASSIUM SERPL-SCNC: 4.8 MMOL/L — SIGNIFICANT CHANGE UP (ref 3.5–5)
PROT SERPL-MCNC: 6.4 G/DL — SIGNIFICANT CHANGE UP (ref 6–8)
PROTHROM AB SERPL-ACNC: 10.6 SEC — SIGNIFICANT CHANGE UP (ref 9.95–12.87)
RBC # BLD: 3.96 M/UL — LOW (ref 4.2–5.4)
RBC # FLD: 18.3 % — HIGH (ref 11.5–14.5)
SODIUM SERPL-SCNC: 142 MMOL/L — SIGNIFICANT CHANGE UP (ref 135–146)
WBC # BLD: 9.07 K/UL — SIGNIFICANT CHANGE UP (ref 4.8–10.8)
WBC # FLD AUTO: 9.07 K/UL — SIGNIFICANT CHANGE UP (ref 4.8–10.8)

## 2025-02-04 PROCEDURE — 99285 EMERGENCY DEPT VISIT HI MDM: CPT

## 2025-02-04 PROCEDURE — 96374 THER/PROPH/DIAG INJ IV PUSH: CPT | Mod: XU

## 2025-02-04 PROCEDURE — 99284 EMERGENCY DEPT VISIT MOD MDM: CPT | Mod: 25

## 2025-02-04 PROCEDURE — 85610 PROTHROMBIN TIME: CPT

## 2025-02-04 PROCEDURE — 36415 COLL VENOUS BLD VENIPUNCTURE: CPT

## 2025-02-04 PROCEDURE — 85730 THROMBOPLASTIN TIME PARTIAL: CPT

## 2025-02-04 PROCEDURE — 70496 CT ANGIOGRAPHY HEAD: CPT | Mod: 26

## 2025-02-04 PROCEDURE — 80053 COMPREHEN METABOLIC PANEL: CPT

## 2025-02-04 PROCEDURE — 96375 TX/PRO/DX INJ NEW DRUG ADDON: CPT | Mod: XU

## 2025-02-04 PROCEDURE — 70498 CT ANGIOGRAPHY NECK: CPT | Mod: MC

## 2025-02-04 PROCEDURE — 70450 CT HEAD/BRAIN W/O DYE: CPT | Mod: MC

## 2025-02-04 PROCEDURE — 70450 CT HEAD/BRAIN W/O DYE: CPT | Mod: 26

## 2025-02-04 PROCEDURE — 70498 CT ANGIOGRAPHY NECK: CPT | Mod: 26

## 2025-02-04 PROCEDURE — 85025 COMPLETE CBC W/AUTO DIFF WBC: CPT

## 2025-02-04 PROCEDURE — 70496 CT ANGIOGRAPHY HEAD: CPT | Mod: MC

## 2025-02-04 RX ORDER — METOCLOPRAMIDE 10 MG/1
10 TABLET ORAL ONCE
Refills: 0 | Status: COMPLETED | OUTPATIENT
Start: 2025-02-04 | End: 2025-02-04

## 2025-02-04 RX ORDER — KETOROLAC TROMETHAMINE 10 MG
15 TABLET ORAL ONCE
Refills: 0 | Status: DISCONTINUED | OUTPATIENT
Start: 2025-02-04 | End: 2025-02-04

## 2025-02-04 RX ORDER — ACETAMINOPHEN 160 MG/5ML
650 SUSPENSION ORAL ONCE
Refills: 0 | Status: COMPLETED | OUTPATIENT
Start: 2025-02-04 | End: 2025-02-04

## 2025-02-04 RX ORDER — BACTERIOSTATIC SODIUM CHLORIDE 0.9 %
500 VIAL (ML) INJECTION ONCE
Refills: 0 | Status: COMPLETED | OUTPATIENT
Start: 2025-02-04 | End: 2025-02-04

## 2025-02-04 RX ADMIN — METOCLOPRAMIDE 104 MILLIGRAM(S): 10 TABLET ORAL at 21:45

## 2025-02-04 RX ADMIN — Medication 500 MILLILITER(S): at 21:45

## 2025-02-04 RX ADMIN — ACETAMINOPHEN 650 MILLIGRAM(S): 160 SUSPENSION ORAL at 22:38

## 2025-02-04 NOTE — ED PROVIDER NOTE - ATTENDING APP SHARED VISIT CONTRIBUTION OF CARE
58 year old F with PMH of nasopharyngeal cancer in remission s/p chemoradiation 2013, hypothyroidism, Diverticulitis, COPD (not on oxygen), HTN, Depression, Insomnia, Asthma  and Anxiety, TIA/transient blurry vision 2/2 to proximal LULI s/p right carotid angioplasty/stenting 4/2023 (Dr. Blank) on DAPT pw intermittent dizziness, right arm weakness, difficulty ambulating. these symptoms have been progressing and intermittent over the past 1 week but her headaches and facial pain have been occurring for 1 month. on exam there is no sensory deficit, she has weakness in RUE and RLE. cn 2-12 intact, gait not tested however there is no truncal ataxia, no nystagmus. heart and lungs are clear. plan is to obtain labs, ekg, ct head and consultation with neuro.

## 2025-02-04 NOTE — ED PROVIDER NOTE - PHYSICAL EXAMINATION
CONSTITUTIONAL: Well-appearing;  in no apparent distress.   EYES: PERRL; EOM intact.   CARDIOVASCULAR: Normal S1, S2; no murmurs, rubs, or gallops.   RESPIRATORY: Normal chest excursion with respiration; breath sounds clear and equal bilaterally; no wheezes, rhonchi, or rales.  GI/: Normal bowel sounds; non-distended; non-tender; no palpable organomegaly.   MS: TTP to right shoulder and upper arm.  Reproducible pain on right shoulder movement.  Nontender to right elbow, wrist and hand.  Right hand neurovascular intact.  SKIN: Normal for age and race; warm; dry; good turgor; no apparent lesions or exudate.   NEURO/PSYCH: A&O X 4. CN II-XII grossly intact. no drifting. sensation and strength equal to b/l upper and lower extremities. speaking coherently. nml cerebellum test. ambulate with steady gait

## 2025-02-04 NOTE — ED PROVIDER NOTE - PROGRESS NOTE DETAILS
offer stay in obs for evaluation with neuroendovascular and ophthal/ after share decision making, patient prefer to go home and follow up as outpatient. will follow up with her eye doctor and rapid referral placed for neurology and neuroendovascular. neurology consulted for CTA finding. offer stay in obs for evaluation with neuroendovascular and ophthal/ after share decision making, patient prefer to go home and follow up as outpatient. will follow up with her eye doctor and rapid referral placed for neurology and neuroendovascular.

## 2025-02-04 NOTE — ED ADULT NURSE NOTE - CAS DISCH TRANSFER METHOD
Private car Cheiloplasty (Less Than 50%) Text: In order to maintain form and function of the lip, and to avoid distortion of the free margin, a lip advancement flap was planned. After rep and local anesthesia, Burow????????????????????????????????????s triangles were excised superiorly to the nasal sill and inferiorly around the lip to the labial mucosa.  Two flaps were elevated by undermining the skin laterally in both directions,  the skin and mucosa from the orbicularis muscle.  Any redundant orbicularis oris muscle was removed and complimentary edges of remaining muscle reapposed with a horizontal mattress stitch.  After hemostasis was obtained, the flaps were advanced and closed in a layered fashion.

## 2025-02-04 NOTE — ED PROVIDER NOTE - RHYTHM STRIP/ULTRASOUND IMAGES/CT SCAN IMAGES SHOWED
Independent interpretation of the head CT scan as a preliminary reading by Dr. Thaddeus Maya shows no acute hemorraghe

## 2025-02-04 NOTE — ED PROVIDER NOTE - NSPTACCESSSVCSAPPTDETAILS_ED_ALL_ED_FT
Neurology and neuroendovascular follow within 1-2 weeks. CTA with occlusion  of right carotid artery stent.

## 2025-02-04 NOTE — ED PROVIDER NOTE - PROVIDER TOKENS
PROVIDER:[TOKEN:[[id number]],FOLLOWUP:[7-10 Days]],FREE:[LAST:[your eye doctor],PHONE:[(   )    -],FAX:[(   )    -]]

## 2025-02-04 NOTE — ED PROVIDER NOTE - NSFOLLOWUPINSTRUCTIONS_ED_ALL_ED_FT
Our Emergency Department Referral Coordinators will be reaching out to you in the next 24-48 hours from 9:00am to 5:00pm to schedule a follow up appointment. Please expect a phone call from the hospital in that time frame. If you do not receive a call or if you have any questions or concerns, you can reach them at   [phone] for Neuroendovascular follow up with Dr Blank      Headache    A headache is pain or discomfort felt around the head or neck area. The specific cause of a headache may not be found as there are many types including tension headaches, migraine headaches, and cluster headaches. Watch your condition for any changes. Things you can do to manage your pain include taking over the counter and prescription medications as instructed by your health care provider, lying down in a dark quiet room, limiting stress, getting regular sleep, and refraining from alcohol and tobacco products.    SEEK IMMEDIATE MEDICAL CARE IF YOU EXPERIENCE THE FOLLOWING SYMPTOMS: fever, vomiting, stiff neck, loss of vision, problems with speech, muscle weakness, loss of balance, trouble walking, pass out, or confusion.     Carotid Artery Disease    Outline of the head showing the carotid artery with a close-up of narrowing and plaque in the artery.  The carotid arteries are the two main blood vessels on either side of the neck. They supply blood to the brain, other parts of the head, and the neck.    Carotid artery disease is the narrowing or blockage of one or both carotid arteries. This condition is also called carotid artery stenosis.    Carotid artery disease increases your risk for a stroke or a transient ischemic attack (TIA). A TIA is a "mini-stroke" that causes stroke-like symptoms that then go away quickly.    What are the causes?  This condition is mainly caused by a narrowing and hardening of the carotid arteries (atherosclerosis). The carotid arteries can become narrow or clogged with a buildup of fat, cholesterol, calcium, and other substances (plaque).    What increases the risk?  The following factors may make you more likely to develop this condition:  Having certain medical conditions, such as:  High cholesterol.  High blood pressure (hypertension).  Diabetes.  Obesity.  Smoking.  A family history of cardiovascular disease.  Not being active or not exercising regularly.  Being male. People who are male have an increased risk of developing atherosclerosis earlier in life than people who are female.  Being male and older than 45 years old.  Being female and older than 55 years old.  What are the signs or symptoms?  This condition may not have any signs or symptoms until a stroke or TIA occurs. In some cases, your health care provider may be able to hear a whooshing sound (bruit) with a stethoscope. This can mean that there a change in blood flow caused by plaque buildup.    How is this diagnosed?  This condition may be diagnosed with a physical exam, your medical history, and your family's medical history. You may also have tests that look at the blood flow in your carotid arteries, such as:  Carotid artery ultrasound, which uses sound waves to create pictures to show if the arteries are narrow or blocked.  Tests that use a dye injected into a vein to highlight your arteries on images, such as:  Carotid or cerebral angiography, which uses X-rays.  Computerized tomographic angiography (CTA), which uses CT scans.  Magnetic resonance angiography (MRA), which uses MRI.  An eye exam can also help find signs of this condition.    How is this treated?  This condition may be treated with more than one treatment. Treatment may include:  Lifestyle changes, such as:  Quitting smoking.  Exercising regularly as told by your health care provider.  Eating a heart-healthy diet.  Managing stress.  Getting to and staying at a healthy weight.  Medicines to control blood pressure, cholesterol, and blood clotting.  Surgery. You may have:  A carotid endarterectomy. This is a surgery to remove the blockages in the carotid arteries.  A carotid angioplasty with stenting. This is a procedure in which a small mesh tube (stent) is used to widen the blocked carotid arteries.  Follow these instructions at home:  Eating and drinking    A plate with examples of foods in a healthy diet.  Follow instructions from your health care provider about what you may eat and drink. It is important to:  Eat a healthy diet that is low in saturated fats and includes plenty of fresh fruits, vegetables, and lean meats.  Avoid foods that are high in fat and salt (sodium).  Avoid foods that are fried, overly processed, or have poor nutritional value.  Lifestyle    A sign telling a person not to smoke.   Maintain a healthy weight.  Do exercises as told by your health care provider. Each week you should get at least 150 minutes of moderate-intensity exercise or 75 minutes of vigorous exercise that takes a lot of effort.  Do not use any products that contain nicotine or tobacco. These products include cigarettes, chewing tobacco, and vaping devices, such as e-cigarettes. If you need help quitting, ask your health care provider.  Do not drink alcohol if:  Your health care provider tells you not to drink.  You are pregnant, may be pregnant, or are planning to become pregnant.  If you drink alcohol:  Limit how much you have to:  0–1 drink a day for women.  0–2 drinks a day for men.  Know how much alcohol is in your drink. In the [place] one drink equals one 12 oz bottle of beer (355 mL), one 5 oz glass of wine (148 mL), or one 1½ oz glass of hard liquor (44 mL).  Do not use drugs.  Manage your stress. Ask your health care provider for stress management tips.  General instructions    Take over-the-counter and prescription medicines only as told by your health care provider.  Keep all follow-up visits. Your health care provider will monitor your condition and may need to change your treatment plan over time.  Where to find more information  American Heart Association: heart.org  Get help right away if:  You have any symptoms of a stroke. "BE FAST" is an easy way to remember the main warning signs of a stroke:  B - Balance. Signs are dizziness, sudden trouble walking, or loss of balance.  E - Eyes. Signs are trouble seeing or a sudden change in vision.  F - Face. Signs are sudden weakness or numbness of the face, or the face or eyelid drooping on one side.  A - Arms. Signs are weakness or numbness in an arm. This happens suddenly and usually on one side of the body.  S - Speech. Signs are sudden trouble speaking, slurred speech, or trouble understanding what people say.  T - Time. Time to call emergency services. Write down what time symptoms started.  You have other signs of a stroke, such as:  A sudden, severe headache with no known cause.  Nausea or vomiting.  Seizure.  These symptoms may be an emergency. Get help right away. Call 911.  Do not wait to see if the symptoms will go away.  Do not drive yourself to the hospital.  This information is not intended to replace advice given to you by your health care provider. Make sure you discuss any questions you have with your health care provider.

## 2025-02-04 NOTE — ED PROVIDER NOTE - NSPTACCESSSVCSAPPT_ED_ALL_ED
Specialty Care (immediate)...
I have personally performed a face to face diagnostic evaluation on this patient. I have reviewed the ACP note and agree with the history, exam and plan of care, except as noted.

## 2025-02-04 NOTE — ED PROVIDER NOTE - PATIENT PORTAL LINK FT
You can access the FollowMyHealth Patient Portal offered by [facility] by registering at the following website: [email or web address]. By joining [employer]’s FollowMyHealth portal, you will also be able to view your health information using other applications (apps) compatible with our system.

## 2025-02-04 NOTE — ED PROVIDER NOTE - INPATIENT RECORD SUMMARY
Inpatient records reviewed from April 2024 patient admitted for hypotension MRI results and CT imaging reviewed

## 2025-02-04 NOTE — ED PROVIDER NOTE - STUDIES
EKG - see results section for interpretation/Xray Image(s) - see wet read section for interpretation/CT Scan images CT Scan images

## 2025-02-04 NOTE — ED PROVIDER NOTE - OBJECTIVE STATEMENT
59 years old female history of COPD, hypertension, high cholesterol, right carotid stenosis status post carotid stent, nasopharyngeal cancer in remission (had chemo and radiation in 2014 complicated with constant postnasal drip), hypothyroid, migraine presenting complaint of right sided migraine headache Off and on over the past month. Patient was seen in ED about a month ago for similar headache at the time and had negative CT head.  Symptoms improved so she was discharged home.  Patient is trying to get appointment with outpatient neurologist.  Started having right sided arm pain and neck pain with dizziness so she returned back to ED for evaluation.  Patient also report pressure behind her right eye that leading to blurry vision over the past month.  Otherwise denies fever, chills, recent illness, chest pain or abdominal pain.

## 2025-02-04 NOTE — ED PROVIDER NOTE - CARE PROVIDER_API CALL
Bhanu Blank  Neurosurgery  [address] [place] [address], [address]  [place] [address]  Phone: [phone]  Fax: [phone]  Follow Up Time: 7-10 Days    your eye doctor,   Phone: (   )    -  Fax: (   )    -  Follow Up Time:

## 2025-02-05 PROCEDURE — 99283 EMERGENCY DEPT VISIT LOW MDM: CPT

## 2025-02-05 RX ORDER — DEXAMETHASONE SODIUM PHOSPHATE 4 MG/ML
10 INJECTION, SOLUTION INTRA-ARTICULAR; INTRALESIONAL; INTRAMUSCULAR; INTRAVENOUS; SOFT TISSUE ONCE
Refills: 0 | Status: COMPLETED | OUTPATIENT
Start: 2025-02-05 | End: 2025-02-05

## 2025-02-05 RX ORDER — MAGNESIUM SULFATE 0.8 MEQ/ML
2 AMPUL (ML) INJECTION ONCE
Refills: 0 | Status: COMPLETED | OUTPATIENT
Start: 2025-02-05 | End: 2025-02-05

## 2025-02-05 RX ADMIN — Medication 150 GRAM(S): at 02:41

## 2025-02-05 RX ADMIN — DEXAMETHASONE SODIUM PHOSPHATE 10 MILLIGRAM(S): 4 INJECTION, SOLUTION INTRA-ARTICULAR; INTRALESIONAL; INTRAMUSCULAR; INTRAVENOUS; SOFT TISSUE at 02:54

## 2025-02-05 NOTE — CONSULT NOTE ADULT - ASSESSMENT
59y Female with PMHx of  COPD not on home O2, CAD on Plavix, migraines, nasopharyngeal cancer in remission, hypothyroidism, HTN, HLD, right ICA stenting in 2023 w Dr. Blank. Pt presents today to ED for symptoms of right sided HA, RUE pain, and intermittent dizziness for several months. Neurology consulted for CTA w complete intra-stent occlusion of proximal right ICA extending to clinoid segment w distal reconstitution (new since 2023). Pt denies left sided neurologic deficits such as left sided weakness, numbness, or left sided HA. On exam pt w no focal neurologic deficits, visual fields intact. Suspect intra-stent occlusion is incidental finding as pt w no focal neurologic deficit and presentation w nonspecific sx. Would recommend optho eval for progressive vision changes and follow up w Dr. Blank out-patient.       RECS  - Migraine cocktail consisting of Mg sulfate 1g IV, reglan 10mg IV, 500cc NS bolus   - optho eval  - F/u w Dr. Blank (neuroendovascular ) out-patient     Discussed w Dr. Urbina

## 2025-02-05 NOTE — CONSULT NOTE ADULT - SUBJECTIVE AND OBJECTIVE BOX
HPI: 59y Female with PMHx of  COPD not on home O2, CAD on Plavix, migraines, nasopharyngeal cancer in remission, hypothyroidism, HTN, HLD, right ICA stenting in 2023 w Dr. Blank. Pt presents today to ED for symptoms of right sided HA, RUE pain, and intermittent dizziness for several months. Neurology consulted for CTA w complete intra-stent occlusion of proximal right ICA extending to clinoid segment w distal reconstitution (new since 2023). Pt denies left sided neurologic deficits such as left sided weakness, numbness, or left sided HA. Pt admits ot migraines w occasional photophobia and eye pain, pt also admits to blurry vision in accompaniment w photophobia. Pt states vision changes have been present for years, w recent worsening within last few months (since summer). Pt also admits to HA and blurry vision when she uses her prescribed eyeglasses.     T(C): 36.7 (02-04-25 @ 23:50), Max: 36.7 (02-04-25 @ 23:50)  HR: 71 (02-04-25 @ 23:50) (71 - 79)  BP: 145/82 (02-04-25 @ 23:50) (127/76 - 145/82)  RR: 18 (02-04-25 @ 23:50) (18 - 18)  SpO2: 97% (02-04-25 @ 23:50) (97% - 97%)    PAST MEDICAL & SURGICAL HISTORY:  COPD (chronic obstructive pulmonary disease)      HTN (hypertension)      Diverticulitis      Pulmonary nodules/lesions, multiple      Nasopharyngeal cancer      Hypothyroid      Cervical disc disease  sequelae of radtion for nasopharyngeal cancer      Left ear hearing loss      Hemorrhoids      H/O carotid stenosis      Anxiety      Morbid obesity with BMI of 45.0-49.9, adult      H/O dental abscess      HLD (hyperlipidemia)      Carotid artery disease      Obstructive sleep apnea on CPAP      OA (osteoarthritis)      Bilateral knee pain      Depression      History of cholecystectomy      History of common carotid artery stent placement          FAMILY HISTORY:  Family history of lung cancer (Father, Mother)        SOCIAL HISTORY:  Denies smoking, drinking, or drug use    ROS: see hpi     MEDICATIONS  (STANDING):    MEDICATIONS  (PRN):    Home Medications:  aspirin 325 mg oral delayed release tablet: 1 orally once a day (18 Apr 2024 16:47)  baclofen 10 mg oral tablet: 1 tab(s) orally once a day (18 Apr 2024 16:45)  buPROPion 450 mg/24 hours (XL) oral tablet, extended release: 1 orally once a day (18 Apr 2024 16:47)  clonazePAM 1 mg oral tablet: 1 tab(s) orally 2 times a day (18 Apr 2024 16:47)  clopidogrel 75 mg oral tablet: 1 orally once a day (18 Apr 2024 16:47)  docusate sodium 100 mg oral tablet: 1 tab(s) orally 2 times a day (18 Apr 2024 16:47)  hydrOXYzine hydrochloride 10 mg oral tablet: 1 tab(s) orally 3 times a day as needed for  anxiety (18 Apr 2024 16:47)  MONTELUKAST SODIUM 10 MG TABS: 1 dose(s) orally once a day (18 Apr 2024 16:47)  omeprazole 40 mg oral delayed release capsule: 1 tab(s) orally once a day (18 Apr 2024 16:47)  tiZANidine 2 mg oral tablet: 2 tab(s) orally 3 times a day (18 Apr 2024 16:47)  Trelegy Ellipta inhalation powder: 1 puff(s) inhaled once a day (18 Apr 2024 16:47)  Ventolin HFA 90 mcg/inh inhalation aerosol: 2 puff(s) inhaled prn (18 Apr 2024 16:47)  ZOLPIDEM TARTRATE 10 MG TABS: orally once a day (at bedtime) (18 Apr 2024 16:47)      Allergies    ciprofloxacin (Urticaria; Other)    Intolerances      Vital Signs Last 24 Hrs  T(C): 36.7 (04 Feb 2025 23:50), Max: 36.7 (04 Feb 2025 23:50)  T(F): 98 (04 Feb 2025 23:50), Max: 98 (04 Feb 2025 23:50)  HR: 71 (04 Feb 2025 23:50) (71 - 79)  BP: 145/82 (04 Feb 2025 23:50) (127/76 - 145/82)  BP(mean): --  RR: 18 (04 Feb 2025 23:50) (18 - 18)  SpO2: 97% (04 Feb 2025 23:50) (97% - 97%)    Parameters below as of 04 Feb 2025 23:50  Patient On (Oxygen Delivery Method): room air        Physical exam:  General: No acute distress, awake and alert      Neurologic:  -Mental status: Awake, alert, oriented to person, place, and time. Speech is fluent with intact naming, repetition, and comprehension, no dysarthria. Recent and remote memory intact. Follows commands. Attention/concentration intact. Fund of knowledge appropriate.  -Cranial nerves:   II: Visual fields are full to confrontation.  III, IV, VI: Extraocular movements are intact without nystagmus. Pupils equally round and reactive to light  V:  Facial sensation V1-V3 equal and intact   VII: Face is symmetric with normal eye closure and smile  VIII: Hearing is bilaterally intact to finger rub  IX, X: Uvula is midline and soft palate rises symmetrically  XI: Head turning and shoulder shrug are intact.  XII: Tongue protrudes midline  Motor: Normal bulk and tone. No pronator drift. Strength bilateral upper extremity 5/5, bilateral lower extremities 5/5.  Rapid alternating movements intact and symmetric  Sensation: Intact to light touch bilaterally. No neglect or extinction on double simultaneous testing.  Coordination: No dysmetria on finger-to-nose       NIHSS: 0    Fingerstick Blood Glucose: CAPILLARY BLOOD GLUCOSE        LABS:                        9.9    9.07  )-----------( 323      ( 04 Feb 2025 21:33 )             32.0     02-04    142  |  106  |  27[H]  ----------------------------<  102[H]  4.8   |  23  |  1.6[H]    Ca    9.2      04 Feb 2025 21:33    TPro  6.4  /  Alb  3.7  /  TBili  <0.2  /  DBili  x   /  AST  23  /  ALT  16  /  AlkPhos  105  02-04    PT/INR - ( 04 Feb 2025 21:33 )   PT: 10.60 sec;   INR: 0.90 ratio         PTT - ( 04 Feb 2025 21:33 )  PTT:28.3 sec      Urinalysis Basic - ( 04 Feb 2025 21:33 )    Color: x / Appearance: x / SG: x / pH: x  Gluc: 102 mg/dL / Ketone: x  / Bili: x / Urobili: x   Blood: x / Protein: x / Nitrite: x   Leuk Esterase: x / RBC: x / WBC x   Sq Epi: x / Non Sq Epi: x / Bacteria: x        RADIOLOGY & ADDITIONAL STUDIES:    HCT:    IMPRESSION:    No acute intracranial pathology.    CTA:     IMPRESSION:    Right-sided carotid stent distending from the right common carotid artery to the right proximal internal carotid artery, with complete intra-stent occlusion of the proximal right ICA extending to the clinoid segment with distal reconstitution.    Additional mild stenoses as above.        -------------------------------------------------------------------------------------------------------------

## 2025-02-07 ENCOUNTER — APPOINTMENT (OUTPATIENT)
Dept: NEUROSURGERY | Facility: CLINIC | Age: 60
End: 2025-02-07

## 2025-02-07 VITALS
HEIGHT: 64 IN | DIASTOLIC BLOOD PRESSURE: 80 MMHG | BODY MASS INDEX: 45.41 KG/M2 | WEIGHT: 266 LBS | SYSTOLIC BLOOD PRESSURE: 122 MMHG

## 2025-02-07 DIAGNOSIS — Z95.828 PRESENCE OF OTHER VASCULAR IMPLANTS AND GRAFTS: ICD-10-CM

## 2025-02-07 PROCEDURE — 99214 OFFICE O/P EST MOD 30 MIN: CPT

## 2025-02-07 RX ORDER — ZOLPIDEM TARTRATE 10 MG/1
10 TABLET ORAL
Refills: 0 | Status: ACTIVE | COMMUNITY

## 2025-02-07 RX ORDER — BACLOFEN 10 MG/1
10 TABLET ORAL
Refills: 0 | Status: ACTIVE | COMMUNITY

## 2025-02-07 RX ORDER — DOCUSATE SODIUM 100 MG
100 TABLET ORAL
Refills: 0 | Status: ACTIVE | COMMUNITY

## 2025-02-07 RX ORDER — ALBUTEROL 90 MCG
90 AEROSOL (GRAM) INHALATION
Refills: 0 | Status: ACTIVE | COMMUNITY

## 2025-02-07 RX ORDER — ASPIRIN ENTERIC COATED TABLETS 81 MG 81 MG/1
81 TABLET, DELAYED RELEASE ORAL DAILY
Qty: 30 | Refills: 5 | Status: ACTIVE | COMMUNITY
Start: 2025-02-07 | End: 1900-01-01

## 2025-02-07 RX ORDER — TICAGRELOR 90 MG/1
90 TABLET ORAL TWICE DAILY
Qty: 60 | Refills: 3 | Status: ACTIVE | COMMUNITY
Start: 2025-02-07 | End: 1900-01-01

## 2025-02-07 RX ORDER — BUPROPION HYDROCHLORIDE 75 MG/1
TABLET, FILM COATED ORAL
Refills: 0 | Status: ACTIVE | COMMUNITY

## 2025-02-07 RX ORDER — ATORVASTATIN CALCIUM 80 MG/1
80 TABLET, FILM COATED ORAL
Refills: 0 | Status: ACTIVE | COMMUNITY

## 2025-02-07 RX ORDER — TIZANIDINE HYDROCHLORIDE 2 MG/1
2 CAPSULE ORAL
Refills: 0 | Status: ACTIVE | COMMUNITY

## 2025-02-07 RX ORDER — LISINOPRIL 10 MG/1
10 TABLET ORAL
Refills: 0 | Status: ACTIVE | COMMUNITY

## 2025-02-07 RX ORDER — FLUTICASONE FUROATE, UMECLIDINIUM BROMIDE AND VILANTEROL TRIFENATATE 200; 62.5; 25 UG/1; UG/1; UG/1
200-62.5-25 POWDER RESPIRATORY (INHALATION)
Refills: 0 | Status: ACTIVE | COMMUNITY

## 2025-02-07 RX ORDER — LEVOTHYROXINE SODIUM 0.12 MG/1
125 TABLET ORAL
Refills: 0 | Status: ACTIVE | COMMUNITY

## 2025-02-07 RX ORDER — HYDROXYZINE HYDROCHLORIDE 10 MG/1
10 TABLET ORAL
Refills: 0 | Status: ACTIVE | COMMUNITY

## 2025-02-07 RX ORDER — MONTELUKAST SODIUM 10 MG/1
10 TABLET, FILM COATED ORAL
Refills: 0 | Status: ACTIVE | COMMUNITY

## 2025-02-07 RX ORDER — FAMOTIDINE 40 MG/1
40 TABLET, FILM COATED ORAL
Refills: 0 | Status: ACTIVE | COMMUNITY

## 2025-02-08 ENCOUNTER — INPATIENT (INPATIENT)
Facility: HOSPITAL | Age: 60
LOS: 3 days | Discharge: HOME | DRG: 244 | End: 2025-02-12
Attending: STUDENT IN AN ORGANIZED HEALTH CARE EDUCATION/TRAINING PROGRAM | Admitting: INTERNAL MEDICINE
Payer: MEDICAID

## 2025-02-08 VITALS
OXYGEN SATURATION: 98 % | DIASTOLIC BLOOD PRESSURE: 106 MMHG | SYSTOLIC BLOOD PRESSURE: 178 MMHG | HEART RATE: 73 BPM | TEMPERATURE: 98 F | RESPIRATION RATE: 18 BRPM | WEIGHT: 279.99 LBS | HEIGHT: 64 IN

## 2025-02-08 DIAGNOSIS — I10 ESSENTIAL (PRIMARY) HYPERTENSION: ICD-10-CM

## 2025-02-08 DIAGNOSIS — E66.01 MORBID (SEVERE) OBESITY DUE TO EXCESS CALORIES: ICD-10-CM

## 2025-02-08 DIAGNOSIS — G47.33 OBSTRUCTIVE SLEEP APNEA (ADULT) (PEDIATRIC): ICD-10-CM

## 2025-02-08 DIAGNOSIS — Z85.22 PERSONAL HISTORY OF MALIGNANT NEOPLASM OF NASAL CAVITIES, MIDDLE EAR, AND ACCESSORY SINUSES: ICD-10-CM

## 2025-02-08 DIAGNOSIS — R73.03 PREDIABETES: ICD-10-CM

## 2025-02-08 DIAGNOSIS — K57.32 DIVERTICULITIS OF LARGE INTESTINE WITHOUT PERFORATION OR ABSCESS WITHOUT BLEEDING: ICD-10-CM

## 2025-02-08 DIAGNOSIS — I25.10 ATHEROSCLEROTIC HEART DISEASE OF NATIVE CORONARY ARTERY WITHOUT ANGINA PECTORIS: ICD-10-CM

## 2025-02-08 DIAGNOSIS — G43.909 MIGRAINE, UNSPECIFIED, NOT INTRACTABLE, WITHOUT STATUS MIGRAINOSUS: ICD-10-CM

## 2025-02-08 DIAGNOSIS — Z95.5 PRESENCE OF CORONARY ANGIOPLASTY IMPLANT AND GRAFT: ICD-10-CM

## 2025-02-08 DIAGNOSIS — Z87.891 PERSONAL HISTORY OF NICOTINE DEPENDENCE: ICD-10-CM

## 2025-02-08 DIAGNOSIS — Z90.49 ACQUIRED ABSENCE OF OTHER SPECIFIED PARTS OF DIGESTIVE TRACT: Chronic | ICD-10-CM

## 2025-02-08 DIAGNOSIS — Z79.890 HORMONE REPLACEMENT THERAPY: ICD-10-CM

## 2025-02-08 DIAGNOSIS — E78.5 HYPERLIPIDEMIA, UNSPECIFIED: ICD-10-CM

## 2025-02-08 DIAGNOSIS — E03.9 HYPOTHYROIDISM, UNSPECIFIED: ICD-10-CM

## 2025-02-08 DIAGNOSIS — J44.9 CHRONIC OBSTRUCTIVE PULMONARY DISEASE, UNSPECIFIED: ICD-10-CM

## 2025-02-08 DIAGNOSIS — Z98.890 OTHER SPECIFIED POSTPROCEDURAL STATES: Chronic | ICD-10-CM

## 2025-02-08 DIAGNOSIS — Z79.82 LONG TERM (CURRENT) USE OF ASPIRIN: ICD-10-CM

## 2025-02-08 DIAGNOSIS — Z99.89 DEPENDENCE ON OTHER ENABLING MACHINES AND DEVICES: ICD-10-CM

## 2025-02-08 DIAGNOSIS — D50.9 IRON DEFICIENCY ANEMIA, UNSPECIFIED: ICD-10-CM

## 2025-02-08 LAB
ALBUMIN SERPL ELPH-MCNC: 3.9 G/DL — SIGNIFICANT CHANGE UP (ref 3.5–5.2)
ALP SERPL-CCNC: 113 U/L — SIGNIFICANT CHANGE UP (ref 30–115)
ALT FLD-CCNC: 14 U/L — SIGNIFICANT CHANGE UP (ref 0–41)
ANION GAP SERPL CALC-SCNC: 12 MMOL/L — SIGNIFICANT CHANGE UP (ref 7–14)
AST SERPL-CCNC: 20 U/L — SIGNIFICANT CHANGE UP (ref 0–41)
BASOPHILS # BLD AUTO: 0.03 K/UL — SIGNIFICANT CHANGE UP (ref 0–0.2)
BASOPHILS NFR BLD AUTO: 0.3 % — SIGNIFICANT CHANGE UP (ref 0–1)
BILIRUB SERPL-MCNC: 0.3 MG/DL — SIGNIFICANT CHANGE UP (ref 0.2–1.2)
BUN SERPL-MCNC: 21 MG/DL — HIGH (ref 10–20)
CALCIUM SERPL-MCNC: 8.8 MG/DL — SIGNIFICANT CHANGE UP (ref 8.4–10.5)
CHLORIDE SERPL-SCNC: 106 MMOL/L — SIGNIFICANT CHANGE UP (ref 98–110)
CO2 SERPL-SCNC: 22 MMOL/L — SIGNIFICANT CHANGE UP (ref 17–32)
CREAT SERPL-MCNC: 1.5 MG/DL — SIGNIFICANT CHANGE UP (ref 0.7–1.5)
EGFR: 39 ML/MIN/1.73M2 — LOW
EOSINOPHIL # BLD AUTO: 0.18 K/UL — SIGNIFICANT CHANGE UP (ref 0–0.7)
EOSINOPHIL NFR BLD AUTO: 1.7 % — SIGNIFICANT CHANGE UP (ref 0–8)
GLUCOSE SERPL-MCNC: 100 MG/DL — HIGH (ref 70–99)
HCG SERPL QL: NEGATIVE — SIGNIFICANT CHANGE UP
HCT VFR BLD CALC: 33.1 % — LOW (ref 37–47)
HGB BLD-MCNC: 10.3 G/DL — LOW (ref 12–16)
IMM GRANULOCYTES NFR BLD AUTO: 0.3 % — SIGNIFICANT CHANGE UP (ref 0.1–0.3)
LIDOCAIN IGE QN: 30 U/L — SIGNIFICANT CHANGE UP (ref 7–60)
LYMPHOCYTES # BLD AUTO: 1.84 K/UL — SIGNIFICANT CHANGE UP (ref 1.2–3.4)
LYMPHOCYTES # BLD AUTO: 17.7 % — LOW (ref 20.5–51.1)
MCHC RBC-ENTMCNC: 24.7 PG — LOW (ref 27–31)
MCHC RBC-ENTMCNC: 31.1 G/DL — LOW (ref 32–37)
MCV RBC AUTO: 79.4 FL — LOW (ref 81–99)
MONOCYTES # BLD AUTO: 0.74 K/UL — HIGH (ref 0.1–0.6)
MONOCYTES NFR BLD AUTO: 7.1 % — SIGNIFICANT CHANGE UP (ref 1.7–9.3)
NEUTROPHILS # BLD AUTO: 7.58 K/UL — HIGH (ref 1.4–6.5)
NEUTROPHILS NFR BLD AUTO: 72.9 % — SIGNIFICANT CHANGE UP (ref 42.2–75.2)
NRBC # BLD: 0 /100 WBCS — SIGNIFICANT CHANGE UP (ref 0–0)
NRBC BLD-RTO: 0 /100 WBCS — SIGNIFICANT CHANGE UP (ref 0–0)
PLATELET # BLD AUTO: 366 K/UL — SIGNIFICANT CHANGE UP (ref 130–400)
PMV BLD: 11.2 FL — HIGH (ref 7.4–10.4)
POTASSIUM SERPL-MCNC: 4.2 MMOL/L — SIGNIFICANT CHANGE UP (ref 3.5–5)
POTASSIUM SERPL-SCNC: 4.2 MMOL/L — SIGNIFICANT CHANGE UP (ref 3.5–5)
PROT SERPL-MCNC: 6.5 G/DL — SIGNIFICANT CHANGE UP (ref 6–8)
RBC # BLD: 4.17 M/UL — LOW (ref 4.2–5.4)
RBC # FLD: 18.2 % — HIGH (ref 11.5–14.5)
SODIUM SERPL-SCNC: 140 MMOL/L — SIGNIFICANT CHANGE UP (ref 135–146)
WBC # BLD: 10.4 K/UL — SIGNIFICANT CHANGE UP (ref 4.8–10.8)
WBC # FLD AUTO: 10.4 K/UL — SIGNIFICANT CHANGE UP (ref 4.8–10.8)

## 2025-02-08 PROCEDURE — 99285 EMERGENCY DEPT VISIT HI MDM: CPT

## 2025-02-08 PROCEDURE — 74177 CT ABD & PELVIS W/CONTRAST: CPT | Mod: 26

## 2025-02-08 RX ORDER — BACTERIOSTATIC SODIUM CHLORIDE 0.9 %
1000 VIAL (ML) INJECTION ONCE
Refills: 0 | Status: COMPLETED | OUTPATIENT
Start: 2025-02-08 | End: 2025-02-08

## 2025-02-08 RX ORDER — CEFTRIAXONE 250 MG/1
2000 INJECTION, POWDER, FOR SOLUTION INTRAMUSCULAR; INTRAVENOUS ONCE
Refills: 0 | Status: COMPLETED | OUTPATIENT
Start: 2025-02-08 | End: 2025-02-08

## 2025-02-08 RX ORDER — HYDROMORPHONE HYDROCHLORIDE 4 MG/ML
1 INJECTION, SOLUTION INTRAMUSCULAR; INTRAVENOUS; SUBCUTANEOUS ONCE
Refills: 0 | Status: DISCONTINUED | OUTPATIENT
Start: 2025-02-08 | End: 2025-02-08

## 2025-02-08 RX ORDER — METRONIDAZOLE 500 MG
500 TABLET ORAL ONCE
Refills: 0 | Status: COMPLETED | OUTPATIENT
Start: 2025-02-08 | End: 2025-02-08

## 2025-02-08 RX ORDER — ONDANSETRON 4 MG/1
4 TABLET, ORALLY DISINTEGRATING ORAL ONCE
Refills: 0 | Status: COMPLETED | OUTPATIENT
Start: 2025-02-08 | End: 2025-02-08

## 2025-02-08 RX ADMIN — Medication 1000 MILLILITER(S): at 21:24

## 2025-02-08 RX ADMIN — HYDROMORPHONE HYDROCHLORIDE 1 MILLIGRAM(S): 4 INJECTION, SOLUTION INTRAMUSCULAR; INTRAVENOUS; SUBCUTANEOUS at 21:46

## 2025-02-08 NOTE — H&P ADULT - TIME BILLING
Total time spent to complete patient's bedside assessment, review medical chart, discuss medical plan of care with covering medical team was more than 58 minutes  with >50% of time spent face to face with patient, discussion with patient/family and/or coordination of care.

## 2025-02-08 NOTE — H&P ADULT - NSHPPHYSICALEXAM_GEN_ALL_CORE
T(C): 36.9 (02-08-25 @ 20:55), Max: 36.9 (02-08-25 @ 20:55)  HR: 73 (02-08-25 @ 20:55) (73 - 73)  BP: 178/106 (02-08-25 @ 20:55) (178/106 - 178/106)  RR: 18 (02-08-25 @ 20:55) (18 - 18)  SpO2: 98% (02-08-25 @ 20:55) (98% - 98%)    CONSTITUTIONAL: Well groomed, no apparent distress    EYES: PERRLA and symmetric, EOMI, No conjunctival or scleral injection, non-icteric    ENMT: Oral mucosa with moist membranes. No external nasal lesions; nasal mucosa not inflamed  	  NECK: Supple, symmetric and without tracheal deviation; thyroid gland not enlarged and without palpable masses    RESPIRATORY: No respiratory distress, no use of accessory muscles; CTA b/l, no wheezes, rales or rhonchi    CARDIOVASCULAR: RRRR, +S1S2, no murmurs, no rubs  	  GASTROINTESTINAL: Soft, LLQ tenderness present on palpation     MUSCULOSKELETAL: no digital clubbing or cyanosis; examination of the (head/neck, spine/ribs/pelvis, RUE, LUE, RLE, LLE) without misalignment    SKIN: No rashes or ulcers noted; no subcutaneous nodules or induration palpable    NEUROLOGIC: No focal deficit     PSYCHIATRIC: Appropriate insight/judgment; A+O x 3, mood and affect appropriate, recent/remote memory intact T(C): 36.9 (02-08-25 @ 20:55), Max: 36.9 (02-08-25 @ 20:55)  HR: 73 (02-08-25 @ 20:55) (73 - 73)  BP: 178/106 (02-08-25 @ 20:55) (178/106 - 178/106)  RR: 18 (02-08-25 @ 20:55) (18 - 18)  SpO2: 98% (02-08-25 @ 20:55) (98% - 98%)    CONSTITUTIONAL: Well groomed, no apparent distress    RESPIRATORY: No respiratory distress, no use of accessory muscles; CTA b/l, no wheezes, rales or rhonchi    CARDIOVASCULAR: regular rate and rhythm, +S1S2, no murmurs, no rubs  	  GASTROINTESTINAL: Soft, LLQ tenderness present on palpation       SKIN: No rashes or ulcers noted on visible areas    NEUROLOGIC: No focal deficit     PSYCHIATRIC: Appropriate insight/judgment; A+O x 3, mood and affect appropriate, recent/remote memory intact

## 2025-02-08 NOTE — H&P ADULT - ATTENDING COMMENTS
Patient seen on 02/09/2025.    59y Female with PMHx of  COPD not on home O2, CAD on Plavix, migraines, nasopharyngeal cancer in remission, hypothyroidism, HTN, HLD, right ICA stenting in 2023, ?DM (A1C 6.6 in 04/2024) presents with left-sided abdominal pain with diarrhea X 3 days, pain worsening today.  Patient reports bloody diarrhea 3 days ago which is since resolved.  However patient states pain has persisted and worsened today.  Patient states current symptoms feel similar to past episode of diverticulitis.  Patient reports associated chills.  Denies chest pain, shortness of breath, N/V, urinary symptoms. Last admission for diverticulitis was in 2023.     PLAN:     #LLQ abdominal pain   #Acute sigmoid diverticulitis   #Hx diverticular disease with multiple episodes of diverticulitis in the past   #Hx of Nasopharyngeal CA in remission   - LLQ pain  - bloody diarrhea ~3 days ago - resolved now   - Advance diet as tolerated  ---> monitor BMs  - C/w IVF   - GI eval   - C/w Ceftriaxone and Flagyl  - Hgb stable   - Keep active T&S and trend H&H   - Transfuse PRN to keep Hgb > 8     #COPD not on home O2   - not in exacerbation   - c/w home inhalers     # ? DM  - last A1C 6.6 from 04/2024.  - not on any meds  - monitor FS. start SSI if FS > 200.  - check A1C    #CAD   #HTN  #HLD   #R ICA stenting in 2023   - c/w Aspirin and Brilinta   - c/w Olmesartan and Corg   - follow up with Neuro OP     #Hypothyroidism   - c/w Synthroid     #DVT PPx: SCD  #GI PPx: PPI   #Diet: Advance as tolerated   #Activity: IAT

## 2025-02-08 NOTE — ED PROVIDER NOTE - OBJECTIVE STATEMENT
59-year-old female past medical history HTN, diverticulitis, carotid artery disease, presents with left-sided abdominal pain with diarrhea X 3 days, pain worsening today.  Patient reports bloody diarrhea 3 days ago which is since resolved.  However patient states pain has persisted and worsened today.  Patient states current symptoms feel similar to past episode of diverticulitis.  Patient reports associated chills.  Denies chest pain, shortness of breath, N/V, urinary symptoms.

## 2025-02-08 NOTE — H&P ADULT - ASSESSMENT
59y Female with PMHx of  COPD not on home O2, CAD on Plavix, migraines, nasopharyngeal cancer in remission, hypothyroidism, HTN, HLD, right ICA stenting in 2023, presents with left-sided abdominal pain with diarrhea X 3 days, pain worsening today.  Patient reports bloody diarrhea 3 days ago which is since resolved.  However patient states pain has persisted and worsened today.  Patient states current symptoms feel similar to past episode of diverticulitis.  Patient reports associated chills.  Denies chest pain, shortness of breath, N/V, urinary symptoms. Last admission for diverticulitis was in 2023.     PLAN:     #LLQ abdominal pain   #Acute sigmoid diverticulitis   #Hx diverticular disease with multiple episodes of diverticulitis in the past   #Hx of Nasopharyngeal CA in remission   - LLQ pain  - bloody diarrhea ~3 days ago - resolved now   - Advance diet as tolerated   - C/w IVF   - GI eval   - C/w Ceftriaxone and Flagyl  - Hgb stable   - Keep active T&S and trend H&H   - Transfuse PRN to keep Hgb > 8     #COPD not on home O2   - not in exacerbation   - c/w home inhalers     #CAD   #HTN  #HLD   #R ICA stenting in 2023   - c/w home medications   - follow up with Neuro OP     #Hypothyroidism   - c/w home medication     #DVT PPx: Lovenox   #GI PPx: PPI   #Diet: Advance as tolerated   #Activity: IAT      59y Female with PMHx of  COPD not on home O2, CAD on Plavix, migraines, nasopharyngeal cancer in remission, hypothyroidism, HTN, HLD, right ICA stenting in 2023, presents with left-sided abdominal pain with diarrhea X 3 days, pain worsening today.  Patient reports bloody diarrhea 3 days ago which is since resolved.  However patient states pain has persisted and worsened today.  Patient states current symptoms feel similar to past episode of diverticulitis.  Patient reports associated chills.  Denies chest pain, shortness of breath, N/V, urinary symptoms. Last admission for diverticulitis was in 2023.     PLAN:     #LLQ abdominal pain   #Acute sigmoid diverticulitis   #Hx diverticular disease with multiple episodes of diverticulitis in the past   #Hx of Nasopharyngeal CA in remission   - LLQ pain  - bloody diarrhea ~3 days ago - resolved now   - Advance diet as tolerated   - C/w IVF   - GI eval   - C/w Ceftriaxone and Flagyl  - Hgb stable   - Keep active T&S and trend H&H   - Transfuse PRN to keep Hgb > 8     #COPD not on home O2   - not in exacerbation   - c/w home inhalers     #CAD   #HTN  #HLD   #R ICA stenting in 2023   - c/w Aspirin and Brilinta   - c/w Olmesartan and Corg   - follow up with Neuro OP     #Hypothyroidism   - c/w Synthroid     #DVT PPx: SCD  #GI PPx: PPI   #Diet: Advance as tolerated   #Activity: IAT      Patient seen on 02/09/2025.    59y Female with PMHx of  COPD not on home O2, CAD on Plavix, migraines, nasopharyngeal cancer in remission, hypothyroidism, HTN, HLD, right ICA stenting in 2023, ?DM (A1C 6.6 in 04/2024) presents with left-sided abdominal pain with diarrhea X 3 days, pain worsening today.  Patient reports bloody diarrhea 3 days ago which is since resolved.  However patient states pain has persisted and worsened today.  Patient states current symptoms feel similar to past episode of diverticulitis.  Patient reports associated chills.  Denies chest pain, shortness of breath, N/V, urinary symptoms. Last admission for diverticulitis was in 2023.     PLAN:     #LLQ abdominal pain   #Acute sigmoid diverticulitis   #Hx diverticular disease with multiple episodes of diverticulitis in the past   #Hx of Nasopharyngeal CA in remission   - LLQ pain  - bloody diarrhea ~3 days ago - resolved now   - Advance diet as tolerated  ---> monitor BMs  - C/w IVF   - GI eval   - C/w Ceftriaxone and Flagyl  - Hgb stable   - Keep active T&S and trend H&H   - Transfuse PRN to keep Hgb > 8     #COPD not on home O2   - not in exacerbation   - c/w home inhalers     # ? DM  - last A1C 6.6 from 04/2024.  - not on any meds  - monitor FS. start SSI if FS > 200.  - check A1C    #CAD   #HTN  #HLD   #R ICA stenting in 2023   - c/w Aspirin and Brilinta   - c/w Olmesartan and Corg   - follow up with Neuro OP     #Hypothyroidism   - c/w Synthroid     #DVT PPx: SCD  #GI PPx: PPI   #Diet: Advance as tolerated   #Activity: IAT

## 2025-02-08 NOTE — H&P ADULT - NSHPLABSRESULTS_GEN_ALL_CORE
10.3   10.40 )-----------( 366      ( 08 Feb 2025 21:30 )             33.1     02-08    140  |  106  |  21[H]  ----------------------------<  100[H]  4.2   |  22  |  1.5    Ca    8.8      08 Feb 2025 21:30    TPro  6.5  /  Alb  3.9  /  TBili  0.3  /  DBili  x   /  AST  20  /  ALT  14  /  AlkPhos  113  02-08        LIVER FUNCTIONS - ( 08 Feb 2025 21:30 )  Alb: 3.9 g/dL / Pro: 6.5 g/dL / ALK PHOS: 113 U/L / ALT: 14 U/L / AST: 20 U/L / GGT: x           Urinalysis Basic - ( 08 Feb 2025 21:30 )    Color: x / Appearance: x / SG: x / pH: x  Gluc: 100 mg/dL / Ketone: x  / Bili: x / Urobili: x   Blood: x / Protein: x / Nitrite: x   Leuk Esterase: x / RBC: x / WBC x   Sq Epi: x / Non Sq Epi: x / Bacteria: x        RADIOLOGY & ADDITIONAL STUDIES:  < from: CT Abdomen and Pelvis w/ IV Cont (02.08.25 @ 22:53) >    IMPRESSION:  Acute sigmoid diverticulitis; no abscess.    --- End of Report ---      < end of copied text > 10.3   10.40 )-----------( 366      ( 08 Feb 2025 21:30 )               33.1       02-08    140  |  106  |  21[H]  ----------------------------<  100[H]  4.2   |  22  |  1.5    Ca    8.8      08 Feb 2025 21:30    TPro  6.5  /  Alb  3.9  /  TBili  0.3  /  DBili  x   /  AST  20  /  ALT  14  /  AlkPhos  113  02-08        LIVER FUNCTIONS - ( 08 Feb 2025 21:30 )  Alb: 3.9 g/dL / Pro: 6.5 g/dL / ALK PHOS: 113 U/L / ALT: 14 U/L / AST: 20 U/L / GGT: x           Urinalysis Basic - ( 08 Feb 2025 21:30 )    Color: x / Appearance: x / SG: x / pH: x  Gluc: 100 mg/dL / Ketone: x  / Bili: x / Urobili: x   Blood: x / Protein: x / Nitrite: x   Leuk Esterase: x / RBC: x / WBC x   Sq Epi: x / Non Sq Epi: x / Bacteria: x        RADIOLOGY & ADDITIONAL STUDIES:  < from: CT Abdomen and Pelvis w/ IV Cont (02.08.25 @ 22:53) >    IMPRESSION:  Acute sigmoid diverticulitis; no abscess.    --- End of Report ---      < end of copied text >

## 2025-02-08 NOTE — ED PROVIDER NOTE - ATTENDING APP SHARED VISIT CONTRIBUTION OF CARE
I have personally performed a history and physical exam on this patient. I have personally directed the management of the patient.  Patient is c/o abd pain, LT sided abd pain, associated with diarrhea, +blood in the stool. Denies cp/sob/syncope. No trauma, no rash.   Vitals reviewed.   Patient is awake, alert, answering questions appropriately, appears comfortable and not in any distress.  Lungs: CTA, no wheezing, no crackles.  Abd: +BS, +LLQ tenderness, ND, soft,   A/P: Abd pain,   Diarrhea with blood in the stool,   Labs, analgesia, CT,   reevaluation.

## 2025-02-08 NOTE — H&P ADULT - HISTORY OF PRESENT ILLNESS
59y Female with PMHx of  COPD not on home O2, CAD on Plavix, migraines, nasopharyngeal cancer in remission, hypothyroidism, HTN, HLD, right ICA stenting in 2023, presents with left-sided abdominal pain with diarrhea X 3 days, pain worsening today.  Patient reports bloody diarrhea 3 days ago which is since resolved.  However patient states pain has persisted and worsened today.  Patient states current symptoms feel similar to past episode of diverticulitis.  Patient reports associated chills.  Denies chest pain, shortness of breath, N/V, urinary symptoms.Last admission for diverticulitis was in 2023.     In ED vitals were  T(F): 98.5  HR: 73  BP: 178/106  RR: 18  SpO2: 98%    Labs were remarkable for Hgb 10.3 ( around baseline).     CTAP: Acute sigmoid diverticulitis; no abscess.    Pt received Rocephin and Flagyl, 2 L IVF in ED. She is being admitted to medicine for further management.  59y Female with PMHx of  COPD not on home O2, CAD on Plavix, migraines, nasopharyngeal cancer in remission, hypothyroidism, HTN, HLD, right ICA stenting in 2023, presents with left-sided abdominal pain with diarrhea X 3 days, pain worsening today.  Patient reports bloody diarrhea 3 days ago which is since resolved.  However patient states pain has persisted and worsened today.  Patient states current symptoms feel similar to past episode of diverticulitis.  Patient reports associated chills.  Denies chest pain, shortness of breath, N/V, urinary symptoms. Last admission for diverticulitis was in 2023.     In ED vitals were  T(F): 98.5  HR: 73  BP: 178/106  RR: 18  SpO2: 98%    Labs were remarkable for Hgb 10.3 ( around baseline).     CTAP: Acute sigmoid diverticulitis; no abscess.    Pt received Rocephin and Flagyl, 2 L IVF in ED. She is being admitted to medicine for further management.

## 2025-02-08 NOTE — ED ADULT NURSE NOTE - NSFALLUNIVINTERV_ED_ALL_ED
Bed/Stretcher in lowest position, wheels locked, appropriate side rails in place/Call bell, personal items and telephone in reach/Instruct patient to call for assistance before getting out of bed/chair/stretcher/Non-slip footwear applied when patient is off stretcher/[place] to call system/Physically safe environment - no spills, clutter or unnecessary equipment/Purposeful proactive rounding/Room/bathroom lighting operational, light cord in reach Attending Only

## 2025-02-08 NOTE — ED PROVIDER NOTE - DIFFERENTIAL DIAGNOSIS
Pancreatitis, hepatic failure, obstructive uropathy, diverticulitis, colitis, abscess, bowel obstruction, bowel perforation. Electrolyte abnormalities, LAKISHA, and GI bleeding. Differential Diagnosis

## 2025-02-08 NOTE — ED PROVIDER NOTE - PHYSICAL EXAMINATION
Vital Signs: I have reviewed the initial vital signs.  CONSTITUTIONAL: Pt uncomfortable  SKIN: Skin exam is warm and dry, no acute rash.  HEAD: Normocephalic; atraumatic.  EYES: PERRL, EOM intact; conjunctiva and sclera clear.  ENT: No nasal discharge; airway clear.   NECK: Supple; FROM  CARD: S1, S2 normal; no murmurs, gallops, or rubs. Regular rate and rhythm.  RESP: CTAB. No wheezes, rales or rhonchi.  ABD: +LLQ tenderness. soft; non-distended; no hepatosplenomegaly.  MSK: Normal ROM. No clubbing, cyanosis or edema.

## 2025-02-08 NOTE — H&P ADULT - NSHPREVIEWOFSYSTEMS_GEN_ALL_CORE
CONSTITUTIONAL: No weakness, fevers or chills  EYES/ENT: No visual changes;  No vertigo or throat pain   NECK: No pain or stiffness  RESPIRATORY: No cough, wheezing, hemoptysis; No shortness of breath  CARDIOVASCULAR: No chest pain or palpitations  GASTROINTESTINAL: Per HPI   GENITOURINARY: No dysuria, frequency or hematuria  NEUROLOGICAL: No numbness or weakness  SKIN: No itching, rashes

## 2025-02-08 NOTE — ED ADULT TRIAGE NOTE - ACCOMPANIED BY
From: Janice Gracia  To: Regina Chowdary  Sent: 9/19/2022 6:36 AM CDT  Subject: Pat mouth sores    This message is being sent by Sharri Gracia on behalf of Janice Gracia.    Dr. Chowdary,    Good morning. I would like to get your opinion on these mouth sores Pat has. She did have a fever Saturday and was complaining of mouth and tongue pain. She has not had a fever since Saturday. Last night before bed she was complaining and now I can see 3 canker sore like ulcers under her tongue. The inside of the right cheek also has red dots on it. The left inside cheek, not as much. I was up with her at 3 am as this is very painful for her. Is there anything other than Tylenol and Motrin I can give her? Should she be seen? I'm wondering if this is what Alice had last week but didn't turn into all of of this. Both my girls had hand foot mouth virus in Nov 2021 but could this be another strain just effecting the mouth? Lastly, I'm wondering if she can go to school since she is fever free for over 24 hours and the sores are out? I will attach some pictures.     Thank you,   Sharri Gracia    Self

## 2025-02-09 DIAGNOSIS — K57.92 DIVERTICULITIS OF INTESTINE, PART UNSPECIFIED, WITHOUT PERFORATION OR ABSCESS WITHOUT BLEEDING: ICD-10-CM

## 2025-02-09 LAB
ALBUMIN SERPL ELPH-MCNC: 3.3 G/DL — LOW (ref 3.5–5.2)
ALP SERPL-CCNC: 98 U/L — SIGNIFICANT CHANGE UP (ref 30–115)
ALT FLD-CCNC: 12 U/L — SIGNIFICANT CHANGE UP (ref 0–41)
ANION GAP SERPL CALC-SCNC: 12 MMOL/L — SIGNIFICANT CHANGE UP (ref 7–14)
APPEARANCE UR: CLEAR — SIGNIFICANT CHANGE UP
AST SERPL-CCNC: 14 U/L — SIGNIFICANT CHANGE UP (ref 0–41)
BASOPHILS # BLD AUTO: 0.03 K/UL — SIGNIFICANT CHANGE UP (ref 0–0.2)
BASOPHILS NFR BLD AUTO: 0.3 % — SIGNIFICANT CHANGE UP (ref 0–1)
BILIRUB SERPL-MCNC: <0.2 MG/DL — SIGNIFICANT CHANGE UP (ref 0.2–1.2)
BILIRUB UR-MCNC: NEGATIVE — SIGNIFICANT CHANGE UP
BUN SERPL-MCNC: 18 MG/DL — SIGNIFICANT CHANGE UP (ref 10–20)
CALCIUM SERPL-MCNC: 8 MG/DL — LOW (ref 8.4–10.4)
CHLORIDE SERPL-SCNC: 109 MMOL/L — SIGNIFICANT CHANGE UP (ref 98–110)
CO2 SERPL-SCNC: 20 MMOL/L — SIGNIFICANT CHANGE UP (ref 17–32)
COLOR SPEC: YELLOW — SIGNIFICANT CHANGE UP
CREAT SERPL-MCNC: 1.2 MG/DL — SIGNIFICANT CHANGE UP (ref 0.7–1.5)
DIFF PNL FLD: ABNORMAL
EGFR: 52 ML/MIN/1.73M2 — LOW
EOSINOPHIL # BLD AUTO: 0.08 K/UL — SIGNIFICANT CHANGE UP (ref 0–0.7)
EOSINOPHIL NFR BLD AUTO: 0.8 % — SIGNIFICANT CHANGE UP (ref 0–8)
GLUCOSE SERPL-MCNC: 123 MG/DL — HIGH (ref 70–99)
GLUCOSE UR QL: NEGATIVE MG/DL — SIGNIFICANT CHANGE UP
HCT VFR BLD CALC: 29.1 % — LOW (ref 37–47)
HGB BLD-MCNC: 9 G/DL — LOW (ref 12–16)
IMM GRANULOCYTES NFR BLD AUTO: 0.5 % — HIGH (ref 0.1–0.3)
KETONES UR-MCNC: NEGATIVE MG/DL — SIGNIFICANT CHANGE UP
LEUKOCYTE ESTERASE UR-ACNC: ABNORMAL
LYMPHOCYTES # BLD AUTO: 1.28 K/UL — SIGNIFICANT CHANGE UP (ref 1.2–3.4)
LYMPHOCYTES # BLD AUTO: 12.1 % — LOW (ref 20.5–51.1)
MAGNESIUM SERPL-MCNC: 1.6 MG/DL — LOW (ref 1.8–2.4)
MCHC RBC-ENTMCNC: 24.7 PG — LOW (ref 27–31)
MCHC RBC-ENTMCNC: 30.9 G/DL — LOW (ref 32–37)
MCV RBC AUTO: 79.7 FL — LOW (ref 81–99)
MONOCYTES # BLD AUTO: 0.55 K/UL — SIGNIFICANT CHANGE UP (ref 0.1–0.6)
MONOCYTES NFR BLD AUTO: 5.2 % — SIGNIFICANT CHANGE UP (ref 1.7–9.3)
NEUTROPHILS # BLD AUTO: 8.57 K/UL — HIGH (ref 1.4–6.5)
NEUTROPHILS NFR BLD AUTO: 81.1 % — HIGH (ref 42.2–75.2)
NITRITE UR-MCNC: NEGATIVE — SIGNIFICANT CHANGE UP
NRBC # BLD: 0 /100 WBCS — SIGNIFICANT CHANGE UP (ref 0–0)
NRBC BLD-RTO: 0 /100 WBCS — SIGNIFICANT CHANGE UP (ref 0–0)
PH UR: 5.5 — SIGNIFICANT CHANGE UP (ref 5–8)
PLATELET # BLD AUTO: 272 K/UL — SIGNIFICANT CHANGE UP (ref 130–400)
PMV BLD: 10.7 FL — HIGH (ref 7.4–10.4)
POTASSIUM SERPL-MCNC: 4.2 MMOL/L — SIGNIFICANT CHANGE UP (ref 3.5–5)
POTASSIUM SERPL-SCNC: 4.2 MMOL/L — SIGNIFICANT CHANGE UP (ref 3.5–5)
PROT SERPL-MCNC: 5.6 G/DL — LOW (ref 6–8)
PROT UR-MCNC: 30 MG/DL
RBC # BLD: 3.65 M/UL — LOW (ref 4.2–5.4)
RBC # FLD: 18.1 % — HIGH (ref 11.5–14.5)
SODIUM SERPL-SCNC: 141 MMOL/L — SIGNIFICANT CHANGE UP (ref 135–146)
SP GR SPEC: >1.03 — HIGH (ref 1–1.03)
UROBILINOGEN FLD QL: 0.2 MG/DL — SIGNIFICANT CHANGE UP (ref 0.2–1)
WBC # BLD: 10.56 K/UL — SIGNIFICANT CHANGE UP (ref 4.8–10.8)
WBC # FLD AUTO: 10.56 K/UL — SIGNIFICANT CHANGE UP (ref 4.8–10.8)

## 2025-02-09 PROCEDURE — 74177 CT ABD & PELVIS W/CONTRAST: CPT | Mod: MC

## 2025-02-09 PROCEDURE — 81001 URINALYSIS AUTO W/SCOPE: CPT

## 2025-02-09 PROCEDURE — 99223 1ST HOSP IP/OBS HIGH 75: CPT

## 2025-02-09 PROCEDURE — 94640 AIRWAY INHALATION TREATMENT: CPT

## 2025-02-09 PROCEDURE — 85027 COMPLETE CBC AUTOMATED: CPT

## 2025-02-09 PROCEDURE — 99222 1ST HOSP IP/OBS MODERATE 55: CPT

## 2025-02-09 PROCEDURE — 85025 COMPLETE CBC W/AUTO DIFF WBC: CPT

## 2025-02-09 PROCEDURE — 87086 URINE CULTURE/COLONY COUNT: CPT

## 2025-02-09 PROCEDURE — 83735 ASSAY OF MAGNESIUM: CPT

## 2025-02-09 PROCEDURE — 80053 COMPREHEN METABOLIC PANEL: CPT

## 2025-02-09 PROCEDURE — 99232 SBSQ HOSP IP/OBS MODERATE 35: CPT

## 2025-02-09 PROCEDURE — 36415 COLL VENOUS BLD VENIPUNCTURE: CPT

## 2025-02-09 RX ORDER — ATORVASTATIN CALCIUM 80 MG/1
80 TABLET, FILM COATED ORAL AT BEDTIME
Refills: 0 | Status: DISCONTINUED | OUTPATIENT
Start: 2025-02-09 | End: 2025-02-12

## 2025-02-09 RX ORDER — MAGNESIUM, ALUMINUM HYDROXIDE 200-225/5
30 SUSPENSION, ORAL (FINAL DOSE FORM) ORAL EVERY 4 HOURS
Refills: 0 | Status: DISCONTINUED | OUTPATIENT
Start: 2025-02-09 | End: 2025-02-12

## 2025-02-09 RX ORDER — FLUTICASONE PROPIONATE AND SALMETEROL 113; 14 UG/1; UG/1
1 POWDER, METERED RESPIRATORY (INHALATION)
Refills: 0 | Status: DISCONTINUED | OUTPATIENT
Start: 2025-02-09 | End: 2025-02-12

## 2025-02-09 RX ORDER — ACETAMINOPHEN, DIPHENHYDRAMINE HCL, PHENYLEPHRINE HCL 325; 25; 5 MG/1; MG/1; MG/1
3 TABLET ORAL AT BEDTIME
Refills: 0 | Status: DISCONTINUED | OUTPATIENT
Start: 2025-02-09 | End: 2025-02-12

## 2025-02-09 RX ORDER — TICAGRELOR 90 MG/1
90 TABLET ORAL EVERY 12 HOURS
Refills: 0 | Status: DISCONTINUED | OUTPATIENT
Start: 2025-02-09 | End: 2025-02-12

## 2025-02-09 RX ORDER — MONTELUKAST SODIUM 5 MG/1
10 TABLET, CHEWABLE ORAL DAILY
Refills: 0 | Status: DISCONTINUED | OUTPATIENT
Start: 2025-02-09 | End: 2025-02-12

## 2025-02-09 RX ORDER — TICAGRELOR 90 MG/1
1 TABLET ORAL
Refills: 0 | DISCHARGE

## 2025-02-09 RX ORDER — TIZANIDINE HCL 4 MG
4 TABLET ORAL THREE TIMES A DAY
Refills: 0 | Status: DISCONTINUED | OUTPATIENT
Start: 2025-02-09 | End: 2025-02-12

## 2025-02-09 RX ORDER — ASPIRIN 81 MG/1
81 TABLET, COATED ORAL DAILY
Refills: 0 | Status: DISCONTINUED | OUTPATIENT
Start: 2025-02-09 | End: 2025-02-12

## 2025-02-09 RX ORDER — METRONIDAZOLE 500 MG
500 TABLET ORAL EVERY 12 HOURS
Refills: 0 | Status: DISCONTINUED | OUTPATIENT
Start: 2025-02-09 | End: 2025-02-11

## 2025-02-09 RX ORDER — ASPIRIN 81 MG/1
1 TABLET, COATED ORAL
Refills: 0 | DISCHARGE

## 2025-02-09 RX ORDER — ACETAMINOPHEN 160 MG/5ML
650 SUSPENSION ORAL EVERY 6 HOURS
Refills: 0 | Status: DISCONTINUED | OUTPATIENT
Start: 2025-02-09 | End: 2025-02-12

## 2025-02-09 RX ORDER — CARVEDILOL 6.25 MG
6.25 TABLET ORAL EVERY 12 HOURS
Refills: 0 | Status: DISCONTINUED | OUTPATIENT
Start: 2025-02-09 | End: 2025-02-12

## 2025-02-09 RX ORDER — CLONAZEPAM 2 MG
1 TABLET ORAL
Refills: 0 | Status: DISCONTINUED | OUTPATIENT
Start: 2025-02-09 | End: 2025-02-12

## 2025-02-09 RX ORDER — HYDROMORPHONE HYDROCHLORIDE 4 MG/ML
0.5 INJECTION, SOLUTION INTRAMUSCULAR; INTRAVENOUS; SUBCUTANEOUS EVERY 4 HOURS
Refills: 0 | Status: DISCONTINUED | OUTPATIENT
Start: 2025-02-09 | End: 2025-02-11

## 2025-02-09 RX ORDER — ONDANSETRON 4 MG/1
4 TABLET, ORALLY DISINTEGRATING ORAL EVERY 8 HOURS
Refills: 0 | Status: DISCONTINUED | OUTPATIENT
Start: 2025-02-09 | End: 2025-02-12

## 2025-02-09 RX ORDER — ESCITALOPRAM 10 MG/1
5 TABLET, FILM COATED ORAL DAILY
Refills: 0 | Status: DISCONTINUED | OUTPATIENT
Start: 2025-02-09 | End: 2025-02-12

## 2025-02-09 RX ORDER — BACTERIOSTATIC SODIUM CHLORIDE 0.9 %
1000 VIAL (ML) INJECTION
Refills: 0 | Status: DISCONTINUED | OUTPATIENT
Start: 2025-02-09 | End: 2025-02-09

## 2025-02-09 RX ORDER — ZOLPIDEM TARTRATE 5 MG/1
5 TABLET, COATED ORAL AT BEDTIME
Refills: 0 | Status: DISCONTINUED | OUTPATIENT
Start: 2025-02-09 | End: 2025-02-11

## 2025-02-09 RX ORDER — ALBUTEROL 90 MCG
2 AEROSOL REFILL (GRAM) INHALATION EVERY 6 HOURS
Refills: 0 | Status: DISCONTINUED | OUTPATIENT
Start: 2025-02-09 | End: 2025-02-12

## 2025-02-09 RX ORDER — LEVOTHYROXINE SODIUM 25 UG/1
125 TABLET ORAL DAILY
Refills: 0 | Status: DISCONTINUED | OUTPATIENT
Start: 2025-02-09 | End: 2025-02-12

## 2025-02-09 RX ORDER — SENNOSIDES 8.6 MG
2 TABLET ORAL AT BEDTIME
Refills: 0 | Status: DISCONTINUED | OUTPATIENT
Start: 2025-02-09 | End: 2025-02-12

## 2025-02-09 RX ORDER — TIOTROPIUM BROMIDE MONOHYDRATE 18 UG/1
2 CAPSULE ORAL; RESPIRATORY (INHALATION) DAILY
Refills: 0 | Status: DISCONTINUED | OUTPATIENT
Start: 2025-02-09 | End: 2025-02-12

## 2025-02-09 RX ORDER — PANTOPRAZOLE 20 MG/1
40 TABLET, DELAYED RELEASE ORAL
Refills: 0 | Status: DISCONTINUED | OUTPATIENT
Start: 2025-02-09 | End: 2025-02-12

## 2025-02-09 RX ORDER — BACLOFEN 100 %
10 POWDER (GRAM) MISCELLANEOUS DAILY
Refills: 0 | Status: DISCONTINUED | OUTPATIENT
Start: 2025-02-09 | End: 2025-02-12

## 2025-02-09 RX ORDER — CEFTRIAXONE 250 MG/1
1000 INJECTION, POWDER, FOR SOLUTION INTRAMUSCULAR; INTRAVENOUS EVERY 24 HOURS
Refills: 0 | Status: DISCONTINUED | OUTPATIENT
Start: 2025-02-09 | End: 2025-02-12

## 2025-02-09 RX ADMIN — HYDROMORPHONE HYDROCHLORIDE 0.5 MILLIGRAM(S): 4 INJECTION, SOLUTION INTRAMUSCULAR; INTRAVENOUS; SUBCUTANEOUS at 01:09

## 2025-02-09 RX ADMIN — TICAGRELOR 90 MILLIGRAM(S): 90 TABLET ORAL at 17:42

## 2025-02-09 RX ADMIN — ATORVASTATIN CALCIUM 80 MILLIGRAM(S): 80 TABLET, FILM COATED ORAL at 22:40

## 2025-02-09 RX ADMIN — Medication 1 MILLIGRAM(S): at 03:41

## 2025-02-09 RX ADMIN — Medication 100 MILLIGRAM(S): at 17:41

## 2025-02-09 RX ADMIN — Medication 4 MILLIGRAM(S): at 14:48

## 2025-02-09 RX ADMIN — Medication 10 MILLIGRAM(S): at 05:32

## 2025-02-09 RX ADMIN — Medication 1 MILLIGRAM(S): at 23:13

## 2025-02-09 RX ADMIN — Medication 6.25 MILLIGRAM(S): at 06:11

## 2025-02-09 RX ADMIN — FLUTICASONE PROPIONATE AND SALMETEROL 1 DOSE(S): 113; 14 POWDER, METERED RESPIRATORY (INHALATION) at 07:58

## 2025-02-09 RX ADMIN — Medication 75 MILLILITER(S): at 11:27

## 2025-02-09 RX ADMIN — ACETAMINOPHEN, DIPHENHYDRAMINE HCL, PHENYLEPHRINE HCL 3 MILLIGRAM(S): 325; 25; 5 TABLET ORAL at 03:41

## 2025-02-09 RX ADMIN — Medication 1000 MILLILITER(S): at 00:31

## 2025-02-09 RX ADMIN — Medication 100 MILLIGRAM(S): at 00:31

## 2025-02-09 RX ADMIN — Medication 75 MILLILITER(S): at 03:42

## 2025-02-09 RX ADMIN — Medication 4 MILLIGRAM(S): at 22:40

## 2025-02-09 RX ADMIN — HYDROMORPHONE HYDROCHLORIDE 0.5 MILLIGRAM(S): 4 INJECTION, SOLUTION INTRAMUSCULAR; INTRAVENOUS; SUBCUTANEOUS at 05:31

## 2025-02-09 RX ADMIN — ACETAMINOPHEN 650 MILLIGRAM(S): 160 SUSPENSION ORAL at 03:44

## 2025-02-09 RX ADMIN — ASPIRIN 81 MILLIGRAM(S): 81 TABLET, COATED ORAL at 11:27

## 2025-02-09 RX ADMIN — Medication 100 MILLIGRAM(S): at 05:35

## 2025-02-09 RX ADMIN — LEVOTHYROXINE SODIUM 125 MICROGRAM(S): 25 TABLET ORAL at 05:32

## 2025-02-09 RX ADMIN — ONDANSETRON 4 MILLIGRAM(S): 4 TABLET, ORALLY DISINTEGRATING ORAL at 07:55

## 2025-02-09 RX ADMIN — TIOTROPIUM BROMIDE MONOHYDRATE 2 PUFF(S): 18 CAPSULE ORAL; RESPIRATORY (INHALATION) at 07:53

## 2025-02-09 RX ADMIN — MONTELUKAST SODIUM 10 MILLIGRAM(S): 5 TABLET, CHEWABLE ORAL at 11:28

## 2025-02-09 RX ADMIN — CEFTRIAXONE 100 MILLIGRAM(S): 250 INJECTION, POWDER, FOR SOLUTION INTRAMUSCULAR; INTRAVENOUS at 00:31

## 2025-02-09 RX ADMIN — TICAGRELOR 90 MILLIGRAM(S): 90 TABLET ORAL at 05:31

## 2025-02-09 RX ADMIN — PANTOPRAZOLE 40 MILLIGRAM(S): 20 TABLET, DELAYED RELEASE ORAL at 05:31

## 2025-02-09 RX ADMIN — Medication 10 MILLIGRAM(S): at 06:11

## 2025-02-09 RX ADMIN — CEFTRIAXONE 100 MILLIGRAM(S): 250 INJECTION, POWDER, FOR SOLUTION INTRAMUSCULAR; INTRAVENOUS at 05:34

## 2025-02-09 RX ADMIN — Medication 10 MILLIGRAM(S): at 11:27

## 2025-02-09 RX ADMIN — ONDANSETRON 4 MILLIGRAM(S): 4 TABLET, ORALLY DISINTEGRATING ORAL at 00:31

## 2025-02-09 RX ADMIN — Medication 75 MILLILITER(S): at 14:48

## 2025-02-09 NOTE — CONSULT NOTE ADULT - ASSESSMENT
59y Female with PMHx of  COPD not on home O2, CAD on Plavix, migraines, nasopharyngeal cancer in remission, hypothyroidism, HTN, HLD, right ICA stenting in 2023, presents with left-sided abdominal pain with diarrhea X 3 days,.  Patient reports bloody diarrhea 3 days ago which is since resolved.  However patient states pain has persisted and worsened today.  Patient states current symptoms feel similar to past episode of diverticulitis.  Patient reports associated chills.  Denies chest pain, shortness of breath, N/V, urinary symptoms. Last admission for diverticulitis was in 2023.     CTAP: Acute sigmoid diverticulitis; no abscess.  Pt received Rocephin and Flagyl, 2 L IVF in ED. She is being admitted to medicine for further management.    GI is called for evaluation.   reports multiple prior episodes of diverticulitis. reports last colonoscopy with Dr. Bryan last year     # Acute uncomplicated sigmoid diverticulitis:  - bowel rest, BPO, IV fluid  - IV ABx  - frequent abdominal exam  - wants to follow up with Dr. bryan as outpatient  - advised colorectal eval for sigmoid resection give reports multiple prior episodes of diverticulitis.    59y Female with PMHx of  COPD not on home O2, CAD on Plavix, migraines, nasopharyngeal cancer in remission, hypothyroidism, HTN, HLD, right ICA stenting in 2023, presents with left-sided abdominal pain with diarrhea X 3 days,.  Patient reports bloody diarrhea 3 days ago which is since resolved.  However patient states pain has persisted and worsened today.  Patient states current symptoms feel similar to past episode of diverticulitis.  Patient reports associated chills.  Denies chest pain, shortness of breath, N/V, urinary symptoms. Last admission for diverticulitis was in 2023.     CTAP: Acute sigmoid diverticulitis; no abscess.  Pt received Rocephin and Flagyl, 2 L IVF in ED. She is being admitted to medicine for further management.    GI is called for evaluation.   reports multiple prior episodes of diverticulitis. reports last colonoscopy with Dr. Bryan last year     # Acute uncomplicated sigmoid diverticulitis:  - bowel rest, BPO, IV fluid  - IV ABx  - frequent abdominal exam  - wants to follow up with Dr. bryan as outpatient  - advised colorectal eval for sigmoid resection given reports multiple prior episodes of diverticulitis.   will follow up

## 2025-02-09 NOTE — PROGRESS NOTE ADULT - SUBJECTIVE AND OBJECTIVE BOX
24H events:    Patient is a 59y old Female who presents with a chief complaint of abdominal pain (08 Feb 2025 23:58)    Primary diagnosis of Diverticulitis       Today is hospital day . This morning patient was seen and examined at bedside, resting comfortably in bed.      PAST MEDICAL & SURGICAL HISTORY  COPD (chronic obstructive pulmonary disease)    HTN (hypertension)    Diverticulitis    Pulmonary nodules/lesions, multiple    Nasopharyngeal cancer    Hypothyroid    Cervical disc disease  sequelae of radtion for nasopharyngeal cancer    Left ear hearing loss    Hemorrhoids    H/O carotid stenosis    Anxiety    Morbid obesity with BMI of 45.0-49.9, adult    H/O dental abscess    HLD (hyperlipidemia)    Carotid artery disease    Obstructive sleep apnea on CPAP    OA (osteoarthritis)    Bilateral knee pain    Depression    History of cholecystectomy    History of common carotid artery stent placement      SOCIAL HISTORY:  Social History:      ALLERGIES:  ciprofloxacin (Urticaria; Other)    MEDICATIONS:  STANDING MEDICATIONS  aspirin enteric coated 81 milliGRAM(s) Oral daily  atorvastatin 80 milliGRAM(s) Oral at bedtime  baclofen 10 milliGRAM(s) Oral daily  carvedilol 6.25 milliGRAM(s) Oral every 12 hours  cefTRIAXone   IVPB 1000 milliGRAM(s) IV Intermittent every 24 hours  escitalopram 5 milliGRAM(s) Oral daily  fluticasone propionate/ salmeterol 100-50 MICROgram(s) Diskus 1 Dose(s) Inhalation two times a day  influenza   Vaccine 0.5 milliLiter(s) IntraMuscular once  levothyroxine 125 MICROGram(s) Oral daily  lisinopril 10 milliGRAM(s) Oral daily  metroNIDAZOLE  IVPB 500 milliGRAM(s) IV Intermittent every 12 hours  montelukast 10 milliGRAM(s) Oral daily  pantoprazole    Tablet 40 milliGRAM(s) Oral before breakfast  senna 2 Tablet(s) Oral at bedtime  sodium chloride 0.9%. 1000 milliLiter(s) IV Continuous <Continuous>  ticagrelor 90 milliGRAM(s) Oral every 12 hours  tiotropium 2.5 MICROgram(s) Inhaler 2 Puff(s) Inhalation daily  tiZANidine 4 milliGRAM(s) Oral three times a day    PRN MEDICATIONS  acetaminophen     Tablet .. 650 milliGRAM(s) Oral every 6 hours PRN  albuterol    90 MICROgram(s) HFA Inhaler 2 Puff(s) Inhalation every 6 hours PRN  aluminum hydroxide/magnesium hydroxide/simethicone Suspension 30 milliLiter(s) Oral every 4 hours PRN  clonazePAM  Tablet 1 milliGRAM(s) Oral two times a day PRN  HYDROmorphone  Injectable 0.5 milliGRAM(s) IV Push every 4 hours PRN  hydrOXYzine hydrochloride 10 milliGRAM(s) Oral three times a day PRN  melatonin 3 milliGRAM(s) Oral at bedtime PRN  ondansetron Injectable 4 milliGRAM(s) IV Push every 8 hours PRN    VITALS:   T(F): 98.7  HR: 90  BP: 169/77  RR: 18  SpO2: 98%    PHYSICAL EXAM:  GENERAL: NAD, well-groomed, well-developed  HEAD:  Atraumatic, Normocephalic  EYES: EOMI  NECK: Supple  NERVOUS SYSTEM:  Alert & Oriented X3, non focal   CHEST/LUNG: Clear to auscultation bilaterally; No rales, rhonchi, wheezing, or rubs  HEART: Regular rate and rhythm; No murmurs, rubs, or gallops  ABDOMEN: Soft, Nontender, Nondistended; Bowel sounds present; tenderness of LLQ  EXTREMITIES:  2+ Peripheral Pulses, No clubbing, cyanosis, or edema  LYMPH: No lymphadenopathy noted  SKIN: No rashes or lesions  LABS:                        9.0    10.56 )-----------( 272      ( 09 Feb 2025 08:30 )             29.1     02-09    141  |  109  |  18  ----------------------------<  123[H]  4.2   |  20  |  1.2    Ca    8.0[L]      09 Feb 2025 08:30  Mg     1.6     02-09    TPro  5.6[L]  /  Alb  3.3[L]  /  TBili  <0.2  /  DBili  x   /  AST  14  /  ALT  12  /  AlkPhos  98  02-09      Urinalysis Basic - ( 09 Feb 2025 08:30 )    Color: x / Appearance: x / SG: x / pH: x  Gluc: 123 mg/dL / Ketone: x  / Bili: x / Urobili: x   Blood: x / Protein: x / Nitrite: x   Leuk Esterase: x / RBC: x / WBC x   Sq Epi: x / Non Sq Epi: x / Bacteria: x            Urinalysis with Rflx Culture (collected 09 Feb 2025 00:42)          RADIOLOGY:           24H events:    Patient is a 59y old Female who presents with a chief complaint of abdominal pain (08 Feb 2025 23:58)    Primary diagnosis of Diverticulitis       Today is hospital day . This morning patient was seen and examined at bedside, resting comfortably in bed.      PAST MEDICAL & SURGICAL HISTORY  COPD (chronic obstructive pulmonary disease)    HTN (hypertension)    Diverticulitis    Pulmonary nodules/lesions, multiple    Nasopharyngeal cancer    Hypothyroid    Cervical disc disease  sequelae of radtion for nasopharyngeal cancer    Left ear hearing loss    Hemorrhoids    H/O carotid stenosis    Anxiety    Morbid obesity with BMI of 45.0-49.9, adult    H/O dental abscess    HLD (hyperlipidemia)    Carotid artery disease    Obstructive sleep apnea on CPAP    OA (osteoarthritis)    Bilateral knee pain    Depression    History of cholecystectomy    History of common carotid artery stent placement    ALLERGIES:  ciprofloxacin (Urticaria; Other)    MEDICATIONS:  STANDING MEDICATIONS  aspirin enteric coated 81 milliGRAM(s) Oral daily  atorvastatin 80 milliGRAM(s) Oral at bedtime  baclofen 10 milliGRAM(s) Oral daily  carvedilol 6.25 milliGRAM(s) Oral every 12 hours  cefTRIAXone   IVPB 1000 milliGRAM(s) IV Intermittent every 24 hours  escitalopram 5 milliGRAM(s) Oral daily  fluticasone propionate/ salmeterol 100-50 MICROgram(s) Diskus 1 Dose(s) Inhalation two times a day  influenza   Vaccine 0.5 milliLiter(s) IntraMuscular once  levothyroxine 125 MICROGram(s) Oral daily  lisinopril 10 milliGRAM(s) Oral daily  metroNIDAZOLE  IVPB 500 milliGRAM(s) IV Intermittent every 12 hours  montelukast 10 milliGRAM(s) Oral daily  pantoprazole    Tablet 40 milliGRAM(s) Oral before breakfast  senna 2 Tablet(s) Oral at bedtime  sodium chloride 0.9%. 1000 milliLiter(s) IV Continuous <Continuous>  ticagrelor 90 milliGRAM(s) Oral every 12 hours  tiotropium 2.5 MICROgram(s) Inhaler 2 Puff(s) Inhalation daily  tiZANidine 4 milliGRAM(s) Oral three times a day    PRN MEDICATIONS  acetaminophen     Tablet .. 650 milliGRAM(s) Oral every 6 hours PRN  albuterol    90 MICROgram(s) HFA Inhaler 2 Puff(s) Inhalation every 6 hours PRN  aluminum hydroxide/magnesium hydroxide/simethicone Suspension 30 milliLiter(s) Oral every 4 hours PRN  clonazePAM  Tablet 1 milliGRAM(s) Oral two times a day PRN  HYDROmorphone  Injectable 0.5 milliGRAM(s) IV Push every 4 hours PRN  hydrOXYzine hydrochloride 10 milliGRAM(s) Oral three times a day PRN  melatonin 3 milliGRAM(s) Oral at bedtime PRN  ondansetron Injectable 4 milliGRAM(s) IV Push every 8 hours PRN    VITALS:   T(F): 98.7  HR: 90  BP: 169/77  RR: 18  SpO2: 98%    PHYSICAL EXAM:  GENERAL: NAD, well-groomed, well-developed  HEAD:  Atraumatic, Normocephalic  EYES: EOMI  NECK: Supple  NERVOUS SYSTEM:  Alert & Oriented X3, non focal   CHEST/LUNG: Clear to auscultation bilaterally; No rales, rhonchi, wheezing, or rubs, on RA  HEART: Regular rate and rhythm; No murmurs, rubs, or gallops  ABDOMEN: Soft, Nontender, Nondistended; Bowel sounds present; tenderness of LLQ  EXTREMITIES:  2+ Peripheral Pulses, No clubbing, cyanosis, or edema  LYMPH: No lymphadenopathy noted  SKIN: No rashes or lesions  LABS:                        9.0    10.56 )-----------( 272      ( 09 Feb 2025 08:30 )             29.1     02-09    141  |  109  |  18  ----------------------------<  123[H]  4.2   |  20  |  1.2    Ca    8.0[L]      09 Feb 2025 08:30  Mg     1.6     02-09    TPro  5.6[L]  /  Alb  3.3[L]  /  TBili  <0.2  /  DBili  x   /  AST  14  /  ALT  12  /  AlkPhos  98  02-09      Urinalysis Basic - ( 09 Feb 2025 08:30 )    Color: x / Appearance: x / SG: x / pH: x  Gluc: 123 mg/dL / Ketone: x  / Bili: x / Urobili: x   Blood: x / Protein: x / Nitrite: x   Leuk Esterase: x / RBC: x / WBC x   Sq Epi: x / Non Sq Epi: x / Bacteria: x            Urinalysis with Rflx Culture (collected 09 Feb 2025 00:42)   24H events:    Patient is a 59y old Female who presents with a chief complaint of abdominal pain (08 Feb 2025 23:58)    Primary diagnosis of Diverticulitis       Today is hospital day . This morning patient was seen and examined at bedside, resting comfortably in bed.      still having abominal pain. dec po intake still     PAST MEDICAL & SURGICAL HISTORY  COPD (chronic obstructive pulmonary disease)    HTN (hypertension)    Diverticulitis    Pulmonary nodules/lesions, multiple    Nasopharyngeal cancer    Hypothyroid    Cervical disc disease  sequelae of radtion for nasopharyngeal cancer    Left ear hearing loss    Hemorrhoids    H/O carotid stenosis    Anxiety    Morbid obesity with BMI of 45.0-49.9, adult    H/O dental abscess    HLD (hyperlipidemia)    Carotid artery disease    Obstructive sleep apnea on CPAP    OA (osteoarthritis)    Bilateral knee pain    Depression    History of cholecystectomy    History of common carotid artery stent placement    ALLERGIES:  ciprofloxacin (Urticaria; Other)    MEDICATIONS:  STANDING MEDICATIONS  aspirin enteric coated 81 milliGRAM(s) Oral daily  atorvastatin 80 milliGRAM(s) Oral at bedtime  baclofen 10 milliGRAM(s) Oral daily  carvedilol 6.25 milliGRAM(s) Oral every 12 hours  cefTRIAXone   IVPB 1000 milliGRAM(s) IV Intermittent every 24 hours  escitalopram 5 milliGRAM(s) Oral daily  fluticasone propionate/ salmeterol 100-50 MICROgram(s) Diskus 1 Dose(s) Inhalation two times a day  influenza   Vaccine 0.5 milliLiter(s) IntraMuscular once  levothyroxine 125 MICROGram(s) Oral daily  lisinopril 10 milliGRAM(s) Oral daily  metroNIDAZOLE  IVPB 500 milliGRAM(s) IV Intermittent every 12 hours  montelukast 10 milliGRAM(s) Oral daily  pantoprazole    Tablet 40 milliGRAM(s) Oral before breakfast  senna 2 Tablet(s) Oral at bedtime  sodium chloride 0.9%. 1000 milliLiter(s) IV Continuous <Continuous>  ticagrelor 90 milliGRAM(s) Oral every 12 hours  tiotropium 2.5 MICROgram(s) Inhaler 2 Puff(s) Inhalation daily  tiZANidine 4 milliGRAM(s) Oral three times a day    PRN MEDICATIONS  acetaminophen     Tablet .. 650 milliGRAM(s) Oral every 6 hours PRN  albuterol    90 MICROgram(s) HFA Inhaler 2 Puff(s) Inhalation every 6 hours PRN  aluminum hydroxide/magnesium hydroxide/simethicone Suspension 30 milliLiter(s) Oral every 4 hours PRN  clonazePAM  Tablet 1 milliGRAM(s) Oral two times a day PRN  HYDROmorphone  Injectable 0.5 milliGRAM(s) IV Push every 4 hours PRN  hydrOXYzine hydrochloride 10 milliGRAM(s) Oral three times a day PRN  melatonin 3 milliGRAM(s) Oral at bedtime PRN  ondansetron Injectable 4 milliGRAM(s) IV Push every 8 hours PRN    VITALS:   T(F): 98.7  HR: 90  BP: 169/77  RR: 18  SpO2: 98%    PHYSICAL EXAM:  GENERAL: NAD, well-groomed, well-developed  HEAD:  Atraumatic, Normocephalic  EYES: EOMI  NECK: Supple  NERVOUS SYSTEM:  Alert & Oriented X3, non focal   CHEST/LUNG: Clear to auscultation bilaterally; No rales, rhonchi, wheezing, or rubs, on RA  HEART: Regular rate and rhythm; No murmurs, rubs, or gallops  ABDOMEN: Soft,  , Nondistended; Bowel sounds present; tenderness of LLQ  EXTREMITIES:  2+ Peripheral Pulses, No clubbing, cyanosis, or edema  LYMPH: No lymphadenopathy noted  SKIN: No rashes or lesions  LABS:                        9.0    10.56 )-----------( 272      ( 09 Feb 2025 08:30 )             29.1     02-09    141  |  109  |  18  ----------------------------<  123[H]  4.2   |  20  |  1.2    Ca    8.0[L]      09 Feb 2025 08:30  Mg     1.6     02-09    TPro  5.6[L]  /  Alb  3.3[L]  /  TBili  <0.2  /  DBili  x   /  AST  14  /  ALT  12  /  AlkPhos  98  02-09      Urinalysis Basic - ( 09 Feb 2025 08:30 )    Color: x / Appearance: x / SG: x / pH: x  Gluc: 123 mg/dL / Ketone: x  / Bili: x / Urobili: x   Blood: x / Protein: x / Nitrite: x   Leuk Esterase: x / RBC: x / WBC x   Sq Epi: x / Non Sq Epi: x / Bacteria: x            Urinalysis with Rflx Culture (collected 09 Feb 2025 00:42)

## 2025-02-09 NOTE — PROGRESS NOTE ADULT - ASSESSMENT
59y Female with PMHx of  COPD not on home O2, CAD on Plavix, migraines, nasopharyngeal cancer in remission, hypothyroidism, HTN, HLD, right ICA stenting in 2023, ?DM (A1C 6.6 in 04/2024) presents with left-sided abdominal pain with diarrhea X 3 days, pain worsening today.  Patient reports bloody diarrhea 3 days ago which is since resolved.  However patient states pain has persisted and worsened today.  Patient states current symptoms feel similar to past episode of diverticulitis.  Patient reports associated chills.  Denies chest pain, shortness of breath, N/V, urinary symptoms. Last admission for diverticulitis was in 2023.     PLAN:     #LLQ abdominal pain   #Acute sigmoid diverticulitis   #Hx diverticular disease with multiple episodes of diverticulitis in the past   #Hx of Nasopharyngeal CA in remission   - LLQ pain  - bloody diarrhea ~3 days ago - resolved now   - Advance diet as tolerated  ---> monitor BMs  - C/w IVF   - GI eval   - C/w Ceftriaxone and Flagyl  - Hgb stable   - Keep active T&S and trend H&H   - Transfuse PRN to keep Hgb > 8     #COPD not on home O2   - not in exacerbation   - c/w home inhalers     # ? DM  - last A1C 6.6 from 04/2024.  - not on any meds  - monitor FS. start SSI if FS > 200.  - check A1C    #CAD   #HTN  #HLD   #R ICA stenting in 2023   - c/w Aspirin and Brilinta   - c/w Olmesartan and Corg   - follow up with Neuro OP     #Hypothyroidism   - c/w Synthroid     #DVT PPx: SCD  #GI PPx: PPI   #Diet: Advance as tolerated   #Activity: IAT .     Pending: GI recs

## 2025-02-09 NOTE — PATIENT PROFILE ADULT - FALL HARM RISK - RISK INTERVENTIONS
Assistance OOB with selected safe patient handling equipment/Assistance with ambulation/Communicate Fall Risk and Risk Factors to all staff, patient, and family/Discuss with provider need for PT consult/Monitor gait and stability/Reinforce activity limits and safety measures with patient and family/Visual Cue: Yellow wristband/Bed in lowest position, wheels locked, appropriate side rails in place/Call bell, personal items and telephone in reach/Instruct patient to call for assistance before getting out of bed or chair/Non-slip footwear when patient is out of bed/[place] to call system/Physically safe environment - no spills, clutter or unnecessary equipment/Purposeful Proactive Rounding/Room/bathroom lighting operational, light cord in reach

## 2025-02-09 NOTE — CONSULT NOTE ADULT - SUBJECTIVE AND OBJECTIVE BOX
Gastroenterology Consultation:    Patient is a 59y old  Female who presents with a chief complaint of abdominal pain (09 Feb 2025 11:30)        Admitted on: 02-09-25      HPI:  59y Female with PMHx of  COPD not on home O2, CAD on Plavix, migraines, nasopharyngeal cancer in remission, hypothyroidism, HTN, HLD, right ICA stenting in 2023, presents with left-sided abdominal pain with diarrhea X 3 days,.  Patient reports bloody diarrhea 3 days ago which is since resolved.  However patient states pain has persisted and worsened today.  Patient states current symptoms feel similar to past episode of diverticulitis.  Patient reports associated chills.  Denies chest pain, shortness of breath, N/V, urinary symptoms. Last admission for diverticulitis was in 2023.     CTAP: Acute sigmoid diverticulitis; no abscess.  Pt received Rocephin and Flagyl, 2 L IVF in ED. She is being admitted to medicine for further management.    GI is called for evaluation.   reports multiple prior episodes of diverticulitis. reports last colonoscopy with Dr. Pelayo last year       Prior EGD: none on chart     Prior Colonoscopy: none on chart         PAST MEDICAL & SURGICAL HISTORY:  COPD (chronic obstructive pulmonary disease)      HTN (hypertension)      Diverticulitis      Pulmonary nodules/lesions, multiple      Nasopharyngeal cancer      Hypothyroid      Cervical disc disease  sequelae of radtion for nasopharyngeal cancer      Left ear hearing loss      Hemorrhoids      H/O carotid stenosis      Anxiety      Morbid obesity with BMI of 45.0-49.9, adult      H/O dental abscess      HLD (hyperlipidemia)      Carotid artery disease      Obstructive sleep apnea on CPAP      OA (osteoarthritis)      Bilateral knee pain      Depression      History of cholecystectomy      History of common carotid artery stent placement            FAMILY HISTORY:  Family history of lung cancer (Father, Mother)        Social History:  Tobacco: denies  Alcohol: denies   Drugs: denies     Home Medications:  aspirin 81 mg oral tablet: 1 tab(s) orally once a day (09 Feb 2025 01:05)  baclofen 10 mg oral tablet: 1 tab(s) orally once a day (18 Apr 2024 16:45)  Brilinta (ticagrelor) 90 mg oral tablet: 1 tab(s) orally 2 times a day (09 Feb 2025 01:05)  buPROPion 450 mg/24 hours (XL) oral tablet, extended release: 1 orally once a day (18 Apr 2024 16:47)  clonazePAM 1 mg oral tablet: 1 tab(s) orally 2 times a day (18 Apr 2024 16:47)  Coreg 6.25 mg oral tablet: 1 tab(s) orally 2 times a day (09 Feb 2025 05:55)  docusate sodium 100 mg oral tablet: 1 tab(s) orally 2 times a day (18 Apr 2024 16:47)  escitalopram 5 mg oral tablet: 1 tab(s) orally once a day (09 Feb 2025 05:55)  hydrOXYzine hydrochloride 10 mg oral tablet: 1 tab(s) orally 3 times a day as needed for  anxiety (18 Apr 2024 16:47)  lisinopril 10 mg oral tablet: 1 tab(s) orally once a day (09 Feb 2025 05:55)  MONTELUKAST SODIUM 10 MG TABS: 1 dose(s) orally once a day (18 Apr 2024 16:47)  omeprazole 40 mg oral delayed release capsule: 1 tab(s) orally once a day (18 Apr 2024 16:47)  tiZANidine 2 mg oral tablet: 2 tab(s) orally 3 times a day (18 Apr 2024 16:47)  Trelegy Ellipta inhalation powder: 1 puff(s) inhaled once a day (18 Apr 2024 16:47)  Ventolin HFA 90 mcg/inh inhalation aerosol: 2 puff(s) inhaled prn (18 Apr 2024 16:47)  ZOLPIDEM TARTRATE 10 MG TABS: orally once a day (at bedtime) (18 Apr 2024 16:47)        MEDICATIONS  (STANDING):  aspirin enteric coated 81 milliGRAM(s) Oral daily  atorvastatin 80 milliGRAM(s) Oral at bedtime  baclofen 10 milliGRAM(s) Oral daily  carvedilol 6.25 milliGRAM(s) Oral every 12 hours  cefTRIAXone   IVPB 1000 milliGRAM(s) IV Intermittent every 24 hours  escitalopram 5 milliGRAM(s) Oral daily  fluticasone propionate/ salmeterol 100-50 MICROgram(s) Diskus 1 Dose(s) Inhalation two times a day  influenza   Vaccine 0.5 milliLiter(s) IntraMuscular once  levothyroxine 125 MICROGram(s) Oral daily  lisinopril 10 milliGRAM(s) Oral daily  metroNIDAZOLE  IVPB 500 milliGRAM(s) IV Intermittent every 12 hours  montelukast 10 milliGRAM(s) Oral daily  pantoprazole    Tablet 40 milliGRAM(s) Oral before breakfast  senna 2 Tablet(s) Oral at bedtime  sodium chloride 0.9%. 1000 milliLiter(s) (75 mL/Hr) IV Continuous <Continuous>  ticagrelor 90 milliGRAM(s) Oral every 12 hours  tiotropium 2.5 MICROgram(s) Inhaler 2 Puff(s) Inhalation daily  tiZANidine 4 milliGRAM(s) Oral three times a day    MEDICATIONS  (PRN):  acetaminophen     Tablet .. 650 milliGRAM(s) Oral every 6 hours PRN Temp greater or equal to 38C (100.4F), Mild Pain (1 - 3)  albuterol    90 MICROgram(s) HFA Inhaler 2 Puff(s) Inhalation every 6 hours PRN Shortness of Breath and/or Wheezing  aluminum hydroxide/magnesium hydroxide/simethicone Suspension 30 milliLiter(s) Oral every 4 hours PRN Dyspepsia  clonazePAM  Tablet 1 milliGRAM(s) Oral two times a day PRN Anxiety  HYDROmorphone  Injectable 0.5 milliGRAM(s) IV Push every 4 hours PRN Severe Pain (7 - 10)  hydrOXYzine hydrochloride 10 milliGRAM(s) Oral three times a day PRN for anxiety  melatonin 3 milliGRAM(s) Oral at bedtime PRN Insomnia  ondansetron Injectable 4 milliGRAM(s) IV Push every 8 hours PRN Nausea and/or Vomiting      Allergies  ciprofloxacin (Urticaria; Other)      Review of Systems:   Constitutional:  No Fever, No Chills  ENT/Mouth:  No Hearing Changes,  No Difficulty Swallowing  Eyes:  No Eye Pain, No Vision Changes  Cardiovascular:  No Chest Pain, No Palpitations  Respiratory:  No Cough, No Dyspnea  Gastrointestinal:  As described in HPI  Musculoskeletal:  No Joint Swelling, No Back Pain  Skin:  No Skin Lesions, No Jaundice  Neuro:  No Syncope, No Dizziness  Heme/Lymph:  No Bruising, No Bleeding.          Physical Examination:  T(C): 36.8 (02-09-25 @ 11:53), Max: 37.1 (02-09-25 @ 05:45)  HR: 86 (02-09-25 @ 11:53) (73 - 99)  BP: 122/64 (02-09-25 @ 11:53) (122/64 - 178/106)  RR: 18 (02-09-25 @ 11:53) (18 - 18)  SpO2: 98% (02-08-25 @ 20:55) (98% - 98%)  Height (cm): 153.4 (02-09-25 @ 02:50)  Weight (kg): 126.552 (02-09-25 @ 02:50)        GENERAL: AAOx3, obese   HEAD:  Atraumatic, Normocephalic  EYES: conjunctiva and sclera clear  NECK: Supple, no JVD or thyromegaly  CHEST/LUNG: Clear to auscultation bilaterally; No wheeze, rhonchi, or rales  HEART: Regular rate and rhythm; normal S1, S2, No murmurs.  ABDOMEN: Soft, moderate LLQ tender, nondistended; Bowel sounds present  NEUROLOGY: No asterixis or tremor.   SKIN: Intact, no jaundice        Data:                        9.0    10.56 )-----------( 272      ( 09 Feb 2025 08:30 )             29.1     Hgb Trend:  9.0  02-09-25 @ 08:30  10.3  02-08-25 @ 21:30      02-09    141  |  109  |  18  ----------------------------<  123[H]  4.2   |  20  |  1.2    Ca    8.0[L]      09 Feb 2025 08:30  Mg     1.6     02-09    TPro  5.6[L]  /  Alb  3.3[L]  /  TBili  <0.2  /  DBili  x   /  AST  14  /  ALT  12  /  AlkPhos  98  02-09    Liver panel trend:  TBili <0.2   /   AST 14   /   ALT 12   /   AlkP 98   /   Tptn 5.6   /   Alb 3.3    /   DBili --      02-09  TBili 0.3   /   AST 20   /   ALT 14   /   AlkP 113   /   Tptn 6.5   /   Alb 3.9    /   DBili --      02-08  TBili <0.2   /   AST 23   /   ALT 16   /   AlkP 105   /   Tptn 6.4   /   Alb 3.7    /   DBili --      02-04          Urinalysis with Rflx Culture (collected 09 Feb 2025 00:42)          Radiology:  CT Abdomen and Pelvis w/ IV Cont:   ACC: [id number] EXAM:  CT ABDOMEN AND PELVIS IC   ORDERED BY: BENJAMIN GONCALVES     PROCEDURE DATE:  02/08/2025          INTERPRETATION:  CLINICAL STATEMENT: Abdominal pain.    TECHNIQUE: Contiguous axial CT images were obtained from the lower chest   tothe pubic symphysis following administration of 100cc Omnipaque 350   intravenous contrast.  Oral contrast was not administered.  Reformatted   images in the coronal and sagittal planes were acquired.    COMPARISON: CT abdomen and pelvis 12/2/2024.    FINDINGS:    LOWER CHEST: Unremarkable.    HEPATOBILIARY: Postcholecystectomy. Liver, biliary system unremarkable.    SPLEEN: Unremarkable.    PANCREAS: Unremarkable.    ADRENAL GLANDS: Unchanged right adrenal 1.3 cm nodule.    KIDNEYS: No hydronephrosis. Right lower renal pole cyst.    ABDOMINOPELVIC NODES: Unremarkable.    PELVIC ORGANS: Unremarkable.    PERITONEUM/MESENTERY/BOWEL: Colonic diverticulosis with short segment   proximal sigmoid circumferential wall thickening, perisigmoid   inflammatory stranding; no evidence of abscess or gross free air.    BONES/SOFT TISSUES: Degenerative change, lumbar spine.    VASCULAR: Scattered vascular calcifications..      IMPRESSION:  Acute sigmoid diverticulitis; no abscess.    --- End of Report ---            JOSUÉ MA MD; Attending Radiologist  This document has been electronically signed. Feb 8 2025 11:03PM (02-08-25 @ 22:53)

## 2025-02-10 ENCOUNTER — APPOINTMENT (OUTPATIENT)
Dept: NEUROLOGY | Facility: CLINIC | Age: 60
End: 2025-02-10

## 2025-02-10 LAB
ALBUMIN SERPL ELPH-MCNC: 3.2 G/DL — LOW (ref 3.5–5.2)
ALP SERPL-CCNC: 82 U/L — SIGNIFICANT CHANGE UP (ref 30–115)
ALT FLD-CCNC: 11 U/L — SIGNIFICANT CHANGE UP (ref 0–41)
ANION GAP SERPL CALC-SCNC: 10 MMOL/L — SIGNIFICANT CHANGE UP (ref 7–14)
AST SERPL-CCNC: 14 U/L — SIGNIFICANT CHANGE UP (ref 0–41)
BASOPHILS # BLD AUTO: 0.03 K/UL — SIGNIFICANT CHANGE UP (ref 0–0.2)
BASOPHILS NFR BLD AUTO: 0.5 % — SIGNIFICANT CHANGE UP (ref 0–1)
BILIRUB SERPL-MCNC: 0.2 MG/DL — SIGNIFICANT CHANGE UP (ref 0.2–1.2)
BUN SERPL-MCNC: 10 MG/DL — SIGNIFICANT CHANGE UP (ref 10–20)
CALCIUM SERPL-MCNC: 8.2 MG/DL — LOW (ref 8.4–10.5)
CHLORIDE SERPL-SCNC: 107 MMOL/L — SIGNIFICANT CHANGE UP (ref 98–110)
CO2 SERPL-SCNC: 23 MMOL/L — SIGNIFICANT CHANGE UP (ref 17–32)
CREAT SERPL-MCNC: 1.1 MG/DL — SIGNIFICANT CHANGE UP (ref 0.7–1.5)
EGFR: 57 ML/MIN/1.73M2 — LOW
EOSINOPHIL # BLD AUTO: 0.3 K/UL — SIGNIFICANT CHANGE UP (ref 0–0.7)
EOSINOPHIL NFR BLD AUTO: 4.6 % — SIGNIFICANT CHANGE UP (ref 0–8)
GLUCOSE SERPL-MCNC: 111 MG/DL — HIGH (ref 70–99)
HCT VFR BLD CALC: 28 % — LOW (ref 37–47)
HCT VFR BLD CALC: 28.6 % — LOW (ref 37–47)
HGB BLD-MCNC: 8.6 G/DL — LOW (ref 12–16)
HGB BLD-MCNC: 9 G/DL — LOW (ref 12–16)
IMM GRANULOCYTES NFR BLD AUTO: 0.3 % — SIGNIFICANT CHANGE UP (ref 0.1–0.3)
LYMPHOCYTES # BLD AUTO: 1.53 K/UL — SIGNIFICANT CHANGE UP (ref 1.2–3.4)
LYMPHOCYTES # BLD AUTO: 23.5 % — SIGNIFICANT CHANGE UP (ref 20.5–51.1)
MAGNESIUM SERPL-MCNC: 1.7 MG/DL — LOW (ref 1.8–2.4)
MCHC RBC-ENTMCNC: 24.5 PG — LOW (ref 27–31)
MCHC RBC-ENTMCNC: 24.7 PG — LOW (ref 27–31)
MCHC RBC-ENTMCNC: 30.7 G/DL — LOW (ref 32–37)
MCHC RBC-ENTMCNC: 31.5 G/DL — LOW (ref 32–37)
MCV RBC AUTO: 78.6 FL — LOW (ref 81–99)
MCV RBC AUTO: 79.8 FL — LOW (ref 81–99)
MONOCYTES # BLD AUTO: 0.41 K/UL — SIGNIFICANT CHANGE UP (ref 0.1–0.6)
MONOCYTES NFR BLD AUTO: 6.3 % — SIGNIFICANT CHANGE UP (ref 1.7–9.3)
NEUTROPHILS # BLD AUTO: 4.23 K/UL — SIGNIFICANT CHANGE UP (ref 1.4–6.5)
NEUTROPHILS NFR BLD AUTO: 64.8 % — SIGNIFICANT CHANGE UP (ref 42.2–75.2)
NRBC # BLD: 0 /100 WBCS — SIGNIFICANT CHANGE UP (ref 0–0)
NRBC # BLD: 0 /100 WBCS — SIGNIFICANT CHANGE UP (ref 0–0)
NRBC BLD-RTO: 0 /100 WBCS — SIGNIFICANT CHANGE UP (ref 0–0)
NRBC BLD-RTO: 0 /100 WBCS — SIGNIFICANT CHANGE UP (ref 0–0)
PLATELET # BLD AUTO: 274 K/UL — SIGNIFICANT CHANGE UP (ref 130–400)
PLATELET # BLD AUTO: 282 K/UL — SIGNIFICANT CHANGE UP (ref 130–400)
PMV BLD: 10.8 FL — HIGH (ref 7.4–10.4)
PMV BLD: 10.8 FL — HIGH (ref 7.4–10.4)
POTASSIUM SERPL-MCNC: 4 MMOL/L — SIGNIFICANT CHANGE UP (ref 3.5–5)
POTASSIUM SERPL-SCNC: 4 MMOL/L — SIGNIFICANT CHANGE UP (ref 3.5–5)
PROT SERPL-MCNC: 5.4 G/DL — LOW (ref 6–8)
RBC # BLD: 3.51 M/UL — LOW (ref 4.2–5.4)
RBC # BLD: 3.64 M/UL — LOW (ref 4.2–5.4)
RBC # FLD: 17.9 % — HIGH (ref 11.5–14.5)
RBC # FLD: 18.3 % — HIGH (ref 11.5–14.5)
SODIUM SERPL-SCNC: 140 MMOL/L — SIGNIFICANT CHANGE UP (ref 135–146)
WBC # BLD: 6.52 K/UL — SIGNIFICANT CHANGE UP (ref 4.8–10.8)
WBC # BLD: 8.07 K/UL — SIGNIFICANT CHANGE UP (ref 4.8–10.8)
WBC # FLD AUTO: 6.52 K/UL — SIGNIFICANT CHANGE UP (ref 4.8–10.8)
WBC # FLD AUTO: 8.07 K/UL — SIGNIFICANT CHANGE UP (ref 4.8–10.8)

## 2025-02-10 PROCEDURE — 99233 SBSQ HOSP IP/OBS HIGH 50: CPT

## 2025-02-10 PROCEDURE — 99232 SBSQ HOSP IP/OBS MODERATE 35: CPT

## 2025-02-10 RX ORDER — ANTISEPTIC SURGICAL SCRUB 0.04 MG/ML
1 SOLUTION TOPICAL
Refills: 0 | Status: DISCONTINUED | OUTPATIENT
Start: 2025-02-10 | End: 2025-02-12

## 2025-02-10 RX ADMIN — FLUTICASONE PROPIONATE AND SALMETEROL 1 DOSE(S): 113; 14 POWDER, METERED RESPIRATORY (INHALATION) at 08:30

## 2025-02-10 RX ADMIN — HYDROMORPHONE HYDROCHLORIDE 0.5 MILLIGRAM(S): 4 INJECTION, SOLUTION INTRAMUSCULAR; INTRAVENOUS; SUBCUTANEOUS at 15:09

## 2025-02-10 RX ADMIN — ESCITALOPRAM 5 MILLIGRAM(S): 10 TABLET, FILM COATED ORAL at 12:21

## 2025-02-10 RX ADMIN — TICAGRELOR 90 MILLIGRAM(S): 90 TABLET ORAL at 06:19

## 2025-02-10 RX ADMIN — ZOLPIDEM TARTRATE 5 MILLIGRAM(S): 5 TABLET, COATED ORAL at 23:06

## 2025-02-10 RX ADMIN — Medication 10 MILLIGRAM(S): at 06:19

## 2025-02-10 RX ADMIN — HYDROMORPHONE HYDROCHLORIDE 0.5 MILLIGRAM(S): 4 INJECTION, SOLUTION INTRAMUSCULAR; INTRAVENOUS; SUBCUTANEOUS at 23:00

## 2025-02-10 RX ADMIN — LEVOTHYROXINE SODIUM 125 MICROGRAM(S): 25 TABLET ORAL at 06:19

## 2025-02-10 RX ADMIN — Medication 100 MILLIGRAM(S): at 18:21

## 2025-02-10 RX ADMIN — MONTELUKAST SODIUM 10 MILLIGRAM(S): 5 TABLET, CHEWABLE ORAL at 12:20

## 2025-02-10 RX ADMIN — HYDROMORPHONE HYDROCHLORIDE 0.5 MILLIGRAM(S): 4 INJECTION, SOLUTION INTRAMUSCULAR; INTRAVENOUS; SUBCUTANEOUS at 22:01

## 2025-02-10 RX ADMIN — HYDROMORPHONE HYDROCHLORIDE 0.5 MILLIGRAM(S): 4 INJECTION, SOLUTION INTRAMUSCULAR; INTRAVENOUS; SUBCUTANEOUS at 15:39

## 2025-02-10 RX ADMIN — ANTISEPTIC SURGICAL SCRUB 1 APPLICATION(S): 0.04 SOLUTION TOPICAL at 18:31

## 2025-02-10 RX ADMIN — Medication 100 MILLIGRAM(S): at 06:17

## 2025-02-10 RX ADMIN — PANTOPRAZOLE 40 MILLIGRAM(S): 20 TABLET, DELAYED RELEASE ORAL at 06:17

## 2025-02-10 RX ADMIN — Medication 4 MILLIGRAM(S): at 06:17

## 2025-02-10 RX ADMIN — Medication 4 MILLIGRAM(S): at 15:08

## 2025-02-10 RX ADMIN — TIOTROPIUM BROMIDE MONOHYDRATE 2 PUFF(S): 18 CAPSULE ORAL; RESPIRATORY (INHALATION) at 08:30

## 2025-02-10 RX ADMIN — FLUTICASONE PROPIONATE AND SALMETEROL 1 DOSE(S): 113; 14 POWDER, METERED RESPIRATORY (INHALATION) at 22:04

## 2025-02-10 RX ADMIN — Medication 4 MILLIGRAM(S): at 22:01

## 2025-02-10 RX ADMIN — Medication 2 TABLET(S): at 22:01

## 2025-02-10 RX ADMIN — Medication 10 MILLIGRAM(S): at 12:20

## 2025-02-10 RX ADMIN — CEFTRIAXONE 100 MILLIGRAM(S): 250 INJECTION, POWDER, FOR SOLUTION INTRAMUSCULAR; INTRAVENOUS at 06:17

## 2025-02-10 RX ADMIN — Medication 6.25 MILLIGRAM(S): at 06:18

## 2025-02-10 RX ADMIN — ASPIRIN 81 MILLIGRAM(S): 81 TABLET, COATED ORAL at 12:20

## 2025-02-10 RX ADMIN — ATORVASTATIN CALCIUM 80 MILLIGRAM(S): 80 TABLET, FILM COATED ORAL at 22:01

## 2025-02-10 RX ADMIN — Medication 1 MILLIGRAM(S): at 09:13

## 2025-02-10 RX ADMIN — TICAGRELOR 90 MILLIGRAM(S): 90 TABLET ORAL at 18:33

## 2025-02-10 NOTE — PROGRESS NOTE ADULT - SUBJECTIVE AND OBJECTIVE BOX
Gastroenterology Follow Up Note      Location: [facility] 4A 023 C [facility]  Patient Name: GEOVANNA PUTNAM  Age: 59y  Gender: Female      Chief Complaint  Patient is a 59y old Female who presents with a chief complaint of abdominal pain (10 Feb 2025 07:40)  Primary diagnosis of Diverticulitis of intestine without perforation or abscess without bleeding        Progress Note  This morning patient was seen and examined at bedside.    Today is hospital day 1d.  She notes improvement in LLQ pain.  She denies nausea or vomiting.  She chronically has 3 loose stools per day post CCY; last BM was on 02/09.        Vital Signs in the last 24 hours   Vitals Summary T(C): 36.7 (02-10-25 @ 05:00), Max: 36.8 (02-09-25 @ 11:53)  HR: 67 (02-10-25 @ 05:00) (67 - 86)  BP: 141/83 (02-10-25 @ 05:00) (100/69 - 141/83)  RR: 18 (02-10-25 @ 05:00) (18 - 18)  SpO2: --  Vent Data   Intake/ Output   Measurements       Physical Exam  * General Appearance: Alert, cooperative, interactive, oriented to time, place, and person, in no acute distress  * Eyes: PERRL, conjunctiva/corneas clear, EOM's intact, fundi benign, both eyes  * Lungs: Good bilateral air entry, normal breath sounds   * Heart: Regular Rate and Rhythm, normal S1 and S2, no audible murmur, rub, or gallop  * Abdomen: Symmetric, non-distended, soft, LLQ tenderness without guarding or rebound tenderness, bowel sounds active all four quadrants      Investigations   Laboratory Workup      - CBC:                        9.0    10.56 )-----------( 272      ( 09 Feb 2025 08:30 )             29.1       - Hgb Trend:  9.0  02-09-25 @ 08:30  10.3  02-08-25 @ 21:30        - Chemistry:  02-09    141  |  109  |  18  ----------------------------<  123[H]  4.2   |  20  |  1.2    Ca    8.0[L]      09 Feb 2025 08:30  Mg     1.6     02-09    TPro  5.6[L]  /  Alb  3.3[L]  /  TBili  <0.2  /  DBili  x   /  AST  14  /  ALT  12  /  AlkPhos  98  02-09    Liver panel trend:  TBili <0.2   /   AST 14   /   ALT 12   /   AlkP 98   /   Tptn 5.6   /   Alb 3.3    /   DBili --      02-09  TBili 0.3   /   AST 20   /   ALT 14   /   AlkP 113   /   Tptn 6.5   /   Alb 3.9    /   DBili --      02-08  TBili <0.2   /   AST 23   /   ALT 16   /   AlkP 105   /   Tptn 6.4   /   Alb 3.7    /   DBili --      02-04      - Coagulation Studies:      - ABG:      - Cardiac Markers:        Microbiological Workup  Urinalysis Basic - ( 09 Feb 2025 08:30 )    Color: x / Appearance: x / SG: x / pH: x  Gluc: 123 mg/dL / Ketone: x  / Bili: x / Urobili: x   Blood: x / Protein: x / Nitrite: x   Leuk Esterase: x / RBC: x / WBC x   Sq Epi: x / Non Sq Epi: x / Bacteria: x        Culture - Urine (collected 09 Feb 2025 00:42)  Source: Clean Catch None  Final Report (10 Feb 2025 08:04):    <10,000 CFU/mL Normal Urogenital Sharonda    Urinalysis with Rflx Culture (collected 09 Feb 2025 00:42)          Current Medications  Standing Medications  aspirin enteric coated 81 milliGRAM(s) Oral daily  atorvastatin 80 milliGRAM(s) Oral at bedtime  baclofen 10 milliGRAM(s) Oral daily  carvedilol 6.25 milliGRAM(s) Oral every 12 hours  cefTRIAXone   IVPB 1000 milliGRAM(s) IV Intermittent every 24 hours  escitalopram 5 milliGRAM(s) Oral daily  fluticasone propionate/ salmeterol 100-50 MICROgram(s) Diskus 1 Dose(s) Inhalation two times a day  influenza   Vaccine 0.5 milliLiter(s) IntraMuscular once  levothyroxine 125 MICROGram(s) Oral daily  lisinopril 10 milliGRAM(s) Oral daily  metroNIDAZOLE  IVPB 500 milliGRAM(s) IV Intermittent every 12 hours  montelukast 10 milliGRAM(s) Oral daily  pantoprazole    Tablet 40 milliGRAM(s) Oral before breakfast  senna 2 Tablet(s) Oral at bedtime  ticagrelor 90 milliGRAM(s) Oral every 12 hours  tiotropium 2.5 MICROgram(s) Inhaler 2 Puff(s) Inhalation daily  tiZANidine 4 milliGRAM(s) Oral three times a day    PRN Medications  acetaminophen     Tablet .. 650 milliGRAM(s) Oral every 6 hours PRN Temp greater or equal to 38C (100.4F), Mild Pain (1 - 3)  albuterol    90 MICROgram(s) HFA Inhaler 2 Puff(s) Inhalation every 6 hours PRN Shortness of Breath and/or Wheezing  aluminum hydroxide/magnesium hydroxide/simethicone Suspension 30 milliLiter(s) Oral every 4 hours PRN Dyspepsia  clonazePAM  Tablet 1 milliGRAM(s) Oral two times a day PRN Anxiety  HYDROmorphone  Injectable 0.5 milliGRAM(s) IV Push every 4 hours PRN Severe Pain (7 - 10)  hydrOXYzine hydrochloride 10 milliGRAM(s) Oral three times a day PRN for anxiety  melatonin 3 milliGRAM(s) Oral at bedtime PRN Insomnia  ondansetron Injectable 4 milliGRAM(s) IV Push every 8 hours PRN Nausea and/or Vomiting  zolpidem 5 milliGRAM(s) Oral at bedtime PRN Insomnia    Singles Doses Administered  (ADM OVERRIDE) 1 each &lt;see task&gt; GiveOnce  cefTRIAXone   IVPB 2000 milliGRAM(s) IV Intermittent once  HYDROmorphone  Injectable 1 milliGRAM(s) IV Push Once  metroNIDAZOLE  IVPB 500 milliGRAM(s) IV Intermittent once  ondansetron Injectable 4 milliGRAM(s) IV Push once  sodium chloride 0.9% Bolus 1000 milliLiter(s) IV Bolus once  sodium chloride 0.9% Bolus 1000 milliLiter(s) IV Bolus once

## 2025-02-10 NOTE — PROGRESS NOTE ADULT - TIME BILLING
Patient seen at bedside, time spent evaluating and treating the patient's acute illness as well as time spent reviewing labs, radiology, discussing with patient and/or patient's family and discussing the case with a multidisciplinary team.
35 minutes spent on total encounter; more than 50% of the visit was spent counseling and / or coordinating care by the attending physician.  The necessity of the time spent during the encounter on this date of service was due to: Coordination of care.
Patient seen at bedside, time spent evaluating and treating the patient's acute illness as well as time spent reviewing labs, radiology, discussing with patient and/or patient's family and discussing the case with a multidisciplinary team.

## 2025-02-10 NOTE — PROGRESS NOTE ADULT - ASSESSMENT
Assessment and Plan  Case of a 59 year old female patient with history of anxiety, COPD, HTN, DL/CAD, right ICA stenting in 2023 on plavix, migraine, hx of nasopharyngeal cancer s/p chemo-RT in remission, hx CCY, hypothyroidism, and hx of recurrent acute sigmoid diverticulitis with microperforation in 09/2022 s/p conservative management who presented to the ED on 02/08 for evaluation of LLQ pain associated with bloody diarrhea, found to have evidence of acute uncomplicated sigmoid diverticulitis. We are consulted for concern of acute uncomplicated sigmoid diverticulitis.      Acute Uncomplicated Recurrent/Persistent Sigmoid Diverticulitis; Hx of Microperforation in 09/2022 s/p conservative management  Chronic Microcytic Iron Deficiency Anemia w reported bloody diarrhea- resolved  * Hemodynamically stable  * Labs: WBC 10.56, Hb 9, MCV 79.7, Plt 272 on 02/09  * Lipase 30 on 02/08  * INR 0.9 on 02/04  * CT AP IC 02/08 Colonic diverticulosis with short segment proximal sigmoid circumferential wall thickening, perisigmoid inflammatory stranding; no evidence of abscess or gross free air.  * 11x Previous CT abdomens from 06/18/2018-12/02/2024 revealing acute sigmoid diverticulitis; the one from 09/17/2022 revealed also microperforation  * Evaluated by surgery for microperforation in 09/2022: Abs; conservative management  * Evaluated by GI in 03/2023: recommended outpatient colonoscopy with Dr Pelayo  * Last colonoscopy and EGD in 2022: polyps per patient; Dr Pelayo  * No FHx of colon cancer    RECOMMENDATIONS  - Recommend surgical evaluation in setting of persistent/recurrent acute sigmoid diverticulitis and hx of microperforation 09/2022  - Would benefit from a non urgent outpatient colonoscopy in 6 weeks after completion of antibiotics and resolution of diverticulitis; will follow with Dr Pelayo  - Continue IV Rocephin and IV Metronidazole   - Advance diet as tolerated  - Monitor for nausea. Continue IV Zofran 4mg Q8h PRN if QTc normal  - Monitor for pain and keep score at 01/10: management per team  - Monitor BM. In case diarrhea recurs, recommend checking stool cx; stool ova/parasites; C difficile; GI PCR. Currently on bowel regimen with senna   - Recommend checking inflammatory markers: ESR and CRP  - Continue PO protonix 40mg QD; avoid inessential NSAIDs  - Management of recently noted right ICA stent occlusion per neurology; recent evaluation appreciated  - Follow up with our [facility] located at [address] [place] [address]. Phone Number: [phone]        Thank you for your consult.  - Please note that plan was discussed and communicated with medical team.   - Please reach GI on [phone] during weekdays till 5pm.  - Please call the GI service line after 5pm on Weekdays and anytime on Weekends: [phone].      Luther Warren MD  PGY - 5 Gastroenterology Fellow   [facility]

## 2025-02-10 NOTE — PROGRESS NOTE ADULT - SUBJECTIVE AND OBJECTIVE BOX
SUBJECTIVE/OVERNIGHT EVENTS  Today is hospital day 1d. This morning patient was seen and examined at bedside, resting comfortably in bed. No acute or major events overnight.    MEDICATIONS  STANDING MEDICATIONS  aspirin enteric coated 81 milliGRAM(s) Oral daily  atorvastatin 80 milliGRAM(s) Oral at bedtime  baclofen 10 milliGRAM(s) Oral daily  carvedilol 6.25 milliGRAM(s) Oral every 12 hours  cefTRIAXone   IVPB 1000 milliGRAM(s) IV Intermittent every 24 hours  escitalopram 5 milliGRAM(s) Oral daily  fluticasone propionate/ salmeterol 100-50 MICROgram(s) Diskus 1 Dose(s) Inhalation two times a day  influenza   Vaccine 0.5 milliLiter(s) IntraMuscular once  levothyroxine 125 MICROGram(s) Oral daily  lisinopril 10 milliGRAM(s) Oral daily  metroNIDAZOLE  IVPB 500 milliGRAM(s) IV Intermittent every 12 hours  montelukast 10 milliGRAM(s) Oral daily  pantoprazole    Tablet 40 milliGRAM(s) Oral before breakfast  senna 2 Tablet(s) Oral at bedtime  ticagrelor 90 milliGRAM(s) Oral every 12 hours  tiotropium 2.5 MICROgram(s) Inhaler 2 Puff(s) Inhalation daily  tiZANidine 4 milliGRAM(s) Oral three times a day    PRN MEDICATIONS  acetaminophen     Tablet .. 650 milliGRAM(s) Oral every 6 hours PRN  albuterol    90 MICROgram(s) HFA Inhaler 2 Puff(s) Inhalation every 6 hours PRN  aluminum hydroxide/magnesium hydroxide/simethicone Suspension 30 milliLiter(s) Oral every 4 hours PRN  clonazePAM  Tablet 1 milliGRAM(s) Oral two times a day PRN  HYDROmorphone  Injectable 0.5 milliGRAM(s) IV Push every 4 hours PRN  hydrOXYzine hydrochloride 10 milliGRAM(s) Oral three times a day PRN  melatonin 3 milliGRAM(s) Oral at bedtime PRN  ondansetron Injectable 4 milliGRAM(s) IV Push every 8 hours PRN  zolpidem 5 milliGRAM(s) Oral at bedtime PRN    VITALS  T(F): 98 (02-10-25 @ 05:00), Max: 98.3 (02-09-25 @ 11:53)  HR: 67 (02-10-25 @ 05:00) (67 - 86)  BP: 141/83 (02-10-25 @ 05:00) (100/69 - 141/83)  RR: 18 (02-10-25 @ 05:00) (18 - 18)  SpO2: --    PHYSICAL EXAM      LABS             9.0    10.56 )-----------( 272      ( 02-09-25 @ 08:30 )             29.1     141  |  109  |  18  -------------------------<  123   02-09-25 @ 08:30  4.2  |  20  |  1.2    Ca      8.0     02-09-25 @ 08:30  Mg     1.6     02-09-25 @ 08:30    TPro  5.6  /  Alb  3.3  /  TBili  <0.2  /  DBili  x   /  AST  14  /  ALT  12  /  AlkPhos  98  /  GGT  x     02-09-25 @ 08:30        Urinalysis Basic - ( 09 Feb 2025 08:30 )    Color: x / Appearance: x / SG: x / pH: x  Gluc: 123 mg/dL / Ketone: x  / Bili: x / Urobili: x   Blood: x / Protein: x / Nitrite: x   Leuk Esterase: x / RBC: x / WBC x   Sq Epi: x / Non Sq Epi: x / Bacteria: x          Urinalysis with Rflx Culture (collected 09 Feb 2025 00:42)      IMAGING SUBJECTIVE/OVERNIGHT EVENTS  Today is hospital day 1d. This morning patient was seen and examined at bedside, resting comfortably in bed. No acute or major events overnight.  Pt tolerating liquid diet. Abdominal pain improved. Had one BM yesterday.    MEDICATIONS  STANDING MEDICATIONS  aspirin enteric coated 81 milliGRAM(s) Oral daily  atorvastatin 80 milliGRAM(s) Oral at bedtime  baclofen 10 milliGRAM(s) Oral daily  carvedilol 6.25 milliGRAM(s) Oral every 12 hours  cefTRIAXone   IVPB 1000 milliGRAM(s) IV Intermittent every 24 hours  escitalopram 5 milliGRAM(s) Oral daily  fluticasone propionate/ salmeterol 100-50 MICROgram(s) Diskus 1 Dose(s) Inhalation two times a day  influenza   Vaccine 0.5 milliLiter(s) IntraMuscular once  levothyroxine 125 MICROGram(s) Oral daily  lisinopril 10 milliGRAM(s) Oral daily  metroNIDAZOLE  IVPB 500 milliGRAM(s) IV Intermittent every 12 hours  montelukast 10 milliGRAM(s) Oral daily  pantoprazole    Tablet 40 milliGRAM(s) Oral before breakfast  senna 2 Tablet(s) Oral at bedtime  ticagrelor 90 milliGRAM(s) Oral every 12 hours  tiotropium 2.5 MICROgram(s) Inhaler 2 Puff(s) Inhalation daily  tiZANidine 4 milliGRAM(s) Oral three times a day    PRN MEDICATIONS  acetaminophen     Tablet .. 650 milliGRAM(s) Oral every 6 hours PRN  albuterol    90 MICROgram(s) HFA Inhaler 2 Puff(s) Inhalation every 6 hours PRN  aluminum hydroxide/magnesium hydroxide/simethicone Suspension 30 milliLiter(s) Oral every 4 hours PRN  clonazePAM  Tablet 1 milliGRAM(s) Oral two times a day PRN  HYDROmorphone  Injectable 0.5 milliGRAM(s) IV Push every 4 hours PRN  hydrOXYzine hydrochloride 10 milliGRAM(s) Oral three times a day PRN  melatonin 3 milliGRAM(s) Oral at bedtime PRN  ondansetron Injectable 4 milliGRAM(s) IV Push every 8 hours PRN  zolpidem 5 milliGRAM(s) Oral at bedtime PRN    VITALS  T(F): 98 (02-10-25 @ 05:00), Max: 98.3 (02-09-25 @ 11:53)  HR: 67 (02-10-25 @ 05:00) (67 - 86)  BP: 141/83 (02-10-25 @ 05:00) (100/69 - 141/83)  RR: 18 (02-10-25 @ 05:00) (18 - 18)  SpO2: --    PHYSICAL EXAM  GENERAL: NAD  HEENT: NCAT  RESP: CTAB  HEART: RRR  ABDOMEN: soft, nontender  EXT: no rashes  NEURO: AOx3    LABS             9.0    10.56 )-----------( 272      ( 02-09-25 @ 08:30 )             29.1     141  |  109  |  18  -------------------------<  123   02-09-25 @ 08:30  4.2  |  20  |  1.2    Ca      8.0     02-09-25 @ 08:30  Mg     1.6     02-09-25 @ 08:30    TPro  5.6  /  Alb  3.3  /  TBili  <0.2  /  DBili  x   /  AST  14  /  ALT  12  /  AlkPhos  98  /  GGT  x     02-09-25 @ 08:30        Urinalysis Basic - ( 09 Feb 2025 08:30 )    Color: x / Appearance: x / SG: x / pH: x  Gluc: 123 mg/dL / Ketone: x  / Bili: x / Urobili: x   Blood: x / Protein: x / Nitrite: x   Leuk Esterase: x / RBC: x / WBC x   Sq Epi: x / Non Sq Epi: x / Bacteria: x          Urinalysis with Rflx Culture (collected 09 Feb 2025 00:42)      IMAGING

## 2025-02-10 NOTE — PROGRESS NOTE ADULT - ATTENDING COMMENTS
The patient was seen and examined.  Agree with the assessment and plan as detailed by the team.  I edited the note where necessary.
59y Female with PMHx of  COPD not on home O2, CAD on Plavix, migraines, nasopharyngeal cancer in remission, hypothyroidism, HTN, HLD, right ICA stenting in 2023, ?DM (A1C 6.6 in 04/2024) presents with left-sided abdominal pain with diarrhea X 3 days, pain worsening today.  Patient reports bloody diarrhea 3 days ago which is since resolved.  However patient states pain has persisted and worsened today.  Patient states current symptoms feel similar to past episode of diverticulitis.  Patient reports associated chills.  Denies chest pain, shortness of breath, N/V, urinary symptoms. Last admission for diverticulitis was in 2023.     #Acute sigmoid diverticulitis   #Hx diverticular disease with multiple episodes of diverticulitis in the past   #Hx of Nasopharyngeal CA in remission   - LLQ pain  - bloody diarrhea ~3 days ago - resolved now   - Advance diet as tolerated  ---> monitor BMs  - GI eval appreciated- discussed with patient stool regimen, and dietary restrictions. she understands. she would follow up outpt for surgical options.    - C/w Ceftriaxone and Flagyl  - Hgb stable   - Keep active T&S and trend H&H   - Transfuse PRN to keep Hgb > 8     #COPD not on home O2   - not in exacerbation   - c/w home inhalers     # pre-DM  - last A1C 6.6 from 04/2024.  - not on any meds  - monitor FS. start SSI if FS > 200.  - check A1C    #CAD   #HTN  #HLD   #R ICA stenting in 2023   - c/w Aspirin and Brilinta   - c/w Olmesartan and Corg   - follow up with Neuro OP     #Hypothyroidism   - c/w Synthroid     #DVT PPx: SCD  #GI PPx: PPI   #Diet: Advance as tolerated   #Activity: IAT .   Pending: anticpate 24 hours
59y Female with PMHx of  COPD not on home O2, CAD on Plavix, migraines, nasopharyngeal cancer in remission, hypothyroidism, HTN, HLD, right ICA stenting in 2023, ?DM (A1C 6.6 in 04/2024) presents with left-sided abdominal pain with diarrhea X 3 days, pain worsening today.  Patient reports bloody diarrhea 3 days ago which is since resolved.  However patient states pain has persisted and worsened today.  Patient states current symptoms feel similar to past episode of diverticulitis.  Patient reports associated chills.  Denies chest pain, shortness of breath, N/V, urinary symptoms. Last admission for diverticulitis was in 2023.     PLAN:     #LLQ abdominal pain   #Acute sigmoid diverticulitis   #Hx diverticular disease with multiple episodes of diverticulitis in the past   #Hx of Nasopharyngeal CA in remission   - LLQ pain  - bloody diarrhea ~3 days ago - resolved now   - Advance diet as tolerated  ---> monitor BMs  - GI eval apreciated- discussed with patient stool regimen, and dietary restrictions. she understands.   - C/w Ceftriaxone and Flagyl  - Hgb stable   - Keep active T&S and trend H&H   - Transfuse PRN to keep Hgb > 8     #COPD not on home O2   - not in exacerbation   - c/w home inhalers     # pre-DM  - last A1C 6.6 from 04/2024.  - not on any meds  - monitor FS. start SSI if FS > 200.  - check A1C    #CAD   #HTN  #HLD   #R ICA stenting in 2023   - c/w Aspirin and Brilinta   - c/w Olmesartan and Corg   - follow up with Neuro OP     #Hypothyroidism   - c/w Synthroid     #DVT PPx: SCD  #GI PPx: PPI   #Diet: Advance as tolerated   #Activity: IAT .   Pending: anticpate 24 hours

## 2025-02-10 NOTE — PROGRESS NOTE ADULT - ASSESSMENT
59y Female with PMHx of  COPD not on home O2, CAD on Plavix, migraines, nasopharyngeal cancer in remission, hypothyroidism, HTN, HLD, right ICA stenting in 2023, ?DM (A1C 6.6 in 04/2024) presents with left-sided abdominal pain with diarrhea X 3 days, pain worsening today.  Patient reports bloody diarrhea 3 days ago which is since resolved.  However patient states pain has persisted and worsened today.  Patient states current symptoms feel similar to past episode of diverticulitis.  Patient reports associated chills.  Denies chest pain, shortness of breath, N/V, urinary symptoms. Last admission for diverticulitis was in 2023.     PLAN:     #LLQ abdominal pain   #Acute sigmoid diverticulitis   #Hx diverticular disease with multiple episodes of diverticulitis in the past   #Hx of Nasopharyngeal CA in remission   - LLQ pain  - bloody diarrhea ~3 days ago - resolved now   - Advance diet as tolerated  ---> monitor BMs  - C/w IVF   - GI eval        - bowel rest, BPO, IV fluid       - IV ABx       - frequent abdominal exam       - wants to follow up with Dr. bryan as outpatient       - advised colorectal eval for sigmoid resection give reports multiple prior episodes of diverticulitis.   - C/w Ceftriaxone and Flagyl  - Hgb stable   - Keep active T&S and trend H&H   - Transfuse PRN to keep Hgb > 8     #COPD not on home O2   - not in exacerbation   - c/w home inhalers     # ? DM  - last A1C 6.6 from 04/2024.  - not on any meds  - monitor FS. start SSI if FS > 200.  - check A1C    #CAD   #HTN  #HLD   #R ICA stenting in 2023   - c/w Aspirin and Brilinta   - c/w Olmesartan and Corg   - follow up with Neuro OP     #Hypothyroidism   - c/w Synthroid     #DVT PPx: SCD  #GI PPx: PPI   #Diet: Advance as tolerated   #Activity: IAT .     Pending: GI recs 59y Female with PMHx of  COPD not on home O2, CAD on Plavix, migraines, nasopharyngeal cancer in remission, hypothyroidism, HTN, HLD, right ICA stenting in 2023, ?DM (A1C 6.6 in 04/2024) presents with left-sided abdominal pain with diarrhea X 3 days, pain worsening today.  Patient reports bloody diarrhea 3 days ago which is since resolved.  However patient states pain has persisted and worsened today.  Patient states current symptoms feel similar to past episode of diverticulitis.  Patient reports associated chills.  Denies chest pain, shortness of breath, N/V, urinary symptoms. Last admission for diverticulitis was in 2023.     PLAN:     #LLQ abdominal pain   #Acute sigmoid diverticulitis   #Hx diverticular disease with multiple episodes of diverticulitis in the past   #Hx of Nasopharyngeal CA in remission   - LLQ pain  - bloody diarrhea ~3 days ago - resolved now   - Advance diet as tolerated  ---> monitor BMs  - s/p IVF   - GI eval        - bowel rest, BPO, IV fluid       - IV ABx       - frequent abdominal exam       - wants to follow up with Dr. bryan as outpatient       - advised colorectal eval for sigmoid resection give reports multiple prior episodes of diverticulitis.   - C/w Ceftriaxone and Flagyl  - Hgb stable   - Keep active T&S and trend H&H   - Transfuse PRN to keep Hgb > 8     #COPD not on home O2   - not in exacerbation   - c/w home inhalers     # ? DM  - last A1C 6.6 from 04/2024.  - not on any meds  - monitor FS. start SSI if FS > 200.  - check A1C    #CAD   #HTN  #HLD   #R ICA stenting in 2023   - c/w Aspirin and Brilinta   - c/w Olmesartan and Corg   - follow up with Neuro OP     #Hypothyroidism   - c/w Synthroid     #DVT PPx: SCD  #GI PPx: PPI   #Diet: Advance as tolerated   #Activity: IAT .     Pending: tolerate diet, monitor hgb

## 2025-02-11 ENCOUNTER — TRANSCRIPTION ENCOUNTER (OUTPATIENT)
Age: 60
End: 2025-02-11

## 2025-02-11 LAB
ALBUMIN SERPL ELPH-MCNC: 3.2 G/DL — LOW (ref 3.5–5.2)
ALP SERPL-CCNC: 84 U/L — SIGNIFICANT CHANGE UP (ref 30–115)
ALT FLD-CCNC: 11 U/L — SIGNIFICANT CHANGE UP (ref 0–41)
ANION GAP SERPL CALC-SCNC: 13 MMOL/L — SIGNIFICANT CHANGE UP (ref 7–14)
AST SERPL-CCNC: 17 U/L — SIGNIFICANT CHANGE UP (ref 0–41)
BASOPHILS # BLD AUTO: 0.03 K/UL — SIGNIFICANT CHANGE UP (ref 0–0.2)
BASOPHILS NFR BLD AUTO: 0.4 % — SIGNIFICANT CHANGE UP (ref 0–1)
BILIRUB SERPL-MCNC: <0.2 MG/DL — SIGNIFICANT CHANGE UP (ref 0.2–1.2)
BUN SERPL-MCNC: 11 MG/DL — SIGNIFICANT CHANGE UP (ref 10–20)
CALCIUM SERPL-MCNC: 8.4 MG/DL — SIGNIFICANT CHANGE UP (ref 8.4–10.5)
CHLORIDE SERPL-SCNC: 104 MMOL/L — SIGNIFICANT CHANGE UP (ref 98–110)
CO2 SERPL-SCNC: 22 MMOL/L — SIGNIFICANT CHANGE UP (ref 17–32)
CREAT SERPL-MCNC: 1.1 MG/DL — SIGNIFICANT CHANGE UP (ref 0.7–1.5)
EGFR: 57 ML/MIN/1.73M2 — LOW
EOSINOPHIL # BLD AUTO: 0.29 K/UL — SIGNIFICANT CHANGE UP (ref 0–0.7)
EOSINOPHIL NFR BLD AUTO: 4.1 % — SIGNIFICANT CHANGE UP (ref 0–8)
GLUCOSE SERPL-MCNC: 107 MG/DL — HIGH (ref 70–99)
HCT VFR BLD CALC: 28.2 % — LOW (ref 37–47)
HGB BLD-MCNC: 8.7 G/DL — LOW (ref 12–16)
IMM GRANULOCYTES NFR BLD AUTO: 0.3 % — SIGNIFICANT CHANGE UP (ref 0.1–0.3)
LYMPHOCYTES # BLD AUTO: 1.62 K/UL — SIGNIFICANT CHANGE UP (ref 1.2–3.4)
LYMPHOCYTES # BLD AUTO: 22.9 % — SIGNIFICANT CHANGE UP (ref 20.5–51.1)
MAGNESIUM SERPL-MCNC: 1.7 MG/DL — LOW (ref 1.8–2.4)
MCHC RBC-ENTMCNC: 24.9 PG — LOW (ref 27–31)
MCHC RBC-ENTMCNC: 30.9 G/DL — LOW (ref 32–37)
MCV RBC AUTO: 80.6 FL — LOW (ref 81–99)
MONOCYTES # BLD AUTO: 0.54 K/UL — SIGNIFICANT CHANGE UP (ref 0.1–0.6)
MONOCYTES NFR BLD AUTO: 7.6 % — SIGNIFICANT CHANGE UP (ref 1.7–9.3)
NEUTROPHILS # BLD AUTO: 4.57 K/UL — SIGNIFICANT CHANGE UP (ref 1.4–6.5)
NEUTROPHILS NFR BLD AUTO: 64.7 % — SIGNIFICANT CHANGE UP (ref 42.2–75.2)
NRBC # BLD: 0 /100 WBCS — SIGNIFICANT CHANGE UP (ref 0–0)
NRBC BLD-RTO: 0 /100 WBCS — SIGNIFICANT CHANGE UP (ref 0–0)
PLATELET # BLD AUTO: 283 K/UL — SIGNIFICANT CHANGE UP (ref 130–400)
PMV BLD: 10.9 FL — HIGH (ref 7.4–10.4)
POTASSIUM SERPL-MCNC: 4 MMOL/L — SIGNIFICANT CHANGE UP (ref 3.5–5)
POTASSIUM SERPL-SCNC: 4 MMOL/L — SIGNIFICANT CHANGE UP (ref 3.5–5)
PROT SERPL-MCNC: 5.3 G/DL — LOW (ref 6–8)
RBC # BLD: 3.5 M/UL — LOW (ref 4.2–5.4)
RBC # FLD: 18.1 % — HIGH (ref 11.5–14.5)
SODIUM SERPL-SCNC: 139 MMOL/L — SIGNIFICANT CHANGE UP (ref 135–146)
WBC # BLD: 7.07 K/UL — SIGNIFICANT CHANGE UP (ref 4.8–10.8)
WBC # FLD AUTO: 7.07 K/UL — SIGNIFICANT CHANGE UP (ref 4.8–10.8)

## 2025-02-11 PROCEDURE — 99233 SBSQ HOSP IP/OBS HIGH 50: CPT

## 2025-02-11 RX ORDER — METRONIDAZOLE 500 MG
1 TABLET ORAL
Qty: 22 | Refills: 0
Start: 2025-02-11 | End: 2025-02-21

## 2025-02-11 RX ORDER — METRONIDAZOLE 500 MG
500 TABLET ORAL EVERY 12 HOURS
Refills: 0 | Status: DISCONTINUED | OUTPATIENT
Start: 2025-02-11 | End: 2025-02-12

## 2025-02-11 RX ORDER — IOHEXOL 350 MG/ML
30 INJECTION, SOLUTION INTRAVENOUS ONCE
Refills: 0 | Status: COMPLETED | OUTPATIENT
Start: 2025-02-11 | End: 2025-02-11

## 2025-02-11 RX ORDER — OXYCODONE HYDROCHLORIDE AND ACETAMINOPHEN 5; 325 MG/1; MG/1
1 TABLET ORAL
Qty: 9 | Refills: 0
Start: 2025-02-11 | End: 2025-02-13

## 2025-02-11 RX ORDER — ONDANSETRON 4 MG/1
1 TABLET, ORALLY DISINTEGRATING ORAL
Qty: 56 | Refills: 0
Start: 2025-02-11 | End: 2025-02-24

## 2025-02-11 RX ORDER — HYDROMORPHONE HYDROCHLORIDE 4 MG/ML
1 INJECTION, SOLUTION INTRAMUSCULAR; INTRAVENOUS; SUBCUTANEOUS EVERY 4 HOURS
Refills: 0 | Status: DISCONTINUED | OUTPATIENT
Start: 2025-02-11 | End: 2025-02-12

## 2025-02-11 RX ORDER — ONDANSETRON 4 MG/1
1 TABLET, ORALLY DISINTEGRATING ORAL
Qty: 42 | Refills: 0
Start: 2025-02-11 | End: 2025-02-24

## 2025-02-11 RX ORDER — CEFPODOXIME PROXETIL 200 MG
1 TABLET ORAL
Qty: 22 | Refills: 0
Start: 2025-02-11 | End: 2025-02-21

## 2025-02-11 RX ORDER — ZOLPIDEM TARTRATE 5 MG/1
10 TABLET, COATED ORAL AT BEDTIME
Refills: 0 | Status: DISCONTINUED | OUTPATIENT
Start: 2025-02-11 | End: 2025-02-12

## 2025-02-11 RX ADMIN — HYDROMORPHONE HYDROCHLORIDE 1 MILLIGRAM(S): 4 INJECTION, SOLUTION INTRAMUSCULAR; INTRAVENOUS; SUBCUTANEOUS at 21:46

## 2025-02-11 RX ADMIN — CEFTRIAXONE 100 MILLIGRAM(S): 250 INJECTION, POWDER, FOR SOLUTION INTRAMUSCULAR; INTRAVENOUS at 05:44

## 2025-02-11 RX ADMIN — ASPIRIN 81 MILLIGRAM(S): 81 TABLET, COATED ORAL at 12:01

## 2025-02-11 RX ADMIN — Medication 10 MILLIGRAM(S): at 12:01

## 2025-02-11 RX ADMIN — Medication 2 TABLET(S): at 21:48

## 2025-02-11 RX ADMIN — ATORVASTATIN CALCIUM 80 MILLIGRAM(S): 80 TABLET, FILM COATED ORAL at 21:47

## 2025-02-11 RX ADMIN — Medication 4 MILLIGRAM(S): at 15:56

## 2025-02-11 RX ADMIN — Medication 1 MILLIGRAM(S): at 12:01

## 2025-02-11 RX ADMIN — ONDANSETRON 4 MILLIGRAM(S): 4 TABLET, ORALLY DISINTEGRATING ORAL at 12:59

## 2025-02-11 RX ADMIN — ONDANSETRON 4 MILLIGRAM(S): 4 TABLET, ORALLY DISINTEGRATING ORAL at 21:46

## 2025-02-11 RX ADMIN — TIOTROPIUM BROMIDE MONOHYDRATE 2 PUFF(S): 18 CAPSULE ORAL; RESPIRATORY (INHALATION) at 08:14

## 2025-02-11 RX ADMIN — HYDROMORPHONE HYDROCHLORIDE 0.5 MILLIGRAM(S): 4 INJECTION, SOLUTION INTRAMUSCULAR; INTRAVENOUS; SUBCUTANEOUS at 06:55

## 2025-02-11 RX ADMIN — ESCITALOPRAM 5 MILLIGRAM(S): 10 TABLET, FILM COATED ORAL at 12:00

## 2025-02-11 RX ADMIN — Medication 10 MILLIGRAM(S): at 05:40

## 2025-02-11 RX ADMIN — HYDROMORPHONE HYDROCHLORIDE 0.5 MILLIGRAM(S): 4 INJECTION, SOLUTION INTRAMUSCULAR; INTRAVENOUS; SUBCUTANEOUS at 13:29

## 2025-02-11 RX ADMIN — Medication 500 MILLIGRAM(S): at 17:27

## 2025-02-11 RX ADMIN — TICAGRELOR 90 MILLIGRAM(S): 90 TABLET ORAL at 05:40

## 2025-02-11 RX ADMIN — Medication 6.25 MILLIGRAM(S): at 05:40

## 2025-02-11 RX ADMIN — Medication 4 MILLIGRAM(S): at 05:40

## 2025-02-11 RX ADMIN — HYDROMORPHONE HYDROCHLORIDE 0.5 MILLIGRAM(S): 4 INJECTION, SOLUTION INTRAMUSCULAR; INTRAVENOUS; SUBCUTANEOUS at 12:59

## 2025-02-11 RX ADMIN — Medication 1 MILLIGRAM(S): at 21:48

## 2025-02-11 RX ADMIN — TICAGRELOR 90 MILLIGRAM(S): 90 TABLET ORAL at 17:26

## 2025-02-11 RX ADMIN — HYDROMORPHONE HYDROCHLORIDE 1 MILLIGRAM(S): 4 INJECTION, SOLUTION INTRAMUSCULAR; INTRAVENOUS; SUBCUTANEOUS at 16:59

## 2025-02-11 RX ADMIN — HYDROMORPHONE HYDROCHLORIDE 1 MILLIGRAM(S): 4 INJECTION, SOLUTION INTRAMUSCULAR; INTRAVENOUS; SUBCUTANEOUS at 16:29

## 2025-02-11 RX ADMIN — Medication 6.25 MILLIGRAM(S): at 17:26

## 2025-02-11 RX ADMIN — ACETAMINOPHEN, DIPHENHYDRAMINE HCL, PHENYLEPHRINE HCL 3 MILLIGRAM(S): 325; 25; 5 TABLET ORAL at 21:47

## 2025-02-11 RX ADMIN — ANTISEPTIC SURGICAL SCRUB 1 APPLICATION(S): 0.04 SOLUTION TOPICAL at 05:43

## 2025-02-11 RX ADMIN — PANTOPRAZOLE 40 MILLIGRAM(S): 20 TABLET, DELAYED RELEASE ORAL at 05:40

## 2025-02-11 RX ADMIN — Medication 500 MILLIGRAM(S): at 05:40

## 2025-02-11 RX ADMIN — MONTELUKAST SODIUM 10 MILLIGRAM(S): 5 TABLET, CHEWABLE ORAL at 12:01

## 2025-02-11 RX ADMIN — HYDROMORPHONE HYDROCHLORIDE 0.5 MILLIGRAM(S): 4 INJECTION, SOLUTION INTRAMUSCULAR; INTRAVENOUS; SUBCUTANEOUS at 05:40

## 2025-02-11 RX ADMIN — FLUTICASONE PROPIONATE AND SALMETEROL 1 DOSE(S): 113; 14 POWDER, METERED RESPIRATORY (INHALATION) at 08:14

## 2025-02-11 RX ADMIN — IOHEXOL 30 MILLILITER(S): 350 INJECTION, SOLUTION INTRAVENOUS at 21:53

## 2025-02-11 RX ADMIN — LEVOTHYROXINE SODIUM 125 MICROGRAM(S): 25 TABLET ORAL at 05:40

## 2025-02-11 NOTE — DISCHARGE NOTE PROVIDER - HOSPITAL COURSE
History of Present Illness:   59y Female with PMHx of  COPD not on home O2, CAD on Plavix, migraines, nasopharyngeal cancer in remission, hypothyroidism, HTN, HLD, right ICA stenting in 2023, presents with left-sided abdominal pain with diarrhea X 3 days, pain worsening today.  Patient reports bloody diarrhea 3 days ago which is since resolved.  However patient states pain has persisted and worsened today.  Patient states current symptoms feel similar to past episode of diverticulitis.  Patient reports associated chills.  Denies chest pain, shortness of breath, N/V, urinary symptoms. Last admission for diverticulitis was in 2023.     In ED vitals were  T(F): 98.5  HR: 73  BP: 178/106  RR: 18  SpO2: 98%    Labs were remarkable for Hgb 10.3 ( around baseline).     CTAP: Acute sigmoid diverticulitis; no abscess.    Pt received Rocephin and Flagyl, 2 L IVF in ED. She is being admitted to medicine for further management.     Hospital Course:  #Acute sigmoid diverticulitis   #Hx diverticular disease with multiple episodes of diverticulitis in the past   #Hx of Nasopharyngeal CA in remission   - LLQ pain  - bloody diarrhea ~3 days ago - resolved now   - Advance diet as tolerated  ---> monitor BMs  - GI eval appreciated- discussed with patient stool regimen, and dietary restrictions. she understands. she would follow up outpt for surgical options.    - Would benefit from a non urgent outpatient colonoscopy in 6 weeks after completion of antibiotics and resolution of diverticulitis; will follow with Dr Pelayo  - C/w Ceftriaxone and Flagyl- will complete 14d course (vantin and flagyl) at home  - Hgb stable   - Keep active T&S and trend H&H   - Transfuse PRN to keep Hgb > 8     #COPD not on home O2   - not in exacerbation   - c/w home inhalers     # pre-DM  - last A1C 6.6 from 04/2024.  - not on any meds  - monitor FS. start SSI if FS > 200.  - check A1C    #CAD   #HTN  #HLD   #R ICA stenting in 2023   - c/w Aspirin and Brilinta   - c/w Olmesartan and Corg   - follow up with Neuro OP     #Hypothyroidism   - c/w Synthroid     Discussion of discharge plan of care, including discharge diagnoses, medication reconciliation, and follow-ups was conducted with Dr. Kraft on 2/11/25, and discharge was approved.    History of Present Illness:   59y Female with PMHx of  COPD not on home O2, CAD on Plavix, migraines, nasopharyngeal cancer in remission, hypothyroidism, HTN, HLD, right ICA stenting in 2023, presents with left-sided abdominal pain with diarrhea X 3 days, pain worsening today.  Patient reports bloody diarrhea 3 days ago which is since resolved.  However patient states pain has persisted and worsened today.  Patient states current symptoms feel similar to past episode of diverticulitis.  Patient reports associated chills.  Denies chest pain, shortness of breath, N/V, urinary symptoms. Last admission for diverticulitis was in 2023.     In ED vitals were  T(F): 98.5  HR: 73  BP: 178/106  RR: 18  SpO2: 98%    Labs were remarkable for Hgb 10.3 ( around baseline).     CTAP: Acute sigmoid diverticulitis; no abscess.    Pt received Rocephin and Flagyl, 2 L IVF in ED. She is being admitted to medicine for further management.     Hospital Course:  #Acute sigmoid diverticulitis   #Hx diverticular disease with multiple episodes of diverticulitis in the past   #Hx of Nasopharyngeal CA in remission   - LLQ pain  - bloody diarrhea ~3 days ago - resolved now   - Advance diet as tolerated  ---> monitor BMs  - GI eval appreciated- discussed with patient stool regimen, and dietary restrictions. she understands. she would follow up outpt for surgical options.    - Would benefit from a non urgent outpatient colonoscopy in 6 weeks after completion of antibiotics and resolution of diverticulitis; will follow with Dr Pelayo  - C/w Ceftriaxone and Flagyl- will complete 14d course (vantin and flagyl) at home  - 2/11/25 repeat CTAP redemonstrated diverticulitis, no new acute pathology  - Hgb stable   - Keep active T&S and trend H&H   - Transfuse PRN to keep Hgb > 8     #COPD not on home O2   - not in exacerbation   - c/w home inhalers     # pre-DM  - last A1C 6.6 from 04/2024.  - not on any meds  - monitor FS. start SSI if FS > 200.  - check A1C    #CAD   #HTN  #HLD   #R ICA stenting in 2023   - c/w Aspirin and Brilinta   - c/w Olmesartan and Corg   - follow up with Neuro OP     #Hypothyroidism   - c/w Synthroid     Discussion of discharge plan of care, including discharge diagnoses, medication reconciliation, and follow-ups was conducted with Dr. Kratf on 2/12/25, and discharge was approved.

## 2025-02-11 NOTE — DISCHARGE NOTE PROVIDER - NSDCQMAMI_CARD_ALL_CORE
----- Message from Jamal Oswald sent at 4/17/2019  2:50 PM CDT -----    Called pt and sp to him and his wife; will mateus pt for 5/8-5/10   No

## 2025-02-11 NOTE — PROGRESS NOTE ADULT - SUBJECTIVE AND OBJECTIVE BOX
SUBJECTIVE:    Patient is a 59y old Female who presents with a chief complaint of abdominal pain (11 Feb 2025 11:58)      Today is hospital day 2d.     Overnight Events:      patient is having abdominal pain and nausea and vomiting and diarrhea     PAST MEDICAL & SURGICAL HISTORY  COPD (chronic obstructive pulmonary disease)    HTN (hypertension)    Diverticulitis    Pulmonary nodules/lesions, multiple    Nasopharyngeal cancer    Hypothyroid    Cervical disc disease  sequelae of radtion for nasopharyngeal cancer    Left ear hearing loss    Hemorrhoids    H/O carotid stenosis    Anxiety    Morbid obesity with BMI of 45.0-49.9, adult    H/O dental abscess    HLD (hyperlipidemia)    Carotid artery disease    Obstructive sleep apnea on CPAP    OA (osteoarthritis)    Bilateral knee pain    Depression    History of cholecystectomy    History of common carotid artery stent placement          ALLERGIES:  ciprofloxacin (Urticaria; Other)    MEDICATIONS:  STANDING MEDICATIONS  aspirin enteric coated 81 milliGRAM(s) Oral daily  atorvastatin 80 milliGRAM(s) Oral at bedtime  baclofen 10 milliGRAM(s) Oral daily  carvedilol 6.25 milliGRAM(s) Oral every 12 hours  cefTRIAXone   IVPB 1000 milliGRAM(s) IV Intermittent every 24 hours  chlorhexidine 2% Cloths 1 Application(s) Topical <User Schedule>  escitalopram 5 milliGRAM(s) Oral daily  fluticasone propionate/ salmeterol 100-50 MICROgram(s) Diskus 1 Dose(s) Inhalation two times a day  influenza   Vaccine 0.5 milliLiter(s) IntraMuscular once  iohexol 300 mG (iodine)/mL Oral Solution 30 milliLiter(s) Oral once  levothyroxine 125 MICROGram(s) Oral daily  lisinopril 10 milliGRAM(s) Oral daily  metroNIDAZOLE    Tablet 500 milliGRAM(s) Oral every 12 hours  montelukast 10 milliGRAM(s) Oral daily  pantoprazole    Tablet 40 milliGRAM(s) Oral before breakfast  senna 2 Tablet(s) Oral at bedtime  ticagrelor 90 milliGRAM(s) Oral every 12 hours  tiotropium 2.5 MICROgram(s) Inhaler 2 Puff(s) Inhalation daily  tiZANidine 4 milliGRAM(s) Oral three times a day    PRN MEDICATIONS  acetaminophen     Tablet .. 650 milliGRAM(s) Oral every 6 hours PRN  albuterol    90 MICROgram(s) HFA Inhaler 2 Puff(s) Inhalation every 6 hours PRN  aluminum hydroxide/magnesium hydroxide/simethicone Suspension 30 milliLiter(s) Oral every 4 hours PRN  clonazePAM  Tablet 1 milliGRAM(s) Oral two times a day PRN  HYDROmorphone  Injectable 1 milliGRAM(s) IV Push every 4 hours PRN  hydrOXYzine hydrochloride 10 milliGRAM(s) Oral three times a day PRN  melatonin 3 milliGRAM(s) Oral at bedtime PRN  ondansetron Injectable 4 milliGRAM(s) IV Push every 8 hours PRN  zolpidem 10 milliGRAM(s) Oral at bedtime PRN    VITALS:   ICU Vital Signs Last 24 Hrs  T(C): 36.4 (11 Feb 2025 12:30), Max: 36.5 (10 Feb 2025 20:05)  T(F): 97.5 (11 Feb 2025 12:30), Max: 97.7 (10 Feb 2025 20:05)  HR: 85 (11 Feb 2025 12:30) (73 - 85)  BP: 154/82 (11 Feb 2025 12:30) (103/69 - 154/82)  BP(mean): 106 (11 Feb 2025 12:30) (96 - 106)     RR: 18 (11 Feb 2025 12:30) (18 - 18)  SpO2: 94% (11 Feb 2025 12:30) (94% - 97%)    O2 Parameters below as of 11 Feb 2025 12:30  Patient On (Oxygen Delivery Method): room air            LABS:                        8.7    7.07  )-----------( 283      ( 11 Feb 2025 07:31 )             28.2     02-11    139  |  104  |  11  ----------------------------<  107[H]  4.0   |  22  |  1.1    Ca    8.4      11 Feb 2025 07:31  Mg     1.7     02-11    TPro  5.3[L]  /  Alb  3.2[L]  /  TBili  <0.2  /  DBili  x   /  AST  17  /  ALT  11  /  AlkPhos  84  02-11      Urinalysis Basic - ( 11 Feb 2025 07:31 )    Color: x / Appearance: x / SG: x / pH: x  Gluc: 107 mg/dL / Ketone: x  / Bili: x / Urobili: x   Blood: x / Protein: x / Nitrite: x   Leuk Esterase: x / RBC: x / WBC x   Sq Epi: x / Non Sq Epi: x / Bacteria: x            Culture - Urine (collected 09 Feb 2025 00:42)  Source: Clean Catch None  Final Report (10 Feb 2025 08:04):    <10,000 CFU/mL Normal Urogenital Sharonda    Urinalysis with Rflx Culture (collected 09 Feb 2025 00:42)            RADIOLOGY:      Lines:  Central line:              Date placed:             Indication:   Intravenous Access:   NG tube:   Perez Catheter:       Diagnositic orders     iohexol 300 mG (iodine)/mL Oral Solution (02-11-25 @ 15:17) [Active]  CT Abdomen and Pelvis w/ Oral Cont and w/ IV Cont (02-11-25 @ 15:17) [Ordered.]  zolpidem (02-11-25 @ 15:17) [Active]  HYDROmorphone  Injectable (02-11-25 @ 15:17) [Active]  metroNIDAZOLE    Tablet (02-11-25 @ 03:45) [Active]

## 2025-02-11 NOTE — DISCHARGE NOTE PROVIDER - NSDCFUSCHEDAPPT_GEN_ALL_CORE_FT
Bhanu Blank  [facility] Physician [facility]  NEUROSURG [address]  Scheduled Appointment: 03/21/2025    Misbah Valladares  [facility] Physician [facility]  PULMMED [address]  Scheduled Appointment: 03/27/2025

## 2025-02-11 NOTE — PROGRESS NOTE ADULT - SUBJECTIVE AND OBJECTIVE BOX
Gastroenterology Follow Up Note      Location: [facility] [place] 4A 023 C ([facility] [place] 4A)  Patient Name: GEOVANNA PUTNAM  Age: 59y  Gender: Female      Chief Complaint  Patient is a 59y old Female who presents with a chief complaint of abdominal pain (10 Feb 2025 07:40)  Primary diagnosis of Diverticulitis of intestine without perforation or abscess without bleeding        Progress Note  This morning patient was seen and examined at bedside.    Today is hospital day 2d.  She notes improvement in LLQ pain.  She denies nausea or vomiting.  She chronically has 5 loose stools per day post CCY; last BM was on 02/09.      Vital Signs in the last 24 hours   T(C): 36.5 (11 Feb 2025 05:05), Max: 37 (10 Feb 2025 15:10)  T(F): 97.7 (11 Feb 2025 05:05), Max: 98.6 (10 Feb 2025 15:10)  HR: 73 (11 Feb 2025 05:05) (73 - 80)  BP: 140/68 (11 Feb 2025 05:05) (103/69 - 140/68)  BP(mean): 96 (10 Feb 2025 20:05) (96 - 96)  RR: 18 (11 Feb 2025 05:05) (18 - 18)  SpO2: 95% (11 Feb 2025 05:05) (95% - 97%)        Physical Exam  * General Appearance: Alert, cooperative, interactive, oriented to time, place, and person, in no acute distress  * Eyes: PERRL, conjunctiva/corneas clear, EOM's intact, fundi benign, both eyes  * Lungs: Good bilateral air entry, normal breath sounds   * Heart: Regular Rate and Rhythm, normal S1 and S2, no audible murmur, rub, or gallop  * Abdomen: Symmetric, non-distended, soft, LLQ tenderness without guarding or rebound tenderness, bowel sounds active all four quadrants      Investigations                          8.7    7.07  )-----------( 283      ( 11 Feb 2025 07:31 )             28.2     02-11    139  |  104  |  11  ----------------------------<  107[H]  4.0   |  22  |  1.1    Ca    8.4      11 Feb 2025 07:31  Mg     1.7     02-11    TPro  5.3[L]  /  Alb  3.2[L]  /  TBili  <0.2  /  DBili  x   /  AST  17  /  ALT  11  /  AlkPhos  84  02-11        LIVER FUNCTIONS - ( 11 Feb 2025 07:31 )  Alb: 3.2 g/dL / Pro: 5.3 g/dL / ALK PHOS: 84 U/L / ALT: 11 U/L / AST: 17 U/L / GGT: x               Urinalysis Basic - ( 11 Feb 2025 07:31 )    Color: x / Appearance: x / SG: x / pH: x  Gluc: 107 mg/dL / Ketone: x  / Bili: x / Urobili: x   Blood: x / Protein: x / Nitrite: x   Leuk Esterase: x / RBC: x / WBC x   Sq Epi: x / Non Sq Epi: x / Bacteria: x        Current Medications  Standing Medications  aspirin enteric coated 81 milliGRAM(s) Oral daily  atorvastatin 80 milliGRAM(s) Oral at bedtime  baclofen 10 milliGRAM(s) Oral daily  carvedilol 6.25 milliGRAM(s) Oral every 12 hours  cefTRIAXone   IVPB 1000 milliGRAM(s) IV Intermittent every 24 hours  escitalopram 5 milliGRAM(s) Oral daily  fluticasone propionate/ salmeterol 100-50 MICROgram(s) Diskus 1 Dose(s) Inhalation two times a day  influenza   Vaccine 0.5 milliLiter(s) IntraMuscular once  levothyroxine 125 MICROGram(s) Oral daily  lisinopril 10 milliGRAM(s) Oral daily  metroNIDAZOLE  IVPB 500 milliGRAM(s) IV Intermittent every 12 hours  montelukast 10 milliGRAM(s) Oral daily  pantoprazole    Tablet 40 milliGRAM(s) Oral before breakfast  senna 2 Tablet(s) Oral at bedtime  ticagrelor 90 milliGRAM(s) Oral every 12 hours  tiotropium 2.5 MICROgram(s) Inhaler 2 Puff(s) Inhalation daily  tiZANidine 4 milliGRAM(s) Oral three times a day    PRN Medications  acetaminophen     Tablet .. 650 milliGRAM(s) Oral every 6 hours PRN Temp greater or equal to 38C (100.4F), Mild Pain (1 - 3)  albuterol    90 MICROgram(s) HFA Inhaler 2 Puff(s) Inhalation every 6 hours PRN Shortness of Breath and/or Wheezing  aluminum hydroxide/magnesium hydroxide/simethicone Suspension 30 milliLiter(s) Oral every 4 hours PRN Dyspepsia  clonazePAM  Tablet 1 milliGRAM(s) Oral two times a day PRN Anxiety  HYDROmorphone  Injectable 0.5 milliGRAM(s) IV Push every 4 hours PRN Severe Pain (7 - 10)  hydrOXYzine hydrochloride 10 milliGRAM(s) Oral three times a day PRN for anxiety  melatonin 3 milliGRAM(s) Oral at bedtime PRN Insomnia  ondansetron Injectable 4 milliGRAM(s) IV Push every 8 hours PRN Nausea and/or Vomiting  zolpidem 5 milliGRAM(s) Oral at bedtime PRN Insomnia    Singles Doses Administered  (ADM OVERRIDE) 1 each &lt;see task&gt; GiveOnce  cefTRIAXone   IVPB 2000 milliGRAM(s) IV Intermittent once  HYDROmorphone  Injectable 1 milliGRAM(s) IV Push Once  metroNIDAZOLE  IVPB 500 milliGRAM(s) IV Intermittent once  ondansetron Injectable 4 milliGRAM(s) IV Push once  sodium chloride 0.9% Bolus 1000 milliLiter(s) IV Bolus once  sodium chloride 0.9% Bolus 1000 milliLiter(s) IV Bolus once

## 2025-02-11 NOTE — DISCHARGE NOTE PROVIDER - NSDCCPCAREPLAN_GEN_ALL_CORE_FT
PRINCIPAL DISCHARGE DIAGNOSIS  Diagnosis: Diverticulitis  Assessment and Plan of Treatment: You were admitted to the hospital for an acute episode of diverticulitis. You were treated with IV antibiotics and pain medication. Your pain and diarrhea imrpoved. Please follow up with GI, surgery and your primary doctor outpatient. Please take all medications as prescribed. Return to the hospital with any new or worsening symptoms.

## 2025-02-11 NOTE — DISCHARGE NOTE PROVIDER - CARE PROVIDER_API CALL
Venessa Pelayo  Gastroenterology  [address] [place] [address]  Phone: [phone]  Fax: [phone]  Follow Up Time: 2 weeks    Kody Noonan  Internal Medicine  [facility] [place] [address]  Phone: [phone]  Fax: [phone]  Follow Up Time: 1 week

## 2025-02-11 NOTE — PHARMACOTHERAPY INTERVENTION NOTE - COMMENTS
Recommend to change intravenous metronidazole 500 mg q12h to oral metronidazole 500 mg q12h since patient is tolerating oral medications. Intervention pending     Leslie Dyer, Mac  Clinical Pharmacy Specialist, Infectious Diseases  Tele-Antimicrobial Stewardship Program (Tele-ASP)  Tele-ASP Phone: [phone]  Recommend to change intravenous metronidazole 500 mg q12h to oral metronidazole 500 mg q12h since patient is tolerating oral medications.     Leslie Dyer, PharmD  Clinical Pharmacy Specialist, Infectious Diseases  Tele-Antimicrobial Stewardship Program (Tele-ASP)  Tele-ASP Phone: [phone]

## 2025-02-11 NOTE — PROGRESS NOTE ADULT - ASSESSMENT
Assessment and Plan  Case of a 59 year old female patient with history of anxiety, COPD, HTN, DL/CAD, right ICA stenting in 2023 on plavix, migraine, hx of nasopharyngeal cancer s/p chemo-RT in remission, hx CCY, hypothyroidism, and hx of recurrent acute sigmoid diverticulitis with microperforation in 09/2022 s/p conservative management who presented to the ED on 02/08 for evaluation of LLQ pain associated with bloody diarrhea, found to have evidence of acute uncomplicated sigmoid diverticulitis. We are consulted for concern of acute uncomplicated sigmoid diverticulitis.      Acute Uncomplicated Recurrent/Persistent Sigmoid Diverticulitis; Hx of Microperforation in 09/2022 s/p conservative management  Chronic Microcytic Iron Deficiency Anemia w reported bloody diarrhea- resolved  * Hemodynamically stable  * Labs: WBC 10.56, Hb 9, MCV 79.7, Plt 272 on 02/09  * Lipase 30 on 02/08  * INR 0.9 on 02/04  * CT AP IC 02/08 Colonic diverticulosis with short segment proximal sigmoid circumferential wall thickening, perisigmoid inflammatory stranding; no evidence of abscess or gross free air.  * 11x Previous CT abdomens from 06/18/2018-12/02/2024 revealing acute sigmoid diverticulitis; the one from 09/17/2022 revealed also microperforation  * Evaluated by surgery for microperforation in 09/2022: Abs; conservative management  * Evaluated by GI in 03/2023: recommended outpatient colonoscopy with Dr Pelayo  * Last colonoscopy and EGD in 2022: polyps per patient; Dr Pelayo  * No FHx of colon cancer    RECOMMENDATIONS  - Recommend surgical evaluation in setting of persistent/recurrent acute sigmoid diverticulitis and hx of microperforation 09/2022 -> patient reports she is following with surgeon as outpatient  - Would benefit from a non urgent outpatient colonoscopy in 6 weeks after completion of antibiotics and resolution of diverticulitis; will follow with Dr Pelayo  - Complete antibiotics course  - Advance diet as tolerated  - Monitor for nausea. Continue IV Zofran 4mg Q8h PRN if QTc normal  - Monitor for pain and keep score at 01/10: management per team  - Monitor BM. In case diarrhea recurs, recommend checking stool cx; stool ova/parasites; C difficile; GI PCR. Currently on bowel regimen with senna   - Recommend checking inflammatory markers: ESR and CRP  - Continue PO protonix 40mg QD; avoid inessential NSAIDs  - Management of recently noted right ICA stent occlusion per neurology; recent evaluation appreciated  - Follow up with our [facility] located at [address] [place] [address]. Phone Number: [phone]        Thank you for your consult.  - Please note that plan was discussed and communicated with medical team.   - Please reach GI on [phone] during weekdays till 5pm.  - Please call the GI service line after 5pm on Weekdays and anytime on Weekends: [phone].      Luther Warren MD  PGY - 5 Gastroenterology Fellow   [facility]

## 2025-02-11 NOTE — PROGRESS NOTE ADULT - ASSESSMENT
59y Female with PMHx of  COPD not on home O2, CAD on Plavix, migraines, nasopharyngeal cancer in remission, hypothyroidism, HTN, HLD, right ICA stenting in 2023, ?DM (A1C 6.6 in 04/2024) presents with left-sided abdominal pain with diarrhea X 3 days, pain worsening today.  Patient reports bloody diarrhea 3 days ago which is since resolved.  However patient states pain has persisted and worsened today.  Patient states current symptoms feel similar to past episode of diverticulitis.  Patient reports associated chills.  Denies chest pain, shortness of breath, N/V, urinary symptoms. Last admission for diverticulitis was in 2023.     GENERAL: NAD, lying in bed comfortably  CHEST/LUNG: Clear to auscultation bilaterally; No rales, rhonchi, wheezing, or rubs. Unlabored respirations  HEART: Regular rate and rhythm; No murmurs, rubs, or gallops  ABDOMEN: LLQ pain tenderness.   EXTREMITIES:  2+ Peripheral Pulses, brisk capillary refill. No clubbing, cyanosis, or edema  NERVOUS SYSTEM:  Alert & Oriented X3, speech clear. No deficits      #Acute sigmoid diverticulitis   #Hx diverticular disease with multiple episodes of diverticulitis in the past   #Hx of Nasopharyngeal CA in remission   hgb 8.7 today  still having pain. get CT abd with IV.po contrast  continue Ceft and flagyl   pain: increase Dilaudid  Gi and surgery following      #COPD not on home O2   - not in exacerbation   - c/w home inhalers     # pre-DM  - last A1C 6.6 from 04/2024.  - not on any meds  - monitor FS. start SSI if FS > 200.  - check A1C    #CAD   #HTN  #HLD   #R ICA stenting in 2023   - c/w Aspirin and Brilinta   - c/w Olmesartan and Corg   - follow up with Neuro OP     #Hypothyroidism   - c/w Synthroid     #DVT PPx: SCD  #GI PPx: PPI   #Diet: Advance as tolerated   #Activity: IAT .   Pending: anticpate 24 hours .     Time-based billing (NON-critical care).     55 minutes spent on total encounter. The necessity of the time spent during the encounter on this date of service was due to:     Patient seen at bedside, time spent evaluating and treating the patient's acute illness as well as time spent reviewing labs, radiology, discussing with patient and/or patient's family and discussing the case with a multidisciplinary team.

## 2025-02-11 NOTE — DISCHARGE NOTE PROVIDER - NSDCMRMEDTOKEN_GEN_ALL_CORE_FT
aspirin 81 mg oral tablet: 1 tab(s) orally once a day  atorvastatin 80 mg oral tablet: 1 tab(s) orally once a day (at bedtime)  baclofen 10 mg oral tablet: 1 tab(s) orally once a day  Brilinta (ticagrelor) 90 mg oral tablet: 1 tab(s) orally 2 times a day  buPROPion 450 mg/24 hours (XL) oral tablet, extended release: 1 orally once a day  clonazePAM 1 mg oral tablet: 1 tab(s) orally 2 times a day  Coreg 6.25 mg oral tablet: 1 tab(s) orally 2 times a day  docusate sodium 100 mg oral tablet: 1 tab(s) orally 2 times a day  escitalopram 5 mg oral tablet: 1 tab(s) orally once a day  hydrOXYzine hydrochloride 10 mg oral tablet: 1 tab(s) orally 3 times a day as needed for  anxiety  levothyroxine 125 mcg (0.125 mg) oral tablet: 1 tab(s) orally once a day  lisinopril 10 mg oral tablet: 1 tab(s) orally once a day  MONTELUKAST SODIUM 10 MG TABS: 1 dose(s) orally once a day  omeprazole 40 mg oral delayed release capsule: 1 tab(s) orally once a day  tiZANidine 2 mg oral tablet: 2 tab(s) orally 3 times a day  Trelegy Ellipta inhalation powder: 1 puff(s) inhaled once a day  Ventolin HFA 90 mcg/inh inhalation aerosol: 2 puff(s) inhaled prn  ZOLPIDEM TARTRATE 10 MG TABS: orally once a day (at bedtime)   aspirin 81 mg oral tablet: 1 tab(s) orally once a day  atorvastatin 80 mg oral tablet: 1 tab(s) orally once a day (at bedtime)  baclofen 10 mg oral tablet: 1 tab(s) orally once a day  Brilinta (ticagrelor) 90 mg oral tablet: 1 tab(s) orally 2 times a day  buPROPion 450 mg/24 hours (XL) oral tablet, extended release: 1 orally once a day  cefpodoxime 200 mg oral tablet: 1 tab(s) orally 2 times a day  clonazePAM 1 mg oral tablet: 1 tab(s) orally 2 times a day  Coreg 6.25 mg oral tablet: 1 tab(s) orally 2 times a day  docusate sodium 100 mg oral tablet: 1 tab(s) orally 2 times a day  escitalopram 5 mg oral tablet: 1 tab(s) orally once a day  hydrOXYzine hydrochloride 10 mg oral tablet: 1 tab(s) orally 3 times a day as needed for  anxiety  levothyroxine 125 mcg (0.125 mg) oral tablet: 1 tab(s) orally once a day  lisinopril 10 mg oral tablet: 1 tab(s) orally once a day  metroNIDAZOLE 500 mg oral tablet: 1 tab(s) orally every 12 hours  MONTELUKAST SODIUM 10 MG TABS: 1 dose(s) orally once a day  omeprazole 40 mg oral delayed release capsule: 1 tab(s) orally once a day  ondansetron 4 mg oral tablet: 1 tab(s) orally every 6 hours as needed for  nausea  ondansetron 4 mg oral tablet, disintegratin tab(s) orally 3 times a day  Percocet 5 mg-325 mg oral tablet: 1 tab(s) orally every 8 hours MDD: 3 pills  tiZANidine 2 mg oral tablet: 2 tab(s) orally 3 times a day  Trelegy Ellipta inhalation powder: 1 puff(s) inhaled once a day  Ventolin HFA 90 mcg/inh inhalation aerosol: 2 puff(s) inhaled prn  ZOLPIDEM TARTRATE 10 MG TABS: orally once a day (at bedtime)   aspirin 81 mg oral tablet: 1 tab(s) orally once a day  atorvastatin 80 mg oral tablet: 1 tab(s) orally once a day (at bedtime)  baclofen 10 mg oral tablet: 1 tab(s) orally once a day  Brilinta (ticagrelor) 90 mg oral tablet: 1 tab(s) orally 2 times a day  buPROPion 450 mg/24 hours (XL) oral tablet, extended release: 1 orally once a day  cefpodoxime 200 mg oral tablet: 1 tab(s) orally 2 times a day  clonazePAM 1 mg oral tablet: 1 tab(s) orally 2 times a day  Coreg 6.25 mg oral tablet: 1 tab(s) orally 2 times a day  docusate sodium 100 mg oral tablet: 1 tab(s) orally 2 times a day  escitalopram 5 mg oral tablet: 1 tab(s) orally once a day  hydrOXYzine hydrochloride 10 mg oral tablet: 1 tab(s) orally 3 times a day as needed for  anxiety  levothyroxine 125 mcg (0.125 mg) oral tablet: 1 tab(s) orally once a day  lisinopril 10 mg oral tablet: 1 tab(s) orally once a day  metroNIDAZOLE 500 mg oral tablet: 1 tab(s) orally every 12 hours  MONTELUKAST SODIUM 10 MG TABS: 1 dose(s) orally once a day  omeprazole 40 mg oral delayed release capsule: 1 tab(s) orally once a day  ondansetron 4 mg oral tablet: 1 tab(s) orally every 6 hours as needed for  nausea  ondansetron 4 mg oral tablet, disintegratin tab(s) orally 3 times a day  tiZANidine 2 mg oral tablet: 2 tab(s) orally 3 times a day  Trelegy Ellipta inhalation powder: 1 puff(s) inhaled once a day  Ventolin HFA 90 mcg/inh inhalation aerosol: 2 puff(s) inhaled prn  ZOLPIDEM TARTRATE 10 MG TABS: orally once a day (at bedtime)

## 2025-02-11 NOTE — DISCHARGE NOTE PROVIDER - PROVIDER TOKENS
PROVIDER:[TOKEN:[[id number]],FOLLOWUP:[2 weeks]],PROVIDER:[TOKEN:[[id number]],FOLLOWUP:[1 week]]

## 2025-02-12 ENCOUNTER — TRANSCRIPTION ENCOUNTER (OUTPATIENT)
Age: 60
End: 2025-02-12

## 2025-02-12 ENCOUNTER — APPOINTMENT (OUTPATIENT)
Dept: NEUROLOGY | Facility: CLINIC | Age: 60
End: 2025-02-12

## 2025-02-12 PROCEDURE — 99239 HOSP IP/OBS DSCHRG MGMT >30: CPT

## 2025-02-12 PROCEDURE — 74177 CT ABD & PELVIS W/CONTRAST: CPT | Mod: 26

## 2025-02-12 RX ORDER — FLUCONAZOLE 100 MG/1
150 TABLET ORAL ONCE
Refills: 0 | Status: COMPLETED | OUTPATIENT
Start: 2025-02-12 | End: 2025-02-12

## 2025-02-12 RX ADMIN — LEVOTHYROXINE SODIUM 125 MICROGRAM(S): 25 TABLET ORAL at 06:55

## 2025-02-12 RX ADMIN — ONDANSETRON 4 MILLIGRAM(S): 4 TABLET, ORALLY DISINTEGRATING ORAL at 13:56

## 2025-02-12 RX ADMIN — CEFTRIAXONE 100 MILLIGRAM(S): 250 INJECTION, POWDER, FOR SOLUTION INTRAMUSCULAR; INTRAVENOUS at 06:55

## 2025-02-12 RX ADMIN — MONTELUKAST SODIUM 10 MILLIGRAM(S): 5 TABLET, CHEWABLE ORAL at 11:37

## 2025-02-12 RX ADMIN — Medication 10 MILLIGRAM(S): at 11:36

## 2025-02-12 RX ADMIN — ASPIRIN 81 MILLIGRAM(S): 81 TABLET, COATED ORAL at 11:36

## 2025-02-12 RX ADMIN — HYDROMORPHONE HYDROCHLORIDE 1 MILLIGRAM(S): 4 INJECTION, SOLUTION INTRAMUSCULAR; INTRAVENOUS; SUBCUTANEOUS at 08:53

## 2025-02-12 RX ADMIN — TIOTROPIUM BROMIDE MONOHYDRATE 2 PUFF(S): 18 CAPSULE ORAL; RESPIRATORY (INHALATION) at 08:32

## 2025-02-12 RX ADMIN — TICAGRELOR 90 MILLIGRAM(S): 90 TABLET ORAL at 06:55

## 2025-02-12 RX ADMIN — Medication 4 MILLIGRAM(S): at 15:32

## 2025-02-12 RX ADMIN — ESCITALOPRAM 5 MILLIGRAM(S): 10 TABLET, FILM COATED ORAL at 11:35

## 2025-02-12 RX ADMIN — PANTOPRAZOLE 40 MILLIGRAM(S): 20 TABLET, DELAYED RELEASE ORAL at 06:56

## 2025-02-12 RX ADMIN — HYDROMORPHONE HYDROCHLORIDE 1 MILLIGRAM(S): 4 INJECTION, SOLUTION INTRAMUSCULAR; INTRAVENOUS; SUBCUTANEOUS at 09:23

## 2025-02-12 RX ADMIN — FLUTICASONE PROPIONATE AND SALMETEROL 1 DOSE(S): 113; 14 POWDER, METERED RESPIRATORY (INHALATION) at 08:30

## 2025-02-12 RX ADMIN — Medication 6.25 MILLIGRAM(S): at 06:56

## 2025-02-12 RX ADMIN — ANTISEPTIC SURGICAL SCRUB 1 APPLICATION(S): 0.04 SOLUTION TOPICAL at 08:32

## 2025-02-12 RX ADMIN — Medication 500 MILLIGRAM(S): at 06:56

## 2025-02-12 RX ADMIN — FLUCONAZOLE 150 MILLIGRAM(S): 100 TABLET ORAL at 14:42

## 2025-02-12 RX ADMIN — Medication 10 MILLIGRAM(S): at 06:56

## 2025-02-12 NOTE — DISCHARGE NOTE NURSING/CASE MANAGEMENT/SOCIAL WORK - FINANCIAL ASSISTANCE
[facility] provides services at a reduced cost to those who are determined to be eligible through [facility]’s financial assistance program. Information regarding [facility]’s financial assistance program can be found by going to [email or web address] or by calling [phone].

## 2025-02-12 NOTE — DISCHARGE NOTE NURSING/CASE MANAGEMENT/SOCIAL WORK - PATIENT PORTAL LINK FT
You can access the FollowMyHealth Patient Portal offered by [facility] by registering at the following website: [email or web address]. By joining [facility]’s FollowMyHealth portal, you will also be able to view your health information using other applications (apps) compatible with our system.

## 2025-02-13 VITALS
OXYGEN SATURATION: 95 % | DIASTOLIC BLOOD PRESSURE: 67 MMHG | SYSTOLIC BLOOD PRESSURE: 99 MMHG | HEART RATE: 76 BPM | TEMPERATURE: 98 F

## 2025-02-19 ENCOUNTER — NON-APPOINTMENT (OUTPATIENT)
Age: 60
End: 2025-02-19

## 2025-02-26 ENCOUNTER — INPATIENT (INPATIENT)
Facility: HOSPITAL | Age: 60
LOS: 10 days | Discharge: HOME HEALTH | DRG: 248 | End: 2025-03-09
Attending: HOSPITALIST | Admitting: STUDENT IN AN ORGANIZED HEALTH CARE EDUCATION/TRAINING PROGRAM
Payer: MEDICAID

## 2025-02-26 VITALS
RESPIRATION RATE: 20 BRPM | WEIGHT: 250 LBS | TEMPERATURE: 99 F | HEART RATE: 77 BPM | SYSTOLIC BLOOD PRESSURE: 160 MMHG | HEIGHT: 60.4 IN | OXYGEN SATURATION: 97 % | DIASTOLIC BLOOD PRESSURE: 70 MMHG

## 2025-02-26 DIAGNOSIS — Z90.49 ACQUIRED ABSENCE OF OTHER SPECIFIED PARTS OF DIGESTIVE TRACT: Chronic | ICD-10-CM

## 2025-02-26 DIAGNOSIS — Z98.890 OTHER SPECIFIED POSTPROCEDURAL STATES: Chronic | ICD-10-CM

## 2025-02-26 LAB
BASOPHILS # BLD AUTO: 0.04 K/UL — SIGNIFICANT CHANGE UP (ref 0–0.2)
BASOPHILS NFR BLD AUTO: 0.3 % — SIGNIFICANT CHANGE UP (ref 0–1)
EOSINOPHIL # BLD AUTO: 0.04 K/UL — SIGNIFICANT CHANGE UP (ref 0–0.7)
EOSINOPHIL NFR BLD AUTO: 0.3 % — SIGNIFICANT CHANGE UP (ref 0–8)
HCT VFR BLD CALC: 40.2 % — SIGNIFICANT CHANGE UP (ref 37–47)
HGB BLD-MCNC: 12.2 G/DL — SIGNIFICANT CHANGE UP (ref 12–16)
IMM GRANULOCYTES NFR BLD AUTO: 0.5 % — HIGH (ref 0.1–0.3)
LYMPHOCYTES # BLD AUTO: 1.16 K/UL — LOW (ref 1.2–3.4)
LYMPHOCYTES # BLD AUTO: 7.9 % — LOW (ref 20.5–51.1)
MCHC RBC-ENTMCNC: 24.3 PG — LOW (ref 27–31)
MCHC RBC-ENTMCNC: 30.3 G/DL — LOW (ref 32–37)
MCV RBC AUTO: 80.1 FL — LOW (ref 81–99)
MONOCYTES # BLD AUTO: 0.74 K/UL — HIGH (ref 0.1–0.6)
MONOCYTES NFR BLD AUTO: 5.1 % — SIGNIFICANT CHANGE UP (ref 1.7–9.3)
NEUTROPHILS # BLD AUTO: 12.6 K/UL — HIGH (ref 1.4–6.5)
NEUTROPHILS NFR BLD AUTO: 85.9 % — HIGH (ref 42.2–75.2)
NRBC BLD AUTO-RTO: 0 /100 WBCS — SIGNIFICANT CHANGE UP (ref 0–0)
PLATELET # BLD AUTO: 503 K/UL — HIGH (ref 130–400)
PMV BLD: 10.2 FL — SIGNIFICANT CHANGE UP (ref 7.4–10.4)
RBC # BLD: 5.02 M/UL — SIGNIFICANT CHANGE UP (ref 4.2–5.4)
RBC # FLD: 18.8 % — HIGH (ref 11.5–14.5)
WBC # BLD: 14.65 K/UL — HIGH (ref 4.8–10.8)
WBC # FLD AUTO: 14.65 K/UL — HIGH (ref 4.8–10.8)

## 2025-02-26 PROCEDURE — 99285 EMERGENCY DEPT VISIT HI MDM: CPT

## 2025-02-26 RX ORDER — ONDANSETRON HCL/PF 4 MG/2 ML
4 VIAL (ML) INJECTION ONCE
Refills: 0 | Status: COMPLETED | OUTPATIENT
Start: 2025-02-26 | End: 2025-02-26

## 2025-02-26 RX ORDER — IOHEXOL 350 MG/ML
30 INJECTION, SOLUTION INTRAVENOUS ONCE
Refills: 0 | Status: COMPLETED | OUTPATIENT
Start: 2025-02-26 | End: 2025-02-26

## 2025-02-26 RX ORDER — HYDROMORPHONE/SOD CHLOR,ISO/PF 2 MG/10 ML
1 SYRINGE (ML) INJECTION ONCE
Refills: 0 | Status: DISCONTINUED | OUTPATIENT
Start: 2025-02-26 | End: 2025-02-26

## 2025-02-26 RX ADMIN — Medication 2000 MILLILITER(S): at 23:29

## 2025-02-26 RX ADMIN — Medication 4 MILLIGRAM(S): at 23:29

## 2025-02-26 RX ADMIN — IOHEXOL 30 MILLILITER(S): 350 INJECTION, SOLUTION INTRAVENOUS at 23:28

## 2025-02-26 RX ADMIN — Medication 1 MILLIGRAM(S): at 23:28

## 2025-02-26 NOTE — ED ADULT NURSE NOTE - NSFALLRISKINTERV_ED_ALL_ED
Assistance OOB with selected safe patient handling equipment if applicable/Assistance with ambulation/Communicate fall risk and risk factors to all staff, patient, and family/Provide visual cue: yellow wristband, yellow gown, etc/Reinforce activity limits and safety measures with patient and family/Use of alarms - bed, stretcher, chair and/or video monitoring/Call bell, personal items and telephone in reach/Instruct patient to call for assistance before getting out of bed/chair/stretcher/Non-slip footwear applied when patient is off stretcher/[place] to call system/Physically safe environment - no spills, clutter or unnecessary equipment/Purposeful Proactive Rounding/Room/bathroom lighting operational, light cord in reach

## 2025-02-27 DIAGNOSIS — K52.9 NONINFECTIVE GASTROENTERITIS AND COLITIS, UNSPECIFIED: ICD-10-CM

## 2025-02-27 LAB
A1C WITH ESTIMATED AVERAGE GLUCOSE RESULT: 6.6 % — HIGH (ref 4–5.6)
ALBUMIN SERPL ELPH-MCNC: 3.2 G/DL — LOW (ref 3.5–5.2)
ALBUMIN SERPL ELPH-MCNC: 4.2 G/DL — SIGNIFICANT CHANGE UP (ref 3.5–5.2)
ALP SERPL-CCNC: 126 U/L — HIGH (ref 30–115)
ALP SERPL-CCNC: 187 U/L — HIGH (ref 30–115)
ALT FLD-CCNC: 12 U/L — SIGNIFICANT CHANGE UP (ref 0–41)
ALT FLD-CCNC: 16 U/L — SIGNIFICANT CHANGE UP (ref 0–41)
ANION GAP SERPL CALC-SCNC: 12 MMOL/L — SIGNIFICANT CHANGE UP (ref 7–14)
ANION GAP SERPL CALC-SCNC: 15 MMOL/L — HIGH (ref 7–14)
APPEARANCE UR: ABNORMAL
AST SERPL-CCNC: 15 U/L — SIGNIFICANT CHANGE UP (ref 0–41)
AST SERPL-CCNC: 19 U/L — SIGNIFICANT CHANGE UP (ref 0–41)
BACTERIA # UR AUTO: ABNORMAL /HPF
BASOPHILS # BLD AUTO: 0.05 K/UL — SIGNIFICANT CHANGE UP (ref 0–0.2)
BASOPHILS NFR BLD AUTO: 0.4 % — SIGNIFICANT CHANGE UP (ref 0–1)
BILIRUB SERPL-MCNC: 0.2 MG/DL — SIGNIFICANT CHANGE UP (ref 0.2–1.2)
BILIRUB SERPL-MCNC: 0.3 MG/DL — SIGNIFICANT CHANGE UP (ref 0.2–1.2)
BILIRUB UR-MCNC: ABNORMAL
BUN SERPL-MCNC: 34 MG/DL — HIGH (ref 10–20)
BUN SERPL-MCNC: 35 MG/DL — HIGH (ref 10–20)
CALCIUM SERPL-MCNC: 8.4 MG/DL — SIGNIFICANT CHANGE UP (ref 8.4–10.4)
CALCIUM SERPL-MCNC: 9.4 MG/DL — SIGNIFICANT CHANGE UP (ref 8.4–10.5)
CAST: 24 /LPF — HIGH (ref 0–4)
CHLORIDE SERPL-SCNC: 103 MMOL/L — SIGNIFICANT CHANGE UP (ref 98–110)
CHLORIDE SERPL-SCNC: 104 MMOL/L — SIGNIFICANT CHANGE UP (ref 98–110)
CHOLEST SERPL-MCNC: 116 MG/DL — SIGNIFICANT CHANGE UP
CO2 SERPL-SCNC: 21 MMOL/L — SIGNIFICANT CHANGE UP (ref 17–32)
CO2 SERPL-SCNC: 24 MMOL/L — SIGNIFICANT CHANGE UP (ref 17–32)
COD CRY URNS QL: PRESENT
COLOR SPEC: SIGNIFICANT CHANGE UP
CREAT SERPL-MCNC: 1.9 MG/DL — HIGH (ref 0.7–1.5)
CREAT SERPL-MCNC: 2 MG/DL — HIGH (ref 0.7–1.5)
DIFF PNL FLD: NEGATIVE — SIGNIFICANT CHANGE UP
EGFR: 28 ML/MIN/1.73M2 — LOW
EGFR: 28 ML/MIN/1.73M2 — LOW
EGFR: 30 ML/MIN/1.73M2 — LOW
EGFR: 30 ML/MIN/1.73M2 — LOW
EOSINOPHIL # BLD AUTO: 0.07 K/UL — SIGNIFICANT CHANGE UP (ref 0–0.7)
EOSINOPHIL NFR BLD AUTO: 0.5 % — SIGNIFICANT CHANGE UP (ref 0–8)
ESTIMATED AVERAGE GLUCOSE: 143 MG/DL — HIGH (ref 68–114)
FINE GRAN CASTS #/AREA URNS AUTO: PRESENT
GLUCOSE SERPL-MCNC: 143 MG/DL — HIGH (ref 70–99)
GLUCOSE SERPL-MCNC: 194 MG/DL — HIGH (ref 70–99)
GLUCOSE UR QL: NEGATIVE MG/DL — SIGNIFICANT CHANGE UP
HCT VFR BLD CALC: 33.5 % — LOW (ref 37–47)
HDLC SERPL-MCNC: 50 MG/DL — LOW
HGB BLD-MCNC: 10.3 G/DL — LOW (ref 12–16)
IMM GRANULOCYTES NFR BLD AUTO: 1 % — HIGH (ref 0.1–0.3)
KETONES UR-MCNC: ABNORMAL MG/DL
LACTATE SERPL-SCNC: 1.3 MMOL/L — SIGNIFICANT CHANGE UP (ref 0.7–2)
LACTATE SERPL-SCNC: 1.7 MMOL/L — SIGNIFICANT CHANGE UP (ref 0.7–2)
LEUKOCYTE ESTERASE UR-ACNC: ABNORMAL
LIDOCAIN IGE QN: 40 U/L — SIGNIFICANT CHANGE UP (ref 7–60)
LIPID PNL WITH DIRECT LDL SERPL: 42 MG/DL — SIGNIFICANT CHANGE UP
LYMPHOCYTES # BLD AUTO: 1.78 K/UL — SIGNIFICANT CHANGE UP (ref 1.2–3.4)
LYMPHOCYTES # BLD AUTO: 13.9 % — LOW (ref 20.5–51.1)
MCHC RBC-ENTMCNC: 24 PG — LOW (ref 27–31)
MCHC RBC-ENTMCNC: 30.7 G/DL — LOW (ref 32–37)
MCV RBC AUTO: 77.9 FL — LOW (ref 81–99)
MONOCYTES # BLD AUTO: 1.07 K/UL — HIGH (ref 0.1–0.6)
MONOCYTES NFR BLD AUTO: 8.3 % — SIGNIFICANT CHANGE UP (ref 1.7–9.3)
NEUTROPHILS # BLD AUTO: 9.74 K/UL — HIGH (ref 1.4–6.5)
NEUTROPHILS NFR BLD AUTO: 75.9 % — HIGH (ref 42.2–75.2)
NITRITE UR-MCNC: NEGATIVE — SIGNIFICANT CHANGE UP
NON HDL CHOLESTEROL: 66 MG/DL — SIGNIFICANT CHANGE UP
NRBC BLD AUTO-RTO: 0 /100 WBCS — SIGNIFICANT CHANGE UP (ref 0–0)
PH UR: 5.5 — SIGNIFICANT CHANGE UP (ref 5–8)
PLATELET # BLD AUTO: 417 K/UL — HIGH (ref 130–400)
PMV BLD: 10.6 FL — HIGH (ref 7.4–10.4)
POTASSIUM SERPL-MCNC: 4.8 MMOL/L — SIGNIFICANT CHANGE UP (ref 3.5–5)
POTASSIUM SERPL-MCNC: 5.2 MMOL/L — HIGH (ref 3.5–5)
POTASSIUM SERPL-SCNC: 4.8 MMOL/L — SIGNIFICANT CHANGE UP (ref 3.5–5)
POTASSIUM SERPL-SCNC: 5.2 MMOL/L — HIGH (ref 3.5–5)
PROT SERPL-MCNC: 5.6 G/DL — LOW (ref 6–8)
PROT SERPL-MCNC: 6.9 G/DL — SIGNIFICANT CHANGE UP (ref 6–8)
PROT UR-MCNC: 100 MG/DL
RBC # BLD: 4.3 M/UL — SIGNIFICANT CHANGE UP (ref 4.2–5.4)
RBC # FLD: 18 % — HIGH (ref 11.5–14.5)
RBC CASTS # UR COMP ASSIST: 1 /HPF — SIGNIFICANT CHANGE UP (ref 0–4)
SODIUM SERPL-SCNC: 139 MMOL/L — SIGNIFICANT CHANGE UP (ref 135–146)
SODIUM SERPL-SCNC: 140 MMOL/L — SIGNIFICANT CHANGE UP (ref 135–146)
SP GR SPEC: >1.03 — HIGH (ref 1–1.03)
SQUAMOUS # UR AUTO: 16 /HPF — HIGH (ref 0–5)
TRIGL SERPL-MCNC: 136 MG/DL — SIGNIFICANT CHANGE UP
URATE CRY FLD QL MICRO: PRESENT
UROBILINOGEN FLD QL: 1 MG/DL — SIGNIFICANT CHANGE UP (ref 0.2–1)
WBC # BLD: 12.84 K/UL — HIGH (ref 4.8–10.8)
WBC # FLD AUTO: 12.84 K/UL — HIGH (ref 4.8–10.8)
WBC UR QL: 3 /HPF — SIGNIFICANT CHANGE UP (ref 0–5)

## 2025-02-27 PROCEDURE — 85027 COMPLETE CBC AUTOMATED: CPT

## 2025-02-27 PROCEDURE — 86901 BLOOD TYPING SEROLOGIC RH(D): CPT

## 2025-02-27 PROCEDURE — 85025 COMPLETE CBC W/AUTO DIFF WBC: CPT

## 2025-02-27 PROCEDURE — 82977 ASSAY OF GGT: CPT

## 2025-02-27 PROCEDURE — 86850 RBC ANTIBODY SCREEN: CPT

## 2025-02-27 PROCEDURE — 86900 BLOOD TYPING SEROLOGIC ABO: CPT

## 2025-02-27 PROCEDURE — 36415 COLL VENOUS BLD VENIPUNCTURE: CPT

## 2025-02-27 PROCEDURE — 85652 RBC SED RATE AUTOMATED: CPT

## 2025-02-27 PROCEDURE — 99223 1ST HOSP IP/OBS HIGH 75: CPT

## 2025-02-27 PROCEDURE — 97112 NEUROMUSCULAR REEDUCATION: CPT | Mod: GP

## 2025-02-27 PROCEDURE — 83735 ASSAY OF MAGNESIUM: CPT

## 2025-02-27 PROCEDURE — 80061 LIPID PANEL: CPT

## 2025-02-27 PROCEDURE — 70498 CT ANGIOGRAPHY NECK: CPT | Mod: MC

## 2025-02-27 PROCEDURE — 83036 HEMOGLOBIN GLYCOSYLATED A1C: CPT

## 2025-02-27 PROCEDURE — 74018 RADEX ABDOMEN 1 VIEW: CPT | Mod: 26

## 2025-02-27 PROCEDURE — 86140 C-REACTIVE PROTEIN: CPT

## 2025-02-27 PROCEDURE — 94640 AIRWAY INHALATION TREATMENT: CPT

## 2025-02-27 PROCEDURE — 83605 ASSAY OF LACTIC ACID: CPT

## 2025-02-27 PROCEDURE — 74177 CT ABD & PELVIS W/CONTRAST: CPT | Mod: 26

## 2025-02-27 PROCEDURE — 80053 COMPREHEN METABOLIC PANEL: CPT

## 2025-02-27 PROCEDURE — 74018 RADEX ABDOMEN 1 VIEW: CPT

## 2025-02-27 PROCEDURE — 70496 CT ANGIOGRAPHY HEAD: CPT | Mod: MC

## 2025-02-27 PROCEDURE — 97163 PT EVAL HIGH COMPLEX 45 MIN: CPT | Mod: GP

## 2025-02-27 PROCEDURE — 97110 THERAPEUTIC EXERCISES: CPT | Mod: GP

## 2025-02-27 PROCEDURE — 82962 GLUCOSE BLOOD TEST: CPT

## 2025-02-27 PROCEDURE — 74177 CT ABD & PELVIS W/CONTRAST: CPT | Mod: MC

## 2025-02-27 PROCEDURE — 74177 CT ABD & PELVIS W/CONTRAST: CPT | Mod: 26,77

## 2025-02-27 PROCEDURE — 97530 THERAPEUTIC ACTIVITIES: CPT | Mod: GP

## 2025-02-27 PROCEDURE — 99233 SBSQ HOSP IP/OBS HIGH 50: CPT

## 2025-02-27 RX ORDER — HYDROXYZINE HYDROCHLORIDE 25 MG/1
10 TABLET, FILM COATED ORAL THREE TIMES A DAY
Refills: 0 | Status: DISCONTINUED | OUTPATIENT
Start: 2025-02-27 | End: 2025-03-09

## 2025-02-27 RX ORDER — MAGNESIUM, ALUMINUM HYDROXIDE 200-200 MG
30 TABLET,CHEWABLE ORAL EVERY 4 HOURS
Refills: 0 | Status: DISCONTINUED | OUTPATIENT
Start: 2025-02-27 | End: 2025-03-09

## 2025-02-27 RX ORDER — TIZANIDINE 4 MG/1
2 TABLET ORAL THREE TIMES A DAY
Refills: 0 | Status: DISCONTINUED | OUTPATIENT
Start: 2025-02-27 | End: 2025-03-09

## 2025-02-27 RX ORDER — ONDANSETRON HCL/PF 4 MG/2 ML
4 VIAL (ML) INJECTION EVERY 8 HOURS
Refills: 0 | Status: DISCONTINUED | OUTPATIENT
Start: 2025-02-27 | End: 2025-03-09

## 2025-02-27 RX ORDER — SODIUM CHLORIDE 9 G/1000ML
1000 INJECTION, SOLUTION INTRAVENOUS
Refills: 0 | Status: DISCONTINUED | OUTPATIENT
Start: 2025-02-27 | End: 2025-02-27

## 2025-02-27 RX ORDER — BUPROPION HYDROBROMIDE 522 MG/1
450 TABLET, EXTENDED RELEASE ORAL DAILY
Refills: 0 | Status: DISCONTINUED | OUTPATIENT
Start: 2025-02-27 | End: 2025-03-09

## 2025-02-27 RX ORDER — TIOTROPIUM BROMIDE INHALATION SPRAY 3.12 UG/1
2 SPRAY, METERED RESPIRATORY (INHALATION) DAILY
Refills: 0 | Status: DISCONTINUED | OUTPATIENT
Start: 2025-02-27 | End: 2025-03-09

## 2025-02-27 RX ORDER — IOHEXOL 350 MG/ML
30 INJECTION, SOLUTION INTRAVENOUS ONCE
Refills: 0 | Status: COMPLETED | OUTPATIENT
Start: 2025-02-27 | End: 2025-02-27

## 2025-02-27 RX ORDER — HYDROMORPHONE/SOD CHLOR,ISO/PF 2 MG/10 ML
0.5 SYRINGE (ML) INJECTION EVERY 4 HOURS
Refills: 0 | Status: DISCONTINUED | OUTPATIENT
Start: 2025-02-27 | End: 2025-03-05

## 2025-02-27 RX ORDER — BACLOFEN 10 MG/20ML
10 INJECTION INTRATHECAL DAILY
Refills: 0 | Status: DISCONTINUED | OUTPATIENT
Start: 2025-02-27 | End: 2025-02-27

## 2025-02-27 RX ORDER — METRONIDAZOLE 250 MG
500 TABLET ORAL ONCE
Refills: 0 | Status: DISCONTINUED | OUTPATIENT
Start: 2025-02-27 | End: 2025-02-27

## 2025-02-27 RX ORDER — CARVEDILOL 3.12 MG/1
6.25 TABLET, FILM COATED ORAL EVERY 12 HOURS
Refills: 0 | Status: DISCONTINUED | OUTPATIENT
Start: 2025-02-27 | End: 2025-03-05

## 2025-02-27 RX ORDER — LEVOTHYROXINE SODIUM 300 MCG
125 TABLET ORAL DAILY
Refills: 0 | Status: DISCONTINUED | OUTPATIENT
Start: 2025-02-27 | End: 2025-03-09

## 2025-02-27 RX ORDER — ACETAMINOPHEN 500 MG/5ML
650 LIQUID (ML) ORAL EVERY 6 HOURS
Refills: 0 | Status: DISCONTINUED | OUTPATIENT
Start: 2025-02-27 | End: 2025-03-09

## 2025-02-27 RX ORDER — CEFTRIAXONE 500 MG/1
1000 INJECTION, POWDER, FOR SOLUTION INTRAMUSCULAR; INTRAVENOUS EVERY 24 HOURS
Refills: 0 | Status: DISCONTINUED | OUTPATIENT
Start: 2025-02-28 | End: 2025-02-27

## 2025-02-27 RX ORDER — CEFEPIME 2 G/20ML
1000 INJECTION, POWDER, FOR SOLUTION INTRAVENOUS EVERY 12 HOURS
Refills: 0 | Status: DISCONTINUED | OUTPATIENT
Start: 2025-02-27 | End: 2025-03-08

## 2025-02-27 RX ORDER — ONDANSETRON HCL/PF 4 MG/2 ML
4 VIAL (ML) INJECTION ONCE
Refills: 0 | Status: COMPLETED | OUTPATIENT
Start: 2025-02-27 | End: 2025-02-27

## 2025-02-27 RX ORDER — HYDROMORPHONE/SOD CHLOR,ISO/PF 2 MG/10 ML
1 SYRINGE (ML) INJECTION ONCE
Refills: 0 | Status: DISCONTINUED | OUTPATIENT
Start: 2025-02-27 | End: 2025-02-27

## 2025-02-27 RX ORDER — HEPARIN SODIUM 1000 [USP'U]/ML
5000 INJECTION INTRAVENOUS; SUBCUTANEOUS EVERY 8 HOURS
Refills: 0 | Status: DISCONTINUED | OUTPATIENT
Start: 2025-02-27 | End: 2025-03-05

## 2025-02-27 RX ORDER — SODIUM CHLORIDE 9 G/1000ML
1000 INJECTION, SOLUTION INTRAVENOUS
Refills: 0 | Status: DISCONTINUED | OUTPATIENT
Start: 2025-02-27 | End: 2025-03-09

## 2025-02-27 RX ORDER — MELATONIN 5 MG
3 TABLET ORAL AT BEDTIME
Refills: 0 | Status: DISCONTINUED | OUTPATIENT
Start: 2025-02-27 | End: 2025-02-27

## 2025-02-27 RX ORDER — ATORVASTATIN CALCIUM 80 MG/1
80 TABLET, FILM COATED ORAL AT BEDTIME
Refills: 0 | Status: DISCONTINUED | OUTPATIENT
Start: 2025-02-27 | End: 2025-03-09

## 2025-02-27 RX ORDER — TICAGRELOR 90 MG/1
90 TABLET ORAL EVERY 12 HOURS
Refills: 0 | Status: DISCONTINUED | OUTPATIENT
Start: 2025-02-27 | End: 2025-03-09

## 2025-02-27 RX ORDER — MONTELUKAST SODIUM 10 MG/1
10 TABLET ORAL DAILY
Refills: 0 | Status: DISCONTINUED | OUTPATIENT
Start: 2025-02-27 | End: 2025-03-09

## 2025-02-27 RX ORDER — ESCITALOPRAM 10 MG/1
1 TABLET, FILM COATED ORAL
Refills: 0 | DISCHARGE

## 2025-02-27 RX ORDER — ASPIRIN 325 MG
81 TABLET ORAL DAILY
Refills: 0 | Status: DISCONTINUED | OUTPATIENT
Start: 2025-02-27 | End: 2025-03-09

## 2025-02-27 RX ORDER — METRONIDAZOLE 250 MG
500 TABLET ORAL EVERY 8 HOURS
Refills: 0 | Status: DISCONTINUED | OUTPATIENT
Start: 2025-02-27 | End: 2025-03-04

## 2025-02-27 RX ORDER — CLONAZEPAM 0.5 MG/1
1 TABLET ORAL
Refills: 0 | Status: DISCONTINUED | OUTPATIENT
Start: 2025-02-27 | End: 2025-03-06

## 2025-02-27 RX ORDER — ALBUTEROL SULFATE 2.5 MG/3ML
2 VIAL, NEBULIZER (ML) INHALATION EVERY 6 HOURS
Refills: 0 | Status: DISCONTINUED | OUTPATIENT
Start: 2025-02-27 | End: 2025-03-09

## 2025-02-27 RX ORDER — CEFTRIAXONE 500 MG/1
1000 INJECTION, POWDER, FOR SOLUTION INTRAMUSCULAR; INTRAVENOUS ONCE
Refills: 0 | Status: COMPLETED | OUTPATIENT
Start: 2025-02-27 | End: 2025-02-27

## 2025-02-27 RX ADMIN — TIZANIDINE 2 MILLIGRAM(S): 4 TABLET ORAL at 06:38

## 2025-02-27 RX ADMIN — Medication 1 MILLIGRAM(S): at 04:58

## 2025-02-27 RX ADMIN — Medication 125 MICROGRAM(S): at 06:38

## 2025-02-27 RX ADMIN — Medication 100 MILLIGRAM(S): at 15:07

## 2025-02-27 RX ADMIN — SODIUM CHLORIDE 100 MILLILITER(S): 9 INJECTION, SOLUTION INTRAVENOUS at 11:57

## 2025-02-27 RX ADMIN — Medication 4 MILLIGRAM(S): at 08:49

## 2025-02-27 RX ADMIN — CEFTRIAXONE 100 MILLIGRAM(S): 500 INJECTION, POWDER, FOR SOLUTION INTRAMUSCULAR; INTRAVENOUS at 05:27

## 2025-02-27 RX ADMIN — BUPROPION HYDROBROMIDE 450 MILLIGRAM(S): 522 TABLET, EXTENDED RELEASE ORAL at 11:57

## 2025-02-27 RX ADMIN — Medication 250 MILLILITER(S): at 16:05

## 2025-02-27 RX ADMIN — Medication 40 MILLIGRAM(S): at 06:38

## 2025-02-27 RX ADMIN — SODIUM CHLORIDE 100 MILLILITER(S): 9 INJECTION, SOLUTION INTRAVENOUS at 19:01

## 2025-02-27 RX ADMIN — Medication 1 DOSE(S): at 08:43

## 2025-02-27 RX ADMIN — SODIUM CHLORIDE 100 MILLILITER(S): 9 INJECTION, SOLUTION INTRAVENOUS at 05:27

## 2025-02-27 RX ADMIN — TIOTROPIUM BROMIDE INHALATION SPRAY 2 PUFF(S): 3.12 SPRAY, METERED RESPIRATORY (INHALATION) at 08:43

## 2025-02-27 RX ADMIN — TIZANIDINE 2 MILLIGRAM(S): 4 TABLET ORAL at 22:44

## 2025-02-27 RX ADMIN — Medication 1 MILLIGRAM(S): at 16:05

## 2025-02-27 RX ADMIN — Medication 2 MILLIGRAM(S): at 08:49

## 2025-02-27 RX ADMIN — TICAGRELOR 90 MILLIGRAM(S): 90 TABLET ORAL at 19:01

## 2025-02-27 RX ADMIN — Medication 4 MILLIGRAM(S): at 03:36

## 2025-02-27 RX ADMIN — ATORVASTATIN CALCIUM 80 MILLIGRAM(S): 80 TABLET, FILM COATED ORAL at 22:45

## 2025-02-27 RX ADMIN — TIZANIDINE 2 MILLIGRAM(S): 4 TABLET ORAL at 15:07

## 2025-02-27 RX ADMIN — Medication 81 MILLIGRAM(S): at 11:57

## 2025-02-27 RX ADMIN — Medication 1 MILLIGRAM(S): at 03:36

## 2025-02-27 RX ADMIN — Medication 1 MILLIGRAM(S): at 01:00

## 2025-02-27 RX ADMIN — Medication 2 MILLIGRAM(S): at 22:45

## 2025-02-27 RX ADMIN — CEFEPIME 100 MILLIGRAM(S): 2 INJECTION, POWDER, FOR SOLUTION INTRAVENOUS at 19:01

## 2025-02-27 RX ADMIN — Medication 100 MILLIGRAM(S): at 22:45

## 2025-02-27 RX ADMIN — IOHEXOL 30 MILLILITER(S): 350 INJECTION, SOLUTION INTRAVENOUS at 16:00

## 2025-02-27 RX ADMIN — MONTELUKAST SODIUM 10 MILLIGRAM(S): 10 TABLET ORAL at 11:57

## 2025-02-27 RX ADMIN — CLONAZEPAM 1 MILLIGRAM(S): 0.5 TABLET ORAL at 19:01

## 2025-02-27 RX ADMIN — TICAGRELOR 90 MILLIGRAM(S): 90 TABLET ORAL at 06:38

## 2025-02-27 RX ADMIN — HEPARIN SODIUM 5000 UNIT(S): 1000 INJECTION INTRAVENOUS; SUBCUTANEOUS at 06:38

## 2025-02-27 RX ADMIN — CLONAZEPAM 1 MILLIGRAM(S): 0.5 TABLET ORAL at 06:38

## 2025-02-27 RX ADMIN — Medication 100 MILLIGRAM(S): at 06:37

## 2025-02-27 NOTE — CONSULT NOTE ADULT - ASSESSMENT
Assessment and Plan  Case of a 59 year old female patient with history of anxiety, COPD, HTN, DL/CAD, right ICA stenting in 2023 on plavix, migraine, hx of nasopharyngeal cancer s/p chemo-RT in remission, hx CCY, hypothyroidism, and hx of recurrent acute sigmoid diverticulitis with microperforation in 09/2022 s/p conservative management who presented to the ED on 02/26 for recurrent LLQ/upper abdominal pain associated with nausea and vomiting, found to have evidence of TC and DC colitis with worsened fat stranding compared to recent admission. We are consulted for concern of acute uncomplicated sigmoid diverticulitis.      Acute Transverse and Descending Colitis with Increase Fat Stranding  Hx of Uncomplicated Recurrent/Persistent Sigmoid Diverticulitis; Hx of Microperforation in 09/2022 s/p conservative management  R/O Infectious Etiology VS Malignancy VS Ischemic VS Other Etiology  Chronic Microcytic Iron Deficiency Anemia; Hx of reported bloody diarrhea- resolved with current constipation   * Hemodynamically stable  * Labs: WBC 14.65, Hb 12.2, Plt 503, Na 139, K 5.2, BUN 34, Cr 2 on 02/26  * Lipase 30 on 02/08  * INR 0.9 on 02/04  * Current CT Abdomen and Pelvis w/ Oral Cont and w/ IV Cont (02.27.25 @ 01:34) Transverse and descending colonic wall thickening with surrounding fat stranding, increased since prior exam and likely reflecting colitis.  * Recent CT AP IC 02/08 Colonic diverticulosis with short segment proximal sigmoid circumferential wall thickening, perisigmoid inflammatory stranding; no evidence of abscess or gross free air.  * Recent CT AP IC 02/12 unchanged  * 11x Previous CT abdomens from 06/18/2018-12/02/2024 revealing acute sigmoid diverticulitis; the one from 09/17/2022 revealed also microperforation  * Evaluated by surgery for microperforation in 09/2022: Abs; conservative management  * Evaluated by GI in 03/2023: recommended outpatient colonoscopy with Dr Pelayo -> evaluated by GI again earlier in 02/2025 (recommend surgical evaluation, Ab course, and outpatient colonoscopy with Dr Pelayo)  * Last colonoscopy and EGD in 2022: polyps per patient; Dr Pelayo  * No FHx of colon cancer    RECOMMENDATIONS  - Recommend surgical evaluation in setting of new TC/DC colitis with hx of persistent/recurrent acute sigmoid diverticulitis + hx of microperforation 09/2022   - Would benefit from a non urgent outpatient colonoscopy in 6 weeks after completion of antibiotics; patient prefers to follow with Dr Pelayo  - Complete antibiotics course; patient did not receive Ab on discharge (due to coverage issue per patient)  - Monitor for nausea. Continue IV Zofran 4mg Q8h PRN if QTc normal  - Monitor for pain and keep score at 01/10: management per team  - Advance diet as tolerated  - Monitor BM. In case diarrhea recurs, recommend checking stool cx; stool ova/parasites; C difficile; GI PCR   - Recommend checking inflammatory markers: ESR and CRP  - Monitor MAP and keep > 65mmHg. Check LA  - Continue PO protonix 40mg QD; avoid inessential NSAIDs  - Management of recently noted right ICA stent occlusion per neurology; recent evaluation appreciated  - Follow up with Dr Pelayo or at our [facility] located at [address] [place] [address]. Phone Number: [phone]        Thank you for your consult.  - Please note that plan was discussed and communicated with medical team.   - Please reach GI on [phone] during weekdays till 5pm.  - Please call the GI service line after 5pm on Weekdays and anytime on Weekends: [phone].      Luther Warren MD  PGY - 5 Gastroenterology Fellow   [facility]   Assessment and Plan  Case of a 59 year old female patient with history of anxiety, COPD, HTN, DL/CAD, right ICA stenting in 2023 on plavix, migraine, hx of nasopharyngeal cancer s/p chemo-RT in remission, hx CCY, hypothyroidism, and hx of recurrent acute sigmoid diverticulitis with microperforation in 09/2022 s/p conservative management who presented to the ED on 02/26 for recurrent LLQ/upper abdominal pain associated with nausea and vomiting, found to have evidence of TC and DC colitis with worsened fat stranding compared to recent admission. We are consulted for concern of acute uncomplicated sigmoid diverticulitis.      Acute Transverse and Descending Colitis with Increase Fat Stranding  Hx of Uncomplicated Recurrent/Persistent Sigmoid Diverticulitis; Hx of Microperforation in 09/2022 s/p conservative management  R/O Infectious Etiology VS Malignancy VS Ischemic VS Other Etiology  Chronic Microcytic Iron Deficiency Anemia; Hx of reported bloody diarrhea- resolved with current constipation   * Hemodynamically stable  * Labs: WBC 14.65, Hb 12.2, Plt 503, Na 139, K 5.2, BUN 34, Cr 2 on 02/26  * Lipase 30 on 02/08  * INR 0.9 on 02/04  * Current CT Abdomen and Pelvis w/ Oral Cont and w/ IV Cont (02.27.25 @ 01:34) Transverse and descending colonic wall thickening with surrounding fat stranding, increased since prior exam and likely reflecting colitis.  * Recent CT AP IC 02/08 Colonic diverticulosis with short segment proximal sigmoid circumferential wall thickening, perisigmoid inflammatory stranding; no evidence of abscess or gross free air.  * Recent CT AP IC 02/12 unchanged  * 11x Previous CT abdomens from 06/18/2018-12/02/2024 revealing acute sigmoid diverticulitis; the one from 09/17/2022 revealed also microperforation  * Evaluated by surgery for microperforation in 09/2022: Abs; conservative management  * Evaluated by GI in 03/2023: recommended outpatient colonoscopy with Dr Pelayo -> evaluated by GI again earlier in 02/2025 (recommend surgical evaluation, Ab course, and outpatient colonoscopy with Dr Pelayo)  * Last colonoscopy and EGD in 2022: polyps per patient; Dr Pelayo  * No FHx of colon cancer    RECOMMENDATIONS  - Recommend surgical evaluation in setting of new TC/DC colitis with hx of persistent/recurrent acute sigmoid diverticulitis + hx of microperforation 09/2022   - Would benefit from a non urgent outpatient colonoscopy in 6 weeks after completion of antibiotics; patient prefers to follow with Dr Pelayo  - Complete antibiotics course; patient did not receive Ab on discharge (due to coverage issue per patient)  - Monitor for nausea. Continue IV Zofran 4mg Q8h PRN if QTc normal  - Monitor for pain and keep score at 01/10: management per team  - Advance diet as tolerated  - Monitor BM. In case diarrhea recurs, recommend checking stool cx; stool ova/parasites; C difficile; GI PCR   - Recommend checking inflammatory markers: ESR and CRP  - Monitor MAP and keep > 65mmHg. Check LA  - Continue PO protonix 40mg QD; avoid inessential NSAIDs  - Management of recently noted right ICA stent occlusion per neurology; recent evaluation appreciated  - Follow up with Dr Pelayo or at our [facility] located at [address] [place] [address]. Phone Number: [phone]        New ALP Elevation  * Liver enzymes: 0.3/187/191/6 on 02/26 (previous: 0.2/84/17/11 on 02/11)  * INR 0.9 on 02/04  * Hepatitis C serology - in 11/2020  * No US abdomen    RECOMMENDATIONS  - Trend liver enzymes  - Check GGT and iso-ALP  - Check US abdomen  - Check hepatitis panel  - Avoid hepatotoxic agents      Thank you for your consult.  - Please note that plan was discussed and communicated with medical team.   - Please reach GI on [phone] during weekdays till 5pm.  - Please call the GI service line after 5pm on Weekdays and anytime on Weekends: [phone].      Luther Warren MD  PGY - 5 Gastroenterology Fellow   [facility]

## 2025-02-27 NOTE — H&P ADULT - ASSESSMENT
#Acute sigmoid diverticulitis   #Hx diverticular disease with multiple episodes of diverticulitis in the past   #Hx of Nasopharyngeal CA in remission   - LLQ pain  - bloody diarrhea ~3 days ago - resolved now   - Advance diet as tolerated  ---> monitor BMs  - GI eval appreciated- discussed with patient stool regimen, and dietary restrictions. she understands. she would follow up outpt for surgical options.    - Would benefit from a non urgent outpatient colonoscopy in 6 weeks after completion of antibiotics and resolution of diverticulitis; will follow with Dr Pelayo  - C/w Ceftriaxone and Flagyl- will complete 14d course (vantin and flagyl) at home  - 2/11/25 repeat CTAP redemonstrated diverticulitis, no new acute pathology  - Hgb stable   - Keep active T&S and trend H&H   - Transfuse PRN to keep Hgb > 8     #COPD not on home O2   - not in exacerbation   - c/w home inhalers     # pre-DM  - last A1C 6.6 from 04/2024.  - not on any meds  - monitor FS. start SSI if FS > 200.  - check A1C    #CAD   #HTN  #HLD   #R ICA stenting in 2023   - c/w Aspirin and Brilinta   - c/w Olmesartan and Corg   - follow up with Neuro OP     #Hypothyroidism   - c/w Synthroid     59y Female with PMHx of  COPD not on home O2, CAD on Plavix, migraines, nasopharyngeal cancer in remission, hypothyroidism, HTN, HLD, right ICA stenting in 2023, ?DM (A1C 6.6 in 04/2024) presents with left-sided abdominal pain with diarrhea X 3 days, pain worsening today.  Patient reports bloody diarrhea 3 days ago which is since resolved.  However patient states pain has persisted and worsened today.  Patient states current symptoms feel similar to past episode of diverticulitis.  Patient reports associated chills.  Denies chest pain, shortness of breath, N/V, urinary symptoms. Last admission for diverticulitis was in 2023.     GENERAL: NAD, lying in bed comfortably  CHEST/LUNG: Clear to auscultation bilaterally; No rales, rhonchi, wheezing, or rubs. Unlabored respirations  HEART: Regular rate and rhythm; No murmurs, rubs, or gallops  ABDOMEN: LLQ pain tenderness.   EXTREMITIES:  2+ Peripheral Pulses, brisk capillary refill. No clubbing, cyanosis, or edema  NERVOUS SYSTEM:  Alert & Oriented X3, speech clear. No deficits      #Acute sigmoid diverticulitis   #Hx diverticular disease with multiple episodes of diverticulitis in the past   #Hx of Nasopharyngeal CA in remission   hgb 8.7 today  still having pain. get CT abd with IV.po contrast  continue Ceft and flagyl   pain: increase Dilaudid  Gi and surgery following      #COPD not on home O2   - not in exacerbation   - c/w home inhalers     # pre-DM  - last A1C 6.6 from 04/2024.  - not on any meds  - monitor FS. start SSI if FS > 200.  - check A1C    #CAD   #HTN  #HLD   #R ICA stenting in 2023   - c/w Aspirin and Brilinta   - c/w Olmesartan and Corg   - follow up with Neuro OP     #Hypothyroidism   - c/w Synthroid     #DVT PPx: SCD  #GI PPx: PPI   #Diet: Advance as tolerated   #Activity: IAT .   Pending: anticpate 24 hours .    59-year-old female with history of recurrent diverticulitis, nasopharyngeal CA in remission, COPD not on home O2, prediabetes, hypertension, carotid artery stenosis status post endarterectomy/stenting here for assessment of recurrent abdominal pain. Patient was seen here recently, found to have persistent diverticular changes, discharged with oral antibiotics. Pt states she did not have coverage for the script sent so she did not take the abx. She is now returning with pain, nausea and constipation alternating with diarrhea. She is passing loose stools with flatus now. Vitals stable on admission, on RA on my encounter. CT Abdomen and Pelvis w/ Oral Cont and w/ IV Cont : Transverse and descending colonic wall thickening with surrounding fat stranding, increased since prior exam and likely reflecting colitis. Labs: WBC 14k, Hb 12.2, plt 503, K 5.2, BUN/Cr 34/2. Admitted for colitis.     #Acute on chronic colitis  #Hx diverticular disease with multiple episodes of diverticulitis in the past   #Hx of Nasopharyngeal CA in remission   - Monitor for bloody BMs,  - Advance diet as tolerated   - GI consult - last admission they recommended surgical evaluation in setting of persistent/recurrent acute sigmoid diverticulitis and hx of microperforation   - Consult surgery   - C/w Ceftriaxone and Flagyl  - Keep active T&S and trend H&H   - Transfuse PRN to keep Hgb > 8   - pain control with morphine     #COPD not on home O2   - not in exacerbation   - c/w home inhalers     # pre-DM  - not on any meds  - monitor FS. start SSI if FS > 200.  - F/U A1C    #LAKISHA on CKD   - Trend Cr  - likely prerenal   - Start lisinopril once Cr returns to baseline  - LR @100ml/hr    #CAD   #HTN  #HLD   #R ICA stenting in 2023   - c/w Aspirin and Brilinta   - c/w Coreg  - Hold lisinopril   - follow up with Neuro OP     #Hypothyroidism   - c/w Synthroid 125mcg    #DVT PPx: heparin  #GI PPx: PPI   #Diet: Advance as tolerated   #Activity: IAT

## 2025-02-27 NOTE — ED PROVIDER NOTE - CARE PLAN
1 Principal Discharge DX:	Colitis  Secondary Diagnosis:	Intractable abdominal pain  Secondary Diagnosis:	LAKISHA (acute kidney injury)

## 2025-02-27 NOTE — CONSULT NOTE ADULT - ATTENDING COMMENTS
This is 60 y/o female with PMH of recurrent diverticulitis, nasopharyngeal CA in remission, COPD not on home O2, prediabetes, hypertension, carotid artery stenosis s/p stent (on DAPT), s/p CCY here for assessment of recurrent abdominal pain. Patient was seen here recently, found to have persistent diverticular changes, discharged with oral antibiotics. Pt states she did not have coverage for the script sent so she did not take the abx. She is now returning with pain, nausea and constipation alternating with diarrhea for 2 days. She is passing loose stools with flatus now.     PE:  AAO x3  Chest: decreased breath sounds in bilateral lung bases  CV : RRR  Abdomen: tender in the left upper and lower quadrants, no rebound.    I independently reviewed and interpreted all images and labs:  CT Abd/Pelvis - showed thickened transverse, descending and sigmoid colon.  WBC 12 (from 14 yesterday).  Creat 1.9  Lactate 1.7    ASSESSMENT:  60 y/o female with Acute Colitis.  Abdominal pain.  LAKISHA.  Morbid obesity (BMI 48).  COPD.    PLAN:  - NPO, IVF  - iv antibiotics - on Cefepime and Flagyl   - needs adequate hydration  - has repeat CT Abd/Pelvis ordered by Medical team - will follow the results  Will follow up.

## 2025-02-27 NOTE — ED PROVIDER NOTE - CLINICAL SUMMARY MEDICAL DECISION MAKING FREE TEXT BOX
Patient with leukocytosis, LAKISHA and worsening bowel inflammation suggesting of colitis.  Given recent admission, worsening CT findings will need IV antibiotics and admission for GI eval.
Attending Attestation (For Attendings USE Only)...

## 2025-02-27 NOTE — ED PROVIDER NOTE - PHYSICAL EXAMINATION
VITAL SIGNS: I have reviewed nursing notes and confirm.  CONSTITUTIONAL: Well-developed; well-nourished; in no acute distress.  SKIN: Skin exam is warm and dry, no acute rash.  HEAD: Normocephalic; atraumatic.  EYES: PERRL, EOM intact; conjunctiva and sclera clear.  ENT: No nasal discharge; airway clear.   CARD: S1, S2 normal; no murmurs, gallops, or rubs. Regular rate and rhythm.  RESP: No wheezes, rales or rhonchi.  ABD: obese, diffusely tender, distended, decreased BS though limited exam due to pain with auscultation, +voluntary guarding  EXT: Normal ROM.   NEURO: Alert, oriented. Grossly unremarkable. No focal deficits.  PSYCH: Cooperative, appropriate.

## 2025-02-27 NOTE — H&P ADULT - HISTORY OF PRESENT ILLNESS
59-year-old female with history of recurrent diverticulitis, nasopharyngeal CA in remission, COPD not on home O2, prediabetes, hypertension, carotid artery stenosis status post endarterectomy/stenting here for assessment of recurrent abdominal pain.  Patient was seen here on February 8, found to have persistent diverticular changes, admitted at that time for IV antibiotics and discharged with oral antibiotics.  Per patient she did not fill her prescription and pain returned 2 days ago.  This time pain is associated with vomiting and constipation/obstipation.  Patient notes last bowel movement was 4 days ago, last flatus was 3 days ago, pain began 2 days ago and vomiting started today.    · Temp at ED Arrival (C)	37.3 Degrees C  · Oxygen Therapy Flow (L/min)	6 L/min  · O2 Delivery/Oxygen Delivery Method	nasal cannula  · SpO2 (%)	97 %  · Temp site	oral  · Temp (C)	37.3 Degrees C  · Temp (F)	99.1 Degrees F  · Respiration Rate (breaths/min)	20 /min  · Heart Rate	77 /min  · BP Diastolic	70 mm Hg  · BP Systolic	160 mm Hg    < from: CT Abdomen and Pelvis w/ Oral Cont and w/ IV Cont (02.27.25 @ 01:34) >  Transverse and descending colonic wall thickening with surrounding fat   stranding, increased since prior exam and likely reflecting colitis.    #Acute sigmoid diverticulitis   #Hx diverticular disease with multiple episodes of diverticulitis in the past   #Hx of Nasopharyngeal CA in remission   - LLQ pain  - bloody diarrhea ~3 days ago - resolved now   - Advance diet as tolerated  ---> monitor BMs  - GI eval appreciated- discussed with patient stool regimen, and dietary restrictions. she understands. she would follow up outpt for surgical options.    - Would benefit from a non urgent outpatient colonoscopy in 6 weeks after completion of antibiotics and resolution of diverticulitis; will follow with Dr Pelayo  - C/w Ceftriaxone and Flagyl- will complete 14d course (vantin and flagyl) at home  - 2/11/25 repeat CTAP redemonstrated diverticulitis, no new acute pathology  - Hgb stable   - Keep active T&S and trend H&H   - Transfuse PRN to keep Hgb > 8     #COPD not on home O2   - not in exacerbation   - c/w home inhalers     # pre-DM  - last A1C 6.6 from 04/2024.  - not on any meds  - monitor FS. start SSI if FS > 200.  - check A1C    #CAD   #HTN  #HLD   #R ICA stenting in 2023   - c/w Aspirin and Brilinta   - c/w Olmesartan and Corg   - follow up with Neuro OP     #Hypothyroidism   - c/w Synthroid       Labs: WBC 14k, Hb 12.2, plt 503, K 5.2, BUN/Cr 34/2    Admitted for colitis.  Patient is a 59-year-old female with history of recurrent diverticulitis, nasopharyngeal CA in remission, COPD not on home O2, prediabetes, hypertension, carotid artery stenosis status post endarterectomy/stenting here for assessment of recurrent abdominal pain. Patient was seen here recently, found to have persistent diverticular changes, discharged with oral antibiotics. Pt states she did not have coverage for the script sent so she did not take the abx. She is now returning with pain, nausea and constipation alternating with diarrhea. She is passing loose stools with flatus now.     · Temp at ED Arrival (C)	37.3 Degrees C  · Oxygen Therapy Flow (L/min)	6 L/min  · O2 Delivery/Oxygen Delivery Method	nasal cannula  · SpO2 (%)	97 %  · Temp site	oral  · Temp (C)	37.3 Degrees C  · Temp (F)	99.1 Degrees F  · Respiration Rate (breaths/min)	20 /min  · Heart Rate	77 /min  · BP Diastolic	70 mm Hg  · BP Systolic	160 mm Hg    CT Abdomen and Pelvis w/ Oral Cont and w/ IV Cont : Transverse and descending colonic wall thickening with surrounding fat stranding, increased since prior exam and likely reflecting colitis.    Labs: WBC 14k, Hb 12.2, plt 503, K 5.2, BUN/Cr 34/2    Admitted for colitis.

## 2025-02-27 NOTE — PROGRESS NOTE ADULT - ATTENDING COMMENTS
Present with the resident during the interview and examination of the patient by me. I personally interviewed the patient and repeated the exam. I reviewed/revised the exam and discussed with the resident in regards to assessment and plan as documented. See resident's section for details and agree with the above documented note.    S-Patient states that he feels well currently. denies any complaints.     O-PEx: Cardiac exam is regular rate and rhythm, lungs are clear to auscultation.  abdominal pain to palpation     I have utilized all available resources to obtain, update, or review the patients current medications.     Results of imaging, labs and EKG reviewed independently    Medical Problems:   · Assessment	  59-year-old female with history of recurrent diverticulitis, nasopharyngeal CA in remission, COPD not on home O2, prediabetes, hypertension, carotid artery stenosis status post endarterectomy/stenting here for assessment of recurrent abdominal pain. Patient was seen here recently, found to have persistent diverticular changes, discharged with oral antibiotics. Pt states she did not have coverage for the script sent so she did not take the abx. She is now returning with pain, nausea and constipation alternating with diarrhea. She is passing loose stools with flatus now. Vitals stable on admission, on RA on my encounter. CT Abdomen and Pelvis w/ Oral Cont and w/ IV Cont : Transverse and descending colonic wall thickening with surrounding fat stranding, increased since prior exam and likely reflecting colitis. Labs: WBC 14k, Hb 12.2, plt 503, K 5.2, BUN/Cr 34/2. Admitted for colitis.     #Acute on chronic colitis from descending and Transverse colon   #Hx diverticular disease with multiple episodes of diverticulitis in the past   #Hx of Nasopharyngeal CA in remission   - Monitor for bloody BMs,  - Advance diet as tolerated   - GI consult - last admission they recommended surgical evaluation in setting of persistent/recurrent acute sigmoid diverticulitis and hx of microperforation   - Consult surgery   - C/w Ceftriaxone and Flagyl  - Keep active T&S and trend H&H   - Transfuse PRN to keep Hgb > 8   - pain control with morphine   - Re-consult Surgery team (pain is worsening) pending Final recs from Surgery, Abdominal pain worsening will repeat KUB, and if pain persist repeat Abd and Pelvis CT with PO and IV contrast to r/o perforation.     #COPD not on home O2   - not in exacerbation   - c/w home inhalers     # pre-DM  - not on any meds  - monitor FS. start SSI if FS > 200.  - F/U A1C    #LAKISHA on CKD   - Trend Cr  - likely prerenal   - Start lisinopril once Cr returns to baseline  - LR @100ml/hr    #CAD   #HTN  #HLD   #R ICA stenting in 2023   - c/w Aspirin and Brilinta   - c/w Coreg  - Hold lisinopril   - follow up with Neuro OP     #Hypothyroidism   - c/w Synthroid 125mcg    #DVT PPx: heparin  #GI PPx: PPI   #Diet: Advance as tolerated   #Activity: IAT     Disposition and Medical Necessity :   -2-3 MN pending Final recs from Surgery, Abdominal pain worsening will repeat KUB, and if pain persist repeat Abd and Pelvis CT with PO and IV contrast to r/o perforation.     I have seen and examined the patient today and I spend time with the patient half of the time face-to-face encounter, I examine the patient, reviewed medications, I have reviewed laboratories, and imaging, and discussed plan of care with nurses staff. Please excuse any dictation or typographical errors that have not been edited out

## 2025-02-27 NOTE — ED PROVIDER NOTE - OBJECTIVE STATEMENT
59-year-old female with history of recurrent diverticulitis, nasopharyngeal CA in remission, COPD not on home O2, prediabetes, hypertension, carotid artery stenosis status post endarterectomy/stenting here for assessment of recurrent abdominal pain.  Patient was seen here on February 8, found to have persistent diverticular changes, admitted at that time for IV antibiotics and discharged with oral antibiotics.  Per patient she did not fill her prescription and pain returned 2 days ago.  This time pain is associated with vomiting and constipation/obstipation.  Patient notes last bowel movement was 4 days ago, last flatus was 3 days ago, pain began 2 days ago and vomiting started today.

## 2025-02-27 NOTE — H&P ADULT - NSHPPHYSICALEXAM_GEN_ALL_CORE
GENERAL: NAD, lying in bed comfortably  HEART: Regular rate and rhythm, no murmurs, rubs, or gallops  LUNGS: Unlabored respirations.   ABDOMEN: diffuse tenderness  EXTREMITIES: 2+ peripheral pulses bilaterally. No edema  NERVOUS SYSTEM:  A&Ox3

## 2025-02-27 NOTE — PROGRESS NOTE ADULT - SUBJECTIVE AND OBJECTIVE BOX
GEOVANNA PUTNAM  59y  Female      Patient is a 59y old  Female who presents with a chief complaint of abdominal pain (27 Feb 2025 03:58)      INTERVAL HPI/OVERNIGHT EVENTS:    REVIEW OF SYSTEMS:  CONSTITUTIONAL: No fever, weight loss, or fatigue  EYES: No eye pain, visual disturbances, or discharge  ENMT:  No difficulty hearing, tinnitus, vertigo; No sinus or throat pain  NECK: No pain or stiffness  RESPIRATORY: No cough, wheezing, chills or hemoptysis; No shortness of breath  CARDIOVASCULAR: No chest pain, palpitations, dizziness, or leg swelling  GASTROINTESTINAL: No abdominal or epigastric pain. No diarrhea or constipation. No nausea, vomiting, or hematemesis. No melena or hematochezia.  GENITOURINARY: No dysuria, frequency, hematuria, or incontinence  NEUROLOGICAL: No headaches, memory loss, loss of strength, numbness, or tremors  MUSCULOSKELETAL: No joint pain or swelling; No muscle, back, or extremity pain  SKIN: No itching, burning, rashes, or lesions   LYMPH NODES: No enlarged glands  ENDOCRINE: No heat or cold intolerance; No hair loss  PSYCHIATRIC: No depression, anxiety, mood swings, or difficulty sleeping  HEME/LYMPH: No easy bruising, or bleeding gums  ALLERY AND IMMUNOLOGIC: No hives or eczema    Vital Signs Last 24 Hrs  T(C): 36.7 (27 Feb 2025 08:18), Max: 37.3 (26 Feb 2025 20:37)  T(F): 98 (27 Feb 2025 08:18), Max: 99.1 (26 Feb 2025 20:37)  HR: 93 (27 Feb 2025 08:18) (77 - 93)  BP: 104/65 (27 Feb 2025 08:18) (104/65 - 160/70)  BP(mean): --  RR: 18 (27 Feb 2025 08:18) (16 - 20)  SpO2: 98% (27 Feb 2025 08:18) (96% - 98%)    Parameters below as of 27 Feb 2025 08:18  Patient On (Oxygen Delivery Method): room air        PHYSICAL EXAM:  GENERAL: NAD, well-groomed, well-developed  HEAD:  Atraumatic, Normocephalic  EYES: EOMI, PERRLA, conjunctiva and sclera clear  ENMT: Moist mucous membranes, Good dentition, No lesions  NECK: Supple, No JVD, Normal thyroid  NERVOUS SYSTEM:  Alert & Oriented X3, Good concentration; Motor Strength 5/5 B/L upper and lower extremities; DTRs 2+ intact and symmetric  CHEST/LUNG: Clear to auscultation bilaterally; No rales, rhonchi, wheezing, or rubs  HEART: Regular rate and rhythm; No murmurs, rubs, or gallops  ABDOMEN: Soft, Nontender, Nondistended; Bowel sounds present  EXTREMITIES:  2+ Peripheral Pulses, No clubbing, cyanosis, or edema  LYMPH: No lymphadenopathy noted  SKIN: No rashes or lesions    Consultant(s) Notes Reviewed:  [x ] YES  [ ] NO  Care Discussed with Consultants/Other Providers [ x] YES  [ ] NO    LABS:                        10.3   12.84 )-----------( 417      ( 27 Feb 2025 11:49 )             33.5     02-27    140  |  104  |  35[H]  ----------------------------<  143[H]  4.8   |  21  |  1.9[H]    Ca    8.4      27 Feb 2025 11:49    TPro  5.6[L]  /  Alb  3.2[L]  /  TBili  0.2  /  DBili  x   /  AST  15  /  ALT  12  /  AlkPhos  126[H]  02-27      Urinalysis Basic - ( 27 Feb 2025 11:49 )    Color: x / Appearance: x / SG: x / pH: x  Gluc: 143 mg/dL / Ketone: x  / Bili: x / Urobili: x   Blood: x / Protein: x / Nitrite: x   Leuk Esterase: x / RBC: x / WBC x   Sq Epi: x / Non Sq Epi: x / Bacteria: x        CAPILLARY BLOOD GLUCOSE          RADIOLOGY & ADDITIONAL TESTS:    Imaging Personally Reviewed:  [ ] YES  [ ] NO

## 2025-02-28 LAB
A1C WITH ESTIMATED AVERAGE GLUCOSE RESULT: 6.9 % — HIGH (ref 4–5.6)
ALBUMIN SERPL ELPH-MCNC: 3.2 G/DL — LOW (ref 3.5–5.2)
ALP SERPL-CCNC: 109 U/L — SIGNIFICANT CHANGE UP (ref 30–115)
ALT FLD-CCNC: 10 U/L — SIGNIFICANT CHANGE UP (ref 0–41)
ANION GAP SERPL CALC-SCNC: 11 MMOL/L — SIGNIFICANT CHANGE UP (ref 7–14)
AST SERPL-CCNC: 13 U/L — SIGNIFICANT CHANGE UP (ref 0–41)
BILIRUB SERPL-MCNC: 0.3 MG/DL — SIGNIFICANT CHANGE UP (ref 0.2–1.2)
BUN SERPL-MCNC: 23 MG/DL — HIGH (ref 10–20)
CALCIUM SERPL-MCNC: 8.3 MG/DL — LOW (ref 8.4–10.5)
CHLORIDE SERPL-SCNC: 104 MMOL/L — SIGNIFICANT CHANGE UP (ref 98–110)
CO2 SERPL-SCNC: 21 MMOL/L — SIGNIFICANT CHANGE UP (ref 17–32)
CREAT SERPL-MCNC: 1.4 MG/DL — SIGNIFICANT CHANGE UP (ref 0.7–1.5)
EGFR: 43 ML/MIN/1.73M2 — LOW
EGFR: 43 ML/MIN/1.73M2 — LOW
ESTIMATED AVERAGE GLUCOSE: 151 MG/DL — HIGH (ref 68–114)
GGT SERPL-CCNC: 17 U/L — SIGNIFICANT CHANGE UP (ref 1–40)
GLUCOSE SERPL-MCNC: 152 MG/DL — HIGH (ref 70–99)
HCT VFR BLD CALC: 30.1 % — LOW (ref 37–47)
HGB BLD-MCNC: 9.3 G/DL — LOW (ref 12–16)
MAGNESIUM SERPL-MCNC: 1.6 MG/DL — LOW (ref 1.8–2.4)
MCHC RBC-ENTMCNC: 24.4 PG — LOW (ref 27–31)
MCHC RBC-ENTMCNC: 30.9 G/DL — LOW (ref 32–37)
MCV RBC AUTO: 79 FL — LOW (ref 81–99)
NRBC BLD AUTO-RTO: 0 /100 WBCS — SIGNIFICANT CHANGE UP (ref 0–0)
PLATELET # BLD AUTO: 365 K/UL — SIGNIFICANT CHANGE UP (ref 130–400)
PMV BLD: 10.7 FL — HIGH (ref 7.4–10.4)
POTASSIUM SERPL-MCNC: 4.4 MMOL/L — SIGNIFICANT CHANGE UP (ref 3.5–5)
POTASSIUM SERPL-SCNC: 4.4 MMOL/L — SIGNIFICANT CHANGE UP (ref 3.5–5)
PROT SERPL-MCNC: 5.5 G/DL — LOW (ref 6–8)
RBC # BLD: 3.81 M/UL — LOW (ref 4.2–5.4)
RBC # FLD: 18.5 % — HIGH (ref 11.5–14.5)
SODIUM SERPL-SCNC: 136 MMOL/L — SIGNIFICANT CHANGE UP (ref 135–146)
WBC # BLD: 11.31 K/UL — HIGH (ref 4.8–10.8)
WBC # FLD AUTO: 11.31 K/UL — HIGH (ref 4.8–10.8)

## 2025-02-28 PROCEDURE — 99233 SBSQ HOSP IP/OBS HIGH 50: CPT

## 2025-02-28 PROCEDURE — 99232 SBSQ HOSP IP/OBS MODERATE 35: CPT

## 2025-02-28 RX ORDER — MAGNESIUM SULFATE 500 MG/ML
2 SYRINGE (ML) INJECTION
Refills: 0 | Status: COMPLETED | OUTPATIENT
Start: 2025-02-28 | End: 2025-02-28

## 2025-02-28 RX ORDER — INFLUENZA A VIRUS A/IDAHO/07/2018 (H1N1) ANTIGEN (MDCK CELL DERIVED, PROPIOLACTONE INACTIVATED, INFLUENZA A VIRUS A/INDIANA/08/2018 (H3N2) ANTIGEN (MDCK CELL DERIVED, PROPIOLACTONE INACTIVATED), INFLUENZA B VIRUS B/SINGAPORE/INFTT-16-0610/2016 ANTIGEN (MDCK CELL DERIVED, PROPIOLACTONE INACTIVATED), INFLUENZA B VIRUS B/IOWA/06/2017 ANTIGEN (MDCK CELL DERIVED, PROPIOLACTONE INACTIVATED) 15; 15; 15; 15 UG/.5ML; UG/.5ML; UG/.5ML; UG/.5ML
0.5 INJECTION, SUSPENSION INTRAMUSCULAR ONCE
Refills: 0 | Status: COMPLETED | OUTPATIENT
Start: 2025-02-28 | End: 2025-02-28

## 2025-02-28 RX ADMIN — HEPARIN SODIUM 5000 UNIT(S): 1000 INJECTION INTRAVENOUS; SUBCUTANEOUS at 05:57

## 2025-02-28 RX ADMIN — TIZANIDINE 2 MILLIGRAM(S): 4 TABLET ORAL at 17:15

## 2025-02-28 RX ADMIN — Medication 125 MICROGRAM(S): at 05:56

## 2025-02-28 RX ADMIN — Medication 2 MILLIGRAM(S): at 15:46

## 2025-02-28 RX ADMIN — Medication 1 DOSE(S): at 21:43

## 2025-02-28 RX ADMIN — Medication 100 MILLIGRAM(S): at 06:43

## 2025-02-28 RX ADMIN — Medication 1 DOSE(S): at 07:45

## 2025-02-28 RX ADMIN — HEPARIN SODIUM 5000 UNIT(S): 1000 INJECTION INTRAVENOUS; SUBCUTANEOUS at 21:32

## 2025-02-28 RX ADMIN — CLONAZEPAM 1 MILLIGRAM(S): 0.5 TABLET ORAL at 17:11

## 2025-02-28 RX ADMIN — MONTELUKAST SODIUM 10 MILLIGRAM(S): 10 TABLET ORAL at 12:51

## 2025-02-28 RX ADMIN — TIOTROPIUM BROMIDE INHALATION SPRAY 2 PUFF(S): 3.12 SPRAY, METERED RESPIRATORY (INHALATION) at 07:45

## 2025-02-28 RX ADMIN — TIZANIDINE 2 MILLIGRAM(S): 4 TABLET ORAL at 05:57

## 2025-02-28 RX ADMIN — Medication 25 GRAM(S): at 10:45

## 2025-02-28 RX ADMIN — HEPARIN SODIUM 5000 UNIT(S): 1000 INJECTION INTRAVENOUS; SUBCUTANEOUS at 14:08

## 2025-02-28 RX ADMIN — Medication 25 GRAM(S): at 12:50

## 2025-02-28 RX ADMIN — Medication 4 MILLIGRAM(S): at 02:41

## 2025-02-28 RX ADMIN — Medication 4 MILLIGRAM(S): at 21:32

## 2025-02-28 RX ADMIN — TICAGRELOR 90 MILLIGRAM(S): 90 TABLET ORAL at 17:43

## 2025-02-28 RX ADMIN — CEFEPIME 100 MILLIGRAM(S): 2 INJECTION, POWDER, FOR SOLUTION INTRAVENOUS at 17:11

## 2025-02-28 RX ADMIN — Medication 81 MILLIGRAM(S): at 12:51

## 2025-02-28 RX ADMIN — Medication 0.5 MILLIGRAM(S): at 18:39

## 2025-02-28 RX ADMIN — Medication 100 MILLIGRAM(S): at 14:07

## 2025-02-28 RX ADMIN — TIZANIDINE 2 MILLIGRAM(S): 4 TABLET ORAL at 21:32

## 2025-02-28 RX ADMIN — Medication 2 MILLIGRAM(S): at 21:32

## 2025-02-28 RX ADMIN — Medication 2 MILLIGRAM(S): at 07:27

## 2025-02-28 RX ADMIN — Medication 0.5 MILLIGRAM(S): at 18:09

## 2025-02-28 RX ADMIN — CARVEDILOL 6.25 MILLIGRAM(S): 3.12 TABLET, FILM COATED ORAL at 17:11

## 2025-02-28 RX ADMIN — Medication 2 MILLIGRAM(S): at 15:16

## 2025-02-28 RX ADMIN — TICAGRELOR 90 MILLIGRAM(S): 90 TABLET ORAL at 05:56

## 2025-02-28 RX ADMIN — Medication 100 MILLIGRAM(S): at 21:34

## 2025-02-28 RX ADMIN — Medication 2 MILLIGRAM(S): at 21:47

## 2025-02-28 RX ADMIN — BUPROPION HYDROBROMIDE 450 MILLIGRAM(S): 522 TABLET, EXTENDED RELEASE ORAL at 12:51

## 2025-02-28 RX ADMIN — CEFEPIME 100 MILLIGRAM(S): 2 INJECTION, POWDER, FOR SOLUTION INTRAVENOUS at 05:58

## 2025-02-28 RX ADMIN — ATORVASTATIN CALCIUM 80 MILLIGRAM(S): 80 TABLET, FILM COATED ORAL at 21:32

## 2025-02-28 RX ADMIN — CLONAZEPAM 1 MILLIGRAM(S): 0.5 TABLET ORAL at 05:56

## 2025-02-28 RX ADMIN — CARVEDILOL 6.25 MILLIGRAM(S): 3.12 TABLET, FILM COATED ORAL at 05:58

## 2025-02-28 RX ADMIN — Medication 0.5 MILLIGRAM(S): at 02:42

## 2025-02-28 RX ADMIN — Medication 0.5 MILLIGRAM(S): at 05:55

## 2025-02-28 NOTE — PROGRESS NOTE ADULT - ATTENDING COMMENTS
Patient had repeat CT overnight that shows same persistent thickening of transverse, descending and sigmoid colon.  Complain of abdominal pain but slightly better.  WBC 11  Creat 1.4    PE:  AAO x3  Chest: decreased breath sounds in bilateral lung bases  CV : RRR  Abdomen: tender in the left upper and lower quadrants, no rebound    ASSESSMENT:  58 y/o female with Acute Colitis.  Abdominal pain.  LAKISHA.  Morbid obesity (BMI 48).  COPD.  Carotid artery stenosis, S/P Stent.    PLAN:  - start clear liquid diet  - incentive spirometer  - keep normotensive  - continue gentle hydration  - follow serum electrolytes and UOP  - ID - continue Cefepime and Flagyl iv  - ok to restart Brilinta   - DVT prophylaxis  - GI f/u

## 2025-02-28 NOTE — PROGRESS NOTE ADULT - SUBJECTIVE AND OBJECTIVE BOX
GENERAL SURGERY PROGRESS NOTE    Patient: GEOVANNA PUTNAM , 59y ([birth year])Female   MRN: [id number]  Location: [facility] ED Hold 052 A  Visit: 02-27-25 Inpatient  Date: 02-28-25 @ 10:18      Procedure/Dx/Injuries: Colitis    Events of past 24 hours: WBC trending down, GI evaluated patient, no acute events    PAST MEDICAL & SURGICAL HISTORY:  COPD (chronic obstructive pulmonary disease)      HTN (hypertension)      Diverticulitis      Pulmonary nodules/lesions, multiple      Nasopharyngeal cancer      Hypothyroid      Cervical disc disease  sequelae of radtion for nasopharyngeal cancer      Left ear hearing loss      Hemorrhoids      H/O carotid stenosis      Anxiety      Morbid obesity with BMI of 45.0-49.9, adult      H/O dental abscess      HLD (hyperlipidemia)      Carotid artery disease      Obstructive sleep apnea on CPAP      OA (osteoarthritis)      Bilateral knee pain      Depression      History of cholecystectomy      History of common carotid artery stent placement          Vitals:   T(F): 98.1 (02-28-25 @ 07:50), Max: 98.8 (02-28-25 @ 00:39)  HR: 100 (02-28-25 @ 07:50)  BP: 109/67 (02-28-25 @ 07:50)  RR: 17 (02-28-25 @ 07:50)  SpO2: 93% (02-28-25 @ 07:50)      Diet, NPO:   Except Medications      Fluids: lactated ringers.: Solution, 1000 milliLiter(s) infuse at 100 mL/Hr, Stop After 24 Hours  Special Instructions: Please resumed ASAP after the CT scan      I & O's:      PHYSICAL EXAM:    GENERAL: No acute distress, well-developed  CHEST/LUNG: CTAB  HEART: Regular rate and rhythm.   ABDOMEN: Soft, non-distended, moderate tenderness to midline supraumbilical region and LLQ, no rebound, no guarding  EXTREMITIES:  No clubbing, cyanosis, or edema      MEDICATIONS  (STANDING):  aspirin enteric coated 81 milliGRAM(s) Oral daily  atorvastatin 80 milliGRAM(s) Oral at bedtime  buPROPion XL (24-Hour) . 450 milliGRAM(s) Oral daily  carvedilol 6.25 milliGRAM(s) Oral every 12 hours  cefepime   IVPB 1000 milliGRAM(s) IV Intermittent every 12 hours  clonazePAM  Tablet 1 milliGRAM(s) Oral two times a day  fluticasone propionate/ salmeterol 100-50 MICROgram(s) Diskus 1 Dose(s) Inhalation two times a day  heparin   Injectable 5000 Unit(s) SubCutaneous every 8 hours  lactated ringers. 1000 milliLiter(s) (100 mL/Hr) IV Continuous <Continuous>  levothyroxine 125 MICROGram(s) Oral daily  magnesium sulfate  IVPB 2 Gram(s) IV Intermittent every 2 hours  metroNIDAZOLE  IVPB 500 milliGRAM(s) IV Intermittent every 8 hours  montelukast 10 milliGRAM(s) Oral daily  pantoprazole    Tablet 40 milliGRAM(s) Oral before breakfast  ticagrelor 90 milliGRAM(s) Oral every 12 hours  tiotropium 2.5 MICROgram(s) Inhaler 2 Puff(s) Inhalation daily  tiZANidine 2 milliGRAM(s) Oral three times a day    MEDICATIONS  (PRN):  acetaminophen     Tablet .. 650 milliGRAM(s) Oral every 6 hours PRN Temp greater or equal to 38C (100.4F), Mild Pain (1 - 3)  albuterol    90 MICROgram(s) HFA Inhaler 2 Puff(s) Inhalation every 6 hours PRN Shortness of Breath  aluminum hydroxide/magnesium hydroxide/simethicone Suspension 30 milliLiter(s) Oral every 4 hours PRN Dyspepsia  HYDROmorphone  Injectable 0.5 milliGRAM(s) IV Push every 4 hours PRN break through pain  hydrOXYzine hydrochloride 10 milliGRAM(s) Oral three times a day PRN for anxiety  morphine  - Injectable 2 milliGRAM(s) IV Push every 6 hours PRN Severe Pain (7 - 10)  ondansetron Injectable 4 milliGRAM(s) IV Push every 8 hours PRN Nausea and/or Vomiting  zolpidem 5 milliGRAM(s) Oral at bedtime PRN Insomnia  zolpidem 5 milliGRAM(s) Oral at bedtime PRN Insomnia      DVT PROPHYLAXIS: heparin   Injectable 5000 Unit(s) SubCutaneous every 8 hours    GI PROPHYLAXIS: pantoprazole    Tablet 40 milliGRAM(s) Oral before breakfast    ANTICOAGULATION:   ANTIBIOTICS:  cefepime   IVPB 1000 milliGRAM(s)  metroNIDAZOLE  IVPB 500 milliGRAM(s)            LAB/STUDIES:  Labs:  CAPILLARY BLOOD GLUCOSE                              9.3    11.31 )-----------( 365      ( 28 Feb 2025 07:16 )             30.1       Auto Immature Granulocyte %: 1.0 % (02-27-25 @ 11:49)    02-28    136  |  104  |  23[H]  ----------------------------<  152[H]  4.4   |  21  |  1.4      Calcium: 8.3 mg/dL (02-28-25 @ 07:16)      LFTs:             5.5  | 0.3  | 13       ------------------[109     ( 28 Feb 2025 07:16 )  3.2  | x    | 10          Lipase:x      Amylase:x         Lactate, Blood: 1.3 mmol/L (02-27-25 @ 17:40)  Lactate, Blood: 1.7 mmol/L (02-27-25 @ 11:49)      Coags:            Urinalysis Basic - ( 28 Feb 2025 07:16 )    Color: x / Appearance: x / SG: x / pH: x  Gluc: 152 mg/dL / Ketone: x  / Bili: x / Urobili: x   Blood: x / Protein: x / Nitrite: x   Leuk Esterase: x / RBC: x / WBC x   Sq Epi: x / Non Sq Epi: x / Bacteria: x        Urinalysis with Rflx Culture (collected 27 Feb 2025 03:20)              IMAGING:      ACCESS/ DEVICES:  [x ] Peripheral IV  [ ] Central Venous Line	[ ] R	[ ] L	[ ] IJ	[ ] Fem	[ ] SC	Placed:   [ ] Arterial Line		[ ] R	[ ] L	[ ] Fem	[ ] Rad	[ ] Ax	Placed:   [ ] PICC:					[ ] Mediport  [ ] Urinary Catheter,  Date Placed:   [ ] Chest tube: [ ] Right, [ ] Left  [ ] JANIS/Reyes Drains

## 2025-02-28 NOTE — PROGRESS NOTE ADULT - ASSESSMENT
59-year-old female with history of recurrent diverticulitis, nasopharyngeal CA in remission, COPD not on home O2, prediabetes, hypertension, carotid artery stenosis status post endarterectomy/stenting here for assessment of recurrent abdominal pain. Patient was seen here recently, found to have persistent diverticular changes, discharged with oral antibiotics. Pt states she did not have coverage for the script sent so she did not take the abx. She is now returning with pain, nausea and constipation alternating with diarrhea. She is passing loose stools with flatus now. Vitals stable on admission, on RA on my encounter. CT Abdomen and Pelvis w/ Oral Cont and w/ IV Cont : Transverse and descending colonic wall thickening with surrounding fat stranding, increased since prior exam and likely reflecting colitis. Labs: WBC 14k, Hb 12.2, plt 503, K 5.2, BUN/Cr 34/2. Admitted for colitis.     #Acute on chronic colitis  #Hx diverticular disease with multiple episodes of diverticulitis in the past   #Hx of Nasopharyngeal CA in remission   - Monitor for bloody BMs,    - Advance diet as tolerated   - GI consult - last admission they recommended surgical evaluation in setting of persistent/recurrent acute sigmoid diverticulitis and hx of microperforation   - Consult surgery   - C/w Ceftriaxone and Flagyl  - Keep active T&S and trend H&H   - Transfuse PRN to keep Hgb > 8   - pain control with morphine     #COPD not on home O2   - not in exacerbation   - c/w home inhalers     # pre-DM  - not on any meds  - monitor FS. start SSI if FS > 200.  - F/U A1C    #LAKISHA on CKD   - Trend Cr  - likely prerenal   - Start lisinopril once Cr returns to baseline  - LR @100ml/hr    #CAD   #HTN  #HLD   #R ICA stenting in 2023   - c/w Aspirin and Brilinta   - c/w Coreg  - Hold lisinopril   - follow up with Neuro OP     #Hypothyroidism   - c/w Synthroid 125mcg    #DVT PPx: heparin  #GI PPx: PPI   #Diet: Advance as tolerated   #Activity: IAT    59-year-old female with history of recurrent diverticulitis, nasopharyngeal CA in remission, COPD not on home O2, prediabetes, hypertension, carotid artery stenosis status post endarterectomy/stenting here for assessment of recurrent abdominal pain. Patient was seen here recently, found to have persistent diverticular changes, discharged with oral antibiotics. Pt states she did not have coverage for the script sent so she did not take the abx. She is now returning with pain, nausea and constipation alternating with diarrhea. She is passing loose stools with flatus now. Vitals stable on admission, on RA on my encounter. CT Abdomen and Pelvis w/ Oral Cont and w/ IV Cont : Transverse and descending colonic wall thickening with surrounding fat stranding, increased since prior exam and likely reflecting colitis. Labs: WBC 14k, Hb 12.2, plt 503, K 5.2, BUN/Cr 34/2. Admitted for colitis.     #Acute on chronic colitis  #Hx diverticular disease with multiple episodes of diverticulitis in the past   #Hx of Nasopharyngeal CA in remission   - Monitor for bloody BMs,  - Advance diet as tolerated   - GI consult - last admission they recommended surgical evaluation in setting of persistent/recurrent acute sigmoid diverticulitis and hx of microperforation   -cw cefepime and flagyl  - repeat CTAP was negative for any perforation or acute abdominal process, no surgical interventions as per surgery  - C/w Ceftriaxone and Flagyl  -Creatinine trending down, ATN, monitor electrolytes for post ATN diuresis  -Lactate wnl and wbc trending down  - Keep active T&S and trend H&H   - Transfuse PRN to keep Hgb > 8   - pain control with morphine     #COPD not on home O2   - not in exacerbation   - c/w home inhalers     # pre-DM  - not on any meds  - monitor FS. start SSI if FS > 200.  - F/U A1C    #LAKISHA on CKD   - Trend Cr  - likely prerenal   - Start lisinopril once Cr returns to baseline  - LR @100ml/hr    #CAD   #HTN  #HLD   #R ICA stenting in 2023   - c/w Aspirin and Brilinta   - c/w Coreg  - Hold lisinopril   - follow up with Neuro OP     #Hypothyroidism   - c/w Synthroid 125mcg    #DVT PPx: heparin  #GI PPx: PPI   #Diet: Advance as tolerated   #Activity: IAT

## 2025-02-28 NOTE — PROGRESS NOTE ADULT - SUBJECTIVE AND OBJECTIVE BOX
Gastroenterology Initial Consult Note      Location: [facility] ED Hold 052 A ([facility] ED Hold)  Patient Name: GEOVANNA PUTNAM  Age: 59y  Gender: Female      Chief Complaint  Patient is a 59y old Female who presents with a chief complaint of abdominal pain (27 Feb 2025 17:10)  Primary diagnosis of Noninfectious gastroenteritis      Reason for Consult  Colitis      Progress Note  Patient complains of abdominal pain.  She notes mild improvement in nausea but vomited 2x on 02/28 and 5x on 02/27.  She had 1 formed brown BM on 02/28.        Vital Signs in the last 24 hours   Vital Signs Last 24 Hrs  T(C): 36.7 (28 Feb 2025 11:52), Max: 37.1 (28 Feb 2025 00:39)  T(F): 98 (28 Feb 2025 11:52), Max: 98.8 (28 Feb 2025 00:39)  HR: 79 (28 Feb 2025 17:08) (79 - 100)  BP: 119/65 (28 Feb 2025 17:08) (99/55 - 119/65)  BP(mean): 71 (28 Feb 2025 00:39) (71 - 71)  RR: 16 (28 Feb 2025 12:28) (16 - 18)  SpO2: 96% (28 Feb 2025 12:28) (93% - 96%)    Parameters below as of 28 Feb 2025 11:52  Patient On (Oxygen Delivery Method): room air        Physical Exam  * General Appearance: Alert, cooperative, interactive, oriented to time, place, and person, in no acute distress  * Lungs: Good bilateral air entry, normal breath sounds   * Heart: Regular Rate and Rhythm, normal S1 and S2, no audible murmur, rub, or gallop  * Abdomen: Symmetric, non-distended, soft, LLQ and epigastric tenderness, unable to assess for guarding or rebound tenderness as patient was in pain from initial palpation,  bowel sounds active all four quadrants, no masses, no organomegaly (no hepatosplenomegaly)      Investigations                          9.3    11.31 )-----------( 365      ( 28 Feb 2025 07:16 )             30.1     02-28    136  |  104  |  23[H]  ----------------------------<  152[H]  4.4   |  21  |  1.4    Ca    8.3[L]      28 Feb 2025 07:16  Mg     1.6     02-28    TPro  5.5[L]  /  Alb  3.2[L]  /  TBili  0.3  /  DBili  x   /  AST  13  /  ALT  10  /  AlkPhos  109  02-28        LIVER FUNCTIONS - ( 28 Feb 2025 10:45 )  Alb: x     / Pro: x     / ALK PHOS: x     / ALT: x     / AST: x     / GGT: 17 U/L           Urinalysis Basic - ( 28 Feb 2025 07:16 )    Color: x / Appearance: x / SG: x / pH: x  Gluc: 152 mg/dL / Ketone: x  / Bili: x / Urobili: x   Blood: x / Protein: x / Nitrite: x   Leuk Esterase: x / RBC: x / WBC x   Sq Epi: x / Non Sq Epi: x / Bacteria: x        Current Medications  Standing Medications  aspirin enteric coated 81 milliGRAM(s) Oral daily  atorvastatin 80 milliGRAM(s) Oral at bedtime  buPROPion XL (24-Hour) . 450 milliGRAM(s) Oral daily  carvedilol 6.25 milliGRAM(s) Oral every 12 hours  cefepime   IVPB 1000 milliGRAM(s) IV Intermittent every 12 hours  clonazePAM  Tablet 1 milliGRAM(s) Oral two times a day  fluticasone propionate/ salmeterol 100-50 MICROgram(s) Diskus 1 Dose(s) Inhalation two times a day  heparin   Injectable 5000 Unit(s) SubCutaneous every 8 hours  lactated ringers. 1000 milliLiter(s) (100 mL/Hr) IV Continuous <Continuous>  levothyroxine 125 MICROGram(s) Oral daily  metroNIDAZOLE  IVPB 500 milliGRAM(s) IV Intermittent every 8 hours  montelukast 10 milliGRAM(s) Oral daily  pantoprazole    Tablet 40 milliGRAM(s) Oral before breakfast  ticagrelor 90 milliGRAM(s) Oral every 12 hours  tiotropium 2.5 MICROgram(s) Inhaler 2 Puff(s) Inhalation daily  tiZANidine 2 milliGRAM(s) Oral three times a day    PRN Medications  acetaminophen     Tablet .. 650 milliGRAM(s) Oral every 6 hours PRN Temp greater or equal to 38C (100.4F), Mild Pain (1 - 3)  albuterol    90 MICROgram(s) HFA Inhaler 2 Puff(s) Inhalation every 6 hours PRN Shortness of Breath  aluminum hydroxide/magnesium hydroxide/simethicone Suspension 30 milliLiter(s) Oral every 4 hours PRN Dyspepsia  HYDROmorphone  Injectable 0.5 milliGRAM(s) IV Push every 4 hours PRN break through pain  hydrOXYzine hydrochloride 10 milliGRAM(s) Oral three times a day PRN for anxiety  morphine  - Injectable 2 milliGRAM(s) IV Push every 6 hours PRN Severe Pain (7 - 10)  ondansetron Injectable 4 milliGRAM(s) IV Push every 8 hours PRN Nausea and/or Vomiting  zolpidem 5 milliGRAM(s) Oral at bedtime PRN Insomnia  zolpidem 5 milliGRAM(s) Oral at bedtime PRN Insomnia    Singles Doses Administered  (ADM OVERRIDE) 1 each &lt;see task&gt; GiveOnce  cefTRIAXone   IVPB 1000 milliGRAM(s) IV Intermittent once  HYDROmorphone  Injectable 1 milliGRAM(s) IV Push once  HYDROmorphone  Injectable 1 milliGRAM(s) IV Push Once  HYDROmorphone  Injectable 1 milliGRAM(s) IV Push Once  iohexol 300 mG (iodine)/mL Oral Solution 30 milliLiter(s) Oral once  iohexol 300 mG (iodine)/mL Oral Solution 30 milliLiter(s) Oral once  ondansetron Injectable 4 milliGRAM(s) IV Push once  ondansetron Injectable 4 milliGRAM(s) IV Push once  sodium chloride 0.9% Bolus 1000 milliLiter(s) IV Bolus once  sodium chloride 0.9% Bolus 500 milliLiter(s) IV Bolus once

## 2025-02-28 NOTE — PROGRESS NOTE ADULT - ASSESSMENT
ASSESSMENT:  59F with PMH of recurrent diverticulitis, nasopharyngeal CA in remission, COPD not on home O2, prediabetes, hypertension, carotid artery stenosis s/p stent (on DAPT), s/p CCY here for assessment of recurrent abdominal pain. General Surgery consulted for worsening physical exam in the setting of colitis. Physical exam findings, imaging, and labs as documented above.     PLAN:  -no acute surgical intervention indicated, clinically not obstructed  -care as per primary team  -CT A/P w PO and IV contrast  -IVFs while NPO  -Continue with IV abx for colitis     Above plan discussed with Attending Surgeon Dr. Reza  , patient, patient family, and Primary team  02-28-25 @ 10:13    CONSULT SPECTRA: [id number] ASSESSMENT:  59F with PMH of recurrent diverticulitis, nasopharyngeal CA in remission, COPD not on home O2, prediabetes, hypertension, carotid artery stenosis s/p stent (on DAPT), s/p CCY here for assessment of recurrent abdominal pain. General Surgery consulted for worsening physical exam in the setting of colitis. Physical exam findings, imaging, and labs as documented above.     PLAN:  - no acute surgical intervention indicated, clinically not obstructed  - care as per primary team  - CLD and Advance diet as tolerated.  - Continue with IV abx for colitis   - Appreciate GI follow up and recommendations   - Recall surgery as needed.       Above plan discussed with Attending Surgeon Dr. Reza  , patient, patient family, and Primary team  02-28-25 @ 10:13    CONSULT SPECTRA: [id number] ASSESSMENT:  59F with PMH of recurrent diverticulitis, nasopharyngeal CA in remission, COPD not on home O2, prediabetes, hypertension, carotid artery stenosis s/p stent (on DAPT), s/p CCY here for assessment of recurrent abdominal pain. General Surgery consulted for worsening physical exam in the setting of colitis. Physical exam findings, imaging, and labs as documented above.     PLAN:  - no acute surgical intervention indicated, clinically not obstructed  - care as per primary team  - CLD and Advance diet as tolerated.  - Continue with IV abx for colitis   - Appreciate GI follow up and recommendations         Above plan discussed with Attending Surgeon Dr. Reza  , patient, patient family, and Primary team  02-28-25 @ 10:13    CONSULT SPECTRA: [id number]

## 2025-02-28 NOTE — PROGRESS NOTE ADULT - SUBJECTIVE AND OBJECTIVE BOX
GEOVANNA PUTNAM  59y  Female      Patient is a 59y old  Female who presents with a chief complaint of abdominal pain (28 Feb 2025 10:12)      INTERVAL HPI/OVERNIGHT EVENTS:    REVIEW OF SYSTEMS:  CONSTITUTIONAL: No fever, weight loss, or fatigue  EYES: No eye pain, visual disturbances, or discharge  ENMT:  No difficulty hearing, tinnitus, vertigo; No sinus or throat pain  NECK: No pain or stiffness  RESPIRATORY: No cough, wheezing, chills or hemoptysis; No shortness of breath  CARDIOVASCULAR: No chest pain, palpitations, dizziness, or leg swelling  GASTROINTESTINAL: No abdominal or epigastric pain. No diarrhea or constipation. No nausea, vomiting, or hematemesis. No melena or hematochezia.  GENITOURINARY: No dysuria, frequency, hematuria, or incontinence  NEUROLOGICAL: No headaches, memory loss, loss of strength, numbness, or tremors  MUSCULOSKELETAL: No joint pain or swelling; No muscle, back, or extremity pain  SKIN: No itching, burning, rashes, or lesions   LYMPH NODES: No enlarged glands  ENDOCRINE: No heat or cold intolerance; No hair loss  PSYCHIATRIC: No depression, anxiety, mood swings, or difficulty sleeping  HEME/LYMPH: No easy bruising, or bleeding gums  ALLERY AND IMMUNOLOGIC: No hives or eczema    Vital Signs Last 24 Hrs  T(C): 36.7 (28 Feb 2025 07:50), Max: 37.1 (28 Feb 2025 00:39)  T(F): 98.1 (28 Feb 2025 07:50), Max: 98.8 (28 Feb 2025 00:39)  HR: 100 (28 Feb 2025 07:50) (91 - 100)  BP: 109/67 (28 Feb 2025 07:50) (102/56 - 109/67)  BP(mean): 71 (28 Feb 2025 00:39) (71 - 71)  RR: 17 (28 Feb 2025 07:50) (17 - 18)  SpO2: 93% (28 Feb 2025 07:50) (93% - 96%)    Parameters below as of 28 Feb 2025 07:50  Patient On (Oxygen Delivery Method): room air        PHYSICAL EXAM:  GENERAL: NAD, well-groomed, well-developed  HEAD:  Atraumatic, Normocephalic  EYES: EOMI, PERRLA, conjunctiva and sclera clear  ENMT: Moist mucous membranes, Good dentition, No lesions  NECK: Supple, No JVD, Normal thyroid  NERVOUS SYSTEM:  Alert & Oriented X3, Good concentration; Motor Strength 5/5 B/L upper and lower extremities; DTRs 2+ intact and symmetric  CHEST/LUNG: Clear to auscultation bilaterally; No rales, rhonchi, wheezing, or rubs  HEART: Regular rate and rhythm; No murmurs, rubs, or gallops  ABDOMEN: Soft, Nontender, Nondistended; Bowel sounds present  EXTREMITIES:  2+ Peripheral Pulses, No clubbing, cyanosis, or edema  LYMPH: No lymphadenopathy noted  SKIN: No rashes or lesions    Consultant(s) Notes Reviewed:  [x ] YES  [ ] NO  Care Discussed with Consultants/Other Providers [ x] YES  [ ] NO    LABS:                        9.3    11.31 )-----------( 365      ( 28 Feb 2025 07:16 )             30.1     02-28    136  |  104  |  23[H]  ----------------------------<  152[H]  4.4   |  21  |  1.4    Ca    8.3[L]      28 Feb 2025 07:16  Mg     1.6     02-28    TPro  5.5[L]  /  Alb  3.2[L]  /  TBili  0.3  /  DBili  x   /  AST  13  /  ALT  10  /  AlkPhos  109  02-28      Urinalysis Basic - ( 28 Feb 2025 07:16 )    Color: x / Appearance: x / SG: x / pH: x  Gluc: 152 mg/dL / Ketone: x  / Bili: x / Urobili: x   Blood: x / Protein: x / Nitrite: x   Leuk Esterase: x / RBC: x / WBC x   Sq Epi: x / Non Sq Epi: x / Bacteria: x        CAPILLARY BLOOD GLUCOSE          RADIOLOGY & ADDITIONAL TESTS:    Imaging Personally Reviewed:  [ ] YES  [ ] NO GEOVANNA PUTNAM  59y  Female      Patient is a 59y old  Female who presents with a chief complaint of abdominal pain (28 Feb 2025 10:12)      INTERVAL HPI/OVERNIGHT EVENTS:    No events overnight    REVIEW OF SYSTEMS:    ROS negative    Vital Signs Last 24 Hrs  T(C): 36.7 (28 Feb 2025 07:50), Max: 37.1 (28 Feb 2025 00:39)  T(F): 98.1 (28 Feb 2025 07:50), Max: 98.8 (28 Feb 2025 00:39)  HR: 100 (28 Feb 2025 07:50) (91 - 100)  BP: 109/67 (28 Feb 2025 07:50) (102/56 - 109/67)  BP(mean): 71 (28 Feb 2025 00:39) (71 - 71)  RR: 17 (28 Feb 2025 07:50) (17 - 18)  SpO2: 93% (28 Feb 2025 07:50) (93% - 96%)    Parameters below as of 28 Feb 2025 07:50  Patient On (Oxygen Delivery Method): room air        PHYSICAL EXAM:  GENERAL: NAD, well-groomed, well-developed  HEAD:  Atraumatic, Normocephalic  EYES: EOMI, PERRLA, conjunctiva and sclera clear  ENMT: Moist mucous membranes, Good dentition, No lesions  NECK: Supple, No JVD, Normal thyroid  NERVOUS SYSTEM:  Alert & Oriented X3, Good concentration; Motor Strength 5/5 B/L upper and lower extremities; DTRs 2+ intact and symmetric  CHEST/LUNG: Clear to auscultation bilaterally; No rales, rhonchi, wheezing, or rubs  HEART: Regular rate and rhythm; No murmurs, rubs, or gallops  ABDOMEN: diffuse abdominal tenderness, with guarding, and rebound, no rigidity  EXTREMITIES:  2+ Peripheral Pulses, No clubbing, cyanosis, or edema  LYMPH: No lymphadenopathy noted  SKIN: No rashes or lesions    Consultant(s) Notes Reviewed:  [x ] YES  [ ] NO  Care Discussed with Consultants/Other Providers [ x] YES  [ ] NO    LABS:                        9.3    11.31 )-----------( 365      ( 28 Feb 2025 07:16 )             30.1     02-28    136  |  104  |  23[H]  ----------------------------<  152[H]  4.4   |  21  |  1.4    Ca    8.3[L]      28 Feb 2025 07:16  Mg     1.6     02-28    TPro  5.5[L]  /  Alb  3.2[L]  /  TBili  0.3  /  DBili  x   /  AST  13  /  ALT  10  /  AlkPhos  109  02-28      Urinalysis Basic - ( 28 Feb 2025 07:16 )    Color: x / Appearance: x / SG: x / pH: x  Gluc: 152 mg/dL / Ketone: x  / Bili: x / Urobili: x   Blood: x / Protein: x / Nitrite: x   Leuk Esterase: x / RBC: x / WBC x   Sq Epi: x / Non Sq Epi: x / Bacteria: x        CAPILLARY BLOOD GLUCOSE          RADIOLOGY & ADDITIONAL TESTS:    Imaging Personally Reviewed:  [ ] YES  [ ] NO

## 2025-02-28 NOTE — PROGRESS NOTE ADULT - ATTENDING COMMENTS
60yo female with recurrent sigmoid diverticulitis presents with abdominal pain. CT showing left sided/transverse colon thickening, possibly ischemic, inflammatory vs infectious etiology. Recommend serial abd exams, check stool tests if loose stool. Recommend close follow up for colonoscopy when improved. Surgery following.

## 2025-02-28 NOTE — ED ADULT NURSE REASSESSMENT NOTE - NS ED NURSE REASSESS COMMENT FT1
Pt resting comfortably at this time, VSS and safety maintained. Bed alarm set. Call bell within reach, pt has no complaints at this time. hourly rounding continued.

## 2025-02-28 NOTE — PROGRESS NOTE ADULT - ASSESSMENT
Assessment and Plan  Case of a 59 year old female patient with history of anxiety, COPD, HTN, DL/CAD, right ICA stenting in 2023 on plavix, migraine, hx of nasopharyngeal cancer s/p chemo-RT in remission, hx CCY, hypothyroidism, and hx of recurrent acute sigmoid diverticulitis with microperforation in 09/2022 s/p conservative management who presented to the ED on 02/26 for recurrent LLQ/upper abdominal pain associated with nausea and vomiting, found to have evidence of TC and DC colitis with worsened fat stranding compared to recent admission. We are consulted for concern of acute uncomplicated sigmoid diverticulitis.      Acute Transverse and Descending Colitis with Increase Fat Stranding  Hx of Uncomplicated Recurrent/Persistent Sigmoid Diverticulitis; Hx of Microperforation in 09/2022 s/p conservative management  R/O Infectious Etiology VS Malignancy VS Ischemic VS Other Etiology  Chronic Microcytic Iron Deficiency Anemia; Hx of reported bloody diarrhea- resolved with current constipation   * Hemodynamically stable  * Labs: WBC 14.65, Hb 12.2, Plt 503, Na 139, K 5.2, BUN 34, Cr 2 on 02/26  * Lipase 30 on 02/08  * INR 0.9 on 02/04; LA 1.3  * Current CT Abdomen and Pelvis w/ Oral Cont and w/ IV Cont (02.27.25 @ 01:34) Transverse and descending colonic wall thickening with surrounding fat stranding, increased since prior exam and likely reflecting colitis.  * Repeat CT Abdomen and Pelvis w/ Oral Cont and w/ IV Cont (02.27.25 @ 18:40) >  Stable appearance of transverse and descending colon consistent with   colitis. No drainable fluid collection or pneumoperitoneum.  * Recent CT AP IC 02/08 Colonic diverticulosis with short segment proximal sigmoid circumferential wall thickening, perisigmoid inflammatory stranding; no evidence of abscess or gross free air.  * 11x Previous CT abdomens from 06/18/2018-12/02/2024 revealing acute sigmoid diverticulitis; the one from 09/17/2022 revealed also microperforation  * Evaluated by surgery for microperforation in 09/2022: Abs; conservative management  * Evaluated by GI in 03/2023: recommended outpatient colonoscopy with Dr Pelayo -> evaluated by GI again earlier in 02/2025 (recommend surgical evaluation, Ab course, and outpatient colonoscopy with Dr Pelayo)  * Last colonoscopy and EGD in 2022: polyps per patient; Dr Pelayo  * No FHx of colon cancer    RECOMMENDATIONS  - Repeat CT abdomen noted  - Surgical evaluation appreciated   - Would benefit from a non urgent outpatient colonoscopy in 6 weeks after completion of antibiotics; patient prefers to follow with Dr Pelayo  - Continue antibiotics per team  - Monitor for nausea. Continue IV Zofran 4mg Q8h PRN if QTc normal  - Monitor for pain and keep score at 01/10: management per team  - Advance diet as tolerated  - Monitor BM. In case diarrhea recurs, recommend checking stool cx; stool ova/parasites; C difficile; GI PCR   - Recommend checking inflammatory markers: ESR and CRP (still no order)  - Monitor MAP and keep > 65mmHg  - Continue PO protonix 40mg QD; avoid inessential NSAIDs  - Management of recently noted right ICA stent occlusion per neurology; recent evaluation appreciated  - Follow up with Dr Pelayo or at our [facility] located at [address] [place] [address]. Phone Number: [phone]        New ALP Elevation  * Liver enzymes: 0.3/187/191/6 on 02/26 (previous: 0.2/84/17/11 on 02/11)  * INR 0.9 on 02/04  * Hepatitis C serology - in 11/2020  * No US abdomen    RECOMMENDATIONS  - Trend liver enzymes  - Check GGT and iso-ALP  - Check US abdomen  - Check hepatitis panel  - Avoid hepatotoxic agents      Thank you for your consult.  - Please note that plan was discussed and communicated with medical team.   - Please reach GI on [phone] during weekdays till 5pm.  - Please call the GI service line after 5pm on Weekdays and anytime on Weekends: [phone].      Luther Warren MD  PGY - 5 Gastroenterology Fellow   [facility]

## 2025-02-28 NOTE — PATIENT PROFILE ADULT - FALL HARM RISK - RISK INTERVENTIONS
Assistance OOB with selected safe patient handling equipment/Assistance with ambulation/Communicate Fall Risk and Risk Factors to all staff, patient, and family/Discuss with provider need for PT consult/Monitor gait and stability/Reinforce activity limits and safety measures with patient and family/Visual Cue: Yellow wristband/Bed in lowest position, wheels locked, appropriate side rails in place/Call bell, personal items and telephone in reach/Instruct patient to call for assistance before getting out of bed or chair/Non-slip footwear when patient is out of bed/[place] to call system/Physically safe environment - no spills, clutter or unnecessary equipment/Purposeful Proactive Rounding/Room/bathroom lighting operational, light cord in reach

## 2025-02-28 NOTE — PROGRESS NOTE ADULT - ATTENDING COMMENTS
I have personally seen and examined the patient.  I fully participated in the care of this patient.  I have made amendments to the documentation where necessary, and agree with the history, physical exam, and plan as documented by the Resident.     Present with the resident during the interview and examination of the patient by me. I personally interviewed the patient and repeated the exam. I reviewed/revised the exam and discussed with the resident in regards to assessment and plan as documented. See resident's section for details and agree with the above documented note.    S-Patient states that he feels well currently. denies any complaints.     O-PEx: Cardiac exam is regular rate and rhythm, lungs are clear to auscultation.  abdominal pain to palpation     I have utilized all available resources to obtain, update, or review the patients current medications.     Results of imaging, labs and EKG reviewed independently    Medical Problems:   · Assessment	  59-year-old female with history of recurrent diverticulitis, nasopharyngeal CA in remission, COPD not on home O2, prediabetes, hypertension, carotid artery stenosis status post endarterectomy/stenting here for assessment of recurrent abdominal pain. Patient was seen here recently, found to have persistent diverticular changes, discharged with oral antibiotics. Pt states she did not have coverage for the script sent so she did not take the abx. She is now returning with pain, nausea and constipation alternating with diarrhea. She is passing loose stools with flatus now. Vitals stable on admission, on RA on my encounter. CT Abdomen and Pelvis w/ Oral Cont and w/ IV Cont : Transverse and descending colonic wall thickening with surrounding fat stranding, increased since prior exam and likely reflecting colitis. Labs: WBC 14k, Hb 12.2, plt 503, K 5.2, BUN/Cr 34/2. Admitted for colitis.     #Acute on chronic colitis from descending and Transverse colon IMPROVING   #Hx diverticular disease with multiple episodes of diverticulitis in the past   #Hx of Nasopharyngeal CA in remission   - Monitor for bloody BMs,  - Advance diet as tolerated   - GI consult - last admission they recommended surgical evaluation in setting of persistent/recurrent acute sigmoid diverticulitis and hx of microperforation   - Consult surgery   - C/w Ceftriaxone and Flagyl  - Keep active T&S and trend H&H   - Transfuse PRN to keep Hgb > 8   - pain control with morphine   - 2/27: Re-consult Surgery team (pain is worsening) pending Final recs from Surgery, Abdominal pain worsening will repeat KUB, and if pain persist repeat Abd and Pelvis CT with PO and IV contrast to r/o perforation.   - 2/28: Symptoms improving specially pain, abd distention, and rebound, CT Abd and pelvis last night showed improvement on inflammation, advance diet as tolerance.     #Hypomagnesemia: NEW Diagnosis  -Monitor Mg levels   -Start Magnesium Sulfate 2g IV     #COPD not on home O2   - not in exacerbation   - c/w home inhalers     # pre-DM  - not on any meds  - monitor FS. start SSI if FS > 200.  - F/U A1C    #LAKISHA on CKD   - Trend Cr  - likely prerenal   - Start lisinopril once Cr returns to baseline  - LR @100ml/hr    #CAD   #HTN  #HLD   #R ICA stenting in 2023   - c/w Aspirin and Brilinta   - c/w Coreg  - Hold lisinopril   - follow up with Neuro OP     #Hypothyroidism   - c/w Synthroid 125mcg    #DVT PPx: heparin  #GI PPx: PPI   #Diet: Advance as tolerated   #Activity: IAT     Disposition and Medical Necessity : Handoff  -1-2 MN repeat KUB daily, needs Colonoscopy on dc arrangements 6-8 weeks from dc to then consider Resection of Diverticular segment due to recurrent Acute on chronic diverticulitis and H/o perforations.   - 2/28: Symptoms improving specially pain, abd distention, and rebound, CT Abd and pelvis last night showed improvement on inflammation, advance diet as tolerance.       I have seen and examined the patient today and I spend time with the patient half of the time face-to-face encounter, I examine the patient, reviewed medications, I have reviewed laboratories, and imaging, and discussed plan of care with nurses staff. Please excuse any dictation or typographical errors that have not been edited out .

## 2025-03-01 LAB
ALBUMIN SERPL ELPH-MCNC: 3.4 G/DL — LOW (ref 3.5–5.2)
ALP SERPL-CCNC: 95 U/L — SIGNIFICANT CHANGE UP (ref 30–115)
ALT FLD-CCNC: 10 U/L — SIGNIFICANT CHANGE UP (ref 0–41)
ANION GAP SERPL CALC-SCNC: 11 MMOL/L — SIGNIFICANT CHANGE UP (ref 7–14)
AST SERPL-CCNC: 14 U/L — SIGNIFICANT CHANGE UP (ref 0–41)
BASOPHILS # BLD AUTO: 0.05 K/UL — SIGNIFICANT CHANGE UP (ref 0–0.2)
BASOPHILS NFR BLD AUTO: 0.5 % — SIGNIFICANT CHANGE UP (ref 0–1)
BILIRUB SERPL-MCNC: 0.2 MG/DL — SIGNIFICANT CHANGE UP (ref 0.2–1.2)
BUN SERPL-MCNC: 14 MG/DL — SIGNIFICANT CHANGE UP (ref 10–20)
CALCIUM SERPL-MCNC: 8.1 MG/DL — LOW (ref 8.4–10.5)
CHLORIDE SERPL-SCNC: 104 MMOL/L — SIGNIFICANT CHANGE UP (ref 98–110)
CO2 SERPL-SCNC: 24 MMOL/L — SIGNIFICANT CHANGE UP (ref 17–32)
CREAT SERPL-MCNC: 1.1 MG/DL — SIGNIFICANT CHANGE UP (ref 0.7–1.5)
EGFR: 57 ML/MIN/1.73M2 — LOW
EGFR: 57 ML/MIN/1.73M2 — LOW
EOSINOPHIL # BLD AUTO: 0.32 K/UL — SIGNIFICANT CHANGE UP (ref 0–0.7)
EOSINOPHIL NFR BLD AUTO: 3.4 % — SIGNIFICANT CHANGE UP (ref 0–8)
GLUCOSE SERPL-MCNC: 149 MG/DL — HIGH (ref 70–99)
HCT VFR BLD CALC: 29.9 % — LOW (ref 37–47)
HGB BLD-MCNC: 9.3 G/DL — LOW (ref 12–16)
IMM GRANULOCYTES NFR BLD AUTO: 0.4 % — HIGH (ref 0.1–0.3)
LYMPHOCYTES # BLD AUTO: 1.5 K/UL — SIGNIFICANT CHANGE UP (ref 1.2–3.4)
LYMPHOCYTES # BLD AUTO: 16.1 % — LOW (ref 20.5–51.1)
MAGNESIUM SERPL-MCNC: 2 MG/DL — SIGNIFICANT CHANGE UP (ref 1.8–2.4)
MCHC RBC-ENTMCNC: 24.5 PG — LOW (ref 27–31)
MCHC RBC-ENTMCNC: 31.1 G/DL — LOW (ref 32–37)
MCV RBC AUTO: 78.9 FL — LOW (ref 81–99)
MONOCYTES # BLD AUTO: 0.63 K/UL — HIGH (ref 0.1–0.6)
MONOCYTES NFR BLD AUTO: 6.8 % — SIGNIFICANT CHANGE UP (ref 1.7–9.3)
NEUTROPHILS # BLD AUTO: 6.77 K/UL — HIGH (ref 1.4–6.5)
NEUTROPHILS NFR BLD AUTO: 72.8 % — SIGNIFICANT CHANGE UP (ref 42.2–75.2)
NRBC BLD AUTO-RTO: 0 /100 WBCS — SIGNIFICANT CHANGE UP (ref 0–0)
PLATELET # BLD AUTO: 358 K/UL — SIGNIFICANT CHANGE UP (ref 130–400)
PMV BLD: 10.4 FL — SIGNIFICANT CHANGE UP (ref 7.4–10.4)
POTASSIUM SERPL-MCNC: 4.2 MMOL/L — SIGNIFICANT CHANGE UP (ref 3.5–5)
POTASSIUM SERPL-SCNC: 4.2 MMOL/L — SIGNIFICANT CHANGE UP (ref 3.5–5)
PROT SERPL-MCNC: 5.5 G/DL — LOW (ref 6–8)
RBC # BLD: 3.79 M/UL — LOW (ref 4.2–5.4)
RBC # FLD: 18 % — HIGH (ref 11.5–14.5)
SODIUM SERPL-SCNC: 139 MMOL/L — SIGNIFICANT CHANGE UP (ref 135–146)
WBC # BLD: 9.31 K/UL — SIGNIFICANT CHANGE UP (ref 4.8–10.8)
WBC # FLD AUTO: 9.31 K/UL — SIGNIFICANT CHANGE UP (ref 4.8–10.8)

## 2025-03-01 PROCEDURE — 99232 SBSQ HOSP IP/OBS MODERATE 35: CPT

## 2025-03-01 PROCEDURE — 74018 RADEX ABDOMEN 1 VIEW: CPT | Mod: 26

## 2025-03-01 RX ADMIN — Medication 0.5 MILLIGRAM(S): at 05:43

## 2025-03-01 RX ADMIN — CLONAZEPAM 1 MILLIGRAM(S): 0.5 TABLET ORAL at 05:42

## 2025-03-01 RX ADMIN — CARVEDILOL 6.25 MILLIGRAM(S): 3.12 TABLET, FILM COATED ORAL at 17:26

## 2025-03-01 RX ADMIN — TIZANIDINE 2 MILLIGRAM(S): 4 TABLET ORAL at 05:44

## 2025-03-01 RX ADMIN — CEFEPIME 100 MILLIGRAM(S): 2 INJECTION, POWDER, FOR SOLUTION INTRAVENOUS at 06:36

## 2025-03-01 RX ADMIN — HEPARIN SODIUM 5000 UNIT(S): 1000 INJECTION INTRAVENOUS; SUBCUTANEOUS at 13:06

## 2025-03-01 RX ADMIN — ATORVASTATIN CALCIUM 80 MILLIGRAM(S): 80 TABLET, FILM COATED ORAL at 21:46

## 2025-03-01 RX ADMIN — CARVEDILOL 6.25 MILLIGRAM(S): 3.12 TABLET, FILM COATED ORAL at 05:42

## 2025-03-01 RX ADMIN — Medication 100 MILLIGRAM(S): at 13:06

## 2025-03-01 RX ADMIN — Medication 0.5 MILLIGRAM(S): at 13:13

## 2025-03-01 RX ADMIN — Medication 2 MILLIGRAM(S): at 12:19

## 2025-03-01 RX ADMIN — Medication 81 MILLIGRAM(S): at 13:06

## 2025-03-01 RX ADMIN — Medication 4 MILLIGRAM(S): at 17:34

## 2025-03-01 RX ADMIN — TIZANIDINE 2 MILLIGRAM(S): 4 TABLET ORAL at 21:46

## 2025-03-01 RX ADMIN — TIOTROPIUM BROMIDE INHALATION SPRAY 2 PUFF(S): 3.12 SPRAY, METERED RESPIRATORY (INHALATION) at 07:51

## 2025-03-01 RX ADMIN — Medication 40 MILLIGRAM(S): at 05:42

## 2025-03-01 RX ADMIN — HEPARIN SODIUM 5000 UNIT(S): 1000 INJECTION INTRAVENOUS; SUBCUTANEOUS at 05:42

## 2025-03-01 RX ADMIN — Medication 2 MILLIGRAM(S): at 17:33

## 2025-03-01 RX ADMIN — Medication 0.5 MILLIGRAM(S): at 16:17

## 2025-03-01 RX ADMIN — Medication 2 MILLIGRAM(S): at 19:34

## 2025-03-01 RX ADMIN — CLONAZEPAM 1 MILLIGRAM(S): 0.5 TABLET ORAL at 17:26

## 2025-03-01 RX ADMIN — Medication 0.5 MILLIGRAM(S): at 22:31

## 2025-03-01 RX ADMIN — Medication 125 MICROGRAM(S): at 05:41

## 2025-03-01 RX ADMIN — BUPROPION HYDROBROMIDE 450 MILLIGRAM(S): 522 TABLET, EXTENDED RELEASE ORAL at 13:06

## 2025-03-01 RX ADMIN — TICAGRELOR 90 MILLIGRAM(S): 90 TABLET ORAL at 17:26

## 2025-03-01 RX ADMIN — Medication 100 MILLIGRAM(S): at 05:43

## 2025-03-01 RX ADMIN — TIZANIDINE 2 MILLIGRAM(S): 4 TABLET ORAL at 13:05

## 2025-03-01 RX ADMIN — Medication 0.5 MILLIGRAM(S): at 23:00

## 2025-03-01 RX ADMIN — Medication 100 MILLIGRAM(S): at 22:31

## 2025-03-01 RX ADMIN — TICAGRELOR 90 MILLIGRAM(S): 90 TABLET ORAL at 05:42

## 2025-03-01 RX ADMIN — HEPARIN SODIUM 5000 UNIT(S): 1000 INJECTION INTRAVENOUS; SUBCUTANEOUS at 21:47

## 2025-03-01 RX ADMIN — Medication 2 MILLIGRAM(S): at 07:54

## 2025-03-01 RX ADMIN — CEFEPIME 100 MILLIGRAM(S): 2 INJECTION, POWDER, FOR SOLUTION INTRAVENOUS at 17:25

## 2025-03-01 RX ADMIN — MONTELUKAST SODIUM 10 MILLIGRAM(S): 10 TABLET ORAL at 13:06

## 2025-03-01 NOTE — PROGRESS NOTE ADULT - ASSESSMENT
ASSESSMENT:  59F with PMH of recurrent diverticulitis, nasopharyngeal CA in remission, COPD not on home O2, prediabetes, hypertension, carotid artery stenosis s/p stent (on DAPT), s/p CCY here for assessment of recurrent abdominal pain. General Surgery consulted for worsening physical exam in the setting of colitis. Physical exam findings, imaging, and labs as documented above.     PLAN:  - No acute surgical intervention indicated at this time  - Care as per primary team  - Continue with IV abx for colitis   - IVF  - Appreciate GI follow up and recommendations     SPECTRA: [id number]   ASSESSMENT:  59F with PMH of recurrent diverticulitis, nasopharyngeal CA in remission, COPD not on home O2, prediabetes, hypertension, carotid artery stenosis s/p stent (on DAPT), s/p CCY here for assessment of recurrent abdominal pain. General Surgery consulted for worsening physical exam in the setting of colitis. Physical exam findings, imaging, and labs as documented above.     PLAN:  - No acute surgical intervention indicated at this time  - Care as per primary team  - Continue with IV abx for colitis   - IVF  - Appreciate GI follow up and recommendations   - Recall surgery as needed.     SPECTRA: [id number]

## 2025-03-01 NOTE — PROGRESS NOTE ADULT - ATTENDING COMMENTS
Visited pt at bedside during AM rounds. Reports feeling ok. Denies any abdominal pain.    PE;  General; female, in bed, in NAD  Head; NC/AT  Heart: RRR  Lungs; even chest rise  Abdomen; soft, non-tender, non-distended    A/P;  59 F with colitis.  - Advance diet as tolerated  - DVT PPx  - PT/OT  - Ambulation and out of bed  - Continue abx

## 2025-03-01 NOTE — PROGRESS NOTE ADULT - SUBJECTIVE AND OBJECTIVE BOX
SURGERY PROGRESS NOTE    24 HR EVENTS: NAEO. Patient complains of some left-sided abdominal pain, mild nausea but no vomiting. No gas passed or BMs recently per patient. Patient saturating well on RA, vitals WNL. Labs stable. CT abd/p stable, no drainable fluid collection per read.    OBJECTIVE:  Vital Signs Last 24 Hrs  T(C): 36.7 (28 Feb 2025 11:52), Max: 36.7 (28 Feb 2025 07:50)  T(F): 98 (28 Feb 2025 11:52), Max: 98.1 (28 Feb 2025 07:50)  HR: 79 (28 Feb 2025 17:08) (79 - 100)  BP: 119/65 (28 Feb 2025 17:08) (99/55 - 119/65)  BP(mean): --  RR: 16 (28 Feb 2025 12:28) (16 - 17)  SpO2: 96% (28 Feb 2025 12:28) (93% - 96%)    Parameters below as of 28 Feb 2025 11:52  Patient On (Oxygen Delivery Method): room air        I&O's Summary      PHYSICAL EXAM:  GENERAL: No acute distress, well-developed  CHEST/LUNG: CTAB  HEART: Regular rate and rhythm.   ABDOMEN: Soft, non-distended, mild to moderate tenderness to left-side of abdomen, no rebound, no guarding  EXTREMITIES:  No clubbing, cyanosis, or edema    LABS:                        9.3    11.31 )-----------( 365      ( 28 Feb 2025 07:16 )             30.1     02-28    136  |  104  |  23[H]  ----------------------------<  152[H]  4.4   |  21  |  1.4    Ca    8.3[L]      28 Feb 2025 07:16  Mg     1.6     02-28    TPro  5.5[L]  /  Alb  3.2[L]  /  TBili  0.3  /  DBili  x   /  AST  13  /  ALT  10  /  AlkPhos  109  02-28      Urinalysis Basic - ( 28 Feb 2025 07:16 )    Color: x / Appearance: x / SG: x / pH: x  Gluc: 152 mg/dL / Ketone: x  / Bili: x / Urobili: x   Blood: x / Protein: x / Nitrite: x   Leuk Esterase: x / RBC: x / WBC x   Sq Epi: x / Non Sq Epi: x / Bacteria: x        RADIOLOGY & ADDITIONAL STUDIES:

## 2025-03-01 NOTE — PROGRESS NOTE ADULT - SUBJECTIVE AND OBJECTIVE BOX
SUBJECTIVE/OVERNIGHT EVENTS  Today is hospital day 2d. This morning patient was seen and examined at bedside, resting comfortably in bed. No acute or major events overnight.  Patient reports persistent pain worse on the L, will try advancing diet today.    MEDICATIONS  STANDING MEDICATIONS  aspirin enteric coated 81 milliGRAM(s) Oral daily  atorvastatin 80 milliGRAM(s) Oral at bedtime  buPROPion XL (24-Hour) . 450 milliGRAM(s) Oral daily  carvedilol 6.25 milliGRAM(s) Oral every 12 hours  cefepime   IVPB 1000 milliGRAM(s) IV Intermittent every 12 hours  clonazePAM  Tablet 1 milliGRAM(s) Oral two times a day  fluticasone propionate/ salmeterol 100-50 MICROgram(s) Diskus 1 Dose(s) Inhalation two times a day  heparin   Injectable 5000 Unit(s) SubCutaneous every 8 hours  influenza   Vaccine 0.5 milliLiter(s) IntraMuscular once  lactated ringers. 1000 milliLiter(s) IV Continuous <Continuous>  levothyroxine 125 MICROGram(s) Oral daily  metroNIDAZOLE  IVPB 500 milliGRAM(s) IV Intermittent every 8 hours  montelukast 10 milliGRAM(s) Oral daily  pantoprazole    Tablet 40 milliGRAM(s) Oral before breakfast  ticagrelor 90 milliGRAM(s) Oral every 12 hours  tiotropium 2.5 MICROgram(s) Inhaler 2 Puff(s) Inhalation daily  tiZANidine 2 milliGRAM(s) Oral three times a day    PRN MEDICATIONS  acetaminophen     Tablet .. 650 milliGRAM(s) Oral every 6 hours PRN  albuterol    90 MICROgram(s) HFA Inhaler 2 Puff(s) Inhalation every 6 hours PRN  aluminum hydroxide/magnesium hydroxide/simethicone Suspension 30 milliLiter(s) Oral every 4 hours PRN  HYDROmorphone  Injectable 0.5 milliGRAM(s) IV Push every 4 hours PRN  hydrOXYzine hydrochloride 10 milliGRAM(s) Oral three times a day PRN  morphine  - Injectable 2 milliGRAM(s) IV Push every 6 hours PRN  ondansetron Injectable 4 milliGRAM(s) IV Push every 8 hours PRN  zolpidem 5 milliGRAM(s) Oral at bedtime PRN  zolpidem 5 milliGRAM(s) Oral at bedtime PRN    VITALS  T(F): 97.5 (03-01-25 @ 05:11), Max: 98 (02-28-25 @ 11:52)  HR: 65 (03-01-25 @ 05:11) (65 - 85)  BP: 118/65 (03-01-25 @ 05:11) (99/55 - 119/65)  RR: 18 (03-01-25 @ 05:11) (16 - 18)  SpO2: 96% (03-01-25 @ 05:11) (95% - 96%)    PHYSICAL EXAM  GENERAL  ( x ) NAD, lying in bed comfortably     (  ) obtunded     (  ) lethargic     (  ) somnolent    HEAD  ( x ) Atraumatic     (  ) hematoma     (  ) laceration (specify location:       )     NECK  ( x ) Supple     (  ) neck stiffness     (  ) nuchal rigidity     (  )  no JVD     (  ) JVD present ( -- cm)    HEART  Rate -->  ( x ) normal rate    (  ) bradycardic    (  ) tachycardic  Rhythm -->  ( x ) regular    (  ) regularly irregular    (  ) irregularly irregular  Murmurs -->  ( x ) normal s1/s2    (  ) systolic murmur    (  ) diastolic murmur    (  ) continuous murmur     (  ) S3 present    (  ) S4 present    LUNGS  ( x ) Unlabored respirations     (  ) tachypnea  ( x ) B/L air entry     (  ) decreased breath sounds in:  (location     )    (  ) no adventitious sound     (  ) crackles     (  ) wheezing      (  ) rhonchi      (specify location:       )  (  ) chest wall tenderness (specify location:       )    ABDOMEN  ( x ) Soft     (  ) tense   |   (  ) nondistended     (  ) distended   |   ( x ) +BS     (  ) hypoactive bowel sounds     (  ) hyperactive bowel sounds  (  ) nontender     (  ) RUQ tenderness     (  ) RLQ tenderness     (  ) LLQ tenderness     (  ) epigastric tenderness     ( x ) diffuse tenderness  (  ) Splenomegaly      (  ) Hepatomegaly      (  ) Jaundice     (  ) ecchymosis     EXTREMITIES  ( x ) Normal     (  ) Rash     (  ) ecchymosis     (  ) varicose veins      (  ) pitting edema     (  ) non-pitting edema   (  ) ulceration     (  ) gangrene:     (location:     )    NERVOUS SYSTEM  ( x ) A&Ox3     (  ) confused     (  ) lethargic  CN II-XII:     (  ) Intact     (  ) focal deficits  (Specify:     )   Upper extremities:     (  ) strength X/5     (  ) focal deficit (specify:    )  Lower extremities:     (  ) strength  X/5    (  ) focal deficit (specify:    )      LABS             9.3    9.31  )-----------( 358      ( 03-01-25 @ 08:02 )             29.9     136  |  104  |  23  -------------------------<  152   02-28-25 @ 07:16  4.4  |  21  |  1.4    Ca      8.3     02-28-25 @ 07:16  Mg     1.6     02-28-25 @ 07:16    TPro  x   /  Alb  x   /  TBili  x   /  DBili  x   /  AST  x   /  ALT  x   /  AlkPhos  x   /  GGT  17    02-28-25 @ 10:45        Urinalysis Basic - ( 28 Feb 2025 07:16 )    Color: x / Appearance: x / SG: x / pH: x  Gluc: 152 mg/dL / Ketone: x  / Bili: x / Urobili: x   Blood: x / Protein: x / Nitrite: x   Leuk Esterase: x / RBC: x / WBC x   Sq Epi: x / Non Sq Epi: x / Bacteria: x          Urinalysis with Rflx Culture (collected 27 Feb 2025 03:20)      IMAGING

## 2025-03-01 NOTE — PROGRESS NOTE ADULT - ASSESSMENT
59-year-old female with history of recurrent diverticulitis, nasopharyngeal CA in remission, COPD not on home O2, prediabetes, hypertension, carotid artery stenosis status post endarterectomy/stenting here for assessment of recurrent abdominal pain. Patient was seen here recently, found to have persistent diverticular changes, discharged with oral antibiotics. Pt states she did not have coverage for the script sent so she did not take the abx. She is now returning with pain, nausea and constipation alternating with diarrhea. She is passing loose stools with flatus now. Vitals stable on admission, on RA on my encounter. CT Abdomen and Pelvis w/ Oral Cont and w/ IV Cont : Transverse and descending colonic wall thickening with surrounding fat stranding, increased since prior exam and likely reflecting colitis. Labs: WBC 14k, Hb 12.2, plt 503, K 5.2, BUN/Cr 34/2. Admitted for colitis.     #Acute on chronic colitis  #Hx diverticular disease with multiple episodes of diverticulitis in the past   #Hx of Nasopharyngeal CA in remission   - Monitor for bloody BMs  - Lactate wnl and wbc trending down  - Keep active T&S and trend H&H   - Transfuse PRN to keep Hgb > 8   - creatinine trending down, ATN, monitor electrolytes for post ATN diuresis  - pain control with morphine    - GI consulted - last admission they recommended surgical evaluation in setting of persistent/recurrent acute sigmoid diverticulitis and hx of microperforation   - repeat CTAP was negative for any perforation or acute abdominal process  - surgery consulted -> no surgical interventions as per surgery, c/w abx and IVF  - IVF for 24hrs  - c/w cefepime and flagyl for now  - advance diet as tolerated    #COPD not on home O2   - not in exacerbation   - c/w home inhalers     # pre-DM  - not on any meds  - monitor FS. start SSI if FS > 200.  - A1c 6.6-6.9    #LAKISHA on CKD   - Trend Cr - back to baseline  - likely prerenal   - start lisinopril once Cr returns to baseline  - s/p LR @100ml/hr for 24hrs    #CAD   #HTN  #HLD   #R ICA stenting in 2023   - c/w Aspirin and Brilinta   - c/w Coreg  - Hold lisinopril   - follow up with Neuro OP     #Hypothyroidism   - c/w Synthroid 125mcg    #MISC  - DVT PPx: heparin  - GI PPx: PPI   - Diet: Advance as tolerated   - Activity: IAT     Pendings: KUB, advance diet as tolerated, pain control, GI and surgery f/u 59-year-old female with history of recurrent diverticulitis, nasopharyngeal CA in remission, COPD not on home O2, prediabetes, hypertension, carotid artery stenosis status post endarterectomy/stenting here for assessment of recurrent abdominal pain. Patient was seen here recently, found to have persistent diverticular changes, discharged with oral antibiotics. Pt states she did not have coverage for the script sent so she did not take the abx. She is now returning with pain, nausea and constipation alternating with diarrhea. She is passing loose stools with flatus now. Vitals stable on admission, on RA on my encounter. CT Abdomen and Pelvis w/ Oral Cont and w/ IV Cont : Transverse and descending colonic wall thickening with surrounding fat stranding, increased since prior exam and likely reflecting colitis. Labs: WBC 14k, Hb 12.2, plt 503, K 5.2, BUN/Cr 34/2. Admitted for colitis.     #Acute on chronic colitis  #Hx diverticular disease with multiple episodes of diverticulitis in the past   #Hx of Nasopharyngeal CA in remission   - Monitor for bloody BMs  - Lactate wnl and wbc trending down  - Keep active T&S and trend H&H   - Transfuse PRN to keep Hgb > 8   - creatinine trending down, ATN, monitor electrolytes for post ATN diuresis  - pain control with morphine    - GI consulted - last admission they recommended surgical evaluation in setting of persistent/recurrent acute sigmoid diverticulitis and hx of microperforation   - repeat CTAP was negative for any perforation or acute abdominal process  - surgery consulted -> no surgical interventions as per surgery, c/w abx and IVF  - IVF for 24hrs  - c/w cefepime and flagyl for now  - advance diet as tolerated    #COPD not on home O2   - not in exacerbation   - c/w home inhalers     # pre-DM  - not on any meds  - monitor FS. start SSI if FS > 200.  - A1c 6.6-6.9    #LAKISHA on CKD   - Trend Cr - back to baseline  - likely prerenal   - start lisinopril once Cr returns to baseline  - s/p LR @100ml/hr for 24hrs    #CAD   #HTN  #HLD   #R ICA stenting in 2023   - c/w Aspirin and Brilinta   - c/w Coreg  - Hold lisinopril   - follow up with Neuro OP     #Hypothyroidism   - c/w Synthroid 125mcg    Pendings: advance diet as tolerated, pain control, GI and surgery f/u

## 2025-03-02 LAB
ALBUMIN SERPL ELPH-MCNC: 3.4 G/DL — LOW (ref 3.5–5.2)
ALP SERPL-CCNC: 86 U/L — SIGNIFICANT CHANGE UP (ref 30–115)
ALT FLD-CCNC: 10 U/L — SIGNIFICANT CHANGE UP (ref 0–41)
ANION GAP SERPL CALC-SCNC: 12 MMOL/L — SIGNIFICANT CHANGE UP (ref 7–14)
AST SERPL-CCNC: 17 U/L — SIGNIFICANT CHANGE UP (ref 0–41)
BASOPHILS # BLD AUTO: 0.04 K/UL — SIGNIFICANT CHANGE UP (ref 0–0.2)
BASOPHILS NFR BLD AUTO: 0.5 % — SIGNIFICANT CHANGE UP (ref 0–1)
BILIRUB SERPL-MCNC: 0.2 MG/DL — SIGNIFICANT CHANGE UP (ref 0.2–1.2)
BUN SERPL-MCNC: 13 MG/DL — SIGNIFICANT CHANGE UP (ref 10–20)
CALCIUM SERPL-MCNC: 8.6 MG/DL — SIGNIFICANT CHANGE UP (ref 8.4–10.5)
CHLORIDE SERPL-SCNC: 101 MMOL/L — SIGNIFICANT CHANGE UP (ref 98–110)
CO2 SERPL-SCNC: 23 MMOL/L — SIGNIFICANT CHANGE UP (ref 17–32)
CREAT SERPL-MCNC: 1.1 MG/DL — SIGNIFICANT CHANGE UP (ref 0.7–1.5)
EGFR: 57 ML/MIN/1.73M2 — LOW
EGFR: 57 ML/MIN/1.73M2 — LOW
EOSINOPHIL # BLD AUTO: 0.36 K/UL — SIGNIFICANT CHANGE UP (ref 0–0.7)
EOSINOPHIL NFR BLD AUTO: 4.6 % — SIGNIFICANT CHANGE UP (ref 0–8)
GLUCOSE SERPL-MCNC: 125 MG/DL — HIGH (ref 70–99)
HCT VFR BLD CALC: 32.4 % — LOW (ref 37–47)
HGB BLD-MCNC: 9.9 G/DL — LOW (ref 12–16)
IMM GRANULOCYTES NFR BLD AUTO: 0.4 % — HIGH (ref 0.1–0.3)
LYMPHOCYTES # BLD AUTO: 1.27 K/UL — SIGNIFICANT CHANGE UP (ref 1.2–3.4)
LYMPHOCYTES # BLD AUTO: 16.2 % — LOW (ref 20.5–51.1)
MAGNESIUM SERPL-MCNC: 1.7 MG/DL — LOW (ref 1.8–2.4)
MCHC RBC-ENTMCNC: 24.3 PG — LOW (ref 27–31)
MCHC RBC-ENTMCNC: 30.6 G/DL — LOW (ref 32–37)
MCV RBC AUTO: 79.4 FL — LOW (ref 81–99)
MONOCYTES # BLD AUTO: 0.64 K/UL — HIGH (ref 0.1–0.6)
MONOCYTES NFR BLD AUTO: 8.2 % — SIGNIFICANT CHANGE UP (ref 1.7–9.3)
NEUTROPHILS # BLD AUTO: 5.5 K/UL — SIGNIFICANT CHANGE UP (ref 1.4–6.5)
NEUTROPHILS NFR BLD AUTO: 70.1 % — SIGNIFICANT CHANGE UP (ref 42.2–75.2)
NRBC BLD AUTO-RTO: 0 /100 WBCS — SIGNIFICANT CHANGE UP (ref 0–0)
PLATELET # BLD AUTO: 394 K/UL — SIGNIFICANT CHANGE UP (ref 130–400)
PMV BLD: 10.6 FL — HIGH (ref 7.4–10.4)
POTASSIUM SERPL-MCNC: 4.2 MMOL/L — SIGNIFICANT CHANGE UP (ref 3.5–5)
POTASSIUM SERPL-SCNC: 4.2 MMOL/L — SIGNIFICANT CHANGE UP (ref 3.5–5)
PROT SERPL-MCNC: 6 G/DL — SIGNIFICANT CHANGE UP (ref 6–8)
RBC # BLD: 4.08 M/UL — LOW (ref 4.2–5.4)
RBC # FLD: 18.2 % — HIGH (ref 11.5–14.5)
SODIUM SERPL-SCNC: 136 MMOL/L — SIGNIFICANT CHANGE UP (ref 135–146)
WBC # BLD: 7.84 K/UL — SIGNIFICANT CHANGE UP (ref 4.8–10.8)
WBC # FLD AUTO: 7.84 K/UL — SIGNIFICANT CHANGE UP (ref 4.8–10.8)

## 2025-03-02 PROCEDURE — 99232 SBSQ HOSP IP/OBS MODERATE 35: CPT

## 2025-03-02 RX ORDER — LACTULOSE 10 G/15ML
20 SOLUTION ORAL
Refills: 0 | Status: DISCONTINUED | OUTPATIENT
Start: 2025-03-02 | End: 2025-03-03

## 2025-03-02 RX ORDER — NALOXEGOL OXALATE 12.5 MG/1
12.5 TABLET, FILM COATED ORAL
Refills: 0 | Status: DISCONTINUED | OUTPATIENT
Start: 2025-03-02 | End: 2025-03-09

## 2025-03-02 RX ORDER — MAGNESIUM SULFATE 500 MG/ML
2 SYRINGE (ML) INJECTION ONCE
Refills: 0 | Status: COMPLETED | OUTPATIENT
Start: 2025-03-02 | End: 2025-03-02

## 2025-03-02 RX ORDER — MINERAL OIL
133 OIL (ML) MISCELLANEOUS
Refills: 0 | Status: DISCONTINUED | OUTPATIENT
Start: 2025-03-02 | End: 2025-03-03

## 2025-03-02 RX ADMIN — TIOTROPIUM BROMIDE INHALATION SPRAY 2 PUFF(S): 3.12 SPRAY, METERED RESPIRATORY (INHALATION) at 09:06

## 2025-03-02 RX ADMIN — Medication 2 MILLIGRAM(S): at 03:06

## 2025-03-02 RX ADMIN — Medication 100 MILLIGRAM(S): at 13:04

## 2025-03-02 RX ADMIN — Medication 100 MILLIGRAM(S): at 21:51

## 2025-03-02 RX ADMIN — Medication 25 GRAM(S): at 18:17

## 2025-03-02 RX ADMIN — Medication 2 MILLIGRAM(S): at 19:05

## 2025-03-02 RX ADMIN — Medication 0.5 MILLIGRAM(S): at 05:36

## 2025-03-02 RX ADMIN — Medication 100 MILLIGRAM(S): at 05:25

## 2025-03-02 RX ADMIN — CLONAZEPAM 1 MILLIGRAM(S): 0.5 TABLET ORAL at 05:26

## 2025-03-02 RX ADMIN — LACTULOSE 20 GRAM(S): 10 SOLUTION ORAL at 16:13

## 2025-03-02 RX ADMIN — NALOXEGOL OXALATE 12.5 MILLIGRAM(S): 12.5 TABLET, FILM COATED ORAL at 13:06

## 2025-03-02 RX ADMIN — Medication 0.5 MILLIGRAM(S): at 19:05

## 2025-03-02 RX ADMIN — Medication 4 MILLIGRAM(S): at 13:45

## 2025-03-02 RX ADMIN — TICAGRELOR 90 MILLIGRAM(S): 90 TABLET ORAL at 18:12

## 2025-03-02 RX ADMIN — ATORVASTATIN CALCIUM 80 MILLIGRAM(S): 80 TABLET, FILM COATED ORAL at 21:52

## 2025-03-02 RX ADMIN — LACTULOSE 20 GRAM(S): 10 SOLUTION ORAL at 13:05

## 2025-03-02 RX ADMIN — HEPARIN SODIUM 5000 UNIT(S): 1000 INJECTION INTRAVENOUS; SUBCUTANEOUS at 21:50

## 2025-03-02 RX ADMIN — Medication 4 MILLIGRAM(S): at 05:29

## 2025-03-02 RX ADMIN — TIZANIDINE 2 MILLIGRAM(S): 4 TABLET ORAL at 05:26

## 2025-03-02 RX ADMIN — Medication 2 MILLIGRAM(S): at 09:00

## 2025-03-02 RX ADMIN — Medication 0.5 MILLIGRAM(S): at 22:03

## 2025-03-02 RX ADMIN — Medication 81 MILLIGRAM(S): at 13:06

## 2025-03-02 RX ADMIN — Medication 2 PUFF(S): at 03:08

## 2025-03-02 RX ADMIN — TICAGRELOR 90 MILLIGRAM(S): 90 TABLET ORAL at 05:26

## 2025-03-02 RX ADMIN — TIZANIDINE 2 MILLIGRAM(S): 4 TABLET ORAL at 13:05

## 2025-03-02 RX ADMIN — Medication 0.5 MILLIGRAM(S): at 23:00

## 2025-03-02 RX ADMIN — Medication 4 MILLIGRAM(S): at 22:03

## 2025-03-02 RX ADMIN — Medication 2 MILLIGRAM(S): at 03:36

## 2025-03-02 RX ADMIN — BUPROPION HYDROBROMIDE 450 MILLIGRAM(S): 522 TABLET, EXTENDED RELEASE ORAL at 13:06

## 2025-03-02 RX ADMIN — Medication 40 MILLIGRAM(S): at 05:28

## 2025-03-02 RX ADMIN — Medication 5 MILLIGRAM(S): at 22:03

## 2025-03-02 RX ADMIN — CEFEPIME 100 MILLIGRAM(S): 2 INJECTION, POWDER, FOR SOLUTION INTRAVENOUS at 16:13

## 2025-03-02 RX ADMIN — CLONAZEPAM 1 MILLIGRAM(S): 0.5 TABLET ORAL at 18:12

## 2025-03-02 RX ADMIN — Medication 0.5 MILLIGRAM(S): at 06:00

## 2025-03-02 RX ADMIN — CEFEPIME 100 MILLIGRAM(S): 2 INJECTION, POWDER, FOR SOLUTION INTRAVENOUS at 04:07

## 2025-03-02 RX ADMIN — CARVEDILOL 6.25 MILLIGRAM(S): 3.12 TABLET, FILM COATED ORAL at 05:26

## 2025-03-02 RX ADMIN — Medication 2 MILLIGRAM(S): at 18:17

## 2025-03-02 RX ADMIN — Medication 125 MICROGRAM(S): at 05:26

## 2025-03-02 RX ADMIN — Medication 0.5 MILLIGRAM(S): at 16:14

## 2025-03-02 RX ADMIN — MONTELUKAST SODIUM 10 MILLIGRAM(S): 10 TABLET ORAL at 13:06

## 2025-03-02 RX ADMIN — TIZANIDINE 2 MILLIGRAM(S): 4 TABLET ORAL at 21:52

## 2025-03-02 RX ADMIN — HEPARIN SODIUM 5000 UNIT(S): 1000 INJECTION INTRAVENOUS; SUBCUTANEOUS at 13:05

## 2025-03-02 RX ADMIN — CARVEDILOL 6.25 MILLIGRAM(S): 3.12 TABLET, FILM COATED ORAL at 18:12

## 2025-03-02 RX ADMIN — Medication 1 DOSE(S): at 21:52

## 2025-03-02 RX ADMIN — Medication 2 MILLIGRAM(S): at 10:13

## 2025-03-02 NOTE — PROGRESS NOTE ADULT - SUBJECTIVE AND OBJECTIVE BOX
MEDICINE ATTENDING PROGRESS NOTE  HPI:  Patient is a 59-year-old female with history of recurrent diverticulitis, nasopharyngeal CA in remission, COPD not on home O2, prediabetes, hypertension, carotid artery stenosis status post endarterectomy/stenting here for assessment of recurrent abdominal pain. Patient was seen here recently, found to have persistent diverticular changes, discharged with oral antibiotics. Pt states she did not have coverage for the script sent so she did not take the abx. She is now returning with pain, nausea and constipation alternating with diarrhea. She is passing loose stools with flatus now.     · Temp at ED Arrival (C)	37.3 Degrees C  · Oxygen Therapy Flow (L/min)	6 L/min  · O2 Delivery/Oxygen Delivery Method	nasal cannula  · SpO2 (%)	97 %  · Temp site	oral  · Temp (C)	37.3 Degrees C  · Temp (F)	99.1 Degrees F  · Respiration Rate (breaths/min)	20 /min  · Heart Rate	77 /min  · BP Diastolic	70 mm Hg  · BP Systolic	160 mm Hg    CT Abdomen and Pelvis w/ Oral Cont and w/ IV Cont : Transverse and descending colonic wall thickening with surrounding fat stranding, increased since prior exam and likely reflecting colitis.    Labs: WBC 14k, Hb 12.2, plt 503, K 5.2, BUN/Cr 34/2    Admitted for colitis.  (27 Feb 2025 03:58)      Interval/Overnight Events      ROS  General: Denies fevers, chills, nightsweats, weight loss  HEENT: Denies headache, rhinorrhea, sore throat, eye pain  CV: Denies CP, palpitations  PULM: Denies SOB, cough  GI: Denies abdominal pain, diarrhea  : Denies dysuria, hematuria  MSK: Denies arthralgias  SKIN: Denies rash   NEURO: Denies paresthesias, weakness  PSYCH: Denies depression    MEDICATIONS  (STANDING):  aspirin enteric coated 81 milliGRAM(s) Oral daily  atorvastatin 80 milliGRAM(s) Oral at bedtime  buPROPion XL (24-Hour) . 450 milliGRAM(s) Oral daily  carvedilol 6.25 milliGRAM(s) Oral every 12 hours  cefepime   IVPB 1000 milliGRAM(s) IV Intermittent every 12 hours  clonazePAM  Tablet 1 milliGRAM(s) Oral two times a day  fluticasone propionate/ salmeterol 100-50 MICROgram(s) Diskus 1 Dose(s) Inhalation two times a day  heparin   Injectable 5000 Unit(s) SubCutaneous every 8 hours  influenza   Vaccine 0.5 milliLiter(s) IntraMuscular once  lactated ringers. 1000 milliLiter(s) (100 mL/Hr) IV Continuous <Continuous>  lactulose Syrup 20 Gram(s) Oral every 1 hour  levothyroxine 125 MICROGram(s) Oral daily  metroNIDAZOLE  IVPB 500 milliGRAM(s) IV Intermittent every 8 hours  mineral oil enema 133 milliLiter(s) Rectal two times a day  montelukast 10 milliGRAM(s) Oral daily  naloxegol 12.5 milliGRAM(s) Oral before breakfast  pantoprazole    Tablet 40 milliGRAM(s) Oral before breakfast  ticagrelor 90 milliGRAM(s) Oral every 12 hours  tiotropium 2.5 MICROgram(s) Inhaler 2 Puff(s) Inhalation daily  tiZANidine 2 milliGRAM(s) Oral three times a day    MEDICATIONS  (PRN):  acetaminophen     Tablet .. 650 milliGRAM(s) Oral every 6 hours PRN Temp greater or equal to 38C (100.4F), Mild Pain (1 - 3)  albuterol    90 MICROgram(s) HFA Inhaler 2 Puff(s) Inhalation every 6 hours PRN Shortness of Breath  aluminum hydroxide/magnesium hydroxide/simethicone Suspension 30 milliLiter(s) Oral every 4 hours PRN Dyspepsia  HYDROmorphone  Injectable 0.5 milliGRAM(s) IV Push every 4 hours PRN break through pain  hydrOXYzine hydrochloride 10 milliGRAM(s) Oral three times a day PRN for anxiety  morphine  - Injectable 2 milliGRAM(s) IV Push every 6 hours PRN Severe Pain (7 - 10)  ondansetron Injectable 4 milliGRAM(s) IV Push every 8 hours PRN Nausea and/or Vomiting  zolpidem 5 milliGRAM(s) Oral at bedtime PRN Insomnia  zolpidem 5 milliGRAM(s) Oral at bedtime PRN Insomnia    ANTIBIOTICS:  cefepime   IVPB 1000 milliGRAM(s) IV Intermittent every 12 hours  metroNIDAZOLE  IVPB 500 milliGRAM(s) IV Intermittent every 8 hours      VITALS:  T(F): 98.2, Max: 98.2 (03-02-25 @ 05:25)  HR: 75  BP: 143/61  RR: 18Vital Signs Last 24 Hrs  T(C): 36.8 (02 Mar 2025 05:25), Max: 36.8 (02 Mar 2025 05:25)  T(F): 98.2 (02 Mar 2025 05:25), Max: 98.2 (02 Mar 2025 05:25)  HR: 75 (02 Mar 2025 05:25) (73 - 77)  BP: 143/61 (02 Mar 2025 05:25) (118/74 - 143/61)  BP(mean): --  RR: 18 (02 Mar 2025 05:25) (18 - 18)  SpO2: 98% (02 Mar 2025 05:25) (98% - 98%)     I&O's Summary    01 Mar 2025 07:01  -  02 Mar 2025 07:00  --------------------------------------------------------  IN: 0 mL / OUT: 390 mL / NET: -390 mL      PHYSICAL EXAM:  Gen: NAD, resting in bed  HEENT: Normocephalic, atraumatic  Neck: supple, no lymphadenopathy  CV: Regular rate & regular rhythm  Lungs: CTABL no wheeze  Abdomen: Soft, NTND+ BS present  Ext: Warm, well perfused no CCE  Neuro: non focal, awake, CN II-XII intact   Skin: no rash, no erythema  Psych: no SI, HI, Hallucination     LABS:                        9.9    7.84  )-----------( 394      ( 02 Mar 2025 07:28 )             32.4     03-02    136  |  101  |  13  ----------------------------<  125[H]  4.2   |  23  |  1.1    Ca    8.6      02 Mar 2025 07:28  Mg     1.7     03-02    TPro  6.0  /  Alb  3.4[L]  /  TBili  0.2  /  DBili  x   /  AST  17  /  ALT  10  /  AlkPhos  86  03-02      LIVER FUNCTIONS - ( 02 Mar 2025 07:28 )  Alb: 3.4 g/dL / Pro: 6.0 g/dL / ALK PHOS: 86 U/L / ALT: 10 U/L / AST: 17 U/L / GGT: x               MICROBIOLOGY:  Urinalysis Basic - ( 02 Mar 2025 07:28 )    Color: x / Appearance: x / SG: x / pH: x  Gluc: 125 mg/dL / Ketone: x  / Bili: x / Urobili: x   Blood: x / Protein: x / Nitrite: x   Leuk Esterase: x / RBC: x / WBC x   Sq Epi: x / Non Sq Epi: x / Bacteria: x        Urinalysis with Rflx Culture (collected 02-27-25 @ 03:20)        Lactate, Blood: 1.3 mmol/L (02-27-25 @ 17:40)  Lactate, Blood: 1.7 mmol/L (02-27-25 @ 11:49)        IMAGING:  CXR      CT  CT Abdomen and Pelvis w/ Oral Cont and w/ IV Cont:   ACC: [id number] EXAM:  CT ABDOMEN AND PELVIS OC IC   ORDERED BY: LAKSHMI STILL     PROCEDURE DATE:  02/27/2025          INTERPRETATION:  CLINICAL INFORMATION: Worsening abdominal pain. History   of diverticulitis.    COMPARISON: February 27, 2025 at 1:23 AM    CONTRAST/COMPLICATIONS:  IV Contrast: Omnipaque 350  100 cc administered   0 cc discarded  Oral Contrast: NONE  Complications: None.    PROCEDURE:  CT of the Abdomen and Pelvis was performed.  Sagittal and coronal reformats were performed.    FINDINGS:  LOWER CHEST: Bibasilar atelectatic changes. Coronary atherosclerosis.    LIVER: Within normal limits.  BILE DUCTS: Unchanged  GALLBLADDER: Cholecystectomy.  SPLEEN: Within normal limits.  PANCREAS: Within normal limits.  ADRENALS: Unchangedright adrenal nodule.  KIDNEYS/URETERS: Symmetric renal enhancement without hydronephrosis   bilaterally. Right renal cysts.    BLADDER: Bladder is opacified with contrast  REPRODUCTIVE ORGANS: Unremarkable    BOWEL: No bowel obstruction. Appendix isnormal. Circumferential wall   thickening transverse and descending colon with surrounding mesenteric   stranding, similar in appearance from prior examination. No pneumatosis.  PERITONEUM/RETROPERITONEUM: No pneumoperitoneum or ascites.  VESSELS: Within normal limits.  LYMPH NODES: No lymphadenopathy.  ABDOMINAL WALL: Within normal limits.  BONES: Unchanged    IMPRESSION:    Since prior examination;    Stable appearance of transverse and descending colon consistent with   colitis. No drainable fluid collection or pneumoperitoneum.    --- End of Report ---            ELLIOT LANDAU MD; Attending Radiologist  This document has been electronically signed. Feb 27 2025  7:31PM (02-27-25 @ 18:40)    CARDIOLOGY TESTING  QRS axis to [] ° and NSR at a rate of [] BPM. There was no atrial enlargement. There was no ventricular hypertrophy. There were no ST-T changes and all intervals were normal.        ASSESSMENT/PLAN:  59-year-old female with history of recurrent diverticulitis, nasopharyngeal CA in remission, COPD not on home O2, prediabetes, hypertension, carotid artery stenosis status post endarterectomy/stenting here for assessment of recurrent abdominal pain. Patient was seen here recently, found to have persistent diverticular changes, discharged with oral antibiotics. Pt states she did not have coverage for the script sent so she did not take the abx. She is now returning with pain, nausea and constipation alternating with diarrhea. She is passing loose stools with flatus now. Vitals stable on admission, on RA on my encounter. CT Abdomen and Pelvis w/ Oral Cont and w/ IV Cont : Transverse and descending colonic wall thickening with surrounding fat stranding, increased since prior exam and likely reflecting colitis. Labs: WBC 14k, Hb 12.2, plt 503, K 5.2, BUN/Cr 34/2. Admitted for colitis.     #Acute on chronic colitis  #Hx diverticular disease with multiple episodes of diverticulitis in the past   #Hx of Nasopharyngeal CA in remission   - Monitor for bloody BMs  - Lactate wnl and wbc trending down  - Keep active T&S and trend H&H   - Transfuse PRN to keep Hgb > 8   - creatinine trending down, ATN, monitor electrolytes for post ATN diuresis  - pain control with morphine    - GI consulted - last admission they recommended surgical evaluation in setting of persistent/recurrent acute sigmoid diverticulitis and hx of microperforation   - repeat CTAP was negative for any perforation or acute abdominal process  - surgery consulted -> no surgical interventions as per surgery, c/w abx and IVF  - IVF for 24hrs  - c/w cefepime and flagyl for now  - advance diet as tolerated    #COPD not on home O2   - not in exacerbation   - c/w home inhalers     # pre-DM  - not on any meds  - monitor FS. start SSI if FS > 200.  - A1c 6.6-6.9    #LAKISHA on CKD   - Trend Cr - back to baseline  - likely prerenal   - start lisinopril once Cr returns to baseline  - s/p LR @100ml/hr for 24hrs    #CAD   #HTN  #HLD   #R ICA stenting in 2023   - c/w Aspirin and Brilinta   - c/w Coreg  - Hold lisinopril   - follow up with Neuro OP     #Hypothyroidism   - c/w Synthroid 125mcg    #MISC  - DVT PPx: heparin  - GI PPx: PPI   - Diet: Advance as tolerated   - Activity: IAT     Pendings: KUB, advance diet as tolerated, pain control, GI and surgery f/u    #Supportive Management:  Dispo:  Home vs SNF  DVT Ppx: heparin   Injectable 5000 Unit(s) SubCutaneous every 8 hours  GI Ppx: pantoprazole    Tablet 40 milliGRAM(s) Oral before breakfast  Diet:  Diet, DASH/TLC:   Sodium & Cholesterol Restricted (03-01-25 @ 10:51) [Active]  Diet, Low Fiber (02-28-25 @ 11:03) [Available for Activation]      Total time spent to complete patient's bedside assessment, review medical chart, discuss medical plan of care with covering medical team was more than 45 minutes with >50% of time spent face to face with patient, discussion with patient/family and/or coordination of care    Housestaff's notes reviewed. When there is confusion regarding the content or information provided in the notes of a housestaff (resident, medical student, physician assistant, or nurse practioner) and the attending physician notes, the attending physician's note takes precedence and supersedes the other notes.    Trent Alonzo MD/Abebe  Attending Physician MEDICINE ATTENDING PROGRESS NOTE  59-year-old female with history of recurrent diverticulitis, nasopharyngeal CA in remission, COPD not on home O2, prediabetes, hypertension, carotid artery stenosis status post endarterectomy/stenting here for assessment of recurrent abdominal pain. Patient was seen here recently, found to have persistent diverticular changes, discharged with oral antibiotics. Pt states she did not have coverage for the script sent so she did not take the abx. She is now returning with pain, nausea and constipation alternating with diarrhea. She is passing loose stools with flatus now. Vitals stable on admission, on RA on my encounter. CT Abdomen and Pelvis w/ Oral Cont and w/ IV Cont : Transverse and descending colonic wall thickening with surrounding fat stranding, increased since prior exam and likely reflecting colitis. Labs: WBC 14k, Hb 12.2, plt 503, K 5.2, BUN/Cr 34/2. Admitted for colitis.     Interval/Overnight Events  - No acute complaints  - still in pain    ROS  General: Denies fevers, chills, nightsweats, weight loss  HEENT: Denies headache, rhinorrhea, sore throat, eye pain  CV: Denies CP, palpitations  PULM: Denies SOB, cough  GI: Denies abdominal pain, diarrhea  : Denies dysuria, hematuria  MSK: Denies arthralgias  SKIN: Denies rash   NEURO: Denies paresthesias, weakness  PSYCH: Denies depression    MEDICATIONS  (STANDING):  aspirin enteric coated 81 milliGRAM(s) Oral daily  atorvastatin 80 milliGRAM(s) Oral at bedtime  buPROPion XL (24-Hour) . 450 milliGRAM(s) Oral daily  carvedilol 6.25 milliGRAM(s) Oral every 12 hours  cefepime   IVPB 1000 milliGRAM(s) IV Intermittent every 12 hours  clonazePAM  Tablet 1 milliGRAM(s) Oral two times a day  fluticasone propionate/ salmeterol 100-50 MICROgram(s) Diskus 1 Dose(s) Inhalation two times a day  heparin   Injectable 5000 Unit(s) SubCutaneous every 8 hours  influenza   Vaccine 0.5 milliLiter(s) IntraMuscular once  lactated ringers. 1000 milliLiter(s) (100 mL/Hr) IV Continuous <Continuous>  lactulose Syrup 20 Gram(s) Oral every 1 hour  levothyroxine 125 MICROGram(s) Oral daily  metroNIDAZOLE  IVPB 500 milliGRAM(s) IV Intermittent every 8 hours  mineral oil enema 133 milliLiter(s) Rectal two times a day  montelukast 10 milliGRAM(s) Oral daily  naloxegol 12.5 milliGRAM(s) Oral before breakfast  pantoprazole    Tablet 40 milliGRAM(s) Oral before breakfast  ticagrelor 90 milliGRAM(s) Oral every 12 hours  tiotropium 2.5 MICROgram(s) Inhaler 2 Puff(s) Inhalation daily  tiZANidine 2 milliGRAM(s) Oral three times a day    MEDICATIONS  (PRN):  acetaminophen     Tablet .. 650 milliGRAM(s) Oral every 6 hours PRN Temp greater or equal to 38C (100.4F), Mild Pain (1 - 3)  albuterol    90 MICROgram(s) HFA Inhaler 2 Puff(s) Inhalation every 6 hours PRN Shortness of Breath  aluminum hydroxide/magnesium hydroxide/simethicone Suspension 30 milliLiter(s) Oral every 4 hours PRN Dyspepsia  HYDROmorphone  Injectable 0.5 milliGRAM(s) IV Push every 4 hours PRN break through pain  hydrOXYzine hydrochloride 10 milliGRAM(s) Oral three times a day PRN for anxiety  morphine  - Injectable 2 milliGRAM(s) IV Push every 6 hours PRN Severe Pain (7 - 10)  ondansetron Injectable 4 milliGRAM(s) IV Push every 8 hours PRN Nausea and/or Vomiting  zolpidem 5 milliGRAM(s) Oral at bedtime PRN Insomnia  zolpidem 5 milliGRAM(s) Oral at bedtime PRN Insomnia    ANTIBIOTICS:  cefepime   IVPB 1000 milliGRAM(s) IV Intermittent every 12 hours  metroNIDAZOLE  IVPB 500 milliGRAM(s) IV Intermittent every 8 hours      VITALS:  T(F): 98.2, Max: 98.2 (03-02-25 @ 05:25)  HR: 75  BP: 143/61  RR: 18Vital Signs Last 24 Hrs  T(C): 36.8 (02 Mar 2025 05:25), Max: 36.8 (02 Mar 2025 05:25)  T(F): 98.2 (02 Mar 2025 05:25), Max: 98.2 (02 Mar 2025 05:25)  HR: 75 (02 Mar 2025 05:25) (73 - 77)  BP: 143/61 (02 Mar 2025 05:25) (118/74 - 143/61)  BP(mean): --  RR: 18 (02 Mar 2025 05:25) (18 - 18)  SpO2: 98% (02 Mar 2025 05:25) (98% - 98%)     I&O's Summary    01 Mar 2025 07:01  -  02 Mar 2025 07:00  --------------------------------------------------------  IN: 0 mL / OUT: 390 mL / NET: -390 mL      PHYSICAL EXAM:  Gen: NAD, resting in bed  HEENT: Normocephalic, atraumatic  Neck: supple, no lymphadenopathy  CV: Regular rate & regular rhythm  Lungs: CTABL no wheeze  Abdomen: Soft, NTND+ BS present  Ext: Warm, well perfused no CCE  Neuro: non focal, awake, CN II-XII intact   Skin: no rash, no erythema  Psych: no SI, HI, Hallucination     LABS:                        9.9    7.84  )-----------( 394      ( 02 Mar 2025 07:28 )             32.4     03-02    136  |  101  |  13  ----------------------------<  125[H]  4.2   |  23  |  1.1    Ca    8.6      02 Mar 2025 07:28  Mg     1.7     03-02    TPro  6.0  /  Alb  3.4[L]  /  TBili  0.2  /  DBili  x   /  AST  17  /  ALT  10  /  AlkPhos  86  03-02    LIVER FUNCTIONS - ( 02 Mar 2025 07:28 )  Alb: 3.4 g/dL / Pro: 6.0 g/dL / ALK PHOS: 86 U/L / ALT: 10 U/L / AST: 17 U/L / GGT: x           MICROBIOLOGY:  Urinalysis Basic - ( 02 Mar 2025 07:28 )  Color: x / Appearance: x / SG: x / pH: x  Gluc: 125 mg/dL / Ketone: x  / Bili: x / Urobili: x   Blood: x / Protein: x / Nitrite: x   Leuk Esterase: x / RBC: x / WBC x   Sq Epi: x / Non Sq Epi: x / Bacteria: x    Urinalysis with Rflx Culture (collected 02-27-25 @ 03:20)    Lactate, Blood: 1.3 mmol/L (02-27-25 @ 17:40)  Lactate, Blood: 1.7 mmol/L (02-27-25 @ 11:49)    IMAGING:  CT Abdomen and Pelvis w/ Oral Cont and w/ IV Cont:   ACC: [id number] EXAM:  CT ABDOMEN AND PELVIS OC IC   ORDERED BY: LAKSHMI STILL     PROCEDURE DATE:  02/27/2025          INTERPRETATION:  CLINICAL INFORMATION: Worsening abdominal pain. History   of diverticulitis.    COMPARISON: February 27, 2025 at 1:23 AM    CONTRAST/COMPLICATIONS:  IV Contrast: Omnipaque 350  100 cc administered   0 cc discarded  Oral Contrast: NONE  Complications: None.    PROCEDURE:  CT of the Abdomen and Pelvis was performed.  Sagittal and coronal reformats were performed.    FINDINGS:  LOWER CHEST: Bibasilar atelectatic changes. Coronary atherosclerosis.    LIVER: Within normal limits.  BILE DUCTS: Unchanged  GALLBLADDER: Cholecystectomy.  SPLEEN: Within normal limits.  PANCREAS: Within normal limits.  ADRENALS: Unchangedright adrenal nodule.  KIDNEYS/URETERS: Symmetric renal enhancement without hydronephrosis   bilaterally. Right renal cysts.    BLADDER: Bladder is opacified with contrast  REPRODUCTIVE ORGANS: Unremarkable    BOWEL: No bowel obstruction. Appendix isnormal. Circumferential wall   thickening transverse and descending colon with surrounding mesenteric   stranding, similar in appearance from prior examination. No pneumatosis.  PERITONEUM/RETROPERITONEUM: No pneumoperitoneum or ascites.  VESSELS: Within normal limits.  LYMPH NODES: No lymphadenopathy.  ABDOMINAL WALL: Within normal limits.  BONES: Unchanged    IMPRESSION:    Since prior examination;    Stable appearance of transverse and descending colon consistent with   colitis. No drainable fluid collection or pneumoperitoneum.    --- End of Report ---            ELLIOT LANDAU MD; Attending Radiologist  This document has been electronically signed. Feb 27 2025  7:31PM (02-27-25 @ 18:40)    CARDIOLOGY TESTING  QRS axis to [] ° and NSR at a rate of [] BPM. There was no atrial enlargement. There was no ventricular hypertrophy. There were no ST-T changes and all intervals were normal.        ASSESSMENT/PLAN:  59-year-old female with history of recurrent diverticulitis, nasopharyngeal CA in remission, COPD not on home O2, prediabetes, hypertension, carotid artery stenosis status post endarterectomy/stenting here for assessment of recurrent abdominal pain. Patient was seen here recently, found to have persistent diverticular changes, discharged with oral antibiotics. Pt states she did not have coverage for the script sent so she did not take the abx. She is now returning with pain, nausea and constipation alternating with diarrhea. She is passing loose stools with flatus now. Vitals stable on admission, on RA on my encounter. CT Abdomen and Pelvis w/ Oral Cont and w/ IV Cont : Transverse and descending colonic wall thickening with surrounding fat stranding, increased since prior exam and likely reflecting colitis. Labs: WBC 14k, Hb 12.2, plt 503, K 5.2, BUN/Cr 34/2. Admitted for colitis.     #Acute on chronic colitis  #Hx diverticular disease with multiple episodes of diverticulitis in the past   #Hx of Nasopharyngeal CA in remission   - Monitor for bloody BMs  - Lactate wnl and wbc trending down  - Keep active T&S and trend H&H   - Transfuse PRN to keep Hgb > 8   - creatinine trending down, ATN, monitor electrolytes for post ATN diuresis  - pain control with morphine    - GI consulted - last admission they recommended surgical evaluation in setting of persistent/recurrent acute sigmoid diverticulitis and hx of microperforation   - repeat CTAP was negative for any perforation or acute abdominal process  - surgery consulted -> no surgical interventions as per surgery, c/w abx and IVF  - IVF for 24hrs  - c/w cefepime and flagyl for now  - advance diet as tolerated    #COPD not on home O2   - not in exacerbation   - c/w home inhalers     # pre-DM  - not on any meds  - monitor FS. start SSI if FS > 200.  - A1c 6.6-6.9    #LAKISHA on CKD   - Trend Cr - back to baseline  - likely prerenal   - start lisinopril once Cr returns to baseline  - s/p LR @100ml/hr for 24hrs    #CAD   #HTN  #HLD   #R ICA stenting in 2023   - c/w Aspirin and Brilinta   - c/w Coreg  - Hold lisinopril   - follow up with Neuro OP     #Hypothyroidism   - c/w Synthroid 125mcg    #MISC  - DVT PPx: heparin  - GI PPx: PPI   - Diet: Advance as tolerated   - Activity: IAT     Pendings: KUB, advance diet as tolerated, pain control, GI and surgery f/u    #Supportive Management:  Dispo:  Home vs SNF  DVT Ppx: heparin   Injectable 5000 Unit(s) SubCutaneous every 8 hours  GI Ppx: pantoprazole    Tablet 40 milliGRAM(s) Oral before breakfast  Diet:  Diet, DASH/TLC:   Sodium & Cholesterol Restricted (03-01-25 @ 10:51) [Active]  Diet, Low Fiber (02-28-25 @ 11:03) [Available for Activation]      Total time spent to complete patient's bedside assessment, review medical chart, discuss medical plan of care with covering medical team was more than 45 minutes with >50% of time spent face to face with patient, discussion with patient/family and/or coordination of care    Housestaff's notes reviewed. When there is confusion regarding the content or information provided in the notes of a housestaff (resident, medical student, physician assistant, or nurse practioner) and the attending physician notes, the attending physician's note takes precedence and supersedes the other notes.    Trent Alonzo MD/Abebe  Attending Physician

## 2025-03-03 LAB
ALBUMIN SERPL ELPH-MCNC: 3.3 G/DL — LOW (ref 3.5–5.2)
ALP SERPL-CCNC: 86 U/L — SIGNIFICANT CHANGE UP (ref 30–115)
ALT FLD-CCNC: 10 U/L — SIGNIFICANT CHANGE UP (ref 0–41)
ANION GAP SERPL CALC-SCNC: 12 MMOL/L — SIGNIFICANT CHANGE UP (ref 7–14)
AST SERPL-CCNC: 16 U/L — SIGNIFICANT CHANGE UP (ref 0–41)
BASOPHILS # BLD AUTO: 0.05 K/UL — SIGNIFICANT CHANGE UP (ref 0–0.2)
BASOPHILS NFR BLD AUTO: 0.7 % — SIGNIFICANT CHANGE UP (ref 0–1)
BILIRUB SERPL-MCNC: 0.3 MG/DL — SIGNIFICANT CHANGE UP (ref 0.2–1.2)
BUN SERPL-MCNC: 12 MG/DL — SIGNIFICANT CHANGE UP (ref 10–20)
CALCIUM SERPL-MCNC: 8.6 MG/DL — SIGNIFICANT CHANGE UP (ref 8.4–10.5)
CHLORIDE SERPL-SCNC: 103 MMOL/L — SIGNIFICANT CHANGE UP (ref 98–110)
CO2 SERPL-SCNC: 24 MMOL/L — SIGNIFICANT CHANGE UP (ref 17–32)
CREAT SERPL-MCNC: 1.1 MG/DL — SIGNIFICANT CHANGE UP (ref 0.7–1.5)
CRP SERPL-MCNC: 93.5 MG/L — HIGH
EGFR: 57 ML/MIN/1.73M2 — LOW
EGFR: 57 ML/MIN/1.73M2 — LOW
EOSINOPHIL # BLD AUTO: 0.37 K/UL — SIGNIFICANT CHANGE UP (ref 0–0.7)
EOSINOPHIL NFR BLD AUTO: 5.2 % — SIGNIFICANT CHANGE UP (ref 0–8)
ERYTHROCYTE [SEDIMENTATION RATE] IN BLOOD: 67 MM/HR — HIGH (ref 0–20)
GLUCOSE SERPL-MCNC: 147 MG/DL — HIGH (ref 70–99)
HCT VFR BLD CALC: 32 % — LOW (ref 37–47)
HGB BLD-MCNC: 9.8 G/DL — LOW (ref 12–16)
IMM GRANULOCYTES NFR BLD AUTO: 0.6 % — HIGH (ref 0.1–0.3)
LACTATE SERPL-SCNC: 0.9 MMOL/L — SIGNIFICANT CHANGE UP (ref 0.7–2)
LYMPHOCYTES # BLD AUTO: 1.21 K/UL — SIGNIFICANT CHANGE UP (ref 1.2–3.4)
LYMPHOCYTES # BLD AUTO: 17.1 % — LOW (ref 20.5–51.1)
MAGNESIUM SERPL-MCNC: 1.8 MG/DL — SIGNIFICANT CHANGE UP (ref 1.8–2.4)
MCHC RBC-ENTMCNC: 24.3 PG — LOW (ref 27–31)
MCHC RBC-ENTMCNC: 30.6 G/DL — LOW (ref 32–37)
MCV RBC AUTO: 79.4 FL — LOW (ref 81–99)
MONOCYTES # BLD AUTO: 0.55 K/UL — SIGNIFICANT CHANGE UP (ref 0.1–0.6)
MONOCYTES NFR BLD AUTO: 7.8 % — SIGNIFICANT CHANGE UP (ref 1.7–9.3)
NEUTROPHILS # BLD AUTO: 4.87 K/UL — SIGNIFICANT CHANGE UP (ref 1.4–6.5)
NEUTROPHILS NFR BLD AUTO: 68.6 % — SIGNIFICANT CHANGE UP (ref 42.2–75.2)
NRBC BLD AUTO-RTO: 0 /100 WBCS — SIGNIFICANT CHANGE UP (ref 0–0)
PLATELET # BLD AUTO: 410 K/UL — HIGH (ref 130–400)
PMV BLD: 10.5 FL — HIGH (ref 7.4–10.4)
POTASSIUM SERPL-MCNC: 4.4 MMOL/L — SIGNIFICANT CHANGE UP (ref 3.5–5)
POTASSIUM SERPL-SCNC: 4.4 MMOL/L — SIGNIFICANT CHANGE UP (ref 3.5–5)
PROT SERPL-MCNC: 5.8 G/DL — LOW (ref 6–8)
RBC # BLD: 4.03 M/UL — LOW (ref 4.2–5.4)
RBC # FLD: 18.2 % — HIGH (ref 11.5–14.5)
SODIUM SERPL-SCNC: 139 MMOL/L — SIGNIFICANT CHANGE UP (ref 135–146)
WBC # BLD: 7.09 K/UL — SIGNIFICANT CHANGE UP (ref 4.8–10.8)
WBC # FLD AUTO: 7.09 K/UL — SIGNIFICANT CHANGE UP (ref 4.8–10.8)

## 2025-03-03 PROCEDURE — 99232 SBSQ HOSP IP/OBS MODERATE 35: CPT

## 2025-03-03 RX ADMIN — HEPARIN SODIUM 5000 UNIT(S): 1000 INJECTION INTRAVENOUS; SUBCUTANEOUS at 21:48

## 2025-03-03 RX ADMIN — MONTELUKAST SODIUM 10 MILLIGRAM(S): 10 TABLET ORAL at 11:16

## 2025-03-03 RX ADMIN — Medication 1 APPLICATION(S): at 13:29

## 2025-03-03 RX ADMIN — HEPARIN SODIUM 5000 UNIT(S): 1000 INJECTION INTRAVENOUS; SUBCUTANEOUS at 05:33

## 2025-03-03 RX ADMIN — CEFEPIME 100 MILLIGRAM(S): 2 INJECTION, POWDER, FOR SOLUTION INTRAVENOUS at 05:32

## 2025-03-03 RX ADMIN — Medication 0.5 MILLIGRAM(S): at 23:20

## 2025-03-03 RX ADMIN — Medication 2 MILLIGRAM(S): at 11:15

## 2025-03-03 RX ADMIN — TIZANIDINE 2 MILLIGRAM(S): 4 TABLET ORAL at 05:33

## 2025-03-03 RX ADMIN — CLONAZEPAM 1 MILLIGRAM(S): 0.5 TABLET ORAL at 17:50

## 2025-03-03 RX ADMIN — ATORVASTATIN CALCIUM 80 MILLIGRAM(S): 80 TABLET, FILM COATED ORAL at 21:49

## 2025-03-03 RX ADMIN — CARVEDILOL 6.25 MILLIGRAM(S): 3.12 TABLET, FILM COATED ORAL at 05:32

## 2025-03-03 RX ADMIN — Medication 2 MILLIGRAM(S): at 17:50

## 2025-03-03 RX ADMIN — NALOXEGOL OXALATE 12.5 MILLIGRAM(S): 12.5 TABLET, FILM COATED ORAL at 05:33

## 2025-03-03 RX ADMIN — Medication 100 MILLIGRAM(S): at 21:48

## 2025-03-03 RX ADMIN — Medication 2 MILLIGRAM(S): at 00:20

## 2025-03-03 RX ADMIN — Medication 2 MILLIGRAM(S): at 11:45

## 2025-03-03 RX ADMIN — Medication 40 MILLIGRAM(S): at 05:32

## 2025-03-03 RX ADMIN — Medication 0.5 MILLIGRAM(S): at 06:07

## 2025-03-03 RX ADMIN — TICAGRELOR 90 MILLIGRAM(S): 90 TABLET ORAL at 05:33

## 2025-03-03 RX ADMIN — CARVEDILOL 6.25 MILLIGRAM(S): 3.12 TABLET, FILM COATED ORAL at 17:50

## 2025-03-03 RX ADMIN — TICAGRELOR 90 MILLIGRAM(S): 90 TABLET ORAL at 17:51

## 2025-03-03 RX ADMIN — TIZANIDINE 2 MILLIGRAM(S): 4 TABLET ORAL at 13:26

## 2025-03-03 RX ADMIN — HEPARIN SODIUM 5000 UNIT(S): 1000 INJECTION INTRAVENOUS; SUBCUTANEOUS at 13:27

## 2025-03-03 RX ADMIN — Medication 1 DOSE(S): at 23:20

## 2025-03-03 RX ADMIN — Medication 100 MILLIGRAM(S): at 13:24

## 2025-03-03 RX ADMIN — Medication 81 MILLIGRAM(S): at 11:16

## 2025-03-03 RX ADMIN — Medication 0.5 MILLIGRAM(S): at 07:07

## 2025-03-03 RX ADMIN — Medication 0.5 MILLIGRAM(S): at 23:50

## 2025-03-03 RX ADMIN — TIOTROPIUM BROMIDE INHALATION SPRAY 2 PUFF(S): 3.12 SPRAY, METERED RESPIRATORY (INHALATION) at 08:35

## 2025-03-03 RX ADMIN — BUPROPION HYDROBROMIDE 450 MILLIGRAM(S): 522 TABLET, EXTENDED RELEASE ORAL at 11:16

## 2025-03-03 RX ADMIN — Medication 2 PUFF(S): at 08:33

## 2025-03-03 RX ADMIN — TIZANIDINE 2 MILLIGRAM(S): 4 TABLET ORAL at 21:49

## 2025-03-03 RX ADMIN — CEFEPIME 100 MILLIGRAM(S): 2 INJECTION, POWDER, FOR SOLUTION INTRAVENOUS at 17:51

## 2025-03-03 RX ADMIN — CLONAZEPAM 1 MILLIGRAM(S): 0.5 TABLET ORAL at 05:43

## 2025-03-03 RX ADMIN — Medication 100 MILLIGRAM(S): at 05:32

## 2025-03-03 RX ADMIN — Medication 125 MICROGRAM(S): at 05:33

## 2025-03-03 RX ADMIN — Medication 2 MILLIGRAM(S): at 18:55

## 2025-03-03 NOTE — PROGRESS NOTE ADULT - ATTENDING COMMENTS
Medicine Attending Addendum  Patient was seen and examined with medicine team.  Nursing records reviewed. I agree with the resident/PA/NP's note including past medical history, home medications, social history, allergies, surgical history, family history, and review of system. I have reviewed relevant vitals, laboratory values, imaging studies, and microbiology.   - Above resident's note was edited by me  - patient is doing much better after multiple BM  - dispo planning  - rest of A/P as per detailed housestaff note above except above modifications    Total time spent to complete patient's bedside assessment, reviewed medical chart, discussed medical plan of care with team was 45 minutes with >50% of time spent face to face with patient, discussion with patient/family and/or coordination of care.

## 2025-03-03 NOTE — PROGRESS NOTE ADULT - SUBJECTIVE AND OBJECTIVE BOX
SUBJECTIVE/OVERNIGHT EVENTS  Today is hospital day 4d. This morning patient was seen and examined at bedside, resting comfortably in bed. No acute or major events overnight.  Patient reports persistent abdominal pain, worse in LLQ, PO intake still decreased, had 2 episodes of diarrhea, will stop lactulose for now.     MEDICATIONS  STANDING MEDICATIONS  aspirin enteric coated 81 milliGRAM(s) Oral daily  atorvastatin 80 milliGRAM(s) Oral at bedtime  buPROPion XL (24-Hour) . 450 milliGRAM(s) Oral daily  carvedilol 6.25 milliGRAM(s) Oral every 12 hours  cefepime   IVPB 1000 milliGRAM(s) IV Intermittent every 12 hours  clonazePAM  Tablet 1 milliGRAM(s) Oral two times a day  fluticasone propionate/ salmeterol 100-50 MICROgram(s) Diskus 1 Dose(s) Inhalation two times a day  heparin   Injectable 5000 Unit(s) SubCutaneous every 8 hours  influenza   Vaccine 0.5 milliLiter(s) IntraMuscular once  lactated ringers. 1000 milliLiter(s) IV Continuous <Continuous>  levothyroxine 125 MICROGram(s) Oral daily  metroNIDAZOLE  IVPB 500 milliGRAM(s) IV Intermittent every 8 hours  montelukast 10 milliGRAM(s) Oral daily  naloxegol 12.5 milliGRAM(s) Oral before breakfast  pantoprazole    Tablet 40 milliGRAM(s) Oral before breakfast  ticagrelor 90 milliGRAM(s) Oral every 12 hours  tiotropium 2.5 MICROgram(s) Inhaler 2 Puff(s) Inhalation daily  tiZANidine 2 milliGRAM(s) Oral three times a day    PRN MEDICATIONS  acetaminophen     Tablet .. 650 milliGRAM(s) Oral every 6 hours PRN  albuterol    90 MICROgram(s) HFA Inhaler 2 Puff(s) Inhalation every 6 hours PRN  aluminum hydroxide/magnesium hydroxide/simethicone Suspension 30 milliLiter(s) Oral every 4 hours PRN  HYDROmorphone  Injectable 0.5 milliGRAM(s) IV Push every 4 hours PRN  hydrOXYzine hydrochloride 10 milliGRAM(s) Oral three times a day PRN  morphine  - Injectable 2 milliGRAM(s) IV Push every 6 hours PRN  ondansetron Injectable 4 milliGRAM(s) IV Push every 8 hours PRN  zolpidem 5 milliGRAM(s) Oral at bedtime PRN  zolpidem 5 milliGRAM(s) Oral at bedtime PRN    VITALS  T(F): 97.5 (03-03-25 @ 06:56), Max: 97.8 (03-02-25 @ 21:00)  HR: 74 (03-03-25 @ 07:45) (69 - 74)  BP: 120/63 (03-03-25 @ 06:56) (118/83 - 121/84)  RR: 18 (03-03-25 @ 07:45) (18 - 18)  SpO2: 95% (03-03-25 @ 07:45) (93% - 96%)    PHYSICAL EXAM  GENERAL  ( x ) NAD, lying in bed comfortably     (  ) obtunded     (  ) lethargic     (  ) somnolent    HEAD  ( x ) Atraumatic     (  ) hematoma     (  ) laceration (specify location:       )     NECK  ( x ) Supple     (  ) neck stiffness     (  ) nuchal rigidity     (  )  no JVD     (  ) JVD present ( -- cm)    HEART  Rate -->  ( x ) normal rate    (  ) bradycardic    (  ) tachycardic  Rhythm -->  ( x ) regular    (  ) regularly irregular    (  ) irregularly irregular  Murmurs -->  ( x ) normal s1/s2    (  ) systolic murmur    (  ) diastolic murmur    (  ) continuous murmur     (  ) S3 present    (  ) S4 present    LUNGS  ( x ) Unlabored respirations     (  ) tachypnea  ( x ) B/L air entry     (  ) decreased breath sounds in:  (location     )    (  ) no adventitious sound     (  ) crackles     (  ) wheezing      (  ) rhonchi      (specify location:       )  (  ) chest wall tenderness (specify location:       )    ABDOMEN  ( x ) Soft     (  ) tense   |   (  ) nondistended     (  ) distended   |   (  ) +BS     (  ) hypoactive bowel sounds     (  ) hyperactive bowel sounds  (  ) nontender     (  ) RUQ tenderness     (  ) RLQ tenderness     ( x ) LLQ tenderness     (  ) epigastric tenderness     (  ) diffuse tenderness  (  ) Splenomegaly      (  ) Hepatomegaly      (  ) Jaundice     (  ) ecchymosis     EXTREMITIES  ( x ) Normal     (  ) Rash     (  ) ecchymosis     (  ) varicose veins      (  ) pitting edema     (  ) non-pitting edema   (  ) ulceration     (  ) gangrene:     (location:     )    NERVOUS SYSTEM  ( x ) A&Ox3     (  ) confused     (  ) lethargic  CN II-XII:     (  ) Intact     (  ) focal deficits  (Specify:     )   Upper extremities:     (  ) strength X/5     (  ) focal deficit (specify:    )  Lower extremities:     (  ) strength  X/5    (  ) focal deficit (specify:    )      LABS             9.8    7.09  )-----------( 410      ( 03-03-25 @ 07:43 )             32.0     139  |  103  |  12  -------------------------<  147   03-03-25 @ 07:43  4.4  |  24  |  1.1    Ca      8.6     03-03-25 @ 07:43  Mg     1.8     03-03-25 @ 07:43    TPro  5.8  /  Alb  3.3  /  TBili  0.3  /  DBili  x   /  AST  16  /  ALT  10  /  AlkPhos  86  /  GGT  x     03-03-25 @ 07:43        Urinalysis Basic - ( 03 Mar 2025 07:43 )    Color: x / Appearance: x / SG: x / pH: x  Gluc: 147 mg/dL / Ketone: x  / Bili: x / Urobili: x   Blood: x / Protein: x / Nitrite: x   Leuk Esterase: x / RBC: x / WBC x   Sq Epi: x / Non Sq Epi: x / Bacteria: x          IMAGING

## 2025-03-03 NOTE — PROGRESS NOTE ADULT - ASSESSMENT
59-year-old female with history of recurrent diverticulitis, nasopharyngeal CA in remission, COPD not on home O2, prediabetes, hypertension, carotid artery stenosis status post endarterectomy/stenting here for assessment of recurrent abdominal pain. Patient was seen here recently, found to have persistent diverticular changes, discharged with oral antibiotics. Pt states she did not have coverage for the script sent so she did not take the abx. She is now returning with pain, nausea and constipation alternating with diarrhea. She is passing loose stools with flatus now. Vitals stable on admission, on RA on my encounter. CT Abdomen and Pelvis w/ Oral Cont and w/ IV Cont : Transverse and descending colonic wall thickening with surrounding fat stranding, increased since prior exam and likely reflecting colitis. Labs: WBC 14k, Hb 12.2, plt 503, K 5.2, BUN/Cr 34/2. Admitted for colitis.     #Acute on chronic colitis  #Hx diverticular disease with multiple episodes of diverticulitis in the past   #Hx of Nasopharyngeal CA in remission   - Monitor for bloody BMs  - Lactate wnl and wbc trending down  - Keep active T&S and trend H&H   - Transfuse PRN to keep Hgb > 8   - creatinine trending down, ATN, monitor electrolytes for post ATN diuresis  - pain control with morphine    - GI consulted - last admission they recommended surgical evaluation in setting of persistent/recurrent acute sigmoid diverticulitis and hx of microperforation   - repeat CTAP was negative for any perforation or acute abdominal process  - surgery consulted -> no surgical interventions as per surgery, c/w abx and IVF  - IVF for 24hrs  - c/w cefepime and flagyl for now  - regular diet resumed, patient reports decreased PO intake  - 3/3: will repeat lactate, ESR, CRP, will send fecal calprotectin, will hold on repeat imaging pending inflammatory markers   - pain control PRN    #COPD not on home O2   - not in exacerbation   - c/w home inhalers     # pre-DM  - not on any meds  - monitor FS. start SSI if FS > 200.  - A1c 6.6-6.9    #LAKISHA on CKD   - Trend Cr - back to baseline  - likely prerenal   - start lisinopril once Cr returns to baseline  - s/p LR @100ml/hr for 24hrs    #CAD   #HTN  #HLD   #R ICA stenting in 2023   - c/w Aspirin and Brilinta   - c/w Coreg  - Hold lisinopril   - follow up with Neuro OP     #Hypothyroidism   - c/w Synthroid 125mcg    #MISC  - DVT PPx: heparin  - GI PPx: PPI   - Diet: Regular  - Activity: IAT     Pendings: repeat lactate, if elevated if repeat CTAP, CRP, ESR, fecal calprotectin, pain control PRN

## 2025-03-04 LAB
ALBUMIN SERPL ELPH-MCNC: 3.4 G/DL — LOW (ref 3.5–5.2)
ALP SERPL-CCNC: 83 U/L — SIGNIFICANT CHANGE UP (ref 30–115)
ALT FLD-CCNC: 14 U/L — SIGNIFICANT CHANGE UP (ref 0–41)
ANION GAP SERPL CALC-SCNC: 11 MMOL/L — SIGNIFICANT CHANGE UP (ref 7–14)
AST SERPL-CCNC: 29 U/L — SIGNIFICANT CHANGE UP (ref 0–41)
BASOPHILS # BLD AUTO: 0.05 K/UL — SIGNIFICANT CHANGE UP (ref 0–0.2)
BASOPHILS NFR BLD AUTO: 0.6 % — SIGNIFICANT CHANGE UP (ref 0–1)
BILIRUB SERPL-MCNC: 0.3 MG/DL — SIGNIFICANT CHANGE UP (ref 0.2–1.2)
BUN SERPL-MCNC: 10 MG/DL — SIGNIFICANT CHANGE UP (ref 10–20)
CALCIUM SERPL-MCNC: 8.6 MG/DL — SIGNIFICANT CHANGE UP (ref 8.4–10.5)
CHLORIDE SERPL-SCNC: 103 MMOL/L — SIGNIFICANT CHANGE UP (ref 98–110)
CO2 SERPL-SCNC: 23 MMOL/L — SIGNIFICANT CHANGE UP (ref 17–32)
CREAT SERPL-MCNC: 1.2 MG/DL — SIGNIFICANT CHANGE UP (ref 0.7–1.5)
EGFR: 52 ML/MIN/1.73M2 — LOW
EGFR: 52 ML/MIN/1.73M2 — LOW
EOSINOPHIL # BLD AUTO: 0.36 K/UL — SIGNIFICANT CHANGE UP (ref 0–0.7)
EOSINOPHIL NFR BLD AUTO: 4.5 % — SIGNIFICANT CHANGE UP (ref 0–8)
GLUCOSE BLDC GLUCOMTR-MCNC: 177 MG/DL — HIGH (ref 70–99)
GLUCOSE SERPL-MCNC: 160 MG/DL — HIGH (ref 70–99)
HCT VFR BLD CALC: 33.8 % — LOW (ref 37–47)
HGB BLD-MCNC: 10.2 G/DL — LOW (ref 12–16)
IMM GRANULOCYTES NFR BLD AUTO: 1.4 % — HIGH (ref 0.1–0.3)
LYMPHOCYTES # BLD AUTO: 1.17 K/UL — LOW (ref 1.2–3.4)
LYMPHOCYTES # BLD AUTO: 14.8 % — LOW (ref 20.5–51.1)
MAGNESIUM SERPL-MCNC: 1.6 MG/DL — LOW (ref 1.8–2.4)
MCHC RBC-ENTMCNC: 24.2 PG — LOW (ref 27–31)
MCHC RBC-ENTMCNC: 30.2 G/DL — LOW (ref 32–37)
MCV RBC AUTO: 80.1 FL — LOW (ref 81–99)
MONOCYTES # BLD AUTO: 0.62 K/UL — HIGH (ref 0.1–0.6)
MONOCYTES NFR BLD AUTO: 7.8 % — SIGNIFICANT CHANGE UP (ref 1.7–9.3)
NEUTROPHILS # BLD AUTO: 5.61 K/UL — SIGNIFICANT CHANGE UP (ref 1.4–6.5)
NEUTROPHILS NFR BLD AUTO: 70.9 % — SIGNIFICANT CHANGE UP (ref 42.2–75.2)
NRBC BLD AUTO-RTO: 0 /100 WBCS — SIGNIFICANT CHANGE UP (ref 0–0)
PLATELET # BLD AUTO: 402 K/UL — HIGH (ref 130–400)
PMV BLD: 10.4 FL — SIGNIFICANT CHANGE UP (ref 7.4–10.4)
POTASSIUM SERPL-MCNC: 4.2 MMOL/L — SIGNIFICANT CHANGE UP (ref 3.5–5)
POTASSIUM SERPL-SCNC: 4.2 MMOL/L — SIGNIFICANT CHANGE UP (ref 3.5–5)
PROT SERPL-MCNC: 5.9 G/DL — LOW (ref 6–8)
RBC # BLD: 4.22 M/UL — SIGNIFICANT CHANGE UP (ref 4.2–5.4)
RBC # FLD: 18.6 % — HIGH (ref 11.5–14.5)
SODIUM SERPL-SCNC: 137 MMOL/L — SIGNIFICANT CHANGE UP (ref 135–146)
WBC # BLD: 7.92 K/UL — SIGNIFICANT CHANGE UP (ref 4.8–10.8)
WBC # FLD AUTO: 7.92 K/UL — SIGNIFICANT CHANGE UP (ref 4.8–10.8)

## 2025-03-04 PROCEDURE — 99232 SBSQ HOSP IP/OBS MODERATE 35: CPT

## 2025-03-04 RX ORDER — METRONIDAZOLE 250 MG
500 TABLET ORAL EVERY 8 HOURS
Refills: 0 | Status: DISCONTINUED | OUTPATIENT
Start: 2025-03-04 | End: 2025-03-09

## 2025-03-04 RX ADMIN — Medication 125 MICROGRAM(S): at 05:10

## 2025-03-04 RX ADMIN — Medication 100 MILLIGRAM(S): at 05:21

## 2025-03-04 RX ADMIN — CLONAZEPAM 1 MILLIGRAM(S): 0.5 TABLET ORAL at 17:22

## 2025-03-04 RX ADMIN — MONTELUKAST SODIUM 10 MILLIGRAM(S): 10 TABLET ORAL at 13:37

## 2025-03-04 RX ADMIN — TICAGRELOR 90 MILLIGRAM(S): 90 TABLET ORAL at 17:22

## 2025-03-04 RX ADMIN — TICAGRELOR 90 MILLIGRAM(S): 90 TABLET ORAL at 05:09

## 2025-03-04 RX ADMIN — TIZANIDINE 2 MILLIGRAM(S): 4 TABLET ORAL at 13:36

## 2025-03-04 RX ADMIN — Medication 0.5 MILLIGRAM(S): at 05:08

## 2025-03-04 RX ADMIN — Medication 500 MILLIGRAM(S): at 21:45

## 2025-03-04 RX ADMIN — NALOXEGOL OXALATE 12.5 MILLIGRAM(S): 12.5 TABLET, FILM COATED ORAL at 05:25

## 2025-03-04 RX ADMIN — Medication 2 MILLIGRAM(S): at 13:47

## 2025-03-04 RX ADMIN — ATORVASTATIN CALCIUM 80 MILLIGRAM(S): 80 TABLET, FILM COATED ORAL at 21:45

## 2025-03-04 RX ADMIN — CEFEPIME 100 MILLIGRAM(S): 2 INJECTION, POWDER, FOR SOLUTION INTRAVENOUS at 17:23

## 2025-03-04 RX ADMIN — CEFEPIME 100 MILLIGRAM(S): 2 INJECTION, POWDER, FOR SOLUTION INTRAVENOUS at 05:08

## 2025-03-04 RX ADMIN — Medication 1 APPLICATION(S): at 13:36

## 2025-03-04 RX ADMIN — Medication 4 MILLIGRAM(S): at 05:07

## 2025-03-04 RX ADMIN — Medication 4 MILLIGRAM(S): at 13:46

## 2025-03-04 RX ADMIN — CARVEDILOL 6.25 MILLIGRAM(S): 3.12 TABLET, FILM COATED ORAL at 05:25

## 2025-03-04 RX ADMIN — Medication 2 MILLIGRAM(S): at 22:30

## 2025-03-04 RX ADMIN — Medication 2 MILLIGRAM(S): at 14:17

## 2025-03-04 RX ADMIN — Medication 2 MILLIGRAM(S): at 00:45

## 2025-03-04 RX ADMIN — Medication 40 MILLIGRAM(S): at 05:20

## 2025-03-04 RX ADMIN — Medication 81 MILLIGRAM(S): at 13:36

## 2025-03-04 RX ADMIN — HEPARIN SODIUM 5000 UNIT(S): 1000 INJECTION INTRAVENOUS; SUBCUTANEOUS at 13:37

## 2025-03-04 RX ADMIN — BUPROPION HYDROBROMIDE 450 MILLIGRAM(S): 522 TABLET, EXTENDED RELEASE ORAL at 13:36

## 2025-03-04 RX ADMIN — CARVEDILOL 6.25 MILLIGRAM(S): 3.12 TABLET, FILM COATED ORAL at 17:22

## 2025-03-04 RX ADMIN — TIZANIDINE 2 MILLIGRAM(S): 4 TABLET ORAL at 21:45

## 2025-03-04 RX ADMIN — TIOTROPIUM BROMIDE INHALATION SPRAY 2 PUFF(S): 3.12 SPRAY, METERED RESPIRATORY (INHALATION) at 08:19

## 2025-03-04 RX ADMIN — Medication 100 MILLIGRAM(S): at 13:36

## 2025-03-04 RX ADMIN — Medication 5 MILLIGRAM(S): at 00:13

## 2025-03-04 RX ADMIN — Medication 2 MILLIGRAM(S): at 21:46

## 2025-03-04 RX ADMIN — HEPARIN SODIUM 5000 UNIT(S): 1000 INJECTION INTRAVENOUS; SUBCUTANEOUS at 05:08

## 2025-03-04 RX ADMIN — HEPARIN SODIUM 5000 UNIT(S): 1000 INJECTION INTRAVENOUS; SUBCUTANEOUS at 21:45

## 2025-03-04 RX ADMIN — TIZANIDINE 2 MILLIGRAM(S): 4 TABLET ORAL at 05:09

## 2025-03-04 RX ADMIN — Medication 2 MILLIGRAM(S): at 00:13

## 2025-03-04 RX ADMIN — Medication 0.5 MILLIGRAM(S): at 05:40

## 2025-03-04 NOTE — PHYSICAL THERAPY INITIAL EVALUATION ADULT - WORK/LEISURE ACTIVITY, REHAB EVAL
The patient was told their pupil does not dilate well which carries an increased risk of surgical complications. Cyclogyl with surgery. independent

## 2025-03-04 NOTE — PROGRESS NOTE ADULT - ATTENDING COMMENTS
Medicine Attending Addendum  Patient was seen and examined with medicine team.  Nursing records reviewed. I agree with the resident/PA/NP's note including past medical history, home medications, social history, allergies, surgical history, family history, and review of system. I have reviewed relevant vitals, laboratory values, imaging studies, and microbiology.   - Above resident's note was edited by me  - No acute complaints  - No more abdominal pain  - dispo pending ID clearance  - anticipate to dc tomorrow  - rest of A/P as per detailed housestaff note above except above modifications    Total time spent to complete patient's bedside assessment, reviewed medical chart, discussed medical plan of care with team was 45 minutes with >50% of time spent face to face with patient, discussion with patient/family and/or coordination of care.

## 2025-03-04 NOTE — PHYSICAL THERAPY INITIAL EVALUATION ADULT - PERTINENT HX OF CURRENT PROBLEM, REHAB EVAL
Patient is a 59-year-old female with history of recurrent diverticulitis, nasopharyngeal CA in remission, COPD not on home O2, prediabetes, hypertension, carotid artery stenosis status post endarterectomy/stenting here for assessment of recurrent abdominal pain. Patient was seen here recently, found to have persistent diverticular changes, discharged with oral antibiotics. Pt states she did not have coverage for the script sent so she did not take the abx. She is now returning with pain, nausea and constipation alternating with diarrhea. She is passing loose stools with flatus now.    Pt. referred to PT for eval and tx.

## 2025-03-04 NOTE — PHYSICAL THERAPY INITIAL EVALUATION ADULT - GAIT DEVIATIONS NOTED, PT EVAL
decreased patrick/decreased velocity of limb motion/decreased step length/decreased stride length/decreased weight-shifting ability

## 2025-03-04 NOTE — PHYSICAL THERAPY INITIAL EVALUATION ADULT - GAIT TRAINING, PT EVAL
Increase ambulation using a rolling walker or without Assistive Device X  150 feet , independent by d/c.

## 2025-03-04 NOTE — PROGRESS NOTE ADULT - SUBJECTIVE AND OBJECTIVE BOX
ED Presentation    HPI:  Patient is a 59-year-old female with history of recurrent diverticulitis, nasopharyngeal CA in remission, COPD not on home O2, prediabetes, hypertension, carotid artery stenosis status post endarterectomy/stenting here for assessment of recurrent abdominal pain. Patient was seen here recently, found to have persistent diverticular changes, discharged with oral antibiotics. Pt states she did not have coverage for the script sent so she did not take the abx. She is now returning with pain, nausea and constipation alternating with diarrhea. She is passing loose stools with flatus now.     · Temp at ED Arrival (C)	37.3 Degrees C  · Oxygen Therapy Flow (L/min)	6 L/min  · O2 Delivery/Oxygen Delivery Method	nasal cannula  · SpO2 (%)	97 %  · Temp site	oral  · Temp (C)	37.3 Degrees C  · Temp (F)	99.1 Degrees F  · Respiration Rate (breaths/min)	20 /min  · Heart Rate	77 /min  · BP Diastolic	70 mm Hg  · BP Systolic	160 mm Hg    CT Abdomen and Pelvis w/ Oral Cont and w/ IV Cont : Transverse and descending colonic wall thickening with surrounding fat stranding, increased since prior exam and likely reflecting colitis.    Labs: WBC 14k, Hb 12.2, plt 503, K 5.2, BUN/Cr 34/2    Admitted for colitis.  (27 Feb 2025 03:58)       Today is hospital day 5d.     PAST MEDICAL & SURGICAL HISTORY  COPD (chronic obstructive pulmonary disease)    HTN (hypertension)    Diverticulitis    Pulmonary nodules/lesions, multiple    Nasopharyngeal cancer    Hypothyroid    Cervical disc disease  sequelae of radtion for nasopharyngeal cancer    Left ear hearing loss    Hemorrhoids    H/O carotid stenosis    Anxiety    Morbid obesity with BMI of 45.0-49.9, adult    H/O dental abscess    HLD (hyperlipidemia)    Carotid artery disease    Obstructive sleep apnea on CPAP    OA (osteoarthritis)    Bilateral knee pain    Depression    History of cholecystectomy    History of common carotid artery stent placement        SOCIAL HISTORY:  Social History:      Otherwise, as noted in HPI    ALLERGIES:  ciprofloxacin (Urticaria; Other)      MEDICATIONS:  STANDING MEDICATIONS  aspirin enteric coated 81 milliGRAM(s) Oral daily  atorvastatin 80 milliGRAM(s) Oral at bedtime  buPROPion XL (24-Hour) . 450 milliGRAM(s) Oral daily  carvedilol 6.25 milliGRAM(s) Oral every 12 hours  cefepime   IVPB 1000 milliGRAM(s) IV Intermittent every 12 hours  chlorhexidine 2% Cloths 1 Application(s) Topical daily  clonazePAM  Tablet 1 milliGRAM(s) Oral two times a day  fluticasone propionate/ salmeterol 100-50 MICROgram(s) Diskus 1 Dose(s) Inhalation two times a day  heparin   Injectable 5000 Unit(s) SubCutaneous every 8 hours  influenza   Vaccine 0.5 milliLiter(s) IntraMuscular once  lactated ringers. 1000 milliLiter(s) IV Continuous <Continuous>  levothyroxine 125 MICROGram(s) Oral daily  metroNIDAZOLE  IVPB 500 milliGRAM(s) IV Intermittent every 8 hours  montelukast 10 milliGRAM(s) Oral daily  naloxegol 12.5 milliGRAM(s) Oral before breakfast  pantoprazole    Tablet 40 milliGRAM(s) Oral before breakfast  ticagrelor 90 milliGRAM(s) Oral every 12 hours  tiotropium 2.5 MICROgram(s) Inhaler 2 Puff(s) Inhalation daily  tiZANidine 2 milliGRAM(s) Oral three times a day    PRN MEDICATIONS  acetaminophen     Tablet .. 650 milliGRAM(s) Oral every 6 hours PRN  albuterol    90 MICROgram(s) HFA Inhaler 2 Puff(s) Inhalation every 6 hours PRN  aluminum hydroxide/magnesium hydroxide/simethicone Suspension 30 milliLiter(s) Oral every 4 hours PRN  HYDROmorphone  Injectable 0.5 milliGRAM(s) IV Push every 4 hours PRN  hydrOXYzine hydrochloride 10 milliGRAM(s) Oral three times a day PRN  morphine  - Injectable 2 milliGRAM(s) IV Push every 6 hours PRN  ondansetron Injectable 4 milliGRAM(s) IV Push every 8 hours PRN  zolpidem 5 milliGRAM(s) Oral at bedtime PRN  zolpidem 5 milliGRAM(s) Oral at bedtime PRN      VITALS:   T(C): 36.3 (03-04-25 @ 07:30), Max: 36.8 (03-04-25 @ 05:18)  HR: 64 (03-04-25 @ 07:30) (64 - 74)  BP: 143/58 (03-04-25 @ 07:30) (114/75 - 148/82)  RR: 16 (03-04-25 @ 07:30) (16 - 18)  SpO2: 96% (03-04-25 @ 07:30) (94% - 98%)  I&O's Summary        PHYSICAL EXAM:  GENERAL: Nondistressed  HEAD: Atraumatic  EYES: Conjunctiva and sclera clear  LUNG: Decreased BS  HEART: S1 S2 heard  ABDOMEN: Soft, mild tenderness LLQ, BS present, no rebound  EXTREMITIES: No edema      LABS:                        10.2   7.92  )-----------( 402      ( 04 Mar 2025 08:36 )             33.8     03-04    137  |  103  |  10  ----------------------------<  160[H]  4.2   |  23  |  1.2    Ca    8.6      04 Mar 2025 08:36  Mg     1.6     03-04    TPro  5.9[L]  /  Alb  3.4[L]  /  TBili  0.3  /  DBili  x   /  AST  29  /  ALT  14  /  AlkPhos  83  03-04      Urinalysis Basic - ( 04 Mar 2025 08:36 )    Color: x / Appearance: x / SG: x / pH: x  Gluc: 160 mg/dL / Ketone: x  / Bili: x / Urobili: x   Blood: x / Protein: x / Nitrite: x   Leuk Esterase: x / RBC: x / WBC x   Sq Epi: x / Non Sq Epi: x / Bacteria: x        Lactate, Blood: 0.9 mmol/L (03-03-25 @ 11:41)  Sedimentation Rate, Erythrocyte: 67 mm/Hr *H* (03-03-25 @ 11:41)

## 2025-03-04 NOTE — PHYSICAL THERAPY INITIAL EVALUATION ADULT - NSACTIVITYREC_GEN_A_PT
As per chart/ notes, pt declined to go to short term rehabilitation, also declined homecare services.

## 2025-03-04 NOTE — PHYSICAL THERAPY INITIAL EVALUATION ADULT - GENERAL OBSERVATIONS, REHAB EVAL
[id number] am Chart reviewed. Pt. seen out of bed, seated in the toilet, + rolling walker, call bell. Pt initially c/o lightheadedness but after brief rest, pt was able to ambulate back to bed. RO=967/ 61 mm Hg,

## 2025-03-04 NOTE — CONSULT NOTE ADULT - ASSESSMENT
ASSESSMENT  Patient is a 59-year-old female with history of recurrent diverticulitis, nasopharyngeal CA in remission, COPD not on home O2, prediabetes, hypertension, carotid artery stenosis status post endarterectomy/stenting here for assessment of recurrent abdominal pain    IMPRESSION  #Recurrent Diverticulitis/Colitis   - CT Abd/pelvis 2/8 - acute sigmoid diverticulitis   - CT Abd/pelvis 2/12/ - ongoing acute sigmoid diverticulitis   - was recommended oral vantin/flagyl but reportedly not covered  - CT Abdomen and Pelvis w/ Oral Cont and w/ IV Cont (02.27.25 @ 01:34): IMPRESSION: Transverse and descending colonic wall thickening with surrounding fat   stranding, increased since prior exam and likely reflecting colitis.  - CT Abdomen and Pelvis w/ Oral Cont and w/ IV Cont (02.27.25 @ 18:40): Since prior examination;   Stable appearance of transverse and descending colon consistent with colitis. No drainable fluid collection or pneumoperitoneum.    Ceftriaxone 2/27  Cefepime 2/27-present   Flagyl 2/27 - present     #Obesity BMI (kg/m2): 51.4, 53.8  #Abx allergy: ciprofloxacin (Urticaria; Other) - eyes swelling with IV cipro     RECOMMENDATIONS  - noted CT Abd/pelvis 2/27 -- worsening colitis compared to 2/12, but potentially from missing antibiotics from last discharge  - WBC improved,  on palpation but reports improved since admission   - continue cefepime 1g q 12 hours   - change to PO flagyl 500 mg q 8 hours   - monitor diarrhea -- lactulose has been stopped   -- eventually plan to switch to PO vantin 400 mg BID (increased for obesity) and PO flagyl 500 mg q 12 hours to continue until 3/12 for 2 week course   -- would try to sent to [employer] to see if prior auth needed as she reports     Please call or message on Microsoft Teams if with any questions.  Spectra [phone]

## 2025-03-05 LAB
ALBUMIN SERPL ELPH-MCNC: 3.4 G/DL — LOW (ref 3.5–5.2)
ALP SERPL-CCNC: 80 U/L — SIGNIFICANT CHANGE UP (ref 30–115)
ALT FLD-CCNC: 15 U/L — SIGNIFICANT CHANGE UP (ref 0–41)
ANION GAP SERPL CALC-SCNC: 13 MMOL/L — SIGNIFICANT CHANGE UP (ref 7–14)
AST SERPL-CCNC: 26 U/L — SIGNIFICANT CHANGE UP (ref 0–41)
BASOPHILS # BLD AUTO: 0.05 K/UL — SIGNIFICANT CHANGE UP (ref 0–0.2)
BASOPHILS NFR BLD AUTO: 0.5 % — SIGNIFICANT CHANGE UP (ref 0–1)
BILIRUB SERPL-MCNC: 0.2 MG/DL — SIGNIFICANT CHANGE UP (ref 0.2–1.2)
BUN SERPL-MCNC: 10 MG/DL — SIGNIFICANT CHANGE UP (ref 10–20)
CALCIUM SERPL-MCNC: 8.5 MG/DL — SIGNIFICANT CHANGE UP (ref 8.4–10.5)
CHLORIDE SERPL-SCNC: 105 MMOL/L — SIGNIFICANT CHANGE UP (ref 98–110)
CO2 SERPL-SCNC: 23 MMOL/L — SIGNIFICANT CHANGE UP (ref 17–32)
CREAT SERPL-MCNC: 1.2 MG/DL — SIGNIFICANT CHANGE UP (ref 0.7–1.5)
EGFR: 52 ML/MIN/1.73M2 — LOW
EGFR: 52 ML/MIN/1.73M2 — LOW
EOSINOPHIL # BLD AUTO: 0.34 K/UL — SIGNIFICANT CHANGE UP (ref 0–0.7)
EOSINOPHIL NFR BLD AUTO: 3.7 % — SIGNIFICANT CHANGE UP (ref 0–8)
GLUCOSE SERPL-MCNC: 167 MG/DL — HIGH (ref 70–99)
HCT VFR BLD CALC: 32.4 % — LOW (ref 37–47)
HGB BLD-MCNC: 10 G/DL — LOW (ref 12–16)
IMM GRANULOCYTES NFR BLD AUTO: 1.7 % — HIGH (ref 0.1–0.3)
LYMPHOCYTES # BLD AUTO: 1.67 K/UL — SIGNIFICANT CHANGE UP (ref 1.2–3.4)
LYMPHOCYTES # BLD AUTO: 18.3 % — LOW (ref 20.5–51.1)
MAGNESIUM SERPL-MCNC: 1.8 MG/DL — SIGNIFICANT CHANGE UP (ref 1.8–2.4)
MCHC RBC-ENTMCNC: 24.3 PG — LOW (ref 27–31)
MCHC RBC-ENTMCNC: 30.9 G/DL — LOW (ref 32–37)
MCV RBC AUTO: 78.8 FL — LOW (ref 81–99)
MONOCYTES # BLD AUTO: 0.97 K/UL — HIGH (ref 0.1–0.6)
MONOCYTES NFR BLD AUTO: 10.6 % — HIGH (ref 1.7–9.3)
NEUTROPHILS # BLD AUTO: 5.96 K/UL — SIGNIFICANT CHANGE UP (ref 1.4–6.5)
NEUTROPHILS NFR BLD AUTO: 65.2 % — SIGNIFICANT CHANGE UP (ref 42.2–75.2)
NRBC BLD AUTO-RTO: 0 /100 WBCS — SIGNIFICANT CHANGE UP (ref 0–0)
PLATELET # BLD AUTO: 397 K/UL — SIGNIFICANT CHANGE UP (ref 130–400)
PMV BLD: 10.6 FL — HIGH (ref 7.4–10.4)
POTASSIUM SERPL-MCNC: 3.8 MMOL/L — SIGNIFICANT CHANGE UP (ref 3.5–5)
POTASSIUM SERPL-SCNC: 3.8 MMOL/L — SIGNIFICANT CHANGE UP (ref 3.5–5)
PROT SERPL-MCNC: 5.5 G/DL — LOW (ref 6–8)
RBC # BLD: 4.11 M/UL — LOW (ref 4.2–5.4)
RBC # FLD: 18.2 % — HIGH (ref 11.5–14.5)
SODIUM SERPL-SCNC: 141 MMOL/L — SIGNIFICANT CHANGE UP (ref 135–146)
WBC # BLD: 9.15 K/UL — SIGNIFICANT CHANGE UP (ref 4.8–10.8)
WBC # FLD AUTO: 9.15 K/UL — SIGNIFICANT CHANGE UP (ref 4.8–10.8)

## 2025-03-05 PROCEDURE — 99232 SBSQ HOSP IP/OBS MODERATE 35: CPT

## 2025-03-05 RX ORDER — MAGNESIUM OXIDE 400 MG
400 TABLET ORAL ONCE
Refills: 0 | Status: COMPLETED | OUTPATIENT
Start: 2025-03-05 | End: 2025-03-05

## 2025-03-05 RX ORDER — SODIUM CHLORIDE 9 G/1000ML
1000 INJECTION, SOLUTION INTRAVENOUS ONCE
Refills: 0 | Status: DISCONTINUED | OUTPATIENT
Start: 2025-03-05 | End: 2025-03-05

## 2025-03-05 RX ORDER — SODIUM CHLORIDE 9 G/1000ML
1000 INJECTION, SOLUTION INTRAVENOUS
Refills: 0 | Status: COMPLETED | OUTPATIENT
Start: 2025-03-05 | End: 2025-03-06

## 2025-03-05 RX ORDER — SODIUM CHLORIDE 9 G/1000ML
1000 INJECTION, SOLUTION INTRAVENOUS ONCE
Refills: 0 | Status: COMPLETED | OUTPATIENT
Start: 2025-03-05 | End: 2025-03-05

## 2025-03-05 RX ORDER — HEPARIN SODIUM 1000 [USP'U]/ML
5000 INJECTION INTRAVENOUS; SUBCUTANEOUS EVERY 12 HOURS
Refills: 0 | Status: DISCONTINUED | OUTPATIENT
Start: 2025-03-05 | End: 2025-03-09

## 2025-03-05 RX ADMIN — Medication 40 MILLIGRAM(S): at 05:57

## 2025-03-05 RX ADMIN — CLONAZEPAM 1 MILLIGRAM(S): 0.5 TABLET ORAL at 06:04

## 2025-03-05 RX ADMIN — MONTELUKAST SODIUM 10 MILLIGRAM(S): 10 TABLET ORAL at 11:34

## 2025-03-05 RX ADMIN — Medication 500 MILLIGRAM(S): at 21:58

## 2025-03-05 RX ADMIN — Medication 500 MILLIGRAM(S): at 13:12

## 2025-03-05 RX ADMIN — CEFEPIME 100 MILLIGRAM(S): 2 INJECTION, POWDER, FOR SOLUTION INTRAVENOUS at 06:01

## 2025-03-05 RX ADMIN — Medication 1 DOSE(S): at 08:06

## 2025-03-05 RX ADMIN — HEPARIN SODIUM 5000 UNIT(S): 1000 INJECTION INTRAVENOUS; SUBCUTANEOUS at 13:11

## 2025-03-05 RX ADMIN — Medication 20 MILLIEQUIVALENT(S): at 13:55

## 2025-03-05 RX ADMIN — Medication 125 MICROGRAM(S): at 05:57

## 2025-03-05 RX ADMIN — HEPARIN SODIUM 5000 UNIT(S): 1000 INJECTION INTRAVENOUS; SUBCUTANEOUS at 05:59

## 2025-03-05 RX ADMIN — TICAGRELOR 90 MILLIGRAM(S): 90 TABLET ORAL at 05:58

## 2025-03-05 RX ADMIN — SODIUM CHLORIDE 2000 MILLILITER(S): 9 INJECTION, SOLUTION INTRAVENOUS at 11:32

## 2025-03-05 RX ADMIN — TIOTROPIUM BROMIDE INHALATION SPRAY 2 PUFF(S): 3.12 SPRAY, METERED RESPIRATORY (INHALATION) at 07:47

## 2025-03-05 RX ADMIN — ATORVASTATIN CALCIUM 80 MILLIGRAM(S): 80 TABLET, FILM COATED ORAL at 21:59

## 2025-03-05 RX ADMIN — Medication 81 MILLIGRAM(S): at 11:33

## 2025-03-05 RX ADMIN — Medication 1 APPLICATION(S): at 11:41

## 2025-03-05 RX ADMIN — Medication 500 MILLIGRAM(S): at 05:58

## 2025-03-05 RX ADMIN — CARVEDILOL 6.25 MILLIGRAM(S): 3.12 TABLET, FILM COATED ORAL at 05:57

## 2025-03-05 RX ADMIN — CEFEPIME 100 MILLIGRAM(S): 2 INJECTION, POWDER, FOR SOLUTION INTRAVENOUS at 17:43

## 2025-03-05 RX ADMIN — CLONAZEPAM 1 MILLIGRAM(S): 0.5 TABLET ORAL at 17:43

## 2025-03-05 RX ADMIN — NALOXEGOL OXALATE 12.5 MILLIGRAM(S): 12.5 TABLET, FILM COATED ORAL at 08:06

## 2025-03-05 RX ADMIN — BUPROPION HYDROBROMIDE 450 MILLIGRAM(S): 522 TABLET, EXTENDED RELEASE ORAL at 11:34

## 2025-03-05 RX ADMIN — SODIUM CHLORIDE 75 MILLILITER(S): 9 INJECTION, SOLUTION INTRAVENOUS at 13:53

## 2025-03-05 RX ADMIN — TIZANIDINE 2 MILLIGRAM(S): 4 TABLET ORAL at 13:11

## 2025-03-05 RX ADMIN — Medication 2 MILLIGRAM(S): at 06:43

## 2025-03-05 RX ADMIN — HEPARIN SODIUM 5000 UNIT(S): 1000 INJECTION INTRAVENOUS; SUBCUTANEOUS at 17:44

## 2025-03-05 RX ADMIN — TICAGRELOR 90 MILLIGRAM(S): 90 TABLET ORAL at 17:44

## 2025-03-05 RX ADMIN — Medication 400 MILLIGRAM(S): at 13:54

## 2025-03-05 RX ADMIN — TIZANIDINE 2 MILLIGRAM(S): 4 TABLET ORAL at 05:58

## 2025-03-05 RX ADMIN — Medication 2 MILLIGRAM(S): at 06:04

## 2025-03-05 RX ADMIN — Medication 5 MILLIGRAM(S): at 22:00

## 2025-03-05 RX ADMIN — TIZANIDINE 2 MILLIGRAM(S): 4 TABLET ORAL at 21:59

## 2025-03-05 NOTE — DISCHARGE NOTE PROVIDER - NSDCFUSCHEDAPPT_GEN_ALL_CORE_FT
Unknown, Doctor  [facility] PreAdmits  Scheduled Appointment: 03/05/2025    [facility]  MRI  [address]  Scheduled Appointment: 03/05/2025    [facility]  MRI  [address]  Scheduled Appointment: 03/05/2025    Bhanu Blank  [facility]  NEUROSURG [address]  Scheduled Appointment: 03/21/2025    Misbah Valladares  [facility]  PULMMED [address]  Scheduled Appointment: 03/27/2025     Bhanu Blank  [facility] Physician [facility]  NEUROSURG [address]  Scheduled Appointment: 03/21/2025    Misbah Valladares  [facility] Physician [facility]  PULMMED [address]  Scheduled Appointment: 03/27/2025

## 2025-03-05 NOTE — PROGRESS NOTE ADULT - ASSESSMENT
59-year-old female with history of recurrent diverticulitis, nasopharyngeal CA in remission, COPD not on home O2, prediabetes, hypertension, carotid artery stenosis status post endarterectomy/stenting here for assessment of recurrent abdominal pain. Patient was seen here recently, found to have persistent diverticular changes, discharged with oral antibiotics. Pt states she did not have coverage for the script sent so she did not take the abx. She is now returning with pain, nausea and constipation alternating with diarrhea. She is passing loose stools with flatus now. Vitals stable on admission, on RA on my encounter. CT Abdomen and Pelvis w/ Oral Cont and w/ IV Cont : Transverse and descending colonic wall thickening with surrounding fat stranding, increased since prior exam and likely reflecting colitis. Labs: WBC 14k, Hb 12.2, plt 503, K 5.2, BUN/Cr 34/2. Admitted for colitis.         #Recurrent Diverticulitis/Colitis   - CT Abd/pelvis 2/8 - acute sigmoid diverticulitis   - CT Abd/pelvis 2/12/ - ongoing acute sigmoid diverticulitis   - was recommended oral vantin/flagyl but reportedly not covered  - CT Abdomen and Pelvis w/ Oral Cont and w/ IV Cont (02.27.25 @ 01:34): IMPRESSION: Transverse and descending colonic wall thickening with surrounding fat   stranding, increased since prior exam and likely reflecting colitis.  - CT Abdomen and Pelvis w/ Oral Cont and w/ IV Cont (02.27.25 @ 18:40): Since prior examination;   Stable appearance of transverse and descending colon consistent with colitis. No drainable fluid collection or pneumoperitoneum.  Plan:   - noted CT Abd/pelvis 2/27 -- worsening colitis compared to 2/12, but potentially from missing antibiotics from last discharge  - continue cefepime 1g q 12 hours and PO flagyl 500 mg q 8 hours   -- Dc on PO Vantin 400 mg BID (increased for obesity) and PO flagyl 500 mg q 12 hours to continue until 3/12 for 2 week course     #COPD not on home O2   - not in exacerbation   - c/w home inhalers     #Orthostasis   - hold Coreg   - IVF challenge     #Pre-DM  - SSI if FS > 200.  - A1c 6.6-6.9    #LAKISHA on CKD 3  - resolved     #CAD   #HTN  #HLD   #R ICA stenting in 2023   - c/w Aspirin and Brilinta   - Hold Coreg for orthostasis   - Hold lisinopril   - follow up with Neuro OP     #Hypothyroidism   - c/w Synthroid 125mcg       - DVT PPx: heparin        Pending: orthostasis improvement

## 2025-03-05 NOTE — DISCHARGE NOTE PROVIDER - CARE PROVIDERS DIRECT ADDRESSES
,[email or web address]

## 2025-03-05 NOTE — DISCHARGE NOTE PROVIDER - NSDCFUADDINST_GEN_ALL_CORE_FT
Patient is to follow-up outpatient with their Primary Care Provider (post-discharge visit) and Gastroenterology (colitis follow-up).

## 2025-03-05 NOTE — DISCHARGE NOTE PROVIDER - ATTENDING DISCHARGE PHYSICAL EXAMINATION:
PHYSICAL EXAM:  GENERAL: NAD, obese   HEAD:  Atraumatic, Normocephalic  EYES: EOMI, PERRLA, conjunctiva and sclera clear  NECK: Supple, No JVD  CHEST/LUNG: Clear to auscultation bilaterally; No wheeze  HEART: Regular rate and rhythm; No murmurs, rubs, or gallops  ABDOMEN: Soft, Nontender, Nondistended; Bowel sounds present  EXTREMITIES:  2+ Peripheral Pulses, No clubbing, cyanosis, or edema  PSYCH: AAOx3  NEUROLOGY: non-focal  SKIN: No rashes or lesions

## 2025-03-05 NOTE — DISCHARGE NOTE PROVIDER - PROVIDER TOKENS
PROVIDER:[TOKEN:[[id number]],FOLLOWUP:[2 weeks]],PROVIDER:[TOKEN:[[id number]],FOLLOWUP:[1 week]] PROVIDER:[TOKEN:[[id number]],FOLLOWUP:[2 weeks]],PROVIDER:[TOKEN:[[id number]],FOLLOWUP:[1 week]],PROVIDER:[TOKEN:[[id number]],FOLLOWUP:[2 weeks],ESTABLISHEDPATIENT:[T]]

## 2025-03-05 NOTE — DISCHARGE NOTE PROVIDER - CARE PROVIDER_API CALL
Kody Noonan  Internal Medicine  [facility] [place] [address]  Phone: [phone]  Fax: [phone]  Follow Up Time: 2 weeks    Melisa Appiah  Gastroenterology  [address] [place] [address]  Phone: [phone]  Fax: [phone]  Follow Up Time: 1 week   Saran Kody  Internal Medicine  [facility] [place] [address]  Phone: [phone]  Fax: [phone]  Follow Up Time: 2 weeks    Melisa Appiah  Gastroenterology  [address] [place] [address]  Phone: [phone]  Fax: [phone]  Follow Up Time: 1 week    Bhanu Blank  Neurosurgery  [address] [place] [address] [address]  [place] [address]  Phone: [phone]  Fax: [phone]  Established Patient  Follow Up Time: 2 weeks

## 2025-03-05 NOTE — DISCHARGE NOTE PROVIDER - NSDCCPCAREPLAN_GEN_ALL_CORE_FT
PRINCIPAL DISCHARGE DIAGNOSIS  Diagnosis: Colitis  Assessment and Plan of Treatment: You were admitted for colitis. During this time your vitals were stabilized and you were started on medications to help manage your condition. Lab and imaging studies were also used to monitor your condition. Please follow-up with your outpatient primary care provider and medical specialty appointments. Please follow-up with gastroenterology outpatient for further evaluation of your condition. If you have any worsening symptoms please call 911 or return to the emergency department.       PRINCIPAL DISCHARGE DIAGNOSIS  Diagnosis: Colitis  Assessment and Plan of Treatment: You were admitted for colitis. During this time your vitals were stabilized and you were started on antibiotics to help treat this condition. Lab and imaging studies were also used to monitor this condition. Please follow-up with your outpatient primary care provider and medical specialty appointments. Please follow-up with gastroenterology outpatient for further evaluation of your condition. If you have any worsening symptoms please call 911 or return to the emergency department.        SECONDARY DISCHARGE DIAGNOSES  Diagnosis: Orthostatic hypotension  Assessment and Plan of Treatment: You were started on new medications to treat postural drop in the blood pressure.   Please TAKE midodrine 10mg 30 mins before planned activity. Do not take if your systolic blood pressure is above 150.  Do not take this medication after 6 pm.   Please TAKE fludrocortisone 0.2mg once a day. Drink lnety of water.

## 2025-03-05 NOTE — DISCHARGE NOTE PROVIDER - NSDCFUADDAPPT_GEN_ALL_CORE_FT
Do you need a primary care doctor or follow-up with a specialist? Our care coordinators will help you find providers near you and schedule any follow-up care visits.    Monday-Friday: 9am-5pm    Call our [facility] team: [phone]

## 2025-03-05 NOTE — DISCHARGE NOTE PROVIDER - NSDCMRMEDTOKEN_GEN_ALL_CORE_FT
aspirin 81 mg oral tablet: 1 tab(s) orally once a day  atorvastatin 80 mg oral tablet: 1 tab(s) orally once a day (at bedtime)  baclofen 10 mg oral tablet: 1 tab(s) orally once a day  Brilinta (ticagrelor) 90 mg oral tablet: 1 tab(s) orally 2 times a day  buPROPion 450 mg/24 hours (XL) oral tablet, extended release: 1 orally once a day  cefpodoxime 200 mg oral tablet: 1 tab(s) orally 2 times a day  clonazePAM 1 mg oral tablet: 1 tab(s) orally 2 times a day  Coreg 6.25 mg oral tablet: 1 tab(s) orally 2 times a day  docusate sodium 100 mg oral tablet: 1 tab(s) orally 2 times a day  hydrOXYzine hydrochloride 10 mg oral tablet: 1 tab(s) orally 3 times a day as needed for  anxiety  levothyroxine 125 mcg (0.125 mg) oral tablet: 1 tab(s) orally once a day  lisinopril 10 mg oral tablet: 1 tab(s) orally once a day  MONTELUKAST SODIUM 10 MG TABS: 1 dose(s) orally once a day  omeprazole 40 mg oral delayed release capsule: 1 tab(s) orally once a day  ondansetron 4 mg oral tablet: 1 tab(s) orally every 6 hours as needed for  nausea  ondansetron 4 mg oral tablet, disintegratin tab(s) orally 3 times a day  tiZANidine 2 mg oral tablet: 2 tab(s) orally 3 times a day  Trelegy Ellipta inhalation powder: 1 puff(s) inhaled once a day  Ventolin HFA 90 mcg/inh inhalation aerosol: 2 puff(s) inhaled prn  ZOLPIDEM TARTRATE 10 MG TABS: orally once a day (at bedtime)   aspirin 81 mg oral tablet: 1 tab(s) orally once a day  atorvastatin 80 mg oral tablet: 1 tab(s) orally once a day (at bedtime)  Brilinta (ticagrelor) 90 mg oral tablet: 1 tab(s) orally 2 times a day  buPROPion 450 mg/24 hours (XL) oral tablet, extended release: 1 orally once a day  cefpodoxime 200 mg oral tablet: 2 tab(s) orally every 12 hours  clonazePAM 1 mg oral tablet: 1 tab(s) orally 2 times a day  fludrocortisone 0.1 mg oral tablet: 2 tab(s) orally once a day  hydrOXYzine hydrochloride 10 mg oral tablet: 1 tab(s) orally 3 times a day as needed for  anxiety  levothyroxine 125 mcg (0.125 mg) oral tablet: 1 tab(s) orally once a day  metroNIDAZOLE 500 mg oral tablet: 1 tab(s) orally every 8 hours  midodrine 10 mg oral tablet: 1 tab(s) orally 3 times a day  MONTELUKAST SODIUM 10 MG TABS: 1 dose(s) orally once a day  omeprazole 40 mg oral delayed release capsule: 1 tab(s) orally once a day  ondansetron 4 mg oral tablet: 1 tab(s) orally every 6 hours as needed for  nausea  tiZANidine 2 mg oral tablet: 2 tab(s) orally 3 times a day  Trelegy Ellipta inhalation powder: 1 puff(s) inhaled once a day  Ventolin HFA 90 mcg/inh inhalation aerosol: 2 puff(s) inhaled prn  ZOLPIDEM TARTRATE 10 MG TABS: orally once a day (at bedtime)

## 2025-03-05 NOTE — PROGRESS NOTE ADULT - SUBJECTIVE AND OBJECTIVE BOX
Pt seen and examined at bedside. Reports dizziness while ambulating.           VITAL SIGNS (Last 24 hrs):  T(C): 36.8 (03-05-25 @ 13:41), Max: 36.9 (03-05-25 @ 05:12)  HR: 67 (03-05-25 @ 13:41) (66 - 75)  BP: 89/52 (03-05-25 @ 13:41) (89/52 - 156/78)  RR: 20 (03-05-25 @ 13:41) (18 - 20)  SpO2: 98% (03-05-25 @ 13:41) (97% - 98%)  Wt(kg): --  Daily     Daily     I&O's Summary      PHYSICAL EXAM:  GENERAL: NAD, obese   HEAD:  Atraumatic, Normocephalic  EYES:   conjunctiva and sclera clear  NECK: Supple, No JVD  CHEST/LUNG: Clear to auscultation bilaterally; No wheeze  HEART: Regular rate and rhythm; No murmurs, rubs, or gallops  ABDOMEN: Soft, Nontender, Nondistended; Bowel sounds present  EXTREMITIES:  2+ Peripheral Pulses, No clubbing, cyanosis, or edema  PSYCH: AAOx3  NEUROLOGY: non-focal  SKIN: No rashes or lesions    Labs Reviewed       CBC Full  -  ( 05 Mar 2025 07:14 )  WBC Count : 9.15 K/uL  Hemoglobin : 10.0 g/dL  Hematocrit : 32.4 %  Platelet Count - Automated : 397 K/uL  Mean Cell Volume : 78.8 fL  Mean Cell Hemoglobin : 24.3 pg  Mean Cell Hemoglobin Concentration : 30.9 g/dL  Auto Neutrophil # : x  Auto Lymphocyte # : x  Auto Monocyte # : x  Auto Eosinophil # : x  Auto Basophil # : x  Auto Neutrophil % : x  Auto Lymphocyte % : x  Auto Monocyte % : x  Auto Eosinophil % : x  Auto Basophil % : x    BMP:    03-05 @ 07:14    Blood Urea Nitrogen - 10  Calcium - 8.5  Carbond Dioxide - 23  Chloride - 105  Creatinine - 1.2  Glucose - 167  Potassium - 3.8  Sodium - 141      Hemoglobin A1c -     Urine Culture:            MEDICATIONS  (STANDING):  aspirin enteric coated 81 milliGRAM(s) Oral daily  atorvastatin 80 milliGRAM(s) Oral at bedtime  buPROPion XL (24-Hour) . 450 milliGRAM(s) Oral daily  cefepime   IVPB 1000 milliGRAM(s) IV Intermittent every 12 hours  chlorhexidine 2% Cloths 1 Application(s) Topical daily  clonazePAM  Tablet 1 milliGRAM(s) Oral two times a day  fluticasone propionate/ salmeterol 100-50 MICROgram(s) Diskus 1 Dose(s) Inhalation two times a day  heparin   Injectable 5000 Unit(s) SubCutaneous every 12 hours  influenza   Vaccine 0.5 milliLiter(s) IntraMuscular once  lactated ringers. 1000 milliLiter(s) (75 mL/Hr) IV Continuous <Continuous>  lactated ringers. 1000 milliLiter(s) (100 mL/Hr) IV Continuous <Continuous>  levothyroxine 125 MICROGram(s) Oral daily  metroNIDAZOLE    Tablet 500 milliGRAM(s) Oral every 8 hours  montelukast 10 milliGRAM(s) Oral daily  naloxegol 12.5 milliGRAM(s) Oral before breakfast  pantoprazole    Tablet 40 milliGRAM(s) Oral before breakfast  ticagrelor 90 milliGRAM(s) Oral every 12 hours  tiotropium 2.5 MICROgram(s) Inhaler 2 Puff(s) Inhalation daily  tiZANidine 2 milliGRAM(s) Oral three times a day    MEDICATIONS  (PRN):  acetaminophen     Tablet .. 650 milliGRAM(s) Oral every 6 hours PRN Temp greater or equal to 38C (100.4F), Mild Pain (1 - 3)  albuterol    90 MICROgram(s) HFA Inhaler 2 Puff(s) Inhalation every 6 hours PRN Shortness of Breath  aluminum hydroxide/magnesium hydroxide/simethicone Suspension 30 milliLiter(s) Oral every 4 hours PRN Dyspepsia  hydrOXYzine hydrochloride 10 milliGRAM(s) Oral three times a day PRN for anxiety  ondansetron Injectable 4 milliGRAM(s) IV Push every 8 hours PRN Nausea and/or Vomiting  zolpidem 5 milliGRAM(s) Oral at bedtime PRN Insomnia  zolpidem 5 milliGRAM(s) Oral at bedtime PRN Insomnia

## 2025-03-05 NOTE — DISCHARGE NOTE PROVIDER - HOSPITAL COURSE
ED Presentation    Patient is a 59-year-old female with history of recurrent diverticulitis, nasopharyngeal CA in remission, COPD not on home O2, prediabetes, hypertension, carotid artery stenosis status post endarterectomy/stenting here for assessment of recurrent abdominal pain. Patient was seen here recently, found to have persistent diverticular changes, discharged with oral antibiotics. Pt states she did not have coverage for the script sent so she did not take the abx. She is now returning with pain, nausea and constipation alternating with diarrhea. She is passing loose stools with flatus now.     · Temp at ED Arrival (C)	37.3 Degrees C  · Oxygen Therapy Flow (L/min)	6 L/min  · O2 Delivery/Oxygen Delivery Method	nasal cannula  · SpO2 (%)	97 %  · Temp site	oral  · Temp (C)	37.3 Degrees C  · Temp (F)	99.1 Degrees F  · Respiration Rate (breaths/min)	20 /min  · Heart Rate	77 /min  · BP Diastolic	70 mm Hg  · BP Systolic	160 mm Hg    CT Abdomen and Pelvis w/ Oral Cont and w/ IV Cont : Transverse and descending colonic wall thickening with surrounding fat stranding, increased since prior exam and likely reflecting colitis.    Labs: WBC 14k, Hb 12.2, plt 503, K 5.2, BUN/Cr 34/2    Admitted for colitis.  (27 Feb 2025 03:58)    Floor Course    Floor evaluation and management as per last inpatient assessment and plan. Patient is to follow-up outpatient with their Primary Care Provider (post-discharge visit) and Gastroenterology (colitis follow-up).    59-year-old female with history of recurrent diverticulitis, nasopharyngeal CA in remission, COPD not on home O2, prediabetes, hypertension, carotid artery stenosis status post endarterectomy/stenting here for assessment of recurrent abdominal pain. Patient was seen here recently, found to have persistent diverticular changes, discharged with oral antibiotics. Pt states she did not have coverage for the script sent so she did not take the abx. She is now returning with pain, nausea and constipation alternating with diarrhea. She is passing loose stools with flatus now. Vitals stable on admission, on RA on my encounter. CT Abdomen and Pelvis w/ Oral Cont and w/ IV Cont : Transverse and descending colonic wall thickening with surrounding fat stranding, increased since prior exam and likely reflecting colitis. Labs: WBC 14k, Hb 12.2, plt 503, K 5.2, BUN/Cr 34/2. Admitted for colitis.     #Acute on chronic colitis  #Hx diverticular disease with multiple episodes of diverticulitis in the past   #Hx of Nasopharyngeal CA in remission   - Monitor for bloody BMs  - Lactate wnl and wbc trending down  - Keep active T&S and trend H&H   - Transfuse PRN to keep Hgb > 8   - creatinine trending down, ATN, monitor electrolytes for post ATN diuresis  - pain control with morphine    - GI consulted - last admission they recommended surgical evaluation in setting of persistent/recurrent acute sigmoid diverticulitis and hx of microperforation   - repeat CTAP was negative for any perforation or acute abdominal process  - surgery consulted -> no surgical interventions as per surgery, c/w abx and IVF  - IVF for 24hrs  - c/w cefepime and flagyl for now  - regular diet resumed, patient reports decreased PO intake  - 3/3: Lactate downtrending; ESR, CRP noted; waiting on fecal calprotectin  - pain control PRN  - ID consult for Abx recs    #COPD not on home O2   - not in exacerbation   - c/w home inhalers     #Pre-DM  - not on any meds  - monitor FS. start SSI if FS > 200.  - A1c 6.6-6.9    #LAKISHA on CKD   - s/p LR @100ml/hr for 24hrs  - consider starting lisinopril once Cr returns to baseline    #CAD   #HTN  #HLD   #R ICA stenting in 2023   - c/w Aspirin and Brilinta   - c/w Coreg  - Hold lisinopril   - follow up with Neuro OP     #Hypothyroidism   - c/w Synthroid 125mcg ED Presentation    Patient is a 59-year-old female with history of recurrent diverticulitis, nasopharyngeal CA in remission, COPD not on home O2, prediabetes, hypertension, carotid artery stenosis status post endarterectomy/stenting here for assessment of recurrent abdominal pain. Patient was seen here recently, found to have persistent diverticular changes, discharged with oral antibiotics. Pt states she did not have coverage for the script sent so she did not take the abx. She is now returning with pain, nausea and constipation alternating with diarrhea. She is passing loose stools with flatus now.     · Temp at ED Arrival (C)	37.3 Degrees C  · Oxygen Therapy Flow (L/min)	6 L/min  · O2 Delivery/Oxygen Delivery Method	nasal cannula  · SpO2 (%)	97 %  · Temp site	oral  · Temp (C)	37.3 Degrees C  · Temp (F)	99.1 Degrees F  · Respiration Rate (breaths/min)	20 /min  · Heart Rate	77 /min  · BP Diastolic	70 mm Hg  · BP Systolic	160 mm Hg    CT Abdomen and Pelvis w/ Oral Cont and w/ IV Cont : Transverse and descending colonic wall thickening with surrounding fat stranding, increased since prior exam and likely reflecting colitis.    Labs: WBC 14k, Hb 12.2, plt 503, K 5.2, BUN/Cr 34/2    Admitted for colitis.  (27 Feb 2025 03:58)    Floor Course    Floor evaluation and management as per last inpatient assessment and plan. Patient is to follow-up outpatient with their Primary Care Provider (post-discharge visit) and Gastroenterology (colitis follow-up).    59-year-old female with history of recurrent diverticulitis, nasopharyngeal CA in remission, COPD not on home O2, prediabetes, hypertension, carotid artery stenosis status post endarterectomy/stenting here for assessment of recurrent abdominal pain. Patient was seen here recently, found to have persistent diverticular changes, discharged with oral antibiotics. Pt states she did not have coverage for the script sent so she did not take the abx. She is now returning with pain, nausea and constipation alternating with diarrhea. She is passing loose stools with flatus now. Vitals stable on admission, on RA on my encounter. CT Abdomen and Pelvis w/ Oral Cont and w/ IV Cont : Transverse and descending colonic wall thickening with surrounding fat stranding, increased since prior exam and likely reflecting colitis. Labs: WBC 14k, Hb 12.2, plt 503, K 5.2, BUN/Cr 34/2. Admitted for colitis.         #Recurrent Diverticulitis/Colitis   - CT Abd/pelvis 2/8 - acute sigmoid diverticulitis   - CT Abd/pelvis 2/12/ - ongoing acute sigmoid diverticulitis   - was recommended oral vantin/flagyl but reportedly not covered  - CT Abdomen and Pelvis w/ Oral Cont and w/ IV Cont (02.27.25 @ 01:34): IMPRESSION: Transverse and descending colonic wall thickening with surrounding fat   stranding, increased since prior exam and likely reflecting colitis.  - CT Abdomen and Pelvis w/ Oral Cont and w/ IV Cont (02.27.25 @ 18:40): Since prior examination;   Stable appearance of transverse and descending colon consistent with colitis. No drainable fluid collection or pneumoperitoneum.  Plan:   - noted CT Abd/pelvis 2/27 -- worsening colitis compared to 2/12, but potentially from missing antibiotics from last discharge  - c/w Vantin till 3/12   - c/w PO flagyl 500 mg q 8 hours     #COPD not on home O2   - not in exacerbation   - c/w home inhalers     #Orthostasis   - hold Coreg   - midodrine 10mg TID   - compression stockings, abd binder   - florinef     #Pre-DM  - SSI if FS > 200.  - A1c 6.6-6.9    #LAKISHA on CKD 3  - resolved     #CAD   #HTN  #HLD   #R ICA stenting in 2023   - c/w Aspirin and Brilinta   - Hold Coreg for orthostasis   - Hold lisinopril   - CTA H/N done as per neuro endo vascular     #Hypothyroidism   - c/w Synthroid 125mcg    DVT PPx: Heparin

## 2025-03-06 LAB
ALBUMIN SERPL ELPH-MCNC: 3.3 G/DL — LOW (ref 3.5–5.2)
ALP SERPL-CCNC: 81 U/L — SIGNIFICANT CHANGE UP (ref 30–115)
ALT FLD-CCNC: 15 U/L — SIGNIFICANT CHANGE UP (ref 0–41)
ANION GAP SERPL CALC-SCNC: 12 MMOL/L — SIGNIFICANT CHANGE UP (ref 7–14)
AST SERPL-CCNC: 23 U/L — SIGNIFICANT CHANGE UP (ref 0–41)
BASOPHILS # BLD AUTO: 0.05 K/UL — SIGNIFICANT CHANGE UP (ref 0–0.2)
BASOPHILS NFR BLD AUTO: 0.5 % — SIGNIFICANT CHANGE UP (ref 0–1)
BILIRUB SERPL-MCNC: 0.2 MG/DL — SIGNIFICANT CHANGE UP (ref 0.2–1.2)
BUN SERPL-MCNC: 11 MG/DL — SIGNIFICANT CHANGE UP (ref 10–20)
CALCIUM SERPL-MCNC: 8.5 MG/DL — SIGNIFICANT CHANGE UP (ref 8.4–10.5)
CHLORIDE SERPL-SCNC: 104 MMOL/L — SIGNIFICANT CHANGE UP (ref 98–110)
CO2 SERPL-SCNC: 25 MMOL/L — SIGNIFICANT CHANGE UP (ref 17–32)
CREAT SERPL-MCNC: 1.2 MG/DL — SIGNIFICANT CHANGE UP (ref 0.7–1.5)
EGFR: 52 ML/MIN/1.73M2 — LOW
EGFR: 52 ML/MIN/1.73M2 — LOW
EOSINOPHIL # BLD AUTO: 0.25 K/UL — SIGNIFICANT CHANGE UP (ref 0–0.7)
EOSINOPHIL NFR BLD AUTO: 2.3 % — SIGNIFICANT CHANGE UP (ref 0–8)
GLUCOSE SERPL-MCNC: 177 MG/DL — HIGH (ref 70–99)
HCT VFR BLD CALC: 31 % — LOW (ref 37–47)
HGB BLD-MCNC: 9.6 G/DL — LOW (ref 12–16)
IMM GRANULOCYTES NFR BLD AUTO: 2.2 % — HIGH (ref 0.1–0.3)
LYMPHOCYTES # BLD AUTO: 1.61 K/UL — SIGNIFICANT CHANGE UP (ref 1.2–3.4)
LYMPHOCYTES # BLD AUTO: 15.1 % — LOW (ref 20.5–51.1)
MAGNESIUM SERPL-MCNC: 1.8 MG/DL — SIGNIFICANT CHANGE UP (ref 1.8–2.4)
MCHC RBC-ENTMCNC: 24.4 PG — LOW (ref 27–31)
MCHC RBC-ENTMCNC: 31 G/DL — LOW (ref 32–37)
MCV RBC AUTO: 78.7 FL — LOW (ref 81–99)
MONOCYTES # BLD AUTO: 1.05 K/UL — HIGH (ref 0.1–0.6)
MONOCYTES NFR BLD AUTO: 9.9 % — HIGH (ref 1.7–9.3)
NEUTROPHILS # BLD AUTO: 7.45 K/UL — HIGH (ref 1.4–6.5)
NEUTROPHILS NFR BLD AUTO: 70 % — SIGNIFICANT CHANGE UP (ref 42.2–75.2)
NRBC BLD AUTO-RTO: 0 /100 WBCS — SIGNIFICANT CHANGE UP (ref 0–0)
PLATELET # BLD AUTO: 392 K/UL — SIGNIFICANT CHANGE UP (ref 130–400)
PMV BLD: 10.6 FL — HIGH (ref 7.4–10.4)
POTASSIUM SERPL-MCNC: 4.4 MMOL/L — SIGNIFICANT CHANGE UP (ref 3.5–5)
POTASSIUM SERPL-SCNC: 4.4 MMOL/L — SIGNIFICANT CHANGE UP (ref 3.5–5)
PROT SERPL-MCNC: 5.7 G/DL — LOW (ref 6–8)
RBC # BLD: 3.94 M/UL — LOW (ref 4.2–5.4)
RBC # FLD: 18.2 % — HIGH (ref 11.5–14.5)
SODIUM SERPL-SCNC: 141 MMOL/L — SIGNIFICANT CHANGE UP (ref 135–146)
WBC # BLD: 10.64 K/UL — SIGNIFICANT CHANGE UP (ref 4.8–10.8)
WBC # FLD AUTO: 10.64 K/UL — SIGNIFICANT CHANGE UP (ref 4.8–10.8)

## 2025-03-06 PROCEDURE — 99232 SBSQ HOSP IP/OBS MODERATE 35: CPT

## 2025-03-06 RX ORDER — MIDODRINE HYDROCHLORIDE 5 MG/1
10 TABLET ORAL
Refills: 0 | Status: DISCONTINUED | OUTPATIENT
Start: 2025-03-06 | End: 2025-03-08

## 2025-03-06 RX ORDER — LORAZEPAM 4 MG/ML
1 VIAL (ML) INJECTION ONCE
Refills: 0 | Status: DISCONTINUED | OUTPATIENT
Start: 2025-03-06 | End: 2025-03-09

## 2025-03-06 RX ORDER — SODIUM CHLORIDE 9 G/1000ML
1000 INJECTION, SOLUTION INTRAVENOUS
Refills: 0 | Status: DISCONTINUED | OUTPATIENT
Start: 2025-03-06 | End: 2025-03-09

## 2025-03-06 RX ADMIN — SODIUM CHLORIDE 75 MILLILITER(S): 9 INJECTION, SOLUTION INTRAVENOUS at 05:30

## 2025-03-06 RX ADMIN — Medication 40 MILLIGRAM(S): at 05:31

## 2025-03-06 RX ADMIN — Medication 500 MILLIGRAM(S): at 21:47

## 2025-03-06 RX ADMIN — MIDODRINE HYDROCHLORIDE 10 MILLIGRAM(S): 5 TABLET ORAL at 14:44

## 2025-03-06 RX ADMIN — SODIUM CHLORIDE 75 MILLILITER(S): 9 INJECTION, SOLUTION INTRAVENOUS at 23:50

## 2025-03-06 RX ADMIN — TIZANIDINE 2 MILLIGRAM(S): 4 TABLET ORAL at 21:49

## 2025-03-06 RX ADMIN — Medication 81 MILLIGRAM(S): at 11:23

## 2025-03-06 RX ADMIN — ATORVASTATIN CALCIUM 80 MILLIGRAM(S): 80 TABLET, FILM COATED ORAL at 21:47

## 2025-03-06 RX ADMIN — NALOXEGOL OXALATE 12.5 MILLIGRAM(S): 12.5 TABLET, FILM COATED ORAL at 05:31

## 2025-03-06 RX ADMIN — SODIUM CHLORIDE 75 MILLILITER(S): 9 INJECTION, SOLUTION INTRAVENOUS at 14:45

## 2025-03-06 RX ADMIN — SODIUM CHLORIDE 75 MILLILITER(S): 9 INJECTION, SOLUTION INTRAVENOUS at 15:16

## 2025-03-06 RX ADMIN — Medication 1 DOSE(S): at 07:47

## 2025-03-06 RX ADMIN — TIZANIDINE 2 MILLIGRAM(S): 4 TABLET ORAL at 05:30

## 2025-03-06 RX ADMIN — Medication 125 MICROGRAM(S): at 05:31

## 2025-03-06 RX ADMIN — CLONAZEPAM 1 MILLIGRAM(S): 0.5 TABLET ORAL at 05:31

## 2025-03-06 RX ADMIN — TICAGRELOR 90 MILLIGRAM(S): 90 TABLET ORAL at 17:13

## 2025-03-06 RX ADMIN — MIDODRINE HYDROCHLORIDE 10 MILLIGRAM(S): 5 TABLET ORAL at 17:13

## 2025-03-06 RX ADMIN — CEFEPIME 100 MILLIGRAM(S): 2 INJECTION, POWDER, FOR SOLUTION INTRAVENOUS at 05:29

## 2025-03-06 RX ADMIN — TIOTROPIUM BROMIDE INHALATION SPRAY 2 PUFF(S): 3.12 SPRAY, METERED RESPIRATORY (INHALATION) at 08:24

## 2025-03-06 RX ADMIN — CEFEPIME 100 MILLIGRAM(S): 2 INJECTION, POWDER, FOR SOLUTION INTRAVENOUS at 17:12

## 2025-03-06 RX ADMIN — MONTELUKAST SODIUM 10 MILLIGRAM(S): 10 TABLET ORAL at 11:22

## 2025-03-06 RX ADMIN — Medication 1 APPLICATION(S): at 13:13

## 2025-03-06 RX ADMIN — HEPARIN SODIUM 5000 UNIT(S): 1000 INJECTION INTRAVENOUS; SUBCUTANEOUS at 17:13

## 2025-03-06 RX ADMIN — BUPROPION HYDROBROMIDE 450 MILLIGRAM(S): 522 TABLET, EXTENDED RELEASE ORAL at 11:22

## 2025-03-06 RX ADMIN — Medication 5 MILLIGRAM(S): at 23:50

## 2025-03-06 RX ADMIN — HEPARIN SODIUM 5000 UNIT(S): 1000 INJECTION INTRAVENOUS; SUBCUTANEOUS at 05:32

## 2025-03-06 RX ADMIN — TIZANIDINE 2 MILLIGRAM(S): 4 TABLET ORAL at 13:06

## 2025-03-06 RX ADMIN — Medication 500 MILLIGRAM(S): at 05:30

## 2025-03-06 RX ADMIN — Medication 500 MILLIGRAM(S): at 13:08

## 2025-03-06 RX ADMIN — TICAGRELOR 90 MILLIGRAM(S): 90 TABLET ORAL at 05:31

## 2025-03-06 NOTE — PROGRESS NOTE ADULT - ASSESSMENT
59-year-old female with history of recurrent diverticulitis, nasopharyngeal CA in remission, COPD not on home O2, prediabetes, hypertension, carotid artery stenosis status post endarterectomy/stenting here for assessment of recurrent abdominal pain. Patient was seen here recently, found to have persistent diverticular changes, discharged with oral antibiotics. Pt states she did not have coverage for the script sent so she did not take the abx. She is now returning with pain, nausea and constipation alternating with diarrhea. She is passing loose stools with flatus now. Vitals stable on admission, on RA on my encounter. CT Abdomen and Pelvis w/ Oral Cont and w/ IV Cont : Transverse and descending colonic wall thickening with surrounding fat stranding, increased since prior exam and likely reflecting colitis. Labs: WBC 14k, Hb 12.2, plt 503, K 5.2, BUN/Cr 34/2. Admitted for colitis.     #Orthostasis  - Orthostatics positive  - Trend repeats  - Stockings / Abdominal Binder if appropriate  - Neurovascular recs appreciated  - If remain positive, will consider MRI + MRA inpatient    #Acute on chronic colitis  #Hx diverticular disease with multiple episodes of diverticulitis in the past   #Hx of Nasopharyngeal CA in remission   - Monitor for bloody BMs  - Lactate wnl and wbc trending down  - Keep active T&S and trend H&H   - Transfuse PRN to keep Hgb > 8   - creatinine trending down, ATN, monitor electrolytes for post ATN diuresis  - pain control with morphine    - GI consulted - last admission they recommended surgical evaluation in setting of persistent/recurrent acute sigmoid diverticulitis and hx of microperforation   - repeat CTAP was negative for any perforation or acute abdominal process  - surgery consulted -> no surgical interventions as per surgery, c/w abx and IVF  - IVF for 24hrs  - c/w cefepime and flagyl for now  - regular diet resumed, patient reports decreased PO intake  - 3/3: Lactate downtrending; ESR, CRP noted; waiting on fecal calprotectin  - pain control PRN  - ID following  - Transition to oral when close to d/c    #COPD not on home O2   - not in exacerbation   - c/w home inhalers     #Pre-DM  - not on any meds  - monitor FS. start SSI if FS > 200.  - A1c 6.6-6.9    #LAKISHA on CKD   - s/p LR @100ml/hr for 24hrs  - consider starting lisinopril once Cr returns to baseline    #CAD   #HTN  #HLD   #R ICA stenting in 2023   - c/w Aspirin and Brilinta   - c/w Coreg  - Hold lisinopril   - follow up with Neuro OP     #Hypothyroidism   - c/w Synthroid 125mcg    #MISC  - DVT PPx: heparin  - GI PPx: PPI   - Diet: Regular  - Activity: IAT     Pendings: ID Abx; orthostatics

## 2025-03-06 NOTE — PROGRESS NOTE ADULT - SUBJECTIVE AND OBJECTIVE BOX
ED Presentation    HPI:  Patient is a 59-year-old female with history of recurrent diverticulitis, nasopharyngeal CA in remission, COPD not on home O2, prediabetes, hypertension, carotid artery stenosis status post endarterectomy/stenting here for assessment of recurrent abdominal pain. Patient was seen here recently, found to have persistent diverticular changes, discharged with oral antibiotics. Pt states she did not have coverage for the script sent so she did not take the abx. She is now returning with pain, nausea and constipation alternating with diarrhea. She is passing loose stools with flatus now.     · Temp at ED Arrival (C)	37.3 Degrees C  · Oxygen Therapy Flow (L/min)	6 L/min  · O2 Delivery/Oxygen Delivery Method	nasal cannula  · SpO2 (%)	97 %  · Temp site	oral  · Temp (C)	37.3 Degrees C  · Temp (F)	99.1 Degrees F  · Respiration Rate (breaths/min)	20 /min  · Heart Rate	77 /min  · BP Diastolic	70 mm Hg  · BP Systolic	160 mm Hg    CT Abdomen and Pelvis w/ Oral Cont and w/ IV Cont : Transverse and descending colonic wall thickening with surrounding fat stranding, increased since prior exam and likely reflecting colitis.    Labs: WBC 14k, Hb 12.2, plt 503, K 5.2, BUN/Cr 34/2    Admitted for colitis.  (27 Feb 2025 03:58)       Today is hospital day 7d.     PAST MEDICAL & SURGICAL HISTORY  COPD (chronic obstructive pulmonary disease)    HTN (hypertension)    Diverticulitis    Pulmonary nodules/lesions, multiple    Nasopharyngeal cancer    Hypothyroid    Cervical disc disease  sequelae of radtion for nasopharyngeal cancer    Left ear hearing loss    Hemorrhoids    H/O carotid stenosis    Anxiety    Morbid obesity with BMI of 45.0-49.9, adult    H/O dental abscess    HLD (hyperlipidemia)    Carotid artery disease    Obstructive sleep apnea on CPAP    OA (osteoarthritis)    Bilateral knee pain    Depression    History of cholecystectomy    History of common carotid artery stent placement        SOCIAL HISTORY:  Social History:      Otherwise, as noted in HPI    ALLERGIES:  ciprofloxacin (Urticaria; Other)      MEDICATIONS:  STANDING MEDICATIONS  aspirin enteric coated 81 milliGRAM(s) Oral daily  atorvastatin 80 milliGRAM(s) Oral at bedtime  buPROPion XL (24-Hour) . 450 milliGRAM(s) Oral daily  cefepime   IVPB 1000 milliGRAM(s) IV Intermittent every 12 hours  chlorhexidine 2% Cloths 1 Application(s) Topical daily  clonazePAM  Tablet 1 milliGRAM(s) Oral two times a day  fluticasone propionate/ salmeterol 100-50 MICROgram(s) Diskus 1 Dose(s) Inhalation two times a day  heparin   Injectable 5000 Unit(s) SubCutaneous every 12 hours  influenza   Vaccine 0.5 milliLiter(s) IntraMuscular once  lactated ringers. 1000 milliLiter(s) IV Continuous <Continuous>  lactated ringers. 1000 milliLiter(s) IV Continuous <Continuous>  levothyroxine 125 MICROGram(s) Oral daily  metroNIDAZOLE    Tablet 500 milliGRAM(s) Oral every 8 hours  montelukast 10 milliGRAM(s) Oral daily  naloxegol 12.5 milliGRAM(s) Oral before breakfast  pantoprazole    Tablet 40 milliGRAM(s) Oral before breakfast  ticagrelor 90 milliGRAM(s) Oral every 12 hours  tiotropium 2.5 MICROgram(s) Inhaler 2 Puff(s) Inhalation daily  tiZANidine 2 milliGRAM(s) Oral three times a day    PRN MEDICATIONS  acetaminophen     Tablet .. 650 milliGRAM(s) Oral every 6 hours PRN  albuterol    90 MICROgram(s) HFA Inhaler 2 Puff(s) Inhalation every 6 hours PRN  aluminum hydroxide/magnesium hydroxide/simethicone Suspension 30 milliLiter(s) Oral every 4 hours PRN  hydrOXYzine hydrochloride 10 milliGRAM(s) Oral three times a day PRN  ondansetron Injectable 4 milliGRAM(s) IV Push every 8 hours PRN  zolpidem 5 milliGRAM(s) Oral at bedtime PRN  zolpidem 5 milliGRAM(s) Oral at bedtime PRN      VITALS:   T(C): 36.8 (03-06-25 @ 04:43), Max: 37.2 (03-05-25 @ 14:25)  HR: 67 (03-06-25 @ 04:43) (67 - 68)  BP: 169/69 (03-06-25 @ 04:43) (89/52 - 169/69)  RR: 18 (03-06-25 @ 04:43) (18 - 20)  SpO2: 96% (03-06-25 @ 04:43) (95% - 98%)  I&O's Summary        PHYSICAL EXAM:  GENERAL: Nondistressed  HEAD: Atraumatic  EYES: Conjunctiva and sclera clear  LUNG: Decreased BS  HEART: S1 S2 heard  ABDOMEN: Soft, mild tenderness LLQ, BS present, no rebound  EXTREMITIES: No edema      LABS:                        9.6    10.64 )-----------( 392      ( 06 Mar 2025 07:11 )             31.0     03-06    141  |  104  |  11  ----------------------------<  177[H]  4.4   |  25  |  1.2    Ca    8.5      06 Mar 2025 07:11  Mg     1.8     03-06    TPro  5.7[L]  /  Alb  3.3[L]  /  TBili  0.2  /  DBili  x   /  AST  23  /  ALT  15  /  AlkPhos  81  03-06      Urinalysis Basic - ( 06 Mar 2025 07:11 )    Color: x / Appearance: x / SG: x / pH: x  Gluc: 177 mg/dL / Ketone: x  / Bili: x / Urobili: x   Blood: x / Protein: x / Nitrite: x   Leuk Esterase: x / RBC: x / WBC x   Sq Epi: x / Non Sq Epi: x / Bacteria: x

## 2025-03-06 NOTE — PROGRESS NOTE ADULT - ATTENDING COMMENTS
59-year-old female with history of recurrent diverticulitis, nasopharyngeal CA in remission, COPD not on home O2, prediabetes, hypertension, carotid artery stenosis status post endarterectomy/stenting here for assessment of recurrent abdominal pain. Patient was seen here recently, found to have persistent diverticular changes, discharged with oral antibiotics. Pt states she did not have coverage for the script sent so she did not take the abx. She is now returning with pain, nausea and constipation alternating with diarrhea. She is passing loose stools with flatus now. Vitals stable on admission, on RA on my encounter. CT Abdomen and Pelvis w/ Oral Cont and w/ IV Cont : Transverse and descending colonic wall thickening with surrounding fat stranding, increased since prior exam and likely reflecting colitis. Labs: WBC 14k, Hb 12.2, plt 503, K 5.2, BUN/Cr 34/2. Admitted for colitis.         #Recurrent Diverticulitis/Colitis   - CT Abd/pelvis 2/8 - acute sigmoid diverticulitis   - CT Abd/pelvis 2/12/ - ongoing acute sigmoid diverticulitis   - was recommended oral vantin/flagyl but reportedly not covered  - CT Abdomen and Pelvis w/ Oral Cont and w/ IV Cont (02.27.25 @ 01:34): IMPRESSION: Transverse and descending colonic wall thickening with surrounding fat   stranding, increased since prior exam and likely reflecting colitis.  - CT Abdomen and Pelvis w/ Oral Cont and w/ IV Cont (02.27.25 @ 18:40): Since prior examination;   Stable appearance of transverse and descending colon consistent with colitis. No drainable fluid collection or pneumoperitoneum.  Plan:   - noted CT Abd/pelvis 2/27 -- worsening colitis compared to 2/12, but potentially from missing antibiotics from last discharge  - continue cefepime 1g q 12 hours and PO flagyl 500 mg q 8 hours   -- Dc on PO Vantin 400 mg BID (increased for obesity) and PO flagyl 500 mg q 12 hours to continue until 3/12 for 2 week course     #COPD not on home O2   - not in exacerbation   - c/w home inhalers     #Orthostasis   - hold Coreg   - midodrine 10mg TID     #Pre-DM  - SSI if FS > 200.  - A1c 6.6-6.9    #LAKISHA on CKD 3  - resolved     #CAD   #HTN  #HLD   #R ICA stenting in 2023   - c/w Aspirin and Brilinta   - Hold Coreg for orthostasis   - Hold lisinopril   - follow up with Neuro OP     #Hypothyroidism   - c/w Synthroid 125mcg    DVT PPx: Heparin      Pending: orthostasis improvement, MRI brain/MRA H/N 59-year-old female with history of recurrent diverticulitis, nasopharyngeal CA in remission, COPD not on home O2, prediabetes, hypertension, carotid artery stenosis status post endarterectomy/stenting here for assessment of recurrent abdominal pain. Patient was seen here recently, found to have persistent diverticular changes, discharged with oral antibiotics. Pt states she did not have coverage for the script sent so she did not take the abx. She is now returning with pain, nausea and constipation alternating with diarrhea. She is passing loose stools with flatus now. Vitals stable on admission, on RA on my encounter. CT Abdomen and Pelvis w/ Oral Cont and w/ IV Cont : Transverse and descending colonic wall thickening with surrounding fat stranding, increased since prior exam and likely reflecting colitis. Labs: WBC 14k, Hb 12.2, plt 503, K 5.2, BUN/Cr 34/2. Admitted for colitis.         #Recurrent Diverticulitis/Colitis   - CT Abd/pelvis 2/8 - acute sigmoid diverticulitis   - CT Abd/pelvis 2/12/ - ongoing acute sigmoid diverticulitis   - was recommended oral vantin/flagyl but reportedly not covered  - CT Abdomen and Pelvis w/ Oral Cont and w/ IV Cont (02.27.25 @ 01:34): IMPRESSION: Transverse and descending colonic wall thickening with surrounding fat   stranding, increased since prior exam and likely reflecting colitis.  - CT Abdomen and Pelvis w/ Oral Cont and w/ IV Cont (02.27.25 @ 18:40): Since prior examination;   Stable appearance of transverse and descending colon consistent with colitis. No drainable fluid collection or pneumoperitoneum.  Plan:   - noted CT Abd/pelvis 2/27 -- worsening colitis compared to 2/12, but potentially from missing antibiotics from last discharge  - continue cefepime 1g q 12 hours and PO flagyl 500 mg q 8 hours   -- Dc on PO Vantin 400 mg BID (increased for obesity) and PO flagyl 500 mg q 12 hours to continue until 3/12 for 2 week course     #COPD not on home O2   - not in exacerbation   - c/w home inhalers     #Orthostasis   - hold Coreg   - midodrine 10mg TID     #Pre-DM  - SSI if FS > 200.  - A1c 6.6-6.9    #LAKISHA on CKD 3  - resolved     #CAD   #HTN  #HLD   #R ICA stenting in 2023   - c/w Aspirin and Brilinta   - Hold Coreg for orthostasis   - Hold lisinopril   - MRI brain, MRA H/N as per neuro endo vascular     #Hypothyroidism   - c/w Synthroid 125mcg    DVT PPx: Heparin      Pending: orthostasis improvement, MRI brain/MRA H/N  Plan of care d/w pt   Dispo: Home

## 2025-03-06 NOTE — CHART NOTE - NSCHARTNOTEFT_GEN_A_CORE
The patient is known to Dr. Blank (s/p stenting of high-grade, symptomatic, right-sided internal carotid artery stenosis 4/4/2023) with an occluded right ICA stent and an occluded distal ICA noted on CTA head neck on recent follow-up evaluation 2/4/2025. The patient presented with nausea/ diarrhea and also reports to have been noticing increased shortness of breath and right eye blurry vision this past week. On exam, the patient has no focal deficits w/ generalized weakness noted. The patient denies having a headache and numbness/tingling. She reports to have been compliant with her home DAPT.   - MRI brain is recommended today, if possible prior to discharge (otherwise it may be obtained outpatient within 1 week of discharge)  - Patient should follow up with Dr Blank in the outpatient office within 1-2 weeks and return to the ED if new symptoms arise   [id number] [facility] with questions or if there is any acute change to the patient's neurological status/ exam on this admission The patient is known to Dr. Blank (s/p stenting of high-grade, symptomatic, right-sided internal carotid artery stenosis 4/4/2023) with an occluded right ICA stent and an occluded distal ICA noted on CTA head neck on recent follow-up evaluation 2/4/2025. The patient presented with nausea/ diarrhea and also reports to have been noticing increased shortness of breath and right eye blurry vision this past week. On exam, the patient has no focal deficits w/ generalized weakness noted. The patient denies having a headache and numbness/tingling. She reports to have been compliant with her home DAPT.   - MRI brain noncontrast and MRA brain/ neck with contrast is recommended today, if possible prior to discharge (otherwise it may be obtained outpatient within 1 week of discharge)  - Patient should follow up with Dr Blank in the outpatient office within 1-2 weeks and return to the ED if new symptoms arise   [id number] [facility] with questions or if there is any acute change to the patient's neurological status/ exam on this admission The patient is known to Dr. Blank (s/p stenting of high-grade, symptomatic, right-sided internal carotid artery stenosis 4/4/2023) with an occluded right ICA stent and an occluded distal ICA noted on CTA head neck on recent follow-up evaluation 2/4/2025. The patient presented with nausea/ diarrhea and also reports to have been noticing increased shortness of breath and right eye blurry vision this past week. On exam, the patient has no focal deficits w/ generalized weakness noted. The patient denies having a headache and numbness/tingling. She reports to have been compliant with her home DAPT.   - MRI brain noncontrast and MRA brain NOVA sequence/ neck with contrast is recommended (either prior to discharge or on an outpatient basis after discharge)  - Patient should follow up with Dr Blank in the outpatient office within 1-2 weeks and return to the ED if new symptoms arise   [id number] [facility] with questions or if there is any acute change to the patient's neurological status/ exam on this admission

## 2025-03-07 LAB
ALBUMIN SERPL ELPH-MCNC: 3.4 G/DL — LOW (ref 3.5–5.2)
ALP SERPL-CCNC: 82 U/L — SIGNIFICANT CHANGE UP (ref 30–115)
ALT FLD-CCNC: 13 U/L — SIGNIFICANT CHANGE UP (ref 0–41)
ANION GAP SERPL CALC-SCNC: 13 MMOL/L — SIGNIFICANT CHANGE UP (ref 7–14)
AST SERPL-CCNC: 15 U/L — SIGNIFICANT CHANGE UP (ref 0–41)
BASOPHILS # BLD AUTO: 0.08 K/UL — SIGNIFICANT CHANGE UP (ref 0–0.2)
BASOPHILS NFR BLD AUTO: 0.9 % — SIGNIFICANT CHANGE UP (ref 0–1)
BILIRUB SERPL-MCNC: 0.3 MG/DL — SIGNIFICANT CHANGE UP (ref 0.2–1.2)
BUN SERPL-MCNC: 9 MG/DL — LOW (ref 10–20)
CALCIUM SERPL-MCNC: 8.7 MG/DL — SIGNIFICANT CHANGE UP (ref 8.4–10.5)
CHLORIDE SERPL-SCNC: 104 MMOL/L — SIGNIFICANT CHANGE UP (ref 98–110)
CO2 SERPL-SCNC: 24 MMOL/L — SIGNIFICANT CHANGE UP (ref 17–32)
CREAT SERPL-MCNC: 1 MG/DL — SIGNIFICANT CHANGE UP (ref 0.7–1.5)
EGFR: 64 ML/MIN/1.73M2 — SIGNIFICANT CHANGE UP
EGFR: 64 ML/MIN/1.73M2 — SIGNIFICANT CHANGE UP
EOSINOPHIL # BLD AUTO: 0.28 K/UL — SIGNIFICANT CHANGE UP (ref 0–0.7)
EOSINOPHIL NFR BLD AUTO: 3.1 % — SIGNIFICANT CHANGE UP (ref 0–8)
GLUCOSE SERPL-MCNC: 163 MG/DL — HIGH (ref 70–99)
HCT VFR BLD CALC: 33 % — LOW (ref 37–47)
HGB BLD-MCNC: 10.1 G/DL — LOW (ref 12–16)
IMM GRANULOCYTES NFR BLD AUTO: 3.4 % — HIGH (ref 0.1–0.3)
LYMPHOCYTES # BLD AUTO: 1.48 K/UL — SIGNIFICANT CHANGE UP (ref 1.2–3.4)
LYMPHOCYTES # BLD AUTO: 16.5 % — LOW (ref 20.5–51.1)
MAGNESIUM SERPL-MCNC: 1.8 MG/DL — SIGNIFICANT CHANGE UP (ref 1.8–2.4)
MCHC RBC-ENTMCNC: 24.2 PG — LOW (ref 27–31)
MCHC RBC-ENTMCNC: 30.6 G/DL — LOW (ref 32–37)
MCV RBC AUTO: 78.9 FL — LOW (ref 81–99)
MONOCYTES # BLD AUTO: 0.88 K/UL — HIGH (ref 0.1–0.6)
MONOCYTES NFR BLD AUTO: 9.8 % — HIGH (ref 1.7–9.3)
NEUTROPHILS # BLD AUTO: 5.93 K/UL — SIGNIFICANT CHANGE UP (ref 1.4–6.5)
NEUTROPHILS NFR BLD AUTO: 66.3 % — SIGNIFICANT CHANGE UP (ref 42.2–75.2)
NRBC BLD AUTO-RTO: 0 /100 WBCS — SIGNIFICANT CHANGE UP (ref 0–0)
PLATELET # BLD AUTO: 390 K/UL — SIGNIFICANT CHANGE UP (ref 130–400)
PMV BLD: 10.3 FL — SIGNIFICANT CHANGE UP (ref 7.4–10.4)
POTASSIUM SERPL-MCNC: 3.8 MMOL/L — SIGNIFICANT CHANGE UP (ref 3.5–5)
POTASSIUM SERPL-SCNC: 3.8 MMOL/L — SIGNIFICANT CHANGE UP (ref 3.5–5)
PROT SERPL-MCNC: 5.8 G/DL — LOW (ref 6–8)
RBC # BLD: 4.18 M/UL — LOW (ref 4.2–5.4)
RBC # FLD: 18.5 % — HIGH (ref 11.5–14.5)
SODIUM SERPL-SCNC: 141 MMOL/L — SIGNIFICANT CHANGE UP (ref 135–146)
WBC # BLD: 8.95 K/UL — SIGNIFICANT CHANGE UP (ref 4.8–10.8)
WBC # FLD AUTO: 8.95 K/UL — SIGNIFICANT CHANGE UP (ref 4.8–10.8)

## 2025-03-07 PROCEDURE — 99232 SBSQ HOSP IP/OBS MODERATE 35: CPT

## 2025-03-07 RX ORDER — FLUDROCORTISONE ACETATE 0.1 MG
0.1 TABLET ORAL DAILY
Refills: 0 | Status: DISCONTINUED | OUTPATIENT
Start: 2025-03-07 | End: 2025-03-09

## 2025-03-07 RX ADMIN — Medication 40 MILLIGRAM(S): at 05:16

## 2025-03-07 RX ADMIN — ATORVASTATIN CALCIUM 80 MILLIGRAM(S): 80 TABLET, FILM COATED ORAL at 22:10

## 2025-03-07 RX ADMIN — Medication 5 MILLIGRAM(S): at 22:51

## 2025-03-07 RX ADMIN — CEFEPIME 100 MILLIGRAM(S): 2 INJECTION, POWDER, FOR SOLUTION INTRAVENOUS at 13:00

## 2025-03-07 RX ADMIN — Medication 500 MILLIGRAM(S): at 13:00

## 2025-03-07 RX ADMIN — NALOXEGOL OXALATE 12.5 MILLIGRAM(S): 12.5 TABLET, FILM COATED ORAL at 05:16

## 2025-03-07 RX ADMIN — Medication 125 MICROGRAM(S): at 05:16

## 2025-03-07 RX ADMIN — TIOTROPIUM BROMIDE INHALATION SPRAY 2 PUFF(S): 3.12 SPRAY, METERED RESPIRATORY (INHALATION) at 08:20

## 2025-03-07 RX ADMIN — TIZANIDINE 2 MILLIGRAM(S): 4 TABLET ORAL at 13:00

## 2025-03-07 RX ADMIN — MIDODRINE HYDROCHLORIDE 10 MILLIGRAM(S): 5 TABLET ORAL at 13:00

## 2025-03-07 RX ADMIN — TIZANIDINE 2 MILLIGRAM(S): 4 TABLET ORAL at 05:09

## 2025-03-07 RX ADMIN — TICAGRELOR 90 MILLIGRAM(S): 90 TABLET ORAL at 05:16

## 2025-03-07 RX ADMIN — TICAGRELOR 90 MILLIGRAM(S): 90 TABLET ORAL at 18:06

## 2025-03-07 RX ADMIN — MONTELUKAST SODIUM 10 MILLIGRAM(S): 10 TABLET ORAL at 13:00

## 2025-03-07 RX ADMIN — HEPARIN SODIUM 5000 UNIT(S): 1000 INJECTION INTRAVENOUS; SUBCUTANEOUS at 18:06

## 2025-03-07 RX ADMIN — HEPARIN SODIUM 5000 UNIT(S): 1000 INJECTION INTRAVENOUS; SUBCUTANEOUS at 05:19

## 2025-03-07 RX ADMIN — MIDODRINE HYDROCHLORIDE 10 MILLIGRAM(S): 5 TABLET ORAL at 18:06

## 2025-03-07 RX ADMIN — Medication 500 MILLIGRAM(S): at 22:10

## 2025-03-07 RX ADMIN — Medication 500 MILLIGRAM(S): at 05:16

## 2025-03-07 RX ADMIN — BUPROPION HYDROBROMIDE 450 MILLIGRAM(S): 522 TABLET, EXTENDED RELEASE ORAL at 13:00

## 2025-03-07 RX ADMIN — TIZANIDINE 2 MILLIGRAM(S): 4 TABLET ORAL at 22:10

## 2025-03-07 RX ADMIN — Medication 81 MILLIGRAM(S): at 13:00

## 2025-03-07 RX ADMIN — CEFEPIME 100 MILLIGRAM(S): 2 INJECTION, POWDER, FOR SOLUTION INTRAVENOUS at 05:08

## 2025-03-07 RX ADMIN — Medication 1 APPLICATION(S): at 13:00

## 2025-03-07 NOTE — PROGRESS NOTE ADULT - ATTENDING COMMENTS
59-year-old female with history of recurrent diverticulitis, nasopharyngeal CA in remission, COPD not on home O2, prediabetes, hypertension, carotid artery stenosis status post endarterectomy/stenting here for assessment of recurrent abdominal pain. Patient was seen here recently, found to have persistent diverticular changes, discharged with oral antibiotics. Pt states she did not have coverage for the script sent so she did not take the abx. She is now returning with pain, nausea and constipation alternating with diarrhea. She is passing loose stools with flatus now. Vitals stable on admission, on RA on my encounter. CT Abdomen and Pelvis w/ Oral Cont and w/ IV Cont : Transverse and descending colonic wall thickening with surrounding fat stranding, increased since prior exam and likely reflecting colitis. Labs: WBC 14k, Hb 12.2, plt 503, K 5.2, BUN/Cr 34/2. Admitted for colitis.         #Recurrent Diverticulitis/Colitis   - CT Abd/pelvis 2/8 - acute sigmoid diverticulitis   - CT Abd/pelvis 2/12/ - ongoing acute sigmoid diverticulitis   - was recommended oral vantin/flagyl but reportedly not covered  - CT Abdomen and Pelvis w/ Oral Cont and w/ IV Cont (02.27.25 @ 01:34): IMPRESSION: Transverse and descending colonic wall thickening with surrounding fat   stranding, increased since prior exam and likely reflecting colitis.  - CT Abdomen and Pelvis w/ Oral Cont and w/ IV Cont (02.27.25 @ 18:40): Since prior examination;   Stable appearance of transverse and descending colon consistent with colitis. No drainable fluid collection or pneumoperitoneum.  Plan:   - noted CT Abd/pelvis 2/27 -- worsening colitis compared to 2/12, but potentially from missing antibiotics from last discharge  - continue cefepime 1g q 12 hours and PO flagyl 500 mg q 8 hours   -- Dc on PO Vantin 400 mg BID (increased for obesity) and PO flagyl 500 mg q 12 hours to continue until 3/12 for 2 week course     #COPD not on home O2   - not in exacerbation   - c/w home inhalers     #Orthostasis   - hold Coreg   - midodrine 10mg TID     #Pre-DM  - SSI if FS > 200.  - A1c 6.6-6.9    #LAKISHA on CKD 3  - resolved     #CAD   #HTN  #HLD   #R ICA stenting in 2023   - c/w Aspirin and Brilinta   - Hold Coreg for orthostasis   - Hold lisinopril   - MRI brain, MRA H/N as per neuro endo vascular     #Hypothyroidism   - c/w Synthroid 125mcg    DVT PPx: Heparin      Pending: orthostasis improvement, MRI brain/MRA H/N  Plan of care d/w pt   Dispo: Home

## 2025-03-07 NOTE — PROGRESS NOTE ADULT - SUBJECTIVE AND OBJECTIVE BOX
SUBJECTIVE/OVERNIGHT EVENTS  Today is hospital day 8d. This morning patient was seen and examined at bedside, resting comfortably in bed. No acute or major events overnight.  Patient denies abdominal pain, tolerating PO intake, pending repeat orthostats after starting midodrine, MRI/MRA brain.    MEDICATIONS  STANDING MEDICATIONS  aspirin enteric coated 81 milliGRAM(s) Oral daily  atorvastatin 80 milliGRAM(s) Oral at bedtime  buPROPion XL (24-Hour) . 450 milliGRAM(s) Oral daily  cefepime   IVPB 1000 milliGRAM(s) IV Intermittent every 12 hours  chlorhexidine 2% Cloths 1 Application(s) Topical daily  fluticasone propionate/ salmeterol 100-50 MICROgram(s) Diskus 1 Dose(s) Inhalation two times a day  heparin   Injectable 5000 Unit(s) SubCutaneous every 12 hours  influenza   Vaccine 0.5 milliLiter(s) IntraMuscular once  lactated ringers. 1000 milliLiter(s) IV Continuous <Continuous>  lactated ringers. 1000 milliLiter(s) IV Continuous <Continuous>  levothyroxine 125 MICROGram(s) Oral daily  metroNIDAZOLE    Tablet 500 milliGRAM(s) Oral every 8 hours  midodrine. 10 milliGRAM(s) Oral <User Schedule>  montelukast 10 milliGRAM(s) Oral daily  naloxegol 12.5 milliGRAM(s) Oral before breakfast  pantoprazole    Tablet 40 milliGRAM(s) Oral before breakfast  ticagrelor 90 milliGRAM(s) Oral every 12 hours  tiotropium 2.5 MICROgram(s) Inhaler 2 Puff(s) Inhalation daily  tiZANidine 2 milliGRAM(s) Oral three times a day    PRN MEDICATIONS  acetaminophen     Tablet .. 650 milliGRAM(s) Oral every 6 hours PRN  albuterol    90 MICROgram(s) HFA Inhaler 2 Puff(s) Inhalation every 6 hours PRN  aluminum hydroxide/magnesium hydroxide/simethicone Suspension 30 milliLiter(s) Oral every 4 hours PRN  hydrOXYzine hydrochloride 10 milliGRAM(s) Oral three times a day PRN  LORazepam   Injectable 1 milliGRAM(s) IV Push once PRN  ondansetron Injectable 4 milliGRAM(s) IV Push every 8 hours PRN    VITALS  T(F): 97.7 (03-07-25 @ 05:22), Max: 98.6 (03-06-25 @ 13:56)  HR: 75 (03-07-25 @ 05:22) (67 - 75)  BP: 178/90 (03-07-25 @ 05:22) (156/83 - 180/87)  RR: 18 (03-07-25 @ 05:22) (18 - 20)  SpO2: 97% (03-07-25 @ 05:22) (95% - 97%)    PHYSICAL EXAM  GENERAL  ( x ) NAD, lying in bed comfortably     (  ) obtunded     (  ) lethargic     (  ) somnolent    HEAD  ( x ) Atraumatic     (  ) hematoma     (  ) laceration (specify location:       )     NECK  ( x ) Supple     (  ) neck stiffness     (  ) nuchal rigidity     (  )  no JVD     (  ) JVD present ( -- cm)    HEART  Rate -->  ( x ) normal rate    (  ) bradycardic    (  ) tachycardic  Rhythm -->  ( x ) regular    (  ) regularly irregular    (  ) irregularly irregular  Murmurs -->  ( x ) normal s1/s2    (  ) systolic murmur    (  ) diastolic murmur    (  ) continuous murmur     (  ) S3 present    (  ) S4 present    LUNGS  ( x ) Unlabored respirations     (  ) tachypnea  ( x ) B/L air entry     (  ) decreased breath sounds in:  (location     )    (  ) no adventitious sound     (  ) crackles     (  ) wheezing      (  ) rhonchi      (specify location:       )  (  ) chest wall tenderness (specify location:       )    ABDOMEN  ( x ) Soft     (  ) tense   |   (  ) nondistended     (  ) distended   |   (  ) +BS     (  ) hypoactive bowel sounds     (  ) hyperactive bowel sounds  ( x ) nontender     (  ) RUQ tenderness     (  ) RLQ tenderness     (  ) LLQ tenderness     (  ) epigastric tenderness     (  ) diffuse tenderness  (  ) Splenomegaly      (  ) Hepatomegaly      (  ) Jaundice     (  ) ecchymosis     EXTREMITIES  ( x ) Normal     (  ) Rash     (  ) ecchymosis     (  ) varicose veins      (  ) pitting edema     (  ) non-pitting edema   (  ) ulceration     (  ) gangrene:     (location:     )    NERVOUS SYSTEM  ( x ) A&Ox3     (  ) confused     (  ) lethargic  CN II-XII:     (  ) Intact     (  ) focal deficits  (Specify:     )   Upper extremities:     (  ) strength X/5     (  ) focal deficit (specify:    )  Lower extremities:     (  ) strength  X/5    (  ) focal deficit (specify:    )      LABS             9.6    10.64 )-----------( 392      ( 03-06-25 @ 07:11 )             31.0     141  |  104  |  11  -------------------------<  177   03-06-25 @ 07:11  4.4  |  25  |  1.2    Ca      8.5     03-06-25 @ 07:11  Mg     1.8     03-06-25 @ 07:11    TPro  5.7  /  Alb  3.3  /  TBili  0.2  /  DBili  x   /  AST  23  /  ALT  15  /  AlkPhos  81  /  GGT  x     03-06-25 @ 07:11        Urinalysis Basic - ( 06 Mar 2025 07:11 )    Color: x / Appearance: x / SG: x / pH: x  Gluc: 177 mg/dL / Ketone: x  / Bili: x / Urobili: x   Blood: x / Protein: x / Nitrite: x   Leuk Esterase: x / RBC: x / WBC x   Sq Epi: x / Non Sq Epi: x / Bacteria: x          IMAGING

## 2025-03-07 NOTE — PROGRESS NOTE ADULT - ASSESSMENT
59-year-old female with history of recurrent diverticulitis, nasopharyngeal CA in remission, COPD not on home O2, prediabetes, hypertension, carotid artery stenosis status post endarterectomy/stenting here for assessment of recurrent abdominal pain. Patient was seen here recently, found to have persistent diverticular changes, discharged with oral antibiotics. Pt states she did not have coverage for the script sent so she did not take the abx. She is now returning with pain, nausea and constipation alternating with diarrhea. She is passing loose stools with flatus now. Vitals stable on admission, on RA on my encounter. CT Abdomen and Pelvis w/ Oral Cont and w/ IV Cont : Transverse and descending colonic wall thickening with surrounding fat stranding, increased since prior exam and likely reflecting colitis. Labs: WBC 14k, Hb 12.2, plt 503, K 5.2, BUN/Cr 34/2. Admitted for colitis.     #Recurrent Diverticulitis/Colitis   - CT Abd/pelvis 2/8 - acute sigmoid diverticulitis   - CT Abd/pelvis 2/12/ - ongoing acute sigmoid diverticulitis   - was recommended oral vantin/flagyl but reportedly not covered  - CT Abdomen and Pelvis w/ Oral Cont and w/ IV Cont (02.27.25 @ 01:34): IMPRESSION: Transverse and descending colonic wall thickening with surrounding fat   stranding, increased since prior exam and likely reflecting colitis.  - CT Abdomen and Pelvis w/ Oral Cont and w/ IV Cont (02.27.25 @ 18:40): Since prior examination;   Stable appearance of transverse and descending colon consistent with colitis. No drainable fluid collection or pneumoperitoneum.  - noted CT Abd/pelvis 2/27 - worsening colitis compared to 2/12, but potentially from missing antibiotics from last discharge  - continue cefepime 1g q 12 hours and PO flagyl 500 mg q 8 hours   - Dc on PO Vantin 400 mg BID (increased for obesity) and PO flagyl 500 mg q 12 hours to continue until 3/12 for 2 week course     #COPD not on home O2   - not in exacerbation   - c/w home inhalers     #Orthostasis   - hold Coreg   - midodrine 10mg TID   - repeat orthostats after starting midodrine    #Pre-DM  - ISS  - A1c 6.6-6.9    #LAKISHA on CKD 3  - resolved     #CAD   #HTN  #HLD   #R ICA stenting in 2023   - c/w Aspirin and Brilinta   - Hold Coreg for orthostasis   - Hold lisinopril   - MRI brain, MRA H/N as per neuro endo vascular     #Hypothyroidism   - c/w Synthroid 125mcg    #MISC  - DVT ppx: Heparin  - GI ppx: PPI  - Activity: IAT  - Diet: Regular   - Code: Full     Pendings: orthostasis improvement, MRI brain/MRA H/N

## 2025-03-08 LAB
ALBUMIN SERPL ELPH-MCNC: 3.4 G/DL — LOW (ref 3.5–5.2)
ALP SERPL-CCNC: 77 U/L — SIGNIFICANT CHANGE UP (ref 30–115)
ALT FLD-CCNC: 11 U/L — SIGNIFICANT CHANGE UP (ref 0–41)
ANION GAP SERPL CALC-SCNC: 11 MMOL/L — SIGNIFICANT CHANGE UP (ref 7–14)
AST SERPL-CCNC: 14 U/L — SIGNIFICANT CHANGE UP (ref 0–41)
BASOPHILS # BLD AUTO: 0.05 K/UL — SIGNIFICANT CHANGE UP (ref 0–0.2)
BASOPHILS NFR BLD AUTO: 0.6 % — SIGNIFICANT CHANGE UP (ref 0–1)
BILIRUB SERPL-MCNC: 0.3 MG/DL — SIGNIFICANT CHANGE UP (ref 0.2–1.2)
BUN SERPL-MCNC: 9 MG/DL — LOW (ref 10–20)
CALCIUM SERPL-MCNC: 9 MG/DL — SIGNIFICANT CHANGE UP (ref 8.4–10.5)
CHLORIDE SERPL-SCNC: 105 MMOL/L — SIGNIFICANT CHANGE UP (ref 98–110)
CO2 SERPL-SCNC: 25 MMOL/L — SIGNIFICANT CHANGE UP (ref 17–32)
CREAT SERPL-MCNC: 1 MG/DL — SIGNIFICANT CHANGE UP (ref 0.7–1.5)
EGFR: 64 ML/MIN/1.73M2 — SIGNIFICANT CHANGE UP
EGFR: 64 ML/MIN/1.73M2 — SIGNIFICANT CHANGE UP
EOSINOPHIL # BLD AUTO: 0.21 K/UL — SIGNIFICANT CHANGE UP (ref 0–0.7)
EOSINOPHIL NFR BLD AUTO: 2.5 % — SIGNIFICANT CHANGE UP (ref 0–8)
GLUCOSE SERPL-MCNC: 141 MG/DL — HIGH (ref 70–99)
HCT VFR BLD CALC: 30.8 % — LOW (ref 37–47)
HGB BLD-MCNC: 9.6 G/DL — LOW (ref 12–16)
IMM GRANULOCYTES NFR BLD AUTO: 3.2 % — HIGH (ref 0.1–0.3)
LYMPHOCYTES # BLD AUTO: 1.77 K/UL — SIGNIFICANT CHANGE UP (ref 1.2–3.4)
LYMPHOCYTES # BLD AUTO: 20.9 % — SIGNIFICANT CHANGE UP (ref 20.5–51.1)
MAGNESIUM SERPL-MCNC: 1.6 MG/DL — LOW (ref 1.8–2.4)
MCHC RBC-ENTMCNC: 24.4 PG — LOW (ref 27–31)
MCHC RBC-ENTMCNC: 31.2 G/DL — LOW (ref 32–37)
MCV RBC AUTO: 78.4 FL — LOW (ref 81–99)
MONOCYTES # BLD AUTO: 0.8 K/UL — HIGH (ref 0.1–0.6)
MONOCYTES NFR BLD AUTO: 9.5 % — HIGH (ref 1.7–9.3)
NEUTROPHILS # BLD AUTO: 5.36 K/UL — SIGNIFICANT CHANGE UP (ref 1.4–6.5)
NEUTROPHILS NFR BLD AUTO: 63.3 % — SIGNIFICANT CHANGE UP (ref 42.2–75.2)
NRBC BLD AUTO-RTO: 0 /100 WBCS — SIGNIFICANT CHANGE UP (ref 0–0)
PLATELET # BLD AUTO: 368 K/UL — SIGNIFICANT CHANGE UP (ref 130–400)
PMV BLD: 10.1 FL — SIGNIFICANT CHANGE UP (ref 7.4–10.4)
POTASSIUM SERPL-MCNC: 3.6 MMOL/L — SIGNIFICANT CHANGE UP (ref 3.5–5)
POTASSIUM SERPL-SCNC: 3.6 MMOL/L — SIGNIFICANT CHANGE UP (ref 3.5–5)
PROT SERPL-MCNC: 6 G/DL — SIGNIFICANT CHANGE UP (ref 6–8)
RBC # BLD: 3.93 M/UL — LOW (ref 4.2–5.4)
RBC # FLD: 18.3 % — HIGH (ref 11.5–14.5)
SODIUM SERPL-SCNC: 141 MMOL/L — SIGNIFICANT CHANGE UP (ref 135–146)
WBC # BLD: 8.46 K/UL — SIGNIFICANT CHANGE UP (ref 4.8–10.8)
WBC # FLD AUTO: 8.46 K/UL — SIGNIFICANT CHANGE UP (ref 4.8–10.8)

## 2025-03-08 PROCEDURE — 70498 CT ANGIOGRAPHY NECK: CPT | Mod: 26

## 2025-03-08 PROCEDURE — 70496 CT ANGIOGRAPHY HEAD: CPT | Mod: 26

## 2025-03-08 PROCEDURE — 99232 SBSQ HOSP IP/OBS MODERATE 35: CPT

## 2025-03-08 RX ORDER — SODIUM CHLORIDE 9 G/1000ML
1000 INJECTION, SOLUTION INTRAVENOUS ONCE
Refills: 0 | Status: COMPLETED | OUTPATIENT
Start: 2025-03-08 | End: 2025-03-08

## 2025-03-08 RX ORDER — MAGNESIUM SULFATE 500 MG/ML
2 SYRINGE (ML) INJECTION ONCE
Refills: 0 | Status: COMPLETED | OUTPATIENT
Start: 2025-03-08 | End: 2025-03-08

## 2025-03-08 RX ORDER — MIDODRINE HYDROCHLORIDE 5 MG/1
7.5 TABLET ORAL
Refills: 0 | Status: DISCONTINUED | OUTPATIENT
Start: 2025-03-08 | End: 2025-03-09

## 2025-03-08 RX ORDER — CLONAZEPAM 0.5 MG/1
1 TABLET ORAL
Refills: 0 | Status: DISCONTINUED | OUTPATIENT
Start: 2025-03-08 | End: 2025-03-09

## 2025-03-08 RX ORDER — DROXIDOPA 300 MG/1
200 CAPSULE ORAL THREE TIMES A DAY
Refills: 0 | Status: DISCONTINUED | OUTPATIENT
Start: 2025-03-08 | End: 2025-03-09

## 2025-03-08 RX ORDER — CEFPODOXIME PROXETIL 200 MG/1
400 TABLET, FILM COATED ORAL EVERY 12 HOURS
Refills: 0 | Status: DISCONTINUED | OUTPATIENT
Start: 2025-03-08 | End: 2025-03-09

## 2025-03-08 RX ADMIN — Medication 125 MICROGRAM(S): at 06:51

## 2025-03-08 RX ADMIN — ATORVASTATIN CALCIUM 80 MILLIGRAM(S): 80 TABLET, FILM COATED ORAL at 22:10

## 2025-03-08 RX ADMIN — CEFEPIME 100 MILLIGRAM(S): 2 INJECTION, POWDER, FOR SOLUTION INTRAVENOUS at 06:50

## 2025-03-08 RX ADMIN — Medication 40 MILLIGRAM(S): at 06:51

## 2025-03-08 RX ADMIN — DROXIDOPA 200 MILLIGRAM(S): 300 CAPSULE ORAL at 18:07

## 2025-03-08 RX ADMIN — MONTELUKAST SODIUM 10 MILLIGRAM(S): 10 TABLET ORAL at 11:59

## 2025-03-08 RX ADMIN — Medication 2 PUFF(S): at 18:25

## 2025-03-08 RX ADMIN — Medication 500 MILLIGRAM(S): at 06:51

## 2025-03-08 RX ADMIN — HEPARIN SODIUM 5000 UNIT(S): 1000 INJECTION INTRAVENOUS; SUBCUTANEOUS at 18:07

## 2025-03-08 RX ADMIN — Medication 5 MILLIGRAM(S): at 22:11

## 2025-03-08 RX ADMIN — Medication 0.1 MILLIGRAM(S): at 06:52

## 2025-03-08 RX ADMIN — TIZANIDINE 2 MILLIGRAM(S): 4 TABLET ORAL at 13:46

## 2025-03-08 RX ADMIN — CLONAZEPAM 1 MILLIGRAM(S): 0.5 TABLET ORAL at 18:06

## 2025-03-08 RX ADMIN — Medication 25 GRAM(S): at 11:58

## 2025-03-08 RX ADMIN — NALOXEGOL OXALATE 12.5 MILLIGRAM(S): 12.5 TABLET, FILM COATED ORAL at 06:51

## 2025-03-08 RX ADMIN — Medication 4 MILLIGRAM(S): at 20:16

## 2025-03-08 RX ADMIN — BUPROPION HYDROBROMIDE 450 MILLIGRAM(S): 522 TABLET, EXTENDED RELEASE ORAL at 11:59

## 2025-03-08 RX ADMIN — TICAGRELOR 90 MILLIGRAM(S): 90 TABLET ORAL at 06:51

## 2025-03-08 RX ADMIN — TIOTROPIUM BROMIDE INHALATION SPRAY 2 PUFF(S): 3.12 SPRAY, METERED RESPIRATORY (INHALATION) at 07:45

## 2025-03-08 RX ADMIN — Medication 500 MILLIGRAM(S): at 13:45

## 2025-03-08 RX ADMIN — Medication 1 APPLICATION(S): at 12:03

## 2025-03-08 RX ADMIN — Medication 81 MILLIGRAM(S): at 12:00

## 2025-03-08 RX ADMIN — CEFPODOXIME PROXETIL 400 MILLIGRAM(S): 200 TABLET, FILM COATED ORAL at 18:06

## 2025-03-08 RX ADMIN — SODIUM CHLORIDE 1000 MILLILITER(S): 9 INJECTION, SOLUTION INTRAVENOUS at 09:49

## 2025-03-08 RX ADMIN — TIZANIDINE 2 MILLIGRAM(S): 4 TABLET ORAL at 22:10

## 2025-03-08 RX ADMIN — Medication 500 MILLIGRAM(S): at 22:11

## 2025-03-08 RX ADMIN — TICAGRELOR 90 MILLIGRAM(S): 90 TABLET ORAL at 18:06

## 2025-03-08 RX ADMIN — TIZANIDINE 2 MILLIGRAM(S): 4 TABLET ORAL at 06:52

## 2025-03-08 RX ADMIN — MIDODRINE HYDROCHLORIDE 10 MILLIGRAM(S): 5 TABLET ORAL at 11:59

## 2025-03-08 RX ADMIN — Medication 1 DOSE(S): at 22:44

## 2025-03-08 RX ADMIN — HEPARIN SODIUM 5000 UNIT(S): 1000 INJECTION INTRAVENOUS; SUBCUTANEOUS at 06:52

## 2025-03-08 RX ADMIN — Medication 650 MILLIGRAM(S): at 06:52

## 2025-03-08 RX ADMIN — MIDODRINE HYDROCHLORIDE 10 MILLIGRAM(S): 5 TABLET ORAL at 09:48

## 2025-03-08 NOTE — PROGRESS NOTE ADULT - ATTENDING COMMENTS
59-year-old female with history of recurrent diverticulitis, nasopharyngeal CA in remission, COPD not on home O2, prediabetes, hypertension, carotid artery stenosis status post endarterectomy/stenting here for assessment of recurrent abdominal pain. Patient was seen here recently, found to have persistent diverticular changes, discharged with oral antibiotics. Pt states she did not have coverage for the script sent so she did not take the abx. She is now returning with pain, nausea and constipation alternating with diarrhea. She is passing loose stools with flatus now. Vitals stable on admission, on RA on my encounter. CT Abdomen and Pelvis w/ Oral Cont and w/ IV Cont : Transverse and descending colonic wall thickening with surrounding fat stranding, increased since prior exam and likely reflecting colitis. Labs: WBC 14k, Hb 12.2, plt 503, K 5.2, BUN/Cr 34/2. Admitted for colitis.         #Recurrent Diverticulitis/Colitis   - CT Abd/pelvis 2/8 - acute sigmoid diverticulitis   - CT Abd/pelvis 2/12/ - ongoing acute sigmoid diverticulitis   - was recommended oral vantin/flagyl but reportedly not covered  - CT Abdomen and Pelvis w/ Oral Cont and w/ IV Cont (02.27.25 @ 01:34): IMPRESSION: Transverse and descending colonic wall thickening with surrounding fat   stranding, increased since prior exam and likely reflecting colitis.  - CT Abdomen and Pelvis w/ Oral Cont and w/ IV Cont (02.27.25 @ 18:40): Since prior examination;   Stable appearance of transverse and descending colon consistent with colitis. No drainable fluid collection or pneumoperitoneum.  Plan:   - noted CT Abd/pelvis 2/27 -- worsening colitis compared to 2/12, but potentially from missing antibiotics from last discharge  - dc cefepime 1g q 12 hours and start Vantin till 3/12   - c/w PO flagyl 500 mg q 8 hours     #COPD not on home O2   - not in exacerbation   - c/w home inhalers     #Orthostasis   - hold Coreg   - midodrine 10mg TID   - compression stockings, abd binder   - florinef   - repeat orthostatics     #Pre-DM  - SSI if FS > 200.  - A1c 6.6-6.9    #LAKISHA on CKD 3  - resolved     #CAD   #HTN  #HLD   #R ICA stenting in 2023   - c/w Aspirin and Brilinta   - Hold Coreg for orthostasis   - Hold lisinopril   - MRI brain, MRA H/N as per neuro endo vascular     #Hypothyroidism   - c/w Synthroid 125mcg    DVT PPx: Heparin      Pending: orthostasis improvement, MRI brain/MRA H/N  Plan of care d/w pt   Dispo: Home

## 2025-03-08 NOTE — PROGRESS NOTE ADULT - ASSESSMENT
59-year-old female with history of recurrent diverticulitis, nasopharyngeal CA in remission, COPD not on home O2, prediabetes, hypertension, carotid artery stenosis status post endarterectomy/stenting here for assessment of recurrent abdominal pain. Patient was seen here recently, found to have persistent diverticular changes, discharged with oral antibiotics. Pt states she did not have coverage for the script sent so she did not take the abx. She is now returning with pain, nausea and constipation alternating with diarrhea. She is passing loose stools with flatus now. Vitals stable on admission, on RA on my encounter. CT Abdomen and Pelvis w/ Oral Cont and w/ IV Cont : Transverse and descending colonic wall thickening with surrounding fat stranding, increased since prior exam and likely reflecting colitis. Labs: WBC 14k, Hb 12.2, plt 503, K 5.2, BUN/Cr 34/2. Admitted for colitis.     #Recurrent Diverticulitis/Colitis   - CT Abd/pelvis 2/8 - acute sigmoid diverticulitis   - CT Abd/pelvis 2/12/ - ongoing acute sigmoid diverticulitis   - was recommended oral vantin/flagyl but reportedly not covered  - CT Abdomen and Pelvis w/ Oral Cont and w/ IV Cont (02.27.25 @ 01:34): IMPRESSION: Transverse and descending colonic wall thickening with surrounding fat   stranding, increased since prior exam and likely reflecting colitis.  - CT Abdomen and Pelvis w/ Oral Cont and w/ IV Cont (02.27.25 @ 18:40): Since prior examination;   Stable appearance of transverse and descending colon consistent with colitis. No drainable fluid collection or pneumoperitoneum.  - noted CT Abd/pelvis 2/27 - worsening colitis compared to 2/12, but potentially from missing antibiotics from last discharge  - continue cefepime 1g q 12 hours and PO flagyl 500 mg q 8 hours   - Dc on PO Vantin 400 mg BID (increased for obesity) and PO flagyl 500 mg q 12 hours to continue until 3/12 for 2 week course     #COPD not on home O2   - not in exacerbation   - c/w home inhalers     #Orthostasis   - hold Coreg   - midodrine 10mg TID   - florinef 0.1mg started on 3/8  - patient still orthostatic positive  - 3/8: will give Bolus and repeat orthostats    #Pre-DM  - ISS  - A1c 6.6-6.9    #LAKISHA on CKD 3  - resolved     #CAD   #HTN  #HLD   #R ICA stenting in 2023   - c/w Aspirin and Brilinta   - Hold Coreg for orthostasis   - Hold lisinopril   - CTA H/N ordered     #Hypothyroidism   - c/w Synthroid 125mcg    #MISC  - DVT ppx: Heparin  - GI ppx: PPI  - Activity: IAT  - Diet: Regular   - Code: Full     Pendings: repeat orthostats s/p bolus, CTA H/N   59-year-old female with history of recurrent diverticulitis, nasopharyngeal CA in remission, COPD not on home O2, prediabetes, hypertension, carotid artery stenosis status post endarterectomy/stenting here for assessment of recurrent abdominal pain. Patient was seen here recently, found to have persistent diverticular changes, discharged with oral antibiotics. Pt states she did not have coverage for the script sent so she did not take the abx. She is now returning with pain, nausea and constipation alternating with diarrhea. She is passing loose stools with flatus now. Vitals stable on admission, on RA on my encounter. CT Abdomen and Pelvis w/ Oral Cont and w/ IV Cont : Transverse and descending colonic wall thickening with surrounding fat stranding, increased since prior exam and likely reflecting colitis. Labs: WBC 14k, Hb 12.2, plt 503, K 5.2, BUN/Cr 34/2. Admitted for colitis.       #Recurrent Diverticulitis/Colitis   - CT Abd/pelvis 2/8 - acute sigmoid diverticulitis   - CT Abd/pelvis 2/12/ - ongoing acute sigmoid diverticulitis   - was recommended oral vantin/flagyl but reportedly not covered  - CT Abdomen and Pelvis w/ Oral Cont and w/ IV Cont (02.27.25 @ 01:34): IMPRESSION: Transverse and descending colonic wall thickening with surrounding fat   stranding, increased since prior exam and likely reflecting colitis.  - CT Abdomen and Pelvis w/ Oral Cont and w/ IV Cont (02.27.25 @ 18:40): Since prior examination;   Stable appearance of transverse and descending colon consistent with colitis. No drainable fluid collection or pneumoperitoneum.  - noted CT Abd/pelvis 2/27 - worsening colitis compared to 2/12, but potentially from missing antibiotics from last discharge  - continue cefepime 1g q 12 hours and PO flagyl 500 mg q 8 hours   - Dc on PO Vantin 400 mg BID (increased for obesity) and PO flagyl 500 mg q 12 hours to continue until 3/12 for 2 week course     #COPD not on home O2   - not in exacerbation   - c/w home inhalers     #Orthostasis   - hold Coreg   - midodrine 10mg TID   - florinef 0.1mg started on 3/8  - patient still orthostatic positive  - 3/8: will give Bolus and repeat orthostats    #Pre-DM  - ISS  - A1c 6.6-6.9    #LAKISHA on CKD 3  - resolved     #CAD   #HTN  #HLD   #R ICA stenting in 2023   - c/w Aspirin and Brilinta   - Hold Coreg for orthostasis   - Hold lisinopril   - CTA H/N ordered     #Hypothyroidism   - c/w Synthroid 125mcg    #MISC  - DVT ppx: Heparin  - GI ppx: PPI  - Activity: IAT  - Diet: Regular   - Code: Full     Pendings: repeat orthostats s/p bolus, CTA H/N

## 2025-03-08 NOTE — PROGRESS NOTE ADULT - SUBJECTIVE AND OBJECTIVE BOX
SUBJECTIVE/OVERNIGHT EVENTS  Today is hospital day 9d. This morning patient was seen and examined at bedside, resting comfortably in bed. No acute or major events overnight.  Patient denies abdominal pain, but reports weakness and dizziness upon walking, still orthostatic positive, started on fludrocortisone today, pending CTA head and neck.     MEDICATIONS  STANDING MEDICATIONS  aspirin enteric coated 81 milliGRAM(s) Oral daily  atorvastatin 80 milliGRAM(s) Oral at bedtime  buPROPion XL (24-Hour) . 450 milliGRAM(s) Oral daily  cefepime   IVPB 1000 milliGRAM(s) IV Intermittent every 12 hours  chlorhexidine 2% Cloths 1 Application(s) Topical daily  fludroCORTISONE 0.1 milliGRAM(s) Oral daily  fluticasone propionate/ salmeterol 100-50 MICROgram(s) Diskus 1 Dose(s) Inhalation two times a day  heparin   Injectable 5000 Unit(s) SubCutaneous every 12 hours  influenza   Vaccine 0.5 milliLiter(s) IntraMuscular once  lactated ringers. 1000 milliLiter(s) IV Continuous <Continuous>  lactated ringers. 1000 milliLiter(s) IV Continuous <Continuous>  levothyroxine 125 MICROGram(s) Oral daily  metroNIDAZOLE    Tablet 500 milliGRAM(s) Oral every 8 hours  midodrine. 10 milliGRAM(s) Oral <User Schedule>  montelukast 10 milliGRAM(s) Oral daily  naloxegol 12.5 milliGRAM(s) Oral before breakfast  pantoprazole    Tablet 40 milliGRAM(s) Oral before breakfast  ticagrelor 90 milliGRAM(s) Oral every 12 hours  tiotropium 2.5 MICROgram(s) Inhaler 2 Puff(s) Inhalation daily  tiZANidine 2 milliGRAM(s) Oral three times a day  zolpidem 5 milliGRAM(s) Oral at bedtime    PRN MEDICATIONS  acetaminophen     Tablet .. 650 milliGRAM(s) Oral every 6 hours PRN  albuterol    90 MICROgram(s) HFA Inhaler 2 Puff(s) Inhalation every 6 hours PRN  aluminum hydroxide/magnesium hydroxide/simethicone Suspension 30 milliLiter(s) Oral every 4 hours PRN  hydrOXYzine hydrochloride 10 milliGRAM(s) Oral three times a day PRN  LORazepam   Injectable 1 milliGRAM(s) IV Push once PRN  ondansetron Injectable 4 milliGRAM(s) IV Push every 8 hours PRN    VITALS  T(F): 98.2 (03-08-25 @ 04:24), Max: 98.2 (03-08-25 @ 04:24)  HR: 76 (03-08-25 @ 04:24) (68 - 76)  BP: 150/79 (03-08-25 @ 04:24) (115/79 - 150/79)  RR: 18 (03-08-25 @ 04:24) (18 - 18)  SpO2: 94% (03-08-25 @ 04:24) (94% - 98%)    PHYSICAL EXAM  GENERAL  ( x ) NAD, lying in bed comfortably     (  ) obtunded     (  ) lethargic     (  ) somnolent    HEAD  ( x ) Atraumatic     (  ) hematoma     (  ) laceration (specify location:       )     NECK  ( x ) Supple     (  ) neck stiffness     (  ) nuchal rigidity     (  )  no JVD     (  ) JVD present ( -- cm)    HEART  Rate -->  ( x ) normal rate    (  ) bradycardic    (  ) tachycardic  Rhythm -->  ( x ) regular    (  ) regularly irregular    (  ) irregularly irregular  Murmurs -->  ( x ) normal s1/s2    (  ) systolic murmur    (  ) diastolic murmur    (  ) continuous murmur     (  ) S3 present    (  ) S4 present    LUNGS  ( x ) Unlabored respirations     (  ) tachypnea  ( x ) B/L air entry     (  ) decreased breath sounds in:  (location     )    (  ) no adventitious sound     (  ) crackles     (  ) wheezing      (  ) rhonchi      (specify location:       )  (  ) chest wall tenderness (specify location:       )    ABDOMEN  ( x ) Soft     (  ) tense   |   (  ) nondistended     (  ) distended   |   (  ) +BS     (  ) hypoactive bowel sounds     (  ) hyperactive bowel sounds  ( x ) nontender     (  ) RUQ tenderness     (  ) RLQ tenderness     (  ) LLQ tenderness     (  ) epigastric tenderness     (  ) diffuse tenderness  (  ) Splenomegaly      (  ) Hepatomegaly      (  ) Jaundice     (  ) ecchymosis     EXTREMITIES  ( x ) Normal     (  ) Rash     (  ) ecchymosis     (  ) varicose veins      (  ) pitting edema     (  ) non-pitting edema   (  ) ulceration     (  ) gangrene:     (location:     )    NERVOUS SYSTEM  ( x ) A&Ox3     (  ) confused     (  ) lethargic  CN II-XII:     (  ) Intact     (  ) focal deficits  (Specify:     )   Upper extremities:     (  ) strength X/5     (  ) focal deficit (specify:    )  Lower extremities:     (  ) strength  X/5    (  ) focal deficit (specify:    )      LABS             9.6    8.46  )-----------( 368      ( 03-08-25 @ 08:18 )             30.8     141  |  105  |  9   -------------------------<  141   03-08-25 @ 08:18  3.6  |  25  |  1.0    Ca      9.0     03-08-25 @ 08:18  Mg     1.6     03-08-25 @ 08:18    TPro  6.0  /  Alb  3.4  /  TBili  0.3  /  DBili  x   /  AST  14  /  ALT  11  /  AlkPhos  77  /  GGT  x     03-08-25 @ 08:18        Urinalysis Basic - ( 08 Mar 2025 08:18 )    Color: x / Appearance: x / SG: x / pH: x  Gluc: 141 mg/dL / Ketone: x  / Bili: x / Urobili: x   Blood: x / Protein: x / Nitrite: x   Leuk Esterase: x / RBC: x / WBC x   Sq Epi: x / Non Sq Epi: x / Bacteria: x          IMAGING

## 2025-03-08 NOTE — PROGRESS NOTE ADULT - REASON FOR ADMISSION
abdominal pain
112

## 2025-03-08 NOTE — PROGRESS NOTE ADULT - PROVIDER SPECIALTY LIST ADULT
Gastroenterology
Internal Medicine
Surgery
Hospitalist
Internal Medicine
Surgery

## 2025-03-09 VITALS
SYSTOLIC BLOOD PRESSURE: 162 MMHG | DIASTOLIC BLOOD PRESSURE: 77 MMHG | RESPIRATION RATE: 18 BRPM | OXYGEN SATURATION: 95 % | TEMPERATURE: 98 F | HEART RATE: 85 BPM

## 2025-03-09 LAB
ALBUMIN SERPL ELPH-MCNC: 3.2 G/DL — LOW (ref 3.5–5.2)
ALP SERPL-CCNC: 75 U/L — SIGNIFICANT CHANGE UP (ref 30–115)
ALT FLD-CCNC: 11 U/L — SIGNIFICANT CHANGE UP (ref 0–41)
ANION GAP SERPL CALC-SCNC: 12 MMOL/L — SIGNIFICANT CHANGE UP (ref 7–14)
AST SERPL-CCNC: 12 U/L — SIGNIFICANT CHANGE UP (ref 0–41)
BASOPHILS # BLD AUTO: 0.04 K/UL — SIGNIFICANT CHANGE UP (ref 0–0.2)
BASOPHILS NFR BLD AUTO: 0.4 % — SIGNIFICANT CHANGE UP (ref 0–1)
BILIRUB SERPL-MCNC: 0.2 MG/DL — SIGNIFICANT CHANGE UP (ref 0.2–1.2)
BUN SERPL-MCNC: 7 MG/DL — LOW (ref 10–20)
CALCIUM SERPL-MCNC: 8.5 MG/DL — SIGNIFICANT CHANGE UP (ref 8.4–10.5)
CHLORIDE SERPL-SCNC: 103 MMOL/L — SIGNIFICANT CHANGE UP (ref 98–110)
CO2 SERPL-SCNC: 26 MMOL/L — SIGNIFICANT CHANGE UP (ref 17–32)
CREAT SERPL-MCNC: 1.1 MG/DL — SIGNIFICANT CHANGE UP (ref 0.7–1.5)
EGFR: 57 ML/MIN/1.73M2 — LOW
EGFR: 57 ML/MIN/1.73M2 — LOW
EOSINOPHIL # BLD AUTO: 0.24 K/UL — SIGNIFICANT CHANGE UP (ref 0–0.7)
EOSINOPHIL NFR BLD AUTO: 2.5 % — SIGNIFICANT CHANGE UP (ref 0–8)
GLUCOSE SERPL-MCNC: 135 MG/DL — HIGH (ref 70–99)
HCT VFR BLD CALC: 28 % — LOW (ref 37–47)
HGB BLD-MCNC: 8.6 G/DL — LOW (ref 12–16)
IMM GRANULOCYTES NFR BLD AUTO: 2 % — HIGH (ref 0.1–0.3)
LYMPHOCYTES # BLD AUTO: 1.51 K/UL — SIGNIFICANT CHANGE UP (ref 1.2–3.4)
LYMPHOCYTES # BLD AUTO: 15.9 % — LOW (ref 20.5–51.1)
MAGNESIUM SERPL-MCNC: 1.8 MG/DL — SIGNIFICANT CHANGE UP (ref 1.8–2.4)
MCHC RBC-ENTMCNC: 24.2 PG — LOW (ref 27–31)
MCHC RBC-ENTMCNC: 30.7 G/DL — LOW (ref 32–37)
MCV RBC AUTO: 78.7 FL — LOW (ref 81–99)
MONOCYTES # BLD AUTO: 0.72 K/UL — HIGH (ref 0.1–0.6)
MONOCYTES NFR BLD AUTO: 7.6 % — SIGNIFICANT CHANGE UP (ref 1.7–9.3)
NEUTROPHILS # BLD AUTO: 6.8 K/UL — HIGH (ref 1.4–6.5)
NEUTROPHILS NFR BLD AUTO: 71.6 % — SIGNIFICANT CHANGE UP (ref 42.2–75.2)
NRBC BLD AUTO-RTO: 0 /100 WBCS — SIGNIFICANT CHANGE UP (ref 0–0)
PLATELET # BLD AUTO: 360 K/UL — SIGNIFICANT CHANGE UP (ref 130–400)
PMV BLD: 10.4 FL — SIGNIFICANT CHANGE UP (ref 7.4–10.4)
POTASSIUM SERPL-MCNC: 3.6 MMOL/L — SIGNIFICANT CHANGE UP (ref 3.5–5)
POTASSIUM SERPL-SCNC: 3.6 MMOL/L — SIGNIFICANT CHANGE UP (ref 3.5–5)
PROT SERPL-MCNC: 5.5 G/DL — LOW (ref 6–8)
RBC # BLD: 3.56 M/UL — LOW (ref 4.2–5.4)
RBC # FLD: 18.6 % — HIGH (ref 11.5–14.5)
SODIUM SERPL-SCNC: 141 MMOL/L — SIGNIFICANT CHANGE UP (ref 135–146)
WBC # BLD: 9.5 K/UL — SIGNIFICANT CHANGE UP (ref 4.8–10.8)
WBC # FLD AUTO: 9.5 K/UL — SIGNIFICANT CHANGE UP (ref 4.8–10.8)

## 2025-03-09 PROCEDURE — 99239 HOSP IP/OBS DSCHRG MGMT >30: CPT

## 2025-03-09 RX ORDER — MIDODRINE HYDROCHLORIDE 5 MG/1
1 TABLET ORAL
Qty: 90 | Refills: 0
Start: 2025-03-09 | End: 2025-04-07

## 2025-03-09 RX ORDER — FLUDROCORTISONE ACETATE 0.1 MG
2 TABLET ORAL
Qty: 60 | Refills: 0
Start: 2025-03-09 | End: 2025-04-07

## 2025-03-09 RX ORDER — LEVOTHYROXINE SODIUM 300 MCG
1 TABLET ORAL
Qty: 0 | Refills: 0 | DISCHARGE
Start: 2025-03-09

## 2025-03-09 RX ORDER — MIDODRINE HYDROCHLORIDE 5 MG/1
10 TABLET ORAL
Refills: 0 | Status: DISCONTINUED | OUTPATIENT
Start: 2025-03-09 | End: 2025-03-09

## 2025-03-09 RX ORDER — CARVEDILOL 6.25 MG
1 TABLET ORAL
Refills: 0 | DISCHARGE

## 2025-03-09 RX ORDER — CEFPODOXIME PROXETIL 200 MG/1
2 TABLET, FILM COATED ORAL
Qty: 16 | Refills: 0
Start: 2025-03-09 | End: 2025-03-12

## 2025-03-09 RX ORDER — METRONIDAZOLE 250 MG
1 TABLET ORAL
Qty: 12 | Refills: 0
Start: 2025-03-09 | End: 2025-03-12

## 2025-03-09 RX ADMIN — TICAGRELOR 90 MILLIGRAM(S): 90 TABLET ORAL at 06:09

## 2025-03-09 RX ADMIN — HEPARIN SODIUM 5000 UNIT(S): 1000 INJECTION INTRAVENOUS; SUBCUTANEOUS at 06:09

## 2025-03-09 RX ADMIN — NALOXEGOL OXALATE 12.5 MILLIGRAM(S): 12.5 TABLET, FILM COATED ORAL at 06:10

## 2025-03-09 RX ADMIN — Medication 40 MILLIGRAM(S): at 06:10

## 2025-03-09 RX ADMIN — CEFPODOXIME PROXETIL 400 MILLIGRAM(S): 200 TABLET, FILM COATED ORAL at 17:22

## 2025-03-09 RX ADMIN — DROXIDOPA 200 MILLIGRAM(S): 300 CAPSULE ORAL at 13:10

## 2025-03-09 RX ADMIN — CEFPODOXIME PROXETIL 400 MILLIGRAM(S): 200 TABLET, FILM COATED ORAL at 06:10

## 2025-03-09 RX ADMIN — TIOTROPIUM BROMIDE INHALATION SPRAY 2 PUFF(S): 3.12 SPRAY, METERED RESPIRATORY (INHALATION) at 08:25

## 2025-03-09 RX ADMIN — CLONAZEPAM 1 MILLIGRAM(S): 0.5 TABLET ORAL at 17:22

## 2025-03-09 RX ADMIN — Medication 2 GRAM(S): at 12:08

## 2025-03-09 RX ADMIN — Medication 0.1 MILLIGRAM(S): at 06:10

## 2025-03-09 RX ADMIN — TIZANIDINE 2 MILLIGRAM(S): 4 TABLET ORAL at 13:33

## 2025-03-09 RX ADMIN — BUPROPION HYDROBROMIDE 450 MILLIGRAM(S): 522 TABLET, EXTENDED RELEASE ORAL at 12:37

## 2025-03-09 RX ADMIN — TICAGRELOR 90 MILLIGRAM(S): 90 TABLET ORAL at 17:23

## 2025-03-09 RX ADMIN — Medication 500 MILLIGRAM(S): at 06:09

## 2025-03-09 RX ADMIN — MONTELUKAST SODIUM 10 MILLIGRAM(S): 10 TABLET ORAL at 12:37

## 2025-03-09 RX ADMIN — CLONAZEPAM 1 MILLIGRAM(S): 0.5 TABLET ORAL at 06:11

## 2025-03-09 RX ADMIN — Medication 1 DOSE(S): at 08:35

## 2025-03-09 RX ADMIN — Medication 1 APPLICATION(S): at 12:38

## 2025-03-09 RX ADMIN — Medication 81 MILLIGRAM(S): at 12:37

## 2025-03-09 RX ADMIN — Medication 125 MICROGRAM(S): at 06:10

## 2025-03-09 RX ADMIN — DROXIDOPA 200 MILLIGRAM(S): 300 CAPSULE ORAL at 06:12

## 2025-03-09 RX ADMIN — MIDODRINE HYDROCHLORIDE 7.5 MILLIGRAM(S): 5 TABLET ORAL at 08:37

## 2025-03-09 RX ADMIN — TIZANIDINE 2 MILLIGRAM(S): 4 TABLET ORAL at 06:10

## 2025-03-09 RX ADMIN — Medication 500 MILLIGRAM(S): at 13:33

## 2025-03-09 RX ADMIN — DROXIDOPA 200 MILLIGRAM(S): 300 CAPSULE ORAL at 17:25

## 2025-03-09 NOTE — DISCHARGE NOTE NURSING/CASE MANAGEMENT/SOCIAL WORK - PATIENT PORTAL LINK FT
You can access the FollowMyHealth Patient Portal offered by [facility] by registering at the following website: [email or web address]. By joining [facility]’s FollowMyHealth portal, you will also be able to view your health information using other applications (apps) compatible with our system.

## 2025-03-09 NOTE — DISCHARGE NOTE NURSING/CASE MANAGEMENT/SOCIAL WORK - FINANCIAL ASSISTANCE
[facility] provides services at a reduced cost to those who are determined to be eligible through [facility]’s financial assistance program. Information regarding [facility]’s financial assistance program can be found by going to [email or web address] or by calling [phone].

## 2025-03-09 NOTE — DISCHARGE NOTE NURSING/CASE MANAGEMENT/SOCIAL WORK - NSDCFUADDAPPT_GEN_ALL_CORE_FT
Do you need a primary care doctor or follow-up with a specialist? Our care coordinators will help you find providers near you and schedule any follow-up care visits.    Monday-Friday: 9am-5pm    Call our [facility] team: [phone]

## 2025-03-13 NOTE — CDI QUERY NOTE - NSCDIOTHERTXTBX_GEN_ALL_CORE_HH
Clinical documentation and/or evidence in the medical record indicates that this patient has a gastrointestinal (GI) condition.  In order to ensure accurate coding and accuracy of the clinical record, the documentation in this patient’s medical record requires additional clarification.      Please clarify if the diagnosis associated with administration of Flagyl and Cefepime can be further specified as:  • Administration of Flagyl and Cefepime were associated with bacterial infectious colitis  • Other (specify)    Supporting documentation and/or clinical evidence:     3/5 Discharge Note Provider: history of recurrent diverticulitis, nasopharyngeal CA in remission, COPD, prediabetes, hypertension, carotid artery stenosis status post endarterectomy/stenting here for assessment of recurrent abdominal pain.   ... here recently, found to have persistent diverticular changes, discharged with oral antibiotics...  Recurrent Diverticulitis/Colitis   CT Abd/pelvis 2/8 - acute sigmoid diverticulitis   CT Abd/pelvis 2/12/ - ongoing acute sigmoid diverticulitis   was recommended oral vantin/flagyl but reportedly not covered  CT Abdomen and Pelvis w/ Oral Cont and w/ IV Cont (02.27.25 @ 01:34): IMPRESSION: Transverse and descending colonic wall thickening with surrounding fat   stranding, increased since prior exam and likely reflecting colitis.  CT Abdomen and Pelvis w/ Oral Cont and w/ IV Cont (02.27.25 @ 18:40): Since prior examination;   Stable appearance of transverse and descending colon consistent with colitis. No drainable fluid collection or pneumoperitoneum.  Plan: - noted CT Abd/pelvis 2/27 -- worsening colitis compared to 2/12, but potentially from missing antibiotics from last discharge  - c/w Vantin till 3/12   - c/w PO flagyl 500 mg q 8 hours     Antimicrobials/Anti-Infectives  2/27-3/4 IV Flagyl; every 8 hours x 17 doses  2/27-3/8 IV Cefepime; every 12 hours x 18 doses  3/4- 3/9 PO Flagyl; every 8 hours x 15 doses  3/8-3/9 PO Vantin; every 12 hours x 3 doses (Indication: colitis)    2/26 WBC- 14.65    Thank you,  Holly Guo RN, BSN, CCDS  [phone]/ TEAMS (preferred)

## 2025-03-20 DIAGNOSIS — Z88.1 ALLERGY STATUS TO OTHER ANTIBIOTIC AGENTS: ICD-10-CM

## 2025-03-20 DIAGNOSIS — J44.9 CHRONIC OBSTRUCTIVE PULMONARY DISEASE, UNSPECIFIED: ICD-10-CM

## 2025-03-20 DIAGNOSIS — F32.A DEPRESSION, UNSPECIFIED: ICD-10-CM

## 2025-03-20 DIAGNOSIS — Z87.891 PERSONAL HISTORY OF NICOTINE DEPENDENCE: ICD-10-CM

## 2025-03-20 DIAGNOSIS — R73.03 PREDIABETES: ICD-10-CM

## 2025-03-20 DIAGNOSIS — Z79.890 HORMONE REPLACEMENT THERAPY: ICD-10-CM

## 2025-03-20 DIAGNOSIS — Z95.828 PRESENCE OF OTHER VASCULAR IMPLANTS AND GRAFTS: ICD-10-CM

## 2025-03-20 DIAGNOSIS — H91.92 UNSPECIFIED HEARING LOSS, LEFT EAR: ICD-10-CM

## 2025-03-20 DIAGNOSIS — Z79.82 LONG TERM (CURRENT) USE OF ASPIRIN: ICD-10-CM

## 2025-03-20 DIAGNOSIS — Z85.818 PERSONAL HISTORY OF MALIGNANT NEOPLASM OF OTHER SITES OF LIP, ORAL CAVITY, AND PHARYNX: ICD-10-CM

## 2025-03-20 DIAGNOSIS — I12.9 HYPERTENSIVE CHRONIC KIDNEY DISEASE WITH STAGE 1 THROUGH STAGE 4 CHRONIC KIDNEY DISEASE, OR UNSPECIFIED CHRONIC KIDNEY DISEASE: ICD-10-CM

## 2025-03-20 DIAGNOSIS — Z79.51 LONG TERM (CURRENT) USE OF INHALED STEROIDS: ICD-10-CM

## 2025-03-20 DIAGNOSIS — N17.9 ACUTE KIDNEY FAILURE, UNSPECIFIED: ICD-10-CM

## 2025-03-20 DIAGNOSIS — N18.30 CHRONIC KIDNEY DISEASE, STAGE 3 UNSPECIFIED: ICD-10-CM

## 2025-03-20 DIAGNOSIS — E66.01 MORBID (SEVERE) OBESITY DUE TO EXCESS CALORIES: ICD-10-CM

## 2025-03-20 DIAGNOSIS — A04.9 BACTERIAL INTESTINAL INFECTION, UNSPECIFIED: ICD-10-CM

## 2025-03-20 DIAGNOSIS — G47.33 OBSTRUCTIVE SLEEP APNEA (ADULT) (PEDIATRIC): ICD-10-CM

## 2025-03-20 DIAGNOSIS — E78.5 HYPERLIPIDEMIA, UNSPECIFIED: ICD-10-CM

## 2025-03-20 DIAGNOSIS — E03.9 HYPOTHYROIDISM, UNSPECIFIED: ICD-10-CM

## 2025-03-20 DIAGNOSIS — R74.8 ABNORMAL LEVELS OF OTHER SERUM ENZYMES: ICD-10-CM

## 2025-03-20 DIAGNOSIS — I95.1 ORTHOSTATIC HYPOTENSION: ICD-10-CM

## 2025-03-20 DIAGNOSIS — I65.21 OCCLUSION AND STENOSIS OF RIGHT CAROTID ARTERY: ICD-10-CM

## 2025-03-20 DIAGNOSIS — Z79.02 LONG TERM (CURRENT) USE OF ANTITHROMBOTICS/ANTIPLATELETS: ICD-10-CM

## 2025-03-20 DIAGNOSIS — E83.42 HYPOMAGNESEMIA: ICD-10-CM

## 2025-03-21 ENCOUNTER — APPOINTMENT (OUTPATIENT)
Dept: NEUROSURGERY | Facility: CLINIC | Age: 60
End: 2025-03-21

## 2025-03-24 ENCOUNTER — EMERGENCY (EMERGENCY)
Facility: HOSPITAL | Age: 60
LOS: 0 days | Discharge: HOME | End: 2025-03-25
Attending: EMERGENCY MEDICINE
Payer: MEDICAID

## 2025-03-24 VITALS
DIASTOLIC BLOOD PRESSURE: 84 MMHG | SYSTOLIC BLOOD PRESSURE: 135 MMHG | RESPIRATION RATE: 18 BRPM | TEMPERATURE: 98 F | HEIGHT: 64 IN | HEART RATE: 107 BPM | OXYGEN SATURATION: 96 %

## 2025-03-24 DIAGNOSIS — Z98.890 OTHER SPECIFIED POSTPROCEDURAL STATES: Chronic | ICD-10-CM

## 2025-03-24 DIAGNOSIS — Z90.49 ACQUIRED ABSENCE OF OTHER SPECIFIED PARTS OF DIGESTIVE TRACT: Chronic | ICD-10-CM

## 2025-03-24 LAB
ALBUMIN SERPL ELPH-MCNC: 3.9 G/DL — SIGNIFICANT CHANGE UP (ref 3.5–5.2)
ALP SERPL-CCNC: 106 U/L — SIGNIFICANT CHANGE UP (ref 30–115)
ALT FLD-CCNC: 19 U/L — SIGNIFICANT CHANGE UP (ref 0–41)
ANION GAP SERPL CALC-SCNC: 11 MMOL/L — SIGNIFICANT CHANGE UP (ref 7–14)
APTT BLD: 29.1 SEC — SIGNIFICANT CHANGE UP (ref 27–39.2)
AST SERPL-CCNC: 21 U/L — SIGNIFICANT CHANGE UP (ref 0–41)
BASOPHILS # BLD AUTO: 0.06 K/UL — SIGNIFICANT CHANGE UP (ref 0–0.2)
BASOPHILS NFR BLD AUTO: 0.7 % — SIGNIFICANT CHANGE UP (ref 0–1)
BILIRUB SERPL-MCNC: 0.2 MG/DL — SIGNIFICANT CHANGE UP (ref 0.2–1.2)
BUN SERPL-MCNC: 22 MG/DL — HIGH (ref 10–20)
CALCIUM SERPL-MCNC: 9.7 MG/DL — SIGNIFICANT CHANGE UP (ref 8.4–10.5)
CHLORIDE SERPL-SCNC: 104 MMOL/L — SIGNIFICANT CHANGE UP (ref 98–110)
CO2 SERPL-SCNC: 24 MMOL/L — SIGNIFICANT CHANGE UP (ref 17–32)
CREAT SERPL-MCNC: 1.3 MG/DL — SIGNIFICANT CHANGE UP (ref 0.7–1.5)
EGFR: 47 ML/MIN/1.73M2 — LOW
EGFR: 47 ML/MIN/1.73M2 — LOW
EOSINOPHIL # BLD AUTO: 0.29 K/UL — SIGNIFICANT CHANGE UP (ref 0–0.7)
EOSINOPHIL NFR BLD AUTO: 3.3 % — SIGNIFICANT CHANGE UP (ref 0–8)
GAS PNL BLDV: SIGNIFICANT CHANGE UP
GLUCOSE SERPL-MCNC: 110 MG/DL — HIGH (ref 70–99)
HCG SERPL QL: NEGATIVE — SIGNIFICANT CHANGE UP
HCT VFR BLD CALC: 36.7 % — LOW (ref 37–47)
HGB BLD-MCNC: 11.2 G/DL — LOW (ref 12–16)
IMM GRANULOCYTES NFR BLD AUTO: 0.2 % — SIGNIFICANT CHANGE UP (ref 0.1–0.3)
INR BLD: 0.94 RATIO — SIGNIFICANT CHANGE UP (ref 0.65–1.3)
LIDOCAIN IGE QN: 48 U/L — SIGNIFICANT CHANGE UP (ref 7–60)
LYMPHOCYTES # BLD AUTO: 1.57 K/UL — SIGNIFICANT CHANGE UP (ref 1.2–3.4)
LYMPHOCYTES # BLD AUTO: 17.8 % — LOW (ref 20.5–51.1)
MCHC RBC-ENTMCNC: 24.3 PG — LOW (ref 27–31)
MCHC RBC-ENTMCNC: 30.5 G/DL — LOW (ref 32–37)
MCV RBC AUTO: 79.8 FL — LOW (ref 81–99)
MONOCYTES # BLD AUTO: 0.65 K/UL — HIGH (ref 0.1–0.6)
MONOCYTES NFR BLD AUTO: 7.4 % — SIGNIFICANT CHANGE UP (ref 1.7–9.3)
NEUTROPHILS # BLD AUTO: 6.25 K/UL — SIGNIFICANT CHANGE UP (ref 1.4–6.5)
NEUTROPHILS NFR BLD AUTO: 70.6 % — SIGNIFICANT CHANGE UP (ref 42.2–75.2)
NRBC BLD AUTO-RTO: 0 /100 WBCS — SIGNIFICANT CHANGE UP (ref 0–0)
PLATELET # BLD AUTO: 416 K/UL — HIGH (ref 130–400)
PMV BLD: 10.1 FL — SIGNIFICANT CHANGE UP (ref 7.4–10.4)
POTASSIUM SERPL-MCNC: 4.9 MMOL/L — SIGNIFICANT CHANGE UP (ref 3.5–5)
POTASSIUM SERPL-SCNC: 4.9 MMOL/L — SIGNIFICANT CHANGE UP (ref 3.5–5)
PROT SERPL-MCNC: 6.7 G/DL — SIGNIFICANT CHANGE UP (ref 6–8)
PROTHROM AB SERPL-ACNC: 11.1 SEC — SIGNIFICANT CHANGE UP (ref 9.95–12.87)
RBC # BLD: 4.6 M/UL — SIGNIFICANT CHANGE UP (ref 4.2–5.4)
RBC # FLD: 19.3 % — HIGH (ref 11.5–14.5)
SODIUM SERPL-SCNC: 139 MMOL/L — SIGNIFICANT CHANGE UP (ref 135–146)
TROPONIN T, HIGH SENSITIVITY RESULT: 21 NG/L — HIGH (ref 6–13)
TROPONIN T, HIGH SENSITIVITY RESULT: 22 NG/L — HIGH (ref 6–13)
WBC # BLD: 8.84 K/UL — SIGNIFICANT CHANGE UP (ref 4.8–10.8)
WBC # FLD AUTO: 8.84 K/UL — SIGNIFICANT CHANGE UP (ref 4.8–10.8)

## 2025-03-24 PROCEDURE — 71045 X-RAY EXAM CHEST 1 VIEW: CPT | Mod: 26

## 2025-03-24 PROCEDURE — 71275 CT ANGIOGRAPHY CHEST: CPT | Mod: MC

## 2025-03-24 PROCEDURE — 85610 PROTHROMBIN TIME: CPT

## 2025-03-24 PROCEDURE — 93010 ELECTROCARDIOGRAM REPORT: CPT

## 2025-03-24 PROCEDURE — 93306 TTE W/DOPPLER COMPLETE: CPT

## 2025-03-24 PROCEDURE — 83605 ASSAY OF LACTIC ACID: CPT

## 2025-03-24 PROCEDURE — 84132 ASSAY OF SERUM POTASSIUM: CPT

## 2025-03-24 PROCEDURE — 78452 HT MUSCLE IMAGE SPECT MULT: CPT | Mod: MC

## 2025-03-24 PROCEDURE — 84703 CHORIONIC GONADOTROPIN ASSAY: CPT

## 2025-03-24 PROCEDURE — 71045 X-RAY EXAM CHEST 1 VIEW: CPT

## 2025-03-24 PROCEDURE — 85025 COMPLETE CBC W/AUTO DIFF WBC: CPT

## 2025-03-24 PROCEDURE — 80053 COMPREHEN METABOLIC PANEL: CPT

## 2025-03-24 PROCEDURE — 99285 EMERGENCY DEPT VISIT HI MDM: CPT

## 2025-03-24 PROCEDURE — 71275 CT ANGIOGRAPHY CHEST: CPT | Mod: 26

## 2025-03-24 PROCEDURE — 85018 HEMOGLOBIN: CPT

## 2025-03-24 PROCEDURE — 99285 EMERGENCY DEPT VISIT HI MDM: CPT | Mod: 25

## 2025-03-24 PROCEDURE — 93017 CV STRESS TEST TRACING ONLY: CPT

## 2025-03-24 PROCEDURE — G0378: CPT

## 2025-03-24 PROCEDURE — 93005 ELECTROCARDIOGRAM TRACING: CPT

## 2025-03-24 PROCEDURE — 84295 ASSAY OF SERUM SODIUM: CPT

## 2025-03-24 PROCEDURE — 84484 ASSAY OF TROPONIN QUANT: CPT

## 2025-03-24 PROCEDURE — A9500: CPT

## 2025-03-24 PROCEDURE — 85730 THROMBOPLASTIN TIME PARTIAL: CPT

## 2025-03-24 PROCEDURE — 82330 ASSAY OF CALCIUM: CPT

## 2025-03-24 PROCEDURE — 82803 BLOOD GASES ANY COMBINATION: CPT

## 2025-03-24 PROCEDURE — 85014 HEMATOCRIT: CPT

## 2025-03-24 PROCEDURE — 36415 COLL VENOUS BLD VENIPUNCTURE: CPT

## 2025-03-24 PROCEDURE — 83690 ASSAY OF LIPASE: CPT

## 2025-03-24 NOTE — ED ADULT TRIAGE NOTE - CHIEF COMPLAINT QUOTE
pt states everytime getting up to walk, heart feels like its racing with chest pain going on for 3 weeks. pt also states x 2 vomiting and x 1 diarrhea episode today

## 2025-03-24 NOTE — ED PROVIDER NOTE - ATTENDING APP SHARED VISIT CONTRIBUTION OF CARE
59-year-old female with past medical history of recent admission of diverticulitis, history of GERD, nasopharyngeal cancer s/p radiation, HTN, asthma/COPD, (not on home O2), anxiety/depression, hypothyroid, right carotid artery stenosis s/p stent, s/p cholecystectomy, presents to the ED for 2 weeks of exertional shortness of breath/palpitations/chest pressure associated with associated nausea/vomiting X2 and diarrhea (nonbloody, non bilious).  Patient states feels that the symptoms of SOB/palpitations/chest pain when she is walking but not really while she is at rest.  States she follows with Dr. Magaña from cardiology, and he adjusted her Lasix dose when she complained of the symptoms.  Her last CT heart coronaries was from April 2024 in which she had a calcium score of 87 and a CAD RADS score of 3/4A.  Patient still an active smoker.  Denies any drug or alcohol use.    AVSS On exam Lungs CTAB, Heart Regular S1S2, Abdomen obese, nontender, nondistended +BS, Ext no pedal edema or calf tenderness, distal pulses intact. Neuro intact    A/P exertional SOB/CP/palpitations. Will check CTA chest to r/o PE, check ekg, labs, xray and reassess.     ALL: Cipro-->rash  Meds: same as previous in EMR  SH +smoker. Denies ETOH, denies drugs  PMD: Saran

## 2025-03-24 NOTE — ED PROVIDER NOTE - OBJECTIVE STATEMENT
Patient is a 59-year-old female PMH nasopharyngeal cancer s/p radiation, right carotid stenosis s/p stent, hypertension, s/p Kaci cystectomy, colitis, COPD (not on home O2) coming to ED for chest pain and shortness of breath.  Patient reports that since being admitted to the hospital 2 weeks ago has had chest pain with exertion and exertional shortness of breath.  Reports when she gets up to walk a few steps her heart begins to race, feels heaviness in the middle of her chest, feels she cannot breathe.  Associated nausea and diarrhea.  Patient follows with Dr. Magaña for cardiology who prescribed Lasix and adjusted blood pressure medicine within the last 2 weeks.  Had CCTA April 2024 CADRADS 3/4A.  Denies fever, abdominal pain, leg pain/swelling, headache, cough Patient is a 59-year-old female PMH nasopharyngeal cancer s/p radiation, right carotid stenosis s/p stent, hypertension, s/p Cholecystectomy, colitis, COPD (not on home O2) coming to ED for chest pain and shortness of breath.  Patient reports that since being admitted to the hospital 2 weeks ago has had chest pain with exertion and exertional shortness of breath.  Reports when she gets up to walk a few steps her heart begins to race, feels heaviness in the middle of her chest, feels she cannot breathe.  Associated nausea and diarrhea.  Patient follows with Dr. Magaña for cardiology who prescribed Lasix and adjusted blood pressure medicine within the last 2 weeks.  Had CCTA April 2024 CADRADS 3/4A.  Denies fever, abdominal pain, leg pain/swelling, headache, cough

## 2025-03-24 NOTE — ED PROVIDER NOTE - CLINICAL SUMMARY MEDICAL DECISION MAKING FREE TEXT BOX
59-year-old female with past medical history of recent admission of diverticulitis, history of GERD, nasopharyngeal cancer s/p radiation, HTN, asthma/COPD, (not on home O2), anxiety/depression, hypothyroid, right carotid artery stenosis s/p stent, s/p cholecystectomy, presents to the ED for 2 weeks of exertional shortness of breath/palpitations/chest pressure associated with associated nausea/vomiting X2 and diarrhea (nonbloody, non bilious).  Patient states feels that the symptoms of SOB/palpitations/chest pain when she is walking but not really while she is at rest.  States she follows with Dr. Magaña from cardiology, and he adjusted her Lasix dose when she complained of the symptoms.  Her last CT heart coronaries was from April 2024 in which she had a calcium score of 87 and a CAD RADS score of 3/4A.  Patient still an active smoker.  Denies any drug or alcohol use.    All test results reviewed. Pt has no PE on CTA. +stable RUL thickened interlobular septa and interstitial opacities (from her COPD?). Will offer OBS placement for completion of ACS workup  (trop decreased therefore don't think pt needs tele admission). Recommend consultation in am with Dr Magaña.

## 2025-03-24 NOTE — ED PROVIDER NOTE - PHYSICAL EXAMINATION
CONST: in NAD  EYES: EOMI, Sclera and conjunctiva clear.   ENT: No nasal discharge. Oropharynx normal appearing, no erythema or exudates. Uvula midline.  NECK: Non-tender  CARD: Normal S1 S2; Normal rate and rhythm  RESP: Equal BS B/L, No wheezes, rhonchi or rales. No distress  GI: Soft, non-tender, non-distended.  MS: Normal ROM in all extremities.   SKIN: Warm, dry, Good turgor  NEURO: A&Ox3, No focal deficits. Strength 5/5 with no sensory deficits

## 2025-03-25 ENCOUNTER — RESULT REVIEW (OUTPATIENT)
Age: 60
End: 2025-03-25

## 2025-03-25 VITALS
SYSTOLIC BLOOD PRESSURE: 154 MMHG | OXYGEN SATURATION: 98 % | RESPIRATION RATE: 16 BRPM | DIASTOLIC BLOOD PRESSURE: 86 MMHG | TEMPERATURE: 98 F | HEART RATE: 74 BPM

## 2025-03-25 PROCEDURE — 93018 CV STRESS TEST I&R ONLY: CPT

## 2025-03-25 PROCEDURE — 93016 CV STRESS TEST SUPVJ ONLY: CPT

## 2025-03-25 PROCEDURE — 99236 HOSP IP/OBS SAME DATE HI 85: CPT

## 2025-03-25 PROCEDURE — 93306 TTE W/DOPPLER COMPLETE: CPT | Mod: 26

## 2025-03-25 PROCEDURE — 78452 HT MUSCLE IMAGE SPECT MULT: CPT | Mod: 26

## 2025-03-25 RX ORDER — PREDNISONE 20 MG/1
2 TABLET ORAL
Qty: 10 | Refills: 0
Start: 2025-03-25 | End: 2025-03-29

## 2025-03-25 RX ORDER — ALBUTEROL SULFATE 2.5 MG/3ML
2 VIAL, NEBULIZER (ML) INHALATION
Qty: 1 | Refills: 0
Start: 2025-03-25 | End: 2025-03-31

## 2025-03-25 RX ORDER — REGADENOSON 0.08 MG/ML
0.4 INJECTION, SOLUTION INTRAVENOUS ONCE
Refills: 0 | Status: DISCONTINUED | OUTPATIENT
Start: 2025-03-25 | End: 2025-03-25

## 2025-03-25 RX ORDER — LABETALOL HYDROCHLORIDE 200 MG/1
5 TABLET, FILM COATED ORAL ONCE
Refills: 0 | Status: DISCONTINUED | OUTPATIENT
Start: 2025-03-25 | End: 2025-03-25

## 2025-03-25 NOTE — ED CDU PROVIDER DISPOSITION NOTE - NSFOLLOWUPINSTRUCTIONS_ED_ALL_ED_FT
Follow up with PMD and Pulmonology.    Chest Pain    Chest pain can be caused by many different conditions which may or may not be dangerous. Causes include heartburn, lung infections, heart attack, blood clot in lungs, skin infections, strain or damage to muscle, cartilage, or bones, etc. In addition to a history and physical examination, an electrocardiogram (ECG) or other lab tests may have been performed to determine the cause of your chest pain. Follow up with your primary care provider or with a cardiologist as instructed.     SEEK IMMEDIATE MEDICAL CARE IF YOU HAVE ANY OF THE FOLLOWING SYMPTOMS: worsening chest pain, coughing up blood, unexplained back/neck/jaw pain, severe abdominal pain, dizziness or lightheadedness, fainting, shortness of breath, sweaty or clammy skin, vomiting, or racing heart beat. These symptoms may represent a serious problem that is an emergency. Do not wait to see if the symptoms will go away. Get medical help right away. Call 911 and do not drive yourself to the hospital.

## 2025-03-25 NOTE — ED CDU PROVIDER DISPOSITION NOTE - CLINICAL COURSE
59-year-old female with past medical history of recent admission of diverticulitis, history of GERD, nasopharyngeal cancer s/p radiation, HTN, asthma/COPD, (not on home O2), anxiety/depression, hypothyroid, right carotid artery stenosis s/p stent, s/p cholecystectomy, presents to the ED for 2 weeks of exertional shortness of breath/palpitations/chest pressure associated with associated nausea/vomiting X2 and diarrhea (nonbloody, non bilious).  Her last CT heart coronaries was from April 2024 in which she had a calcium score of 87 and a CAD RADS score of 3/4A. Pt has no PE on CTA. +stable RUL thickened interlobular septa and interstitial opacities (from her COPD?). Patient to CDU for continued observation, reassessment and provocative testing.  Patient with no events overnight, stress test with no ischemia or abnormalities.  Telephone conversation with Dr. Magaña.  Agrees with discharge and follow-up as an outpatient.  All results discussed with patient and patient given return instructions.

## 2025-03-25 NOTE — ED CDU PROVIDER DISPOSITION NOTE - CARE PROVIDER_API CALL
Misbah Valladares E  Pulmonary Disease  [address] [place] [id number]  Phone: [phone]  Fax: [phone]  Follow Up Time:

## 2025-03-25 NOTE — ED CDU PROVIDER DISPOSITION NOTE - PATIENT PORTAL LINK FT
You can access the FollowMyHealth Patient Portal offered by [facility] by registering at the following website: [email or web address]. By joining [employer]’s FollowMyHealth portal, you will also be able to view your health information using other applications (apps) compatible with our system.

## 2025-03-25 NOTE — ED CDU PROVIDER INITIAL DAY NOTE - OBJECTIVE STATEMENT
Patient is a 59-year-old female PMH nasopharyngeal cancer s/p radiation, right carotid stenosis s/p stent, hypertension, s/p Cholecystectomy, colitis, COPD (not on home O2) coming to ED for chest pain and shortness of breath.  Patient reports that since being admitted to the hospital 2 weeks ago has had chest pain with exertion and exertional shortness of breath.  Reports when she gets up to walk a few steps her heart begins to race, feels heaviness in the middle of her chest, feels she cannot breathe.  Associated nausea and diarrhea.  Patient follows with Dr. Magaña for cardiology who prescribed Lasix and adjusted blood pressure medicine within the last 2 weeks.  Had CCTA April 2024 CADRADS 3/4A.  Denies fever, abdominal pain, leg pain/swelling, headache, cough

## 2025-03-25 NOTE — ED CDU PROVIDER INITIAL DAY NOTE - PHYSICAL EXAMINATION
CONST: in NAD  EYES: EOMI, Sclera and conjunctiva clear.   ENT: No nasal discharge. Oropharynx normal appearing, no erythema or exudates. Uvula midline.  NECK: Non-tender  CARD: Normal S1 S2; Normal rate and rhythm  RESP: Equal BS B/L, No wheezes, rhonchi or rales. No distress  GI: Soft, non-tender, non-distended.  MS: Normal ROM in all extremities.   SKIN: Warm, dry, Good turgor  NEURO: A&Ox3, No focal deficits. Strength 5/5 with no sensory
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
## 2025-03-27 ENCOUNTER — APPOINTMENT (OUTPATIENT)
Dept: PULMONOLOGY | Facility: CLINIC | Age: 60
End: 2025-03-27
Payer: MEDICAID

## 2025-03-27 VITALS
BODY MASS INDEX: 44.73 KG/M2 | RESPIRATION RATE: 15 BRPM | HEIGHT: 64 IN | WEIGHT: 262 LBS | OXYGEN SATURATION: 94 % | HEART RATE: 114 BPM | SYSTOLIC BLOOD PRESSURE: 120 MMHG | DIASTOLIC BLOOD PRESSURE: 80 MMHG

## 2025-03-27 DIAGNOSIS — Z86.79 PERSONAL HISTORY OF OTHER DISEASES OF THE CIRCULATORY SYSTEM: ICD-10-CM

## 2025-03-27 DIAGNOSIS — Z78.9 OTHER SPECIFIED HEALTH STATUS: ICD-10-CM

## 2025-03-27 DIAGNOSIS — Z87.891 PERSONAL HISTORY OF NICOTINE DEPENDENCE: ICD-10-CM

## 2025-03-27 DIAGNOSIS — Z86.59 PERSONAL HISTORY OF OTHER MENTAL AND BEHAVIORAL DISORDERS: ICD-10-CM

## 2025-03-27 DIAGNOSIS — G47.33 OBSTRUCTIVE SLEEP APNEA (ADULT) (PEDIATRIC): ICD-10-CM

## 2025-03-27 DIAGNOSIS — Z86.39 PERSONAL HISTORY OF OTHER ENDOCRINE, NUTRITIONAL AND METABOLIC DISEASE: ICD-10-CM

## 2025-03-27 DIAGNOSIS — J44.9 CHRONIC OBSTRUCTIVE PULMONARY DISEASE, UNSPECIFIED: ICD-10-CM

## 2025-03-27 DIAGNOSIS — Z87.09 PERSONAL HISTORY OF OTHER DISEASES OF THE RESPIRATORY SYSTEM: ICD-10-CM

## 2025-03-27 DIAGNOSIS — Z85.819 PERSONAL HISTORY OF MALIGNANT NEOPLASM OF UNSPECIFIED SITE OF LIP, ORAL CAVITY, AND PHARYNX: ICD-10-CM

## 2025-03-27 PROCEDURE — G2211 COMPLEX E/M VISIT ADD ON: CPT | Mod: NC

## 2025-03-27 PROCEDURE — 99214 OFFICE O/P EST MOD 30 MIN: CPT

## 2025-04-02 ENCOUNTER — APPOINTMENT (OUTPATIENT)
Dept: VASCULAR SURGERY | Facility: CLINIC | Age: 60
End: 2025-04-02
Payer: MEDICAID

## 2025-04-02 VITALS
HEIGHT: 64 IN | HEART RATE: 94 BPM | WEIGHT: 259 LBS | SYSTOLIC BLOOD PRESSURE: 179 MMHG | DIASTOLIC BLOOD PRESSURE: 91 MMHG | BODY MASS INDEX: 44.22 KG/M2

## 2025-04-02 DIAGNOSIS — Z95.828 PRESENCE OF OTHER VASCULAR IMPLANTS AND GRAFTS: ICD-10-CM

## 2025-04-02 DIAGNOSIS — T82.898A OTHER SPECIFIED COMPLICATION OF VASCULAR PROSTHETIC DEVICES, IMPLANTS AND GRAFTS, INITIAL ENCOUNTER: ICD-10-CM

## 2025-04-02 DIAGNOSIS — I65.22 OCCLUSION AND STENOSIS OF LEFT CAROTID ARTERY: ICD-10-CM

## 2025-04-02 DIAGNOSIS — Z87.891 PERSONAL HISTORY OF NICOTINE DEPENDENCE: ICD-10-CM

## 2025-04-02 PROCEDURE — 99213 OFFICE O/P EST LOW 20 MIN: CPT

## 2025-04-02 RX ORDER — ALBUTEROL SULFATE 2.5 MG/3ML
(2.5 MG/3ML) SOLUTION RESPIRATORY (INHALATION) 4 TIMES DAILY
Qty: 1 | Refills: 0 | Status: DISCONTINUED | COMMUNITY
Start: 2025-03-27 | End: 2025-04-02

## 2025-04-07 PROBLEM — I65.22 ASYMPTOMATIC STENOSIS OF LEFT CAROTID ARTERY: Status: ACTIVE | Noted: 2025-04-07

## 2025-04-07 PROBLEM — T82.898A CAROTID STENT OCCLUSION: Status: ACTIVE | Noted: 2025-04-07

## 2025-04-07 RX ORDER — HYDROXYZINE HYDROCHLORIDE 50 MG/1
TABLET ORAL
Refills: 0 | Status: ACTIVE | COMMUNITY

## 2025-04-07 RX ORDER — PRIMIDONE 50 MG/1
50 TABLET ORAL
Refills: 0 | Status: ACTIVE | COMMUNITY

## 2025-04-07 RX ORDER — PREDNISONE 20 MG/1
20 TABLET ORAL
Refills: 0 | Status: ACTIVE | COMMUNITY

## 2025-04-07 RX ORDER — OLMESARTAN MEDOXOMIL 20 MG/1
20 TABLET, FILM COATED ORAL
Refills: 0 | Status: ACTIVE | COMMUNITY

## 2025-04-07 RX ORDER — CLONAZEPAM 1 MG/1
1 TABLET, ORALLY DISINTEGRATING ORAL
Refills: 0 | Status: ACTIVE | COMMUNITY

## 2025-04-07 RX ORDER — OMEPRAZOLE 40 MG/1
40 CAPSULE, DELAYED RELEASE ORAL
Refills: 0 | Status: ACTIVE | COMMUNITY

## 2025-04-07 RX ORDER — METOPROLOL TARTRATE 25 MG/1
25 TABLET ORAL
Refills: 0 | Status: ACTIVE | COMMUNITY

## 2025-04-07 RX ORDER — ZOLPIDEM TARTRATE 10 MG/1
10 TABLET ORAL
Refills: 0 | Status: ACTIVE | COMMUNITY

## 2025-04-07 RX ORDER — LEVOTHYROXINE SODIUM 125 UG/1
125 CAPSULE ORAL
Refills: 0 | Status: ACTIVE | COMMUNITY

## 2025-04-07 RX ORDER — FAMOTIDINE 40 MG/1
40 TABLET, FILM COATED ORAL
Refills: 0 | Status: ACTIVE | COMMUNITY

## 2025-04-07 RX ORDER — CLOPIDOGREL BISULFATE 75 MG/1
75 TABLET, FILM COATED ORAL
Refills: 0 | Status: ACTIVE | COMMUNITY

## 2025-04-28 ENCOUNTER — OUTPATIENT (OUTPATIENT)
Dept: OUTPATIENT SERVICES | Facility: HOSPITAL | Age: 60
LOS: 1 days | End: 2025-04-28
Payer: MEDICAID

## 2025-04-28 ENCOUNTER — APPOINTMENT (OUTPATIENT)
Dept: PULMONOLOGY | Facility: HOSPITAL | Age: 60
End: 2025-04-28
Payer: MEDICAID

## 2025-04-28 DIAGNOSIS — R06.02 SHORTNESS OF BREATH: ICD-10-CM

## 2025-04-28 DIAGNOSIS — Z90.49 ACQUIRED ABSENCE OF OTHER SPECIFIED PARTS OF DIGESTIVE TRACT: Chronic | ICD-10-CM

## 2025-04-28 DIAGNOSIS — Z98.890 OTHER SPECIFIED POSTPROCEDURAL STATES: Chronic | ICD-10-CM

## 2025-04-28 PROCEDURE — 94664 DEMO&/EVAL PT USE INHALER: CPT

## 2025-04-28 PROCEDURE — 94060 EVALUATION OF WHEEZING: CPT | Mod: 26

## 2025-04-28 PROCEDURE — 94726 PLETHYSMOGRAPHY LUNG VOLUMES: CPT

## 2025-04-28 PROCEDURE — 94729 DIFFUSING CAPACITY: CPT | Mod: 26

## 2025-04-28 PROCEDURE — 94727 GAS DIL/WSHOT DETER LNG VOL: CPT | Mod: 26

## 2025-04-28 PROCEDURE — 94729 DIFFUSING CAPACITY: CPT

## 2025-04-28 PROCEDURE — 94070 EVALUATION OF WHEEZING: CPT

## 2025-04-29 DIAGNOSIS — R06.02 SHORTNESS OF BREATH: ICD-10-CM

## 2025-05-01 NOTE — ED ADULT NURSE NOTE - NS ED NOTE ABUSE SUSPICION NEGLECT YN
Pt here today for OB follow up  Pt states no concerns  GBS TODAY   Reports +FM  Good # [phone]   Pharmacy Confirmed.  Chaperone offered and provided.   Repeat  on 25 at 09:30 am with Dr. Ramires    No

## 2025-05-12 ENCOUNTER — OUTPATIENT (OUTPATIENT)
Dept: OUTPATIENT SERVICES | Facility: HOSPITAL | Age: 60
LOS: 1 days | Discharge: HOME | End: 2025-05-12
Payer: MEDICAID

## 2025-05-12 ENCOUNTER — APPOINTMENT (OUTPATIENT)
Dept: SLEEP CENTER | Facility: HOSPITAL | Age: 60
End: 2025-05-12
Payer: MEDICAID

## 2025-05-12 DIAGNOSIS — Z90.49 ACQUIRED ABSENCE OF OTHER SPECIFIED PARTS OF DIGESTIVE TRACT: Chronic | ICD-10-CM

## 2025-05-12 DIAGNOSIS — Z98.890 OTHER SPECIFIED POSTPROCEDURAL STATES: Chronic | ICD-10-CM

## 2025-05-12 DIAGNOSIS — G47.33 OBSTRUCTIVE SLEEP APNEA (ADULT) (PEDIATRIC): ICD-10-CM

## 2025-05-12 PROCEDURE — 95811 POLYSOM 6/>YRS CPAP 4/> PARM: CPT

## 2025-05-12 PROCEDURE — 95811 POLYSOM 6/>YRS CPAP 4/> PARM: CPT | Mod: 26

## 2025-05-14 DIAGNOSIS — G47.33 OBSTRUCTIVE SLEEP APNEA (ADULT) (PEDIATRIC): ICD-10-CM

## 2025-05-27 ENCOUNTER — APPOINTMENT (OUTPATIENT)
Dept: PULMONOLOGY | Facility: CLINIC | Age: 60
End: 2025-05-27
Payer: MEDICAID

## 2025-05-27 VITALS
WEIGHT: 275 LBS | BODY MASS INDEX: 46.95 KG/M2 | RESPIRATION RATE: 15 BRPM | HEART RATE: 67 BPM | DIASTOLIC BLOOD PRESSURE: 82 MMHG | SYSTOLIC BLOOD PRESSURE: 146 MMHG | HEIGHT: 64 IN | OXYGEN SATURATION: 96 %

## 2025-05-27 DIAGNOSIS — J45.30 MILD PERSISTENT ASTHMA, UNCOMPLICATED: ICD-10-CM

## 2025-05-27 DIAGNOSIS — G47.33 OBSTRUCTIVE SLEEP APNEA (ADULT) (PEDIATRIC): ICD-10-CM

## 2025-05-27 PROCEDURE — G2211 COMPLEX E/M VISIT ADD ON: CPT | Mod: NC

## 2025-05-27 PROCEDURE — 99213 OFFICE O/P EST LOW 20 MIN: CPT

## 2025-06-06 NOTE — PRE PROCEDURE NOTE - GENERAL PROCEDURE NAME
Diagnostic Cerebral Angiogram amiodarone 200 mg oral tablet: 1 tab(s) orally once a day  apixaban 5 mg oral tablet: 1 tab(s) orally every 12 hours  atorvastatin 40 mg oral tablet: 1 tab(s) orally once a day  clopidogrel 75 mg oral tablet: 1 tab(s) orally once a day  dapagliflozin 10 mg oral tablet: 1 tab(s) orally once a day  Entresto 24 mg-26 mg oral tablet: 1 tab(s) orally 2 times a day  eszopiclone 3 mg oral tablet: 1 tab(s) orally once a day (at bedtime)  ezetimibe 10 mg oral tablet: 1 orally once a day  famotidine 40 mg oral tablet: 1 tab(s) orally once a day  fenofibrate 145 mg oral tablet: 1 orally once a day  melatonin 5 mg oral tablet: 1 tab(s) orally once a day (at bedtime)  metFORMIN 500 mg oral tablet: 1 tab(s) orally 2 times a day  metoprolol succinate 50 mg oral tablet, extended release: 1 tab(s) orally once a day  Soaanz 20 mg oral tablet: 1 tab(s) orally every 12 hours  spironolactone 25 mg oral tablet: 1 tab(s) orally every 24 hours  tiotropium 2.5 mcg/inh inhalation aerosol: 2 inhaled once a day  Tresiba 100 units/mL subcutaneous solution: 34 unit(s) subcutaneous once a day (in the morning)   amiodarone 200 mg oral tablet: 1 tab(s) orally once a day  apixaban 5 mg oral tablet: 1 tab(s) orally every 12 hours  atorvastatin 40 mg oral tablet: 1 tab(s) orally once a day  cefpodoxime 200 mg oral tablet: 1 tab(s) orally every 12 hours  clopidogrel 75 mg oral tablet: 1 tab(s) orally once a day  dapagliflozin 10 mg oral tablet: 1 tab(s) orally once a day  doxycycline monohydrate 50 mg oral capsule: 2 cap(s) orally every 12 hours  Entresto 24 mg-26 mg oral tablet: 1 tab(s) orally 2 times a day  ezetimibe 10 mg oral tablet: 1 orally once a day  famotidine 40 mg oral tablet: 1 tab(s) orally once a day  fenofibrate 145 mg oral tablet: 1 orally once a day  guaiFENesin 600 mg oral tablet, extended release: 1 tab(s) orally every 12 hours as needed for  cough  melatonin 5 mg oral tablet: 1 tab(s) orally once a day (at bedtime)  metFORMIN 500 mg oral tablet: 1 tab(s) orally 2 times a day  metoprolol succinate 50 mg oral tablet, extended release: 1 tab(s) orally once a day  predniSONE 20 mg oral tablet: 2 tab(s) orally once a day take 30 mg PO x 3 days, then 20 mg PO x 3 days, then 10 mg PO x 3 days  Soaanz 20 mg oral tablet: 1 tab(s) orally once a day  spironolactone 25 mg oral tablet: 1 tab(s) orally every 24 hours  tiotropium 2.5 mcg/inh inhalation aerosol: 2 inhaled once a day  Tresiba 100 units/mL subcutaneous solution: 34 unit(s) subcutaneous once a day (in the morning)

## 2025-06-11 ENCOUNTER — EMERGENCY (EMERGENCY)
Facility: HOSPITAL | Age: 60
LOS: 0 days | Discharge: HOME | End: 2025-06-12
Attending: EMERGENCY MEDICINE
Payer: MEDICAID

## 2025-06-11 VITALS
WEIGHT: 276.02 LBS | RESPIRATION RATE: 20 BRPM | DIASTOLIC BLOOD PRESSURE: 87 MMHG | TEMPERATURE: 98 F | OXYGEN SATURATION: 96 % | HEART RATE: 82 BPM | SYSTOLIC BLOOD PRESSURE: 145 MMHG

## 2025-06-11 DIAGNOSIS — K57.92 DIVERTICULITIS OF INTESTINE, PART UNSPECIFIED, WITHOUT PERFORATION OR ABSCESS WITHOUT BLEEDING: ICD-10-CM

## 2025-06-11 DIAGNOSIS — Z98.890 OTHER SPECIFIED POSTPROCEDURAL STATES: Chronic | ICD-10-CM

## 2025-06-11 DIAGNOSIS — I10 ESSENTIAL (PRIMARY) HYPERTENSION: ICD-10-CM

## 2025-06-11 DIAGNOSIS — Z90.49 ACQUIRED ABSENCE OF OTHER SPECIFIED PARTS OF DIGESTIVE TRACT: Chronic | ICD-10-CM

## 2025-06-11 DIAGNOSIS — E78.5 HYPERLIPIDEMIA, UNSPECIFIED: ICD-10-CM

## 2025-06-11 DIAGNOSIS — Z88.1 ALLERGY STATUS TO OTHER ANTIBIOTIC AGENTS: ICD-10-CM

## 2025-06-11 DIAGNOSIS — I25.10 ATHEROSCLEROTIC HEART DISEASE OF NATIVE CORONARY ARTERY WITHOUT ANGINA PECTORIS: ICD-10-CM

## 2025-06-11 DIAGNOSIS — R10.32 LEFT LOWER QUADRANT PAIN: ICD-10-CM

## 2025-06-11 DIAGNOSIS — J44.9 CHRONIC OBSTRUCTIVE PULMONARY DISEASE, UNSPECIFIED: ICD-10-CM

## 2025-06-11 DIAGNOSIS — E03.9 HYPOTHYROIDISM, UNSPECIFIED: ICD-10-CM

## 2025-06-11 LAB
ALBUMIN SERPL ELPH-MCNC: 4 G/DL — SIGNIFICANT CHANGE UP (ref 3.5–5.2)
ALP SERPL-CCNC: 108 U/L — SIGNIFICANT CHANGE UP (ref 30–115)
ALT FLD-CCNC: 17 U/L — SIGNIFICANT CHANGE UP (ref 0–41)
ANION GAP SERPL CALC-SCNC: 11 MMOL/L — SIGNIFICANT CHANGE UP (ref 7–14)
APPEARANCE UR: CLEAR — SIGNIFICANT CHANGE UP
AST SERPL-CCNC: 19 U/L — SIGNIFICANT CHANGE UP (ref 0–41)
BASOPHILS # BLD AUTO: 0.06 K/UL — SIGNIFICANT CHANGE UP (ref 0–0.2)
BASOPHILS NFR BLD AUTO: 0.6 % — SIGNIFICANT CHANGE UP (ref 0–1)
BILIRUB SERPL-MCNC: <0.2 MG/DL — SIGNIFICANT CHANGE UP (ref 0.2–1.2)
BILIRUB UR-MCNC: NEGATIVE — SIGNIFICANT CHANGE UP
BUN SERPL-MCNC: 20 MG/DL — SIGNIFICANT CHANGE UP (ref 10–20)
CALCIUM SERPL-MCNC: 9.4 MG/DL — SIGNIFICANT CHANGE UP (ref 8.4–10.5)
CHLORIDE SERPL-SCNC: 101 MMOL/L — SIGNIFICANT CHANGE UP (ref 98–110)
CO2 SERPL-SCNC: 25 MMOL/L — SIGNIFICANT CHANGE UP (ref 17–32)
COLOR SPEC: YELLOW — SIGNIFICANT CHANGE UP
CREAT SERPL-MCNC: 1.4 MG/DL — SIGNIFICANT CHANGE UP (ref 0.7–1.5)
DIFF PNL FLD: NEGATIVE — SIGNIFICANT CHANGE UP
EGFR: 43 ML/MIN/1.73M2 — LOW
EGFR: 43 ML/MIN/1.73M2 — LOW
EOSINOPHIL # BLD AUTO: 0.22 K/UL — SIGNIFICANT CHANGE UP (ref 0–0.7)
EOSINOPHIL NFR BLD AUTO: 2.1 % — SIGNIFICANT CHANGE UP (ref 0–8)
GLUCOSE SERPL-MCNC: 103 MG/DL — HIGH (ref 70–99)
GLUCOSE UR QL: NEGATIVE MG/DL — SIGNIFICANT CHANGE UP
HCT VFR BLD CALC: 30.6 % — LOW (ref 37–47)
HGB BLD-MCNC: 9.3 G/DL — LOW (ref 12–16)
IMM GRANULOCYTES NFR BLD AUTO: 0.6 % — HIGH (ref 0.1–0.3)
KETONES UR QL: ABNORMAL MG/DL
LACTATE SERPL-SCNC: 1.3 MMOL/L — SIGNIFICANT CHANGE UP (ref 0.7–2)
LEUKOCYTE ESTERASE UR-ACNC: NEGATIVE — SIGNIFICANT CHANGE UP
LIDOCAIN IGE QN: 40 U/L — SIGNIFICANT CHANGE UP (ref 7–60)
LYMPHOCYTES # BLD AUTO: 1.74 K/UL — SIGNIFICANT CHANGE UP (ref 1.2–3.4)
LYMPHOCYTES # BLD AUTO: 16.7 % — LOW (ref 20.5–51.1)
MCHC RBC-ENTMCNC: 23.3 PG — LOW (ref 27–31)
MCHC RBC-ENTMCNC: 30.4 G/DL — LOW (ref 32–37)
MCV RBC AUTO: 76.7 FL — LOW (ref 81–99)
MONOCYTES # BLD AUTO: 0.82 K/UL — HIGH (ref 0.1–0.6)
MONOCYTES NFR BLD AUTO: 7.9 % — SIGNIFICANT CHANGE UP (ref 1.7–9.3)
NEUTROPHILS # BLD AUTO: 7.49 K/UL — HIGH (ref 1.4–6.5)
NEUTROPHILS NFR BLD AUTO: 72.1 % — SIGNIFICANT CHANGE UP (ref 42.2–75.2)
NITRITE UR-MCNC: NEGATIVE — SIGNIFICANT CHANGE UP
NRBC BLD AUTO-RTO: 0 /100 WBCS — SIGNIFICANT CHANGE UP (ref 0–0)
PH UR: 5.5 — SIGNIFICANT CHANGE UP (ref 5–8)
PLATELET # BLD AUTO: 409 K/UL — HIGH (ref 130–400)
PMV BLD: 10.7 FL — HIGH (ref 7.4–10.4)
POTASSIUM SERPL-MCNC: 5 MMOL/L — SIGNIFICANT CHANGE UP (ref 3.5–5)
POTASSIUM SERPL-SCNC: 5 MMOL/L — SIGNIFICANT CHANGE UP (ref 3.5–5)
PROT SERPL-MCNC: 6.8 G/DL — SIGNIFICANT CHANGE UP (ref 6–8)
PROT UR-MCNC: SIGNIFICANT CHANGE UP MG/DL
RBC # BLD: 3.99 M/UL — LOW (ref 4.2–5.4)
RBC # FLD: 17.7 % — HIGH (ref 11.5–14.5)
SODIUM SERPL-SCNC: 137 MMOL/L — SIGNIFICANT CHANGE UP (ref 135–146)
SP GR SPEC: 1.02 — SIGNIFICANT CHANGE UP (ref 1–1.03)
UROBILINOGEN FLD QL: 0.2 MG/DL — SIGNIFICANT CHANGE UP (ref 0.2–1)
WBC # BLD: 10.39 K/UL — SIGNIFICANT CHANGE UP (ref 4.8–10.8)
WBC # FLD AUTO: 10.39 K/UL — SIGNIFICANT CHANGE UP (ref 4.8–10.8)

## 2025-06-11 PROCEDURE — 96375 TX/PRO/DX INJ NEW DRUG ADDON: CPT

## 2025-06-11 PROCEDURE — 96376 TX/PRO/DX INJ SAME DRUG ADON: CPT

## 2025-06-11 PROCEDURE — 36415 COLL VENOUS BLD VENIPUNCTURE: CPT

## 2025-06-11 PROCEDURE — 74177 CT ABD & PELVIS W/CONTRAST: CPT

## 2025-06-11 PROCEDURE — 80053 COMPREHEN METABOLIC PANEL: CPT

## 2025-06-11 PROCEDURE — 74177 CT ABD & PELVIS W/CONTRAST: CPT | Mod: 26

## 2025-06-11 PROCEDURE — 99285 EMERGENCY DEPT VISIT HI MDM: CPT

## 2025-06-11 PROCEDURE — 85025 COMPLETE CBC W/AUTO DIFF WBC: CPT

## 2025-06-11 PROCEDURE — 83690 ASSAY OF LIPASE: CPT

## 2025-06-11 PROCEDURE — 99284 EMERGENCY DEPT VISIT MOD MDM: CPT | Mod: 25

## 2025-06-11 PROCEDURE — 83605 ASSAY OF LACTIC ACID: CPT

## 2025-06-11 PROCEDURE — 81003 URINALYSIS AUTO W/O SCOPE: CPT

## 2025-06-11 PROCEDURE — 96374 THER/PROPH/DIAG INJ IV PUSH: CPT

## 2025-06-11 RX ORDER — ONDANSETRON HCL/PF 4 MG/2 ML
4 VIAL (ML) INJECTION ONCE
Refills: 0 | Status: COMPLETED | OUTPATIENT
Start: 2025-06-11 | End: 2025-06-11

## 2025-06-11 RX ADMIN — Medication 8 MILLIGRAM(S): at 20:16

## 2025-06-11 RX ADMIN — Medication 4 MILLIGRAM(S): at 20:16

## 2025-06-11 RX ADMIN — Medication 4 MILLIGRAM(S): at 22:30

## 2025-06-11 NOTE — ED PROVIDER NOTE - CLINICAL SUMMARY MEDICAL DECISION MAKING FREE TEXT BOX
Labs and EKG were ordered and reviewed.  Imaging was ordered and reviewed by me.  Appropriate medications for patient's presenting complaints were ordered and effects were reassessed.  Patient's records (prior hospital, ED visit, and/or nursing home notes if available) were reviewed.  Additional history was obtained from EMS, family, and/or PCP (where available).  Escalation to admission/observation was considered.  However, patient has simple diverticulitis without evidence of sepsis or perforation.  Stable for outpatient treatment and follow up.

## 2025-06-11 NOTE — ED PROVIDER NOTE - OBJECTIVE STATEMENT
pt with pmhx CAD COPD Diverticulitis HTN HLD Hypothyroid presents to ED c/o LLQ pain resembling prior diverticulitis attacks. last colonoscopy approx 1 yr ago. dr purcell is her GI. pain is sharp, nonradiating, moderate. denies exacerbating or relieving factors. Denies fever/chill/HA/dizziness/chest pain/palpitation/sob/v/d/ black stool/bloody stool/urinary sxs

## 2025-06-11 NOTE — ED PROVIDER NOTE - PHYSICAL EXAMINATION
CONSTITUTIONAL: Well-appearing; well-nourished; in no apparent distress.   NECK: Supple; non-tender; no cervical lymphadenopathy.   CARDIOVASCULAR: Normal S1, S2; no murmurs, rubs, or gallops.   RESPIRATORY: Normal chest excursion with respiration; breath sounds clear and equal bilaterally; no wheezes, rhonchi, or rales.  GI/: +LLQ ttp; non-distended  MS: No evidence of trauma or deformity. No CVA tenderness. Normal ROM in all four extremities; non-tender to palpation; distal pulses are normal.   SKIN: Normal for age and race; warm; dry; good turgor; no apparent lesions or exudate.   NEURO/PSYCH: A & O x 4; grossly unremarkable. mood and manner are appropriate.

## 2025-06-11 NOTE — ED PROVIDER NOTE - CARE PROVIDER_API CALL
Venessa Pelayo  Gastroenterology  [address] [place] [address]  Phone: [phone]  Fax: [phone]  Follow Up Time:     Laura Armenta Bilal  Surgery  [address] [facility] [place] [address]  Phone: [phone]  Fax: [phone]  Follow Up Time:

## 2025-06-11 NOTE — ED ADULT TRIAGE NOTE - CHIEF COMPLAINT QUOTE
Pt complaining of abdominal pain x 1 day. hx of diverticulitis. also complaining of worsening SOB, hx of COPD

## 2025-06-11 NOTE — ED ADULT TRIAGE NOTE - HOW PATIENT ADDRESSED, PROFILE
Subjective:      Patient ID: Mabel Silva is a 77 y.o. female. HPI   patient has H/o Multiple medical Problems , she has Dm, HTN, HLD,  , has CHF, Grade 1 HFPEF has H/o TIA In past , Multiple falls in past , She underwent back surgery with Vertebral Fracture   MRI cervical spine Suggestive of Cervical Stenosis with Myelopathy , underwent Surgery , off Hard Collar , feels that her Neck pain is better , still has Balance issues . Patient has history of restless leg syndrome, was on Mirapex at 1 point in time, she is requesting refill of Mirapex. Review of Systems   Constitutional: Negative for activity change, appetite change, chills, diaphoresis, fatigue and fever. HENT: Negative for congestion, dental problem, drooling and ear discharge. Eyes: Negative for pain, discharge, redness and itching. Respiratory: Negative for apnea, cough, choking, chest tightness and shortness of breath. Cardiovascular: Negative for chest pain and leg swelling. Gastrointestinal: Negative for abdominal distention, abdominal pain, blood in stool, constipation and diarrhea. Endocrine: Negative for cold intolerance and heat intolerance. Genitourinary: Negative for difficulty urinating, dysuria, enuresis, flank pain and frequency. Musculoskeletal: Positive for arthralgias, back pain, gait problem (balance issues ), neck pain and neck stiffness. Negative for joint swelling. Skin: Negative for color change, pallor and rash. Neurological: Negative for dizziness, facial asymmetry, light-headedness, numbness and headaches. Psychiatric/Behavioral: Positive for dysphoric mood. Negative for agitation, behavioral problems, confusion and decreased concentration. Objective:   Physical Exam  Constitutional:       Appearance: She is well-developed. She is obese. She is not diaphoretic. HENT:      Head: Normocephalic and atraumatic. Mouth/Throat:      Pharynx: No oropharyngeal exudate.    Eyes:      General: No scleral icterus. Right eye: No discharge. Left eye: No discharge. Conjunctiva/sclera: Conjunctivae normal.      Pupils: Pupils are equal, round, and reactive to light. Neck:      Thyroid: No thyromegaly. Vascular: No JVD. Trachea: No tracheal deviation. Cardiovascular:      Rate and Rhythm: Normal rate. Heart sounds: Murmur (Systolic murmur in aortic area.) heard. No gallop. Pulmonary:      Effort: Pulmonary effort is normal. No respiratory distress. Breath sounds: Normal breath sounds. No stridor. No wheezing or rales. Chest:      Chest wall: No tenderness. Abdominal:      General: Bowel sounds are normal. There is no distension. Palpations: Abdomen is soft. Tenderness: There is no abdominal tenderness. There is no guarding or rebound. Musculoskeletal:         General: Normal range of motion. Cervical back: Normal range of motion and neck supple. Neurological:      Mental Status: She is alert and oriented to person, place, and time. Assessment / Plan:   1. Cerebrovascular accident (CVA), unspecified mechanism ([address]  - atorvastatin (LIPITOR) 80 MG tablet; Take 0.5 tablets by mouth nightly  Dispense: 90 tablet; Refill: 3    2. Acute renal failure, unspecified acute renal failure type ([address]  - Basic Metabolic Panel; Future    3. Major depressive disorder, recurrent, moderate (HCC)  Stable     4. Hypertension, unspecified type  Controlled   - lisinopril (PRINIVIL;ZESTRIL) 30 MG tablet; Take 1 tablet by mouth daily  Dispense: 90 tablet; Refill: 0    5. Neck pain  - External Referral To Physical Therapy    6. RLS (restless legs syndrome)  - pramipexole (MIRAPEX) 0.5 MG tablet; Take 1 tablet by mouth nightly  Dispense: 90 tablet; Refill: 3      · Return in about 6 weeks (around 9/2/2021). · Reviewed prior labs and health maintenance. · Discussed use, benefit, and side effects of prescribed medications.   Barriers to medication compliance addressed. All patient questions answered. Pt voiced understanding. Kayla Murguia MD  [facility]  7/28/2021, 10:15 PM    Please note that this chart was generated using voice recognition Dragon dictation software. Although every effort was made to ensure the accuracy of this automated transcription, some errors in transcription may have occurred. Visit Information    Have you changed or started any medications since your last visit including any over-the-counter medicines, vitamins, or herbal medicines? no   Are you having any side effects from any of your medications? -  no  Have you stopped taking any of your medications? Is so, why? -  no    Have you seen any other physician or provider since your last visit? yes  Have you had any other diagnostic tests since your last visit? Yes - Records Requested  Have you been seen in the emergency room and/or had an admission to a hospital since we last saw you? No  Have you had your routine dental cleaning in the past 6 months? yes -     Have you activated your [email or web address] account? If not, what are your barriers?  Yes     Patient Care Team:  Kayla Murguia MD as PCP - General (Internal Medicine)  Kayla Murguia MD as PCP [facility]  Luanna Hamman, MD as Consulting Physician (Gastroenterology)    Medical History Review  Past Medical, Family, and Social History reviewed and does not contribute to the patient presenting condition    Health Maintenance   Topic Date Due    Diabetic foot exam  Never done    Diabetic retinal exam  Never done    DTaP/Tdap/Td vaccine (1 - Tdap) Never done    Shingles Vaccine (1 of 2) Never done    Flu vaccine (1) 09/01/2021    Annual Wellness Visit (AWV)  12/04/2021    A1C test (Diabetic or Prediabetic)  02/22/2022    Lipid screen  03/12/2022    Potassium monitoring  05/21/2022    Creatinine monitoring  05/21/2022    Breast cancer screen  05/06/2023    Colon cancer screen colonoscopy 10/07/2026    DEXA (modify frequency per FRAX score)  Completed    Pneumococcal 65+ years Vaccine  Completed    COVID-19 Vaccine  Completed    Hepatitis C screen  Completed    Hepatitis A vaccine  Aged Out    Hib vaccine  Aged Out    Meningococcal (ACWY) vaccine  Aged Out tavares

## 2025-06-11 NOTE — ED PROVIDER NOTE - PATIENT PORTAL LINK FT
You can access the FollowMyHealth Patient Portal offered by [facility] by registering at the following website: [email or web address]. By joining [employer]’s FollowMyHealth portal, you will also be able to view your health information using other applications (apps) compatible with our system.

## 2025-06-12 VITALS
HEART RATE: 86 BPM | SYSTOLIC BLOOD PRESSURE: 139 MMHG | DIASTOLIC BLOOD PRESSURE: 80 MMHG | RESPIRATION RATE: 20 BRPM | OXYGEN SATURATION: 96 %

## 2025-06-12 RX ORDER — METRONIDAZOLE 250 MG
500 TABLET ORAL ONCE
Refills: 0 | Status: COMPLETED | OUTPATIENT
Start: 2025-06-12 | End: 2025-06-12

## 2025-06-12 RX ORDER — CEFDINIR 250 MG/5ML
1 POWDER, FOR SUSPENSION ORAL
Qty: 20 | Refills: 0
Start: 2025-06-12 | End: 2025-06-21

## 2025-06-12 RX ORDER — CEFPODOXIME PROXETIL 200 MG/1
100 TABLET, FILM COATED ORAL ONCE
Refills: 0 | Status: COMPLETED | OUTPATIENT
Start: 2025-06-12 | End: 2025-06-12

## 2025-06-12 RX ORDER — METRONIDAZOLE 250 MG
1 TABLET ORAL
Qty: 20 | Refills: 0
Start: 2025-06-12 | End: 2025-06-21

## 2025-06-12 RX ORDER — BUTYROSPERMUM PARKII(SHEA BUTTER), SIMMONDSIA CHINENSIS (JOJOBA) SEED OIL, ALOE BARBADENSIS LEAF EXTRACT .01; 1; 3.5 G/100G; G/100G; G/100G
1 LIQUID TOPICAL
Qty: 20 | Refills: 0
Start: 2025-06-12 | End: 2025-06-21

## 2025-06-12 RX ORDER — CEFPODOXIME PROXETIL 200 MG/1
1 TABLET, FILM COATED ORAL
Qty: 20 | Refills: 0
Start: 2025-06-12 | End: 2025-06-21

## 2025-06-12 RX ADMIN — Medication 500 MILLIGRAM(S): at 01:16

## 2025-06-12 RX ADMIN — CEFPODOXIME PROXETIL 100 MILLIGRAM(S): 200 TABLET, FILM COATED ORAL at 01:16

## 2025-06-12 NOTE — ED ADULT NURSE REASSESSMENT NOTE - NS ED NURSE REASSESS COMMENT FT1
per  arya Hay to be picked up at 1000 to go to [facility]
pt verbalized understanding of d/c instructions, f/u care, meds. No acute distress. VSS. IV removed. pt w/c to lobby to wait for ride
DISPLAY PLAN FREE TEXT

## 2025-07-04 ENCOUNTER — NON-APPOINTMENT (OUTPATIENT)
Age: 60
End: 2025-07-04

## 2025-07-05 ENCOUNTER — NON-APPOINTMENT (OUTPATIENT)
Age: 60
End: 2025-07-05

## 2025-07-07 NOTE — ED ADULT NURSE NOTE - PMH
Home Anxiety    Cervical disc disease  sequelae of radtion for nasopharyngeal cancer  COPD (chronic obstructive pulmonary disease)    Diverticulitis    Hemorrhoids    HTN (hypertension)    Hypothyroid    Left ear hearing loss    Nasopharyngeal cancer    Pulmonary nodules/lesions, multiple

## 2025-07-16 NOTE — ED PROVIDER NOTE - NS ED ATTENDING STATEMENT MOD
How Severe Is Your Rash?: moderate
Is This A New Presentation, Or A Follow-Up?: Rash
Additional History: Patient states she has been using her deodorant for years though without changing it.
I have personally seen and examined this patient.  I have fully participated in the care of this patient. I have reviewed all pertinent clinical information, including history, physical exam, plan and the Resident’s note and agree except as noted.

## 2025-07-17 ENCOUNTER — EMERGENCY (EMERGENCY)
Facility: HOSPITAL | Age: 60
LOS: 0 days | Discharge: HOME | End: 2025-07-18
Attending: EMERGENCY MEDICINE
Payer: MEDICAID

## 2025-07-17 VITALS
TEMPERATURE: 98 F | RESPIRATION RATE: 18 BRPM | WEIGHT: 277.56 LBS | SYSTOLIC BLOOD PRESSURE: 155 MMHG | DIASTOLIC BLOOD PRESSURE: 876 MMHG | HEIGHT: 64 IN | OXYGEN SATURATION: 96 % | HEART RATE: 86 BPM

## 2025-07-17 DIAGNOSIS — Z98.890 OTHER SPECIFIED POSTPROCEDURAL STATES: Chronic | ICD-10-CM

## 2025-07-17 DIAGNOSIS — R59.0 LOCALIZED ENLARGED LYMPH NODES: ICD-10-CM

## 2025-07-17 DIAGNOSIS — E78.5 HYPERLIPIDEMIA, UNSPECIFIED: ICD-10-CM

## 2025-07-17 DIAGNOSIS — Z90.49 ACQUIRED ABSENCE OF OTHER SPECIFIED PARTS OF DIGESTIVE TRACT: Chronic | ICD-10-CM

## 2025-07-17 DIAGNOSIS — I10 ESSENTIAL (PRIMARY) HYPERTENSION: ICD-10-CM

## 2025-07-17 DIAGNOSIS — R22.1 LOCALIZED SWELLING, MASS AND LUMP, NECK: ICD-10-CM

## 2025-07-17 DIAGNOSIS — Z88.1 ALLERGY STATUS TO OTHER ANTIBIOTIC AGENTS: ICD-10-CM

## 2025-07-17 DIAGNOSIS — J44.9 CHRONIC OBSTRUCTIVE PULMONARY DISEASE, UNSPECIFIED: ICD-10-CM

## 2025-07-17 DIAGNOSIS — Z85.818 PERSONAL HISTORY OF MALIGNANT NEOPLASM OF OTHER SITES OF LIP, ORAL CAVITY, AND PHARYNX: ICD-10-CM

## 2025-07-17 DIAGNOSIS — E03.9 HYPOTHYROIDISM, UNSPECIFIED: ICD-10-CM

## 2025-07-17 DIAGNOSIS — R51.9 HEADACHE, UNSPECIFIED: ICD-10-CM

## 2025-07-17 LAB
ANION GAP SERPL CALC-SCNC: 14 MMOL/L — SIGNIFICANT CHANGE UP (ref 7–14)
BASOPHILS # BLD AUTO: 0.04 K/UL — SIGNIFICANT CHANGE UP (ref 0–0.2)
BASOPHILS NFR BLD AUTO: 0.5 % — SIGNIFICANT CHANGE UP (ref 0–1)
BUN SERPL-MCNC: 22 MG/DL — HIGH (ref 10–20)
CALCIUM SERPL-MCNC: 9.4 MG/DL — SIGNIFICANT CHANGE UP (ref 8.4–10.5)
CHLORIDE SERPL-SCNC: 103 MMOL/L — SIGNIFICANT CHANGE UP (ref 98–110)
CO2 SERPL-SCNC: 24 MMOL/L — SIGNIFICANT CHANGE UP (ref 17–32)
CREAT SERPL-MCNC: 1.2 MG/DL — SIGNIFICANT CHANGE UP (ref 0.7–1.5)
EGFR: 52 ML/MIN/1.73M2 — LOW
EGFR: 52 ML/MIN/1.73M2 — LOW
EOSINOPHIL # BLD AUTO: 0.29 K/UL — SIGNIFICANT CHANGE UP (ref 0–0.7)
EOSINOPHIL NFR BLD AUTO: 3.3 % — SIGNIFICANT CHANGE UP (ref 0–8)
GLUCOSE SERPL-MCNC: 141 MG/DL — HIGH (ref 70–99)
HCT VFR BLD CALC: 29.2 % — LOW (ref 37–47)
HGB BLD-MCNC: 8.7 G/DL — LOW (ref 12–16)
IMM GRANULOCYTES NFR BLD AUTO: 0.3 % — SIGNIFICANT CHANGE UP (ref 0.1–0.3)
LYMPHOCYTES # BLD AUTO: 1.76 K/UL — SIGNIFICANT CHANGE UP (ref 1.2–3.4)
LYMPHOCYTES # BLD AUTO: 20.3 % — LOW (ref 20.5–51.1)
MCHC RBC-ENTMCNC: 22.4 PG — LOW (ref 27–31)
MCHC RBC-ENTMCNC: 29.8 G/DL — LOW (ref 32–37)
MCV RBC AUTO: 75.1 FL — LOW (ref 81–99)
MONOCYTES # BLD AUTO: 0.73 K/UL — HIGH (ref 0.1–0.6)
MONOCYTES NFR BLD AUTO: 8.4 % — SIGNIFICANT CHANGE UP (ref 1.7–9.3)
NEUTROPHILS # BLD AUTO: 5.83 K/UL — SIGNIFICANT CHANGE UP (ref 1.4–6.5)
NEUTROPHILS NFR BLD AUTO: 67.2 % — SIGNIFICANT CHANGE UP (ref 42.2–75.2)
NRBC BLD AUTO-RTO: 0 /100 WBCS — SIGNIFICANT CHANGE UP (ref 0–0)
PLATELET # BLD AUTO: 419 K/UL — HIGH (ref 130–400)
PMV BLD: 9.9 FL — SIGNIFICANT CHANGE UP (ref 7.4–10.4)
POTASSIUM SERPL-MCNC: 4.4 MMOL/L — SIGNIFICANT CHANGE UP (ref 3.5–5)
POTASSIUM SERPL-SCNC: 4.4 MMOL/L — SIGNIFICANT CHANGE UP (ref 3.5–5)
RBC # BLD: 3.89 M/UL — LOW (ref 4.2–5.4)
RBC # FLD: 17.9 % — HIGH (ref 11.5–14.5)
SODIUM SERPL-SCNC: 141 MMOL/L — SIGNIFICANT CHANGE UP (ref 135–146)
WBC # BLD: 8.68 K/UL — SIGNIFICANT CHANGE UP (ref 4.8–10.8)
WBC # FLD AUTO: 8.68 K/UL — SIGNIFICANT CHANGE UP (ref 4.8–10.8)

## 2025-07-17 PROCEDURE — 96375 TX/PRO/DX INJ NEW DRUG ADDON: CPT | Mod: XU

## 2025-07-17 PROCEDURE — 99284 EMERGENCY DEPT VISIT MOD MDM: CPT | Mod: 25

## 2025-07-17 PROCEDURE — 96374 THER/PROPH/DIAG INJ IV PUSH: CPT | Mod: XU

## 2025-07-17 PROCEDURE — 85025 COMPLETE CBC W/AUTO DIFF WBC: CPT

## 2025-07-17 PROCEDURE — 99285 EMERGENCY DEPT VISIT HI MDM: CPT

## 2025-07-17 PROCEDURE — 70491 CT SOFT TISSUE NECK W/DYE: CPT

## 2025-07-17 PROCEDURE — 70491 CT SOFT TISSUE NECK W/DYE: CPT | Mod: 26

## 2025-07-17 PROCEDURE — 96376 TX/PRO/DX INJ SAME DRUG ADON: CPT | Mod: XU

## 2025-07-17 PROCEDURE — 80048 BASIC METABOLIC PNL TOTAL CA: CPT

## 2025-07-17 PROCEDURE — 36415 COLL VENOUS BLD VENIPUNCTURE: CPT

## 2025-07-17 RX ORDER — ONDANSETRON HCL/PF 4 MG/2 ML
4 VIAL (ML) INJECTION ONCE
Refills: 0 | Status: COMPLETED | OUTPATIENT
Start: 2025-07-17 | End: 2025-07-17

## 2025-07-17 RX ADMIN — Medication 4 MILLIGRAM(S): at 22:17

## 2025-07-17 RX ADMIN — Medication 4 MILLIGRAM(S): at 21:08

## 2025-07-17 RX ADMIN — Medication 6 MILLIGRAM(S): at 21:08

## 2025-07-17 NOTE — ED ADULT NURSE NOTE - OBJECTIVE STATEMENT
Pt is AOX4. Pt endorses lump on right side of head for the past three day for 3 days associated with headache. Pt went to  then was referred to the ED. Pt has hx of ca and received radition. No c/o of blurry vision, nausea. Pt endorses headache and that lump is painful to touch.

## 2025-07-17 NOTE — ED PROVIDER NOTE - NSFOLLOWUPINSTRUCTIONS_ED_ALL_ED_FT
Please follow up with your PMD within 1-2 days.    Lymphadenopathy  Body outline showing the lymphatic system.  Lymphadenopathy is when your lymph glands are swollen or larger than normal.    Lymph glands, also called lymph nodes, are clumps of tissue. They filter germs and waste from tissues in your body to your bloodstream. They're part of your body's defense system, or immune system.    Lymphadenopathy has different causes, like infection, autoimmune disease, and cancer. Lymphadenopathy can happen wherever you have lymph nodes. The type you have depends on which nodes it's in, such as:  Cervical lymphadenopathy. This is in the neck.  Mediastinal lymphadenopathy. This is in the chest.  Hilar lymphadenopathy. This is in the lungs.  Axillary lymphadenopathy. This is in the armpits.  Inguinal lymphadenopathy. This is in the groin.  Sometimes, fluid and cells that fight infection build up in your lymph nodes. This happens when your immune system reacts to germs or other substances that get into your body. This makes lymph nodes swell and get bigger.    Treatment is based on what's thought to be the cause. Sometimes, lymph nodes don't go back to normal size after treatment. If yours don't, your health care provider may order tests to help learn why your glands are still swollen and big.    Follow these instructions at home:  A heating pad for use on the swollen area.  Take over-the-counter and prescription medicines only as told by your provider.  If you were prescribed antibiotics, do not stop using them, even if you start to feel better.  If told, apply heat to swollen lymph nodes as told by your provider. Use the heat source that your provider recommends, such as a moist heat pack or a heating pad.  Place a towel between your skin and the heat source.  Leave the heat on only for the time told by your provider to avoid injury.  If your skin turns bright red, remove the heat right away to prevent burns. The risk of burns is higher if you cannot feel pain, heat, or cold.  Check your swollen lymph nodes every day for changes. Check other places where you have lymph nodes as told. Check for changes such as:  More swelling.  Sudden growth in size.  Redness or pain.  Hardness.  Contact a health care provider if:  You have lymph nodes that:  Are still swollen after 2 weeks.  Have gotten bigger all of a sudden or the swelling spreads.  Are red, painful, or hard.  Fluid leaks from the skin near a swollen lymph node.  You get a fever, chills, or night sweats.  You feel tired.  You have a sore throat.  Your abdomen hurts.  You lose weight without trying.

## 2025-07-17 NOTE — ED PROVIDER NOTE - CLINICAL SUMMARY MEDICAL DECISION MAKING FREE TEXT BOX
Pt is a 59 yr old female with a PMH of HTN, hyperlipidemia, COPD and nasopharyngeal cancer who presents to the ED c/o a lump on the back of her right head/neck for 3 days.  It is causing a headache.  She had a fever (100.1F yesterday) but fever subsided.  No fever today.  Additionally, her voice has been raspy for the past 1 month.    On our exam, pt is A&O x 3 in NAD and appears uncomfortable  Ear exam: TMs normal right ear; no mastoid tenderness  Head/neck:  There is soft tissue swelling inferior to right occiput (but no fluctuance) with tenderness to this area.  No erythema of the skin and no warmth of the skin to this region.    Impression:  Neck pain   CT soft tissue neck -- showing some SQ lymph node but no acute concerning findings  Advised to follow up with her primary medical doctor

## 2025-07-17 NOTE — ED PROVIDER NOTE - PATIENT PORTAL LINK FT
You can access the FollowMyHealth Patient Portal offered by [facility] by registering at the following website: [email or web address]. By joining [employer]’s FollowMyHealth portal, you will also be able to view your health information using other applications (apps) compatible with our system.

## 2025-07-17 NOTE — ED PROVIDER NOTE - OBJECTIVE STATEMENT
59-year-old female PMHx HTN, HLD, COPD, Hypothyroid, Right carotid stent, nasopharyngeal Cancer (2013) presenting today for a lump on her right back of her head for 3 days associated with headache. Patient states she went to urgent care this morning and was told to come to the emergency department for further evaluation.  Patient states pain radiates from the back of her neck up to her right forehead and right eye area.  Patient denies any change in vision, ear pain/discharge, change of hearing, chest pain, shortness of breath, N/V/D.

## 2025-07-17 NOTE — ED PROVIDER NOTE - PHYSICAL EXAMINATION
Vital Signs: I have reviewed the initial vital signs.  Constitutional: NAD, well-nourished, appears stated age  HEENT: Right sided neck lump, airway patent, moist MM. EOMI, PERRLA.  CV: regular rate, regular rhythm, well-perfused extremities  Lungs: BCTA, no increased WOB.  ABD: NTND, no guarding or rebound  MSK: Neck supple, nontender, nl ROM, no stepoff. Chest nontender. Ext nontender, nl rom, no deformity.   INTEG: Skin warm, dry, no rash.  NEURO: A&Ox3, normal strength, nl sensation throughout, normal speech.   PSYCH: Calm, cooperative, normal affect and interaction.

## 2025-07-17 NOTE — ED PROVIDER NOTE - ATTENDING CONTRIBUTION TO CARE
Pt is a 59 yr old female with a PMH of HTN, hyperlipidemia, COPD and nasopharyngeal cancer who presents to the ED c/o a lump on the back of her right head/neck for 3 days.  It is causing a headache.  She felt feverish last night.    On our exam, pt is A&O x 3 in NAD and appears uncomfortable  Ear exam: TMs normal right ear; no mastoid tenderness  Head/neck:  There is soft tissue swelling inferior to right occiput (but no fluctuance) with tenderness to this area.  No erythema of the skin and no warmth of the skin to this region. Pt is a 59 yr old female with a PMH of HTN, hyperlipidemia, COPD and nasopharyngeal cancer who presents to the ED c/o a lump on the back of her right head/neck for 3 days.  It is causing a headache.  She had a fever (100.1F yesterday) but fever subsided.  No fever today.  Additionally, her voice has been raspy for the past 1 month.    On our exam, pt is A&O x 3 in NAD and appears uncomfortable  Ear exam: TMs normal right ear; no mastoid tenderness  Head/neck:  There is soft tissue swelling inferior to right occiput (but no fluctuance) with tenderness to this area.  No erythema of the skin and no warmth of the skin to this region.    Impression:  Neck pain  Plan: CT soft tissue neck Pt is a 59 yr old female with a PMH of HTN, hyperlipidemia, COPD and nasopharyngeal cancer who presents to the ED c/o a lump on the back of her right head/neck for 3 days.  It is causing a headache.  She had a fever (100.1F yesterday) but fever subsided.  No fever today.  Additionally, her voice has been raspy for the past 1 month.    On our exam, pt is A&O x 3 in NAD and appears uncomfortable  Ear exam: TMs normal right ear; no mastoid tenderness  Head/neck:  There is soft tissue swelling inferior to right occiput (but no fluctuance) with tenderness to this area.  No erythema of the skin and no warmth of the skin to this region.    Impression:  Neck pain   CT soft tissue neck -- showing some SQ lymph node but no acute concerning findings  Will f/u as outpatient

## 2025-07-18 VITALS
HEART RATE: 90 BPM | TEMPERATURE: 98 F | RESPIRATION RATE: 18 BRPM | OXYGEN SATURATION: 95 % | SYSTOLIC BLOOD PRESSURE: 181 MMHG | DIASTOLIC BLOOD PRESSURE: 78 MMHG

## 2025-07-18 RX ORDER — TRAMADOL HYDROCHLORIDE AND ACETAMINOPHEN 37.5; 325 MG/1; MG/1
2 TABLET ORAL
Qty: 10 | Refills: 0
Start: 2025-07-18

## 2025-07-29 DIAGNOSIS — E66.813 OBESITY, CLASS 3: ICD-10-CM

## 2025-07-30 ENCOUNTER — APPOINTMENT (OUTPATIENT)
Dept: SURGERY | Facility: CLINIC | Age: 60
End: 2025-07-30

## 2025-08-19 ENCOUNTER — APPOINTMENT (OUTPATIENT)
Dept: VASCULAR SURGERY | Facility: CLINIC | Age: 60
End: 2025-08-19

## 2025-09-02 ENCOUNTER — APPOINTMENT (OUTPATIENT)
Dept: PULMONOLOGY | Facility: CLINIC | Age: 60
End: 2025-09-02

## 2025-09-11 ENCOUNTER — APPOINTMENT (OUTPATIENT)
Dept: SURGERY | Facility: CLINIC | Age: 60
End: 2025-09-11

## 2025-09-11 ENCOUNTER — NON-APPOINTMENT (OUTPATIENT)
Age: 60
End: 2025-09-11

## 2025-09-24 PROBLEM — J45.909 ACTIVE ASTHMA: Status: ACTIVE | Noted: 2025-09-24
